# Patient Record
Sex: FEMALE | Race: WHITE | Employment: OTHER | URBAN - METROPOLITAN AREA
[De-identification: names, ages, dates, MRNs, and addresses within clinical notes are randomized per-mention and may not be internally consistent; named-entity substitution may affect disease eponyms.]

---

## 2017-01-12 ENCOUNTER — OFFICE VISIT (OUTPATIENT)
Dept: PEDIATRICS | Facility: CLINIC | Age: 77
End: 2017-01-12
Payer: MEDICARE

## 2017-01-12 VITALS
WEIGHT: 184.9 LBS | SYSTOLIC BLOOD PRESSURE: 144 MMHG | HEIGHT: 64 IN | OXYGEN SATURATION: 96 % | TEMPERATURE: 98 F | DIASTOLIC BLOOD PRESSURE: 70 MMHG | BODY MASS INDEX: 31.57 KG/M2 | HEART RATE: 93 BPM

## 2017-01-12 DIAGNOSIS — I35.0 NONRHEUMATIC AORTIC VALVE STENOSIS: ICD-10-CM

## 2017-01-12 DIAGNOSIS — K21.9 GASTROESOPHAGEAL REFLUX DISEASE WITHOUT ESOPHAGITIS: ICD-10-CM

## 2017-01-12 DIAGNOSIS — Z23 IMMUNIZATION DUE: ICD-10-CM

## 2017-01-12 DIAGNOSIS — C43.9 METASTATIC MALIGNANT MELANOMA (H): Primary | ICD-10-CM

## 2017-01-12 DIAGNOSIS — K21.9 GASTROESOPHAGEAL REFLUX DISEASE WITHOUT ESOPHAGITIS: Primary | ICD-10-CM

## 2017-01-12 DIAGNOSIS — Z23 NEED FOR PROPHYLACTIC VACCINATION AND INOCULATION AGAINST INFLUENZA: ICD-10-CM

## 2017-01-12 PROCEDURE — 90662 IIV NO PRSV INCREASED AG IM: CPT | Performed by: INTERNAL MEDICINE

## 2017-01-12 PROCEDURE — G0008 ADMIN INFLUENZA VIRUS VAC: HCPCS | Performed by: INTERNAL MEDICINE

## 2017-01-12 PROCEDURE — 99397 PER PM REEVAL EST PAT 65+ YR: CPT | Performed by: INTERNAL MEDICINE

## 2017-01-12 RX ORDER — CHOLECALCIFEROL (VITAMIN D3) 25 MCG
2000 CAPSULE ORAL
COMMUNITY
End: 2017-02-15

## 2017-01-12 RX ORDER — PRAVASTATIN SODIUM 40 MG
TABLET ORAL
COMMUNITY
Start: 2016-11-28 | End: 2017-03-21

## 2017-01-12 RX ORDER — PANTOPRAZOLE SODIUM 40 MG/1
40 TABLET, DELAYED RELEASE ORAL DAILY
Qty: 30 TABLET | Refills: 1 | Status: SHIPPED | OUTPATIENT
Start: 2017-01-12 | End: 2017-01-12

## 2017-01-12 RX ORDER — IBUPROFEN 800 MG/1
TABLET, FILM COATED ORAL
Refills: 1 | Status: ON HOLD | COMMUNITY
Start: 2016-06-23 | End: 2017-02-23

## 2017-01-12 NOTE — TELEPHONE ENCOUNTER
pantoprazole (PROTONIX) 40 MG EC tablet      Last Written Prescription Date: 01/12/17  Last Fill Quantity: 30,  # refills: 1   Last Office Visit with [facility], [facility] or [facility] prescribing provider: 01/12/17 with Dr. Wheeler.    Received faxed notation from pharmacy stating that patient is requesting a 90-day supply.  Nargis Klein, CMA

## 2017-01-12 NOTE — MR AVS SNAPSHOT
After Visit Summary   1/12/2017    Gabino Jones    MRN: [id number]           Patient Information     Date Of Birth          1940        Visit Information        Provider Department      1/12/2017 10:00 AM Jeevan Wheeler MD [facility]        Today's Diagnoses     Gastroesophageal reflux disease without esophagitis    -  1     Immunization due           Care Instructions      Preventive Health Recommendations    Female Ages 65 +    Yearly exam:     See your health care provider every year in order to  o Review health changes.   o Discuss preventive care.    o Review your medicines if your doctor has prescribed any.      You no longer need a yearly Pap test unless you've had an abnormal Pap test in the past 10 years. If you have vaginal symptoms, such as bleeding or discharge, be sure to talk with your provider about a Pap test.      Every 1 to 2 years, have a mammogram.  If you are over 69, talk with your health care provider about whether or not you want to continue having screening mammograms.      Every 10 years, have a colonoscopy. Or, have a yearly FIT test (stool test). These exams will check for colon cancer.       Have a cholesterol test every 5 years, or more often if your doctor advises it.       Have a diabetes test (fasting glucose) every three years. If you are at risk for diabetes, you should have this test more often.       At age 65, have a bone density scan (DEXA) to check for osteoporosis (brittle bone disease).    Shots:    Get a flu shot each year.    Get a tetanus shot every 10 years.    Talk to your doctor about your pneumonia vaccines. There are now two you should receive - Pneumovax (PPSV 23) and Prevnar (PCV 13).    Talk to your doctor about the shingles vaccine.    Talk to your doctor about the hepatitis B vaccine.    Nutrition:     Eat at least 5 servings of fruits and vegetables each day.      Eat whole-grain bread, whole-wheat pasta and brown rice  instead of white grains and rice.      Talk to your provider about Calcium and Vitamin D.     Lifestyle    Exercise at least 150 minutes a week (30 minutes a day, 5 days a week). This will help you control your weight and prevent disease.      Limit alcohol to one drink per day.      No smoking.       Wear sunscreen to prevent skin cancer.       See your dentist twice a year for an exam and cleaning.      See your eye doctor every 1 to 2 years to screen for conditions such as glaucoma, macular degeneration and cataracts.        Follow-ups after your visit        Your next 10 appointments already scheduled     Jan 31, 2017 10:30 AM   (Arrive by 10:15 AM)   New Patient Visit with Lenora Pérez MD   [facility] ([facility])    [address] [place] [id number]   [phone]            Feb 15, 2017  9:30 AM   Lab with  LAB   [facility] Lab ([facility])    [address] [place] [id number] [phone]            Feb 15, 2017 10:00 AM   (Arrive by 9:45 AM)   MR ORBIT FACE NECK W/O & W CONTRAST with [address] MRI ([facility])    [address] [place] [id number] [phone]           Take your medicines as usual, unless your doctor tells you not to. Bring a list of your current medicines to your exam (including vitamins, minerals and over-the-counter drugs).  You will be given intravenous contrast for this exam. To prepare:   The day before your exam, drink extra fluids at least six 8-ounce glasses (unless your doctor tells you to restrict your fluids).   Have a blood test (creatinine test) within 30 days of your exam. Go to your clinic or Diagnostic Imaging Department for this test.  The MRI machine uses a strong magnet. Please wear clothes without metal (snaps, zippers). A sweatsuit works well, or we may give you a  hospital gown.  Please remove any body piercings and hair extensions before you arrive. You will also remove watches, jewelry, hairpins, wallets, dentures, partial dental plates and hearing aids. You may wear contact lenses, and you may be able to wear your rings. We have a safe place to keep your personal items, but it is safer to leave them at home.   **IMPORTANT** THE INSTRUCTIONS BELOW ARE ONLY FOR THOSE PATIENTS WHO HAVE BEEN TOLD THEY WILL RECEIVE SEDATION OR GENERAL ANESTHESIA DURING THEIR MRI PROCEDURE:  IF YOU WILL RECEIVE SEDATION (take medicine to help you relax during your exam):   You must get the medicine from your doctor before you arrive. Bring the medicine to the exam. Do not take it at home.   Arrive one hour early. Bring someone who can take you home after the test. Your medicine will make you sleepy. After the exam, you may not drive, take a bus or take a taxi by yourself.   No eating 8 hours before your exam. You may have clear liquids up until 4 hours before your exam. (Clear liquids include water, clear tea, black coffee and fruit juice without pulp.)  IF YOU WILL RECEIVE ANESTHESIA (be asleep for your exam):   Arrive 1 1/2 hours early. Bring someone who can take you home after the test. You may not drive, take a bus or take a taxi by yourself.   No eating 8 hours before your exam. You may have clear liquids up until 4 hours before your exam. (Clear liquids include water, clear tea, black coffee and fruit juice without pulp.)  Please call the Imaging Department at your exam site with any questions.            Feb 15, 2017 11:00 AM   (Arrive by 10:45 AM)   CT CHEST W CONTRAST with UC[facility]2   [facility] ([facility])    [address]  1st Floor  [facility] [address] [phone]           Please bring any scans or X-rays taken at other hospitals, if similar tests were done. Also bring a list of your medicines, including vitamins, minerals and  over-the-counter drugs. It is safest to leave personal items at home.  Be sure to tell your doctor:   If you have any allergies.   If there s any chance you are pregnant.   If you are breastfeeding.   If you have any special needs.  You will have contrast for this exam. To prepare:   Do not eat or drink for 2 hours before your exam. If you need to take medicine, you may take it with small sips of water. (We may ask you to take liquid medicine as well.)   The day before your exam, drink extra fluids at least six 8-ounce glasses (unless your doctor tells you to restrict your fluids).  Patients over 70 or patients with diabetes or kidney problems:   If you haven t had a blood test (creatinine test) within the last 30 days, go to your clinic or Diagnostic Imaging Department for this test.  If you have diabetes:   If your kidney function is normal, continue taking your metformin (Avandamet, Glucophage, Glucovance, Metaglip) on the day of your exam.   If your kidney function is abnormal, wait 48 hours before restarting this medicine.  Please wear loose clothing, such as a sweat suit or jogging clothes. Avoid snaps, zippers and other metal. We may ask you to undress and put on a hospital gown.  If you have any questions, please call the Imaging Department where you will have your exam.            Feb 15, 2017  2:15 PM   (Arrive by 2:00 PM)   Return Visit with Garett Obregon MD   [facility] ([facility])    [address]  2nd Floor  [facility] [address] [phone]            Feb 16, 2017  2:20 PM   (Arrive by 2:05 PM)   RETURN TUMOR VISIT with Yolanda Whitaker MD   [facility] ([facility])    [address]  4th Floor  [facility] [address] [phone]              Who to contact     If you have questions or need follow up information about today's clinic visit or your schedule please contact  "[facility] directly at [phone].  Normal or non-critical lab and imaging results will be communicated to you by iCrederityhart, letter or phone within 4 business days after the clinic has received the results. If you do not hear from us within 7 days, please contact the clinic through iCrederityhart or phone. If you have a critical or abnormal lab result, we will notify you by phone as soon as possible.  Submit refill requests through Viepage or call your pharmacy and they will forward the refill request to us. Please allow 3 business days for your refill to be completed.          Additional Information About Your Visit        Viepage Information     Viepage gives you secure access to your electronic health record. If you see a primary care provider, you can also send messages to your care team and make appointments. If you have questions, please call your primary care clinic.  If you do not have a primary care provider, please call [phone] and they will assist you.      Viepage is an electronic gateway that provides easy, online access to your medical records. With Viepage, you can request a clinic appointment, read your test results, renew a prescription or communicate with your care team.     To access your existing account, please contact your [facility] or call [phone] for assistance.        Care EveryWhere ID     This is your Care EveryWhere ID. This could be used by other organizations to access your [facility] medical records  [id number]        Your Vitals Were     Pulse Temperature Height BMI (Body Mass Index) Pulse Oximetry       93 98  F (36.7  C) (Tympanic) 5' 4.25\" (1.632 m) 31.49 kg/m2 96%        Blood Pressure from Last 3 Encounters:   01/12/17 144/70   08/10/16 167/73   04/25/16 115/66    Weight from Last 3 Encounters:   01/12/17 184 lb 14.4 oz (83.87 kg)   09/15/16 191 lb (86.637 kg)   08/10/16 189 lb 12.8 oz (86.093 kg)              We Performed the " Following     ADMIN INFLUENZA VIRUS VAC          Today's Medication Changes          These changes are accurate as of: 1/12/17 10:46 AM.  If you have any questions, ask your nurse or doctor.               Start taking these medicines.        Dose/Directions    pantoprazole 40 MG EC tablet   Commonly known as:  PROTONIX   Used for:  Gastroesophageal reflux disease without esophagitis   Started by:  Jeevan Wheeler MD        Dose:  40 mg   Take 1 tablet (40 mg) by mouth daily Take 30-60 minutes before a meal.   Quantity:  30 tablet   Refills:  1         Stop taking these medicines if you haven't already. Please contact your care team if you have questions.     MELOXICAM PO   Stopped by:  Jeevan Wheeler MD           omeprazole 20 MG CR capsule   Commonly known as:  priLOSEC   Stopped by:  Jeevan Wheeler MD           TRAMADOL HCL PO   Stopped by:  Jeevan Wheeler MD                Where to get your medicines      These medications were sent to [facility] [address] [place] [address] [address] [address]     Phone:  [phone]    - pantoprazole 40 MG EC tablet             Primary Care Provider Office Phone # Fax #    Stephani PereiraDESTINEY [phone]       [facility] [address] [address] [facility] [address]        Thank you!     Thank you for choosing [facility]  for your care. Our goal is always to provide you with excellent care. Hearing back from our patients is one way we can continue to improve our services. Please take a few minutes to complete the written survey that you may receive in the mail after your visit with us. Thank you!             Your Updated Medication List - Protect others around you: Learn how to safely use, store and throw away your medicines at www.disposemymeds.org.          This list is accurate as of: 1/12/17 10:46 AM.  Always use your most recent med  list.                   Brand Name Dispense Instructions for use    acetaminophen-codeine 300-30 MG per tablet    TYLENOL #3     Take 2 tablets by mouth every 6 hours as needed for moderate pain       ALPRAZOLAM PO      Take 0.25 mg by mouth daily       BUSPAR PO      Take 5 mg by mouth 3 times daily       DOCQLACE 100 MG capsule   Generic drug:  docusate sodium          EFFEXOR  MG 24 hr capsule   Generic drug:  venlafaxine      Take by mouth daily       FOLIC ACID PO      Take 400 mcg by mouth daily       HYDROCHLOROTHIAZIDE PO      Take 12.5 mg by mouth daily       ibuprofen 800 MG tablet    ADVIL/MOTRIN     TK 1 T PO TID WITH MEALS       LISINOPRIL PO      Take 30 mg by mouth daily       pantoprazole 40 MG EC tablet    PROTONIX    30 tablet    Take 1 tablet (40 mg) by mouth daily Take 30-60 minutes before a meal.       potassium chloride SA 20 MEQ CR tablet    K-DUR/KLOR-CON M         POTASSIUM CITRATE PO      Take 20 mEq by mouth       pravastatin 40 MG tablet    PRAVACHOL         ranitidine 150 MG tablet    ZANTAC     TK 1 T PO BID PRN       Simethicone 180 MG Caps      Take by mouth daily       Vitamin D-3 1000 UNITS Caps      Take 2,000 Units by mouth

## 2017-01-12 NOTE — PROGRESS NOTES
SUBJECTIVE:                                                            Gabino Jones is a 76 year old female who presents for Preventive Visit.  Are you in the first 12 months of your Medicare Part B coverage?  No    Healthy Habits:    Do you get at least three servings of calcium containing foods daily (dairy, green leafy vegetables, etc.)? yes    Amount of exercise or daily activities, outside of work: 7 day(s) per week, 30 minutes a day    Problems taking medications regularly No    Medication side effects: Yes - Tramadol gives pt. A rash and itch as well as Temazepam    Have you had an eye exam in the past two years? yes    Do you see a dentist twice per year? no    Do you have sleep apnea, excessive snoring or daytime drowsiness?no    COGNITIVE SCREEN  1) Repeat 3 items (Banana, Sunrise, Chair)    2) Clock draw: NORMAL  3) 3 item recall: Recalls 1 object   Results: NORMAL clock, 1-2 items recalled: COGNITIVE IMPAIRMENT LESS LIKELY    Mini-CogTM Copyright S aMxi. Licensed by the author for use in [facility]; reprinted with permission ([email or web address]). All rights reserved.          History of melanoma    All Histories reviewed and updated in [facility] as appropriate.  Social History   Substance Use Topics     Smoking status: Former Smoker     Types: Cigarettes     Quit date: 06/10/2004     Smokeless tobacco: Never Used     Alcohol Use: No       The patient does not drink >3 drinks per day nor >7 drinks per week.    Today's PHQ-2 Score:   PHQ-2 ( 1999 Pfizer) 6/25/2015 5/19/2015   Q1: Little interest or pleasure in doing things 0 1   Q2: Feeling down, depressed or hopeless 3 1   PHQ-2 Score 3 2       Do you feel safe in your environment - Yes    Do you have a Health Care Directive?: No    Current providers sharing in care for this patient include:   Patient Care Team:  Stephani Pereira NP as PCP - General  Tatyana Morales RN as Clinic Care Coordinator (Otolaryngology)  Garett Obregon MD as MD  (Oncology)  Layla Campos, RN as Nurse Coordinator (Oncology)      Hearing impairment: No    Ability to successfully perform activities of daily living: Yes, no assistance needed     Fall risk:       Home safety:  none identified  click delete button to remove this line now    The following health maintenance items are reviewed in Epic and correct as of today:  Health Maintenance   Topic Date Due     TETANUS IMMUNIZATION (SYSTEM ASSIGNED)  03/18/1958     ADVANCE DIRECTIVE PLANNING Q5 YRS (NO INBASKET)  03/18/1958     FALL RISK ASSESSMENT  03/18/2005     DEXA SCAN SCREENING (SYSTEM ASSIGNED)  03/18/2005     PNEUMOCOCCAL (1 of 2 - PCV13) 03/18/2005     INFLUENZA VACCINE (SYSTEM ASSIGNED)  09/01/2016     LIPID SCREEN Q5 YR FEMALE (SYSTEM ASSIGNED)  08/10/2021              ROS:  C: NEGATIVE for fever, chills, change in weight  E/M: NEGATIVE for ear, mouth and throat problems  R: NEGATIVE for significant cough or SOB  CV: NEGATIVE for chest pain, palpitations or peripheral edema  GI: POSITIVE for constipation and heartburn or reflux    Current Outpatient Prescriptions   Medication Sig Dispense Refill     ranitidine (ZANTAC) 150 MG tablet TK 1 T PO BID PRN  1     pravastatin (PRAVACHOL) 40 MG tablet        ibuprofen (ADVIL/MOTRIN) 800 MG tablet TK 1 T PO TID WITH MEALS  1     ALPRAZOLAM PO Take 0.25 mg by mouth daily       Cholecalciferol (VITAMIN D-3) 1000 UNITS CAPS Take 2,000 Units by mouth       acetaminophen-codeine (TYLENOL #3) 300-30 MG per tablet Take 2 tablets by mouth every 6 hours as needed for moderate pain       BusPIRone HCl (BUSPAR PO) Take 5 mg by mouth 3 times daily       FOLIC ACID PO Take 400 mcg by mouth daily       MELOXICAM PO Take 7.5 mg by mouth daily       Simethicone 180 MG CAPS Take by mouth daily       DOCQLACE 100 MG capsule   0     potassium chloride SA (K-DUR,KLOR-CON M) 20 MEQ tablet   3     omeprazole (PRILOSEC) 20 MG capsule   3     HYDROCHLOROTHIAZIDE PO Take 12.5 mg by mouth daily    "    LISINOPRIL PO Take 30 mg by mouth daily       venlafaxine (EFFEXOR XR) 150 MG 24 hr capsule Take by mouth daily       POTASSIUM CITRATE PO Take 20 mEq by mouth       Allergies   Allergen Reactions     Novocain [Procaine] Unknown and Rash     Mirtazapine Rash     OBJECTIVE:                                                             Estimated body mass index is 30.84 kg/(m^2) as calculated from the following:    Height as of 8/10/16: 5' 5.98\" (1.676 m).    Weight as of 9/15/16: 191 lb (86.637 kg).  EXAM: /70 mmHg  Pulse 93  Temp(Src) 98  F (36.7  C) (Tympanic)  Ht 5' 4.25\" (1.632 m)  Wt 184 lb 14.4 oz (83.87 kg)  BMI 31.49 kg/m2  SpO2 96%    GENERAL: healthy, alert and no distress  EYES: Eyes grossly normal to inspection  HENT: normal cephalic/atraumatic, nose and mouth without ulcers or lesions, oropharynx clear and oral mucous membranes moist  NECK: no adenopathy, no asymmetry, masses, or scars and thyroid normal to palpation  RESP: lungs clear to auscultation - no rales, rhonchi or wheezes  CV: regular rates and rhythm, normal S1 S2, no S3 or S4, grade 3/6 SUNITHA heard best over the base and no peripheral edema  ABDOMEN: soft, nontender, no hepatosplenomegaly, no masses and bowel sounds normal  MS: no gross musculoskeletal defects noted, no edema  NEURO: Normal strength and tone, mentation intact and speech normal  PSYCH: mentation appears normal, affect normal/bright    ASSESSMENT / PLAN:                                                            1. Metastatic malignant melanoma (H)    The MRI showed questionable frontal bone lesion however I did not see that on the bone scan. Would recommend a PET scan for further evaluation    2. Gastroesophageal reflux disease without esophagitis    Recommend stopping NSAIDs.We'll try Protonix instead of omeprazole.  - pantoprazole (PROTONIX) 40 MG EC tablet; Take 1 tablet (40 mg) by mouth daily Take 30-60 minutes before a meal.  Dispense: 30 tablet; Refill: " "1    3. Immunization due      4. Need for prophylactic vaccination and inoculation against influenza    - FLU VACCINE, INCREASED ANTIGEN, PRESV FREE, AGE 65+ [52752]  - Vaccine Administration, Initial [94834]    5. Nonrheumatic aortic valve stenosis        End of Life Planning:  Patient currently has an advanced directive: no    COUNSELING:  Reviewed preventive health counseling, as reflected in patient instructions       Regular exercise       Healthy diet/nutrition       Vaccinated for: Influenza        Estimated body mass index is 30.84 kg/(m^2) as calculated from the following:    Height as of 8/10/16: 5' 5.98\" (1.676 m).    Weight as of 9/15/16: 191 lb (86.637 kg).     reports that she quit smoking about 12 years ago. Her smoking use included Cigarettes. She has never used smokeless tobacco.      Appropriate preventive services were discussed with this patient, including applicable screening as appropriate for cardiovascular disease, diabetes, osteopenia/osteoporosis, and glaucoma.  As appropriate for age/gender, discussed screening for colorectal cancer, prostate cancer, breast cancer, and cervical cancer. Checklist reviewing preventive services available has been given to the patient.    Reviewed patients plan of care and provided an AVS. The Complex Care Plan (for patients with higher acuity and needing more deliberate coordination of services) for Gabino meets the Care Plan requirement. This Care Plan has been established and reviewed with the Patient.    Counseling Resources:  ATP IV Guidelines  Pooled Cohorts Equation Calculator  Breast Cancer Risk Calculator  FRAX Risk Assessment  ICSI Preventive Guidelines  Dietary Guidelines for Americans, 2010  USDA's MyPlate  ASA Prophylaxis  Lung CA Screening    Jeevan Wheeler MD  [facility]  "

## 2017-01-12 NOTE — PROGRESS NOTES
Injectable Influenza Immunization Documentation    1.  Is the person to be vaccinated sick today?  No    2. Does the person to be vaccinated have an allergy to eggs or to a component of the vaccine?  No    3. Has the person to be vaccinated today ever had a serious reaction to influenza vaccine in the past?  No    4. Has the person to be vaccinated ever had Guillain-Manly syndrome?  No     Form completed by Layla Moon CMA

## 2017-01-18 RX ORDER — PANTOPRAZOLE SODIUM 40 MG/1
40 TABLET, DELAYED RELEASE ORAL DAILY
Qty: 90 TABLET | Refills: 1 | Status: SHIPPED | OUTPATIENT
Start: 2017-01-18 | End: 2017-05-09

## 2017-01-18 NOTE — TELEPHONE ENCOUNTER
Patient is requesting a 90 day supply of this medication. Routing to Dr. Wheeler to review.  Tatyana Gonzalez RN, [facility]

## 2017-01-31 ENCOUNTER — OFFICE VISIT (OUTPATIENT)
Dept: NEUROSURGERY | Facility: CLINIC | Age: 77
End: 2017-01-31

## 2017-01-31 VITALS
DIASTOLIC BLOOD PRESSURE: 51 MMHG | BODY MASS INDEX: 29.89 KG/M2 | RESPIRATION RATE: 20 BRPM | WEIGHT: 186 LBS | OXYGEN SATURATION: 99 % | TEMPERATURE: 97.6 F | HEART RATE: 85 BPM | SYSTOLIC BLOOD PRESSURE: 152 MMHG | HEIGHT: 66 IN

## 2017-01-31 DIAGNOSIS — M89.9 FRONTAL SKULL LESION: Primary | ICD-10-CM

## 2017-01-31 ASSESSMENT — PAIN SCALES - GENERAL: PAINLEVEL: MODERATE PAIN (5)

## 2017-01-31 NOTE — PROGRESS NOTES
Dear Ms. Pereira:    We saw Ms. Gabino Jones in [facility] today for evaluation of a skull lesion at Dr. Barboza's request. She is a 76 year old right-handed woman who developed trouble breathing from a nasal obstruction since 2013. She underwent several courses of antibiotics for a presumed sinus infection and was following with an ENT surgeon. An MRI in 2014 revealed a 2 cm anterior cranial fossa mass and she underwent resection for this on 2014 at [facility]. She continued to bleed and underwent re-operation later the same day to remove more of the lesion. She then underwent another endoscopy at [facility] the following day for persistent bleeding. The histopathology revealed this lesion to be a malignant melanoma. She underwent one more resection surgery on 2014 followed by radiation therapy, completed 2014. Ms. Jones developed vision loss in her left eye that was diagnosed on 2016. A new MRI showed a new bony lesion on the right frontal bone. There were no concerning lesion found in her lumbar spine and she is here to discuss biopsy of her sinus lesion. Today, she comes to clinic with her . She reports her vision was lost due to a stroke. She wears bilateral hearing aids, but her hearing continues to decline. 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 She sees Dr. Archibald at the eye clinic in [place].    MEDICAL HISTORY: She has had cholecystectomy. Hernia repair.    SOCIAL HISTORY: She lives in [place] with her . They have 4 living children and 8 grandchildren. Their eldest son  at 40; he had cerebral palsy. She quit smoking more than 10 years ago; she smoked a pack a day.     PHYSICAL EXAM: On exam, she appears well. She has extensive depressions in the skull and we do not feel an obvious mass. Extraocular movements intact. She moves upper and lower extremities equally and with good coordination. She has baseline dysarthria,  but is coherent. Gross neurological examination is normal.     REVIEW OF STUDIES: We reviewed her MRI from 11/2016 and 08/2016. On both studies there is an enhancing lesion in the lateral convexity of the right frontal bone. The lesion does not appear to extend into the epidural space. The mass enhances homogenously and did not appear to grow between August and Novemebr. Because of her history, metastatic melanoma is certainly a consideration.     IMPRESSION AND PLAN: We agree that obtaining a definitive diagnosis for this skull lesion is important. The best way to achieve that is with an excisional biopsy through a small right frontal craniotomy. She and her  seem to have been prepared for this and agree to proceed.We discussed the most serious risks of the operation including epidural hematoma and infection as well as the risk of indeterminate biopsy. They understand all of this and we will schedule her in the next 2-3 weeks. I appreciate your confidence in involving us in your patient's care. Please do not hesitate to contact us with questions. We will keep you informed of her progress.       SHEA BRITTON MD    I, Aster White, am serving as a scribe to document services personally performed by Shea Britton MD, based upon my observations and the provider's statements to me. All documentation has been reviewed by the aforementioned doctor prior to being entered into the official medical record.    I, Shea Britton, attest that above named individual is acting in scribe capacity, has observed my performance of the services and has documented them in accordance with my direction. The documentation recorded by the scribe accurately reflects the service I personally performed and the decisions made by me.

## 2017-01-31 NOTE — LETTER
2017      RE: Gabino Jones  [address] [facility] [address]       Dear Ms. Pereira:    We saw Ms. Gabino Jones in [facility] today for evaluation of a skull lesion at Dr. Barboza's request. She is a 76 year old right-handed woman who developed trouble breathing from a nasal obstruction since 2013. She underwent several courses of antibiotics for a presumed sinus infection and was following with an ENT surgeon. An MRI in 2014 revealed a 2 cm anterior cranial fossa mass and she underwent resection for this on 2014 at [facility]. She continued to bleed and underwent re-operation later the same day to remove more of the lesion. She then underwent another endoscopy at [facility] the following day for persistent bleeding. The histopathology revealed this lesion to be a malignant melanoma. She underwent one more resection surgery on 2014 followed by radiation therapy, completed 2014. Ms. Jones developed vision loss in her left eye that was diagnosed on 2016. A new MRI showed a new bony lesion on the right frontal bone. There were no concerning lesion found in her lumbar spine and she is here to discuss biopsy of her sinus lesion. Today, she comes to clinic with her . She reports her vision was lost due to a stroke. She wears bilateral hearing aids, but her hearing continues to decline. 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 She sees Dr. Archibald at the eye clinic in [place].    MEDICAL HISTORY: She has had cholecystectomy. Hernia repair.    SOCIAL HISTORY: She lives in [place] with her . They have 4 living children and 8 grandchildren. Their eldest son  at 40; he had cerebral palsy. She quit smoking more than 10 years ago; she smoked a pack a day.     PHYSICAL EXAM: On exam, she appears well. She has extensive depressions in the skull and we do not feel an obvious mass. Extraocular movements intact. She moves upper and  lower extremities equally and with good coordination. She has baseline dysarthria, but is coherent. Gross neurological examination is normal.     REVIEW OF STUDIES: We reviewed her MRI from 11/2016 and 08/2016. On both studies there is an enhancing lesion in the lateral convexity of the right frontal bone. The lesion does not appear to extend into the epidural space. The mass enhances homogenously and did not appear to grow between August and Novemebr. Because of her history, metastatic melanoma is certainly a consideration.     IMPRESSION AND PLAN: We agree that obtaining a definitive diagnosis for this skull lesion is important. The best way to achieve that is with an excisional biopsy through a small right frontal craniotomy. She and her  seem to have been prepared for this and agree to proceed.We discussed the most serious risks of the operation including epidural hematoma and infection as well as the risk of indeterminate biopsy. They understand all of this and we will schedule her in the next 2-3 weeks. I appreciate your confidence in involving us in your patient's care. Please do not hesitate to contact us with questions. We will keep you informed of her progress.       SHEA BRITTON MD    I, Aster White, am serving as a scribe to document services personally performed by Shea Britton MD, based upon my observations and the provider's statements to me. All documentation has been reviewed by the aforementioned doctor prior to being entered into the official medical record.    I, Shea Britton, attest that above named individual is acting in scribe capacity, has observed my performance of the services and has documented them in accordance with my direction. The documentation recorded by the scribe accurately reflects the service I personally performed and the decisions made by me.      Shea Britton MD

## 2017-01-31 NOTE — MR AVS SNAPSHOT
After Visit Summary   1/31/2017    Gabino Jones    MRN: [id number]           Patient Information     Date Of Birth          1940        Visit Information        Provider Department      1/31/2017 10:30 AM Lenora Pérez MD [facility] Neurosurgery        Today's Diagnoses     Frontal skull lesion    -  1       Follow-ups after your visit        Your next 10 appointments already scheduled     Oct 04, 2017   Procedure with Milton Reid MD   [facility], [place], Same Day Surgery (--)    [address]  [place] [id number] [phone]            Dec 13, 2017 10:15 AM CST   LAB with  LAB   [facility] ([facility])    [address] [place] [id number] [phone]           Patient must bring picture ID. Patient should be prepared to give a urine specimen  Please do not eat 10-12 hours before your appointment if you are coming in fasting for labs on lipids, cholesterol, or glucose (sugar). Pregnant women should follow their Care Team instructions. Water with medications is okay. Do not drink coffee or other fluids. If you have concerns about taking  your medications, please ask at office or if scheduling via [employer], send a message by clicking on Secure Messaging, Message Your Care Team.            Dec 13, 2017 10:45 AM CST   (Arrive by 10:30 AM)   MR BRAIN W/O & W CONTRAST with [id number] [facility] ([facility])    [address] [place] [id number] [phone]           Take your medicines as usual, unless your doctor tells you not to. Bring a list of your current medicines to your exam (including vitamins, minerals and over-the-counter drugs).  You will be given intravenous contrast for this exam. To prepare:   The day before your exam, drink extra fluids at least six 8-ounce glasses (unless your doctor tells you to restrict your fluids).   Have a blood test  (creatinine test) within 30 days of your exam. Go to your clinic or Diagnostic Imaging Department for this test.  The MRI machine uses a strong magnet. Please wear clothes without metal (snaps, zippers). A sweatsuit works well, or we may give you a hospital gown.  Please remove any body piercings and hair extensions before you arrive. You will also remove watches, jewelry, hairpins, wallets, dentures, partial dental plates and hearing aids. You may wear contact lenses, and you may be able to wear your rings. We have a safe place to keep your personal items, but it is safer to leave them at home.   **IMPORTANT** THE INSTRUCTIONS BELOW ARE ONLY FOR THOSE PATIENTS WHO HAVE BEEN TOLD THEY WILL RECEIVE SEDATION OR GENERAL ANESTHESIA DURING THEIR MRI PROCEDURE:  IF YOU WILL RECEIVE SEDATION (take medicine to help you relax during your exam):   You must get the medicine from your doctor before you arrive. Bring the medicine to the exam. Do not take it at home.   Arrive one hour early. Bring someone who can take you home after the test. Your medicine will make you sleepy. After the exam, you may not drive, take a bus or take a taxi by yourself.   No eating 8 hours before your exam. You may have clear liquids up until 4 hours before your exam. (Clear liquids include water, clear tea, black coffee and fruit juice without pulp.)  IF YOU WILL RECEIVE ANESTHESIA (be asleep for your exam):   Arrive 1 1/2 hours early. Bring someone who can take you home after the test. You may not drive, take a bus or take a taxi by yourself.   No eating 8 hours before your exam. You may have clear liquids up until 4 hours before your exam. (Clear liquids include water, clear tea, black coffee and fruit juice without pulp.)  Please call the Imaging Department at your exam site with any questions.            Dec 13, 2017 11:40 AM CST   (Arrive by 11:25 AM)   CT CHEST/ABDOMEN/PELVIS W CONTRAST with UCCT1   [facility] [address] [facility] ([facility]  [facility] [place])    [address] [place] [address]  [address] [facility] [address] [phone]           Please bring any scans or X-rays taken at other hospitals, if similar tests were done. Also bring a list of your medicines, including vitamins, minerals and over-the-counter drugs. It is safest to leave personal items at home.  Be sure to tell your doctor:   If you have any allergies.   If there s any chance you are pregnant.   If you are breastfeeding.   If you have any special needs.  You may have contrast for this exam. To prepare:   Do not eat or drink for 2 hours before your exam. If you need to take medicine, you may take it with small sips of water. (We may ask you to take liquid medicine as well.)   The day before your exam, drink extra fluids at least six 8-ounce glasses (unless your doctor tells you to restrict your fluids).  Patients over 70 or patients with diabetes or kidney problems:   If you haven t had a blood test (creatinine test) within the last 30 days, go to your clinic or Diagnostic Imaging Department for this test.  If you have diabetes:   If your kidney function is normal, continue taking your metformin (Avandamet, Glucophage, Glucovance, Metaglip) on the day of your exam.   If your kidney function is abnormal, wait 48 hours before restarting this medicine.  You will have oral contrast for this exam:   You will drink the contrast at home. Get this from your clinic or Diagnostic Imaging Department. Please follow the directions given.  Please wear loose clothing, such as a sweat suit or jogging clothes. Avoid snaps, zippers and other metal. We may ask you to undress and put on a hospital gown.  If you have any questions, please call the Imaging Department where you will have your exam.            Dec 13, 2017  1:45 PM CST   (Arrive by 1:30 PM)   Return Visit with Garett Obregon MD   [facility] ([facility] [place])    [address] [place] [address]  "Se  2nd Floor  [facility] [id number] [phone]            Jan 04, 2018  2:20 PM CST   (Arrive by 2:05 PM)   Return Visit with Yolanda Whitaker MD   [facility] ([facility] [place])    [address] [facility] [address]  [address] [facility] [id number] [phone]              Who to contact     Please call your clinic at [phone] to:    Ask questions about your health    Make or cancel appointments    Discuss your medicines    Learn about your test results    Speak to your doctor   If you have compliments or concerns about an experience at your clinic, or if you wish to file a complaint, please contact [facility] Patient Relations at [phone] or email us at [email or web address]         Additional Information About Your Visit        K[employer] Information     [employer] gives you secure access to your electronic health record. If you see a primary care provider, you can also send messages to your care team and make appointments. If you have questions, please call your primary care clinic.  If you do not have a primary care provider, please call [phone] and they will assist you.      VirtualU is an electronic gateway that provides easy, online access to your medical records. With VirtualU, you can request a clinic appointment, read your test results, renew a prescription or communicate with your care team.     To access your existing account, please contact your [facility] or call [phone] for assistance.        Care EveryWhere ID     This is your Care EveryWhere ID. This could be used by other organizations to access your [facility] medical records  [id number]        Your Vitals Were     Pulse Temperature Respirations Height Pulse Oximetry Breastfeeding?    85 97.6  F (36.4  C) (Oral) 20 1.664 m (5' 5.5\") 99% No    BMI (Body Mass Index)                   30.48 kg/m2            Blood Pressure " from Last 3 Encounters:   09/26/17 120/60   08/30/17 129/64   08/01/17 120/70    Weight from Last 3 Encounters:   09/26/17 83.3 kg (183 lb 11.2 oz)   08/30/17 83.8 kg (184 lb 12.8 oz)   08/01/17 82.1 kg (181 lb)              Today, you had the following     No orders found for display       Primary Care Provider Office Phone # Fax #    Jeevan Law Wheeler [phone] [address] [facility] [address]        Equal Access to Services     [facility]: Hadii aad ku hadasho Soomaali, waaxda luqadaha, qaybta kaalmada adegroveryada, vito wright . So [facility] [phone].    ATENCIÓN: Si habla español, tiene a velazquez disposición servicios gratuitos de asistencia lingüística. Llame al [phone].    We comply with applicable federal civil rights laws and [place] laws. We do not discriminate on the basis of race, color, national origin, age, disability sex, sexual orientation or gender identity.            Thank you!     Thank you for choosing [facility]  for your care. Our goal is always to provide you with excellent care. Hearing back from our patients is one way we can continue to improve our services. Please take a few minutes to complete the written survey that you may receive in the mail after your visit with us. Thank you!             Your Updated Medication List - Protect others around you: Learn how to safely use, store and throw away your medicines at www.disposemymeds.org.          This list is accurate as of: 1/31/17 11:59 PM.  Always use your most recent med list.                   Brand Name Dispense Instructions for use Diagnosis    ALPRAZOLAM PO      Take 0.25 mg by mouth daily        BUSPAR PO      Take 5 mg by mouth 3 times daily        ibuprofen 800 MG tablet    ADVIL/MOTRIN     TK 1 T PO TID WITH MEALS        POTASSIUM CITRATE PO      Take 20 mEq by mouth 2 times daily

## 2017-01-31 NOTE — LETTER
2017       RE: Gabino Jones  [address]  [facility] [address]     Dear Colleague,    Thank you for referring your patient, Gabino Jones, to the [facility] NEUROSURGERY at [facility]. Please see a copy of my visit note below.    Dear Ms. Pereira:    We saw Ms. Gabino Jones in [facility] today for evaluation of a skull lesion at Dr. Barboza's request. She is a 76 year old right-handed woman who developed trouble breathing from a nasal obstruction since 2013. She underwent several courses of antibiotics for a presumed sinus infection and was following with an ENT surgeon. An MRI in 2014 revealed a 2 cm anterior cranial fossa mass and she underwent resection for this on 2014 at [facility]. She continued to bleed and underwent re-operation later the same day to remove more of the lesion. She then underwent another endoscopy at [facility] the following day for persistent bleeding. The histopathology revealed this lesion to be a malignant melanoma. She underwent one more resection surgery on 2014 followed by radiation therapy, completed 2014. Ms. Jones developed vision loss in her left eye that was diagnosed on 2016. A new MRI showed a new bony lesion on the right frontal bone. There were no concerning lesion found in her lumbar spine and she is here to discuss biopsy of her sinus lesion. Today, she comes to clinic with her . She reports her vision was lost due to a stroke. She wears bilateral hearing aids, but her hearing continues to decline. 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 She sees Dr. Archibald at the eye clinic in [place].    MEDICAL HISTORY: She has had cholecystectomy. Hernia repair.    SOCIAL HISTORY: She lives in [place] with her . They have 4 living children and 8 grandchildren. Their eldest son  at 40; he had cerebral palsy. She quit smoking more than 10 years ago; she  smoked a pack a day.     PHYSICAL EXAM: On exam, she appears well. She has extensive depressions in the skull and we do not feel an obvious mass. Extraocular movements intact. She moves upper and lower extremities equally and with good coordination. She has baseline dysarthria, but is coherent. Gross neurological examination is normal.     REVIEW OF STUDIES: We reviewed her MRI from 11/2016 and 08/2016. On both studies there is an enhancing lesion in the lateral convexity of the right frontal bone. The lesion does not appear to extend into the epidural space. The mass enhances homogenously and did not appear to grow between August and Novemebr. Because of her history, metastatic melanoma is certainly a consideration.     IMPRESSION AND PLAN: We agree that obtaining a definitive diagnosis for this skull lesion is important. The best way to achieve that is with an excisional biopsy through a small right frontal craniotomy. She and her  seem to have been prepared for this and agree to proceed.We discussed the most serious risks of the operation including epidural hematoma and infection as well as the risk of indeterminate biopsy. They understand all of this and we will schedule her in the next 2-3 weeks. I appreciate your confidence in involving us in your patient's care. Please do not hesitate to contact us with questions. We will keep you informed of her progress.       MD RADHA JAVIRE Madison Smith, am serving as a scribe to document services personally performed by Lenora Pérez MD, based upon my observations and the provider's statements to me. All documentation has been reviewed by the aforementioned doctor prior to being entered into the official medical record.    I, Lenora Pérez, attest that above named individual is acting in scribe capacity, has observed my performance of the services and has documented them in accordance with my direction. The documentation recorded by the  joseibe accurately reflects the service I personally performed and the decisions made by me.      Again, thank you for allowing me to participate in the care of your patient.      Sincerely,    Lenora Pérez MD

## 2017-02-01 NOTE — NURSING NOTE
Pre-op Teaching                Pre-op folder with specific written instructions was provided to  Gabino.     Discussed pre-op routine and requirements to include:  surgical procedure, post-op recovery and expectations, need for H&P, NPO prior to OR, pre-op antibacterial showers, pain control and importance of follow-up visits.  Surgery scheduling will coordinate OR time/date and update patient as appropriate.  Pre-op will call with more instructions 24-48 hour pre-op.   Ample time was provided for questions and in-depth discussion of topics of heightened interest.  Two four ounce bottle of antibacterial soap solution was given to patient as well as specific instructions for use. Patient and  verbalized understanding of instructions.

## 2017-02-06 DIAGNOSIS — M89.8X8 SKULL MASS: Primary | ICD-10-CM

## 2017-02-10 ENCOUNTER — CARE COORDINATION (OUTPATIENT)
Dept: NEUROSURGERY | Facility: CLINIC | Age: 77
End: 2017-02-10

## 2017-02-10 DIAGNOSIS — M89.9 FRONTAL SKULL LESION: Primary | ICD-10-CM

## 2017-02-13 ENCOUNTER — CARE COORDINATION (OUTPATIENT)
Dept: NEUROSURGERY | Facility: CLINIC | Age: 77
End: 2017-02-13

## 2017-02-13 NOTE — PATIENT INSTRUCTIONS
Dear Gabino,     Enclosed you will find information you will need to prepare for your upcoming surgery at the [facility].  At this time, your surgery is scheduled for:    February 22, 2017 @ 7:45 AM                         You MUST arrive on the Surgery Admission Unit 3C at the [facility] NLT 5:30 AM that same day      You will receive a call from the Pre-op Staff 1-2 days prior to your surgery date to confirm the details of your surgery.       There are several important things you must do before that date.Please call Dr. Pérez's RN Care Coordinator, Annabel Billings @ [phone] within one or two days of receiving this information.  She will go over its contents and assist you in coordinating the necessary tasks prior to your scheduled surgery date.  Please be aware, several of these tasks are REQUIRED before surgery.  If not done in a timely manner, your surgery may be cancelled or delayed.           Feel free to call our clinic at the [facility] ([phone]) if you have questions.                                            Sincerely,        Annabel Billings, RN Care Coordinator  Department of Neurosurgery  [facility]

## 2017-02-15 ENCOUNTER — ONCOLOGY VISIT (OUTPATIENT)
Dept: ONCOLOGY | Facility: CLINIC | Age: 77
End: 2017-02-15
Attending: INTERNAL MEDICINE
Payer: MEDICARE

## 2017-02-15 DIAGNOSIS — R68.0 HYPOTHERMIA NOT ASSOCIATED WITH LOW ENVIRONMENTAL TEMPERATURE: ICD-10-CM

## 2017-02-15 DIAGNOSIS — C30.0 MALIGNANT MELANOMA OF NASAL CAVITY (H): ICD-10-CM

## 2017-02-15 DIAGNOSIS — C30.0 MALIGNANT MELANOMA OF NASAL CAVITY (H): Primary | ICD-10-CM

## 2017-02-15 DIAGNOSIS — C76.0 HEAD AND NECK CANCER (H): ICD-10-CM

## 2017-02-15 LAB
ALBUMIN SERPL-MCNC: 3.1 G/DL (ref 3.4–5)
ALP SERPL-CCNC: 90 U/L (ref 40–150)
ALT SERPL W P-5'-P-CCNC: 15 U/L (ref 0–50)
ANION GAP SERPL CALCULATED.3IONS-SCNC: 10 MMOL/L (ref 3–14)
AST SERPL W P-5'-P-CCNC: 19 U/L (ref 0–45)
BASOPHILS # BLD AUTO: 0.1 10E9/L (ref 0–0.2)
BASOPHILS NFR BLD AUTO: 0.8 %
BILIRUB SERPL-MCNC: 0.4 MG/DL (ref 0.2–1.3)
BUN SERPL-MCNC: 11 MG/DL (ref 7–30)
CALCIUM SERPL-MCNC: 8 MG/DL (ref 8.5–10.1)
CHLORIDE SERPL-SCNC: 98 MMOL/L (ref 94–109)
CO2 SERPL-SCNC: 25 MMOL/L (ref 20–32)
CREAT SERPL-MCNC: 0.86 MG/DL (ref 0.52–1.04)
DIFFERENTIAL METHOD BLD: ABNORMAL
EOSINOPHIL # BLD AUTO: 0.2 10E9/L (ref 0–0.7)
EOSINOPHIL NFR BLD AUTO: 2.1 %
ERYTHROCYTE [DISTWIDTH] IN BLOOD BY AUTOMATED COUNT: 13.2 % (ref 10–15)
GFR SERPL CREATININE-BSD FRML MDRD: 64 ML/MIN/1.7M2
GLUCOSE SERPL-MCNC: 84 MG/DL (ref 70–99)
HCT VFR BLD AUTO: 34.2 % (ref 35–47)
HGB BLD-MCNC: 11.1 G/DL (ref 11.7–15.7)
IMM GRANULOCYTES # BLD: 0 10E9/L (ref 0–0.4)
IMM GRANULOCYTES NFR BLD: 0.5 %
LYMPHOCYTES # BLD AUTO: 1.5 10E9/L (ref 0.8–5.3)
LYMPHOCYTES NFR BLD AUTO: 19.3 %
MCH RBC QN AUTO: 29.1 PG (ref 26.5–33)
MCHC RBC AUTO-ENTMCNC: 32.5 G/DL (ref 31.5–36.5)
MCV RBC AUTO: 90 FL (ref 78–100)
MONOCYTES # BLD AUTO: 0.8 10E9/L (ref 0–1.3)
MONOCYTES NFR BLD AUTO: 10.4 %
NEUTROPHILS # BLD AUTO: 5.1 10E9/L (ref 1.6–8.3)
NEUTROPHILS NFR BLD AUTO: 66.9 %
NRBC # BLD AUTO: 0 10*3/UL
NRBC BLD AUTO-RTO: 0 /100
PLATELET # BLD AUTO: 219 10E9/L (ref 150–450)
POTASSIUM SERPL-SCNC: 3.9 MMOL/L (ref 3.4–5.3)
PROT SERPL-MCNC: 6.6 G/DL (ref 6.8–8.8)
RBC # BLD AUTO: 3.81 10E12/L (ref 3.8–5.2)
SODIUM SERPL-SCNC: 133 MMOL/L (ref 133–144)
TSH SERPL DL<=0.005 MIU/L-ACNC: 2.27 MU/L (ref 0.4–4)
WBC # BLD AUTO: 7.6 10E9/L (ref 4–11)

## 2017-02-15 PROCEDURE — 36415 COLL VENOUS BLD VENIPUNCTURE: CPT | Performed by: INTERNAL MEDICINE

## 2017-02-15 PROCEDURE — 84443 ASSAY THYROID STIM HORMONE: CPT | Performed by: INTERNAL MEDICINE

## 2017-02-15 PROCEDURE — 85025 COMPLETE CBC W/AUTO DIFF WBC: CPT | Performed by: INTERNAL MEDICINE

## 2017-02-15 PROCEDURE — 99212 OFFICE O/P EST SF 10 MIN: CPT

## 2017-02-15 PROCEDURE — 99214 OFFICE O/P EST MOD 30 MIN: CPT | Mod: ZP | Performed by: INTERNAL MEDICINE

## 2017-02-15 PROCEDURE — 80053 COMPREHEN METABOLIC PANEL: CPT | Performed by: INTERNAL MEDICINE

## 2017-02-15 RX ORDER — MELATONIN 10 MG
TABLET, SUBLINGUAL SUBLINGUAL 2 TIMES DAILY
COMMUNITY
End: 2017-04-17

## 2017-02-15 NOTE — LETTER
2/15/2017       RE: Gabino Jones  [address] [place] [id number]     Dear Colleague,    Thank you for referring your patient, Gabino Jones, to the [facility]. Please see a copy of my visit note below.    [facility]  FOLLOW-UP VISIT NOTE    PATIENT NAME: Gabino Jones MRN # [id number]  DATE OF VISIT: Feb 15, 2017 YOB: 1940    REFERRING PROVIDER: Yolanda Whitaker MD   PHYSICIANS  [address]  [place] [address]    CANCER TYPE: Malignant Melanoma  STAGE:   ECOG PS: 1    TREATMENT SUMMARY:  Gabino presented with difficulty to breathe for previous 6-7 months due to partial nasal obstruction starting December 2013. She had been following with a local ENT surgeon and was prescribed nasal drops several courses of antibiotics for presumed ethmoid sinusitis.  Most recently in the last couple of weeks she has passed a few blood clots from her nose. She had an episode of epistaxis in April of 2014 which resolved on its own. On 06/03/14, she underwent right intranasal endoscopic sinus surgery for her ethmoid sinusitis as well as resection of the right nasal mass measuring 2.5 x 2 x 1.2 cm at [facility]. Following the procedure, she continued to have hemorrhage from the right nasal cavity and was taken back to the OR that same day for a repeat nasal endoscopy, where it was determined that it was a posterior bleed and that the mass had not been completely resected. The remainder of the mass was resected and the bleeding stopped. The following morning (6/04/14), she underwent another endoscopy at [facility] due to continued bleeding, and her right nasal cavity was packed. The histopathology from her recent resection revealed malignant melanoma.  The margins were positive.  This prompted a referral to [facility] and she was seen in ENT surgery by Dr. Whitaker and  Dr. Ron Quiroga in Radiation/Oncology in addition to Medical Oncology.  She was worked up with a staging PET/CT scan and a brain MRI.  The PET/CT scan revealed minimal uptake in the local area which could very well be from recent surgery or residual disease.  She had multiple subcentimeter pulmonary nodules with the largest one being 6 mm in the left lower lobe.  She had a history of pulmonary nodules which had been previously followed for 3 years. The PET was also significant for an enlarged portacaval node with mild FDG uptake and FDG uptake in the hepatic flexure of her colon.  Her most recent colonoscopy has been merely 2 months ago and was completely normal.  Her brain MRI was limited because of motion artifact. There were no enlarged lymph nodes in the head and neck region or any other site of increased FDG uptake that would be suspicious for definitive metastatic disease.      Her case was reviewed at the multi-specialty tumor board and she was recommended endoscopic resection of the melanoma followed by post operative radiation therapy. She underwent a rerevision surgery on 06/23/2014 using an endoscopic endonasal approach. Tumor was located at the anterior skull base between the middle turbinate and the septum. Final pathology report showed clear margins. She was started on radiation therapy under care of Dr. Ron Quiroga in July. Unfortunately she fell and fractured her left humerus in End of August. It was decided not to proceed with surgical intervention as this would involve intubation which would be difficult given ongoing radiation therapy. She had a difficult hospital course with UTI, confusion. She was managed conservatively and radiation therapy was resumed - eventually completed on September 11, 2014. She had a 3 month post treatment completion restaging scan which does not show any recurrent disease. She comes in today for routine follow up.       ALAN Lloyd is being followed for her  malignant melanoma status post resection on 6/04/14 and then re-resection on 06/24/14 with negative margins followed by radiation therapy from July through September 11, 2014.     She is accompanied by her  at this clinic visit. She notes that she has nearly lost her eye sight in the left eye. She notes that she has seen experts in opthalmology and describes possible retinal detachment, possible retinal vein thrombosis and even a CVA contributing to this. She is worried about losing vision in her right eye and going completely blind.       PAST MEDICAL HISTORY   1. HTN  2. Dyslipidemia  3. Depression on medications  4. Pulmonary nodules  5. Cholecystectomy  6. OA - Back and Neck surgeries done   7. Fibromyalgia      CURRENT OUTPATIENT MEDICATIONS     Current Outpatient Prescriptions   Medication Sig     Cholecalciferol (VITAMIN D3) 50505 UNITS TABS Take by mouth 2 times daily     pantoprazole (PROTONIX) 40 MG EC tablet Take 1 tablet (40 mg) by mouth daily Take 30-60 minutes before a meal.     ranitidine (ZANTAC) 150 MG tablet TK 1 T PO BID PRN     pravastatin (PRAVACHOL) 40 MG tablet      ibuprofen (ADVIL/MOTRIN) 800 MG tablet TK 1 T PO TID WITH MEALS     ALPRAZOLAM PO Take 0.25 mg by mouth daily     acetaminophen-codeine (TYLENOL #3) 300-30 MG per tablet Take 2 tablets by mouth every 6 hours as needed for moderate pain     BusPIRone HCl (BUSPAR PO) Take 5 mg by mouth 3 times daily     Simethicone 180 MG CAPS Take by mouth daily     DOCQLACE 100 MG capsule      potassium chloride SA (K-DUR,KLOR-CON M) 20 MEQ tablet      HYDROCHLOROTHIAZIDE PO Take 12.5 mg by mouth daily     LISINOPRIL PO Take 30 mg by mouth daily     venlafaxine (EFFEXOR XR) 150 MG 24 hr capsule Take by mouth daily     POTASSIUM CITRATE PO Take 20 mEq by mouth     No current facility-administered medications for this visit.           ALLERGIES     Allergies   Allergen Reactions     Novocain [Procaine] Unknown and Rash     Mirtazapine        PHYSICAL EXAM   There were no vitals taken for this visit.   Lying /66, Sitting 119/74, Standing 102/57   SpO2 Readings from Last 4 Encounters:   01/31/17 99%   01/12/17 96%   08/10/16 98%   04/25/16 98%     Wt Readings from Last 3 Encounters:   01/31/17 84.4 kg (186 lb)   01/12/17 83.9 kg (184 lb 14.4 oz)   09/15/16 86.6 kg (191 lb)     GEN: NAD  HEENT: PERRL, EOMI, no icterus, injection or pallor. Oropharynx is clear.   NECK: no obvious cervical or supraclavicular lymphadenopathy  LUNGS: clear bilaterally  CV: regular rate and rhythm  ABDOMEN: soft, non-tender, non-distended, normal bowel sounds, no hepatosplenomegaly  EXT: warm, well perfused, no edema  NEURO: alert  SKIN: no rashes     LABORATORY AND IMAGING STUDIES     Recent Labs   Lab Test  02/15/17   0937  08/10/16   1050 07/29/16 04/13/16   1211  10/21/15   1237  06/25/15   1431   NA  133  136  138  134  136  131*   POTASSIUM  3.9  4.3  4.2  4.2  4.3  4.4   CHLORIDE  98  102  105.0  100  102  97   CO2  25  26   --   26  27  28   ANIONGAP  10  8   --   8  6  7   BUN  11  11  13.0  11  10  11   CR  0.86  0.73  0.76  0.73  0.71  0.68   GLC  84  86  94.0  84  91  107*   STEFFANIE  8.0*  8.7  8.9  9.1  9.0  8.9     Recent Labs   Lab Test  09/02/14   0550  09/01/14   0532  08/31/14   0525  08/30/14   0909  08/30/14   0616   MAG  1.6  1.9  1.8  2.2  1.5*     Recent Labs   Lab Test  02/15/17   0937  08/10/16   1050 07/29/16 04/13/16   1211  10/21/15   1237   WBC  7.6  5.3  5.8  8.5  9.7   HGB  11.1*  12.0  11.9  12.1  12.8   PLT  219  256  225.0  286  272   MCV  90  94  88.0  92  95   NEUTROPHIL  66.9  62.1  79.1  76.1  75.1     Recent Labs   Lab Test  02/15/17   0937  08/10/16   1050  04/13/16   1211   03/19/15   0759   BILITOTAL  0.4  0.3  0.4   < >  0.5   ALKPHOS  90  88  98   < >  79   ALT  15  18  26   < >  22   AST  19  17  23   < >  24   ALBUMIN  3.1*  3.6  3.6   < >  3.7   LDH   --    --    --    --   157    < > = values in this interval not displayed.      TSH   Date Value Ref Range Status   02/15/2017 2.27 0.40 - 4.00 mU/L Final   08/10/2016 2.32 0.40 - 4.00 mU/L Final   03/19/2015 1.34 0.40 - 4.00 mU/L Final     Comment:     Effective 7/30/2014, the reference range for this assay has changed to reflect   new instrumentation/methodology.         Results for orders placed or performed in visit on 02/15/17   CT Chest w Contrast    Narrative    CT CHEST W CONTRAST 2/15/2017 9:28 AM    History: Malignant neoplasm of nasal cavity    Comparison: 8/10/2016, 4/13/2016, 9/22/2015, 6/24/2015, PET/CT from  6/10/2014    Technique: Helical CT images were obtained from the lung apices to the  lung bases with IV contrast.  Images were reconstructed in multiple  planes using multiple reconstruction algorithms.    Contrast: Isovue-370  91cc    Findings:  Lung parenchyma:  Pulmonary nodules:  5 mm pulmonary nodule in the subpleural region of the anterolateral  right upper lobe (series 4 image 98), stable for at least 2 years.  Calcific granulomas in the central left lung base. Other tiny  pulmonary nodules in both lungs are not significantly changed. The  largest of the remaining nodules is located in the lateral left lower  lobe (series 4 image 212), millimeters 6 mm, stable since at least  6/10/2014.    Mediastinal soft tissues and pleural spaces: Inferior thyroid gland is  unremarkable. Atherosclerotic calcifications of the coronary vessels.  No mediastinal or hilar lymphadenopathy. The heart is not enlarged. No  pericardial or pleural effusion. Calcified paraesophageal lymph node  (series 2 image 45).    Visualized upper abdomen: Postoperative evidence of cholecystectomy.  Limited evaluation is otherwise unremarkable.    Subcutaneous soft tissues: No axillary lymphadenopathy. Simultaneous  fat and musculature is unremarkable.    Bones: No aggressive appearing bony lesion or acute fracture.      Impression    Impression: Pulmonary nodules as described, not significantly  changed  since at least 6/10/2014. No new pulmonary nodule is identified. No  other evidence for metastatic disease.         DAVE KNAPP            ASSESSMENT AND PLAN   76 year old female with right sinonasal mucosal malignant melanoma  status post resection on 6/04/14 and then re-resection on 06/24/14 with negative margins followed by radiation therapy from July through September 11, 2014 (6000 cGy in 30 fractions).   b-aubrie and c-KIT status unknown    I have reviewed her scans from earlier today. Her brain MRI was not read at the time of visit. I reviewed actual images with her. I could not identify the site of recurrent disease. She has a surgical procedure planned by Dr. Pérez in a week. She has a visit with Dr. Barboza tomorrow and he would be able to review the official read from MRI. I will follow on the pathology results from her surgery and would plan a follow up depending on the findings from that surgical resection.     Addendum: Official read from MRI does not show any evidence of definite metastasis.     Sincerely,    Garett Obregon MD

## 2017-02-15 NOTE — NURSING NOTE
"Gabino Jones is a 76 year old female who presents for:  Chief Complaint   Patient presents with     Oncology Clinic Visit     Return for SCC Sinus         Initial Vitals:  There were no vitals taken for this visit. Estimated body mass index is 30.48 kg/(m^2) as calculated from the following:    Height as of 1/31/17: 1.664 m (5' 5.5\").    Weight as of 1/31/17: 84.4 kg (186 lb).. There is no height or weight on file to calculate BSA. BP completed using cuff size: large  Data Unavailable No LMP recorded. Patient is postmenopausal. Allergies and medications reviewed.     Medications: Medication refills not needed today.  Pharmacy name entered into [facility]:    [facility] - [place], [place] - [address] AT [address]  [facility] - [place], [place] - [address]    Comments:     6  minutes for nursing intake (face to face time)   Radha Eddy MA        "

## 2017-02-15 NOTE — PROGRESS NOTES
[facility] CANCER CLINIC  FOLLOW-UP VISIT NOTE    PATIENT NAME: Gabino Jones MRN # [id number]  DATE OF VISIT: Feb 15, 2017 YOB: 1940    REFERRING PROVIDER: Yolanda Whitaker MD   PHYSICIANS  [address]  [place] [address]    CANCER TYPE: Malignant Melanoma  STAGE:   ECOG PS: 1    TREATMENT SUMMARY:  Gabino presented with difficulty to breathe for previous 6-7 months due to partial nasal obstruction starting December 2013. She had been following with a local ENT surgeon and was prescribed nasal drops several courses of antibiotics for presumed ethmoid sinusitis.  Most recently in the last couple of weeks she has passed a few blood clots from her nose. She had an episode of epistaxis in April of 2014 which resolved on its own. On 06/03/14, she underwent right intranasal endoscopic sinus surgery for her ethmoid sinusitis as well as resection of the right nasal mass measuring 2.5 x 2 x 1.2 cm at [facility]. Following the procedure, she continued to have hemorrhage from the right nasal cavity and was taken back to the OR that same day for a repeat nasal endoscopy, where it was determined that it was a posterior bleed and that the mass had not been completely resected. The remainder of the mass was resected and the bleeding stopped. The following morning (6/04/14), she underwent another endoscopy at [facility] due to continued bleeding, and her right nasal cavity was packed. The histopathology from her recent resection revealed malignant melanoma.  The margins were positive.  This prompted a referral to [facility] and she was seen in ENT surgery by Dr. Whitaker and Dr. Ron Quiroga in Radiation/Oncology in addition to Medical Oncology.  She was worked up with a staging PET/CT scan and a brain MRI.  The PET/CT scan revealed minimal uptake in the local area which could very well be from recent surgery or residual  disease.  She had multiple subcentimeter pulmonary nodules with the largest one being 6 mm in the left lower lobe.  She had a history of pulmonary nodules which had been previously followed for 3 years. The PET was also significant for an enlarged portacaval node with mild FDG uptake and FDG uptake in the hepatic flexure of her colon.  Her most recent colonoscopy has been merely 2 months ago and was completely normal.  Her brain MRI was limited because of motion artifact. There were no enlarged lymph nodes in the head and neck region or any other site of increased FDG uptake that would be suspicious for definitive metastatic disease.      Her case was reviewed at the multi-specialty tumor board and she was recommended endoscopic resection of the melanoma followed by post operative radiation therapy. She underwent a rerevision surgery on 06/23/2014 using an endoscopic endonasal approach. Tumor was located at the anterior skull base between the middle turbinate and the septum. Final pathology report showed clear margins. She was started on radiation therapy under care of Dr. Ron Quiroga in July. Unfortunately she fell and fractured her left humerus in End of August. It was decided not to proceed with surgical intervention as this would involve intubation which would be difficult given ongoing radiation therapy. She had a difficult hospital course with UTI, confusion. She was managed conservatively and radiation therapy was resumed - eventually completed on September 11, 2014. She had a 3 month post treatment completion restaging scan which does not show any recurrent disease. She comes in today for routine follow up.       ALAN Lloyd is being followed for her malignant melanoma status post resection on 6/04/14 and then re-resection on 06/24/14 with negative margins followed by radiation therapy from July through September 11, 2014.     She is accompanied by her  at this clinic visit. She notes that she  has nearly lost her eye sight in the left eye. She notes that she has seen experts in opthalmology and describes possible retinal detachment, possible retinal vein thrombosis and even a CVA contributing to this. She is worried about losing vision in her right eye and going completely blind.       PAST MEDICAL HISTORY   1. HTN  2. Dyslipidemia  3. Depression on medications  4. Pulmonary nodules  5. Cholecystectomy  6. OA - Back and Neck surgeries done   7. Fibromyalgia      CURRENT OUTPATIENT MEDICATIONS     Current Outpatient Prescriptions   Medication Sig     Cholecalciferol (VITAMIN D3) 00145 UNITS TABS Take by mouth 2 times daily     pantoprazole (PROTONIX) 40 MG EC tablet Take 1 tablet (40 mg) by mouth daily Take 30-60 minutes before a meal.     ranitidine (ZANTAC) 150 MG tablet TK 1 T PO BID PRN     pravastatin (PRAVACHOL) 40 MG tablet      ibuprofen (ADVIL/MOTRIN) 800 MG tablet TK 1 T PO TID WITH MEALS     ALPRAZOLAM PO Take 0.25 mg by mouth daily     acetaminophen-codeine (TYLENOL #3) 300-30 MG per tablet Take 2 tablets by mouth every 6 hours as needed for moderate pain     BusPIRone HCl (BUSPAR PO) Take 5 mg by mouth 3 times daily     Simethicone 180 MG CAPS Take by mouth daily     DOCQLACE 100 MG capsule      potassium chloride SA (K-DUR,KLOR-CON M) 20 MEQ tablet      HYDROCHLOROTHIAZIDE PO Take 12.5 mg by mouth daily     LISINOPRIL PO Take 30 mg by mouth daily     venlafaxine (EFFEXOR XR) 150 MG 24 hr capsule Take by mouth daily     POTASSIUM CITRATE PO Take 20 mEq by mouth     No current facility-administered medications for this visit.           ALLERGIES     Allergies   Allergen Reactions     Novocain [Procaine] Unknown and Rash     Mirtazapine       PHYSICAL EXAM   There were no vitals taken for this visit.   Lying /66, Sitting 119/74, Standing 102/57   SpO2 Readings from Last 4 Encounters:   01/31/17 99%   01/12/17 96%   08/10/16 98%   04/25/16 98%     Wt Readings from Last 3 Encounters:    01/31/17 84.4 kg (186 lb)   01/12/17 83.9 kg (184 lb 14.4 oz)   09/15/16 86.6 kg (191 lb)     GEN: NAD  HEENT: PERRL, EOMI, no icterus, injection or pallor. Oropharynx is clear.   NECK: no obvious cervical or supraclavicular lymphadenopathy  LUNGS: clear bilaterally  CV: regular rate and rhythm  ABDOMEN: soft, non-tender, non-distended, normal bowel sounds, no hepatosplenomegaly  EXT: warm, well perfused, no edema  NEURO: alert  SKIN: no rashes     LABORATORY AND IMAGING STUDIES     Recent Labs   Lab Test  02/15/17   0937  08/10/16   1050 07/29/16 04/13/16   1211  10/21/15   1237  06/25/15   1431   NA  133  136  138  134  136  131*   POTASSIUM  3.9  4.3  4.2  4.2  4.3  4.4   CHLORIDE  98  102  105.0  100  102  97   CO2  25  26   --   26  27  28   ANIONGAP  10  8   --   8  6  7   BUN  11  11  13.0  11  10  11   CR  0.86  0.73  0.76  0.73  0.71  0.68   GLC  84  86  94.0  84  91  107*   STEFFANIE  8.0*  8.7  8.9  9.1  9.0  8.9     Recent Labs   Lab Test  09/02/14   0550  09/01/14   0532  08/31/14   0525  08/30/14   0909  08/30/14   0616   MAG  1.6  1.9  1.8  2.2  1.5*     Recent Labs   Lab Test  02/15/17   0937  08/10/16   1050 07/29/16 04/13/16   1211  10/21/15   1237   WBC  7.6  5.3  5.8  8.5  9.7   HGB  11.1*  12.0  11.9  12.1  12.8   PLT  219  256  225.0  286  272   MCV  90  94  88.0  92  95   NEUTROPHIL  66.9  62.1  79.1  76.1  75.1     Recent Labs   Lab Test  02/15/17   0937  08/10/16   1050  04/13/16   1211   03/19/15   0759   BILITOTAL  0.4  0.3  0.4   < >  0.5   ALKPHOS  90  88  98   < >  79   ALT  15  18  26   < >  22   AST  19  17  23   < >  24   ALBUMIN  3.1*  3.6  3.6   < >  3.7   LDH   --    --    --    --   157    < > = values in this interval not displayed.     TSH   Date Value Ref Range Status   02/15/2017 2.27 0.40 - 4.00 mU/L Final   08/10/2016 2.32 0.40 - 4.00 mU/L Final   03/19/2015 1.34 0.40 - 4.00 mU/L Final     Comment:     Effective 7/30/2014, the reference range for this assay has changed to  reflect   new instrumentation/methodology.         Results for orders placed or performed in visit on 02/15/17   CT Chest w Contrast    Narrative    CT CHEST W CONTRAST 2/15/2017 9:28 AM    History: Malignant neoplasm of nasal cavity    Comparison: 8/10/2016, 4/13/2016, 9/22/2015, 6/24/2015, PET/CT from  6/10/2014    Technique: Helical CT images were obtained from the lung apices to the  lung bases with IV contrast.  Images were reconstructed in multiple  planes using multiple reconstruction algorithms.    Contrast: Isovue-370  91cc    Findings:  Lung parenchyma:  Pulmonary nodules:  5 mm pulmonary nodule in the subpleural region of the anterolateral  right upper lobe (series 4 image 98), stable for at least 2 years.  Calcific granulomas in the central left lung base. Other tiny  pulmonary nodules in both lungs are not significantly changed. The  largest of the remaining nodules is located in the lateral left lower  lobe (series 4 image 212), millimeters 6 mm, stable since at least  6/10/2014.    Mediastinal soft tissues and pleural spaces: Inferior thyroid gland is  unremarkable. Atherosclerotic calcifications of the coronary vessels.  No mediastinal or hilar lymphadenopathy. The heart is not enlarged. No  pericardial or pleural effusion. Calcified paraesophageal lymph node  (series 2 image 45).    Visualized upper abdomen: Postoperative evidence of cholecystectomy.  Limited evaluation is otherwise unremarkable.    Subcutaneous soft tissues: No axillary lymphadenopathy. Simultaneous  fat and musculature is unremarkable.    Bones: No aggressive appearing bony lesion or acute fracture.      Impression    Impression: Pulmonary nodules as described, not significantly changed  since at least 6/10/2014. No new pulmonary nodule is identified. No  other evidence for metastatic disease.         DAVE KNAPP            ASSESSMENT AND PLAN   76 year old female with right sinonasal mucosal malignant melanoma  status post  resection on 6/04/14 and then re-resection on 06/24/14 with negative margins followed by radiation therapy from July through September 11, 2014 (6000 cGy in 30 fractions).   b-aubrie and c-KIT status unknown    I have reviewed her scans from earlier today. Her brain MRI was not read at the time of visit. I reviewed actual images with her. I could not identify the site of recurrent disease. She has a surgical procedure planned by Dr. Pérez in a week. She has a visit with Dr. Barboza tomorrow and he would be able to review the official read from MRI. I will follow on the pathology results from her surgery and would plan a follow up depending on the findings from that surgical resection.     Addendum: Official read from MRI does not show any evidence of definite metastasis.

## 2017-02-15 NOTE — MR AVS SNAPSHOT
After Visit Summary   2/15/2017    Gabino Jones    MRN: [id number]           Patient Information     Date Of Birth          1940        Visit Information        Provider Department      2/15/2017 2:15 PM Garett Obregon MD [facility]        Today's Diagnoses     Malignant melanoma of nasal cavity (H)    -  1       Follow-ups after your visit        Your next 10 appointments already scheduled     Mar 08, 2017  1:00 PM CST   (Arrive by 12:45 PM)   Return Visit with Tiffanie Soares PA-C   [facility] ([facility])    [address]  3rd Floor  [facility] [address]   [phone]            Mar 16, 2017  1:30 PM CDT   NEW NEURO with Matt Quiroga MD   [facility] ([facility])    [facility]  [address] [place] [address]  [address] [address]   [phone]            Apr 17, 2017  1:10 PM CDT   Return Visit with Blake Hobbs MD   [facility] ([facility])    [address] [place] [address]   [phone]            Jun 22, 2017  2:20 PM CDT   (Arrive by 2:05 PM)   Return Visit with Yolanda Whitaker MD   [facility] Ear Nose and Throat ([facility])    [address]  4th [facility] [address]   [phone]            Aug 30, 2017 10:20 AM CDT   (Arrive by 10:05 AM)   CT CHEST/ABDOMEN/PELVIS W CONTRAST with UCCT1   [facility] CT ([facility])    [address]  1st [facility] [address] [phone]           Please bring any scans or X-rays taken at other hospitals, if similar tests were done. Also bring a list of your medicines, including vitamins, minerals and over-the-counter drugs. It is safest to leave personal items at home.  Be sure to tell your doctor:   If you have any allergies.   If there s any chance you are pregnant.   If  you are breastfeeding.   If you have any special needs.  You may have contrast for this exam. To prepare:   Do not eat or drink for 2 hours before your exam. If you need to take medicine, you may take it with small sips of water. (We may ask you to take liquid medicine as well.)   The day before your exam, drink extra fluids at least six 8-ounce glasses (unless your doctor tells you to restrict your fluids).  Patients over 70 or patients with diabetes or kidney problems:   If you haven t had a blood test (creatinine test) within the last 30 days, go to your clinic or Diagnostic Imaging Department for this test.  If you have diabetes:   If your kidney function is normal, continue taking your metformin (Avandamet, Glucophage, Glucovance, Metaglip) on the day of your exam.   If your kidney function is abnormal, wait 48 hours before restarting this medicine.  You will have oral contrast for this exam:   You will drink the contrast at home. Get this from your clinic or Diagnostic Imaging Department. Please follow the directions given.  Please wear loose clothing, such as a sweat suit or jogging clothes. Avoid snaps, zippers and other metal. We may ask you to undress and put on a hospital gown.  If you have any questions, please call the Imaging Department where you will have your exam.            Aug 30, 2017 10:45 AM CDT   (Arrive by 10:30 AM)   MR ORBIT FACE NECK W/O & W CONTRAST with [id number]   [facility] ([facility])    [address] [address] [phone]           Take your medicines as usual, unless your doctor tells you not to. Bring a list of your current medicines to your exam (including vitamins, minerals and over-the-counter drugs).  You will be given intravenous contrast for this exam. To prepare:   The day before your exam, drink extra fluids at least six 8-ounce glasses (unless your doctor tells you to restrict your fluids).    Have a blood test (creatinine test) within 30 days of your exam. Go to your clinic or Diagnostic Imaging Department for this test.  The MRI machine uses a strong magnet. Please wear clothes without metal (snaps, zippers). A sweatsuit works well, or we may give you a hospital gown.  Please remove any body piercings and hair extensions before you arrive. You will also remove watches, jewelry, hairpins, wallets, dentures, partial dental plates and hearing aids. You may wear contact lenses, and you may be able to wear your rings. We have a safe place to keep your personal items, but it is safer to leave them at home.   **IMPORTANT** THE INSTRUCTIONS BELOW ARE ONLY FOR THOSE PATIENTS WHO HAVE BEEN TOLD THEY WILL RECEIVE SEDATION OR GENERAL ANESTHESIA DURING THEIR MRI PROCEDURE:  IF YOU WILL RECEIVE SEDATION (take medicine to help you relax during your exam):   You must get the medicine from your doctor before you arrive. Bring the medicine to the exam. Do not take it at home.   Arrive one hour early. Bring someone who can take you home after the test. Your medicine will make you sleepy. After the exam, you may not drive, take a bus or take a taxi by yourself.   No eating 8 hours before your exam. You may have clear liquids up until 4 hours before your exam. (Clear liquids include water, clear tea, black coffee and fruit juice without pulp.)  IF YOU WILL RECEIVE ANESTHESIA (be asleep for your exam):   Arrive 1 1/2 hours early. Bring someone who can take you home after the test. You may not drive, take a bus or take a taxi by yourself.   No eating 8 hours before your exam. You may have clear liquids up until 4 hours before your exam. (Clear liquids include water, clear tea, black coffee and fruit juice without pulp.)  Please call the Imaging Department at your exam site with any questions.            Aug 30, 2017  1:15 PM CDT   (Arrive by 1:00 PM)   Return Visit with Gartet Obregon MD   [facility]  ([facility])    [address]  2nd Floor  [facility] [address] [phone]              Who to contact     If you have questions or need follow up information about today's clinic visit or your schedule please contact [facility] directly at [phone].  Normal or non-critical lab and imaging results will be communicated to you by MyChart, letter or phone within 4 business days after the clinic has received the results. If you do not hear from us within 7 days, please contact the clinic through [facility] or phone. If you have a critical or abnormal lab result, we will notify you by phone as soon as possible.  Submit refill requests through [employer] or call your pharmacy and they will forward the refill request to us. Please allow 3 business days for your refill to be completed.          Additional Information About Your Visit        MyChart Information     [employer] gives you secure access to your electronic health record. If you see a primary care provider, you can also send messages to your care team and make appointments. If you have questions, please call your primary care clinic.  If you do not have a primary care provider, please call [phone] and they will assist you.        Care EveryWhere ID     This is your Care EveryWhere ID. This could be used by other organizations to access your [facility] medical records  [id number]         Blood Pressure from Last 3 Encounters:   02/23/17 122/65   02/21/17 148/87   01/31/17 152/51    Weight from Last 3 Encounters:   02/22/17 85 kg (187 lb 6.3 oz)   02/21/17 84.6 kg (186 lb 9.6 oz)   02/16/17 84.8 kg (187 lb)              Today, you had the following     No orders found for display         Today's Medication Changes          These changes are accurate as of: 2/15/17 11:59 PM.  If you have any questions, ask your nurse or doctor.               These medicines have changed or have updated prescriptions.         Dose/Directions    hydrochlorothiazide 12.5 MG capsule   Commonly known as:  MICROZIDE   This may have changed:  medication strength   Used for:  Essential hypertension   Changed by:  Jeevan Wheeler MD        Dose:  12.5 mg   Take 1 capsule (12.5 mg) by mouth daily   Quantity:  30 capsule   Refills:  1       lisinopril 30 MG tablet   Commonly known as:  PRINIVIL,ZESTRIL   This may have changed:  medication strength   Used for:  Essential hypertension   Changed by:  Jeevan Wheeler MD        Dose:  30 mg   Take 1 tablet (30 mg) by mouth daily   Quantity:  30 tablet   Refills:  1            Where to get your medicines      These medications were sent to [facility] [address] [place] [address] [address]  [address] [address]     Phone:  [phone]     hydrochlorothiazide 12.5 MG capsule    lisinopril 30 MG tablet                Primary Care Provider Office Phone # Fax #    Jeevan Wheeler [phone]       [facility] [address] [place] [address]        Thank you!     Thank you for choosing [facility]  for your care. Our goal is always to provide you with excellent care. Hearing back from our patients is one way we can continue to improve our services. Please take a few minutes to complete the written survey that you may receive in the mail after your visit with us. Thank you!             Your Updated Medication List - Protect others around you: Learn how to safely use, store and throw away your medicines at www.disposemymeds.org.          This list is accurate as of: 2/15/17 11:59 PM.  Always use your most recent med list.                   Brand Name Dispense Instructions for use    ALPRAZOLAM PO      Take 0.25 mg by mouth daily       BUSPAR PO      Take 5 mg by mouth 3 times daily       DOCQLACE 100 MG capsule   Generic drug:  docusate sodium          EFFEXOR  MG 24  hr capsule   Generic drug:  venlafaxine      Take by mouth daily       hydrochlorothiazide 12.5 MG capsule    MICROZIDE    30 capsule    Take 1 capsule (12.5 mg) by mouth daily       ibuprofen 800 MG tablet    ADVIL/MOTRIN    90 tablet    Take 1 tablet (800 mg) by mouth every 4 hours as needed for moderate pain       lisinopril 30 MG tablet    PRINIVIL,ZESTRIL    30 tablet    Take 1 tablet (30 mg) by mouth daily       ondansetron 4 MG ODT tab    ZOFRAN-ODT    40 tablet    Take 1-2 tablets (4-8 mg) by mouth every 6 hours as needed for nausea       oxyCODONE 5 MG IR tablet    ROXICODONE    40 tablet    Take 1-2 tablets (5-10 mg) by mouth every 4 hours as needed for moderate to severe pain       pantoprazole 40 MG EC tablet    PROTONIX    90 tablet    Take 1 tablet (40 mg) by mouth daily Take 30-60 minutes before a meal.       potassium chloride SA 20 MEQ CR tablet    K-DUR/KLOR-CON M         POTASSIUM CITRATE PO      Take 20 mEq by mouth       pravastatin 40 MG tablet    PRAVACHOL         ranitidine 150 MG tablet    ZANTAC     TK 1 T PO BID PRN       senna-docusate 8.6-50 MG per tablet    SENOKOT-S;PERICOLACE    40 tablet    Take 1-2 tablets by mouth 2 times daily       Simethicone 180 MG Caps      Take by mouth daily       Vitamin D3 81515 UNITS Tabs      Take by mouth 2 times daily

## 2017-02-16 ENCOUNTER — OFFICE VISIT (OUTPATIENT)
Dept: OTOLARYNGOLOGY | Facility: CLINIC | Age: 77
End: 2017-02-16

## 2017-02-16 ENCOUNTER — TELEPHONE (OUTPATIENT)
Dept: NEUROSURGERY | Facility: CLINIC | Age: 77
End: 2017-02-16

## 2017-02-16 VITALS — WEIGHT: 187 LBS | BODY MASS INDEX: 30.65 KG/M2

## 2017-02-16 DIAGNOSIS — C31.9: ICD-10-CM

## 2017-02-16 DIAGNOSIS — R13.12 OROPHARYNGEAL DYSPHAGIA: Primary | ICD-10-CM

## 2017-02-16 ASSESSMENT — PAIN SCALES - GENERAL: PAINLEVEL: NO PAIN (0)

## 2017-02-16 NOTE — MR AVS SNAPSHOT
After Visit Summary   2/16/2017    Gabino Jones    MRN: [id number]           Patient Information     Date Of Birth          1940        Visit Information        Provider Department      2/16/2017 2:20 PM Yolanda Whitaker MD [facility] Ear Nose and Throat        Care Instructions    Return in 4 months with Dr Barboza.      We will order a consult with Dr Quiroga- our neuro-ophtlamologist.  Also will order an appointment/ consult with Speech Pathology/ therapy  to evaluate swallow with swallow. [facility] will call regarding the consult and study.    Our  or myself will call you with Dr Barboza follow up and Dr Quiroga appointment.         Please call/contact with further questions/concerns.     Sincerely,    WILL Lopes R.N.    My days of work are  Monday 12:30-4:30 PM, Tuesday 8-4:30, Thursday 8-4:30. primarily in ENT Triage.    [facility]  ENT clinic [phone]   [address] [place] [address]   4th Floor  Option #1 for appointments  Option # 3 for nurse triage   Fax: [phone]             Follow-ups after your visit        Your next 10 appointments already scheduled     Feb 21, 2017 12:40 PM CST   LAB with LAB FIRST FLOOR [facility] ([facility])    [address] [place] [address] [phone]           Patient must bring picture ID.  Patient should be prepared to give a urine specimen  Please do not eat 10-12 hours before your appointment if you are coming in fasting for labs on lipids, cholesterol, or glucose (sugar).  Pregnant women should follow their Care Team instructions. Water with medications is okay. Do not drink coffee or other fluids.   If you have concerns about taking  your medications, please ask at office or if scheduling via [employer], send a message by clicking on Secure Messaging, Message Your Care Team.            Feb 21, 2017  1:00 PM CST   Pre-Op physical with Jeevan Wheeler MD   [facility]  [facility] ([facility])    [address] [place] [address]   [phone]            Feb 22, 2017  6:00 AM CST   CT HEAD W/O CONTRAST with UUCT1   [facility] [place] [place] ([facility])    [address] [facility] [address] [phone]           Please bring any scans or X-rays taken at other hospitals, if similar tests were done. Also bring a list of your medicines, including vitamins, minerals and over-the-counter drugs. It is safest to leave personal items at home.  Be sure to tell your doctor:   If you have any allergies.   If there s any chance you are pregnant.   If you are breastfeeding.   If you have any special needs.  You do not need to do anything special to prepare.  Please wear loose clothing, such as a sweat suit or jogging clothes. Avoid snaps, zippers and other metal. We may ask you to undress and put on a hospital gown.            Feb 22, 2017  6:30 AM CST   MR BRAIN W/O & W CONTRAST with UUMR2   [facility] [place] MRI ([facility])    [address] [facility] [address] [phone]           Take your medicines as usual, unless your doctor tells you not to. Bring a list of your current medicines to your exam (including vitamins, minerals and over-the-counter drugs).  You will be given intravenous contrast for this exam. To prepare:   The day before your exam, drink extra fluids at least six 8-ounce glasses (unless your doctor tells you to restrict your fluids).   Have a blood test (creatinine test) within 30 days of your exam. Go to your clinic or Diagnostic Imaging Department for this test.  The MRI machine uses a strong magnet. Please wear clothes without metal (snaps, zippers). A sweatsuit works well, or we may give you a hospital gown.  Please remove any body piercings and hair extensions before you arrive. You will also remove watches,  jewelry, hairpins, wallets, dentures, partial dental plates and hearing aids. You may wear contact lenses, and you may be able to wear your rings. We have a safe place to keep your personal items, but it is safer to leave them at home.   **IMPORTANT** THE INSTRUCTIONS BELOW ARE ONLY FOR THOSE PATIENTS WHO HAVE BEEN TOLD THEY WILL RECEIVE SEDATION OR GENERAL ANESTHESIA DURING THEIR MRI PROCEDURE:  IF YOU WILL RECEIVE SEDATION (take medicine to help you relax during your exam):   You must get the medicine from your doctor before you arrive. Bring the medicine to the exam. Do not take it at home.   Arrive one hour early. Bring someone who can take you home after the test. Your medicine will make you sleepy. After the exam, you may not drive, take a bus or take a taxi by yourself.   No eating 8 hours before your exam. You may have clear liquids up until 4 hours before your exam. (Clear liquids include water, clear tea, black coffee and fruit juice without pulp.)  IF YOU WILL RECEIVE ANESTHESIA (be asleep for your exam):   Arrive 1 1/2 hours early. Bring someone who can take you home after the test. You may not drive, take a bus or take a taxi by yourself.   No eating 8 hours before your exam. You may have clear liquids up until 4 hours before your exam. (Clear liquids include water, clear tea, black coffee and fruit juice without pulp.)  Please call the Imaging Department at your exam site with any questions.            Feb 22, 2017   Procedure with Lenora Pérez MD   [facility] [place], Same Day Surgery (--)    [address] [facility] [address] [phone]              Who to contact     Please call your clinic at [phone] to:    Ask questions about your health    Make or cancel appointments    Discuss your medicines    Learn about your test results    Speak to your doctor   If you have compliments or concerns about an experience at your clinic, or if you wish to file a complaint, please contact  [facility] Patient Relations at [phone] or email us at [email or web address]         Additional Information About Your Visit        MyChar Information     [employer] gives you secure access to your electronic health record. If you see a primary care provider, you can also send messages to your care team and make appointments. If you have questions, please call your primary care clinic.  If you do not have a primary care provider, please call [phone] and they will assist you.      [employer] is an electronic gateway that provides easy, online access to your medical records. With [employer], you can request a clinic appointment, read your test results, renew a prescription or communicate with your care team.     To access your existing account, please contact your [facility] or call [phone] for assistance.        Care EveryWhere ID     This is your Care EveryWhere ID. This could be used by other organizations to access your [facility] medical records  [id number]        Your Vitals Were     BMI (Body Mass Index)                   30.65 kg/m2            Blood Pressure from Last 3 Encounters:   01/31/17 152/51   01/12/17 144/70   08/10/16 167/73    Weight from Last 3 Encounters:   02/16/17 84.8 kg (187 lb)   01/31/17 84.4 kg (186 lb)   01/12/17 83.9 kg (184 lb 14.4 oz)              Today, you had the following     No orders found for display       Primary Care Provider Office Phone # Fax #    Jeevan Wheeler [phone]       [facility] [address] [place] [address]        Thank you!     Thank you for choosing [facility] EAR NOSE AND TH[facility]AT  for your care. Our goal is always to provide you with excellent care. Hearing back from our patients is one way we can continue to improve our services. Please take a few minutes to complete the written survey that you may receive in the mail after your visit with us. Thank  you!             Your Updated Medication List - Protect others around you: Learn how to safely use, store and throw away your medicines at www.disposemymeds.org.          This list is accurate as of: 2/16/17  2:32 PM.  Always use your most recent med list.                   Brand Name Dispense Instructions for use    acetaminophen-codeine 300-30 MG per tablet    TYLENOL #3     Take 2 tablets by mouth every 6 hours as needed for moderate pain       ALPRAZOLAM PO      Take 0.25 mg by mouth daily       BUSPAR PO      Take 5 mg by mouth 3 times daily       DOCQLACE 100 MG capsule   Generic drug:  docusate sodium          EFFEXOR  MG 24 hr capsule   Generic drug:  venlafaxine      Take by mouth daily       HYDROCHLOROTHIAZIDE PO      Take 12.5 mg by mouth daily       ibuprofen 800 MG tablet    ADVIL/MOTRIN     TK 1 T PO TID WITH MEALS       LISINOPRIL PO      Take 30 mg by mouth daily       pantoprazole 40 MG EC tablet    PROTONIX    90 tablet    Take 1 tablet (40 mg) by mouth daily Take 30-60 minutes before a meal.       potassium chloride SA 20 MEQ CR tablet    K-DUR/KLOR-CON M         POTASSIUM CITRATE PO      Take 20 mEq by mouth       pravastatin 40 MG tablet    PRAVACHOL         ranitidine 150 MG tablet    ZANTAC     TK 1 T PO BID PRN       Simethicone 180 MG Caps      Take by mouth daily       Vitamin D3 [address]14 UNITS Tabs      Take by mouth 2 times daily

## 2017-02-16 NOTE — NURSING NOTE
Chief Complaint   Patient presents with     RECHECK     6 month f/u     Uma Munoz Medical Assistant

## 2017-02-16 NOTE — LETTER
2/16/2017       RE: Gabino Jones  [address] [facility] [address]     Dear Colleague,    Thank you for referring your patient, Gabino Jones, to the [facility] EAR [facility]SE AND THROAT at [facility]. Please see a copy of my visit note below.    DIAGNOSIS:  Right-sided sinonasal mucosal malignant melanoma.      TREATMENT:  The patient underwent endoscopic resection on 07/23/2014.  She received postoperative radiation therapy that finished on 09/11/2014.      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  The patient states that about two months ago, she started developing left eye vision disturbance.  She started having what she describes as diplopia.  This progressed to loss of vision of her left eye.  She was seen by a local ophthalmologist.  At this point, there is not a clear diagnosis why she lost vision in the left eye.  The patient is also scheduled for surgery with Dr. Pérez for biopsy of the right temporal lesion.  Other than that, she is doing well from a sinonasal standpoint.  She has been having some recurrent epistaxis but is telling me that the last episode was about a month ago.  She denies nasal obstruction.  She is also complaining of difficulty swallowing.  The patient states that she is choking with a lot of the foods, and this was not happening before.      PHYSICAL EXAMINATION:    Constitutional: The patient was well-groomed and in no acute distress.    Skin: Warm and pink.    Neurologic: Alert and oriented x3. Cranial nerves III-XII within normal limits. Voice quality is normal.    Psychiatric: The patient's affect was calm, cooperative and appropriate.    Respiratory: Breathing comfortably without stridor or exertion of accessory muscles.    Eyes: Pupils were equal and reactive. Extraocular movements are intact.    Head: Normocephalic and  atraumatic. No lesions or scars.    Ears: External auditory canals and tympanic membranes were clear.    Nose: Sinuses were nontender. Anterior rhinoscopy revealed midline septum and absence of purulence or polyps.    Oral cavity/Oropharynx: Normal tongue, floor of mouth, buccal mucosa and palate. No lesions or masses on inspection or palpation. No abnormal lymph tissue in the oropharynx.    Neck: The parotids are soft without masses. Supple with normal laryngeal and tracheal landmarks.    Lymphatic: There is no palpable lymphadenopathy or other masses in the neck or parotids.       PROCEDURE: Nasal endoscopy: The risks and benefits of the procedure were discussed and written consent was obtained. A timeout was performed. The patient's nose was sprayed with lidocaine and Afrin, and a flexible laryngoscope was used to gain access into the nasal cavity on the right.  There is evidence of endonasal surgery.  Anterior posterior ethmoidectomy is wide open.  Frontal sinusotomy is wide open.  Sphenoidotomy is wide open.  Right maxillary antrum is wide open.  No evidence of masses or lesions.  I advanced the scope all the way to the nasopharynx which is normal.  The oropharynx shows normal tongue base.  The vallecula and epiglottis are within normal limits.  True vocal cord motility is within normal limits.  I do not see any evidence of masses or lesions in the flexible laryngoscopy today.      ASSESSMENT AND PLAN:  This is a 76-year-old female with right-sided mucosal malignant melanoma, b-aubrie and c-KIT status unknown.  The patient is doing relatively well.  She has lost vision in the left eye.  I am going to ask her to see Dr. Quiroga from our Neurophthalmology Department to evaluate her.  Also, I am going to obtain an evaluation from our speech pathologist with a swallow study.  I would like to see her back in 4 months.           Again, thank you for allowing me to participate in the care of your patient.       Sincerely,    Yolanda Whitaker MD

## 2017-02-16 NOTE — PROGRESS NOTES
DIAGNOSIS:  Right-sided sinonasal mucosal malignant melanoma.      TREATMENT:  The patient underwent endoscopic resection on 07/23/2014.  She received postoperative radiation therapy that finished on 09/11/2014.      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  The patient states that about two months ago, she started developing left eye vision disturbance.  She started having what she describes as diplopia.  This progressed to loss of vision of her left eye.  She was seen by a local ophthalmologist.  At this point, there is not a clear diagnosis why she lost vision in the left eye.  The patient is also scheduled for surgery with Dr. Pérez for biopsy of the right temporal lesion.  Other than that, she is doing well from a sinonasal standpoint.  She has been having some recurrent epistaxis but is telling me that the last episode was about a month ago.  She denies nasal obstruction.  She is also complaining of difficulty swallowing.  The patient states that she is choking with a lot of the foods, and this was not happening before.      PHYSICAL EXAMINATION:    Constitutional: The patient was well-groomed and in no acute distress.    Skin: Warm and pink.    Neurologic: Alert and oriented x3. Cranial nerves III-XII within normal limits. Voice quality is normal.    Psychiatric: The patient's affect was calm, cooperative and appropriate.    Respiratory: Breathing comfortably without stridor or exertion of accessory muscles.    Eyes: Pupils were equal and reactive. Extraocular movements are intact.    Head: Normocephalic and atraumatic. No lesions or scars.    Ears: External auditory canals and tympanic membranes were clear.    Nose: Sinuses were nontender. Anterior rhinoscopy revealed midline septum and absence of purulence or polyps.    Oral cavity/Oropharynx: Normal tongue, floor of mouth, buccal mucosa and palate. No  lesions or masses on inspection or palpation. No abnormal lymph tissue in the oropharynx.    Neck: The parotids are soft without masses. Supple with normal laryngeal and tracheal landmarks.    Lymphatic: There is no palpable lymphadenopathy or other masses in the neck or parotids.       PROCEDURE: Nasal endoscopy: The risks and benefits of the procedure were discussed and written consent was obtained. A timeout was performed. The patient's nose was sprayed with lidocaine and Afrin, and a flexible laryngoscope was used to gain access into the nasal cavity on the right.  There is evidence of endonasal surgery.  Anterior posterior ethmoidectomy is wide open.  Frontal sinusotomy is wide open.  Sphenoidotomy is wide open.  Right maxillary antrum is wide open.  No evidence of masses or lesions.  I advanced the scope all the way to the nasopharynx which is normal.  The oropharynx shows normal tongue base.  The vallecula and epiglottis are within normal limits.  True vocal cord motility is within normal limits.  I do not see any evidence of masses or lesions in the flexible laryngoscopy today.      ASSESSMENT AND PLAN:  This is a 76-year-old female with right-sided mucosal malignant melanoma, b-aubrie and c-KIT status unknown.  The patient is doing relatively well.  She has lost vision in the left eye.  I am going to ask her to see Dr. Quiroga from our Neurophthalmology Department to evaluate her.  Also, I am going to obtain an evaluation from our speech pathologist with a swallow study.  I would like to see her back in 4 months.

## 2017-02-16 NOTE — PATIENT INSTRUCTIONS
Return in 4 months with Dr Barboza.      We will order a consult with Dr Quiroga- our neuro-ophtlamologist.  Also will order an appointment/ consult with Speech Pathology/ therapy  to evaluate swallow with swallow. The [facility] services will call regarding the consult and study.    Our  or myself will call you with Dr Barboza follow up and Dr Quiroga appointment.         Please call/contact with further questions/concerns.     Sincerely,    WILL Lopes R.N.    My days of work are  Monday 12:30-4:30 PM, Tuesday 8-4:30, Thursday 8-4:30. primarily in ENT Triage.    [facility]  ENT clinic [phone]   [address] [place] [address]   4th Floor  Option #1 for appointments  Option # 3 for nurse triage   Fax: [phone]

## 2017-02-20 ENCOUNTER — DOCUMENTATION ONLY (OUTPATIENT)
Dept: LAB | Facility: CLINIC | Age: 77
End: 2017-02-20

## 2017-02-20 DIAGNOSIS — I10 HYPERTENSION GOAL BP (BLOOD PRESSURE) < 140/90: ICD-10-CM

## 2017-02-20 RX ORDER — HYDROCHLOROTHIAZIDE 12.5 MG/1
12.5 CAPSULE ORAL DAILY
Qty: 90 CAPSULE | Refills: 1 | Status: SHIPPED | OUTPATIENT
Start: 2017-02-20 | End: 2017-05-09

## 2017-02-20 RX ORDER — LISINOPRIL 30 MG/1
30 TABLET ORAL DAILY
Qty: 90 TABLET | Refills: 1 | Status: SHIPPED | OUTPATIENT
Start: 2017-02-20 | End: 2017-05-09

## 2017-02-20 NOTE — TELEPHONE ENCOUNTER
Received faxed request from patient's pharmacy stating that patient is requesting a 90-day supply for lisinopril (PRINIVIL,ZESTRIL) 30 MG tablet and hydrochlorothiazide (MICROZIDE) 12.5 MG capsule. Medications was filled on 02/16/17 for 30 tablets with one refill. Please send new scripts for 90-day supply.  Nargis Klein, CMA

## 2017-02-20 NOTE — PROGRESS NOTES
Patient has pre-op lab appt tomorrow at 12:40pm and a pre-op appt with Dr. Wheeler at 1pm.    Routed to Dr. Wheeler for lab orders.    Fabiola Biswas RN,   [facility]

## 2017-02-21 ENCOUNTER — ANESTHESIA EVENT (OUTPATIENT)
Dept: SURGERY | Facility: CLINIC | Age: 77
DRG: 516 | End: 2017-02-21
Payer: MEDICARE

## 2017-02-21 ENCOUNTER — OFFICE VISIT (OUTPATIENT)
Dept: PEDIATRICS | Facility: CLINIC | Age: 77
End: 2017-02-21
Payer: MEDICARE

## 2017-02-21 VITALS
WEIGHT: 186.6 LBS | TEMPERATURE: 98.4 F | HEART RATE: 81 BPM | HEIGHT: 66 IN | SYSTOLIC BLOOD PRESSURE: 148 MMHG | DIASTOLIC BLOOD PRESSURE: 87 MMHG | BODY MASS INDEX: 29.99 KG/M2 | OXYGEN SATURATION: 97 %

## 2017-02-21 DIAGNOSIS — Z01.818 PREOP GENERAL PHYSICAL EXAM: ICD-10-CM

## 2017-02-21 DIAGNOSIS — M79.7 FIBROMYALGIA: ICD-10-CM

## 2017-02-21 DIAGNOSIS — Z01.818 PRE-OP EXAM: Primary | ICD-10-CM

## 2017-02-21 LAB
HGB BLD-MCNC: 12.2 G/DL (ref 11.7–15.7)
POTASSIUM SERPL-SCNC: 4.1 MMOL/L (ref 3.4–5.3)

## 2017-02-21 PROCEDURE — 85018 HEMOGLOBIN: CPT | Performed by: INTERNAL MEDICINE

## 2017-02-21 PROCEDURE — 99214 OFFICE O/P EST MOD 30 MIN: CPT | Performed by: INTERNAL MEDICINE

## 2017-02-21 PROCEDURE — 84132 ASSAY OF SERUM POTASSIUM: CPT | Performed by: INTERNAL MEDICINE

## 2017-02-21 PROCEDURE — 36415 COLL VENOUS BLD VENIPUNCTURE: CPT | Performed by: INTERNAL MEDICINE

## 2017-02-21 ASSESSMENT — COPD QUESTIONNAIRES: COPD: 1

## 2017-02-21 ASSESSMENT — LIFESTYLE VARIABLES: TOBACCO_USE: 1

## 2017-02-21 NOTE — NURSING NOTE
"Chief Complaint   Patient presents with     Pre-Op Exam       Initial /87 (BP Location: Left arm, Patient Position: Chair, Cuff Size: Adult Regular)  Pulse 81  Temp 98.4  F (36.9  C) (Tympanic)  Ht 5' 5.5\" (1.664 m)  Wt 186 lb 9.6 oz (84.6 kg)  SpO2 97%  BMI 30.58 kg/m2 Estimated body mass index is 30.58 kg/(m^2) as calculated from the following:    Height as of this encounter: 5' 5.5\" (1.664 m).    Weight as of this encounter: 186 lb 9.6 oz (84.6 kg).  Medication Reconciliation: complete       Layla Moon CMA    "

## 2017-02-21 NOTE — PROGRESS NOTES
[address] [address]  [phone]  Dept: [phone]    PRE-OP EVALUATION:  Today's date: 2017    Gabino Jones (: 1940) presents for pre-operative evaluation assessment as requested by Dr. Pérez.  She requires evaluation and anesthesia risk assessment prior to undergoing surgery/procedure for treatment of  cramotomy to remove a skull lesion .  Proposed procedure: cramotomy to remove a skull lesion    Date of Surgery/ Procedure: 2017  Time of Surgery/ Procedure: 745 AM  Hospital/Surgical Facility: [facility]  Fax number for surgical facility: [phone]  Primary Physician: Jeevan Wheeler  Type of Anesthesia Anticipated: to be determined    Patient has a Health Care Directive or Living Will:  NO    1. NO - Do you have a history of heart attack, stroke, stent, bypass or surgery on an artery in the head, neck, heart or legs?  2. NO - Do you ever have any pain or discomfort in your chest?  3. NO - Do you have a history of  Heart Failure?  4. NO - Are you troubled by shortness of breath when: walking on the level, up a slight hill or at night?  5. NO - Do you currently have a cold, bronchitis or other respiratory infection?  6. NO - Do you have a cough, shortness of breath or wheezing?  7. NO - Do you sometimes get pains in the calves of your legs when you walk?  8. NO - Do you or anyone in your family have previous history of blood clots?  9. NO - Do you or does anyone in your family have a serious bleeding problem such as prolonged bleeding following surgeries or cuts?  10. NO - Have you ever had problems with anemia or been told to take iron pills?  11. NO - Have you had any abnormal blood loss such as black, tarry or bloody stools, or abnormal vaginal bleeding?  12. NO - Have you ever had a blood transfusion?  13. NO - Have you or any of your relatives ever had problems with anesthesia?  14. NO - Do you have sleep apnea,  excessive snoring or daytime drowsiness?  15. NO - Do you have any prosthetic heart valves?  16. NO - Do you have prosthetic joints?  17. NO - Is there any chance that you may be pregnant?      HPI:                                                      Brief HPI related to upcoming procedure: cramotomy to remove a skull lesion. History of melanoma.        MEDICAL HISTORY:                                                      Patient Active Problem List    Diagnosis Date Noted     Hypertension goal BP (blood pressure) < 140/90 02/20/2017     Priority: Medium     Aortic stenosis 04/25/2016     Priority: Medium     Abnormal chest CT 04/22/2016     Priority: Medium     Proximal humerus fracture 08/29/2014     Melanoma (H) 06/23/2014     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     Nasal pain 06/05/2014      Past Medical History   Diagnosis Date     Cancer (H)      Chronic back pain      Chronic leg pain      Depression      Hearing loss      Heart murmur      Hyperlipidemia      Hypertension      Ringing in ears      Past Surgical History   Procedure Laterality Date     Tonsillectomy       Neck surgery       Gallbladder surgery       Stomach surgery       C anesth,cerv spine, sitting position       Tubal ligation       1975     Optical tracking system endoscopic endonasal surgery  6/23/2014     Procedure: OPTICAL TRACKING SYSTEM ENDOSCOPIC ENDONASAL SURGERY;  Surgeon: Yolanda Whitaker MD;  Location: [facility] OR     Current Outpatient Prescriptions   Medication Sig Dispense Refill     lisinopril (PRINIVIL,ZESTRIL) 30 MG tablet Take 1 tablet (30 mg) by mouth daily 90 tablet 1     hydrochlorothiazide (MICROZIDE) 12.5 MG capsule Take 1 capsule (12.5 mg) by mouth daily 90 capsule 1     Cholecalciferol (VITAMIN D3) 45279 UNITS TABS Take by mouth 2 times daily       pantoprazole (PROTONIX) 40 MG EC tablet Take 1 tablet (40 mg) by mouth daily Take 30-60 minutes before a meal. 90 tablet 1     ranitidine (ZANTAC)  "150 MG tablet TK 1 T PO BID PRN  1     pravastatin (PRAVACHOL) 40 MG tablet        ibuprofen (ADVIL/MOTRIN) 800 MG tablet TK 1 T PO TID WITH MEALS  1     ALPRAZOLAM PO Take 0.25 mg by mouth daily       acetaminophen-codeine (TYLENOL #3) 300-30 MG per tablet Take 2 tablets by mouth every 6 hours as needed for moderate pain       BusPIRone HCl (BUSPAR PO) Take 5 mg by mouth 3 times daily       Simethicone 180 MG CAPS Take by mouth daily       DOCQLACE 100 MG capsule   0     potassium chloride SA (K-DUR,KLOR-CON M) 20 MEQ tablet   3     venlafaxine (EFFEXOR XR) 150 MG 24 hr capsule Take by mouth daily       POTASSIUM CITRATE PO Take 20 mEq by mouth       OTC products: None, except as noted above    Allergies   Allergen Reactions     Novocain [Procaine] Unknown and Rash     Mirtazapine       Latex Allergy: NO    Social History   Substance Use Topics     Smoking status: Former Smoker     Types: Cigarettes     Quit date: 6/10/2004     Smokeless tobacco: Never Used     Alcohol use No     History   Drug Use No       REVIEW OF SYSTEMS:                                                    ROS:  GI: heartburn improved on protonix  Constitutional, HEENT, cardiovascular, pulmonary, and gu systems are negative, except as otherwise noted.      EXAM:                                                    /87 (BP Location: Left arm, Patient Position: Chair, Cuff Size: Adult Regular)  Pulse 81  Temp 98.4  F (36.9  C) (Tympanic)  Ht 5' 5.5\" (1.664 m)  Wt 186 lb 9.6 oz (84.6 kg)  SpO2 97%  BMI 30.58 kg/m2        GENERAL APPEARANCE: healthy, alert and no distress     EYES: Eyes grossly normal to inspection     HENT: nose and mouth without ulcers or lesions     NECK: no adenopathy, no asymmetry, masses, or scars and thyroid normal to palpation     RESP: lungs clear to auscultation - no rales, rhonchi or wheezes     CV: regular rates and rhythm, normal S1 S2, no S3 or S4 and murmur 2/6 mid systolic medium pitched harsh murmur aortic " area     ABDOMEN:  soft, nontender, no HSM or masses and bowel sounds normal     MS: extremities normal- no gross deformities noted, no evidence of inflammation in joints, FROM in all extremities.     NEURO: Normal strength and tone, sensory exam grossly normal, mentation intact and speech normal     PSYCH: mentation appears normal. and affect normal/bright    DIAGNOSTICS:                                                      Labs Resulted Today:   Results for orders placed or performed in visit on 02/21/17   Hemoglobin   Result Value Ref Range    Hemoglobin 12.2 11.7 - 15.7 g/dL   Potassium   Result Value Ref Range    Potassium 4.1 3.4 - 5.3 mmol/L       Recent Labs   Lab Test  02/15/17   0937  08/10/16   1050   08/29/14   1912   HGB  11.1*  12.0   < >  10.0*   PLT  219  256   < >  212   INR   --    --    --   1.19*   NA  133  136   < >  134   POTASSIUM  3.9  4.3   < >  3.9   CR  0.86  0.73   < >  0.69    < > = values in this interval not displayed.        IMPRESSION:                                                    Reason for surgery/procedure: Skull lesion. History of melanoma.    The proposed surgical procedure is considered LOW risk.    REVISED CARDIAC RISK INDEX  The patient has the following serious cardiovascular risks for perioperative complications such as (MI, PE, VFib and 3  AV Block):  Moderate aortic stenosis  INTERPRETATION: 2 risks: Class III (moderate risk - 6.6% complication rate)    The patient has the following additional risks for perioperative complications:  Moderate aortic stenosis      ICD-10-CM    1. Pre-op exam Z01.818 Hemoglobin     Potassium   2. Preop general physical exam Z01.818        RECOMMENDATIONS:                                                      ACE Inhibitor or Angiotensin Receptor Blocker (ARB) Use  Ace inhibitor or Angiotensin Receptor Blocker (ARB) and will continue this medication due to the higher risk of uncontrolled perioerative hypertension (e.g. neurosurgical  procedure)      APPROVAL GIVEN to proceed with proposed procedure, without further diagnostic evaluation       Signed Electronically by: Jeevan Wheeler MD    Copy of this evaluation report is provided to requesting physician.    [facility] Preop Guidelines

## 2017-02-21 NOTE — ANESTHESIA PREPROCEDURE EVALUATION
Anesthesia Evaluation     . Pt has had prior anesthetic. Type: General    No history of anesthetic complications     ROS/MED HX    ENT/Pulmonary:     (+)tobacco use, Past use COPD, , . .    Neurologic:  - neg neurologic ROS     Cardiovascular:     (+) Dyslipidemia, hypertension----. : . . . :. valvular problems/murmurs type: AS mild to moderate aortic stenosis/insufficiency:. Previous cardiac testing Echodate:5/6/2016results:Global and regional left ventricular function is normal with an EF of 60-65%.  Right ventricular function, chamber size, wall motion, and thickness are  normal.  Mild to moderate aortic insufficiency is present.  Mild aortic stenosis is present.  Pulmonary artery systolic pressure is normal.  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    NPO Status:  > 8 hours    Plan for General, RSI and ETT with Intravenous and Propofol induction. Maintenance will be Inhalation and Balanced.    PONV prophylaxis:  Ondansetron (or other 5HT-3) and Dexamethasone or Solumedrol  Additional equipment: 2nd IV and Arterial Line      Postoperative Care  Postoperative pain management:  IV analgesics and Oral pain medications.      Consents        ANESTHESIA PREOP EVALUATION    Procedure: Procedure(s):  Stealth Assisted Right Frontal Craniotomy And Resection Skull Mass  - Wound Class:     HPI: Gabino Jones is a 76 year old female with HTN, HLD,  mild to moderate aortic stenosis, and history of sinusoidal malignancy s/p radiation & surgery presenting for above procedure.     PMHx/PSHx/ROS:  Past Medical History   Diagnosis Date     Cancer (H)      Chronic back pain      Chronic leg pain      Depression      Hearing loss      Heart murmur      Hyperlipidemia      Hypertension      Ringing in ears        Past Surgical History   Procedure Laterality Date     Tonsillectomy       Neck surgery       Gallbladder surgery       Stomach surgery       C anesth,cerv spine, sitting position       Tubal ligation       1975     Optical tracking system endoscopic endonasal surgery  6/23/2014     Procedure: OPTICAL TRACKING SYSTEM ENDOSCOPIC ENDONASAL SURGERY;  Surgeon: Yolanda Whitaker MD;  Location: [facility] OR         Past Anes Hx: No personal or family h/o anesthesia problems    Soc Hx:   Social History   Substance Use Topics     Smoking status: Former Smoker     Types: Cigarettes     Quit date: 6/10/2004     Smokeless tobacco: Never Used     Alcohol use No       Allergies:   Allergies   Allergen Reactions     Novocain [Procaine] Unknown and Rash     Mirtazapine        Meds:   No prescriptions prior to admission.       Current Outpatient Prescriptions   Medication Sig Dispense Refill     acetaminophen-codeine (TYLENOL #3) 300-30 MG per tablet Take 1 tablet by mouth every 6 hours as needed for moderate pain 28 tablet 1     lisinopril (PRINIVIL,ZESTRIL) 30 MG tablet Take 1 tablet (30 mg) by mouth daily 90 tablet 1     hydrochlorothiazide (MICROZIDE) 12.5 MG capsule Take 1 capsule (12.5 mg) by mouth daily 90 capsule 1     Cholecalciferol (VITAMIN D3) 11302 UNITS TABS Take by mouth 2 times daily       pantoprazole (PROTONIX) 40 MG EC tablet Take 1 tablet (40 mg) by mouth daily Take 30-60 minutes before a meal. 90 tablet 1     ranitidine (ZANTAC) 150 MG tablet TK 1 T PO BID PRN  1     pravastatin (PRAVACHOL) 40 MG tablet        ibuprofen (ADVIL/MOTRIN) 800 MG tablet  TK 1 T PO TID WITH MEALS  1     ALPRAZOLAM PO Take 0.25 mg by mouth daily       BusPIRone HCl (BUSPAR PO) Take 5 mg by mouth 3 times daily       Simethicone 180 MG CAPS Take by mouth daily       DOCQLACE 100 MG capsule   0     potassium chloride SA (K-DUR,KLOR-CON M) 20 MEQ tablet   3     venlafaxine (EFFEXOR XR) 150 MG 24 hr capsule Take by mouth daily       POTASSIUM CITRATE PO Take 20 mEq by mouth         Physical Exam:  Vitals: There were no vitals taken for this visit.  BMI= There is no height or weight on file to calculate BMI.      Labs:  UPT: No results found for: HCGQUANT      BMP:  Recent Labs   Lab Test  02/21/17   1240  02/15/17   0937   NA   --   133   POTASSIUM  4.1  3.9   CHLORIDE   --   98   CO2   --   25   BUN   --   11   CR   --   0.86   GLC   --   84   STEFFANIE   --   8.0*     CBC:   Recent Labs   Lab Test  02/21/17   1240  02/15/17   0937   WBC   --   7.6   RBC   --   3.81   HGB  12.2  11.1*   HCT   --   34.2*   MCV   --   90   MCH   --   29.1   MCHC   --   32.5   RDW   --   13.2   PLT   --   219     Coags:  Recent Labs   Lab Test  08/29/14   1912   INR  1.19*   PTT  33       Assessment/Plan:  - ASA 3  - GETA with standard ASA monitors, IV induction, balanced anesthetic  - Pre-induction:   - Versed 1 mg   - PIVx2   - Arterial line    - No central line   - Antibiotics per surgeon  - Induction:   - Mac 3   - ETT 7.0   - Propofol, Rocuronium, Fentanyl, Lidocaine  - Maintenance:   - 0.5 MAC Sevoflurane, Remifentanil infusion, propofol infusion   - Analgesia: Fentanyl   - Fluids: LR 1 mL/kg/hr maint  - Emergence:   - Reversal: Sugammadex vs. Neostigmine & Glycopyrrolate   - PONV prophylaxis: jessica Cruz Jr., MD  Anesthesia Resident - [facility]    2/21/2017  4:10 PM

## 2017-02-21 NOTE — MR AVS SNAPSHOT
After Visit Summary   2/21/2017    Gabino Jones    MRN: [id number]           Patient Information     Date Of Birth          1940        Visit Information        Provider Department      2/21/2017 1:00 PM Jeevan Wheeler MD [facility]        Today's Diagnoses     Pre-op exam    -  1    Preop general physical exam        Fibromyalgia          Care Instructions      Before Your Surgery      Call your surgeon if there is any change in your health. This includes signs of a cold or flu (such as a sore throat, runny nose, cough, rash or fever).    Do not smoke, drink alcohol or take over the counter medicine (unless your surgeon or primary care doctor tells you to) for the 24 hours before and after surgery.    If you take prescribed drugs: Follow your doctor s orders about which medicines to take and which to stop until after surgery.    Eating and drinking prior to surgery: follow the instructions from your surgeon    Take a shower or bath the night before surgery. Use the soap your surgeon gave you to gently clean your skin. If you do not have soap from your surgeon, use your regular soap. Do not shave or scrub the surgery site.  Wear clean pajamas and have clean sheets on your bed.         Follow-ups after your visit        Your next 10 appointments already scheduled     Feb 22, 2017  6:00 AM CST   CT HEAD W/O CONTRAST with UUCT1   [facility], [place], [place] ([facility], [facility])    [address] [address] [phone]           Please bring any scans or X-rays taken at other hospitals, if similar tests were done. Also bring a list of your medicines, including vitamins, minerals and over-the-counter drugs. It is safest to leave personal items at home.  Be sure to tell your doctor:   If you have any allergies.   If there s any chance you are pregnant.   If you are breastfeeding.   If you have any special needs.  You do not  need to do anything special to prepare.  Please wear loose clothing, such as a sweat suit or jogging clothes. Avoid snaps, zippers and other metal. We may ask you to undress and put on a hospital gown.            Feb 22, 2017  6:30 AM CST   MR BRAIN W/O & W CONTRAST with UUMR2   [facility], [place], MRI ([facility], [facility])    [address] [address] [phone]           Take your medicines as usual, unless your doctor tells you not to. Bring a list of your current medicines to your exam (including vitamins, minerals and over-the-counter drugs).  You will be given intravenous contrast for this exam. To prepare:   The day before your exam, drink extra fluids at least six 8-ounce glasses (unless your doctor tells you to restrict your fluids).   Have a blood test (creatinine test) within 30 days of your exam. Go to your clinic or Diagnostic Imaging Department for this test.  The MRI machine uses a strong magnet. Please wear clothes without metal (snaps, zippers). A sweatsuit works well, or we may give you a hospital gown.  Please remove any body piercings and hair extensions before you arrive. You will also remove watches, jewelry, hairpins, wallets, dentures, partial dental plates and hearing aids. You may wear contact lenses, and you may be able to wear your rings. We have a safe place to keep your personal items, but it is safer to leave them at home.   **IMPORTANT** THE INSTRUCTIONS BELOW ARE ONLY FOR THOSE PATIENTS WHO HAVE BEEN TOLD THEY WILL RECEIVE SEDATION OR GENERAL ANESTHESIA DURING THEIR MRI PROCEDURE:  IF YOU WILL RECEIVE SEDATION (take medicine to help you relax during your exam):   You must get the medicine from your doctor before you arrive. Bring the medicine to the exam. Do not take it at home.   Arrive one hour early. Bring someone who can take you home after the test. Your medicine will make you sleepy. After the exam, you may not  drive, take a bus or take a taxi by yourself.   No eating 8 hours before your exam. You may have clear liquids up until 4 hours before your exam. (Clear liquids include water, clear tea, black coffee and fruit juice without pulp.)  IF YOU WILL RECEIVE ANESTHESIA (be asleep for your exam):   Arrive 1 1/2 hours early. Bring someone who can take you home after the test. You may not drive, take a bus or take a taxi by yourself.   No eating 8 hours before your exam. You may have clear liquids up until 4 hours before your exam. (Clear liquids include water, clear tea, black coffee and fruit juice without pulp.)  Please call the Imaging Department at your exam site with any questions.            Feb 22, 2017   Procedure with Lenora Pérez MD   [facility], [place], Same Day Surgery (--)    [address] [address]   [phone]            Feb 28, 2017 12:30 PM CST   NEW NEURO with Matt Quiroga MD   Eye Clinic ([facility])    [address]  [address]  [facility] [id number] [phone]            Jun 22, 2017  2:20 PM CDT   (Arrive by 2:05 PM)   Return Visit with Yolanda Whitaker MD   [facility] ([facility])    [address]  [address] [facility] [id number] [phone]              Who to contact     If you have questions or need follow up information about today's clinic visit or your schedule please contact [facility] [[email or web address]] directly at [phone].  Normal or non-critical lab and imaging results will be communicated to you by MyChart, letter or phone within 4 business days after the clinic has received the results. If you do not hear from us within 7 days, please contact the clinic through MyChart or phone. If you have a critical or abnormal lab result, we will notify you by phone as soon as possible.  Submit refill requests through [place] or call your pharmacy and  "they will forward the refill request to us. Please allow 3 business days for your refill to be completed.          Additional Information About Your Visit        [employer] Information     [employer] gives you secure access to your electronic health record. If you see a primary care provider, you can also send messages to your care team and make appointments. If you have questions, please call your primary care clinic.  If you do not have a primary care provider, please call [phone] and they will assist you.      [employer] is an electronic gateway that provides easy, online access to your medical records. With [employer], you can request a clinic appointment, read your test results, renew a prescription or communicate with your care team.     To access your existing account, please contact your [facility] or call [phone] for assistance.        Care EveryWhere ID     This is your Care EveryWhere ID. This could be used by other organizations to access your [place] medical records  [id number]        Your Vitals Were     Pulse Temperature Height Pulse Oximetry BMI (Body Mass Index)       81 98.4  F (36.9  C) (Tympanic) 5' 5.5\" (1.664 m) 97% 30.58 kg/m2        Blood Pressure from Last 3 Encounters:   02/21/17 148/87   01/31/17 152/51   01/12/17 144/70    Weight from Last 3 Encounters:   02/21/17 186 lb 9.6 oz (84.6 kg)   02/16/17 187 lb (84.8 kg)   01/31/17 186 lb (84.4 kg)              We Performed the Following     Hemoglobin     Potassium          Today's Medication Changes          These changes are accurate as of: 2/21/17  1:37 PM.  If you have any questions, ask your nurse or doctor.               These medicines have changed or have updated prescriptions.        Dose/Directions    acetaminophen-codeine 300-30 MG per tablet   Commonly known as:  TYLENOL #3   This may have changed:  how much to take   Used for:  Fibromyalgia   Changed by:  Jeevan Wheeler MD        Dose:  1 " tablet   Take 1 tablet by mouth every 6 hours as needed for moderate pain   Quantity:  28 tablet   Refills:  1            Where to get your medicines      Some of these will need a paper prescription and others can be bought over the counter.  Ask your nurse if you have questions.     Bring a paper prescription for each of these medications     acetaminophen-codeine 300-30 MG per tablet                Primary Care Provider Office Phone # Fax #    Jeevan Wheeler [phone]       [facility] [address] [facility] [place] [address]        Thank you!     Thank you for choosing [facility]  for your care. Our goal is always to provide you with excellent care. Hearing back from our patients is one way we can continue to improve our services. Please take a few minutes to complete the written survey that you may receive in the mail after your visit with us. Thank you!             Your Updated Medication List - Protect others around you: Learn how to safely use, store and throw away your medicines at www.disposemymeds.org.          This list is accurate as of: 2/21/17  1:37 PM.  Always use your most recent med list.                   Brand Name Dispense Instructions for use    acetaminophen-codeine 300-30 MG per tablet    TYLENOL #3    28 tablet    Take 1 tablet by mouth every 6 hours as needed for moderate pain       ALPRAZOLAM PO      Take 0.25 mg by mouth daily       BUSPAR PO      Take 5 mg by mouth 3 times daily       DOCQLACE 100 MG capsule   Generic drug:  docusate sodium          EFFEXOR  MG 24 hr capsule   Generic drug:  venlafaxine      Take by mouth daily       hydrochlorothiazide 12.5 MG capsule    MICROZIDE    90 capsule    Take 1 capsule (12.5 mg) by mouth daily       ibuprofen 800 MG tablet    ADVIL/MOTRIN     TK 1 T PO TID WITH MEALS       lisinopril 30 MG tablet    PRINIVIL,ZESTRIL    90 tablet    Take 1 tablet (30 mg) by mouth daily       pantoprazole 40 MG  EC tablet    PROTONIX    90 tablet    Take 1 tablet (40 mg) by mouth daily Take 30-60 minutes before a meal.       potassium chloride SA 20 MEQ CR tablet    K-DUR/KLOR-CON M         POTASSIUM CITRATE PO      Take 20 mEq by mouth       pravastatin 40 MG tablet    PRAVACHOL         ranitidine 150 MG tablet    ZANTAC     TK 1 T PO BID PRN       Simethicone 180 MG Caps      Take by mouth daily       Vitamin D3 81486 UNITS Tabs      Take by mouth 2 times daily

## 2017-02-22 ENCOUNTER — HOSPITAL ENCOUNTER (INPATIENT)
Facility: CLINIC | Age: 77
LOS: 1 days | Discharge: HOME | DRG: 516 | End: 2017-02-23
Attending: NEUROLOGICAL SURGERY | Admitting: NEUROLOGICAL SURGERY
Payer: MEDICARE

## 2017-02-22 ENCOUNTER — HOSPITAL ENCOUNTER (OUTPATIENT)
Dept: CT IMAGING | Facility: CLINIC | Age: 77
DRG: 516 | End: 2017-02-22
Attending: NEUROLOGICAL SURGERY | Admitting: NEUROLOGICAL SURGERY
Payer: MEDICARE

## 2017-02-22 ENCOUNTER — HOSPITAL ENCOUNTER (OUTPATIENT)
Dept: MRI IMAGING | Facility: CLINIC | Age: 77
DRG: 516 | End: 2017-02-22
Attending: NEUROLOGICAL SURGERY | Admitting: NEUROLOGICAL SURGERY
Payer: MEDICARE

## 2017-02-22 ENCOUNTER — ANESTHESIA (OUTPATIENT)
Dept: SURGERY | Facility: CLINIC | Age: 77
DRG: 516 | End: 2017-02-22
Payer: MEDICARE

## 2017-02-22 DIAGNOSIS — M89.8X8 SKULL MASS: ICD-10-CM

## 2017-02-22 DIAGNOSIS — H53.10 SUBJECTIVE VISUAL DISTURBANCE: Primary | ICD-10-CM

## 2017-02-22 DIAGNOSIS — M89.9 SKULL LESION: Primary | ICD-10-CM

## 2017-02-22 PROCEDURE — A9270 NON-COVERED ITEM OR SERVICE: HCPCS | Mod: GY | Performed by: NEUROLOGICAL SURGERY

## 2017-02-22 PROCEDURE — 25000128 H RX IP 250 OP 636: Performed by: NEUROLOGICAL SURGERY

## 2017-02-22 PROCEDURE — 25000132 ZZH RX MED GY IP 250 OP 250 PS 637: Mod: GY | Performed by: NEUROLOGICAL SURGERY

## 2017-02-22 PROCEDURE — 27210794 ZZH OR GENERAL SUPPLY STERILE: Performed by: NEUROLOGICAL SURGERY

## 2017-02-22 PROCEDURE — 25800025 ZZH RX 258: Performed by: NEUROLOGICAL SURGERY

## 2017-02-22 PROCEDURE — 25000125 ZZHC RX 250: Performed by: NEUROLOGICAL SURGERY

## 2017-02-22 PROCEDURE — C9399 UNCLASSIFIED DRUGS OR BIOLOG: HCPCS | Performed by: STUDENT IN AN ORGANIZED HEALTH CARE EDUCATION/TRAINING PROGRAM

## 2017-02-22 PROCEDURE — 8E09XBH COMPUTER ASSISTED PROCEDURE OF HEAD AND NECK REGION, WITH MAGNETIC RESONANCE IMAGING: ICD-10-PCS | Performed by: NEUROLOGICAL SURGERY

## 2017-02-22 PROCEDURE — 36000076 ZZH SURGERY LEVEL 6 EA 15 ADDTL MIN - UMMC: Performed by: NEUROLOGICAL SURGERY

## 2017-02-22 PROCEDURE — A9585 GADOBUTROL INJECTION: HCPCS | Performed by: NEUROLOGICAL SURGERY

## 2017-02-22 PROCEDURE — C1781 MESH (IMPLANTABLE): HCPCS | Performed by: NEUROLOGICAL SURGERY

## 2017-02-22 PROCEDURE — 88307 TISSUE EXAM BY PATHOLOGIST: CPT | Performed by: NEUROLOGICAL SURGERY

## 2017-02-22 PROCEDURE — 88305 TISSUE EXAM BY PATHOLOGIST: CPT | Performed by: NEUROLOGICAL SURGERY

## 2017-02-22 PROCEDURE — 71000017 ZZH RECOVERY PHASE 1 LEVEL 3 EA ADDTL HR: Performed by: NEUROLOGICAL SURGERY

## 2017-02-22 PROCEDURE — 25000128 H RX IP 250 OP 636: Performed by: STUDENT IN AN ORGANIZED HEALTH CARE EDUCATION/TRAINING PROGRAM

## 2017-02-22 PROCEDURE — 25500064 ZZH RX 255 OP 636: Performed by: NEUROLOGICAL SURGERY

## 2017-02-22 PROCEDURE — 70450 CT HEAD/BRAIN W/O DYE: CPT

## 2017-02-22 PROCEDURE — C1713 ANCHOR/SCREW BN/BN,TIS/BN: HCPCS | Performed by: NEUROLOGICAL SURGERY

## 2017-02-22 PROCEDURE — 12000008 ZZH R&B INTERMEDIATE UMMC

## 2017-02-22 PROCEDURE — 25000125 ZZHC RX 250: Performed by: STUDENT IN AN ORGANIZED HEALTH CARE EDUCATION/TRAINING PROGRAM

## 2017-02-22 PROCEDURE — 25800025 ZZH RX 258: Performed by: STUDENT IN AN ORGANIZED HEALTH CARE EDUCATION/TRAINING PROGRAM

## 2017-02-22 PROCEDURE — 37000008 ZZH ANESTHESIA TECHNICAL FEE, 1ST 30 MIN: Performed by: NEUROLOGICAL SURGERY

## 2017-02-22 PROCEDURE — 71000016 ZZH RECOVERY PHASE 1 LEVEL 3 FIRST HR: Performed by: NEUROLOGICAL SURGERY

## 2017-02-22 PROCEDURE — 8E09XBG COMPUTER ASSISTED PROCEDURE OF HEAD AND NECK REGION, WITH COMPUTERIZED TOMOGRAPHY: ICD-10-PCS | Performed by: NEUROLOGICAL SURGERY

## 2017-02-22 PROCEDURE — 37000009 ZZH ANESTHESIA TECHNICAL FEE, EACH ADDTL 15 MIN: Performed by: NEUROLOGICAL SURGERY

## 2017-02-22 PROCEDURE — 0NB00ZZ EXCISION OF SKULL, OPEN APPROACH: ICD-10-PCS | Performed by: NEUROLOGICAL SURGERY

## 2017-02-22 PROCEDURE — 40000275 ZZH STATISTIC RCP TIME EA 10 MIN

## 2017-02-22 PROCEDURE — 25000566 ZZH SEVOFLURANE, EA 15 MIN: Performed by: NEUROLOGICAL SURGERY

## 2017-02-22 PROCEDURE — 70552 MRI BRAIN STEM W/DYE: CPT

## 2017-02-22 PROCEDURE — 40000014 ZZH STATISTIC ARTERIAL MONITORING DAILY

## 2017-02-22 PROCEDURE — 88311 DECALCIFY TISSUE: CPT | Performed by: NEUROLOGICAL SURGERY

## 2017-02-22 PROCEDURE — 36000074 ZZH SURGERY LEVEL 6 1ST 30 MIN - UMMC: Performed by: NEUROLOGICAL SURGERY

## 2017-02-22 PROCEDURE — 40000170 ZZH STATISTIC PRE-PROCEDURE ASSESSMENT II: Performed by: NEUROLOGICAL SURGERY

## 2017-02-22 PROCEDURE — C1763 CONN TISS, NON-HUMAN: HCPCS | Performed by: NEUROLOGICAL SURGERY

## 2017-02-22 DEVICE — IMP SCR SYN MATRIX LOW PRO 1.5X04MM SELF DRILL 04.503.104.01: Type: IMPLANTABLE DEVICE | Site: SKULL | Status: FUNCTIONAL

## 2017-02-22 DEVICE — GRAFT DURAGEN 2X2" ID220: Type: IMPLANTABLE DEVICE | Site: BRAIN | Status: FUNCTIONAL

## 2017-02-22 DEVICE — IMP MESH SYN MALLEABLE 38X45MM LOW PROFILER TI: Type: IMPLANTABLE DEVICE | Site: SKULL | Status: FUNCTIONAL

## 2017-02-22 RX ORDER — POTASSIUM CHLORIDE 7.45 MG/ML
10 INJECTION INTRAVENOUS
Status: DISCONTINUED | OUTPATIENT
Start: 2017-02-22 | End: 2017-02-23 | Stop reason: HOSPADM

## 2017-02-22 RX ORDER — ACETAMINOPHEN 325 MG/1
650 TABLET ORAL EVERY 4 HOURS PRN
Status: DISCONTINUED | OUTPATIENT
Start: 2017-02-25 | End: 2017-02-23 | Stop reason: HOSPADM

## 2017-02-22 RX ORDER — HYDROMORPHONE HYDROCHLORIDE 1 MG/ML
.3-.5 INJECTION, SOLUTION INTRAMUSCULAR; INTRAVENOUS; SUBCUTANEOUS
Status: DISCONTINUED | OUTPATIENT
Start: 2017-02-22 | End: 2017-02-23

## 2017-02-22 RX ORDER — ACETAMINOPHEN 10 MG/ML
1000 INJECTION, SOLUTION INTRAVENOUS ONCE
Status: COMPLETED | OUTPATIENT
Start: 2017-02-22 | End: 2017-02-22

## 2017-02-22 RX ORDER — NALOXONE HYDROCHLORIDE 0.4 MG/ML
.1-.4 INJECTION, SOLUTION INTRAMUSCULAR; INTRAVENOUS; SUBCUTANEOUS
Status: DISCONTINUED | OUTPATIENT
Start: 2017-02-22 | End: 2017-02-23 | Stop reason: HOSPADM

## 2017-02-22 RX ORDER — SODIUM CHLORIDE, SODIUM LACTATE, POTASSIUM CHLORIDE, CALCIUM CHLORIDE 600; 310; 30; 20 MG/100ML; MG/100ML; MG/100ML; MG/100ML
INJECTION, SOLUTION INTRAVENOUS CONTINUOUS
Status: DISCONTINUED | OUTPATIENT
Start: 2017-02-22 | End: 2017-02-22 | Stop reason: HOSPADM

## 2017-02-22 RX ORDER — HYDRALAZINE HYDROCHLORIDE 20 MG/ML
10-20 INJECTION INTRAMUSCULAR; INTRAVENOUS EVERY 30 MIN PRN
Status: DISCONTINUED | OUTPATIENT
Start: 2017-02-22 | End: 2017-02-23 | Stop reason: HOSPADM

## 2017-02-22 RX ORDER — HYDROMORPHONE HYDROCHLORIDE 1 MG/ML
.3-.5 INJECTION, SOLUTION INTRAMUSCULAR; INTRAVENOUS; SUBCUTANEOUS EVERY 5 MIN PRN
Status: DISCONTINUED | OUTPATIENT
Start: 2017-02-22 | End: 2017-02-22 | Stop reason: HOSPADM

## 2017-02-22 RX ORDER — PROPOFOL 10 MG/ML
INJECTION, EMULSION INTRAVENOUS PRN
Status: DISCONTINUED | OUTPATIENT
Start: 2017-02-22 | End: 2017-02-22

## 2017-02-22 RX ORDER — LIDOCAINE HYDROCHLORIDE 20 MG/ML
INJECTION, SOLUTION INFILTRATION; PERINEURAL PRN
Status: DISCONTINUED | OUTPATIENT
Start: 2017-02-22 | End: 2017-02-22

## 2017-02-22 RX ORDER — ALPRAZOLAM 0.25 MG
0.25 TABLET ORAL DAILY
Status: DISCONTINUED | OUTPATIENT
Start: 2017-02-23 | End: 2017-02-23 | Stop reason: HOSPADM

## 2017-02-22 RX ORDER — BUPIVACAINE HYDROCHLORIDE AND EPINEPHRINE 2.5; 5 MG/ML; UG/ML
INJECTION, SOLUTION EPIDURAL; INFILTRATION; INTRACAUDAL; PERINEURAL PRN
Status: DISCONTINUED | OUTPATIENT
Start: 2017-02-22 | End: 2017-02-22 | Stop reason: HOSPADM

## 2017-02-22 RX ORDER — ACETAMINOPHEN 325 MG/1
975 TABLET ORAL EVERY 8 HOURS
Status: DISCONTINUED | OUTPATIENT
Start: 2017-02-22 | End: 2017-02-23 | Stop reason: HOSPADM

## 2017-02-22 RX ORDER — ONDANSETRON 2 MG/ML
4 INJECTION INTRAMUSCULAR; INTRAVENOUS EVERY 6 HOURS PRN
Status: DISCONTINUED | OUTPATIENT
Start: 2017-02-22 | End: 2017-02-23 | Stop reason: HOSPADM

## 2017-02-22 RX ORDER — SODIUM CHLORIDE 9 MG/ML
INJECTION, SOLUTION INTRAVENOUS CONTINUOUS
Status: DISCONTINUED | OUTPATIENT
Start: 2017-02-22 | End: 2017-02-23

## 2017-02-22 RX ORDER — CEFAZOLIN SODIUM 1 G/3ML
1 INJECTION, POWDER, FOR SOLUTION INTRAMUSCULAR; INTRAVENOUS SEE ADMIN INSTRUCTIONS
Status: DISCONTINUED | OUTPATIENT
Start: 2017-02-22 | End: 2017-02-22 | Stop reason: HOSPADM

## 2017-02-22 RX ORDER — PROCHLORPERAZINE MALEATE 5 MG
5 TABLET ORAL EVERY 6 HOURS PRN
Status: DISCONTINUED | OUTPATIENT
Start: 2017-02-22 | End: 2017-02-23 | Stop reason: HOSPADM

## 2017-02-22 RX ORDER — ALBUTEROL SULFATE 0.83 MG/ML
2.5 SOLUTION RESPIRATORY (INHALATION) EVERY 4 HOURS PRN
Status: DISCONTINUED | OUTPATIENT
Start: 2017-02-22 | End: 2017-02-22 | Stop reason: HOSPADM

## 2017-02-22 RX ORDER — FENTANYL CITRATE 50 UG/ML
25-50 INJECTION, SOLUTION INTRAMUSCULAR; INTRAVENOUS
Status: DISCONTINUED | OUTPATIENT
Start: 2017-02-22 | End: 2017-02-22 | Stop reason: HOSPADM

## 2017-02-22 RX ORDER — PRAVASTATIN SODIUM 40 MG
40 TABLET ORAL DAILY
Status: DISCONTINUED | OUTPATIENT
Start: 2017-02-22 | End: 2017-02-23 | Stop reason: HOSPADM

## 2017-02-22 RX ORDER — SIMETHICONE 80 MG
160 TABLET,CHEWABLE ORAL DAILY
Status: DISCONTINUED | OUTPATIENT
Start: 2017-02-23 | End: 2017-02-23 | Stop reason: HOSPADM

## 2017-02-22 RX ORDER — FENTANYL CITRATE 50 UG/ML
INJECTION, SOLUTION INTRAMUSCULAR; INTRAVENOUS PRN
Status: DISCONTINUED | OUTPATIENT
Start: 2017-02-22 | End: 2017-02-22

## 2017-02-22 RX ORDER — ONDANSETRON 4 MG/1
4-8 TABLET, ORALLY DISINTEGRATING ORAL EVERY 6 HOURS PRN
Status: DISCONTINUED | OUTPATIENT
Start: 2017-02-22 | End: 2017-02-23 | Stop reason: HOSPADM

## 2017-02-22 RX ORDER — LABETALOL HYDROCHLORIDE 5 MG/ML
10-40 INJECTION, SOLUTION INTRAVENOUS EVERY 10 MIN PRN
Status: DISCONTINUED | OUTPATIENT
Start: 2017-02-22 | End: 2017-02-23 | Stop reason: HOSPADM

## 2017-02-22 RX ORDER — ONDANSETRON 2 MG/ML
INJECTION INTRAMUSCULAR; INTRAVENOUS PRN
Status: DISCONTINUED | OUTPATIENT
Start: 2017-02-22 | End: 2017-02-22

## 2017-02-22 RX ORDER — CEFAZOLIN SODIUM 2 G/100ML
2 INJECTION, SOLUTION INTRAVENOUS
Status: COMPLETED | OUTPATIENT
Start: 2017-02-22 | End: 2017-02-22

## 2017-02-22 RX ORDER — SODIUM CHLORIDE, SODIUM LACTATE, POTASSIUM CHLORIDE, CALCIUM CHLORIDE 600; 310; 30; 20 MG/100ML; MG/100ML; MG/100ML; MG/100ML
INJECTION, SOLUTION INTRAVENOUS CONTINUOUS PRN
Status: DISCONTINUED | OUTPATIENT
Start: 2017-02-22 | End: 2017-02-22

## 2017-02-22 RX ORDER — GADOBUTROL 604.72 MG/ML
10 INJECTION INTRAVENOUS ONCE
Status: COMPLETED | OUTPATIENT
Start: 2017-02-22 | End: 2017-02-22

## 2017-02-22 RX ORDER — DOCUSATE SODIUM 100 MG/1
100 CAPSULE, LIQUID FILLED ORAL DAILY
Status: DISCONTINUED | OUTPATIENT
Start: 2017-02-22 | End: 2017-02-22

## 2017-02-22 RX ORDER — DEXAMETHASONE SODIUM PHOSPHATE 4 MG/ML
INJECTION, SOLUTION INTRA-ARTICULAR; INTRALESIONAL; INTRAMUSCULAR; INTRAVENOUS; SOFT TISSUE PRN
Status: DISCONTINUED | OUTPATIENT
Start: 2017-02-22 | End: 2017-02-22

## 2017-02-22 RX ORDER — POTASSIUM CHLORIDE 29.8 MG/ML
20 INJECTION INTRAVENOUS
Status: DISCONTINUED | OUTPATIENT
Start: 2017-02-22 | End: 2017-02-23 | Stop reason: HOSPADM

## 2017-02-22 RX ORDER — LIDOCAINE 40 MG/G
CREAM TOPICAL
Status: DISCONTINUED | OUTPATIENT
Start: 2017-02-22 | End: 2017-02-23 | Stop reason: HOSPADM

## 2017-02-22 RX ORDER — PANTOPRAZOLE SODIUM 40 MG/1
40 TABLET, DELAYED RELEASE ORAL DAILY
Status: DISCONTINUED | OUTPATIENT
Start: 2017-02-22 | End: 2017-02-23 | Stop reason: HOSPADM

## 2017-02-22 RX ORDER — ONDANSETRON 4 MG/1
4 TABLET, ORALLY DISINTEGRATING ORAL EVERY 30 MIN PRN
Status: DISCONTINUED | OUTPATIENT
Start: 2017-02-22 | End: 2017-02-22 | Stop reason: HOSPADM

## 2017-02-22 RX ORDER — POTASSIUM CHLORIDE 1.5 G/1.58G
20-40 POWDER, FOR SOLUTION ORAL
Status: DISCONTINUED | OUTPATIENT
Start: 2017-02-22 | End: 2017-02-23 | Stop reason: HOSPADM

## 2017-02-22 RX ORDER — BUSPIRONE HYDROCHLORIDE 5 MG/1
5 TABLET ORAL 3 TIMES DAILY
Status: DISCONTINUED | OUTPATIENT
Start: 2017-02-22 | End: 2017-02-23 | Stop reason: HOSPADM

## 2017-02-22 RX ORDER — LABETALOL HYDROCHLORIDE 5 MG/ML
10 INJECTION, SOLUTION INTRAVENOUS
Status: DISCONTINUED | OUTPATIENT
Start: 2017-02-22 | End: 2017-02-22 | Stop reason: HOSPADM

## 2017-02-22 RX ORDER — AMOXICILLIN 250 MG
1-2 CAPSULE ORAL 2 TIMES DAILY
Status: DISCONTINUED | OUTPATIENT
Start: 2017-02-22 | End: 2017-02-23 | Stop reason: HOSPADM

## 2017-02-22 RX ORDER — HYDROXYZINE HYDROCHLORIDE 10 MG/1
10 TABLET, FILM COATED ORAL EVERY 6 HOURS PRN
Status: DISCONTINUED | OUTPATIENT
Start: 2017-02-22 | End: 2017-02-23 | Stop reason: HOSPADM

## 2017-02-22 RX ORDER — VENLAFAXINE HYDROCHLORIDE 150 MG/1
150 TABLET, EXTENDED RELEASE ORAL DAILY
Status: DISCONTINUED | OUTPATIENT
Start: 2017-02-22 | End: 2017-02-23 | Stop reason: HOSPADM

## 2017-02-22 RX ORDER — VENLAFAXINE HYDROCHLORIDE 150 MG/1
150 CAPSULE, EXTENDED RELEASE ORAL DAILY
Status: DISCONTINUED | OUTPATIENT
Start: 2017-02-22 | End: 2017-02-22

## 2017-02-22 RX ORDER — MAGNESIUM SULFATE HEPTAHYDRATE 40 MG/ML
4 INJECTION, SOLUTION INTRAVENOUS EVERY 4 HOURS PRN
Status: DISCONTINUED | OUTPATIENT
Start: 2017-02-22 | End: 2017-02-23 | Stop reason: HOSPADM

## 2017-02-22 RX ORDER — NALOXONE HYDROCHLORIDE 0.4 MG/ML
.1-.4 INJECTION, SOLUTION INTRAMUSCULAR; INTRAVENOUS; SUBCUTANEOUS
Status: DISCONTINUED | OUTPATIENT
Start: 2017-02-22 | End: 2017-02-22 | Stop reason: HOSPADM

## 2017-02-22 RX ORDER — POTASSIUM CHLORIDE 750 MG/1
20-40 TABLET, EXTENDED RELEASE ORAL
Status: DISCONTINUED | OUTPATIENT
Start: 2017-02-22 | End: 2017-02-23 | Stop reason: HOSPADM

## 2017-02-22 RX ORDER — OXYCODONE HYDROCHLORIDE 5 MG/1
5-10 TABLET ORAL EVERY 4 HOURS PRN
Status: DISCONTINUED | OUTPATIENT
Start: 2017-02-22 | End: 2017-02-23 | Stop reason: HOSPADM

## 2017-02-22 RX ORDER — HYDROCHLOROTHIAZIDE 12.5 MG/1
12.5 CAPSULE ORAL DAILY
Status: DISCONTINUED | OUTPATIENT
Start: 2017-02-22 | End: 2017-02-23 | Stop reason: HOSPADM

## 2017-02-22 RX ORDER — EPHEDRINE SULFATE 50 MG/ML
INJECTION, SOLUTION INTRAMUSCULAR; INTRAVENOUS; SUBCUTANEOUS PRN
Status: DISCONTINUED | OUTPATIENT
Start: 2017-02-22 | End: 2017-02-22

## 2017-02-22 RX ORDER — PROPOFOL 10 MG/ML
INJECTION, EMULSION INTRAVENOUS CONTINUOUS PRN
Status: DISCONTINUED | OUTPATIENT
Start: 2017-02-22 | End: 2017-02-22

## 2017-02-22 RX ORDER — ONDANSETRON 2 MG/ML
4 INJECTION INTRAMUSCULAR; INTRAVENOUS EVERY 30 MIN PRN
Status: DISCONTINUED | OUTPATIENT
Start: 2017-02-22 | End: 2017-02-22 | Stop reason: HOSPADM

## 2017-02-22 RX ADMIN — HYDROMORPHONE HYDROCHLORIDE 0.5 MG: 10 INJECTION, SOLUTION INTRAMUSCULAR; INTRAVENOUS; SUBCUTANEOUS at 13:12

## 2017-02-22 RX ADMIN — ACETAMINOPHEN 1000 MG: 10 INJECTION, SOLUTION INTRAVENOUS at 13:30

## 2017-02-22 RX ADMIN — PRAVASTATIN SODIUM 40 MG: 40 TABLET ORAL at 20:21

## 2017-02-22 RX ADMIN — PROPOFOL 40 MCG/KG/MIN: 10 INJECTION, EMULSION INTRAVENOUS at 08:38

## 2017-02-22 RX ADMIN — Medication 5 MG: at 11:17

## 2017-02-22 RX ADMIN — VITAMIN D, TAB 1000IU (100/BT) 1000 UNITS: 25 TAB at 20:21

## 2017-02-22 RX ADMIN — PHENYLEPHRINE HYDROCHLORIDE 200 MCG: 10 INJECTION, SOLUTION INTRAMUSCULAR; INTRAVENOUS; SUBCUTANEOUS at 08:45

## 2017-02-22 RX ADMIN — GADOBUTROL 10 ML: 604.72 INJECTION INTRAVENOUS at 07:16

## 2017-02-22 RX ADMIN — Medication 5 MG: at 11:12

## 2017-02-22 RX ADMIN — SENNOSIDES AND DOCUSATE SODIUM 2 TABLET: 8.6; 5 TABLET ORAL at 20:27

## 2017-02-22 RX ADMIN — SODIUM CHLORIDE, POTASSIUM CHLORIDE, SODIUM LACTATE AND CALCIUM CHLORIDE: 600; 310; 30; 20 INJECTION, SOLUTION INTRAVENOUS at 08:22

## 2017-02-22 RX ADMIN — LISINOPRIL 30 MG: 20 TABLET ORAL at 20:22

## 2017-02-22 RX ADMIN — BUSPIRONE HYDROCHLORIDE 5 MG: 5 TABLET ORAL at 20:21

## 2017-02-22 RX ADMIN — PHENYLEPHRINE HYDROCHLORIDE 200 MCG: 10 INJECTION, SOLUTION INTRAMUSCULAR; INTRAVENOUS; SUBCUTANEOUS at 09:01

## 2017-02-22 RX ADMIN — HYDROCHLOROTHIAZIDE 12.5 MG: 12.5 CAPSULE ORAL at 20:21

## 2017-02-22 RX ADMIN — PHENYLEPHRINE HYDROCHLORIDE 200 MCG: 10 INJECTION, SOLUTION INTRAMUSCULAR; INTRAVENOUS; SUBCUTANEOUS at 08:42

## 2017-02-22 RX ADMIN — FENTANYL CITRATE 250 MCG: 50 INJECTION, SOLUTION INTRAMUSCULAR; INTRAVENOUS at 08:34

## 2017-02-22 RX ADMIN — DEXAMETHASONE SODIUM PHOSPHATE 10 MG: 4 INJECTION, SOLUTION INTRAMUSCULAR; INTRAVENOUS at 09:46

## 2017-02-22 RX ADMIN — VENLAFAXINE HYDROCHLORIDE 150 MG: 150 TABLET, EXTENDED RELEASE ORAL at 20:20

## 2017-02-22 RX ADMIN — PHENYLEPHRINE HYDROCHLORIDE 100 MCG: 10 INJECTION, SOLUTION INTRAMUSCULAR; INTRAVENOUS; SUBCUTANEOUS at 11:19

## 2017-02-22 RX ADMIN — ROCURONIUM BROMIDE 50 MG: 10 INJECTION INTRAVENOUS at 08:34

## 2017-02-22 RX ADMIN — PHENYLEPHRINE HYDROCHLORIDE 200 MCG: 10 INJECTION, SOLUTION INTRAMUSCULAR; INTRAVENOUS; SUBCUTANEOUS at 08:53

## 2017-02-22 RX ADMIN — CEFAZOLIN SODIUM 1 G: 2 INJECTION, SOLUTION INTRAVENOUS at 10:36

## 2017-02-22 RX ADMIN — HYDROMORPHONE HYDROCHLORIDE 0.3 MG: 10 INJECTION, SOLUTION INTRAMUSCULAR; INTRAVENOUS; SUBCUTANEOUS at 15:48

## 2017-02-22 RX ADMIN — REMIFENTANIL HYDROCHLORIDE 0.1 MCG/KG/MIN: 1 INJECTION, POWDER, LYOPHILIZED, FOR SOLUTION INTRAVENOUS at 08:37

## 2017-02-22 RX ADMIN — ROCURONIUM BROMIDE 20 MG: 10 INJECTION INTRAVENOUS at 10:05

## 2017-02-22 RX ADMIN — ACETAMINOPHEN 975 MG: 325 TABLET, FILM COATED ORAL at 15:51

## 2017-02-22 RX ADMIN — FENTANYL CITRATE 50 MCG: 50 INJECTION, SOLUTION INTRAMUSCULAR; INTRAVENOUS at 11:55

## 2017-02-22 RX ADMIN — PHENYLEPHRINE HYDROCHLORIDE 200 MCG: 10 INJECTION, SOLUTION INTRAMUSCULAR; INTRAVENOUS; SUBCUTANEOUS at 08:58

## 2017-02-22 RX ADMIN — LIDOCAINE HYDROCHLORIDE 100 MG: 20 INJECTION, SOLUTION INFILTRATION; PERINEURAL at 08:34

## 2017-02-22 RX ADMIN — SODIUM CHLORIDE: 9 INJECTION, SOLUTION INTRAVENOUS at 12:06

## 2017-02-22 RX ADMIN — HYDROMORPHONE HYDROCHLORIDE 0.5 MG: 10 INJECTION, SOLUTION INTRAMUSCULAR; INTRAVENOUS; SUBCUTANEOUS at 12:37

## 2017-02-22 RX ADMIN — FENTANYL CITRATE 50 MCG: 50 INJECTION, SOLUTION INTRAMUSCULAR; INTRAVENOUS at 11:59

## 2017-02-22 RX ADMIN — OXYCODONE HYDROCHLORIDE 5 MG: 5 TABLET ORAL at 22:57

## 2017-02-22 RX ADMIN — PROPOFOL 150 MG: 10 INJECTION, EMULSION INTRAVENOUS at 08:34

## 2017-02-22 RX ADMIN — SUCCINYLCHOLINE CHLORIDE 100 MG: 20 INJECTION, SOLUTION INTRAMUSCULAR; INTRAVENOUS at 08:34

## 2017-02-22 RX ADMIN — MIDAZOLAM HYDROCHLORIDE 1 MG: 1 INJECTION, SOLUTION INTRAMUSCULAR; INTRAVENOUS at 08:22

## 2017-02-22 RX ADMIN — SUGAMMADEX 170 MG: 100 INJECTION, SOLUTION INTRAVENOUS at 11:07

## 2017-02-22 RX ADMIN — FENTANYL CITRATE 25 MCG: 50 INJECTION, SOLUTION INTRAMUSCULAR; INTRAVENOUS at 11:43

## 2017-02-22 RX ADMIN — CEFAZOLIN SODIUM 2 G: 2 INJECTION, SOLUTION INTRAVENOUS at 08:48

## 2017-02-22 RX ADMIN — PHENYLEPHRINE HYDROCHLORIDE 0.4 MCG/KG/MIN: 10 INJECTION, SOLUTION INTRAMUSCULAR; INTRAVENOUS; SUBCUTANEOUS at 08:57

## 2017-02-22 RX ADMIN — HYDROMORPHONE HYDROCHLORIDE 0.2 MG: 10 INJECTION, SOLUTION INTRAMUSCULAR; INTRAVENOUS; SUBCUTANEOUS at 12:30

## 2017-02-22 RX ADMIN — HYDROMORPHONE HYDROCHLORIDE 0.5 MG: 10 INJECTION, SOLUTION INTRAMUSCULAR; INTRAVENOUS; SUBCUTANEOUS at 18:07

## 2017-02-22 RX ADMIN — HYDROMORPHONE HYDROCHLORIDE 0.3 MG: 1 INJECTION, SOLUTION INTRAMUSCULAR; INTRAVENOUS; SUBCUTANEOUS at 10:07

## 2017-02-22 RX ADMIN — RANITIDINE HYDROCHLORIDE 150 MG: 150 TABLET, FILM COATED ORAL at 20:21

## 2017-02-22 RX ADMIN — FENTANYL CITRATE 50 MCG: 50 INJECTION, SOLUTION INTRAMUSCULAR; INTRAVENOUS at 11:47

## 2017-02-22 RX ADMIN — HYDROMORPHONE HYDROCHLORIDE 0.3 MG: 10 INJECTION, SOLUTION INTRAMUSCULAR; INTRAVENOUS; SUBCUTANEOUS at 12:25

## 2017-02-22 RX ADMIN — FENTANYL CITRATE 25 MCG: 50 INJECTION, SOLUTION INTRAMUSCULAR; INTRAVENOUS at 11:40

## 2017-02-22 RX ADMIN — OXYCODONE HYDROCHLORIDE 5 MG: 5 TABLET ORAL at 20:22

## 2017-02-22 RX ADMIN — ONDANSETRON 4 MG: 2 INJECTION INTRAMUSCULAR; INTRAVENOUS at 10:41

## 2017-02-22 ASSESSMENT — PAIN DESCRIPTION - DESCRIPTORS
DESCRIPTORS: ACHING

## 2017-02-22 NOTE — OR NURSING
Dr. Donell Clarke at bedside in PACU to assess patient.  Patient continues to c/o severe right side head pain.  Patient too groggy to swallow ice at this time.  When patient able to swallow, administer tylenol.  Continue to administer pain medication until pain tolerable.    Patient to transfer to unit 6A per Dr. Clarke.

## 2017-02-22 NOTE — OR NURSING
Pt has a small raised white-filled blister ?pimple? to her left forearm, noted while starting the IV.

## 2017-02-22 NOTE — IP AVS SNAPSHOT
[address] [address]    Phone:  [phone]                                       After Visit Summary   2/22/2017    Gabino Jones    MRN: [id number]           After Visit Summary Signature Page     I have received my discharge instructions, and my questions have been answered. I have discussed any challenges I see with this plan with the nurse or doctor.    ..........................................................................................................................................  Patient/Patient Representative Signature      ..........................................................................................................................................  Patient Representative Print Name and Relationship to Patient    ..................................................               ................................................  Date                                            Time    ..........................................................................................................................................  Reviewed by Signature/Title    ...................................................              ..............................................  Date                                                            Time

## 2017-02-22 NOTE — PROGRESS NOTES
SPIRITUAL HEALTH SERVICES  [facility]C   PRE-SURGERY VISIT    Patient unavailable when checked several times. She was having procedures off the floor. On visit requested  list.    Judie Mccall  Volunteer   Pager [phone]

## 2017-02-22 NOTE — OR NURSING
Text page sent to Dr. Kristian Lopez notifying patient slow to swallowing.  Unable to administer po Tylenol.  Order request for IV Tylenol.  Return call obtained from Dr. Lopez stating he will enter order for IV tylenol.

## 2017-02-22 NOTE — OR NURSING
Dr. Kristian Lopez notified patient continues to c/o severe right side head pain post administration of Fentanyl 200 mcg and Dilaudid 0.5 mg.  Dr. Lopez arrived to bedside in PACU to assess patient.  Neuro assessment at patients baseline.  Per Dr. Lopez, administer Tylenol as ordered in post op orders.

## 2017-02-22 NOTE — OP NOTE
PREOPERATIVE DIAGNOSIS:  Right frontal skull lesion.      POSTOPERATIVE DIAGNOSIS:  Right frontal skull lesion.        PROCEDURE PERFORMED:     1.  Right frontal craniectomy for resection of skull lesion.   2.  Use of intraoperative frameless stereotactic neuronavigation.      SURGEON:  Lenora Pérez MD      ASSISTANTS:  Kristian Lopez MD      OPERATIVE INDICATIONS:  Ms. Gabino Jones is a 76-year-old lady who was referred to the Neurosurgery Clinic at the [facility] for a right frontal skull lesion that was noted on MRI screening of her brain.  This was enhancing and was present in the right frontal aspect measuring about 1 x 1.3 cm in size.  She has a history of malignant melanoma that was treated by resection and underwent radiation therapy.  For concerns of this skull lesion being a metastatic lesion, a biopsy was recommended.       SUMMARY:  Ms. Jones was brought to the operating room by our anesthesia colleagues.  She underwent general endotracheal anesthesia and a dose of preoperative antibiotics was given.  She was then positioned supine in the operative bed and head was fixed in [place] headholder and turned to the left side so the right frontal aspect of the scalp was visible.  Using Stealth guidance, her preoperative Stealth imaging was merged on Stealth system and head was registered to the Stealth scan.  Then a pterional incision was planned from the  speak to the root of zygoma on the right side behind the hairline.  Sterile prepping and draping was completed, and timeout was performed and 8 mL of local anesthetic was injected along the planned incision site.  A 10 blade was used to open the planned incision after timeout was completed and monopolar cautery used for further dissection above the subperiosteal plane about 2 cm behind the orbit.  Then, the periosteum was reflected forward and incision over the temporalis fascia and muscle was made up to the superior  aspect of the root of zygoma and the temporalis muscle reflected forward.  A small temporalis fascia cuff was left at the superior temporal line.  Then, using Stealth guidance, the site of the craniotomy was planned.  A 5 mm bur was used to make a small rosanna hole, and the dura was exposed.  Then, using a B1 with footplate, a 2 x 2 cm size craniectomy was created with the right frontal lesion at the center of the bone flap. The bone flap was elevated and inspected. While there was some discoloration along the inner table, there was no erosion.    The bone was then on the back table and cut in a split thickness manner using the drill. Within the diploe, there was yellowish soft semi-hard tissue was evident which seemed different from the native surrounding bone.  Both bone fragments were sent for pathology.  A small sample of the underlying dura was taken as a biopsy and sent for pathology as well.  Irrigation was performed.  A 2 x 2 cm size Duragen was placed over the site of the dural defect.  A small piece of Synthes titanium mesh was placed over it and secured with help of titanium mini screws. The temporalis muscle fascia was sutured to the mesh with the help of 2-0 Vicryl stitches.  The subgaleal tissue was closed with 3-0 Vicryl stitches in an inverted interrupted manner and the skin was closed with 4-0 Monocryl in a running continuous manner.  The needle and sponge count was correct x2 at the end of the procedure.  Dr. Lenora Britton was present for the key portions and immediately available for the entire procedure.         LENORA BRITTON MD       As dictated by DALIA MOLINA MD      ATTESTATION: My signature attests that I was present for the key portions and immediately available for the entire procedure described above.    LENORA BRITTON MD     D: 02/22/2017 11:31   T: 02/22/2017 12:18   MT: MICHELA      Name:     ALEX ANAYA   MRN:      [id number]        Account:         [id number]   :      1940           Procedure Date: 2017      Document: [id number]

## 2017-02-22 NOTE — ANESTHESIA POSTPROCEDURE EVALUATION
Patient: Gabino Jones    Procedure(s):  Stealth Assisted Right Frontal Craniotomy And Resection Skull Mass, dural biopsy - Wound Class: I-Clean    Diagnosis:Right Frontal Skull Lesion   Diagnosis Additional Information: No value filed.    Anesthesia Type:  General, RSI, ETT    Note:  Anesthesia Post Evaluation    Patient location during evaluation: PACU  Patient participation: Able to fully participate in evaluation  Level of consciousness: awake and alert  Pain management: adequate  Airway patency: patent  Cardiovascular status: acceptable and hemodynamically stable  Respiratory status: acceptable  Hydration status: acceptable  PONV: none     Anesthetic complications: None          Last vitals:  Vitals:    02/22/17 0611   BP: (!) 146/112   Resp: 16   Temp: 36.8  C (98.2  F)   SpO2: 97%         Electronically Signed By: Edwin De León MD  February 22, 2017  11:38 AM

## 2017-02-22 NOTE — IP AVS SNAPSHOT
MRN:[id number]                      After Visit Summary   2017    Gabino Jones    MRN: [id number]           Thank you!     Thank you for choosing [facility] for your care. Our goal is always to provide you with excellent care. Hearing back from our patients is one way we can continue to improve our services. Please take a few minutes to complete the written survey that you may receive in the mail after you visit with us. Thank you!        Patient Information     Date Of Birth          1940        About your hospital stay     You were admitted on:  2017 You last received care in the:  [address] [facility]    You were discharged on:  2017        Reason for your hospital stay       You underwent a skull biopsy.                  Who to Call     For medical emergencies, please call [phone].  For non-urgent questions about your medical care, please call your primary care provider or clinic, [phone]  For questions related to your surgery, please call your surgery clinic        Attending Provider     Provider Specialty    Lenora Pérez MD Neurosurgery       Primary Care Provider Office Phone # Fax #    Jeevan Law Wheeler [phone]       [facility] [address] [place] [address]         When to contact your care team       Call if you have any of the followin. Temperature greater than 101.5 F.   2. Any redness, swelling or discharge from the wound.   3. Any new weakness, numbness or altered mental status.  4. Worsening pain that is not improving with the pain medications you were prescribed.     Call [phone] or after 5:00 pm or on weekends call [phone] and ask for the neurosurgery resident on call. Thank You.   .                  After Care Instructions     Activity       Your activity upon discharge: activity as tolerated and no driving for today            Diet       Follow this diet upon discharge:  "Regular            Wound care and dressings       Instructions to care for your wound at home:Your sutures are absorbable. You can shower on post-operative day #3. Please keep incision clean and dry.                  Follow-up Appointments     Adult [facility] Follow-up and recommended labs and tests       Please call the neurosurgery clinic at [phone] to schedule a follow-up appointment to go over your pathology as well as do a wound check.    This should be done in 2 weeks time.                  Your next 10 appointments already scheduled     Feb 28, 2017 12:30 PM CST   NEW NEURO with Matt Quiroga MD   Eye Clinic ([facility])    [address]  [address]  9[address]  [facility] [id number] [phone]            Jun 22, 2017  2:20 PM CDT   (Arrive by 2:05 PM)   Return Visit with Yolanda Whitaker MD   [facility] Th[facility] ([facility])    [address]  4th Floor  [facility] [address] [phone]              Pending Results     Date and Time Order Name Status Description    2/22/2017 1027 Surgical pathology exam In process             Statement of Approval     Ordered          02/23/17 0827  I have reviewed and agree with all the recommendations and orders detailed in this document.  EFFECTIVE NOW     Approved and electronically signed by:  Donell Clarke MD             Admission Information     Date & Time Provider Department Dept. Phone    2/22/2017 Lenora Pérez MD [address] [facility] [place] [phone]      Your Vitals Were     Blood Pressure Temperature Respirations Height Weight Pulse Oximetry    132/70 98.5  F (36.9  C) (Oral) 16 1.676 m (5' 6\") 85 kg (187 lb 6.3 oz) 98%    BMI (Body Mass Index)                   30.25 kg/m2           MyChart Information     [email or web address] gives you secure access to your electronic health record. If you see a primary care provider, you can also send messages to " your care team and make appointments. If you have questions, please call your primary care clinic.  If you do not have a primary care provider, please call [phone] and they will assist you.        Care EveryWhere ID     This is your Care EveryWhere ID. This could be used by other organizations to access your [place] medical records  [id number]           Review of your medicines      START taking        Dose / Directions    ondansetron 4 MG ODT tab   Commonly known as:  ZOFRAN-ODT        Dose:  4-8 mg   Take 1-2 tablets (4-8 mg) by mouth every 6 hours as needed for nausea   Quantity:  40 tablet   Refills:  0       oxyCODONE 5 MG IR tablet   Commonly known as:  ROXICODONE        Dose:  5-10 mg   Take 1-2 tablets (5-10 mg) by mouth every 4 hours as needed for moderate to severe pain   Quantity:  40 tablet   Refills:  0       senna-docusate 8.6-50 MG per tablet   Commonly known as:  SENOKOT-S;PERICOLACE        Dose:  1-2 tablet   Take 1-2 tablets by mouth 2 times daily   Quantity:  40 tablet   Refills:  0         CONTINUE these medicines which may have CHANGED, or have new prescriptions. If we are uncertain of the size of tablets/capsules you have at home, strength may be listed as something that might have changed.        Dose / Directions    ibuprofen 800 MG tablet   Commonly known as:  ADVIL/MOTRIN   This may have changed:  See the new instructions.        Dose:  800 mg   Start taking on:  2/28/2017   Take 1 tablet (800 mg) by mouth every 4 hours as needed for moderate pain   Quantity:  90 tablet   Refills:  1         CONTINUE these medicines which have NOT CHANGED        Dose / Directions    acetaminophen-codeine 300-30 MG per tablet   Commonly known as:  TYLENOL #3   Used for:  Fibromyalgia        Dose:  1 tablet   Take 1 tablet by mouth every 6 hours as needed for moderate pain   Quantity:  28 tablet   Refills:  1       ALPRAZOLAM PO        Dose:  0.25 mg   Take 0.25 mg by mouth daily   Refills:  0        BUSPAR PO        Dose:  5 mg   Take 5 mg by mouth 3 times daily   Refills:  0       DOCQLACE 100 MG capsule   Generic drug:  docusate sodium        Refills:  0       EFFEXOR  MG 24 hr capsule   Generic drug:  venlafaxine        Take by mouth daily   Refills:  0       hydrochlorothiazide 12.5 MG capsule   Commonly known as:  MICROZIDE   Used for:  Hypertension goal BP (blood pressure) < 140/90        Dose:  12.5 mg   Take 1 capsule (12.5 mg) by mouth daily   Quantity:  90 capsule   Refills:  1       lisinopril 30 MG tablet   Commonly known as:  PRINIVIL,ZESTRIL   Used for:  Hypertension goal BP (blood pressure) < 140/90        Dose:  30 mg   Take 1 tablet (30 mg) by mouth daily   Quantity:  90 tablet   Refills:  1       pantoprazole 40 MG EC tablet   Commonly known as:  PROTONIX   Used for:  Gastroesophageal reflux disease without esophagitis        Dose:  40 mg   Take 1 tablet (40 mg) by mouth daily Take 30-60 minutes before a meal.   Quantity:  90 tablet   Refills:  1       potassium chloride SA 20 MEQ CR tablet   Commonly known as:  K-DUR/KLOR-CON M        Refills:  3       POTASSIUM CITRATE PO        Dose:  20 mEq   Take 20 mEq by mouth   Refills:  0       pravastatin 40 MG tablet   Commonly known as:  PRAVACHOL        Refills:  0       ranitidine 150 MG tablet   Commonly known as:  ZANTAC        TK 1 T PO BID PRN   Refills:  1       Simethicone 180 MG Caps        Take by mouth daily   Refills:  0       Vitamin D3 09551 UNITS Tabs        Take by mouth 2 times daily   Refills:  0            Where to get your medicines      These medications were sent to [facility] [place], [place] - [address] [address]     Phone:  [phone]     ondansetron 4 MG ODT tab    senna-docusate 8.6-50 MG per tablet         Some of these will need a paper prescription and others can be bought over the counter. Ask your nurse if you have questions.     Bring a paper  prescription for each of these medications     oxyCODONE 5 MG IR tablet                Protect others around you: Learn how to safely use, store and throw away your medicines at www.disposemymeds.org.             Medication List: This is a list of all your medications and when to take them. Check marks below indicate your daily home schedule. Keep this list as a reference.      Medications           Morning Afternoon Evening Bedtime As Needed    acetaminophen-codeine 300-30 MG per tablet   Commonly known as:  TYLENOL #3   Take 1 tablet by mouth every 6 hours as needed for moderate pain                                ALPRAZOLAM PO   Take 0.25 mg by mouth daily   Last time this was given:  0.25 mg on 2/23/2017  7:55 AM                                BUSPAR PO   Take 5 mg by mouth 3 times daily   Last time this was given:  5 mg on 2/23/2017  7:55 AM                                DOCQLACE 100 MG capsule   Generic drug:  docusate sodium                                EFFEXOR  MG 24 hr capsule   Take by mouth daily   Generic drug:  venlafaxine                                hydrochlorothiazide 12.5 MG capsule   Commonly known as:  MICROZIDE   Take 1 capsule (12.5 mg) by mouth daily   Last time this was given:  12.5 mg on 2/23/2017  7:56 AM                                ibuprofen 800 MG tablet   Commonly known as:  ADVIL/MOTRIN   Take 1 tablet (800 mg) by mouth every 4 hours as needed for moderate pain   Start taking on:  2/28/2017                                lisinopril 30 MG tablet   Commonly known as:  PRINIVIL,ZESTRIL   Take 1 tablet (30 mg) by mouth daily   Last time this was given:  30 mg on 2/23/2017  7:56 AM                                ondansetron 4 MG ODT tab   Commonly known as:  ZOFRAN-ODT   Take 1-2 tablets (4-8 mg) by mouth every 6 hours as needed for nausea                                oxyCODONE 5 MG IR tablet   Commonly known as:  ROXICODONE   Take 1-2 tablets (5-10 mg) by mouth every 4 hours  as needed for moderate to severe pain   Last time this was given:  10 mg on 2/23/2017  7:55 AM                                pantoprazole 40 MG EC tablet   Commonly known as:  PROTONIX   Take 1 tablet (40 mg) by mouth daily Take 30-60 minutes before a meal.   Last time this was given:  40 mg on 2/23/2017  7:56 AM                                potassium chloride SA 20 MEQ CR tablet   Commonly known as:  K-DUR/KLOR-CON M                                POTASSIUM CITRATE PO   Take 20 mEq by mouth                                pravastatin 40 MG tablet   Commonly known as:  PRAVACHOL   Last time this was given:  40 mg on 2/23/2017  7:57 AM                                ranitidine 150 MG tablet   Commonly known as:  ZANTAC   TK 1 T PO BID PRN   Last time this was given:  150 mg on 2/23/2017  7:56 AM                                senna-docusate 8.6-50 MG per tablet   Commonly known as:  SENOKOT-S;PERICOLACE   Take 1-2 tablets by mouth 2 times daily   Last time this was given:  2 tablets on 2/23/2017  7:56 AM                                Simethicone 180 MG Caps   Take by mouth daily                                Vitamin D3 88572 UNITS Tabs   Take by mouth 2 times daily   Last time this was given:  1,000 Units on 2/23/2017  7:57 AM

## 2017-02-22 NOTE — BRIEF OP NOTE
[facility], [place]    Brief Operative Note    Pre-operative diagnosis: Right Frontal Skull Lesion   Post-operative diagnosis Same  Procedure: Procedure(s):  Stealth Assisted Right Frontal Craniotomy And Resection Skull Mass, dural biopsy - Wound Class: I-Clean  Surgeon: Surgeon(s) and Role:     * Lenora Pérez MD - Primary     * Kristian Lopez MD - Resident - Assisting  Anesthesia: General   Estimated blood loss: 20 ml  Drains: None  Specimens:   ID Type Source Tests Collected by Time Destination   A : Right frontal skull mass Bone Bone SURGICAL PATHOLOGY EXAM Lenora Pérez MD 2/22/2017 10:20 AM    B : Right frontal dura Tissue Brain SURGICAL PATHOLOGY EXAM Lenora Pérez MD 2/22/2017 10:25 AM      Findings:   Soft yellowish tissue invading bone, dura torn and also sent for pathology.  Complications: None.  Implants: Titanium mesh and screws.  Plan:  PACU --> 6A

## 2017-02-22 NOTE — ANESTHESIA CARE TRANSFER NOTE
Patient: Gabino Jones    Procedure(s):  Stealth Assisted Right Frontal Craniotomy And Resection Skull Mass, dural biopsy - Wound Class: I-Clean    Diagnosis: Right Frontal Skull Lesion   Diagnosis Additional Information: No value filed.    Anesthesia Type:   General, RSI, ETT     Note:  Airway :Face Mask  Patient transferred to:PACU  Comments: Airway :Face Mask  Patient transferred to:PACU  Comments: Prior to extubation, patient was breathing spontaneously with appropriate respiratory rate and tidal volume. The patient was following commands, warm and demonstrated adequate strength. Patient was suctioned and extubated without complication.   Transported to PACU on 6L O2 via face mask.   VSS upon arrival to PACU.  Patient denies nausea or pain at this time.   Care transfer plan communicated and patient care transferred to PACU RN     Alcides Dunlap Jr., MD  Anesthesia Resident - [facility]    2/22/2017  11:26 AM        Vitals: (Last set prior to Anesthesia Care Transfer)    CRNA VITALS  2/22/2017 1050 - 2/22/2017 1126      2/22/2017             Ht Rate: 68    SpO2: 100 %    Resp Rate (observed): 12                Electronically Signed By: Alcides Dunlap MD  February 22, 2017  11:26 AM

## 2017-02-22 NOTE — ANESTHESIA POSTPROCEDURE EVALUATION
Patient: Gabino Jones    Procedure(s):  Stealth Assisted Right Frontal Craniotomy And Resection Skull Mass, dural biopsy - Wound Class: I-Clean    Diagnosis:Right Frontal Skull Lesion   Diagnosis Additional Information: No value filed.    Anesthesia Type:  General, RSI, ETT    Note:  Anesthesia Post Evaluation    Patient location during evaluation: PACU  Patient participation: Able to fully participate in evaluation  Level of consciousness: awake and alert  Pain management: adequate  Airway patency: patent  Cardiovascular status: acceptable and hemodynamically stable  Respiratory status: acceptable  Hydration status: acceptable  PONV: none     Anesthetic complications: None          Last vitals:  Vitals:    02/22/17 1130 02/22/17 1145 02/22/17 1200   BP: 92/80 99/78    Resp: 14 14    Temp:      SpO2: 99% 99% 100%         Electronically Signed By: Edwin De León MD  February 22, 2017  12:18 PM

## 2017-02-23 VITALS
TEMPERATURE: 97.9 F | BODY MASS INDEX: 30.12 KG/M2 | WEIGHT: 187.39 LBS | HEIGHT: 66 IN | DIASTOLIC BLOOD PRESSURE: 65 MMHG | SYSTOLIC BLOOD PRESSURE: 122 MMHG | OXYGEN SATURATION: 97 % | RESPIRATION RATE: 16 BRPM

## 2017-02-23 LAB — GLUCOSE BLDC GLUCOMTR-MCNC: 83 MG/DL (ref 70–99)

## 2017-02-23 PROCEDURE — A9270 NON-COVERED ITEM OR SERVICE: HCPCS | Mod: GY | Performed by: NEUROLOGICAL SURGERY

## 2017-02-23 PROCEDURE — 25000132 ZZH RX MED GY IP 250 OP 250 PS 637: Mod: GY | Performed by: NEUROLOGICAL SURGERY

## 2017-02-23 PROCEDURE — 00000146 ZZHCL STATISTIC GLUCOSE BY METER IP

## 2017-02-23 RX ORDER — OXYCODONE HYDROCHLORIDE 5 MG/1
5-10 TABLET ORAL EVERY 4 HOURS PRN
Qty: 40 TABLET | Refills: 0 | Status: SHIPPED | OUTPATIENT
Start: 2017-02-23 | End: 2017-03-08

## 2017-02-23 RX ORDER — AMOXICILLIN 250 MG
1-2 CAPSULE ORAL 2 TIMES DAILY
Qty: 40 TABLET | Refills: 0 | Status: SHIPPED
Start: 2017-02-23 | End: 2017-05-09

## 2017-02-23 RX ORDER — IBUPROFEN 800 MG/1
800 TABLET, FILM COATED ORAL EVERY 4 HOURS PRN
Qty: 90 TABLET | Refills: 1 | COMMUNITY
Start: 2017-02-28 | End: 2017-05-23

## 2017-02-23 RX ORDER — ONDANSETRON 4 MG/1
4-8 TABLET, ORALLY DISINTEGRATING ORAL EVERY 6 HOURS PRN
Qty: 40 TABLET | Refills: 0 | Status: SHIPPED
Start: 2017-02-23 | End: 2017-04-17

## 2017-02-23 RX ADMIN — BUSPIRONE HYDROCHLORIDE 5 MG: 5 TABLET ORAL at 07:55

## 2017-02-23 RX ADMIN — OXYCODONE HYDROCHLORIDE 5 MG: 5 TABLET ORAL at 00:19

## 2017-02-23 RX ADMIN — ALPRAZOLAM 0.25 MG: 0.25 TABLET ORAL at 07:55

## 2017-02-23 RX ADMIN — ACETAMINOPHEN 975 MG: 325 TABLET, FILM COATED ORAL at 00:19

## 2017-02-23 RX ADMIN — ACETAMINOPHEN 975 MG: 325 TABLET, FILM COATED ORAL at 07:56

## 2017-02-23 RX ADMIN — PANTOPRAZOLE SODIUM 40 MG: 40 TABLET, DELAYED RELEASE ORAL at 07:56

## 2017-02-23 RX ADMIN — SIMETHICONE CHEW TAB 80 MG 160 MG: 80 TABLET ORAL at 07:57

## 2017-02-23 RX ADMIN — OXYCODONE HYDROCHLORIDE 10 MG: 5 TABLET ORAL at 04:13

## 2017-02-23 RX ADMIN — PRAVASTATIN SODIUM 40 MG: 40 TABLET ORAL at 07:57

## 2017-02-23 RX ADMIN — RANITIDINE HYDROCHLORIDE 150 MG: 150 TABLET, FILM COATED ORAL at 07:56

## 2017-02-23 RX ADMIN — OXYCODONE HYDROCHLORIDE 10 MG: 5 TABLET ORAL at 11:51

## 2017-02-23 RX ADMIN — VENLAFAXINE HYDROCHLORIDE 150 MG: 150 TABLET, EXTENDED RELEASE ORAL at 07:55

## 2017-02-23 RX ADMIN — HYDROCHLOROTHIAZIDE 12.5 MG: 12.5 CAPSULE ORAL at 07:56

## 2017-02-23 RX ADMIN — OXYCODONE HYDROCHLORIDE 10 MG: 5 TABLET ORAL at 07:55

## 2017-02-23 RX ADMIN — SENNOSIDES AND DOCUSATE SODIUM 2 TABLET: 8.6; 5 TABLET ORAL at 07:56

## 2017-02-23 RX ADMIN — LISINOPRIL 30 MG: 20 TABLET ORAL at 07:56

## 2017-02-23 RX ADMIN — VITAMIN D, TAB 1000IU (100/BT) 1000 UNITS: 25 TAB at 07:57

## 2017-02-23 NOTE — PLAN OF CARE
Post op  Crani for frontal skull mass. Neuros ORx3, generalized weakness. Pt is [place] bilat hearing aides. 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  Crani dressing with scant drainage. Sutures intact. Tolerating clear liquids. Dilaudid IV for C/O pain. Pt due to void at 2000. Pt wears attents for hx of stress incont. IV at 100cc/hr. HOB at 30. Cont POC.

## 2017-02-23 NOTE — PLAN OF CARE
Problem: Discharge Planning  Goal: Discharge Planning (Adult, OB, Behavioral, Peds)  Outcome: Adequate for Discharge Date Met:  02/23/17  VSS. A&Ox4. Neuros unchanged with baseline bilateral [place]; blind in right eye. C/o head pain, good relief with po oxycodone and ice packs. Voiding spont. Reg. PIV removed. Up with A1/GB. Reviewed D/C paperwork with pt and family. No further questions. Pt left floor via wheelchair with all belongings.

## 2017-02-23 NOTE — DISCHARGE SUMMARY
[facility] Discharge Summary and Instructions    Gabino Jones MRN# [id number]   Age: 76 year old YOB: 1940     Date of Admission:  2/22/2017  Date of Discharge::  2/23/2017  1:54 PM  Admitting Physician:  Lenora Pérez MD  Discharge Physician:  Lenora Pérez MD          Admission Diagnoses:   Right Frontal Skull Lesion   Skull lesion          Discharge Diagnosis:   Right Frontal Skull Lesion   Skull lesion          Procedures:   1. Right frontal craniectomy for resection of skull lesion.   2. Use of intraoperative frameless stereotactic neuronavigation.            Brief History of Illness:   Ms. Gabino Jones is a 76-year-old lady who was referred to the Neurosurgery Clinic at the [facility] for a right frontal skull lesion that was noted on MRI screening of her brain. This was enhancing and was present in the right frontal aspect measuring about 1 x 1.3 cm in size. She has a history of malignant melanoma that was treated by resection and underwent radiation therapy. For concerns of this skull lesion being a metastatic lesion, a biopsy was recommended           Hospital Course:   Patient underwent above-mentioned procedure on 2/22/2017. The operation was uncomplicated and she was admitted to the floor for overnight observation. On post operative day 1 she was doing well: ambulating, voiding without a breen, eating a regular diet, pain was well controlled and therefore she was discharged home.     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          Discharge Medications:     Current Discharge Medication List      START taking these medications    Details   ondansetron (ZOFRAN-ODT) 4 MG ODT tab Take 1-2 tablets (4-8 mg) by mouth every 6 hours as needed for nausea  Qty: 40 tablet, Refills: 0    Associated Diagnoses: Skull lesion      oxyCODONE (ROXICODONE) 5 MG  IR tablet Take 1-2 tablets (5-10 mg) by mouth every 4 hours as needed for moderate to severe pain  Qty: 40 tablet, Refills: 0    Associated Diagnoses: Skull lesion      senna-docusate (SENOKOT-S;PERICOLACE) 8.6-50 MG per tablet Take 1-2 tablets by mouth 2 times daily  Qty: 40 tablet, Refills: 0    Associated Diagnoses: Skull lesion         CONTINUE these medications which have CHANGED    Details   ibuprofen (ADVIL/MOTRIN) 800 MG tablet Take 1 tablet (800 mg) by mouth every 4 hours as needed for moderate pain  Qty: 90 tablet, Refills: 1    Associated Diagnoses: Skull lesion         CONTINUE these medications which have NOT CHANGED    Details   acetaminophen-codeine (TYLENOL #3) 300-30 MG per tablet Take 1 tablet by mouth every 6 hours as needed for moderate pain  Qty: 28 tablet, Refills: 1    Associated Diagnoses: Fibromyalgia      BusPIRone HCl (BUSPAR PO) Take 5 mg by mouth 3 times daily      lisinopril (PRINIVIL,ZESTRIL) 30 MG tablet Take 1 tablet (30 mg) by mouth daily  Qty: 90 tablet, Refills: 1    Associated Diagnoses: Hypertension goal BP (blood pressure) < 140/90      hydrochlorothiazide (MICROZIDE) 12.5 MG capsule Take 1 capsule (12.5 mg) by mouth daily  Qty: 90 capsule, Refills: 1    Associated Diagnoses: Hypertension goal BP (blood pressure) < 140/90      Cholecalciferol (VITAMIN D3) 22315 UNITS TABS Take by mouth 2 times daily      pantoprazole (PROTONIX) 40 MG EC tablet Take 1 tablet (40 mg) by mouth daily Take 30-60 minutes before a meal.  Qty: 90 tablet, Refills: 1    Associated Diagnoses: Gastroesophageal reflux disease without esophagitis      ranitidine (ZANTAC) 150 MG tablet TK 1 T PO BID PRN  Refills: 1      pravastatin (PRAVACHOL) 40 MG tablet       ALPRAZOLAM PO Take 0.25 mg by mouth daily      Simethicone 180 MG CAPS Take by mouth daily      DOCQLACE 100 MG capsule Refills: 0      potassium chloride SA (K-DUR,KLOR-CON M) 20 MEQ tablet Refills: 3      venlafaxine (EFFEXOR XR) 150 MG 24 hr  capsule Take by mouth daily      POTASSIUM CITRATE PO Take 20 mEq by mouth                     Discharge Instructions and Follow-Up:   Discharge diet: Regular   Discharge activity: You may advance activity as tolerated. No strenuous exercise or heay lifting greater than 10 lbs for 4 weeks or until seen and cleared in clinic.   Discharge follow-up: Follow-up with Dr. Lenora Pérez MD in 2 weeks   Wound care: Ok to shower,however no scrubbing of the wound and no soaking of the wound, meaning no bathtubs or swimming pools. Pat dry only. Leave wound open to air.  Sutures are not absorbable and need to be removed in 2 weeks. If patient still at rehab by this time, the sutures may be removed by the rehab physician if he or she considers that the wound has healed completely.     Please call if you have:  1. increased pain, redness, drainage, swelling at your incision  2. fevers > 101.5 F degrees  3. with any questions or concerns.  You may reach the Neurosurgery clinic at [phone] during regular work hours. ER at [phone].    and ask for the Neurosurgery Resident on call at [phone], during off hours or weekends.         Discharge Disposition:   Discharged to home

## 2017-02-23 NOTE — PLAN OF CARE
Problem: Craniotomy/Craniectomy/Cranioplasty (Adult)  Goal: Signs and Symptoms of Listed Potential Problems Will be Absent or Manageable (Craniotomy/Craniectomy/Cranioplasty)  Signs and symptoms of listed potential problems will be absent or manageable by discharge/transition of care (reference Craniotomy/Craniectomy/Cranioplasty (Adult) CPG).   Outcome: No Change  VSS. A&Ox4. Neuros unchanged with baseline bilateral [place]; blind in right eye. C/o head pain, moderate relief with po oxycodone. Voiding spont. Diet advanced to Reg. PIV SL. Up with A1/GB. Cont to monitor and with POC.

## 2017-02-24 ENCOUNTER — TELEPHONE (OUTPATIENT)
Dept: NEUROSURGERY | Facility: CLINIC | Age: 77
End: 2017-02-24

## 2017-02-24 ENCOUNTER — CARE COORDINATION (OUTPATIENT)
Dept: CARDIOLOGY | Facility: CLINIC | Age: 77
End: 2017-02-24

## 2017-02-24 NOTE — PROGRESS NOTES
"[facility]  \"Hello, my name is Karla Rayo , and I am calling from the [facility].  I want to check in and see how you are doing, after leaving the hospital.  You may also receive a call from your Care Coordinator (care team), but I want to make sure you don t have any urgent needs.  I have a couple questions to review with you:     Post-Discharge Outreach                                                    Gabino Jones is a 76 year old female     Follow-up Appointments           Adult [facility] Follow-up and recommended labs and tests       Please call the neurosurgery clinic at [phone] to schedule a follow-up appointment to go over your pathology as well as do a wound check.     This should be done in 2 weeks time.                       Your next 10 appointments already scheduled            Feb 28, 2017 12:30 PM CST   NEW NEURO with Matt Quiroga MD   Eye Clinic ([facility])     [address]  [address]  9[address]  [facility] [address]   [phone]                  Jun 22, 2017 2:20 PM CDT   (Arrive by 2:05 PM)   Return Visit with Yolanda Whitaker MD   [facility] Ear Nose and Throat ([facility] [place])              Care Team:    Patient Care Team       Relationship Specialty Notifications Start End    Jeevan Wheeler MD PCP - General Internal Medicine  1/31/17     Phone: [phone] Fax: [phone]         [address] [place] [address]    Tatyana Morales RN Clinic Care Coordinator Otolaryngology Abnormal results only, Admissions 9/25/14     Comment:  [phone]    Phone: [phone]         Garett Obregon MD MD Oncology Admissions 3/29/16     Phone: [phone] Fax: [phone]          [facility] [address] [facility] [address] [facility] [address]    Layla Campos, RN Nurse Coordinator Oncology Admissions 3/29/16     Phone: [phone] Pager: " [phone]        Laron Archibald MD MD Ophthalmology  1/31/17     Phone: [phone] Fax: [phone]         [facility] OPHTHALMOLOGY [address] [facility] [address] [facility] [address]    Yolanda Whitaker MD MD Otolaryngology  2/17/17     Phone: [phone] Fax: [phone]          [facility]IC[facility] [address] [facility] [address] [facility] [address]    Matt Quiroga MD MD Ophthalmology  2/17/17     Phone: [phone] Fax: [phone]          [facility]IC[facility] [address] [facility] [address] [facility] [address]            Transition of Care Review                                                      Patient was called three times and no answer so post 24 hr DC follow up calls will be closed out             Plan                                                      Thanks for your time.  Your Care Coordinator may follow-up within the next couple days.  In the meantime if you have questions, concerns or problems call your care team.        Karla Rayo

## 2017-03-01 LAB — COPATH REPORT: NORMAL

## 2017-03-02 DIAGNOSIS — C30.0 MALIGNANT MELANOMA OF NASAL CAVITY (H): Primary | ICD-10-CM

## 2017-03-06 ENCOUNTER — TELEPHONE (OUTPATIENT)
Dept: ONCOLOGY | Facility: CLINIC | Age: 77
End: 2017-03-06

## 2017-03-06 NOTE — TELEPHONE ENCOUNTER
I called pt's home # but no answer and no patient identifiers on that home answering machine; I did not leave a msg on home #.     I called pt's cell phone ([phone]) which had a positive identifier and left a detailed voicemail. Per Dr Obregon (see msg below), results of patient's craniotomy done 2/22/17 showed no evidence of disease (hx Right-sided sinonasal mucosal malignant melanoma, with recurrent resections). Dr Obregon recommends that pt return in 6 months with repeat scans (MRI orbit/face and CT CAP) followed by MD visit. I also reminded pt that she has a post-op visit w/ neurosurgery on 3/8/17 at 12:45pm for suture removal & general post-op assessment. Left [facility] # for call back as needed.

## 2017-03-06 NOTE — TELEPHONE ENCOUNTER
----- Message from Garett Obregon MD sent at 3/2/2017  4:04 PM CST -----  This is great news as there is no evidence of cancer that was feared.     Unless the ENT tumor board advices otherwise, I would suggest follow up in 6 months with labs and scans (MRI orbit and face and CT chest) prior to clinic visit.     Garett  ----- Message -----     From: Arnol Diaz RN     Sent: 3/1/2017   3:24 PM       To: Garett Obregon MD, Arnol Diaz RN, #    Hi Dr Obregon, her craniotomy results finalized today (3/1/17), take a look and let me know what her plan is! Right now, she has no return appts with you.    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  This case was reviewed with Dr. Chelsea Dunne in Surgical Pathology and he concurs in this assessment.   I have personally reviewed all specimens and or slides, including the   listed special stains, and used them with my medical judgement to   determine the final diagnosis.   ----- Message -----     From: Arnol Diaz RN     Sent: 2/27/2017   1:20 PM       To: Arnol Diaz RN    Message     Hi Pa,        Please watch for craniotomy results and review results with Dr. Obregon once available.        Layla Granados        ----- Message -----       From: Layla Campos RN       Sent: 2/24/2017         To: Layla Campos RN        Check if path results from craniotomy on 2/22 are back.    Discuss with Sabas f/u plan.

## 2017-03-08 ENCOUNTER — OFFICE VISIT (OUTPATIENT)
Dept: NEUROSURGERY | Facility: CLINIC | Age: 77
End: 2017-03-08

## 2017-03-08 VITALS
WEIGHT: 188.3 LBS | HEIGHT: 66 IN | RESPIRATION RATE: 20 BRPM | TEMPERATURE: 97.5 F | HEART RATE: 74 BPM | DIASTOLIC BLOOD PRESSURE: 54 MMHG | SYSTOLIC BLOOD PRESSURE: 145 MMHG | BODY MASS INDEX: 30.26 KG/M2

## 2017-03-08 DIAGNOSIS — M89.9 SKULL LESION: ICD-10-CM

## 2017-03-08 DIAGNOSIS — Z98.890 POST-OPERATIVE STATE: Primary | ICD-10-CM

## 2017-03-08 RX ORDER — OXYCODONE HYDROCHLORIDE 5 MG/1
5 TABLET ORAL EVERY 8 HOURS PRN
Qty: 30 TABLET | Refills: 0 | Status: SHIPPED | OUTPATIENT
Start: 2017-03-08 | End: 2017-04-17

## 2017-03-08 ASSESSMENT — PAIN SCALES - GENERAL: PAINLEVEL: MODERATE PAIN (5)

## 2017-03-08 NOTE — PROGRESS NOTES
Neurosurgery clinic post op wound check  Date of visit: 3/8/2017      Date of Surgery: 2/22/17 by Dr Pérez @[facility].  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 This was enhancing and was present in the right frontal aspect measuring about 1 x 1.3 cm in size. She has a history of malignant melanoma that was treated by resection and underwent radiation therapy. For concerns of this skull lesion being a metastatic lesion, a biopsy was recommended.   The procedure itself was without incident. SHe recovered well and was discharged home in good condition.  Since then she has headache pain but more complaints about total body aches. She says she has fibromyalgia and has been on narcotic for this for 10 years. Currently is taking 3 oxycodone per 24hours. Her son is with her today and he does not dispute this.  SHe presents for routine wound check and suture removal if needed.    STATUS REPORT  Patient Supplied Answers To the [facility] Pain Questionnaire  [facility] Pain -  Patient Entered Questionnaire/Answers 1/31/2017   What number best describes your pain right now:  0 = No pain  to  10 = Worst pain imaginable 0   Which of the following worsen your pain? standing, walking, exercise   What number best describes your average pain for the past week:  0 = No pain  to  10 = Worst pain imaginable 5   What number best describes your WORST pain in past 24 hours:  0 = No pain  to  10 = Worst pain imaginable 5   When is your pain worst? PM    What non-medicine treatments have you already had for your pain? physical therapy, exercise   Have you tried treating your pain with medication?  Yes   Are you currently taking medications for your pain? Yes       Current Outpatient Prescriptions:      oxyCODONE (ROXICODONE) 5 MG IR tablet, Take 1 tablet (5 mg) by mouth every 8 hours as needed for moderate to severe pain, Disp: 30 tablet, Rfl: 0     ondansetron (ZOFRAN-ODT) 4 MG ODT tab, Take 1-2 tablets (4-8 mg) by mouth every 6 hours as needed for nausea, Disp: 40 tablet, Rfl: 0     senna-docusate (SENOKOT-S;PERICOLACE) 8.6-50 MG per tablet, Take 1-2 tablets by mouth 2 times daily, Disp: 40 tablet, Rfl: 0     ibuprofen (ADVIL/MOTRIN) 800 MG tablet, Take 1 tablet (800 mg) by mouth every 4 hours as needed for moderate pain, Disp: 90 tablet, Rfl: 1     acetaminophen-codeine (TYLENOL #3) 300-30 MG per tablet, Take 1 tablet by mouth every 6 hours as needed for moderate pain, Disp: 28 tablet, Rfl: 1     lisinopril (PRINIVIL,ZESTRIL) 30 MG tablet, Take 1 tablet (30 mg) by mouth daily, Disp: 90 tablet, Rfl: 1     hydrochlorothiazide (MICROZIDE) 12.5 MG capsule, Take 1 capsule (12.5 mg) by mouth daily, Disp: 90 capsule, Rfl: 1     Cholecalciferol (VITAMIN D3) 79661 UNITS TABS, Take by mouth 2 times daily, Disp: , Rfl:      pantoprazole (PROTONIX) 40 MG EC tablet, Take 1 tablet (40 mg) by mouth daily Take 30-60 minutes before a meal., Disp: 90 tablet, Rfl: 1     ranitidine (ZANTAC) 150 MG tablet, TK 1 T PO BID PRN, Disp: , Rfl: 1     pravastatin (PRAVACHOL) 40 MG tablet, , Disp: , Rfl:      ALPRAZOLAM PO, Take 0.25 mg by mouth daily, Disp: , Rfl:      BusPIRone HCl (BUSPAR PO), Take 5 mg by mouth 3 times daily, Disp: , Rfl:      Simethicone 180 MG CAPS, Take by mouth daily, Disp: , Rfl:      DOCQLACE 100 MG capsule, , Disp: , Rfl: 0     potassium chloride SA (K-DUR,KLOR-CON M) 20 MEQ tablet, , Disp: , Rfl: 3     venlafaxine (EFFEXOR XR) 150 MG 24 hr capsule, Take by mouth  "daily, Disp: , Rfl:      POTASSIUM CITRATE PO, Take 20 mEq by mouth, Disp: , Rfl:   Allergies   Allergen Reactions     Novocain [Procaine] Unknown and Rash     Mirtazapine    PMH, SOC HIST, FAM HIST, PROBLEM LIST:  All reviewed in EPIC.    OBJECTIVE:  /54 (BP Location: Right arm, Patient Position: Chair, Cuff Size: Adult Large)  Pulse 74  Temp 97.5  F (36.4  C)  Resp 20  Ht 1.676 m (5' 6\")  Wt 85.4 kg (188 lb 4.8 oz)  BMI 30.39 kg/m2    Imaging:  None new.    EXAM:  Well developed well nourished female found seated comfortably in exam chair.  No apparent distress. SHe is accompanied by her son.  A&O X3.  Mood and affect WNL. Language and fund of knowledge intact. Is a bit hard of hearing.  Is able to sit and rise independently.   SHe has a nicely healed incision.  I prepped the wound with chloroprep and cleanly removed monocryl suture knots without difficulty.    Assessment:  1. Post-operative state    2. Skull lesion    3.      Gabino Jones is doing well. The was no melanoma found in the specimen. She says her doctor called and has discussed this with her already.  The wound is healthy.     PLAN:  We discussed wound care.  *  May shower and shampoo with mild soap/shampoo including the incision. No hair conditioners, hair treatments or skin cremes for two more weeks.  *  May swim or bathe when all scabbing is gone from the incision.  We discussed medication.    *  FYI: In general we prescribe narcotics for pain management in the post operative recovery phase generally 2-6 weeks post op, depending on the surgery performed.  Pre-op our patients are advised that by 6 weeks post op we anticipate they will no longer require narcotic. [Surgeries of this type generally do not exceed more than a few days of pain management.]  For pain outside the scope of these parameters we refer the patient back to his/her other providers for ongoing narcotic needs.   *  Medications prescribed today:  Oxycodone filled  for one time. "  Printed today and handed to the patient.  We discussed activities and return to work.  *   We recommend she return to normal activities as able in two weeks  We discussed follow up  *  We are comfortable releasing her to PRN follow up.  We have not made a specific return appointment but will be happy to see her again should the need arise.  *  All the patient's questions have been answered and they demonstrate good understanding of the above.   *   The patient has our contact information and is aware that she should call if she has questions comments or concerns.     We appreciate the opportunity to be of service in the care of this pleasant patient.  Please do call if there is anything more we can do    Tiffanie Soares PA-C  [facility]  Department of Neurosurgery  Phone: [phone]  Fax: [phone]

## 2017-03-08 NOTE — MR AVS SNAPSHOT
After Visit Summary   3/8/2017    Gabino Jones    MRN: [id number]           Patient Information     Date Of Birth          1940        Visit Information        Provider Department      3/8/2017 1:00 PM Tiffanie Soares PA-C [facility] Neurosurgery        Today's Diagnoses     Post-operative state    -  1    Skull lesion           Follow-ups after your visit        Follow-up notes from your care team     Return if symptoms worsen or fail to improve.      Your next 10 appointments already scheduled     Mar 16, 2017  1:30 PM CDT   NEW NEURO with Matt Quiroga MD   [facility] ([facility])    [address]  [address] [facility]  [address] [address]   [phone]            Apr 17, 2017  1:10 PM CDT   [facility]bs [facility]   [facility]    [address] [place] [address]   [phone]            Jun 22, 2017  2:20 PM CDT   (Arrive by 2:05 PM)   Return Visit with Yolanda Whitaker MD   [facility] Nose [facility]ro[facility] ([facility])    [address]  4th [address]  [facility] [address] [phone]            Aug 30, 2017 10:20 AM CDT   (Arrive by 10:05 AM)   CT CHEST/ABDOMEN/PELVIS W CONTRAST with UCCT2   [facility] CT ([facility] [facility])    [address]  1st [address]  [facility] [address] [phone]           Please bring any scans or X-rays taken at other hospitals, if similar tests were done. Also bring a list of your medicines, including vitamins, minerals and over-the-counter drugs. It is safest to leave personal items at home.  Be sure to tell your doctor:   If you have any allergies.   If there s any chance you are pregnant.   If you are breastfeeding.   If you have any special needs.  You may have contrast for this exam. To prepare:   Do not eat or drink for 2 hours before your exam. If  you need to take medicine, you may take it with small sips of water. (We may ask you to take liquid medicine as well.)   The day before your exam, drink extra fluids at least six 8-ounce glasses (unless your doctor tells you to restrict your fluids).  Patients over 70 or patients with diabetes or kidney problems:   If you haven t had a blood test (creatinine test) within the last 30 days, go to your clinic or Diagnostic Imaging Department for this test.  If you have diabetes:   If your kidney function is normal, continue taking your metformin (Avandamet, Glucophage, Glucovance, Metaglip) on the day of your exam.   If your kidney function is abnormal, wait 48 hours before restarting this medicine.  You will have oral contrast for this exam:   You will drink the contrast at home. Get this from your clinic or Diagnostic Imaging Department. Please follow the directions given.  Please wear loose clothing, such as a sweat suit or jogging clothes. Avoid snaps, zippers and other metal. We may ask you to undress and put on a hospital gown.  If you have any questions, please call the Imaging Department where you will have your exam.            Aug 30, 2017 10:45 AM CDT   (Arrive by 10:30 AM)   MR ORBIT FACE NECK W/O & W CONTRAST with [id number]   [facility] ([facility])    [address] [address] [phone]           Take your medicines as usual, unless your doctor tells you not to. Bring a list of your current medicines to your exam (including vitamins, minerals and over-the-counter drugs).  You will be given intravenous contrast for this exam. To prepare:   The day before your exam, drink extra fluids at least six 8-ounce glasses (unless your doctor tells you to restrict your fluids).   Have a blood test (creatinine test) within 30 days of your exam. Go to your clinic or Diagnostic Imaging Department for this test.  The MRI machine uses a strong  magnet. Please wear clothes without metal (snaps, zippers). A sweatsuit works well, or we may give you a hospital gown.  Please remove any body piercings and hair extensions before you arrive. You will also remove watches, jewelry, hairpins, wallets, dentures, partial dental plates and hearing aids. You may wear contact lenses, and you may be able to wear your rings. We have a safe place to keep your personal items, but it is safer to leave them at home.   **IMPORTANT** THE INSTRUCTIONS BELOW ARE ONLY FOR THOSE PATIENTS WHO HAVE BEEN TOLD THEY WILL RECEIVE SEDATION OR GENERAL ANESTHESIA DURING THEIR MRI PROCEDURE:  IF YOU WILL RECEIVE SEDATION (take medicine to help you relax during your exam):   You must get the medicine from your doctor before you arrive. Bring the medicine to the exam. Do not take it at home.   Arrive one hour early. Bring someone who can take you home after the test. Your medicine will make you sleepy. After the exam, you may not drive, take a bus or take a taxi by yourself.   No eating 8 hours before your exam. You may have clear liquids up until 4 hours before your exam. (Clear liquids include water, clear tea, black coffee and fruit juice without pulp.)  IF YOU WILL RECEIVE ANESTHESIA (be asleep for your exam):   Arrive 1 1/2 hours early. Bring someone who can take you home after the test. You may not drive, take a bus or take a taxi by yourself.   No eating 8 hours before your exam. You may have clear liquids up until 4 hours before your exam. (Clear liquids include water, clear tea, black coffee and fruit juice without pulp.)  Please call the Imaging Department at your exam site with any questions.            Aug 30, 2017  1:15 PM CDT   (Arrive by 1:00 PM)   Return Visit with Garett Obregon MD   [facility] ([facility])    [address] [address] [phone]              Who to contact     Please call  "your clinic at [phone] to:    Ask questions about your health    Make or cancel appointments    Discuss your medicines    Learn about your test results    Speak to your doctor   If you have compliments or concerns about an experience at your clinic, or if you wish to file a complaint, please contact [facility] Patient Relations at [phone] or email us at [email or web address]         Additional Information About Your Visit        [facility] Information     [employer] gives you secure access to your electronic health record. If you see a primary care provider, you can also send messages to your care team and make appointments. If you have questions, please call your primary care clinic.  If you do not have a primary care provider, please call [phone] and they will assist you.      [employer] is an electronic gateway that provides easy, online access to your medical records. With [employer], you can request a clinic appointment, read your test results, renew a prescription or communicate with your care team.     To access your existing account, please contact your [facility] or call [phone] for assistance.        Care EveryWhere ID     This is your Care EveryWhere ID. This could be used by other organizations to access your [facility] medical records  [id number]        Your Vitals Were     Pulse Temperature Respirations Height BMI (Body Mass Index)       74 97.5  F (36.4  C) 20 1.676 m (5' 6\") 30.39 kg/m2        Blood Pressure from Last 3 Encounters:   03/08/17 145/54   02/23/17 122/65   02/21/17 148/87    Weight from Last 3 Encounters:   03/08/17 85.4 kg (188 lb 4.8 oz)   02/22/17 85 kg (187 lb 6.3 oz)   02/21/17 84.6 kg (186 lb 9.6 oz)              Today, you had the following     No orders found for display         Today's Medication Changes          These changes are accurate as of: 3/8/17  3:09 PM.  If you have any questions, ask your " nurse or doctor.               These medicines have changed or have updated prescriptions.        Dose/Directions    oxyCODONE 5 MG IR tablet   Commonly known as:  ROXICODONE   This may have changed:    - how much to take  - when to take this   Used for:  Skull lesion   Changed by:  Tiffanie Soares PA-C        Dose:  5 mg   Take 1 tablet (5 mg) by mouth every 8 hours as needed for moderate to severe pain   Quantity:  30 tablet   Refills:  0            Where to get your medicines      Some of these will need a paper prescription and others can be bought over the counter.  Ask your nurse if you have questions.     Bring a paper prescription for each of these medications     oxyCODONE 5 MG IR tablet                Primary Care Provider Office Phone # Fax #    Jeevan Wheeler [phone]       [facility] [address] [place] [address]        Thank you!     Thank you for choosing [facility] NEUROSURGERY  for your care. Our goal is always to provide you with excellent care. Hearing back from our patients is one way we can continue to improve our services. Please take a few minutes to complete the written survey that you may receive in the mail after your visit with us. Thank you!             Your Updated Medication List - Protect others around you: Learn how to safely use, store and throw away your medicines at www.disposemymeds.org.          This list is accurate as of: 3/8/17  3:09 PM.  Always use your most recent med list.                   Brand Name Dispense Instructions for use    acetaminophen-codeine 300-30 MG per tablet    TYLENOL #3    28 tablet    Take 1 tablet by mouth every 6 hours as needed for moderate pain       ALPRAZOLAM PO      Take 0.25 mg by mouth daily       BUSPAR PO      Take 5 mg by mouth 3 times daily       DOCQLACE 100 MG capsule   Generic drug:  docusate sodium          EFFEXOR  MG 24 hr capsule   Generic drug:  venlafaxine      Take by mouth daily        hydrochlorothiazide 12.5 MG capsule    MICROZIDE    90 capsule    Take 1 capsule (12.5 mg) by mouth daily       ibuprofen 800 MG tablet    ADVIL/MOTRIN    90 tablet    Take 1 tablet (800 mg) by mouth every 4 hours as needed for moderate pain       lisinopril 30 MG tablet    PRINIVIL,ZESTRIL    90 tablet    Take 1 tablet (30 mg) by mouth daily       ondansetron 4 MG ODT tab    ZOFRAN-ODT    40 tablet    Take 1-2 tablets (4-8 mg) by mouth every 6 hours as needed for nausea       oxyCODONE 5 MG IR tablet    ROXICODONE    30 tablet    Take 1 tablet (5 mg) by mouth every 8 hours as needed for moderate to severe pain       pantoprazole 40 MG EC tablet    PROTONIX    90 tablet    Take 1 tablet (40 mg) by mouth daily Take 30-60 minutes before a meal.       potassium chloride SA 20 MEQ CR tablet    K-DUR/KLOR-CON M         POTASSIUM CITRATE PO      Take 20 mEq by mouth       pravastatin 40 MG tablet    PRAVACHOL         ranitidine 150 MG tablet    ZANTAC     TK 1 T PO BID PRN       senna-docusate 8.6-50 MG per tablet    SENOKOT-S;PERICOLACE    40 tablet    Take 1-2 tablets by mouth 2 times daily       Simethicone 180 MG Caps      Take by mouth daily       Vitamin D3 44600 UNITS Tabs      Take by mouth 2 times daily

## 2017-03-08 NOTE — LETTER
3/8/2017       RE: Gabino Jones  [address] [facility] [address]     Dear Colleague,    Thank you for referring your patient, Gabino Jones, to the [facility] NEUROSURGERY at [facility]. Please see a copy of my visit note below.      Neurosurgery clinic post op wound check  Date of visit: 3/8/2017      Date of Surgery: 2/22/17 by Dr Pérez @[facility].  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 This was enhancing and was present in the right frontal aspect measuring about 1 x 1.3 cm in size. She has a history of malignant melanoma that was treated by resection and underwent radiation therapy. For concerns of this skull lesion being a metastatic lesion, a biopsy was recommended.   The procedure itself was without incident. SHe recovered well and was discharged home in good condition.  Since then she has headache pain but more complaints about total body aches. She says she has fibromyalgia and has been on narcotic for this for 10 years. Currently is taking 3 oxycodone per 24hours. Her son is with her today and he does not dispute this.  SHe presents for routine wound check and suture removal if needed.    STATUS REPORT  Patient Supplied Answers To the [facility] Pain Questionnaire  [facility] Pain -  Patient Entered Questionnaire/Answers 1/31/2017   What number best describes your pain right now:  0 = No pain  to  10 = Worst pain imaginable 0   Which of the following worsen your  pain? standing, walking, exercise   What number best describes your average pain for the past week:  0 = No pain  to  10 = Worst pain imaginable 5   What number best describes your WORST pain in past 24 hours:  0 = No pain  to  10 = Worst pain imaginable 5   When is your pain worst? PM   What non-medicine treatments have you already had for your pain? physical therapy, exercise   Have you tried treating your pain with medication?  Yes   Are you currently taking medications for your pain? Yes       Current Outpatient Prescriptions:      oxyCODONE (ROXICODONE) 5 MG IR tablet, Take 1 tablet (5 mg) by mouth every 8 hours as needed for moderate to severe pain, Disp: 30 tablet, Rfl: 0     ondansetron (ZOFRAN-ODT) 4 MG ODT tab, Take 1-2 tablets (4-8 mg) by mouth every 6 hours as needed for nausea, Disp: 40 tablet, Rfl: 0     senna-docusate (SENOKOT-S;PERICOLACE) 8.6-50 MG per tablet, Take 1-2 tablets by mouth 2 times daily, Disp: 40 tablet, Rfl: 0     ibuprofen (ADVIL/MOTRIN) 800 MG tablet, Take 1 tablet (800 mg) by mouth every 4 hours as needed for moderate pain, Disp: 90 tablet, Rfl: 1     acetaminophen-codeine (TYLENOL #3) 300-30 MG per tablet, Take 1 tablet by mouth every 6 hours as needed for moderate pain, Disp: 28 tablet, Rfl: 1     lisinopril (PRINIVIL,ZESTRIL) 30 MG tablet, Take 1 tablet (30 mg) by mouth daily, Disp: 90 tablet, Rfl: 1     hydrochlorothiazide (MICROZIDE) 12.5 MG capsule, Take 1 capsule (12.5 mg) by mouth daily, Disp: 90 capsule, Rfl: 1     Cholecalciferol (VITAMIN D3) 64637 UNITS TABS, Take by mouth 2 times daily, Disp: , Rfl:      pantoprazole (PROTONIX) 40 MG EC tablet, Take 1 tablet (40 mg) by mouth daily Take 30-60 minutes before a meal., Disp: 90 tablet, Rfl: 1     ranitidine (ZANTAC) 150 MG tablet, TK 1 T PO BID PRN, Disp: , Rfl: 1     pravastatin (PRAVACHOL) 40 MG tablet, , Disp: , Rfl:      ALPRAZOLAM PO, Take 0.25 mg by mouth daily, Disp: , Rfl:      BusPIRone HCl (BUSPAR PO), Take 5 mg by  "mouth 3 times daily, Disp: , Rfl:      Simethicone 180 MG CAPS, Take by mouth daily, Disp: , Rfl:      DOCQLACE 100 MG capsule, , Disp: , Rfl: 0     potassium chloride SA (K-DUR,KLOR-CON M) 20 MEQ tablet, , Disp: , Rfl: 3     venlafaxine (EFFEXOR XR) 150 MG 24 hr capsule, Take by mouth daily, Disp: , Rfl:      POTASSIUM CITRATE PO, Take 20 mEq by mouth, Disp: , Rfl:   Allergies   Allergen Reactions     Novocain [Procaine] Unknown and Rash     Mirtazapine    PMH, SOC HIST, FAM HIST, PROBLEM LIST:  All reviewed in EPIC.    OBJECTIVE:  /54 (BP Location: Right arm, Patient Position: Chair, Cuff Size: Adult Large)  Pulse 74  Temp 97.5  F (36.4  C)  Resp 20  Ht 1.676 m (5' 6\")  Wt 85.4 kg (188 lb 4.8 oz)  BMI 30.39 kg/m2    Imaging:  None new.    EXAM:  Well developed well nourished female found seated comfortably in exam chair.  No apparent distress. SHe is accompanied by her son.  A&O X3.  Mood and affect WNL. Language and fund of knowledge intact. Is a bit hard of hearing.  Is able to sit and rise independently.   SHe has a nicely healed incision.  I prepped the wound with chloroprep and cleanly removed monocryl suture knots without difficulty.    Assessment:  1. Post-operative state    2. Skull lesion    3.      Gabino Jones is doing well. The was no melanoma found in the specimen. She says her doctor called and has discussed this with her already.  The wound is healthy.     PLAN:  We discussed wound care.  *  May shower and shampoo with mild soap/shampoo including the incision. No hair conditioners, hair treatments or skin cremes for two more weeks.  *  May swim or bathe when all scabbing is gone from the incision.  We discussed medication.    *  FYI: In general we prescribe narcotics for pain management in the post operative recovery phase generally 2-6 weeks post op, depending on the surgery performed.  Pre-op our patients are advised that by 6 weeks post op we anticipate they will no longer require narcotic. " [Surgeries of this type generally do not exceed more than a few days of pain management.]  For pain outside the scope of these parameters we refer the patient back to his/her other providers for ongoing narcotic needs.   *  Medications prescribed today:  Oxycodone filled  for one time.  Printed today and handed to the patient.  We discussed activities and return to work.  *   We recommend she return to normal activities as able in two weeks  We discussed follow up  *  We are comfortable releasing her to PRN follow up.  We have not made a specific return appointment but will be happy to see her again should the need arise.  *  All the patient's questions have been answered and they demonstrate good understanding of the above.   *   The patient has our contact information and is aware that she should call if she has questions comments or concerns.     We appreciate the opportunity to be of service in the care of this pleasant patient.  Please do call if there is anything more we can do    Tiffanie Soares PA-C  [facility]  Department of Neurosurgery  Phone: [phone]  Fax: [phone]

## 2017-03-08 NOTE — NURSING NOTE
Chief Complaint   Patient presents with     RECHECK     [facility] RETURN - 2 WKS Post Op Suture Removal     Harriett Kimbrough MA

## 2017-03-16 ENCOUNTER — OFFICE VISIT (OUTPATIENT)
Dept: OPHTHALMOLOGY | Facility: CLINIC | Age: 77
End: 2017-03-16
Attending: OPHTHALMOLOGY
Payer: MEDICARE

## 2017-03-16 DIAGNOSIS — H46.9 OPTIC NEUROPATHY: ICD-10-CM

## 2017-03-16 DIAGNOSIS — H47.20 OPTIC ATROPHY: ICD-10-CM

## 2017-03-16 DIAGNOSIS — H53.10 SUBJECTIVE VISUAL DISTURBANCE: ICD-10-CM

## 2017-03-16 DIAGNOSIS — C30.0 MALIGNANT MELANOMA OF NASAL CAVITY (H): Primary | ICD-10-CM

## 2017-03-16 PROCEDURE — 92133 CPTRZD OPH DX IMG PST SGM ON: CPT | Mod: ZF | Performed by: OPHTHALMOLOGY

## 2017-03-16 PROCEDURE — 92083 EXTENDED VISUAL FIELD XM: CPT | Mod: ZF | Performed by: OPHTHALMOLOGY

## 2017-03-16 PROCEDURE — 99214 OFFICE O/P EST MOD 30 MIN: CPT | Mod: 25,ZF

## 2017-03-16 ASSESSMENT — CONF VISUAL FIELD
OS_SUPERIOR_TEMPORAL_RESTRICTION: 1
OS_SUPERIOR_NASAL_RESTRICTION: 1
METHOD: COUNTING FINGERS
OS_INFERIOR_NASAL_RESTRICTION: 1
OS_INFERIOR_TEMPORAL_RESTRICTION: 1

## 2017-03-16 ASSESSMENT — ENCOUNTER SYMPTOMS: JOINT SWELLING: 1

## 2017-03-16 ASSESSMENT — VISUAL ACUITY
CORRECTION_TYPE: GLASSES
OS_CC: HM
METHOD: SNELLEN - LINEAR
OD_CC: 20/40
OD_CC+: -2

## 2017-03-16 ASSESSMENT — REFRACTION_WEARINGRX
OD_AXIS: 025
OD_ADD: +2.75
SPECS_TYPE: BIFOCAL
OD_CYLINDER: +0.50
OS_AXIS: 180
OS_SPHERE: -2.25
OS_CYLINDER: +1.00
OS_ADD: +2.75
OD_SPHERE: -2.50

## 2017-03-16 ASSESSMENT — SLIT LAMP EXAM - LIDS
COMMENTS: NORMAL
COMMENTS: NORMAL

## 2017-03-16 ASSESSMENT — TONOMETRY
IOP_METHOD: TONOPEN
OS_IOP_MMHG: 14
OD_IOP_MMHG: 15

## 2017-03-16 ASSESSMENT — EXTERNAL EXAM - LEFT EYE: OS_EXAM: NORMAL

## 2017-03-16 ASSESSMENT — EXTERNAL EXAM - RIGHT EYE: OD_EXAM: NORMAL

## 2017-03-16 NOTE — NURSING NOTE
Chief Complaints and History of Present Illnesses   Patient presents with     Vision Loss Both Eyes     Vision loss LE>RE 3 months ago.     HPI    Affected eye(s):  Both   Symptoms:     Blurred vision   Difficulty with reading   No floaters   No flashes   No tearing   Dryness   Itching (Comment: sometimes BE.)         Do you have eye pain now?:  No      Comments:  Vision loss LE>RE 3 months ago.    Jacky ADAMS  2:03 PM03/16/2017

## 2017-03-16 NOTE — LETTER
3/16/2017         RE:  :  MRN: Gabino Jones  1940  [id number]     Dear Dr. Whitaker,    Thank you for asking me to see your very pleasant patient, Gabino Jones, in neuro-ophthalmic consultation.  I would like to thank you for sending your records and I have summarized them in the history of present illness. She presented with her spouse who provided additional history.  My assessment and plan are below.  For further details, please see my attached clinic note.      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 Subjective visual disturbance    3. Optic neuropathy    4. Optic atrophy         Again, thank you for allowing me to participate in the care of your patient.      Sincerely,    Matt Quiroga MD  Professor, Neuro-Ophthalmology  Department of Ophthalmology and Visual Neurosciences  [facility]    CC: Yolanda Whitaker MD  VIA In Basket     Jeevan Wheeler MD  VIA In Basket     Tatyana Morales RN  VIA In Basket     Garett Obregon MD  VIA In Basket     Layla Campos RN  VIA In Basket     Laron Archibald MD  [facility] Ophthalmology  [address] [address]  VIA Facsimile:  [phone]

## 2017-03-16 NOTE — MR AVS SNAPSHOT
After Visit Summary   3/16/2017    Gabino Jones    MRN: [id number]           Patient Information     Date Of Birth          1940        Visit Information        Provider Department      3/16/2017 1:30 PM Matt Quiroga MD [facility]        Today's Diagnoses     Malignant melanoma of nasal cavity (H)    -  1    Subjective visual disturbance        Optic neuropathy        Optic atrophy           Follow-ups after your visit        Follow-up notes from your care team     Return in 6 weeks (on 4/27/2017).      Your next 10 appointments already scheduled     Mar 17, 2017  7:00 PM CDT   (Arrive by 6:45 PM)   MR ORBIT FACE NECK W/O & W CONTRAST with [id number]   [facility] [place] [facility] ([facility] [place])    [address] [address] [phone]           Take your medicines as usual, unless your doctor tells you not to. Bring a list of your current medicines to your exam (including vitamins, minerals and over-the-counter drugs).  You will be given intravenous contrast for this exam. To prepare:   The day before your exam, drink extra fluids at least six 8-ounce glasses (unless your doctor tells you to restrict your fluids).   Have a blood test (creatinine test) within 30 days of your exam. Go to your clinic or Diagnostic Imaging Department for this test.  The MRI machine uses a strong magnet. Please wear clothes without metal (snaps, zippers). A sweatsuit works well, or we may give you a hospital gown.  Please remove any body piercings and hair extensions before you arrive. You will also remove watches, jewelry, hairpins, wallets, dentures, partial dental plates and hearing aids. You may wear contact lenses, and you may be able to wear your rings. We have a safe place to keep your personal items, but it is safer to leave them at home.   **IMPORTANT** THE INSTRUCTIONS BELOW ARE ONLY FOR THOSE PATIENTS WHO HAVE BEEN TOLD THEY WILL RECEIVE  SEDATION OR GENERAL ANESTHESIA DURING THEIR MRI PROCEDURE:  IF YOU WILL RECEIVE SEDATION (take medicine to help you relax during your exam):   You must get the medicine from your doctor before you arrive. Bring the medicine to the exam. Do not take it at home.   Arrive one hour early. Bring someone who can take you home after the test. Your medicine will make you sleepy. After the exam, you may not drive, take a bus or take a taxi by yourself.   No eating 8 hours before your exam. You may have clear liquids up until 4 hours before your exam. (Clear liquids include water, clear tea, black coffee and fruit juice without pulp.)  IF YOU WILL RECEIVE ANESTHESIA (be asleep for your exam):   Arrive 1 1/2 hours early. Bring someone who can take you home after the test. You may not drive, take a bus or take a taxi by yourself.   No eating 8 hours before your exam. You may have clear liquids up until 4 hours before your exam. (Clear liquids include water, clear tea, black coffee and fruit juice without pulp.)  Please call the Imaging Department at your exam site with any questions.            Apr 17, 2017  1:10 PM CDT   Return Visit with Blake Hobbs MD   [facility]    [address] [place] [address]   [phone]            Apr 27, 2017  2:00 PM CDT   RETURN NEURO with Matt Quiroga MD   [facility] ([facility])    [facility]  [address]  [address] [address]  [facility] [address] [phone]            Jun 22, 2017  2:20 PM CDT   (Arrive by 2:05 PM)   Return Visit with Yolanda Whitaker MD   [facility] ([facility] [place])    [address] [facility]  [address] [facility] [address] [phone]            Aug 30, 2017 10:20 AM CDT   (Arrive by 10:05 AM)   CT CHEST/ABDOMEN/PELVIS W CONTRAST with [id number]   [facility] [place] [facility] ([facility] [place])     [address] [place] [address] [phone]           Please bring any scans or X-rays taken at other hospitals, if similar tests were done. Also bring a list of your medicines, including vitamins, minerals and over-the-counter drugs. It is safest to leave personal items at home.  Be sure to tell your doctor:   If you have any allergies.   If there s any chance you are pregnant.   If you are breastfeeding.   If you have any special needs.  You may have contrast for this exam. To prepare:   Do not eat or drink for 2 hours before your exam. If you need to take medicine, you may take it with small sips of water. (We may ask you to take liquid medicine as well.)   The day before your exam, drink extra fluids at least six 8-ounce glasses (unless your doctor tells you to restrict your fluids).  Patients over 70 or patients with diabetes or kidney problems:   If you haven t had a blood test (creatinine test) within the last 30 days, go to your clinic or Diagnostic Imaging Department for this test.  If you have diabetes:   If your kidney function is normal, continue taking your metformin (Avandamet, Glucophage, Glucovance, Metaglip) on the day of your exam.   If your kidney function is abnormal, wait 48 hours before restarting this medicine.  You will have oral contrast for this exam:   You will drink the contrast at home. Get this from your clinic or Diagnostic Imaging Department. Please follow the directions given.  Please wear loose clothing, such as a sweat suit or jogging clothes. Avoid snaps, zippers and other metal. We may ask you to undress and put on a hospital gown.  If you have any questions, please call the Imaging Department where you will have your exam.            Aug 30, 2017 10:45 AM CDT   (Arrive by 10:30 AM)   MR ORBIT FACE NECK W/O & W CONTRAST with [id number]   [facility] ([facility])    [address] [place] [address]  [id number] [phone]           Take your medicines as usual, unless your doctor tells you not to. Bring a list of your current medicines to your exam (including vitamins, minerals and over-the-counter drugs).  You will be given intravenous contrast for this exam. To prepare:   The day before your exam, drink extra fluids at least six 8-ounce glasses (unless your doctor tells you to restrict your fluids).   Have a blood test (creatinine test) within 30 days of your exam. Go to your clinic or Diagnostic Imaging Department for this test.  The MRI machine uses a strong magnet. Please wear clothes without metal (snaps, zippers). A sweatsuit works well, or we may give you a hospital gown.  Please remove any body piercings and hair extensions before you arrive. You will also remove watches, jewelry, hairpins, wallets, dentures, partial dental plates and hearing aids. You may wear contact lenses, and you may be able to wear your rings. We have a safe place to keep your personal items, but it is safer to leave them at home.   **IMPORTANT** THE INSTRUCTIONS BELOW ARE ONLY FOR THOSE PATIENTS WHO HAVE BEEN TOLD THEY WILL RECEIVE SEDATION OR GENERAL ANESTHESIA DURING THEIR MRI PROCEDURE:  IF YOU WILL RECEIVE SEDATION (take medicine to help you relax during your exam):   You must get the medicine from your doctor before you arrive. Bring the medicine to the exam. Do not take it at home.   Arrive one hour early. Bring someone who can take you home after the test. Your medicine will make you sleepy. After the exam, you may not drive, take a bus or take a taxi by yourself.   No eating 8 hours before your exam. You may have clear liquids up until 4 hours before your exam. (Clear liquids include water, clear tea, black coffee and fruit juice without pulp.)  IF YOU WILL RECEIVE ANESTHESIA (be asleep for your exam):   Arrive 1 1/2 hours early. Bring someone who can take you home after the test. You may not drive, take a bus or take  a taxi by yourself.   No eating 8 hours before your exam. You may have clear liquids up until 4 hours before your exam. (Clear liquids include water, clear tea, black coffee and fruit juice without pulp.)  Please call the Imaging Department at your exam site with any questions.            Aug 30, 2017  1:15 PM CDT   (Arrive by 1:00 PM)   Return Visit with Garett Obregon MD   [facility] ([facility] [place])    [address] [facility]  [address] [facility] [address] [phone]              Future tests that were ordered for you today     Open Future Orders        Priority Expected Expires Ordered    MR Orbit Face Neck w/o & w Contrast Routine  3/16/2018 3/16/2017    IOP Measurement Routine  9/17/2018 3/16/2017    Color Vision - Screening OU (both eyes) Routine  9/17/2018 3/16/2017    DILATED FUNDUS EXAM Routine  9/17/2018 3/16/2017    Glaucoma Top OU Routine  9/17/2018 3/16/2017            Who to contact     Please call your clinic at [phone] to:    Ask questions about your health    Make or cancel appointments    Discuss your medicines    Learn about your test results    Speak to your doctor   If you have compliments or concerns about an experience at your clinic, or if you wish to file a complaint, please contact [facility] Patient Relations at [phone] or email us at [email or web address]         Additional Information About Your Visit        "[employer]hart Information     [employer] gives you secure access to your electronic health record. If you see a primary care provider, you can also send messages to your care team and make appointments. If you have questions, please call your primary care clinic.  If you do not have a primary care provider, please call [phone] and they will assist you.      [employer] is an electronic gateway that provides easy, online access to your medical records. With [employer], you can request a clinic  appointment, read your test results, renew a prescription or communicate with your care team.     To access your existing account, please contact your [facility] or call [phone] for assistance.        Care EveryWhere ID     This is your Care EveryWhere ID. This could be used by other organizations to access your [facility] medical records  [id number]         Blood Pressure from Last 3 Encounters:   03/08/17 145/54   02/23/17 122/65   02/21/17 148/87    Weight from Last 3 Encounters:   03/08/17 85.4 kg (188 lb 4.8 oz)   02/22/17 85 kg (187 lb 6.3 oz)   02/21/17 84.6 kg (186 lb 9.6 oz)              We Performed the Following     Glaucoma Top OU     IOP Measurement     OCT Optic Nerve RNFL Spectralis OU (both eyes)        Primary Care Provider Office Phone # Fax #    Jeevan Wheeler [phone]       [facility] [address] [facility] [address]        Thank you!     Thank you for choosing EYE CLINIC  for your care. Our goal is always to provide you with excellent care. Hearing back from our patients is one way we can continue to improve our services. Please take a few minutes to complete the written survey that you may receive in the mail after your visit with us. Thank you!             Your Updated Medication List - Protect others around you: Learn how to safely use, store and throw away your medicines at www.disposemymeds.org.          This list is accurate as of: 3/16/17  4:17 PM.  Always use your most recent med list.                   Brand Name Dispense Instructions for use    acetaminophen-codeine 300-30 MG per tablet    TYLENOL #3    28 tablet    Take 1 tablet by mouth every 6 hours as needed for moderate pain       ALPRAZOLAM PO      Take 0.25 mg by mouth daily       BUSPAR PO      Take 5 mg by mouth 3 times daily       DOCQLACE 100 MG capsule   Generic drug:  docusate sodium          EFFEXOR  MG 24 hr capsule   Generic drug:   venlafaxine      Take by mouth daily       hydrochlorothiazide 12.5 MG capsule    MICROZIDE    90 capsule    Take 1 capsule (12.5 mg) by mouth daily       ibuprofen 800 MG tablet    ADVIL/MOTRIN    90 tablet    Take 1 tablet (800 mg) by mouth every 4 hours as needed for moderate pain       lisinopril 30 MG tablet    PRINIVIL,ZESTRIL    90 tablet    Take 1 tablet (30 mg) by mouth daily       ondansetron 4 MG ODT tab    ZOFRAN-ODT    40 tablet    Take 1-2 tablets (4-8 mg) by mouth every 6 hours as needed for nausea       oxyCODONE 5 MG IR tablet    ROXICODONE    30 tablet    Take 1 tablet (5 mg) by mouth every 8 hours as needed for moderate to severe pain       pantoprazole 40 MG EC tablet    PROTONIX    90 tablet    Take 1 tablet (40 mg) by mouth daily Take 30-60 minutes before a meal.       potassium chloride SA 20 MEQ CR tablet    K-DUR/KLOR-CON M         POTASSIUM CITRATE PO      Take 20 mEq by mouth       pravastatin 40 MG tablet    PRAVACHOL         ranitidine 150 MG tablet    ZANTAC     TK 1 T PO BID PRN       senna-docusate 8.6-50 MG per tablet    SENOKOT-S;PERICOLACE    40 tablet    Take 1-2 tablets by mouth 2 times daily       Simethicone 180 MG Caps      Take by mouth daily       Vitamin D3 57757 UNITS Tabs      Take by mouth 2 times daily

## 2017-03-16 NOTE — PROGRESS NOTES
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 Subjective visual disturbance    3. Optic neuropathy    4. Optic atrophy       She is a very poor historian. Her records reflect that she lost vision some time before November 2016 in the LEFT eye.  She has no idea when the vision loss actually occurred.  She thinks that her vision loss in the RIGHT eye is new in the past couple of months and may be progressive.  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      The concern is that she has a recurrence of her melanoma vs. Radiation induced optic neuropathy.  I will obtain an MRI to evaluate for these.  If she has enhancement of the right optic nerve, I will try a course of corticosteroids.   If there is compression of the right optic nerve, then I will consider asking rad onc to consider more radiation therapy.      Overall, prognosis is very guarded.         Attending Physician Attestation:  I have seen and examined this patient.  I have confirmed and edited as necessary the chief complaint(s), history of present illness, review of systems, relevant history, and examination findings as documented by others.  I have personally reviewed the relevant tests, images, and reports as documented above.  I have confirmed and edited as necessary the assessment and plan and agree with this note.  - Matt Quiroga MD 4:10 PM 3/16/2017

## 2017-03-21 DIAGNOSIS — H46.9 OPTIC NEURITIS: Primary | ICD-10-CM

## 2017-03-21 DIAGNOSIS — E78.2 MIXED HYPERLIPIDEMIA: Primary | ICD-10-CM

## 2017-03-21 RX ORDER — PREDNISONE 20 MG/1
80 TABLET ORAL DAILY
Qty: 28 TABLET | Refills: 0 | Status: SHIPPED | OUTPATIENT
Start: 2017-03-21 | End: 2017-04-17

## 2017-03-21 NOTE — TELEPHONE ENCOUNTER
pravastatin (PRAVACHOL) 40 MG tablet   Last Written Prescription Date: 11/28/2016  Last Fill Quantity: 90, # refills: 0  Last Office Visit with [facility], [facility] or [facility] prescribing provider: Saw Dr. Wheeler on 02/21/2017  Next 5 appointments (look out 90 days)     Apr 17, 2017  1:10 PM CDT   [facility]   [facility]    [address] [place] [address]   [phone]                   Lab Results   Component Value Date    CHOL 160 08/10/2016     Lab Results   Component Value Date    HDL 60 08/10/2016     Lab Results   Component Value Date    LDL 80 08/10/2016     Lab Results   Component Value Date    TRIG 100 08/10/2016     Lab Results   Component Value Date    CHOLHDLRATIO 2.3 07/29/2016

## 2017-03-28 ENCOUNTER — OFFICE VISIT (OUTPATIENT)
Dept: OPHTHALMOLOGY | Facility: CLINIC | Age: 77
End: 2017-03-28
Attending: OPHTHALMOLOGY
Payer: MEDICARE

## 2017-03-28 DIAGNOSIS — H46.9 OPTIC NEUROPATHY: Primary | ICD-10-CM

## 2017-03-28 DIAGNOSIS — H46.9 OPTIC NEUROPATHY: ICD-10-CM

## 2017-03-28 DIAGNOSIS — C30.0 MALIGNANT MELANOMA OF NASAL CAVITY (H): ICD-10-CM

## 2017-03-28 DIAGNOSIS — C76.0 HEAD AND NECK CANCER (H): ICD-10-CM

## 2017-03-28 LAB
ALBUMIN SERPL-MCNC: 3.6 G/DL (ref 3.4–5)
ALP SERPL-CCNC: 114 U/L (ref 40–150)
ALT SERPL W P-5'-P-CCNC: 18 U/L (ref 0–50)
ANION GAP SERPL CALCULATED.3IONS-SCNC: 9 MMOL/L (ref 3–14)
AST SERPL W P-5'-P-CCNC: 13 U/L (ref 0–45)
BASOPHILS # BLD AUTO: 0 10E9/L (ref 0–0.2)
BASOPHILS NFR BLD AUTO: 0.4 %
BILIRUB SERPL-MCNC: 0.4 MG/DL (ref 0.2–1.3)
BUN SERPL-MCNC: 12 MG/DL (ref 7–30)
CALCIUM SERPL-MCNC: 9 MG/DL (ref 8.5–10.1)
CHLORIDE SERPL-SCNC: 100 MMOL/L (ref 94–109)
CO2 SERPL-SCNC: 27 MMOL/L (ref 20–32)
CREAT SERPL-MCNC: 0.79 MG/DL (ref 0.52–1.04)
DIFFERENTIAL METHOD BLD: ABNORMAL
EOSINOPHIL # BLD AUTO: 0 10E9/L (ref 0–0.7)
EOSINOPHIL NFR BLD AUTO: 0 %
ERYTHROCYTE [DISTWIDTH] IN BLOOD BY AUTOMATED COUNT: 13.8 % (ref 10–15)
GFR SERPL CREATININE-BSD FRML MDRD: 70 ML/MIN/1.7M2
GLUCOSE SERPL-MCNC: 121 MG/DL (ref 70–99)
HCT VFR BLD AUTO: 39.4 % (ref 35–47)
HGB BLD-MCNC: 12.7 G/DL (ref 11.7–15.7)
IMM GRANULOCYTES # BLD: 0.2 10E9/L (ref 0–0.4)
IMM GRANULOCYTES NFR BLD: 2.2 %
LYMPHOCYTES # BLD AUTO: 0.8 10E9/L (ref 0.8–5.3)
LYMPHOCYTES NFR BLD AUTO: 7.3 %
MCH RBC QN AUTO: 29.4 PG (ref 26.5–33)
MCHC RBC AUTO-ENTMCNC: 32.2 G/DL (ref 31.5–36.5)
MCV RBC AUTO: 91 FL (ref 78–100)
MONOCYTES # BLD AUTO: 0.3 10E9/L (ref 0–1.3)
MONOCYTES NFR BLD AUTO: 2.6 %
NEUTROPHILS # BLD AUTO: 9.5 10E9/L (ref 1.6–8.3)
NEUTROPHILS NFR BLD AUTO: 87.5 %
NRBC # BLD AUTO: 0 10*3/UL
NRBC BLD AUTO-RTO: 0 /100
PLATELET # BLD AUTO: 307 10E9/L (ref 150–450)
POTASSIUM SERPL-SCNC: 4.9 MMOL/L (ref 3.4–5.3)
PROT SERPL-MCNC: 7.9 G/DL (ref 6.8–8.8)
RBC # BLD AUTO: 4.32 10E12/L (ref 3.8–5.2)
SODIUM SERPL-SCNC: 136 MMOL/L (ref 133–144)
WBC # BLD AUTO: 10.8 10E9/L (ref 4–11)

## 2017-03-28 PROCEDURE — 92083 EXTENDED VISUAL FIELD XM: CPT | Mod: ZF | Performed by: OPHTHALMOLOGY

## 2017-03-28 PROCEDURE — 86038 ANTINUCLEAR ANTIBODIES: CPT | Performed by: INTERNAL MEDICINE

## 2017-03-28 PROCEDURE — 86618 LYME DISEASE ANTIBODY: CPT | Performed by: INTERNAL MEDICINE

## 2017-03-28 PROCEDURE — 86780 TREPONEMA PALLIDUM: CPT | Performed by: INTERNAL MEDICINE

## 2017-03-28 PROCEDURE — 99214 OFFICE O/P EST MOD 30 MIN: CPT | Mod: ZF

## 2017-03-28 RX ORDER — ALPRAZOLAM 0.25 MG/1
0.25 TABLET, ORALLY DISINTEGRATING ORAL DAILY PRN
Qty: 30 TABLET | OUTPATIENT
Start: 2017-03-28

## 2017-03-28 RX ORDER — PREDNISONE 10 MG/1
70 TABLET ORAL DAILY
Qty: 100 TABLET | Refills: 0 | Status: SHIPPED | OUTPATIENT
Start: 2017-03-28 | End: 2017-04-12

## 2017-03-28 ASSESSMENT — VISUAL ACUITY
CORRECTION_TYPE: GLASSES
METHOD: SNELLEN - LINEAR
OD_CC+: -3
OD_CC: 20/30
OS_CC: HM

## 2017-03-28 ASSESSMENT — EXTERNAL EXAM - LEFT EYE: OS_EXAM: NORMAL

## 2017-03-28 ASSESSMENT — TONOMETRY
OD_IOP_MMHG: 13
OS_IOP_MMHG: 14
IOP_METHOD: TONOPEN

## 2017-03-28 ASSESSMENT — EXTERNAL EXAM - RIGHT EYE: OD_EXAM: NORMAL

## 2017-03-28 ASSESSMENT — SLIT LAMP EXAM - LIDS
COMMENTS: NORMAL
COMMENTS: NORMAL

## 2017-03-28 NOTE — NURSING NOTE
Pt returns today to review the latest MRI results with MD. Pt feels that vision may be slightly improved, hard to tell. Denies any pain or discomfort. ABBY FUNG, COA 2:04 PM 03/28/2017

## 2017-03-28 NOTE — TELEPHONE ENCOUNTER
Pending Prescriptions:                       Disp   Refills    ALPRAZolam 0.25 MG TBDP                   30 tab*             Sig: Take 0.25 mg by mouth daily as needed    Last Office Visit with LEON, LILI or [facility] prescribing provider: 2/21/17  Future Office visit:    Next 5 appointments (look out 90 days)     Apr 17, 2017  1:10 PM CDT   Return Visit with Blake Hobbs MD   [facility]    [address] [place] [id number] [phone]                   Routing refill request to provider for review/approval because:  Medication is reported/historical

## 2017-03-28 NOTE — MR AVS SNAPSHOT
After Visit Summary   3/28/2017    Gabino Jones    MRN: [id number]           Patient Information     Date Of Birth          1940        Visit Information        Provider Department      3/28/2017 2:00 PM Matt Quiroga MD [facility]        Today's Diagnoses     Optic neuropathy    -  1       Follow-ups after your visit        Follow-up notes from your care team     Return in about 1 month (around 4/28/2017).      Your next 10 appointments already scheduled     Apr 17, 2017  1:10 PM CDT   Return Visit with Blake Hobbs MD   [facility]    [address] [place] [id number]   [phone]            Apr 27, 2017  2:00 PM CDT   RETURN NEURO with Matt Quiroga MD   [facility] ([facility] [address] [facility])    [facility] Aldana [facility]  [address] [facility]  [address] [address]   [phone]            Jun 22, 2017  2:20 PM CDT   (Arrive by 2:05 PM)   Return Visit with Yolanda Whitaker MD   [facility] ([facility] [place])    [address]  4th Floor  [facility] [address]   [phone]            Aug 30, 2017 10:20 AM CDT   (Arrive by 10:05 AM)   CT CHEST/ABDOMEN/PELVIS W CONTRAST with UCCT2   [facility] [place] [facility] ([facility])    [address]  1st Floor  [facility] [address] [phone]           Please bring any scans or X-rays taken at other hospitals, if similar tests were done. Also bring a list of your medicines, including vitamins, minerals and over-the-counter drugs. It is safest to leave personal items at home.  Be sure to tell your doctor:   If you have any allergies.   If there s any chance you are pregnant.   If you are breastfeeding.   If you have any special needs.  You may have contrast for this exam. To prepare:   Do not eat or drink for 2 hours before your exam. If you need to take medicine,  you may take it with small sips of water. (We may ask you to take liquid medicine as well.)   The day before your exam, drink extra fluids at least six 8-ounce glasses (unless your doctor tells you to restrict your fluids).  Patients over 70 or patients with diabetes or kidney problems:   If you haven t had a blood test (creatinine test) within the last 30 days, go to your clinic or Diagnostic Imaging Department for this test.  If you have diabetes:   If your kidney function is normal, continue taking your metformin (Avandamet, Glucophage, Glucovance, Metaglip) on the day of your exam.   If your kidney function is abnormal, wait 48 hours before restarting this medicine.  You will have oral contrast for this exam:   You will drink the contrast at home. Get this from your clinic or Diagnostic Imaging Department. Please follow the directions given.  Please wear loose clothing, such as a sweat suit or jogging clothes. Avoid snaps, zippers and other metal. We may ask you to undress and put on a hospital gown.  If you have any questions, please call the Imaging Department where you will have your exam.            Aug 30, 2017 10:45 AM CDT   (Arrive by 10:30 AM)   MR ORBIT FACE NECK W/O & W CONTRAST with [id number]   [facility] [place] [facility] ([facility])    [address] [address] [phone]           Take your medicines as usual, unless your doctor tells you not to. Bring a list of your current medicines to your exam (including vitamins, minerals and over-the-counter drugs).  You will be given intravenous contrast for this exam. To prepare:   The day before your exam, drink extra fluids at least six 8-ounce glasses (unless your doctor tells you to restrict your fluids).   Have a blood test (creatinine test) within 30 days of your exam. Go to your clinic or Diagnostic Imaging Department for this test.  The MRI machine uses a strong magnet. Please wear clothes  without metal (snaps, zippers). A sweatsuit works well, or we may give you a hospital gown.  Please remove any body piercings and hair extensions before you arrive. You will also remove watches, jewelry, hairpins, wallets, dentures, partial dental plates and hearing aids. You may wear contact lenses, and you may be able to wear your rings. We have a safe place to keep your personal items, but it is safer to leave them at home.   **IMPORTANT** THE INSTRUCTIONS BELOW ARE ONLY FOR THOSE PATIENTS WHO HAVE BEEN TOLD THEY WILL RECEIVE SEDATION OR GENERAL ANESTHESIA DURING THEIR MRI PROCEDURE:  IF YOU WILL RECEIVE SEDATION (take medicine to help you relax during your exam):   You must get the medicine from your doctor before you arrive. Bring the medicine to the exam. Do not take it at home.   Arrive one hour early. Bring someone who can take you home after the test. Your medicine will make you sleepy. After the exam, you may not drive, take a bus or take a taxi by yourself.   No eating 8 hours before your exam. You may have clear liquids up until 4 hours before your exam. (Clear liquids include water, clear tea, black coffee and fruit juice without pulp.)  IF YOU WILL RECEIVE ANESTHESIA (be asleep for your exam):   Arrive 1 1/2 hours early. Bring someone who can take you home after the test. You may not drive, take a bus or take a taxi by yourself.   No eating 8 hours before your exam. You may have clear liquids up until 4 hours before your exam. (Clear liquids include water, clear tea, black coffee and fruit juice without pulp.)  Please call the Imaging Department at your exam site with any questions.            Aug 30, 2017  1:15 PM CDT   (Arrive by 1:00 PM)   Return Visit with Garett Obregon MD   [facility] ([facility])    [address] [address] [phone]              Future tests that were ordered for you today     Open Future  Orders        Priority Expected Expires Ordered    Angiotensin converting enzyme Routine  6/26/2017 3/28/2017    Antinuclear antibody screen by EIA Routine  6/26/2017 3/28/2017    Antineutrophil cytoplasmic Jerri IgG Routine  6/26/2017 3/28/2017    Lyme Disease Jerri with reflex to WB Serum Routine  6/26/2017 3/28/2017    Neuromyelitis Optica AQP4 IgG w Reflex Blood Routine  6/26/2017 3/28/2017    Anti Treponema Routine  6/26/2017 3/28/2017            Who to contact     Please call your clinic at [phone] to:    Ask questions about your health    Make or cancel appointments    Discuss your medicines    Learn about your test results    Speak to your doctor   If you have compliments or concerns about an experience at your clinic, or if you wish to file a complaint, please contact [facility] Patient Relations at [phone] or email us at [email or web address]         Additional Information About Your Visit        [employer] Software Information     [employer] gives you secure access to your electronic health record. If you see a primary care provider, you can also send messages to your care team and make appointments. If you have questions, please call your primary care clinic.  If you do not have a primary care provider, please call [phone] and they will assist you.      [employer] is an electronic gateway that provides easy, online access to your medical records. With [employer], you can request a clinic appointment, read your test results, renew a prescription or communicate with your care team.     To access your existing account, please contact your [facility] or call [phone] for assistance.        Care EveryWhere ID     This is your Care EveryWhere ID. This could be used by other organizations to access your [place] medical records  [id number]         Blood Pressure from Last 3 Encounters:   03/08/17 145/54   02/23/17 122/65   02/21/17 148/87    Weight  from Last 3 Encounters:   03/08/17 85.4 kg (188 lb 4.8 oz)   02/22/17 85 kg (187 lb 6.3 oz)   02/21/17 84.6 kg (186 lb 9.6 oz)              We Performed the Following     Glaucoma Top OU          Today's Medication Changes          These changes are accurate as of: 3/28/17  3:25 PM.  If you have any questions, ask your nurse or doctor.               These medicines have changed or have updated prescriptions.        Dose/Directions    * predniSONE 20 MG tablet   Commonly known as:  DELTASONE   This may have changed:  Another medication with the same name was added. Make sure you understand how and when to take each.   Used for:  Optic neuritis   Changed by:  Matt Quiroga MD        Dose:  80 mg   Take 4 tablets (80 mg) by mouth daily   Quantity:  28 tablet   Refills:  0       * predniSONE 10 MG tablet   Commonly known as:  DELTASONE   This may have changed:  You were already taking a medication with the same name, and this prescription was added. Make sure you understand how and when to take each.   Used for:  Optic neuropathy   Changed by:  Matt Quiroga MD        Dose:  70 mg   Take 7 tablets (70 mg) by mouth daily   Quantity:  100 tablet   Refills:  0       * Notice:  This list has 2 medication(s) that are the same as other medications prescribed for you. Read the directions carefully, and ask your doctor or other care provider to review them with you.         Where to get your medicines      These medications were sent to [email or web address] [address] [place] [address] [address]  [address] [address]     Phone:  [phone]     predniSONE 10 MG tablet                Primary Care Provider Office Phone # Fax #    Jeevan Wheeler [phone]       [place] [facility] [place] [address]        Thank you!     Thank you for choosing [facility]  for your care. Our goal is always to provide you  with excellent care. Hearing back from our patients is one way we can continue to improve our services. Please take a few minutes to complete the written survey that you may receive in the mail after your visit with us. Thank you!             Your Updated Medication List - Protect others around you: Learn how to safely use, store and throw away your medicines at www.disposemymeds.org.          This list is accurate as of: 3/28/17  3:25 PM.  Always use your most recent med list.                   Brand Name Dispense Instructions for use    acetaminophen-codeine 300-30 MG per tablet    TYLENOL #3    28 tablet    Take 1 tablet by mouth every 6 hours as needed for moderate pain       ALPRAZOLAM PO      Take 0.25 mg by mouth daily       BUSPAR PO      Take 5 mg by mouth 3 times daily       DOCQLACE 100 MG capsule   Generic drug:  docusate sodium          EFFEXOR  MG 24 hr capsule   Generic drug:  venlafaxine      Take by mouth daily       hydrochlorothiazide 12.5 MG capsule    MICROZIDE    90 capsule    Take 1 capsule (12.5 mg) by mouth daily       ibuprofen 800 MG tablet    ADVIL/MOTRIN    90 tablet    Take 1 tablet (800 mg) by mouth every 4 hours as needed for moderate pain       lisinopril 30 MG tablet    PRINIVIL,ZESTRIL    90 tablet    Take 1 tablet (30 mg) by mouth daily       ondansetron 4 MG ODT tab    ZOFRAN-ODT    40 tablet    Take 1-2 tablets (4-8 mg) by mouth every 6 hours as needed for nausea       oxyCODONE 5 MG IR tablet    ROXICODONE    30 tablet    Take 1 tablet (5 mg) by mouth every 8 hours as needed for moderate to severe pain       pantoprazole 40 MG EC tablet    PROTONIX    90 tablet    Take 1 tablet (40 mg) by mouth daily Take 30-60 minutes before a meal.       potassium chloride SA 20 MEQ CR tablet    K-DUR/KLOR-CON M         POTASSIUM CITRATE PO      Take 20 mEq by mouth       pravastatin 40 MG tablet    PRAVACHOL         * predniSONE 20 MG tablet    DELTASONE    28 tablet    Take 4 tablets  (80 mg) by mouth daily       * predniSONE 10 MG tablet    DELTASONE    100 tablet    Take 7 tablets (70 mg) by mouth daily       ranitidine 150 MG tablet    ZANTAC     TK 1 T PO BID PRN       senna-docusate 8.6-50 MG per tablet    SENOKOT-S;PERICOLACE    40 tablet    Take 1-2 tablets by mouth 2 times daily       Simethicone 180 MG Caps      Take by mouth daily       Vitamin D3 34092 UNITS Tabs      Take by mouth 2 times daily       * Notice:  This list has 2 medication(s) that are the same as other medications prescribed for you. Read the directions carefully, and ask your doctor or other care provider to review them with you.

## 2017-03-28 NOTE — LETTER
3/28/2017         RE:  :  MRN: Gabino Jones  1940  [id number]     Dear Dr. Whitaker:     Your patient, Gabino Jones, returned for neuro-ophthalmic follow up. My assessment and plan are below.  For further details, please see my attached clinic note.      Assessment & Plan     Gaibno Jones is a 76 year old female with the following diagnoses:   1. Optic neuropathy       She had a brain MRI that showed Enhancement of the prechiasmatic optic nerves bilaterally, left more than right, also with some atrophy of the left prechiasmatic optic nerve. She was started on oral Prednisone 80 mg x 7 days. She reports that the vision overall seems better.      Her visual acuity has slightly improved in the right eye. Automated visual field showed moderate improvement in the right eye. The visual acuity in the LEFT eye is stable.  The visual field has improved LEFT eye as well.      Overall, her examination today is better. That said, it is unlikely that she has radiation induced optic neuropathy since radiation induced optic neuropathy typically does not get better with steroids. 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     Will start tapering the prednisone, 70 mg x 7 days, 60 mg x 7 days, 40 mg x 7 days, 30 mg x 7 days, 20 mg x 7 days, 15 mg x 7 days, 5 mg x 7 days.     Follow up after 1 month sooner as needed for worsening symptoms.          Again, thank you for allowing me to participate in the care of your patient.      Sincerely,    Matt Quiroga MD  Professor, Neuro-Ophthalmology  Department of Ophthalmology and Visual Neurosciences  [facility]      CC: Jeevan Wheeler MD  [facility]  [facility] [place] [address]  VIA In Basket     Tatyana Morales RN  VIA In Basket     Garett Obregon MD   Physicians  [address]  [place] [address]  VIA In Basket     Layla Campos  RN  VIA In Basket     Laron Archibald MD  [facility]  [address]  [facility] [address]  VIA Facsimile:  [phone]     Yolanda Whitaker MD   Physicians  [address]  [facility] [address]  VIA In Basket       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

## 2017-03-28 NOTE — PROGRESS NOTES
Assessment & Plan     Gabino Jones is a 76 year old female with the following diagnoses:   1. Optic neuropathy       She had a brain MRI that showed Enhancement of the prechiasmatic optic nerves bilaterally, left more than right, also with some atrophy of the left prechiasmatic optic nerve. She was started on oral Prednisone 80 mg x 7 days. She reports that the vision overall seems better.      Her visual acuity has slightly improved in the right eye. Automated visual field showed moderate improvement in the right eye. The visual acuity in the LEFT eye is stable.  The visual field has improved LEFT eye as well.      Overall, her examination today is better. That said, it is unlikely that she has radiation induced optic neuropathy since radiation induced optic neuropathy typically does not get better with steroids. 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     Will start tapering the prednisone, 70 mg x 7 days, 60 mg x 7 days, 40 mg x 7 days, 30 mg x 7 days, 20 mg x 7 days, 15 mg x 7 days, 5 mg x 7 days.     Follow up after 1 month sooner as needed for worsening symptoms.             Attending Physician Attestation:  I have seen and examined this patient.  I have confirmed and edited as necessary the chief complaint(s), history of present illness, review of systems, relevant history, and examination findings as documented by others.  I have personally reviewed the relevant tests, images, and reports as documented above.  I have confirmed and edited as necessary the assessment and plan and agree with this note.  - Matt Quiroga MD 4:10 PM 3/16/2017

## 2017-03-29 LAB
ANA SER QL IA: NORMAL
B BURGDOR IGG+IGM SER QL: 0.09 (ref 0–0.89)
T PALLIDUM IGG+IGM SER QL: NEGATIVE

## 2017-03-30 LAB — ACE SERPL-CCNC: 8 U/L

## 2017-03-30 RX ORDER — PRAVASTATIN SODIUM 40 MG
40 TABLET ORAL DAILY
Qty: 30 TABLET | Refills: 0 | Status: SHIPPED | OUTPATIENT
Start: 2017-03-30 | End: 2017-09-26

## 2017-03-30 NOTE — TELEPHONE ENCOUNTER
"Pravastatin 40 mg tablet    Last Written Prescription Date: 11/28/2016  Last Fill Quantity: 90, # refills: 0  Last Office Visit with [facility], [facility] or [facility] prescribing provider:  Dr. Wheeler 2/21/2017  Next 5 appointments (look out 90 days)     Apr 17, 2017  1:10 PM CDT   [facility] Visit with Blake Hobbs MD   [facility]    [address] [place] [address]   [phone]                   Lab Results   Component Value Date    CHOL 160 08/10/2016     Lab Results   Component Value Date    HDL 60 08/10/2016     Lab Results   Component Value Date    LDL 80 08/10/2016     Lab Results   Component Value Date    TRIG 100 08/10/2016     Lab Results   Component Value Date    CHOLHDLRATIO 2.3 07/29/2016     Routing refill to provider to review and approve.  Medication is listed on MAR as \"patient reported\" and has not been reviewed or ordered by  provider.    "

## 2017-03-31 LAB — ANCA IGG TITR SER IF: NORMAL {TITER}

## 2017-04-05 LAB — AQP4 H2O CHANNEL IGG TITR SERPL IF: NORMAL {TITER}

## 2017-04-12 DIAGNOSIS — H46.9 OPTIC NEUROPATHY: ICD-10-CM

## 2017-04-16 RX ORDER — PREDNISONE 10 MG/1
TABLET ORAL
Qty: 36 TABLET | Refills: 0 | Status: SHIPPED | OUTPATIENT
Start: 2017-04-16 | End: 2017-05-03

## 2017-04-17 ENCOUNTER — OFFICE VISIT (OUTPATIENT)
Dept: CARDIOLOGY | Facility: CLINIC | Age: 77
End: 2017-04-17
Payer: MEDICARE

## 2017-04-17 VITALS
OXYGEN SATURATION: 97 % | WEIGHT: 188.1 LBS | HEART RATE: 82 BPM | DIASTOLIC BLOOD PRESSURE: 77 MMHG | SYSTOLIC BLOOD PRESSURE: 148 MMHG | BODY MASS INDEX: 30.36 KG/M2

## 2017-04-17 DIAGNOSIS — I35.0 NONRHEUMATIC AORTIC VALVE STENOSIS: Primary | ICD-10-CM

## 2017-04-17 PROCEDURE — 99214 OFFICE O/P EST MOD 30 MIN: CPT | Performed by: INTERNAL MEDICINE

## 2017-04-17 ASSESSMENT — PAIN SCALES - GENERAL: PAINLEVEL: MODERATE PAIN (4)

## 2017-04-17 NOTE — PROGRESS NOTES
CARDIOLOGY CONSULTATION    Referring Provider:  Yolanda Gil*  Primary Care Provider:   Susie Harrison  Indication for Consultation:  LV apical mass    HPI: Gabino Jones is a 77 year old female  referred by Yolanda Gil, for evaluation of an LV apical mass. The patient's risk factor profile is: (+) HTN [Rx], (-) diabetes, (-) hyperlipidemia [Rx], (+)former  tobacco use, (-) family Hx CAD. The patient has a history of aortic stenosis with mean gradient of 22 mm Hg and PATRICIA 1.2 cm2 in 2014.  She has no other evidence of cardiovascular disease (CAD, CHF, arrhythmia).  She has no Hx of rheumatic fever.     The patient denies a history of chest discomfort.  She notes OSBORN with walking up a single flight of steps.  She also notes OSBORN with walking one block.  This is unchanged from last year. She denies PND, orthopnea, pedal edema, palpitations, lightheadedness, and syncope.  The patient has melanoma of the sinus activities and is currently in remission.  She had a routine follow up appointment and as part of that visit, she was scheduled for a chest CT scan.  That study showed a questionable mass in the LV apex.  A follow up cardiac MRI showed no abnormality in the LV apes.    She lives with her family.  She is independent.  She does not drive.  She leads a relatively sedentary lifestyle.    Home BPs in 120s-130s/70s.    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 36 tablet 0     pravastatin (PRAVACHOL) 40 MG tablet Take 1 tablet  (40 mg) by mouth daily 30 tablet 0     senna-docusate (SENOKOT-S;PERICOLACE) 8.6-50 MG per tablet Take 1-2 tablets by mouth 2 times daily 40 tablet 0     acetaminophen-codeine (TYLENOL #3) 300-30 MG per tablet Take 1 tablet by mouth every 6 hours as needed for moderate pain 28 tablet 1     lisinopril (PRINIVIL,ZESTRIL) 30 MG tablet Take 1 tablet (30 mg) by mouth daily 90 tablet 1     hydrochlorothiazide (MICROZIDE) 12.5 MG capsule Take 1 capsule (12.5 mg) by mouth daily 90 capsule 1     ranitidine (ZANTAC) 150 MG tablet TK 1 T PO BID PRN  1     ALPRAZOLAM PO Take 0.25 mg by mouth daily       BusPIRone HCl (BUSPAR PO) Take 5 mg by mouth 3 times daily       Simethicone 180 MG CAPS Take by mouth daily       venlafaxine (EFFEXOR XR) 150 MG 24 hr capsule Take by mouth daily       POTASSIUM CITRATE PO Take 20 mEq by mouth 2 times daily        ibuprofen (ADVIL/MOTRIN) 800 MG tablet Take 800 mg by mouth every 4 hours as needed for moderate pain Reported on 4/17/2017 90 tablet 1     pantoprazole (PROTONIX) 40 MG EC tablet Take 1 tablet (40 mg) by mouth daily Take 30-60 minutes before a meal. (Patient not taking: Reported on 4/17/2017) 90 tablet 1       PAST SURGICAL HISTORY:  Past Surgical History:   Procedure Laterality Date     C ANESTH,CERV SPINE, SITTING POSITION       GALLBLADDER SURGERY       NECK SURGERY       OPTICAL TRACKING SYSTEM CRANIOTOMY, EXCISE TUMOR, COMBINED Right 2/22/2017    Procedure: COMBINED OPTICAL TRACKING SYSTEM CRANIOTOMY, EXCISE TUMOR;  Surgeon: Lenroa Pérez MD;  Location:  OR     OPTICAL TRACKING SYSTEM ENDOSCOPIC ENDONASAL SURGERY  6/23/2014    Procedure: OPTICAL TRACKING SYSTEM ENDOSCOPIC ENDONASAL SURGERY;  Surgeon: Yolanda Whitaker MD;  Location:  OR     STOMACH SURGERY       TONSILLECTOMY       TUBAL LIGATION      1975       ALLERGIES  Novocain [procaine] and Mirtazapine    FAMILY HX:  Family History   Problem Relation Age of Onset     Prostate Cancer Father       CANCER Sister      SCLC     CANCER Sister      Macular Degeneration Daughter      Glaucoma No family hx of        SOCIAL HX:  History     Social History     Marital Status:      Spouse Name: N/A     Number of Children: N/A     Years of Education: N/A     Social History Main Topics     Smoking status: Former Smoker     Quit date: 06/10/2004     Smokeless tobacco: Never Used     Alcohol Use: No     Drug Use: No     Sexual Activity: Not on file     Other Topics Concern     None     Social History Narrative       ROS:  Constitutional: No fever, chills, or sweats. No weight gain/loss.   HEENT: No visual disturbance, ear ache, epistaxis, sore throat.   Allergies/Immunologic: Negative.   Respiratory: No cough, hemoptysis.   Cardiovascular: As per HPI.   GI: No nausea, vomiting, hematemesis, melena, or hematochezia.   : No urinary frequency, dysuria, or hematuria.   Integument: No rash.   Psychiatric: No anxiety / depression.   Neuro: No speech disturbance, focal sensory or motor deficit.   Endocrinology: No polyuria / polyphagia.   Musculoskeletal: No myalgia.    VITAL SIGNS:  /77 (BP Location: Right arm, Patient Position: Chair, Cuff Size: Adult Regular)  Pulse 82  Wt 85.3 kg (188 lb 1.6 oz)  SpO2 97%  BMI 30.36 kg/m2  Body mass index is 30.36 kg/(m^2).  Wt Readings from Last 2 Encounters:   03/08/17 85.4 kg (188 lb 4.8 oz)   02/22/17 85 kg (187 lb 6.3 oz)       PHYSICAL EXAM  Gabino Jones is a 76 year old female.in no acute distress.  HEENT: Eyes Nonicteric.  Neck: JVP normal.  Carotids +3/3 bilaterally without bruits.  Lungs: CTA.  Cor: RRR. Normal S1 and S2.  There is a SUNITHA Grade II/VI radiating from the RUSB to the carotids, unchanged.  Faint, early diastolic murmur, Grade I/VI RLSB.  No rub, or gallop.  PMI in Lf 5th ICS.  Abd: Soft, nontender, nondistended.  NABS.  No pulsatile mass.  Extremities: No C/C/E.  Pulses +3/3 symmetric in upper and lower extremities.  Neuro: Grossly intact.  Psych:  A&O x 3.  Skin: No rash.    LABS  Recent Labs   Lab Test  16   1211  10/21/15   1237   WBC  8.5  9.7   HGB  12.1  12.8   HCT  37.9  38.7   PLT  286  272     Recent Labs   Lab Test  16   1211  10/21/15   1237   NA  134  136   POTASSIUM  4.2  4.3   CHLORIDE  100  102   CO2  26  27   GLC  84  91   BUN  11  10   CR  0.73  0.71   STEFFANIE  9.1  9.0     Recent Labs   Lab Test  08/10/16   1050 16   CHOL  160  166.0   HDL  60  71.0*   LDL  80  82.4   TRIG  100  63.0   CHOLHDLRATIO   --   2.3       EK16  NSR with normal intervals and axes.  No ST shift, TWI, or Q waves.  No ectopy.  No LVH.    ECHO: 14  Global and regional left ventricular function is hyperkinetic with an EF of 65-70%. Global right ventricular function is normal. Moderate aortic stenosis. The peak aortic velocity is 3.2 m/sec. The mean gradient across the aortic valve is 22 mmHg. The aortic valve area is 1.2 cm2 by the continuity equation (with an LVOT of 23 mm). Moderate aortic insufficiency. Moderate tricuspid insufficiency is present. Moderate pulmonary hypertension. Right ventricular systolic pressure is 40 mmHg above the right atrial pressure. The inferior vena cava wi dilated at 2 cm without respiratory variability, consistent with increased right atrial pressure. No pericardial effusion is present.    ECHO (16):  Interpretation Summary  Global and regional left ventricular function is normal with an EF of 60-65%.  Right ventricular function, chamber size, wall motion, and thickness are normal.  Mild to moderate aortic insufficiency is present.  Mild aortic stenosis is present.  Pulmonary artery systolic pressure is normal.  No pericardial effusion is present.    Cardiac MRI (16):  IMPRESSION:  1.  Normal left ventricular size and systolic function with a calculated ejection fraction of  69 %.  2.  Normal right ventricular size and systolic function with a calculated ejection fraction of 69%.   3.  There is decreased  leaflet excursion with moderate sclerosis of the aortic valve with flow acceleration consistent with moderate aortic stenosis and mild aortic insufficiency.  4.  On delayed enhancement imaging, there is no abnormal hyperenhancement to suggest myocardial scar/inflammation/infiltration.  5.  There is no area of infarction or mass noted in the left ventricular apex.     STRESS TEST:  None    CARDIAC CATH: None    CT CHEST:  4/13/16  Findings: Coronary arterial calcifications. Heart is not enlarged. Focal hypodensity left ventricular apex. In the Main pulmonary and aorta are not enlarged. No mediastinal, hilar or axillary adenopathy. Calcified left paraesophageal node. Calcified granuloma in the in the  left lower lobe medially. Mild interlobular septal thickening.   6 mm nodule left lower lobe image 42. Unchanged  3 mm nodule right upper lobe image 17. Unchanged  4 mm subpleural nodule left lower lobe image 33. Unchanged  Other scattered nodules are also unchanged     Moderate centrilobular emphysematous changes in the lungs.     Evaluation of the upper abdomen is limited. Enlarged ita hepatis  lymph nodes are decreased. Cholecystectomy clips.       Bones: No suspicious bony lesions.     IMPRESSION:  1. Stable pulmonary nodules for nearly 2 years.  2. Focal hypodensity in the left ventricular apex could represent an infarct versus ischemia. Cardiac MRI could be helpful in further evaluation.  3. Subtle groundglass opacity within the lungs could represent infection or inflammatory possibly drug induced or chronic aspiration    CARDIAC MRI:  5/6/16  IMPRESSION:  1. Normal left ventricular size and systolic function with a calculated ejection fraction of 69 %.  2. Normal right ventricular size and systolic function with a calculated ejection fraction of 69%.   3. There is decreased leaflet excursion with moderate sclerosis of the aortic valve with flow acceleration consistent with moderate aortic stenosis and mild  aortic insufficiency.  4. On delayed enhancement imaging, there is no abnormal hyperenhancement to suggest myocardial scar/inflammation/infiltration.  5. There is no area of infarction or mass noted in the left ventricular apex.     ASSESSMENT AND PLAN:   1. Aortic stenosis.  Patient has Class II-III dyspnea of unknown etiology.  This has been stable for the past year.  ECHO (May 2016) showed mild to moderate aortic insufficiency, and mild aortic stenosis.   Repeat ECHO in May 2018.    2. R/O Cardiac Mass.  The CT scan reported out a hypodensity in the LV apex but this could not be further defined.  Cardiac MRI showed no mass.    3. Lipids.  LDL 80 mg/dl.  Patient remains on Pravastatin.    4. HTN.  Well controlled.  Continue on Lisinopril and HCTZ.      FOLLOW UP:  1 year      ECHO (4/23/18):  Mild to moderate aortic insufficiency is present.  Moderate aortic stenosis is present.  The aortic valve area is 1.2 cm^2, by the continuity equation.  Mild progression of aortic stenosis since last study in 5/2016.    Blake Hobbs MD    Divisions of Cardiology  [facility]  Patient Care Team:  Jeevan Wheeler MD as PCP - General (Internal Medicine)  Tatyana Morales, RN as Clinic Care Coordinator (Otolaryngology)  Garett Obregon MD as MD (Oncology)  Layla Campos, RN as Nurse Coordinator (Oncology)  Laron Archibald MD as MD (Ophthalmology)  Yolanda Villarreal MD as MD (Otolaryngology)  Matt Quiroga MD as MD (Ophthalmology)  YOLANDA VILLARREAL

## 2017-04-17 NOTE — PATIENT INSTRUCTIONS
It was a pleasure to see you in the cardiology clinic today.    If you have any questions, you can reach my nurse, Guerita Cerna, at [phone].     Note the new medications: None  Stop the following medications: None    The results from today include: None    Tests ordered today: None    I would like you to follow up with Dr. Hobbs in 1 year. Should also have echocardiogram done before appt.     Sincerely,      Blake Hobbs MD     [facility]

## 2017-04-17 NOTE — MR AVS SNAPSHOT
After Visit Summary   4/17/2017    Gabino Jones    MRN: [id number]           Patient Information     Date Of Birth          1940        Visit Information        Provider Department      4/17/2017 1:10 PM Blake Hobbs MD [facility]        Care Instructions    It was a pleasure to see you in the cardiology clinic today.    If you have any questions, you can reach my nurse, Guerita Ceran, at [phone].     Note the new medications: None  Stop the following medications: None    The results from today include: None    Tests ordered today: None    I would like you to follow up with Dr. Hobbs in 1 year. Should also have echocardiogram done before appt.     Sincerely,      Blake Hobbs MD     [facility]            Follow-ups after your visit        Follow-up notes from your care team     Return in about 1 year (around 4/17/2018).      Your next 10 appointments already scheduled     Apr 27, 2017  2:00 PM CDT   RETURN NEURO with Matt Quiroga MD   [facility] ([facility]    [facility]  [address] [facility] [place]  [address] [address]   [phone]            Jun 22, 2017  2:20 PM CDT   (Arrive by 2:05 PM)   Return Visit with Yolanda Whitaker MD   [facility] ([facility])    [address]  [address]th [facility] [id number] [phone]            Aug 30, 2017 10:20 AM CDT   (Arrive by 10:05 AM)   CT CHEST/ABDOMEN/PELVIS W CONTRAST with [id number]   [facility] CT ([facility])    [address]  1st Floor  [facility] [id number] [phone]           Please bring any scans or X-rays taken at other hospitals, if similar tests were done. Also bring a list of your medicines, including vitamins, minerals and over-the-counter drugs. It is safest to leave personal items at home.  Be sure to tell  your doctor:   If you have any allergies.   If there s any chance you are pregnant.   If you are breastfeeding.   If you have any special needs.  You may have contrast for this exam. To prepare:   Do not eat or drink for 2 hours before your exam. If you need to take medicine, you may take it with small sips of water. (We may ask you to take liquid medicine as well.)   The day before your exam, drink extra fluids at least six 8-ounce glasses (unless your doctor tells you to restrict your fluids).  Patients over 70 or patients with diabetes or kidney problems:   If you haven t had a blood test (creatinine test) within the last 30 days, go to your clinic or Diagnostic Imaging Department for this test.  If you have diabetes:   If your kidney function is normal, continue taking your metformin (Avandamet, Glucophage, Glucovance, Metaglip) on the day of your exam.   If your kidney function is abnormal, wait 48 hours before restarting this medicine.  You will have oral contrast for this exam:   You will drink the contrast at home. Get this from your clinic or Diagnostic Imaging Department. Please follow the directions given.  Please wear loose clothing, such as a sweat suit or jogging clothes. Avoid snaps, zippers and other metal. We may ask you to undress and put on a hospital gown.  If you have any questions, please call the Imaging Department where you will have your exam.            Aug 30, 2017 10:45 AM CDT   (Arrive by 10:30 AM)   MR ORBIT FACE NECK W/O & W CONTRAST with [id number]   [facility] ([facility])    [address] [address] [phone]           Take your medicines as usual, unless your doctor tells you not to. Bring a list of your current medicines to your exam (including vitamins, minerals and over-the-counter drugs).  You will be given intravenous contrast for this exam. To prepare:   The day before your exam, drink extra  fluids at least six 8-ounce glasses (unless your doctor tells you to restrict your fluids).   Have a blood test (creatinine test) within 30 days of your exam. Go to your clinic or Diagnostic Imaging Department for this test.  The MRI machine uses a strong magnet. Please wear clothes without metal (snaps, zippers). A sweatsuit works well, or we may give you a hospital gown.  Please remove any body piercings and hair extensions before you arrive. You will also remove watches, jewelry, hairpins, wallets, dentures, partial dental plates and hearing aids. You may wear contact lenses, and you may be able to wear your rings. We have a safe place to keep your personal items, but it is safer to leave them at home.   **IMPORTANT** THE INSTRUCTIONS BELOW ARE ONLY FOR THOSE PATIENTS WHO HAVE BEEN TOLD THEY WILL RECEIVE SEDATION OR GENERAL ANESTHESIA DURING THEIR MRI PROCEDURE:  IF YOU WILL RECEIVE SEDATION (take medicine to help you relax during your exam):   You must get the medicine from your doctor before you arrive. Bring the medicine to the exam. Do not take it at home.   Arrive one hour early. Bring someone who can take you home after the test. Your medicine will make you sleepy. After the exam, you may not drive, take a bus or take a taxi by yourself.   No eating 8 hours before your exam. You may have clear liquids up until 4 hours before your exam. (Clear liquids include water, clear tea, black coffee and fruit juice without pulp.)  IF YOU WILL RECEIVE ANESTHESIA (be asleep for your exam):   Arrive 1 1/2 hours early. Bring someone who can take you home after the test. You may not drive, take a bus or take a taxi by yourself.   No eating 8 hours before your exam. You may have clear liquids up until 4 hours before your exam. (Clear liquids include water, clear tea, black coffee and fruit juice without pulp.)  Please call the Imaging Department at your exam site with any questions.            Aug 30, 2017  1:15 PM CDT    (Arrive by 1:00 PM)   Return Visit with Garett Obregon MD   [facility] ([facility])    [address] [facility]  2nd Floor  [facility] [address] [phone]              Who to contact     If you have questions or need follow up information about today's clinic visit or your schedule please contact [facility] directly at [phone].  Normal or non-critical lab and imaging results will be communicated to you by T-VIPShart, letter or phone within 4 business days after the clinic has received the results. If you do not hear from us within 7 days, please contact the clinic through T-VIPShart or phone. If you have a critical or abnormal lab result, we will notify you by phone as soon as possible.  Submit refill requests through [employer] Plus or call your pharmacy and they will forward the refill request to us. Please allow 3 business days for your refill to be completed.          Additional Information About Your Visit        [employer] Information     [employer] gives you secure access to your electronic health record. If you see a primary care provider, you can also send messages to your care team and make appointments. If you have questions, please call your primary care clinic.  If you do not have a primary care provider, please call [phone] and they will assist you.      [employer] Plus is an electronic gateway that provides easy, online access to your medical records. With [employer], you can request a clinic appointment, read your test results, renew a prescription or communicate with your care team.     To access your existing account, please contact your [facility] or call [phone] for assistance.        Care EveryWhere ID     This is your Care EveryWhere ID. This could be used by other organizations to access your [facility] medical records  [id number]        Your Vitals Were     Pulse Pulse Oximetry BMI (Body Mass Index)              82 97% 30.36 kg/m2          Blood Pressure from Last 3 Encounters:   04/17/17 148/77   03/08/17 145/54   02/23/17 122/65    Weight from Last 3 Encounters:   04/17/17 85.3 kg (188 lb 1.6 oz)   03/08/17 85.4 kg (188 lb 4.8 oz)   02/22/17 85 kg (187 lb 6.3 oz)              Today, you had the following     No orders found for display       Primary Care Provider Office Phone # Fax #    Jeevan Law Wheeler [phone]       [facility] [address] [place] [address]        Thank you!     Thank you for choosing [facility]  for your care. Our goal is always to provide you with excellent care. Hearing back from our patients is one way we can continue to improve our services. Please take a few minutes to complete the written survey that you may receive in the mail after your visit with us. Thank you!             Your Updated Medication List - Protect others around you: Learn how to safely use, store and throw away your medicines at www.disposemymeds.org.          This list is accurate as of: 4/17/17  1:53 PM.  Always use your most recent med list.                   Brand Name Dispense Instructions for use    acetaminophen-codeine 300-30 MG per tablet    TYLENOL #3    28 tablet    Take 1 tablet by mouth every 6 hours as needed for moderate pain       ALPRAZOLAM PO      Take 0.25 mg by mouth daily       BUSPAR PO      Take 5 mg by mouth 3 times daily       EFFEXOR  MG 24 hr capsule   Generic drug:  venlafaxine      Take by mouth daily       hydrochlorothiazide 12.5 MG capsule    MICROZIDE    90 capsule    Take 1 capsule (12.5 mg) by mouth daily       ibuprofen 800 MG tablet    ADVIL/MOTRIN    90 tablet    Take 800 mg by mouth every 4 hours as needed for moderate pain Reported on 4/17/2017       lisinopril 30 MG tablet    PRINIVIL,ZESTRIL    90 tablet    Take 1 tablet (30 mg) by mouth daily       pantoprazole 40 MG EC tablet    PROTONIX    90 tablet    Take 1 tablet  (40 mg) by mouth daily Take 30-60 minutes before a meal.       POTASSIUM CITRATE PO      Take 20 mEq by mouth 2 times daily       pravastatin 40 MG tablet    PRAVACHOL    30 tablet    Take 1 tablet (40 mg) by mouth daily       predniSONE 10 MG tablet    DELTASONE    36 tablet    70 mg x 7 days, 60 mg x 7 days, 40 mg x 7 days, 30 mg x 7 days, 20 mg x 7 days, 15 mg x 7 days, 5 mg x 7 days.       ranitidine 150 MG tablet    ZANTAC     TK 1 T PO BID PRN       senna-docusate 8.6-50 MG per tablet    SENOKOT-S;PERICOLACE    40 tablet    Take 1-2 tablets by mouth 2 times daily       Simethicone 180 MG Caps      Take by mouth daily

## 2017-04-17 NOTE — NURSING NOTE
"Gabino Jones's goals for this visit include:   Chief Complaint   Patient presents with     RECHECK     aortic stenosis       She requests these members of her care team be copied on today's visit information: PCP    PCP: Jeevan Wheeler    Referring Provider:  No referring provider defined for this encounter.    Chief Complaint   Patient presents with     RECHECK     aortic stenosis       Initial /77 (BP Location: Right arm, Patient Position: Chair, Cuff Size: Adult Regular)  Pulse 82  Wt 85.3 kg (188 lb 1.6 oz)  SpO2 97%  BMI 30.36 kg/m2 Estimated body mass index is 30.36 kg/(m^2) as calculated from the following:    Height as of 3/8/17: 1.676 m (5' 6\").    Weight as of this encounter: 85.3 kg (188 lb 1.6 oz).  Medication Reconciliation: complete       Medication Refills: none        Rody Stanley CMA      "

## 2017-04-27 ENCOUNTER — OFFICE VISIT (OUTPATIENT)
Dept: OPHTHALMOLOGY | Facility: CLINIC | Age: 77
End: 2017-04-27
Attending: OPHTHALMOLOGY
Payer: MEDICARE

## 2017-04-27 DIAGNOSIS — H46.9 OPTIC NEUROPATHY: ICD-10-CM

## 2017-04-27 DIAGNOSIS — H53.10 SUBJECTIVE VISUAL DISTURBANCE: ICD-10-CM

## 2017-04-27 PROCEDURE — 99214 OFFICE O/P EST MOD 30 MIN: CPT | Mod: ZF

## 2017-04-27 PROCEDURE — 92083 EXTENDED VISUAL FIELD XM: CPT | Mod: ZF | Performed by: OPHTHALMOLOGY

## 2017-04-27 ASSESSMENT — REFRACTION_WEARINGRX
OS_CYLINDER: +1.00
OS_ADD: +2.75
OS_AXIS: 180
OD_SPHERE: -2.50
OS_SPHERE: -2.25
OD_CYLINDER: +0.50
SPECS_TYPE: BIFOCAL
OD_ADD: +2.75
OD_AXIS: 025

## 2017-04-27 ASSESSMENT — TONOMETRY
OD_IOP_MMHG: 12
IOP_METHOD: TONOPEN
OS_IOP_MMHG: 13

## 2017-04-27 ASSESSMENT — SLIT LAMP EXAM - LIDS
COMMENTS: NORMAL
COMMENTS: NORMAL

## 2017-04-27 ASSESSMENT — CONF VISUAL FIELD
OD_NORMAL: 1
OS_INFERIOR_TEMPORAL_RESTRICTION: 1
OS_SUPERIOR_TEMPORAL_RESTRICTION: 1
OS_SUPERIOR_NASAL_RESTRICTION: 1
OS_INFERIOR_NASAL_RESTRICTION: 1

## 2017-04-27 ASSESSMENT — VISUAL ACUITY
OD_CC: 20/25
OD_CC+: -2
OS_CC: LP
METHOD: SNELLEN - LINEAR

## 2017-04-27 ASSESSMENT — EXTERNAL EXAM - RIGHT EYE: OD_EXAM: NORMAL

## 2017-04-27 ASSESSMENT — EXTERNAL EXAM - LEFT EYE: OS_EXAM: NORMAL

## 2017-04-27 NOTE — MR AVS SNAPSHOT
After Visit Summary   4/27/2017    Gabino Jones    MRN: [id number]           Patient Information     Date Of Birth          1940        Visit Information        Provider Department      4/27/2017 2:00 PM Matt Quiroga MD [facility]        Today's Diagnoses     Subjective visual disturbance        Optic neuropathy           Follow-ups after your visit        Follow-up notes from your care team     Return in 2 months (on 6/27/2017).      Your next 10 appointments already scheduled     Jun 08, 2017  1:00 PM CDT   RETURN NEURO with Matt Quiroga MD   [facility] ([facility]    [address]  [address]  [address]  [facility] [address]   [phone]            Jun 22, 2017  2:20 PM CDT   (Arrive by 2:05 PM)   Return Visit with Yolanda Whitaker MD   [facility] ([facility] [place])    [address]  4th [address]  [facility] [address] [phone]            Aug 30, 2017 10:20 AM CDT   (Arrive by 10:05 AM)   CT CHEST/ABDOMEN/PELVIS W CONTRAST with [id number]   [facility] [place] [facility] ([facility] [place])    [address]  1st [address]  [facility] [address] [phone]           Please bring any scans or X-rays taken at other hospitals, if similar tests were done. Also bring a list of your medicines, including vitamins, minerals and over-the-counter drugs. It is safest to leave personal items at home.  Be sure to tell your doctor:   If you have any allergies.   If there s any chance you are pregnant.   If you are breastfeeding.   If you have any special needs.  You may have contrast for this exam. To prepare:   Do not eat or drink for 2 hours before your exam. If you need to take medicine, you may take it with small sips of water. (We may ask you to take liquid medicine as well.)   The day before your exam, drink extra fluids at least six 8-ounce glasses (unless your  doctor tells you to restrict your fluids).  Patients over 70 or patients with diabetes or kidney problems:   If you haven t had a blood test (creatinine test) within the last 30 days, go to your clinic or Diagnostic Imaging Department for this test.  If you have diabetes:   If your kidney function is normal, continue taking your metformin (Avandamet, Glucophage, Glucovance, Metaglip) on the day of your exam.   If your kidney function is abnormal, wait 48 hours before restarting this medicine.  You will have oral contrast for this exam:   You will drink the contrast at home. Get this from your clinic or Diagnostic Imaging Department. Please follow the directions given.  Please wear loose clothing, such as a sweat suit or jogging clothes. Avoid snaps, zippers and other metal. We may ask you to undress and put on a hospital gown.  If you have any questions, please call the Imaging Department where you will have your exam.            Aug 30, 2017 10:45 AM CDT   (Arrive by 10:30 AM)   MR ORBIT FACE NECK W/O & W CONTRAST with [id number]   [facility] MRI ([facility] [place])    [address] [place] [address] [phone]           Take your medicines as usual, unless your doctor tells you not to. Bring a list of your current medicines to your exam (including vitamins, minerals and over-the-counter drugs).  You will be given intravenous contrast for this exam. To prepare:   The day before your exam, drink extra fluids at least six 8-ounce glasses (unless your doctor tells you to restrict your fluids).   Have a blood test (creatinine test) within 30 days of your exam. Go to your clinic or Diagnostic Imaging Department for this test.  The MRI machine uses a strong magnet. Please wear clothes without metal (snaps, zippers). A sweatsuit works well, or we may give you a hospital gown.  Please remove any body piercings and hair extensions before you arrive. You will also  remove watches, jewelry, hairpins, wallets, dentures, partial dental plates and hearing aids. You may wear contact lenses, and you may be able to wear your rings. We have a safe place to keep your personal items, but it is safer to leave them at home.   **IMPORTANT** THE INSTRUCTIONS BELOW ARE ONLY FOR THOSE PATIENTS WHO HAVE BEEN TOLD THEY WILL RECEIVE SEDATION OR GENERAL ANESTHESIA DURING THEIR MRI PROCEDURE:  IF YOU WILL RECEIVE SEDATION (take medicine to help you relax during your exam):   You must get the medicine from your doctor before you arrive. Bring the medicine to the exam. Do not take it at home.   Arrive one hour early. Bring someone who can take you home after the test. Your medicine will make you sleepy. After the exam, you may not drive, take a bus or take a taxi by yourself.   No eating 8 hours before your exam. You may have clear liquids up until 4 hours before your exam. (Clear liquids include water, clear tea, black coffee and fruit juice without pulp.)  IF YOU WILL RECEIVE ANESTHESIA (be asleep for your exam):   Arrive 1 1/2 hours early. Bring someone who can take you home after the test. You may not drive, take a bus or take a taxi by yourself.   No eating 8 hours before your exam. You may have clear liquids up until 4 hours before your exam. (Clear liquids include water, clear tea, black coffee and fruit juice without pulp.)  Please call the Imaging Department at your exam site with any questions.            Aug 30, 2017  1:15 PM CDT   (Arrive by 1:00 PM)   Return Visit with Garett Obregon MD   [facility] ([facility])    [address] [place] [address] [phone]              Future tests that were ordered for you today     Open Future Orders        Priority Expected Expires Ordered    Glaucoma Top OU Routine  10/29/2018 4/27/2017    Color Vision - Screening OU (both eyes) Routine  10/29/2018 4/27/2017    DILATED  FUNDUS EXAM Routine  10/29/2018 4/27/2017    IOP Measurement Routine  10/29/2018 4/27/2017            Who to contact     Please call your clinic at [phone] to:    Ask questions about your health    Make or cancel appointments    Discuss your medicines    Learn about your test results    Speak to your doctor   If you have compliments or concerns about an experience at your clinic, or if you wish to file a complaint, please contact [facility] Patient Relations at [phone] or email us at [email or web address]         Additional Information About Your Visit        [employer] Information     [employer] gives you secure access to your electronic health record. If you see a primary care provider, you can also send messages to your care team and make appointments. If you have questions, please call your primary care clinic.  If you do not have a primary care provider, please call [phone] and they will assist you.      GT[employer] is an electronic gateway that provides easy, online access to your medical records. With GTI, you can request a clinic appointment, read your test results, renew a prescription or communicate with your care team.     To access your existing account, please contact your [facility] or call [phone] for assistance.        Care EveryWhere ID     This is your Care EveryWhere ID. This could be used by other organizations to access your [facility] medical records  [id number]         Blood Pressure from Last 3 Encounters:   04/17/17 148/77   03/08/17 145/54   02/23/17 122/65    Weight from Last 3 Encounters:   04/17/17 85.3 kg (188 lb 1.6 oz)   03/08/17 85.4 kg (188 lb 4.8 oz)   02/22/17 85 kg (187 lb 6.3 oz)              We Performed the Following     DILATED FUNDUS EXAM     Glaucoma Top OU     IOP Measurement        Primary Care Provider Office Phone # Fax #    Jeevan Wheeler [phone]       [place]  [facility] [address] [facility] [place] [address]        Thank you!     Thank you for choosing [facility]  for your care. Our goal is always to provide you with excellent care. Hearing back from our patients is one way we can continue to improve our services. Please take a few minutes to complete the written survey that you may receive in the mail after your visit with us. Thank you!             Your Updated Medication List - Protect others around you: Learn how to safely use, store and throw away your medicines at www.disposemymeds.org.          This list is accurate as of: 4/27/17  3:15 PM.  Always use your most recent med list.                   Brand Name Dispense Instructions for use    acetaminophen-codeine 300-30 MG per tablet    TYLENOL #3    28 tablet    Take 1 tablet by mouth every 6 hours as needed for moderate pain       ALPRAZOLAM PO      Take 0.25 mg by mouth daily       BUSPAR PO      Take 5 mg by mouth 3 times daily       EFFEXOR  MG 24 hr capsule   Generic drug:  venlafaxine      Take by mouth daily       hydrochlorothiazide 12.5 MG capsule    MICROZIDE    90 capsule    Take 1 capsule (12.5 mg) by mouth daily       ibuprofen 800 MG tablet    ADVIL/MOTRIN    90 tablet    Take 800 mg by mouth every 4 hours as needed for moderate pain Reported on 4/17/2017       lisinopril 30 MG tablet    PRINIVIL,ZESTRIL    90 tablet    Take 1 tablet (30 mg) by mouth daily       pantoprazole 40 MG EC tablet    PROTONIX    90 tablet    Take 1 tablet (40 mg) by mouth daily Take 30-60 minutes before a meal.       POTASSIUM CITRATE PO      Take 20 mEq by mouth 2 times daily       pravastatin 40 MG tablet    PRAVACHOL    30 tablet    Take 1 tablet (40 mg) by mouth daily       predniSONE 10 MG tablet    DELTASONE    36 tablet    70 mg x 7 days, 60 mg x 7 days, 40 mg x 7 days, 30 mg x 7 days, 20 mg x 7 days, 15 mg x 7 days, 5 mg x 7 days.       ranitidine 150 MG tablet    ZANTAC     TK 1 T PO BID PRN       senna-docusate  8.6-50 MG per tablet    SENOKOT-S;PERICOLACE    40 tablet    Take 1-2 tablets by mouth 2 times daily       Simethicone 180 MG Caps      Take by mouth daily

## 2017-04-27 NOTE — NURSING NOTE
Chief Complaints and History of Present Illnesses   Patient presents with     Neurologic Problem     6 WK FU     HPI    Affected eye(s):  Both   Symptoms:     Blurred vision   Decreased vision            Comments:  Gabino Jones is a 76 year old female with the following diagnoses:   1. Optic neuropathy      Feels right eye has improved  Currently on 10 mg prednisone BID. Next Wednesday will decrease to 15 mg.     Alexander MARTINEZ 1:54 PM April 27, 2017

## 2017-04-27 NOTE — PROGRESS NOTES
Assessment & Plan     Gabino Jones is a 77 year old female with the following diagnoses:   1. Subjective visual disturbance    2. 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  Continue prednisone taper by 5 mg each week, currently on 20 mg/d.  Follow up 6 weeks sooner as needed for worsening symptoms.           Attending Physician Attestation:  I have seen and examined this patient.  I have confirmed and edited as necessary the chief complaint(s), history of present illness, review of systems, relevant history, and examination findings as documented by others.  I have personally reviewed the relevant tests, images, and reports as documented above.  I have confirmed and edited as necessary the assessment and plan and agree with this note.  - Matt Quiroga MD 3:09 PM 4/27/2017

## 2017-05-02 NOTE — PROGRESS NOTES
"  SUBJECTIVE:                                                    Gabino Jones is a 77 year old female who presents to clinic today for the following health issues:      Hypertension Follow-up      Outpatient blood pressures are not being checked.    Low Salt Diet: low salt       Amount of exercise or physical activity: 4-5 days/week for an average of 15-30 minutes    Problems taking medications regularly: No, sometime hard to swallow on medication     Medication side effects: none    Diet: regular (no restrictions) and low salt    ABDOMINAL PAIN     Onset: 1 year or more     Description:   Character: Cramping, pressure, bloating,gas   Location: kimberly-umbilical region  Radiation: None    Intensity: 5/10    Progression of Symptoms:  worsening    Accompanying Signs & Symptoms:  Fever/Chills?: no, \"always cold at night\"   Gas/Bloating: YES  Nausea: no   Vomitting: no   Diarrhea?: no   Constipation:YES  Dysuria or Hematuria: no    History:   Trauma: no   Previous similar pain: YES   Previous tests done: none    Precipitating factors:   Does the pain change with:     Food: YES - \"more pressure when i eat\"     BM: YES    Urination: no     Alleviating factors:  OTC acids reflux     Therapies Tried and outcome: \"i don't know \"      LMP:  not applicable     Son has history of polyps. Has colonoscopy every 6 months. No one else in the family has issues.     GERD/Heartburn     Onset: couple years off and on     Description:     Burning in chest: No- \"throat\"     Intensity: moderate    Progression of Symptoms: worsening    Accompanying Signs & Symptoms:  Does it feel like food gets stuck: no   Nausea: no   Vomiting (bloody?): no   Abdominal Pain: YES  Black-Tarry stools: no :  Bloody stools: no    History:   Previous ulcers: no     Precipitating factors:   Caffeine use: YES-some   Alcohol use: no   NSAID/Aspirin use: YES- Ibuprofen. At most 1-2 per day. But not for weeks. Takes it for fibromyalgia.    Tobacco use: no  Worse with " "\"bread\" .    Alleviating factors:  none          Therapies Tried and outcome:OTC acid reflex     History of endoscopy in the past. Reports that food was in there even though NPO.   Was on protonix. Not sure if it made a difference. Ran out.   Taking ranitidine. Helps some. Tums can help.     MELANOMA - nose and sinus cavity. She has had a skull lesion as well and has had surgery. She is managed by ENT. She has vision issues related to this.     ANXIETY/DEPRESSION- DESTINEY Pereira. Treating with Buspar and Venlafaxine. Mental health clinic. Can't be seen there any longer per patient due to be over 60 years old. She is in need of refills. Denies any concerns.       Problem list and histories reviewed & adjusted, as indicated.  Additional history: as documented    Patient Active Problem List   Diagnosis     Malignant melanoma of nasal cavity (H)     Nasal pain     Melanoma (H)     Proximal humerus fracture     Abnormal chest CT     Aortic stenosis     Hypertension goal BP (blood pressure) < 140/90     Skull lesion     Past Surgical History:   Procedure Laterality Date     C ANESTH,CERV SPINE, SITTING POSITION       GALLBLADDER SURGERY       NECK SURGERY       OPTICAL TRACKING SYSTEM CRANIOTOMY, EXCISE TUMOR, COMBINED Right 2/22/2017    Procedure: COMBINED OPTICAL TRACKING SYSTEM CRANIOTOMY, EXCISE TUMOR;  Surgeon: Lenora Pérez MD;  Location:  OR     OPTICAL TRACKING SYSTEM ENDOSCOPIC ENDONASAL SURGERY  6/23/2014    Procedure: OPTICAL TRACKING SYSTEM ENDOSCOPIC ENDONASAL SURGERY;  Surgeon: Yolanda Whitaker MD;  Location:  OR     STOMACH SURGERY       TONSILLECTOMY       TUBAL LIGATION      1975       Social History   Substance Use Topics     Smoking status: Former Smoker     Types: Cigarettes     Quit date: 6/10/2004     Smokeless tobacco: Never Used     Alcohol use No     Family History   Problem Relation Age of Onset     Prostate Cancer Father      CANCER Sister      SCLC     CANCER " "Sister      Macular Degeneration Daughter      Glaucoma No family hx of            Reviewed and updated as needed this visit by clinical staff       Reviewed and updated as needed this visit by Provider         ROS:  C: NEGATIVE for fever, chills, change in weight  ENT/MOUTH: as above.   R: NEGATIVE for significant cough or SOB  CV: NEGATIVE for chest pain, palpitations or peripheral edema  GI: as above  : NEGATIVE for frequency, dysuria, or hematuria  N: NEGATIVE for weakness, dizziness or paresthesias  PSYCHIATRIC: as above    OBJECTIVE:                                                    /64 (BP Location: Left arm, Cuff Size: Adult Large)  Pulse 88  Temp 99.1  F (37.3  C) (Temporal)  Resp 16  Ht 5' 4\" (1.626 m)  Wt 171 lb (77.6 kg)  BMI 29.35 kg/m2  Body mass index is 29.35 kg/(m^2).  GENERAL APPEARANCE: alert and no distress  HENT: throat is clear, Mucous membranes are moist.   NECK: no adenopathy, no asymmetry, masses, or scars and thyroid normal to palpation  RESP: lungs clear to auscultation - no rales, rhonchi or wheezes  CV: regular rates and rhythm, normal S1 S2, no S3 or S4 and no murmur, click or rub  ABDOMEN: normal bowel sounds soft. No hepatosplenomegaly or masses. Non tender. No rebound or guarding.   MS: extremities normal- no gross deformities noted  MENTAL STATUS EXAM:  Appearance/Behavior: No apparent distress and Casually groomed  Speech: Normal  Mood/Affect: normal affect  Insight: Fair    Diagnostic Test Results:  CBC RESULTS:   Recent Labs   Lab Test  03/28/17   1553   WBC  10.8   RBC  4.32   HGB  12.7   HCT  39.4   MCV  91   MCH  29.4   MCHC  32.2   RDW  13.8   PLT  307      Last Basic Metabolic Panel:  Lab Results   Component Value Date     03/28/2017      Lab Results   Component Value Date    POTASSIUM 4.9 03/28/2017     Lab Results   Component Value Date    CHLORIDE 100 03/28/2017     Lab Results   Component Value Date    STEFFANIE 9.0 03/28/2017     Lab Results   Component " Value Date    CO2 27 03/28/2017     Lab Results   Component Value Date    BUN 12 03/28/2017     Lab Results   Component Value Date    CR 0.79 03/28/2017     Lab Results   Component Value Date     03/28/2017     Lab Results   Component Value Date    AST 13 03/28/2017     Lab Results   Component Value Date    ALT 18 03/28/2017           ASSESSMENT/PLAN:                                                            1. Gastroesophageal reflux disease without esophagitis  Reviewed most recent labs done.   No concerns noted.   Restart protonix.  Continue with ranitidine.   Recommend evaluation by GI for EGD and colonoscopy.   Recheck if worsening in any way.   All questions invited, asked and answered to the patient's apparent satisfaction.  Patient agrees to plan.    - pantoprazole (PROTONIX) 40 MG EC tablet; Take 1 tablet (40 mg) by mouth daily Take 30-60 minutes before a meal.  Dispense: 90 tablet; Refill: 1  - GASTROENTEROLOGY ADULT REF PROCEDURE ONLY    2. Flatulence, eructation, and gas pain  Patient with increased gas.   Most recent labs reviewed as above.   Discussed colonoscopy and she agrees.   Discussed potential CT dependent upon CT results.   - GASTROENTEROLOGY ADULT REF PROCEDURE ONLY  - Simethicone 180 MG CAPS; Take 1 capsule by mouth 2 times daily Reported on 5/9/2017  Dispense: 60 capsule; Refill: 6  - senna-docusate (SENOKOT-S;PERICOLACE) 8.6-50 MG per tablet; Take 1-2 tablets by mouth 2 times daily  Dispense: 120 tablet; Refill: 3    3. Malignant melanoma of nose (H)  4. Skull lesion  Under the care of neurosurgery, ENT and oncology.   Will continue follow up      5. Hypertension goal BP (blood pressure) < 140/90  Doing well. Well controlled. Tolerating medication.  No change in plan.    - lisinopril (PRINIVIL,ZESTRIL) 30 MG tablet; Take 1 tablet (30 mg) by mouth daily  Dispense: 90 tablet; Refill: 1  - hydrochlorothiazide (MICROZIDE) 12.5 MG capsule; Take 1 capsule (12.5 mg) by mouth daily   Dispense: 90 capsule; Refill: 1    6. Mixed hyperlipidemia  Doing well. Tolerating medication.  No change in plan.            MIMI RAMIREZ MD, MD  [facility]

## 2017-05-03 DIAGNOSIS — H46.9 OPTIC NEUROPATHY: ICD-10-CM

## 2017-05-03 RX ORDER — PREDNISONE 10 MG/1
20 TABLET ORAL DAILY
Qty: 28 TABLET | Refills: 0 | Status: SHIPPED | OUTPATIENT
Start: 2017-05-03 | End: 2017-06-22

## 2017-05-09 ENCOUNTER — OFFICE VISIT (OUTPATIENT)
Dept: FAMILY MEDICINE | Facility: CLINIC | Age: 77
End: 2017-05-09
Payer: MEDICARE

## 2017-05-09 VITALS
TEMPERATURE: 99.1 F | DIASTOLIC BLOOD PRESSURE: 64 MMHG | BODY MASS INDEX: 29.19 KG/M2 | WEIGHT: 171 LBS | SYSTOLIC BLOOD PRESSURE: 132 MMHG | HEIGHT: 64 IN | HEART RATE: 88 BPM | RESPIRATION RATE: 16 BRPM

## 2017-05-09 DIAGNOSIS — C43.31 MALIGNANT MELANOMA OF NOSE (H): ICD-10-CM

## 2017-05-09 DIAGNOSIS — R14.2 FLATULENCE, ERUCTATION, AND GAS PAIN: ICD-10-CM

## 2017-05-09 DIAGNOSIS — E78.2 MIXED HYPERLIPIDEMIA: ICD-10-CM

## 2017-05-09 DIAGNOSIS — M89.9 SKULL LESION: ICD-10-CM

## 2017-05-09 DIAGNOSIS — I10 HYPERTENSION GOAL BP (BLOOD PRESSURE) < 140/90: ICD-10-CM

## 2017-05-09 DIAGNOSIS — K21.9 GASTROESOPHAGEAL REFLUX DISEASE WITHOUT ESOPHAGITIS: Primary | ICD-10-CM

## 2017-05-09 DIAGNOSIS — R14.3 FLATULENCE, ERUCTATION, AND GAS PAIN: ICD-10-CM

## 2017-05-09 DIAGNOSIS — R14.1 FLATULENCE, ERUCTATION, AND GAS PAIN: ICD-10-CM

## 2017-05-09 DIAGNOSIS — K21.9 GASTROESOPHAGEAL REFLUX DISEASE WITHOUT ESOPHAGITIS: ICD-10-CM

## 2017-05-09 PROCEDURE — 99214 OFFICE O/P EST MOD 30 MIN: CPT | Performed by: FAMILY MEDICINE

## 2017-05-09 RX ORDER — LISINOPRIL 30 MG/1
30 TABLET ORAL DAILY
Qty: 90 TABLET | Refills: 1 | Status: SHIPPED | OUTPATIENT
Start: 2017-05-09 | End: 2017-12-15

## 2017-05-09 RX ORDER — AMOXICILLIN 250 MG
1-2 CAPSULE ORAL 2 TIMES DAILY
Qty: 120 TABLET | Refills: 3 | Status: SHIPPED | OUTPATIENT
Start: 2017-05-09 | End: 2017-08-01

## 2017-05-09 RX ORDER — HYDROCHLOROTHIAZIDE 12.5 MG/1
12.5 CAPSULE ORAL DAILY
Qty: 90 CAPSULE | Refills: 1 | Status: SHIPPED | OUTPATIENT
Start: 2017-05-09 | End: 2017-12-15

## 2017-05-09 RX ORDER — SIMETHICONE 180 MG
1 CAPSULE ORAL 2 TIMES DAILY
Qty: 60 CAPSULE | Refills: 6 | Status: SHIPPED | OUTPATIENT
Start: 2017-05-09 | End: 2017-08-01

## 2017-05-09 RX ORDER — PANTOPRAZOLE SODIUM 40 MG/1
40 TABLET, DELAYED RELEASE ORAL DAILY
Qty: 90 TABLET | Refills: 1 | Status: SHIPPED | OUTPATIENT
Start: 2017-05-09 | End: 2017-12-15

## 2017-05-09 ASSESSMENT — ANXIETY QUESTIONNAIRES
5. BEING SO RESTLESS THAT IT IS HARD TO SIT STILL: NOT AT ALL
6. BECOMING EASILY ANNOYED OR IRRITABLE: MORE THAN HALF THE DAYS
IF YOU CHECKED OFF ANY PROBLEMS ON THIS QUESTIONNAIRE, HOW DIFFICULT HAVE THESE PROBLEMS MADE IT FOR YOU TO DO YOUR WORK, TAKE CARE OF THINGS AT HOME, OR GET ALONG WITH OTHER PEOPLE: SOMEWHAT DIFFICULT
GAD7 TOTAL SCORE: 8
3. WORRYING TOO MUCH ABOUT DIFFERENT THINGS: MORE THAN HALF THE DAYS
1. FEELING NERVOUS, ANXIOUS, OR ON EDGE: MORE THAN HALF THE DAYS
2. NOT BEING ABLE TO STOP OR CONTROL WORRYING: MORE THAN HALF THE DAYS
7. FEELING AFRAID AS IF SOMETHING AWFUL MIGHT HAPPEN: NOT AT ALL

## 2017-05-09 ASSESSMENT — PATIENT HEALTH QUESTIONNAIRE - PHQ9: 5. POOR APPETITE OR OVEREATING: NOT AT ALL

## 2017-05-09 ASSESSMENT — PAIN SCALES - GENERAL: PAINLEVEL: SEVERE PAIN (6)

## 2017-05-09 NOTE — NURSING NOTE
"Chief Complaint   Patient presents with     Abdominal Pain     Panel Management     KEV, Dexgeovany, Honoring CHoices,        Initial /64 (BP Location: Left arm, Cuff Size: Adult Large)  Pulse 88  Temp 99.1  F (37.3  C) (Temporal)  Resp 16  Ht 5' 4\" (1.626 m)  Wt 171 lb (77.6 kg)  BMI 29.35 kg/m2 Estimated body mass index is 29.35 kg/(m^2) as calculated from the following:    Height as of this encounter: 5' 4\" (1.626 m).    Weight as of this encounter: 171 lb (77.6 kg).  Medication Reconciliation: incomplete       Arnol Restrepo MA    "

## 2017-05-09 NOTE — MR AVS SNAPSHOT
After Visit Summary   5/9/2017    Gabino Jones    MRN: [id number]           Patient Information     Date Of Birth          1940        Visit Information        Provider Department      5/9/2017 11:20 AM Rebekah Boogie MD [facility]        Today's Diagnoses     Screening for osteoporosis    -  1    Gastroesophageal reflux disease without esophagitis        Hypertension goal BP (blood pressure) < 140/90        Mixed hyperlipidemia        Malignant melanoma of nose (H)        Flatulence, eructation, and gas pain        Skull lesion           Follow-ups after your visit        Additional Services     GASTROENTEROLOGY ADULT REF PROCEDURE ONLY       Last Lab Result: Creatinine (mg/dL)       Date                     Value                 03/28/2017               0.79             ----------  Body mass index is 29.35 kg/(m^2).     Needed:  No  Language:  English    Patient will be contacted to schedule procedure.     Please be aware that coverage of these services is subject to the terms and limitations of your health insurance plan.  Call member services at your health plan with any benefit or coverage questions.  Any procedures must be performed at a [place] facility OR coordinated by your clinic's referral office.    Please bring the following with you to your appointment:    (1) Any X-Rays, CTs or MRIs which have been performed.  Contact the facility where they were done to arrange for  prior to your scheduled appointment.    (2) List of current medications   (3) This referral request   (4) Any documents/labs given to you for this referral                  Your next 10 appointments already scheduled     Jun 08, 2017  1:00 PM CDT   RETURN NEURO with Matt Quiroga MD   Eye Clinic ([facility])    [facility]  [address]  [address]  [facility] [facility] [address]   [phone]            Jun 22, 2017  2:20 PM CDT   (Arrive by 2:05 PM)    Return Visit with Yolanda Whitaker MD   [facility] ([facility])    [address]  4th Floor  [place] [address] [phone]            Aug 30, 2017 10:20 AM CDT   (Arrive by 10:05 AM)   CT CHEST/ABDOMEN/PELVIS W CONTRAST with UCCT2   [facility] CT ([facility])    [address]  1st Floor  [place] [address] [phone]           Please bring any scans or X-rays taken at other hospitals, if similar tests were done. Also bring a list of your medicines, including vitamins, minerals and over-the-counter drugs. It is safest to leave personal items at home.  Be sure to tell your doctor:   If you have any allergies.   If there s any chance you are pregnant.   If you are breastfeeding.   If you have any special needs.  You may have contrast for this exam. To prepare:   Do not eat or drink for 2 hours before your exam. If you need to take medicine, you may take it with small sips of water. (We may ask you to take liquid medicine as well.)   The day before your exam, drink extra fluids at least six 8-ounce glasses (unless your doctor tells you to restrict your fluids).  Patients over 70 or patients with diabetes or kidney problems:   If you haven t had a blood test (creatinine test) within the last 30 days, go to your clinic or Diagnostic Imaging Department for this test.  If you have diabetes:   If your kidney function is normal, continue taking your metformin (Avandamet, Glucophage, Glucovance, Metaglip) on the day of your exam.   If your kidney function is abnormal, wait 48 hours before restarting this medicine.  You will have oral contrast for this exam:   You will drink the contrast at home. Get this from your clinic or Diagnostic Imaging Department. Please follow the directions given.  Please wear loose clothing, such as a sweat suit or jogging clothes. Avoid snaps, zippers and other metal.  We may ask you to undress and put on a hospital gown.  If you have any questions, please call the Imaging Department where you will have your exam.            Aug 30, 2017 10:45 AM CDT   (Arrive by 10:30 AM)   MR ORBIT FACE NECK W/O & W CONTRAST with [id number]   [facility] ([facility])    [address] [address] [phone]           Take your medicines as usual, unless your doctor tells you not to. Bring a list of your current medicines to your exam (including vitamins, minerals and over-the-counter drugs).  You will be given intravenous contrast for this exam. To prepare:   The day before your exam, drink extra fluids at least six 8-ounce glasses (unless your doctor tells you to restrict your fluids).   Have a blood test (creatinine test) within 30 days of your exam. Go to your clinic or Diagnostic Imaging Department for this test.  The MRI machine uses a strong magnet. Please wear clothes without metal (snaps, zippers). A sweatsuit works well, or we may give you a hospital gown.  Please remove any body piercings and hair extensions before you arrive. You will also remove watches, jewelry, hairpins, wallets, dentures, partial dental plates and hearing aids. You may wear contact lenses, and you may be able to wear your rings. We have a safe place to keep your personal items, but it is safer to leave them at home.   **IMPORTANT** THE INSTRUCTIONS BELOW ARE ONLY FOR THOSE PATIENTS WHO HAVE BEEN TOLD THEY WILL RECEIVE SEDATION OR GENERAL ANESTHESIA DURING THEIR MRI PROCEDURE:  IF YOU WILL RECEIVE SEDATION (take medicine to help you relax during your exam):   You must get the medicine from your doctor before you arrive. Bring the medicine to the exam. Do not take it at home.   Arrive one hour early. Bring someone who can take you home after the test. Your medicine will make you sleepy. After the exam, you may not drive, take a bus or take a  taxi by yourself.   No eating 8 hours before your exam. You may have clear liquids up until 4 hours before your exam. (Clear liquids include water, clear tea, black coffee and fruit juice without pulp.)  IF YOU WILL RECEIVE ANESTHESIA (be asleep for your exam):   Arrive 1 1/2 hours early. Bring someone who can take you home after the test. You may not drive, take a bus or take a taxi by yourself.   No eating 8 hours before your exam. You may have clear liquids up until 4 hours before your exam. (Clear liquids include water, clear tea, black coffee and fruit juice without pulp.)  Please call the Imaging Department at your exam site with any questions.            Aug 30, 2017  1:15 PM CDT   (Arrive by 1:00 PM)   Return Visit with Garett Obregon MD   [facility] ([facility])    [address]  [address] [facility] [address] [phone]              Who to contact     If you have questions or need follow up information about today's clinic visit or your schedule please contact [facility] directly at [phone].  Normal or non-critical lab and imaging results will be communicated to you by MyChart, letter or phone within 4 business days after the clinic has received the results. If you do not hear from us within 7 days, please contact the clinic through [facility] or phone. If you have a critical or abnormal lab result, we will notify you by phone as soon as possible.  Submit refill requests through [facility] or call your pharmacy and they will forward the refill request to us. Please allow 3 business days for your refill to be completed.          Additional Information About Your Visit        [facility] Information     [facility] gives you secure access to your electronic health record. If you see a primary care provider, you can also send messages to your care team and make appointments. If you have questions, please call your primary care clinic.  If  "you do not have a primary care provider, please call [phone] and they will assist you.        Care EveryWhere ID     This is your Care EveryWhere ID. This could be used by other organizations to access your [facility] medical records  [id number]        Your Vitals Were     Pulse Temperature Respirations Height BMI (Body Mass Index)       88 99.1  F (37.3  C) (Temporal) 16 5' 4\" (1.626 m) 29.35 kg/m2        Blood Pressure from Last 3 Encounters:   05/09/17 132/64   04/17/17 148/77   03/08/17 145/54    Weight from Last 3 Encounters:   05/09/17 171 lb (77.6 kg)   04/17/17 188 lb 1.6 oz (85.3 kg)   03/08/17 188 lb 4.8 oz (85.4 kg)              We Performed the Following     GASTROENTEROLOGY ADULT REF PROCEDURE ONLY          Today's Medication Changes          These changes are accurate as of: 5/9/17 12:55 PM.  If you have any questions, ask your nurse or doctor.               These medicines have changed or have updated prescriptions.        Dose/Directions    ranitidine 150 MG tablet   Commonly known as:  ZANTAC   This may have changed:  See the new instructions.   Used for:  Gastroesophageal reflux disease without esophagitis   Changed by:  Rebekah Boogie MD        TK 1 T PO BID PRN   Quantity:  60 tablet   Refills:  6       Simethicone 180 MG Caps   This may have changed:    - how much to take  - when to take this   Used for:  Flatulence, eructation, and gas pain   Changed by:  Rebekah Boogie MD        Dose:  1 capsule   Take 1 capsule by mouth 2 times daily Reported on 5/9/2017   Quantity:  60 capsule   Refills:  6         Stop taking these medicines if you haven't already. Please contact your care team if you have questions.     POTASSIUM CITRATE PO   Stopped by:  Rebekah Boogie MD                Where to get your medicines      These medications were sent to [facility] [address] [place] [address] [address] [facility] [address]  [address] [facility] [address] [address]     Phone:  [phone]     hydrochlorothiazide 12.5 MG capsule    lisinopril 30 MG tablet    pantoprazole 40 MG EC tablet    ranitidine 150 MG tablet    senna-docusate 8.6-50 MG per tablet    Simethicone 180 MG Caps                Primary Care Provider Office Phone # Fax #    Jeevan Law Wheeler [phone]       [facility] [address] [facility] [place] [address]        Thank you!     Thank you for choosing [facility]  for your care. Our goal is always to provide you with excellent care. Hearing back from our patients is one way we can continue to improve our services. Please take a few minutes to complete the written survey that you may receive in the mail after your visit with us. Thank you!             Your Updated Medication List - Protect others around you: Learn how to safely use, store and throw away your medicines at www.disposemymeds.org.          This list is accurate as of: 5/9/17 12:55 PM.  Always use your most recent med list.                   Brand Name Dispense Instructions for use    acetaminophen-codeine 300-30 MG per tablet    TYLENOL #3    28 tablet    Take 1 tablet by mouth every 6 hours as needed for moderate pain       ALPRAZOLAM PO      Take 0.25 mg by mouth daily       BUSPAR PO      Take 5 mg by mouth 3 times daily       EFFEXOR  MG 24 hr capsule   Generic drug:  venlafaxine      Take by mouth daily       hydrochlorothiazide 12.5 MG capsule    MICROZIDE    90 capsule    Take 1 capsule (12.5 mg) by mouth daily       ibuprofen 800 MG tablet    ADVIL/MOTRIN    90 tablet    Take 800 mg by mouth every 4 hours as needed for moderate pain Reported on 4/17/2017       lisinopril 30 MG tablet    PRINIVIL,ZESTRIL    90 tablet    Take 1 tablet (30 mg) by mouth daily       pantoprazole 40 MG EC tablet    PROTONIX    90 tablet    Take 1 tablet (40 mg) by mouth daily Take 30-60 minutes before a meal.       pravastatin 40 MG tablet    PRAVACHOL    30  tablet    Take 1 tablet (40 mg) by mouth daily       predniSONE 10 MG tablet    DELTASONE    28 tablet    Take 2 tablets (20 mg) by mouth daily       ranitidine 150 MG tablet    ZANTAC    60 tablet    TK 1 T PO BID PRN       senna-docusate 8.6-50 MG per tablet    SENOKOT-S;PERICOLACE    120 tablet    Take 1-2 tablets by mouth 2 times daily       Simethicone 180 MG Caps     60 capsule    Take 1 capsule by mouth 2 times daily Reported on 5/9/2017

## 2017-05-10 ASSESSMENT — ANXIETY QUESTIONNAIRES: GAD7 TOTAL SCORE: 8

## 2017-05-10 ASSESSMENT — PATIENT HEALTH QUESTIONNAIRE - PHQ9: SUM OF ALL RESPONSES TO PHQ QUESTIONS 1-9: 8

## 2017-05-10 NOTE — TELEPHONE ENCOUNTER
Prescription approved per [facility] Refill Protocol. For 90- days      Gianni Bowen, RN, BSN

## 2017-05-22 ENCOUNTER — MYC MEDICAL ADVICE (OUTPATIENT)
Dept: FAMILY MEDICINE | Facility: CLINIC | Age: 77
End: 2017-05-22

## 2017-05-22 DIAGNOSIS — M89.9 SKULL LESION: ICD-10-CM

## 2017-05-22 DIAGNOSIS — M79.10 MYALGIA: ICD-10-CM

## 2017-05-22 DIAGNOSIS — F41.1 GAD (GENERALIZED ANXIETY DISORDER): Primary | ICD-10-CM

## 2017-05-23 DIAGNOSIS — F41.1 GAD (GENERALIZED ANXIETY DISORDER): ICD-10-CM

## 2017-05-23 RX ORDER — IBUPROFEN 800 MG/1
800 TABLET, FILM COATED ORAL EVERY 4 HOURS PRN
Qty: 90 TABLET | Refills: 1 | Status: SHIPPED | OUTPATIENT
Start: 2017-05-23 | End: 2017-08-01

## 2017-05-23 RX ORDER — VENLAFAXINE HYDROCHLORIDE 150 MG/1
150 CAPSULE, EXTENDED RELEASE ORAL DAILY
Qty: 30 CAPSULE | Refills: 3 | Status: SHIPPED | OUTPATIENT
Start: 2017-05-23 | End: 2017-05-23

## 2017-05-23 NOTE — TELEPHONE ENCOUNTER
effexor      Last Written Prescription Date:   Last Fill Quantity: , # refills:   Last Office Visit with [facility] primary care provider:  5/9/17        Last PHQ-9 score on record=   PHQ-9 SCORE 5/9/2017   Total Score 8     Routing refill request to provider for review/approval because:  Drug not active on patient's medication list  Medication is reported/historical        Ibuprofen      Last Written Prescription Date: 2-28-17  Last Quantity: 90, # refills: 1  Last Office Visit with [facility], [facility] or [facility] prescribing provider: 5-9-17       Creatinine   Date Value Ref Range Status   03/28/2017 0.79 0.52 - 1.04 mg/dL Final     Lab Results   Component Value Date    AST 13 03/28/2017     Lab Results   Component Value Date    ALT 18 03/28/2017     BP Readings from Last 3 Encounters:   05/09/17 132/64   04/17/17 148/77   03/08/17 145/54

## 2017-05-24 RX ORDER — VENLAFAXINE HYDROCHLORIDE 150 MG/1
CAPSULE, EXTENDED RELEASE ORAL
Qty: 90 CAPSULE | Refills: 3 | Status: SHIPPED | OUTPATIENT
Start: 2017-05-24 | End: 2017-07-18 | Stop reason: DRUGHIGH

## 2017-05-24 NOTE — TELEPHONE ENCOUNTER
Agreed.  Though patient wants 90 days.  rx sent.    Gianni Bowen, RN, BSN      Pending Prescriptions:                       Disp   Refills    venlafaxine (EFFEXOR-XR) 150 MG 24 hr caps*90 cap*3        Sig: TAKE 1 CAPSULE(150 MG) BY MOUTH DAILY

## 2017-06-22 ENCOUNTER — OFFICE VISIT (OUTPATIENT)
Dept: OTOLARYNGOLOGY | Facility: CLINIC | Age: 77
End: 2017-06-22

## 2017-06-22 ENCOUNTER — OFFICE VISIT (OUTPATIENT)
Dept: OPHTHALMOLOGY | Facility: CLINIC | Age: 77
End: 2017-06-22
Attending: OPHTHALMOLOGY
Payer: MEDICARE

## 2017-06-22 VITALS — BODY MASS INDEX: 32.1 KG/M2 | WEIGHT: 187 LBS

## 2017-06-22 DIAGNOSIS — C43.31 MALIGNANT MELANOMA OF NOSE (H): ICD-10-CM

## 2017-06-22 DIAGNOSIS — H46.9 OPTIC NEUROPATHY: ICD-10-CM

## 2017-06-22 DIAGNOSIS — H53.10 SUBJECTIVE VISUAL DISTURBANCE: ICD-10-CM

## 2017-06-22 DIAGNOSIS — R13.10 DYSPHAGIA, UNSPECIFIED TYPE: Primary | ICD-10-CM

## 2017-06-22 PROCEDURE — 99212 OFFICE O/P EST SF 10 MIN: CPT | Mod: ZF

## 2017-06-22 PROCEDURE — 92083 EXTENDED VISUAL FIELD XM: CPT | Mod: ZF | Performed by: OPHTHALMOLOGY

## 2017-06-22 ASSESSMENT — SLIT LAMP EXAM - LIDS
COMMENTS: NORMAL
COMMENTS: NORMAL

## 2017-06-22 ASSESSMENT — REFRACTION_WEARINGRX
SPECS_TYPE: BIFOCAL
OS_SPHERE: -2.25
OD_AXIS: 025
OD_ADD: +2.75
OD_CYLINDER: +0.50
OS_AXIS: 180
OD_SPHERE: -2.50
OS_CYLINDER: +1.00
OS_ADD: +2.75

## 2017-06-22 ASSESSMENT — EXTERNAL EXAM - LEFT EYE: OS_EXAM: NORMAL

## 2017-06-22 ASSESSMENT — VISUAL ACUITY
OD_CC+: +2
CORRECTION_TYPE: GLASSES
METHOD: SNELLEN - LINEAR
OD_CC: 20/30
OS_CC: LP

## 2017-06-22 ASSESSMENT — PAIN SCALES - GENERAL: PAINLEVEL: NO PAIN (0)

## 2017-06-22 ASSESSMENT — TONOMETRY
OS_IOP_MMHG: 18
IOP_METHOD: TONOPEN
OD_IOP_MMHG: 15

## 2017-06-22 ASSESSMENT — EXTERNAL EXAM - RIGHT EYE: OD_EXAM: NORMAL

## 2017-06-22 ASSESSMENT — CUP TO DISC RATIO
OD_RATIO: 0.4
OS_RATIO: 0.6

## 2017-06-22 ASSESSMENT — CONF VISUAL FIELD
OS_INFERIOR_TEMPORAL_RESTRICTION: 1
OS_SUPERIOR_TEMPORAL_RESTRICTION: 1
OS_INFERIOR_NASAL_RESTRICTION: 1
OS_SUPERIOR_NASAL_RESTRICTION: 1

## 2017-06-22 NOTE — NURSING NOTE
Chief Complaints and History of Present Illnesses   Patient presents with     Follow Up For     8 week follow up optic neuropathy     HPI    Affected eye(s):  Both   Symptoms:     Blurred vision (Comment: RE)   No floaters   No flashes   No photophobia      Duration:  8 weeks      Do you have eye pain now?:  No      Comments:  8 week follow up  RE mattering sticky, watering, feels like have cold in eye  Prednisone last taken end of April  Refresh qd BE  Eliz Neff COA 1:13 PM June 22, 2017

## 2017-06-22 NOTE — PATIENT INSTRUCTIONS
1.  You were seen in the ENT Clinic today by Dr. Whitaker.  If you have questions or concerns after your appointment please call, [phone].  Press option #1 for scheduling related needs.  Press option #3 for Nurse advice.  2.  Plan is to make a referral to speech therapy.  You will be called with this appointment information.  3. RN Care Coordinator is Tatyana, [phone]  4.  Please return to clinic in 6 months for another assessment.  Call sooner with needs or concerns.

## 2017-06-22 NOTE — MR AVS SNAPSHOT
After Visit Summary   6/22/2017    Gabino Jones    MRN: [id number]           Patient Information     Date Of Birth          1940        Visit Information        Provider Department      6/22/2017 2:20 PM Yolanda Whitaker MD [facility] Nose [facility] Th[facility]        Today's Diagnoses     Dysphagia    -  1      Care Instructions    1.  You were seen in the ENT Clinic today by Dr. Whitaker.  If you have questions or concerns after your appointment please call, [phone].  Press option #1 for scheduling related needs.  Press option #3 for Nurse advice.  2.  Plan is to make a referral to speech therapy.  You will be called with this appointment information.  3. RN Care Coordinator is Tatyana, [phone]  4.  Please return to clinic in 6 months for another assessment.  Call sooner with needs or concerns.              Follow-ups after your visit        Additional Services     SPEECH THERAPY REFERRAL       *This therapy referral will be filtered to a centralized scheduling office at [facility] and the patient will receive a call to schedule an appointment at a [facility] location most convenient for them. *     [facility] provides Speech Therapy evaluation and treatment and many specialty services across the [place] system.  If requesting a specialty program, please choose from the list below.  If you have not heard from the scheduling office within 2 business days, please call [phone] for all locations, with the exception of [place], please call [phone].       Treatment: Evaluation & Treatment  Speech Treatment Diagnosis: Dysphagia  Special Instructions: Selena manzanares  Special Programs: Clinical Swallow Study    Please be aware that coverage of these services is subject to the terms and limitations of your health insurance plan.  Call member services at your health plan with any benefit or coverage questions.      **Note  "to Provider:  If you are referring outside of [facility] for the therapy appointment, please list the name of the location in the \"special instructions\" above, print the referral and give to the patient to schedule the appointment.                  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      Your next 10 appointments already scheduled     Jul 07, 2017   Procedure with Sathya Norman MD   [facility] (--)    [address]  [place] [address]   [phone]            Aug 28, 2017  1:00 PM CDT   RETURN NEURO with Matt Quiroga MD   [facility] ([facility])    [address]  [address]  [address] [address]  [facility] [id number] [phone]            Aug 30, 2017 10:20 AM CDT   (Arrive by 10:05 AM)   CT CHEST/ABDOMEN/PELVIS W CONTRAST with [id number]   [facility] CT ([facility])    [address]  1st Floor  [facility] [id number] [phone]           Please bring any scans or X-rays taken at other hospitals, if similar tests were done. Also bring a list of your medicines, including vitamins, minerals and over-the-counter drugs. It is safest to leave personal items at home.  Be sure to tell your doctor:   If you have any allergies.   If there s any chance you are pregnant.   If you are breastfeeding.   If you have any special needs.  You may have contrast for this exam. To prepare:   Do not eat or drink for 2 hours before your exam. If you need to take medicine, you may take it with small sips of water. (We may ask you to take liquid medicine as well.)   The day before your exam, drink extra fluids at least six 8-ounce glasses (unless your doctor tells you to restrict your fluids).  Patients over 70 or patients with diabetes or kidney problems:   If you haven t had a blood test (creatinine test) within the last 30 days, go to your clinic " or Diagnostic Imaging Department for this test.  If you have diabetes:   If your kidney function is normal, continue taking your metformin (Avandamet, Glucophage, Glucovance, Metaglip) on the day of your exam.   If your kidney function is abnormal, wait 48 hours before restarting this medicine.  You will have oral contrast for this exam:   You will drink the contrast at home. Get this from your clinic or Diagnostic Imaging Department. Please follow the directions given.  Please wear loose clothing, such as a sweat suit or jogging clothes. Avoid snaps, zippers and other metal. We may ask you to undress and put on a hospital gown.  If you have any questions, please call the Imaging Department where you will have your exam.            Aug 30, 2017 10:45 AM CDT   (Arrive by 10:30 AM)   MR ORBIT FACE NECK W/O & W CONTRAST with [id number]   [facility] ([facility])    [address] [place] [address] [phone]           Take your medicines as usual, unless your doctor tells you not to. Bring a list of your current medicines to your exam (including vitamins, minerals and over-the-counter drugs).  You will be given intravenous contrast for this exam. To prepare:   The day before your exam, drink extra fluids at least six 8-ounce glasses (unless your doctor tells you to restrict your fluids).   Have a blood test (creatinine test) within 30 days of your exam. Go to your clinic or Diagnostic Imaging Department for this test.  The MRI machine uses a strong magnet. Please wear clothes without metal (snaps, zippers). A sweatsuit works well, or we may give you a hospital gown.  Please remove any body piercings and hair extensions before you arrive. You will also remove watches, jewelry, hairpins, wallets, dentures, partial dental plates and hearing aids. You may wear contact lenses, and you may be able to wear your rings. We have a safe place to keep your  personal items, but it is safer to leave them at home.   **IMPORTANT** THE INSTRUCTIONS BELOW ARE ONLY FOR THOSE PATIENTS WHO HAVE BEEN TOLD THEY WILL RECEIVE SEDATION OR GENERAL ANESTHESIA DURING THEIR MRI PROCEDURE:  IF YOU WILL RECEIVE SEDATION (take medicine to help you relax during your exam):   You must get the medicine from your doctor before you arrive. Bring the medicine to the exam. Do not take it at home.   Arrive one hour early. Bring someone who can take you home after the test. Your medicine will make you sleepy. After the exam, you may not drive, take a bus or take a taxi by yourself.   No eating 8 hours before your exam. You may have clear liquids up until 4 hours before your exam. (Clear liquids include water, clear tea, black coffee and fruit juice without pulp.)  IF YOU WILL RECEIVE ANESTHESIA (be asleep for your exam):   Arrive 1 1/2 hours early. Bring someone who can take you home after the test. You may not drive, take a bus or take a taxi by yourself.   No eating 8 hours before your exam. You may have clear liquids up until 4 hours before your exam. (Clear liquids include water, clear tea, black coffee and fruit juice without pulp.)  Please call the Imaging Department at your exam site with any questions.            Aug 30, 2017  1:15 PM CDT   (Arrive by 1:00 PM)   Return Visit with Garett Obregon MD   [facility] ([facility])    [address] [place] [address]  2nd Floor  [facility] [id number] [phone]            Dec 28, 2017  1:20 PM CST   (Arrive by 1:05 PM)   Return Visit with Yolanda Whitaker MD   [facility] ([facility])    [address]  [address] [facility] [address] [phone]              Who to contact     Please call your clinic at [phone] to:    Ask questions about your health    Make or cancel appointments    Discuss your medicines    Learn about your test  results    Speak to your doctor   If you have compliments or concerns about an experience at your clinic, or if you wish to file a complaint, please contact [facility] Patient Relations at [phone] or email us at [email or web address]         Additional Information About Your Visit        MyChart Information     [employer] gives you secure access to your electronic health record. If you see a primary care provider, you can also send messages to your care team and make appointments. If you have questions, please call your primary care clinic.  If you do not have a primary care provider, please call [phone] and they will assist you.      [employer] is an electronic gateway that provides easy, online access to your medical records. With [employer], you can request a clinic appointment, read your test results, renew a prescription or communicate with your care team.     To access your existing account, please contact your [facility] or call [phone] for assistance.        Care EveryWhere ID     This is your Care EveryWhere ID. This could be used by other organizations to access your [facility] medical records  [id number]        Your Vitals Were     BMI (Body Mass Index)                   32.1 kg/m2            Blood Pressure from Last 3 Encounters:   05/09/17 132/64   04/17/17 148/77   03/08/17 145/54    Weight from Last 3 Encounters:   06/22/17 84.8 kg (187 lb)   05/09/17 77.6 kg (171 lb)   04/17/17 85.3 kg (188 lb 1.6 oz)              We Performed the Following     SPEECH THERAPY REFERRAL        Primary Care Provider Office Phone # Fax #    Jeevan Wheeler [phone]       [facility] [address] [place] [address]        Equal Access to Services     GAEL OCONNELL : uyen Torres qaybta kaalmada adeegyada, waxay idiin hayaan adeeg kharash la'aan ah. So [facility]  [phone].    ATENCIÓN: Si demetri lindsay, tiene a velazquez disposición servicios gratuitos de asistencia lingüística. Taisha donovan [phone].    We comply with applicable federal civil rights laws and [place] laws. We do not discriminate on the basis of race, color, national origin, age, disability sex, sexual orientation or gender identity.            Thank you!     Thank you for choosing [facility] EAR NOSE AND THROAT  for your care. Our goal is always to provide you with excellent care. Hearing back from our patients is one way we can continue to improve our services. Please take a few minutes to complete the written survey that you may receive in the mail after your visit with us. Thank you!             Your Updated Medication List - Protect others around you: Learn how to safely use, store and throw away your medicines at www.disposemymeds.org.          This list is accurate as of: 6/22/17  3:42 PM.  Always use your most recent med list.                   Brand Name Dispense Instructions for use Diagnosis    acetaminophen-codeine 300-30 MG per tablet    TYLENOL #3    28 tablet    Take 1 tablet by mouth every 6 hours as needed for moderate pain    Fibromyalgia       ALPRAZOLAM PO      Take 0.25 mg by mouth daily        BUSPAR PO      Take 5 mg by mouth 3 times daily        hydrochlorothiazide 12.5 MG capsule    MICROZIDE    90 capsule    Take 1 capsule (12.5 mg) by mouth daily    Hypertension goal BP (blood pressure) < 140/90       ibuprofen 800 MG tablet    ADVIL/MOTRIN    90 tablet    Take 1 tablet (800 mg) by mouth every 4 hours as needed for moderate pain Reported on 4/17/2017    Myalgia       lisinopril 30 MG tablet    PRINIVIL,ZESTRIL    90 tablet    Take 1 tablet (30 mg) by mouth daily    Hypertension goal BP (blood pressure) < 140/90       pantoprazole 40 MG EC tablet    PROTONIX    90 tablet    Take 1 tablet (40 mg) by mouth daily Take 30-60 minutes before a meal.    Gastroesophageal reflux disease without  esophagitis       pravastatin 40 MG tablet    PRAVACHOL    30 tablet    Take 1 tablet (40 mg) by mouth daily    Mixed hyperlipidemia       ranitidine 150 MG tablet    ZANTAC    180 tablet    TAKE 1 TABLET BY MOUTH TWICE DAILY AS NEEDED    Gastroesophageal reflux disease without esophagitis       senna-docusate 8.6-50 MG per tablet    SENOKOT-S;PERICOLACE    120 tablet    Take 1-2 tablets by mouth 2 times daily    Skull lesion       Simethicone 180 MG Caps     60 capsule    Take 1 capsule by mouth 2 times daily Reported on 5/9/2017    Flatulence, eructation, and gas pain       venlafaxine 150 MG 24 hr capsule    EFFEXOR-XR    90 capsule    TAKE 1 CAPSULE(150 MG) BY MOUTH DAILY    JITENDRA (generalized anxiety disorder)

## 2017-06-22 NOTE — NURSING NOTE
Chief Complaint   Patient presents with     RECHECK     Return F/U Sinonasal melanoma      Pt states no pain today.    N Italo THORNEN

## 2017-06-22 NOTE — PROGRESS NOTES
Assessment & Plan     Gabino Jones is a 77 year old female with the following diagnoses:   1. Subjective visual disturbance    2. Optic neuropathy       She has a left optic neuropathy, longstanding with very poor visual acuity. This has not changed.  Her RIGHT eye has stable visual acuity and visual field.  Unclear if definitive response to steroids.  Will continue to follow and consider repeat MRI.  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           Attending Physician Attestation:  Complete documentation of historical and exam elements from today's encounter can be found in the full encounter summary report (not reduplicated in this progress note).  I personally obtained the chief complaint(s) and history of present illness.  I confirmed and edited as necessary the review of systems, past medical/surgical history, family history, social history, and examination findings as documented by others; and I examined the patient myself.  I personally reviewed the relevant tests, images, and reports as documented above.  I formulated and edited as necessary the assessment and plan and discussed the findings and management plan with the patient and family. - Matt Quiroga MD

## 2017-06-22 NOTE — MR AVS SNAPSHOT
After Visit Summary   6/22/2017    Gabino Jones    MRN: [id number]           Patient Information     Date Of Birth          1940        Visit Information        Provider Department      6/22/2017 1:00 PM [facility]        Today's Diagnoses     Subjective visual disturbance        Optic neuropathy           Follow-ups after your visit        Additional Services     OCCUPATIONAL THERAPY REFERRAL       Low Vision Referral to Judie Marino                  Follow-up notes from your care team     Return in about 3 months (around 9/22/2017) for Vision, color, tension, dilate, GTOP.      Your next 10 appointments already scheduled     Jul 07, 2017   Procedure with Sathya Norman MD   [facility] [place]    [address]  [facility] [id number] [phone]            Aug 30, 2017 10:20 AM CDT   (Arrive by 10:05 AM)   CT CHEST/ABDOMEN/PELVIS W CONTRAST with [id number]   [facility] ([facility])    [address]  1st Floor  [facility] [address] [phone]           Please bring any scans or X-rays taken at other hospitals, if similar tests were done. Also bring a list of your medicines, including vitamins, minerals and over-the-counter drugs. It is safest to leave personal items at home.  Be sure to tell your doctor:   If you have any allergies.   If there s any chance you are pregnant.   If you are breastfeeding.   If you have any special needs.  You may have contrast for this exam. To prepare:   Do not eat or drink for 2 hours before your exam. If you need to take medicine, you may take it with small sips of water. (We may ask you to take liquid medicine as well.)   The day before your exam, drink extra fluids at least six 8-ounce glasses (unless your doctor tells you to restrict your fluids).  Patients over 70 or patients with diabetes or kidney problems:   If you haven t had a blood test  (creatinine test) within the last 30 days, go to your clinic or Diagnostic Imaging Department for this test.  If you have diabetes:   If your kidney function is normal, continue taking your metformin (Avandamet, Glucophage, Glucovance, Metaglip) on the day of your exam.   If your kidney function is abnormal, wait 48 hours before restarting this medicine.  You will have oral contrast for this exam:   You will drink the contrast at home. Get this from your clinic or Diagnostic Imaging Department. Please follow the directions given.  Please wear loose clothing, such as a sweat suit or jogging clothes. Avoid snaps, zippers and other metal. We may ask you to undress and put on a hospital gown.  If you have any questions, please call the Imaging Department where you will have your exam.            Aug 30, 2017 10:45 AM CDT   (Arrive by 10:30 AM)   MR ORBIT FACE NECK W/O & W CONTRAST with [id number]   [facility] ([facility])    [address] [address] [phone]           Take your medicines as usual, unless your doctor tells you not to. Bring a list of your current medicines to your exam (including vitamins, minerals and over-the-counter drugs).  You will be given intravenous contrast for this exam. To prepare:   The day before your exam, drink extra fluids at least six 8-ounce glasses (unless your doctor tells you to restrict your fluids).   Have a blood test (creatinine test) within 30 days of your exam. Go to your clinic or Diagnostic Imaging Department for this test.  The MRI machine uses a strong magnet. Please wear clothes without metal (snaps, zippers). A sweatsuit works well, or we may give you a hospital gown.  Please remove any body piercings and hair extensions before you arrive. You will also remove watches, jewelry, hairpins, wallets, dentures, partial dental plates and hearing aids. You may wear contact lenses, and you may be able  to wear your rings. We have a safe place to keep your personal items, but it is safer to leave them at home.   **IMPORTANT** THE INSTRUCTIONS BELOW ARE ONLY FOR THOSE PATIENTS WHO HAVE BEEN TOLD THEY WILL RECEIVE SEDATION OR GENERAL ANESTHESIA DURING THEIR MRI PROCEDURE:  IF YOU WILL RECEIVE SEDATION (take medicine to help you relax during your exam):   You must get the medicine from your doctor before you arrive. Bring the medicine to the exam. Do not take it at home.   Arrive one hour early. Bring someone who can take you home after the test. Your medicine will make you sleepy. After the exam, you may not drive, take a bus or take a taxi by yourself.   No eating 8 hours before your exam. You may have clear liquids up until 4 hours before your exam. (Clear liquids include water, clear tea, black coffee and fruit juice without pulp.)  IF YOU WILL RECEIVE ANESTHESIA (be asleep for your exam):   Arrive 1 1/2 hours early. Bring someone who can take you home after the test. You may not drive, take a bus or take a taxi by yourself.   No eating 8 hours before your exam. You may have clear liquids up until 4 hours before your exam. (Clear liquids include water, clear tea, black coffee and fruit juice without pulp.)  Please call the Imaging Department at your exam site with any questions.            Aug 30, 2017  1:15 PM CDT   (Arrive by 1:00 PM)   Return Visit with Garett Obregon MD   [facility] ([facility])    [address] [address] [phone]              Who to contact     Please call your clinic at [phone] to:    Ask questions about your health    Make or cancel appointments    Discuss your medicines    Learn about your test results    Speak to your doctor   If you have compliments or concerns about an experience at your clinic, or if you wish to file a complaint, please contact [facility] Patient  Relations at [phone] or email us at [email or web address]         Additional Information About Your Visit        [id number] Information     [employer]ote gives you secure access to your electronic health record. If you see a primary care provider, you can also send messages to your care team and make appointments. If you have questions, please call your primary care clinic.  If you do not have a primary care provider, please call [phone] and they will assist you.      Pikanote is an electronic gateway that provides easy, online access to your medical records. With [employer], you can request a clinic appointment, read your test results, renew a prescription or communicate with your care team.     To access your existing account, please contact your [facility] or call [phone] for assistance.        Care EveryWhere ID     This is your Care EveryWhere ID. This could be used by other organizations to access your [facility] medical records  [id number]         Blood Pressure from Last 3 Encounters:   05/09/17 132/64   04/17/17 148/77   03/08/17 145/54    Weight from Last 3 Encounters:   05/09/17 77.6 kg (171 lb)   04/17/17 85.3 kg (188 lb 1.6 oz)   03/08/17 85.4 kg (188 lb 4.8 oz)              We Performed the Following     Glaucoma Top OU     OCCUPATIONAL THERAPY REFERRAL        Primary Care Provider Office Phone # Fax #    Jeevan Wheeler [phone]       [facility] [address] [place] [address]        Equal Access to Services     GAEL OCONNELL : Hadii aad ku hadasho Soomaali, waaxda luqadaha, qaybta kaalmada adegroveryada, vito cazares hayclif dubon. So [facility] [phone].    ATENCIÓN: Si habla español, tiene a velazquez disposición servicios gratuitos de asistencia lingüística. Llame al [phone].    We comply with applicable federal civil rights laws and [place] laws. We do not discriminate on the basis of race, color, national  origin, age, disability sex, sexual orientation or gender identity.            Thank you!     Thank you for choosing [facility]  for your care. Our goal is always to provide you with excellent care. Hearing back from our patients is one way we can continue to improve our services. Please take a few minutes to complete the written survey that you may receive in the mail after your visit with us. Thank you!             Your Updated Medication List - Protect others around you: Learn how to safely use, store and throw away your medicines at www.disposemymeds.org.          This list is accurate as of: 6/22/17  2:26 PM.  Always use your most recent med list.                   Brand Name Dispense Instructions for use Diagnosis    acetaminophen-codeine 300-30 MG per tablet    TYLENOL #3    28 tablet    Take 1 tablet by mouth every 6 hours as needed for moderate pain    Fibromyalgia       ALPRAZOLAM PO      Take 0.25 mg by mouth daily        BUSPAR PO      Take 5 mg by mouth 3 times daily        hydrochlorothiazide 12.5 MG capsule    MICROZIDE    90 capsule    Take 1 capsule (12.5 mg) by mouth daily    Hypertension goal BP (blood pressure) < 140/90       ibuprofen 800 MG tablet    ADVIL/MOTRIN    90 tablet    Take 1 tablet (800 mg) by mouth every 4 hours as needed for moderate pain Reported on 4/17/2017    Myalgia       lisinopril 30 MG tablet    PRINIVIL,ZESTRIL    90 tablet    Take 1 tablet (30 mg) by mouth daily    Hypertension goal BP (blood pressure) < 140/90       pantoprazole 40 MG EC tablet    PROTONIX    90 tablet    Take 1 tablet (40 mg) by mouth daily Take 30-60 minutes before a meal.    Gastroesophageal reflux disease without esophagitis       pravastatin 40 MG tablet    PRAVACHOL    30 tablet    Take 1 tablet (40 mg) by mouth daily    Mixed hyperlipidemia       predniSONE 10 MG tablet    DELTASONE    28 tablet    Take 2 tablets (20 mg) by mouth daily    Optic neuropathy       ranitidine 150 MG tablet    ZANTAC     180 tablet    TAKE 1 TABLET BY MOUTH TWICE DAILY AS NEEDED    Gastroesophageal reflux disease without esophagitis       senna-docusate 8.6-50 MG per tablet    SENOKOT-S;PERICOLACE    120 tablet    Take 1-2 tablets by mouth 2 times daily    Skull lesion       Simethicone 180 MG Caps     60 capsule    Take 1 capsule by mouth 2 times daily Reported on 5/9/2017    Flatulence, eructation, and gas pain       venlafaxine 150 MG 24 hr capsule    EFFEXOR-XR    90 capsule    TAKE 1 CAPSULE(150 MG) BY MOUTH DAILY    JITENDRA (generalized anxiety disorder)

## 2017-06-22 NOTE — PROGRESS NOTES
DIAGNOSIS:  Right-sided sinonasal mucosal malignant melanoma.      TREATMENT:  The patient underwent endoscopic resection on 07/23/2014 and then received postoperative radiation therapy that finished on 09/11/2014.      HISTORY OF PRESENT ILLNESS:  Ms. 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  The patient is doing really well.  Today, she reports no new symptoms.  She is pain-free.  No nasal obstruction, no epistaxis.  She says she is using saline solution irrigations on a regular basis.  Her swallowing actually is stable.  She is telling me that she is eating pretty much everything.  She is not losing any weight.  She just saw Dr. Quiroga from Ophthalmology who reports the vision to be stable.      PHYSICAL EXAMINATION:    Constitutional: The patient was well-groomed and in no acute distress.    Skin: Warm and pink.    Neurologic: Alert and oriented x3. Cranial nerves III-XII within normal limits. Voice quality is normal.    Psychiatric: The patient's affect was calm, cooperative and appropriate.    Respiratory: Breathing comfortably without stridor or exertion of accessory muscles.    Eyes: Pupils were equal and reactive. Extraocular movements are intact.    Head: Normocephalic and atraumatic. On examination of the scalp, I spotted a lesion on the right temporoparietal region.  This is an approximately 3-4 mm dark black flat lesion without any concerning findings on examination.  In talking to her , he is telling me that this lesion has been there for many years with no change.   Ears: External auditory canals and tympanic membranes were clear.    Nose: Sinuses were nontender. Anterior rhinoscopy revealed midline septum and absence of purulence or polyps.    Oral cavity/Oropharynx: Normal tongue, floor of mouth, buccal mucosa and palate. No lesions or masses on inspection or palpation. No abnormal lymph tissue in the oropharynx.    Neck: The  parotids are soft without masses. Supple with normal laryngeal and tracheal landmarks.    Lymphatic: There is no palpable lymphadenopathy or other masses in the neck or parotids.       PROCEDURE: Nasal endoscopy: The risks and benefits of the procedure were discussed and written consent was obtained. A timeout was performed. The patient's nose was sprayed with lidocaine and Afrin, and a 0-degree nasal endoscope was used to gain access into the nasal cavity on the left side.  The septum is in the midline.  Inferior and middle turbinate are normal.  The nasopharynx is normal.  On the right side, the septum is in the midline.  There is a large maxillary antrostomy.  There is a large sphenoidotomy and anteroposterior ethmoidectomy.  The middle turbinate is absent.  The sinonasal cavity is totally mucosalized.  No evidence of concerning lesions on today's examination.      ASSESSMENT AND PLAN:  This is a 77-year-old female with right-sided sinonasal malignant melanoma, status post surgery followed by radiation therapy, doing well.  We are going to see her back in six months.

## 2017-06-22 NOTE — LETTER
6/22/2017       RE: Gabino Jones  [address] [facility] [address]     Dear Colleague,    Thank you for referring your patient, Gabino Jones, to the [facility] at [facility]. Please see a copy of my visit note below.    DIAGNOSIS:  Right-sided sinonasal mucosal malignant melanoma.      TREATMENT:  The patient underwent endoscopic resection on 07/23/2014 and then received postoperative radiation therapy that finished on 09/11/2014.      HISTORY OF PRESENT ILLNESS:  Ms. 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  The patient is doing really well.  Today, she reports no new symptoms.  She is pain-free.  No nasal obstruction, no epistaxis.  She says she is using saline solution irrigations on a regular basis.  Her swallowing actually is stable.  She is telling me that she is eating pretty much everything.  She is not losing any weight.  She just saw Dr. Quiroga from Ophthalmology who reports the vision to be stable.      PHYSICAL EXAMINATION:    Constitutional: The patient was well-groomed and in no acute distress.    Skin: Warm and pink.    Neurologic: Alert and oriented x3. Cranial nerves III-XII within normal limits. Voice quality is normal.    Psychiatric: The patient's affect was calm, cooperative and appropriate.    Respiratory: Breathing comfortably without stridor or exertion of accessory muscles.    Eyes: Pupils were equal and reactive. Extraocular movements are intact.    Head: Normocephalic and atraumatic. On examination of the scalp, I spotted a lesion on the right temporoparietal region.  This is an approximately 3-4 mm dark black flat lesion without any concerning findings on examination.  In talking to her , he is telling me that this lesion has been there for many years with no change.   Ears: External auditory canals and tympanic membranes were clear.    Nose:  Sinuses were nontender. Anterior rhinoscopy revealed midline septum and absence of purulence or polyps.    Oral cavity/Oropharynx: Normal tongue, floor of mouth, buccal mucosa and palate. No lesions or masses on inspection or palpation. No abnormal lymph tissue in the oropharynx.    Neck: The parotids are soft without masses. Supple with normal laryngeal and tracheal landmarks.    Lymphatic: There is no palpable lymphadenopathy or other masses in the neck or parotids.       PROCEDURE: Nasal endoscopy: The risks and benefits of the procedure were discussed and written consent was obtained. A timeout was performed. The patient's nose was sprayed with lidocaine and Afrin, and a 0-degree nasal endoscope was used to gain access into the nasal cavity on the left side.  The septum is in the midline.  Inferior and middle turbinate are normal.  The nasopharynx is normal.  On the right side, the septum is in the midline.  There is a large maxillary antrostomy.  There is a large sphenoidotomy and anteroposterior ethmoidectomy.  The middle turbinate is absent.  The sinonasal cavity is totally mucosalized.  No evidence of concerning lesions on today's examination.      ASSESSMENT AND PLAN:  This is a 77-year-old female with right-sided sinonasal malignant melanoma, status post surgery followed by radiation therapy, doing well.  We are going to see her back in six months.         Again, thank you for allowing me to participate in the care of your patient.      Sincerely,    Yolanda Whitaker MD

## 2017-07-03 ENCOUNTER — TELEPHONE (OUTPATIENT)
Dept: SPEECH THERAPY | Facility: CLINIC | Age: 77
End: 2017-07-03

## 2017-07-03 NOTE — TELEPHONE ENCOUNTER
Rehab Services Scheduling Attempt    This patient has been contacted by [facility] Central Scheduling department in attempt to schedule a Speech Therapy evaluation or treatment session.    Either:  1) Two messages were left  2) Letter was sent if patient does not have voicemail box  3) Patient refused/declined services    If medical provider or patient wish to pursue Speech Therapy evaluation/treatment scheduling still, you may contact Rehab Central Scheduling at [phone].

## 2017-07-06 PROBLEM — E78.2 MIXED HYPERLIPIDEMIA: Status: ACTIVE | Noted: 2017-07-06

## 2017-07-07 ENCOUNTER — SURGERY (OUTPATIENT)
Age: 77
End: 2017-07-07
Payer: MEDICARE

## 2017-07-07 ENCOUNTER — HOSPITAL ENCOUNTER (OUTPATIENT)
Facility: AMBULATORY SURGERY CENTER | Age: 77
Discharge: HOME | End: 2017-07-07
Attending: INTERNAL MEDICINE | Admitting: INTERNAL MEDICINE
Payer: MEDICARE

## 2017-07-07 VITALS
HEIGHT: 64 IN | BODY MASS INDEX: 31.92 KG/M2 | SYSTOLIC BLOOD PRESSURE: 143 MMHG | DIASTOLIC BLOOD PRESSURE: 59 MMHG | TEMPERATURE: 97.1 F | WEIGHT: 187 LBS | OXYGEN SATURATION: 100 % | RESPIRATION RATE: 16 BRPM

## 2017-07-07 LAB
COLONOSCOPY: NORMAL
UPPER GI ENDOSCOPY: NORMAL

## 2017-07-07 PROCEDURE — G8907 PT DOC NO EVENTS ON DISCHARG: HCPCS

## 2017-07-07 PROCEDURE — 43239 EGD BIOPSY SINGLE/MULTIPLE: CPT

## 2017-07-07 PROCEDURE — 45380 COLONOSCOPY AND BIOPSY: CPT | Mod: XS

## 2017-07-07 PROCEDURE — 45385 COLONOSCOPY W/LESION REMOVAL: CPT

## 2017-07-07 PROCEDURE — G0500 MOD SEDAT ENDO SERVICE >5YRS: HCPCS | Performed by: INTERNAL MEDICINE

## 2017-07-07 PROCEDURE — 45385 COLONOSCOPY W/LESION REMOVAL: CPT | Performed by: INTERNAL MEDICINE

## 2017-07-07 PROCEDURE — 43239 EGD BIOPSY SINGLE/MULTIPLE: CPT | Mod: 51 | Performed by: INTERNAL MEDICINE

## 2017-07-07 PROCEDURE — 45381 COLONOSCOPY SUBMUCOUS NJX: CPT | Performed by: INTERNAL MEDICINE

## 2017-07-07 PROCEDURE — G8918 PT W/O PREOP ORDER IV AB PRO: HCPCS

## 2017-07-07 PROCEDURE — 45381 COLONOSCOPY SUBMUCOUS NJX: CPT

## 2017-07-07 RX ORDER — LIDOCAINE 40 MG/G
CREAM TOPICAL
Status: DISCONTINUED | OUTPATIENT
Start: 2017-07-07 | End: 2017-07-08 | Stop reason: HOSPADM

## 2017-07-07 RX ORDER — ONDANSETRON 2 MG/ML
4 INJECTION INTRAMUSCULAR; INTRAVENOUS
Status: DISCONTINUED | OUTPATIENT
Start: 2017-07-07 | End: 2017-07-08 | Stop reason: HOSPADM

## 2017-07-07 RX ORDER — FENTANYL CITRATE 50 UG/ML
INJECTION, SOLUTION INTRAMUSCULAR; INTRAVENOUS PRN
Status: DISCONTINUED | OUTPATIENT
Start: 2017-07-07 | End: 2017-07-07 | Stop reason: HOSPADM

## 2017-07-07 RX ADMIN — FENTANYL CITRATE 50 MCG: 50 INJECTION, SOLUTION INTRAMUSCULAR; INTRAVENOUS at 10:43

## 2017-07-07 RX ADMIN — FENTANYL CITRATE 50 MCG: 50 INJECTION, SOLUTION INTRAMUSCULAR; INTRAVENOUS at 10:35

## 2017-07-07 RX ADMIN — FENTANYL CITRATE 50 MCG: 50 INJECTION, SOLUTION INTRAMUSCULAR; INTRAVENOUS at 10:18

## 2017-07-07 RX ADMIN — FENTANYL CITRATE 50 MCG: 50 INJECTION, SOLUTION INTRAMUSCULAR; INTRAVENOUS at 10:21

## 2017-07-11 LAB — COPATH REPORT: NORMAL

## 2017-07-18 ENCOUNTER — TELEPHONE (OUTPATIENT)
Dept: FAMILY MEDICINE | Facility: CLINIC | Age: 77
End: 2017-07-18

## 2017-07-18 DIAGNOSIS — F41.1 GAD (GENERALIZED ANXIETY DISORDER): ICD-10-CM

## 2017-07-18 RX ORDER — VENLAFAXINE HYDROCHLORIDE 150 MG/1
300 CAPSULE, EXTENDED RELEASE ORAL DAILY
Qty: 180 CAPSULE | Status: CANCELLED | OUTPATIENT
Start: 2017-07-18

## 2017-07-18 RX ORDER — VENLAFAXINE HYDROCHLORIDE 75 MG/1
225 CAPSULE, EXTENDED RELEASE ORAL DAILY
Qty: 270 CAPSULE | Refills: 1 | Status: SHIPPED | OUTPATIENT
Start: 2017-07-18 | End: 2018-03-02

## 2017-07-18 NOTE — TELEPHONE ENCOUNTER
Reason for call:  Medication  Reason for Call:  Medication or medication refill:    Do you use a [facility]?  Name of the pharmacy and phone number for the current request:  [facility] - [phone]    Name of the medication requested: anxiety medication.  Pharmacy will not refill it because she is requesting it to be filled to soon they said.  She states that she has been taking more than instructed by Dr. Boogie, but she states she needs to because she is very depressed.      Other request:     Can we leave a detailed message on this number? YES    Phone number patient can be reached at: Home number on file [phone] (home)    Best Time: asap    Call taken on 7/18/2017 at 3:05 PM by Bella Faye

## 2017-07-18 NOTE — TELEPHONE ENCOUNTER
Left message  Spoke with patient.  States that mental health provider : Lita that has been seeing her had told her to go up to 300 mg in the morning.  Has been taking it this way for 6 months.  Has been helping. She has been out for 3 days and she's starting to notices more mood changes.  Would like new script for the 300 mg daily of the Effexor XR.    Advised SF may want to see her or have a phone visit.    rx pending.  Flag for provider to review.      Gianni Bowen, RN, BSN

## 2017-07-18 NOTE — TELEPHONE ENCOUNTER
Maximum dose of venlafaxine XR is 225 mg daily. I have sent a prescription for this.     Please assist in scheduling an office visit for follow up in the next 2-3 weeks.

## 2017-07-20 ENCOUNTER — CARE COORDINATION (OUTPATIENT)
Dept: GASTROENTEROLOGY | Facility: CLINIC | Age: 77
End: 2017-07-20

## 2017-07-20 DIAGNOSIS — D12.6 BENIGN NEOPLASM OF COLON: Primary | ICD-10-CM

## 2017-07-20 NOTE — PROGRESS NOTES
[facility] Gastroenterology Procedure Referral       Referring provider: Dr. Emerson GOMEZ [facility]     Referred to: [facility] - Specific provider - Dr. Juan Reid     Referral Received: 07/20/17    Records received: 07/20/17    Images received: BARBER GANDARA review date: 07/20/17    Requested procedure: Colonoscopy with possible EMR vs ESD.     Evaluation for: Endoscopic resection of colon polyp.       Clinical History (per RN review of records provided): ***      Recommendations/Orders: ***       Referral updates/Patient contacted:

## 2017-07-20 NOTE — LETTER
July 24, 2017       TO: Gabino Jones  [address] [place] [address]         Dear Gabino,    PROCEDURE PACKET            Your Colonoscopy is scheduled:    Date: October 4, 2017  ________________________________    Time: 7:30 a.m.  ________________________________    Please arrive at:  5:30 a.m.  ________________________________    Provider's Name: Dr. Juan Reid   ________________________________        Pre-Op Physical Fax Numbers:             [facility] Pre-Admissions      [address]      Fax: [phone]      Phone: [phone]        Your procedure is located at:      [address] [address]      [phone]      [email or web address]       Before Your Procedure  For Patients and Visitors at [place]    Welcome  As you get ready for procedure, you may have a lot of questions.  This brochure will help you know what to expect before and after your procedure.  You and your family are the most important members of your health care team.  You will need to take an active role in your care.    Be sure to ask questions and learn all that you can about your procedure.  If you have any safety concerns, we urge you to tell a nurse as soon as possible.   This brochure is for information only.  It does not replace the advice of your doctor.  Always follow your doctor's advice.    Please tell us if you need a .    GETTING READY FOR THE PROCEDURE  Always follow your provider's instructions.  If you don't, your procedure could be canceled.  Please use the following checklist.    Within 30 Days of Procedure:    Have a pre-procedure physical exam with your family doctor or partner.    If you use a [facility], all of your information from the pre-op physical will be in the Epic computer system.     Failure to complete the pre-operative history and  physical will results in cancellation and rescheduling the procedure.     Ask the doctor to send all of your results to the hospital before the procedure.  The doctor also may ask you to bring the results with you on the day of procedure or you can fax them to [phone].   PROCEDURE PREP INSTRUCTIONS:  YOU WILL NEED A RIDE HOME FROM THE PROCEDURE.    Please make all transportation arrangements prior to arriving for your procedure.    Public transportation is not allowed unless you have a responsible adult with you at all times.    If you don't have a ride home, the procedure will be canceled.    If you are taking blood-thinning medications: Such as Aspirin, Coumadin (warfarin), Persantine, Plavix, Lovenox or Heparin, talk to your doctor about stopping them. These medications need to be held for 5 days before your procedure.    An absolutely clean colon is necessary for a successful examination. Please follow these instructions carefully.    Avoid fruits and vegetables with peels or leafy greens, including salad, as well as seeds and nuts for 2 - 3 days before the examination.    DAY BEFORE THE EXAMINATION:      NO SOLID FOOD CAN BE EATEN THE DAY BEFORE THE EXAM.    1. Mix preparation drink according to instructions and refrigerate.     2. Clear liquids are permitted: some examples include: clear broth, apple juice, vegetable/beef/chicken broth, ginger ale, or Jello-O. Do not drink any red, purple colored beverages as this can affect your exam. You should drink at least six 8 ounce glasses of clear liquids.         Evening before exam:    1. Begin drinking the preparation drink at 4:00 PM. Drink a large glass (about 8 ounces) every 15 minutes until half the container is empty, (about 8 or 9 glasses). Is is best to drink the whole glass rapidly rather than sipping small amounts continuously.     2. Feelings of bloating and/or nausea and chills are common after the first few glasses of the preparation drink because  of the large volume of fluid ingested. This is temporary, however, and will disappear once bowel movements begin. Bowel movements usually occur within 4 hours after the first glass of the drink. They will continue periodically for approximately 2-4 hours after you finish drinking the last glass. By this time the stool liquid should be clear.       3. Between 8:00 PM - 10:00 PM finish the remaining half of the container. It is best to drink the whole glass rapidly rather than sipping small amounts continuously.     4. You may have clear liquids in the morning, but NOTHING, INCLUDING WATER, CAN BE TAKEN BY MOUTH 8 HOURS PRIOR TO THE EXAMINATION.       Please contact Ellie WINTERS, Care Coordinator, as soon as possible if you are unable to fully complete your bowel preparation or if your stools fail to turn clear; your procedure may need to be delayed and an alternative bowel preparation regimen may need to be utilized in order to provide you the best chance for a successful exam.       Sincerely,    Ellie Case RN

## 2017-07-21 NOTE — PROGRESS NOTES
[facility] Procedure Referral       Referring provider: Dr. Norman [place]     Referred to: [facility] - Specific provider - Dr. Juan Reid     Referral Received: 07/20/17    Records received: 07/20/17    Images received: BARBER GANDARA review date: 07/20/17    Requested procedure: Colonoscopy with possible EMR vs ESD.     Evaluation for: Endoscopic resection of colon polyp.       Clinical History (per RN review of records provided):     - Patient evaluated by [facility] for further evaluation of heart burn and abdominal pain. Recommended to have Colonoscopy and EGD.     - EGD/Colonoscopy completed on 7/7/17 with Dr. Norman. Reports available in EPIC.     Impression from colonoscopy:   - One non-bleeding angioectasia in the ascending colon. No intervention performed.   - One 4 mm polyp in the ascending colon, removed with a cold biopsy forceps. Resected and retrieved.   - One 18 mm polyp in the ascending colon, removed using injection-lift and a hot snare. Resected and retrieved. Clips were placed.   - One 22 mm polyp in the proximal transverse colon, removed using injection-lift and a hot snare.  Resected and retrieved. Clips were placed.   - One 12 mm polyp in the descending colon, removed with a hot snare. Resected and retrieved.   - One 35-40 mm polyp in the distal transverse colon. Resection not attempted. Tattooed.  - Sigmoid diverticula   - The examination was otherwise normal on direct and retroflexion views.     Recommendations/Orders:     - Verbal orders received from Dr. Juan Reid for patient to be scheduled for colonoscopy with possible ESD in the OR. First available appointment ok.        Referral updates/Patient contacted:      to contact pt and confirm pharmacy and inform pt of need for pre op H&P.     07/26/2017 9:14 AM - prep medication sent to patients requested pharmacy.

## 2017-07-24 NOTE — PROGRESS NOTES
"  SUBJECTIVE:                                                    Gabino Jones is a 77 year old female who presents to clinic today for the following health issues:      Hypertension Follow-up      Outpatient blood pressures are not being checked.    Low Salt Diet: low salt    Anxiety Follow-Up    Status since last visit: \"anxiety is good.\"    Other associated symptoms:None    Complicating factors:   Significant life event: No   Current substance abuse: None  Depression symptoms: No    Has not had any xanax for the last month. Would like to have some on hand to help as needed.    JITENDRA-7 SCORE 5/9/2017 8/1/2017   Total Score -  (minimal anxiety)   Total Score 8 -       GAD7      GERD/GASSINESS- a little better with the simethicone. She is wondering what else may help. Had upper endoscopy and colonoscopy. Upper endoscopy did not reveal any issues. Colonoscopy showed multiple large polyps which GI is managing.     FIBROMYALGIA- has been on T#3. She is no longer taking \"it doesn't do anything for me\". Reports she had been on this for many years. This was the only thing she has tried. No amitriptyline or Lyrica. She is wanting something to help with pain.     HYPOKALEMIA- due for recheck of labs.       Amount of exercise or physical activity: 1 day/week for an average of 15-30 minutes    Problems taking medications regularly: No    Medication side effects: none  Diet: regular (no restrictions) and low salt          Problem list and histories reviewed & adjusted, as indicated.  Additional history: as documented    BP Readings from Last 3 Encounters:   08/01/17 120/70   07/07/17 143/59   05/09/17 132/64    Wt Readings from Last 3 Encounters:   08/01/17 181 lb (82.1 kg)   07/06/17 187 lb (84.8 kg)   06/22/17 187 lb (84.8 kg)                      Reviewed and updated as needed this visit by clinical staffTobacco  Allergies  Med Hx  Surg Hx  Fam Hx  Soc Hx      Reviewed and updated as needed this visit by Provider     " "    ROS:  C: NEGATIVE for fever, chills, change in weight  E/M: NEGATIVE for ear, mouth and throat problems  R: NEGATIVE for significant cough or SOB  CV: NEGATIVE for chest pain, palpitations or peripheral edema  GI: as above.   MUSCULOSKELETAL:as above.     OBJECTIVE:     /70  Pulse 88  Temp 98.4  F (36.9  C) (Temporal)  Ht 5' 4\" (1.626 m)  Wt 181 lb (82.1 kg)  BMI 31.07 kg/m2  Body mass index is 31.07 kg/(m^2).  GENERAL: alert and no distress  NECK: no adenopathy, no asymmetry, masses, or scars and thyroid normal to palpation  RESP: lungs clear to auscultation - no rales, rhonchi or wheezes  CV: regular rate and rhythm, normal S1 S2, no S3 or S4, systolic murmur heard.   ABDOMEN: soft, nontender, without hepatosplenomegaly or masses and bowel sounds normal  MS: no gross musculoskeletal defects noted, no edema    Diagnostic Test Results:  pending    ASSESSMENT/PLAN:       1. Mixed hyperlipidemia  Awaiting results. Dose adjustment as needed.    - Lipid panel reflex to direct LDL    2. Myalgia  Patient with myalgias.   We discussed that narcotics are not appropriate for the treatment/control of fibromyalgia.   Recommend a trial of alternative. Consider Lyrica. She was concerned about price.   Recommend amitriptyline at bedtime. Discussed side effects.   Recheck in one month. Sooner if needed.   - ibuprofen (ADVIL/MOTRIN) 800 MG tablet; Take 1 tablet (800 mg) by mouth every 4 hours as needed for moderate pain Reported on 4/17/2017  Dispense: 90 tablet; Refill: 1  - amitriptyline (ELAVIL) 10 MG tablet; Take 1 tablet (10 mg) by mouth At Bedtime  Dispense: 30 tablet; Refill: 3    3. Flatulence, eructation, and gas pain  Discussed. Has had some improvement with simethicone.   Discussed trial of probiotics. She will try that.   Recheck in one month.   - Simethicone 180 MG CAPS; Take 1 capsule by mouth 2 times daily Reported on 5/9/2017  Dispense: 60 capsule; Refill: 6    4. Gastroesophageal reflux disease " without esophagitis  Continue present care.   - ranitidine (ZANTAC) 150 MG tablet; TAKE 1 TABLET BY MOUTH TWICE DAILY AS NEEDED  Dispense: 180 tablet; Refill: 1    5. Hypokalemia  Refills given.   Will notify of results.   - potassium chloride (MICRO-K) 10 MEQ CR capsule; TK 2 CS PO D  Dispense: 90 capsule; Refill: 1    6. JITENDRA (generalized anxiety disorder)  Small supply of xanax given. Discussed use.   - ALPRAZolam (XANAX) 0.25 MG tablet; Take 1 tablet (0.25 mg) by mouth 3 times daily as needed for anxiety  Dispense: 20 tablet; Refill: 1    7. Encounter for long-term (current) use of medications  Will notify of results.   - Comprehensive metabolic panel    8. Aortic valve stenosis, unspecified etiology  Continue follow up with cardiology          MIMI RAMIREZ MD, MD  [facility]

## 2017-08-01 ENCOUNTER — OFFICE VISIT (OUTPATIENT)
Dept: FAMILY MEDICINE | Facility: CLINIC | Age: 77
End: 2017-08-01
Payer: MEDICARE

## 2017-08-01 VITALS
WEIGHT: 181 LBS | HEART RATE: 88 BPM | SYSTOLIC BLOOD PRESSURE: 120 MMHG | DIASTOLIC BLOOD PRESSURE: 70 MMHG | HEIGHT: 64 IN | BODY MASS INDEX: 30.9 KG/M2 | TEMPERATURE: 98.4 F

## 2017-08-01 DIAGNOSIS — Z79.899 ENCOUNTER FOR LONG-TERM (CURRENT) USE OF MEDICATIONS: ICD-10-CM

## 2017-08-01 DIAGNOSIS — E87.6 HYPOKALEMIA: ICD-10-CM

## 2017-08-01 DIAGNOSIS — K21.9 GASTROESOPHAGEAL REFLUX DISEASE WITHOUT ESOPHAGITIS: ICD-10-CM

## 2017-08-01 DIAGNOSIS — E78.2 MIXED HYPERLIPIDEMIA: Primary | ICD-10-CM

## 2017-08-01 DIAGNOSIS — R14.1 FLATULENCE, ERUCTATION, AND GAS PAIN: ICD-10-CM

## 2017-08-01 DIAGNOSIS — M79.10 MYALGIA: ICD-10-CM

## 2017-08-01 DIAGNOSIS — R14.2 FLATULENCE, ERUCTATION, AND GAS PAIN: ICD-10-CM

## 2017-08-01 DIAGNOSIS — I35.0 AORTIC VALVE STENOSIS, UNSPECIFIED ETIOLOGY: ICD-10-CM

## 2017-08-01 DIAGNOSIS — R14.3 FLATULENCE, ERUCTATION, AND GAS PAIN: ICD-10-CM

## 2017-08-01 DIAGNOSIS — F41.1 GAD (GENERALIZED ANXIETY DISORDER): ICD-10-CM

## 2017-08-01 LAB
ALBUMIN SERPL-MCNC: 3.7 G/DL (ref 3.4–5)
ALP SERPL-CCNC: 94 U/L (ref 40–150)
ALT SERPL W P-5'-P-CCNC: 20 U/L (ref 0–50)
ANION GAP SERPL CALCULATED.3IONS-SCNC: 8 MMOL/L (ref 3–14)
AST SERPL W P-5'-P-CCNC: 18 U/L (ref 0–45)
BILIRUB SERPL-MCNC: 0.3 MG/DL (ref 0.2–1.3)
BUN SERPL-MCNC: 12 MG/DL (ref 7–30)
CALCIUM SERPL-MCNC: 9.3 MG/DL (ref 8.5–10.1)
CHLORIDE SERPL-SCNC: 99 MMOL/L (ref 94–109)
CHOLEST SERPL-MCNC: 228 MG/DL
CO2 SERPL-SCNC: 28 MMOL/L (ref 20–32)
CREAT SERPL-MCNC: 0.78 MG/DL (ref 0.52–1.04)
GFR SERPL CREATININE-BSD FRML MDRD: 72 ML/MIN/1.7M2
GLUCOSE SERPL-MCNC: 99 MG/DL (ref 70–99)
HDLC SERPL-MCNC: 55 MG/DL
LDLC SERPL CALC-MCNC: 145 MG/DL
NONHDLC SERPL-MCNC: 173 MG/DL
POTASSIUM SERPL-SCNC: 3.8 MMOL/L (ref 3.4–5.3)
PROT SERPL-MCNC: 7.7 G/DL (ref 6.8–8.8)
SODIUM SERPL-SCNC: 135 MMOL/L (ref 133–144)
TRIGL SERPL-MCNC: 139 MG/DL

## 2017-08-01 PROCEDURE — 99214 OFFICE O/P EST MOD 30 MIN: CPT | Performed by: FAMILY MEDICINE

## 2017-08-01 PROCEDURE — 80053 COMPREHEN METABOLIC PANEL: CPT | Performed by: FAMILY MEDICINE

## 2017-08-01 PROCEDURE — 80061 LIPID PANEL: CPT | Performed by: FAMILY MEDICINE

## 2017-08-01 PROCEDURE — 36415 COLL VENOUS BLD VENIPUNCTURE: CPT | Performed by: FAMILY MEDICINE

## 2017-08-01 RX ORDER — POTASSIUM CHLORIDE 750 MG/1
CAPSULE, EXTENDED RELEASE ORAL
Refills: 1 | COMMUNITY
Start: 2017-02-13 | End: 2017-08-01

## 2017-08-01 RX ORDER — AMOXICILLIN 250 MG
1-2 CAPSULE ORAL 2 TIMES DAILY
Qty: 120 TABLET | Refills: 3 | Status: SHIPPED | OUTPATIENT
Start: 2017-08-01 | End: 2018-05-31

## 2017-08-01 RX ORDER — PRAVASTATIN SODIUM 40 MG
40 TABLET ORAL DAILY
Qty: 30 TABLET | Refills: 0 | Status: CANCELLED | OUTPATIENT
Start: 2017-08-01

## 2017-08-01 RX ORDER — IBUPROFEN 800 MG/1
800 TABLET, FILM COATED ORAL EVERY 4 HOURS PRN
Qty: 90 TABLET | Refills: 1 | Status: SHIPPED | OUTPATIENT
Start: 2017-08-01 | End: 2017-09-29

## 2017-08-01 RX ORDER — AMITRIPTYLINE HYDROCHLORIDE 10 MG/1
10 TABLET ORAL AT BEDTIME
Qty: 30 TABLET | Refills: 3 | Status: SHIPPED | OUTPATIENT
Start: 2017-08-01 | End: 2017-09-26

## 2017-08-01 RX ORDER — POTASSIUM CHLORIDE 750 MG/1
CAPSULE, EXTENDED RELEASE ORAL
Qty: 90 CAPSULE | Refills: 1 | Status: SHIPPED | OUTPATIENT
Start: 2017-08-01 | End: 2017-12-15

## 2017-08-01 RX ORDER — ALPRAZOLAM 0.25 MG
0.25 TABLET ORAL 3 TIMES DAILY PRN
Qty: 20 TABLET | Refills: 1 | Status: SHIPPED | OUTPATIENT
Start: 2017-08-01 | End: 2017-10-31

## 2017-08-01 RX ORDER — SIMETHICONE 180 MG
1 CAPSULE ORAL 2 TIMES DAILY
Qty: 60 CAPSULE | Refills: 6 | Status: SHIPPED | OUTPATIENT
Start: 2017-08-01 | End: 2018-05-31

## 2017-08-01 ASSESSMENT — PATIENT HEALTH QUESTIONNAIRE - PHQ9
SUM OF ALL RESPONSES TO PHQ QUESTIONS 1-9: 0
SUM OF ALL RESPONSES TO PHQ QUESTIONS 1-9: 0
10. IF YOU CHECKED OFF ANY PROBLEMS, HOW DIFFICULT HAVE THESE PROBLEMS MADE IT FOR YOU TO DO YOUR WORK, TAKE CARE OF THINGS AT HOME, OR GET ALONG WITH OTHER PEOPLE: NOT DIFFICULT AT ALL

## 2017-08-01 ASSESSMENT — PAIN SCALES - GENERAL: PAINLEVEL: MODERATE PAIN (5)

## 2017-08-01 NOTE — NURSING NOTE
"Chief Complaint   Patient presents with     Recheck Medication     Panel Management     Dexa, Honoring Choices. PHQ9, JITENDRA       Initial /70  Pulse 88  Temp 98.4  F (36.9  C) (Temporal)  Ht 5' 4\" (1.626 m)  Wt 181 lb (82.1 kg)  BMI 31.07 kg/m2 Estimated body mass index is 31.07 kg/(m^2) as calculated from the following:    Height as of this encounter: 5' 4\" (1.626 m).    Weight as of this encounter: 181 lb (82.1 kg).  Medication Reconciliation: complete       Arnol Restrepo MA    "

## 2017-08-01 NOTE — PATIENT INSTRUCTIONS
Recommend starting Probiotics - Nature Made is good. Take daily to help with gas and bloating. Will take a few weeks to know if helping.

## 2017-08-01 NOTE — MR AVS SNAPSHOT
After Visit Summary   8/1/2017    Gabino Jones    MRN: [id number]           Patient Information     Date Of Birth          1940        Visit Information        Provider Department      8/1/2017 8:20 AM Rebekah Boogie MD [facility]        Today's Diagnoses     Mixed hyperlipidemia    -  1    Myalgia        Flatulence, eructation, and gas pain        Gastroesophageal reflux disease without esophagitis        Skull lesion        Encounter for long-term (current) use of medications        Hypokalemia        JITENDRA (generalized anxiety disorder)          Care Instructions      Recommend starting Probiotics - Nature Made is good. Take daily to help with gas and bloating. Will take a few weeks to know if helping.               Follow-ups after your visit        Your next 10 appointments already scheduled     Aug 28, 2017  1:00 PM CDT   RETURN NEURO with Matt Quiroga MD   Eye Clinic ([facility])    [address]  [address]  [address] [address]  [facility] [id number] [phone]            Aug 30, 2017 10:20 AM CDT   (Arrive by 10:05 AM)   CT CHEST/ABDOMEN/PELVIS W CONTRAST with [id number]   [facility] CT ([facility])    [address]  [address] [facility] [id number] [phone]           Please bring any scans or X-rays taken at other hospitals, if similar tests were done. Also bring a list of your medicines, including vitamins, minerals and over-the-counter drugs. It is safest to leave personal items at home.  Be sure to tell your doctor:   If you have any allergies.   If there s any chance you are pregnant.   If you are breastfeeding.   If you have any special needs.  You may have contrast for this exam. To prepare:   Do not eat or drink for 2 hours before your exam. If you need to take medicine, you may take it with small sips of water. (We may ask you to take liquid medicine as well.)   The day before  your exam, drink extra fluids at least six 8-ounce glasses (unless your doctor tells you to restrict your fluids).  Patients over 70 or patients with diabetes or kidney problems:   If you haven t had a blood test (creatinine test) within the last 30 days, go to your clinic or Diagnostic Imaging Department for this test.  If you have diabetes:   If your kidney function is normal, continue taking your metformin (Avandamet, Glucophage, Glucovance, Metaglip) on the day of your exam.   If your kidney function is abnormal, wait 48 hours before restarting this medicine.  You will have oral contrast for this exam:   You will drink the contrast at home. Get this from your clinic or Diagnostic Imaging Department. Please follow the directions given.  Please wear loose clothing, such as a sweat suit or jogging clothes. Avoid snaps, zippers and other metal. We may ask you to undress and put on a hospital gown.  If you have any questions, please call the Imaging Department where you will have your exam.            Aug 30, 2017 10:45 AM CDT   (Arrive by 10:30 AM)   MR ORBIT W/O & W CONTRAST with [id number]   [facility] MRI ([facility] [place])    [address] [address] [phone]           Take your medicines as usual, unless your doctor tells you not to. Bring a list of your current medicines to your exam (including vitamins, minerals and over-the-counter drugs).  You will be given intravenous contrast for this exam. To prepare:   The day before your exam, drink extra fluids at least six 8-ounce glasses (unless your doctor tells you to restrict your fluids).   Have a blood test (creatinine test) within 30 days of your exam. Go to your clinic or Diagnostic Imaging Department for this test.  The MRI machine uses a strong magnet. Please wear clothes without metal (snaps, zippers). A sweatsuit works well, or we may give you a hospital gown.  Please remove any body  piercings and hair extensions before you arrive. You will also remove watches, jewelry, hairpins, wallets, dentures, partial dental plates and hearing aids. You may wear contact lenses, and you may be able to wear your rings. We have a safe place to keep your personal items, but it is safer to leave them at home.   **IMPORTANT** THE INSTRUCTIONS BELOW ARE ONLY FOR THOSE PATIENTS WHO HAVE BEEN TOLD THEY WILL RECEIVE SEDATION OR GENERAL ANESTHESIA DURING THEIR MRI PROCEDURE:  IF YOU WILL RECEIVE SEDATION (take medicine to help you relax during your exam):   You must get the medicine from your doctor before you arrive. Bring the medicine to the exam. Do not take it at home.   Arrive one hour early. Bring someone who can take you home after the test. Your medicine will make you sleepy. After the exam, you may not drive, take a bus or take a taxi by yourself.   No eating 8 hours before your exam. You may have clear liquids up until 4 hours before your exam. (Clear liquids include water, clear tea, black coffee and fruit juice without pulp.)  IF YOU WILL RECEIVE ANESTHESIA (be asleep for your exam):   Arrive 1 1/2 hours early. Bring someone who can take you home after the test. You may not drive, take a bus or take a taxi by yourself.   No eating 8 hours before your exam. You may have clear liquids up until 4 hours before your exam. (Clear liquids include water, clear tea, black coffee and fruit juice without pulp.)  Please call the Imaging Department at your exam site with any questions.            Aug 30, 2017  1:15 PM CDT   (Arrive by 1:00 PM)   Return Visit with Garett Obregon MD   [facility] ([facility])    [address]  2nd Floor  [facility] [address]   [phone]            Oct 04, 2017   Procedure with [place] Reid MD   [facility] [place] Same Day Surgery (--)    [address] [facility] [address]   [phone]            Dec 28, 2017  1:20 PM  "CST   (Arrive by 1:05 PM)   Return Visit with Yolanda Whitaker MD   [facility] ([facility])    [address]  [address] [facility] [address] [phone]              Who to contact     If you have questions or need follow up information about today's clinic visit or your schedule please contact [facility] directly at [phone].  Normal or non-critical lab and imaging results will be communicated to you by CURRENThart, letter or phone within 4 business days after the clinic has received the results. If you do not hear from us within 7 days, please contact the clinic through CURRENThart or phone. If you have a critical or abnormal lab result, we will notify you by phone as soon as possible.  Submit refill requests through Brightblue or call your pharmacy and they will forward the refill request to us. Please allow 3 business days for your refill to be completed.          Additional Information About Your Visit        Brightblue Information     Brightblue gives you secure access to your electronic health record. If you see a primary care provider, you can also send messages to your care team and make appointments. If you have questions, please call your primary care clinic.  If you do not have a primary care provider, please call [phone] and they will assist you.        Care EveryWhere ID     This is your Care EveryWhere ID. This could be used by other organizations to access your [place] medical records  [id number]        Your Vitals Were     Pulse Temperature Height BMI (Body Mass Index)          88 98.4  F (36.9  C) (Temporal) 5' 4\" (1.626 m) 31.07 kg/m2         Blood Pressure from Last 3 Encounters:   08/01/17 120/70   07/07/17 143/59   05/09/17 132/64    Weight from Last 3 Encounters:   08/01/17 181 lb (82.1 kg)   07/06/17 187 lb (84.8 kg)   06/22/17 187 lb (84.8 kg)              We Performed the Following     Comprehensive metabolic " panel     Lipid panel reflex to direct LDL          Today's Medication Changes          These changes are accurate as of: 8/1/17  9:13 AM.  If you have any questions, ask your nurse or doctor.               Start taking these medicines.        Dose/Directions    amitriptyline 10 MG tablet   Commonly known as:  ELAVIL   Used for:  Myalgia   Started by:  Rebekah Boogie MD        Dose:  10 mg   Take 1 tablet (10 mg) by mouth At Bedtime   Quantity:  30 tablet   Refills:  3         These medicines have changed or have updated prescriptions.        Dose/Directions    ALPRAZolam 0.25 MG tablet   Commonly known as:  XANAX   This may have changed:    - medication strength  - when to take this  - reasons to take this   Used for:  JITENDRA (generalized anxiety disorder)   Changed by:  Rebekah Boogie MD        Dose:  0.25 mg   Take 1 tablet (0.25 mg) by mouth 3 times daily as needed for anxiety   Quantity:  20 tablet   Refills:  1       potassium chloride 10 MEQ CR capsule   Commonly known as:  MICRO-K   This may have changed:  See the new instructions.   Used for:  Hypokalemia   Changed by:  Rebekah Boogie MD        TK 2 CS PO D   Quantity:  90 capsule   Refills:  1       ranitidine 150 MG tablet   Commonly known as:  ZANTAC   This may have changed:  See the new instructions.   Used for:  Gastroesophageal reflux disease without esophagitis   Changed by:  Rebekah Boogie MD        TAKE 1 TABLET BY MOUTH TWICE DAILY AS NEEDED   Quantity:  180 tablet   Refills:  1         Stop taking these medicines if you haven't already. Please contact your care team if you have questions.     acetaminophen-codeine 300-30 MG per tablet   Commonly known as:  TYLENOL #3   Stopped by:  Rebekah Boogie MD                Where to get your medicines      [facility] [address] [address] [facility] [address]      Phone:  [phone]     amitriptyline 10 MG tablet    ibuprofen 800 MG tablet    potassium chloride 10 MEQ CR capsule    ranitidine 150 MG tablet    senna-docusate 8.6-50 MG per tablet    Simethicone 180 MG Caps         Some of these will need a paper prescription and others can be bought over the counter.  Ask your nurse if you have questions.     Bring a paper prescription for each of these medications     ALPRAZolam 0.25 MG tablet                Primary Care Provider Office Phone # Fax #    Rebekahgeovany Boogie [phone]       [facility] [address] [facility] [address]        Equal Access to Services     [facility]: Hadii aad ku hadasho Soomaali, waaxda luqadaha, qaybta kaalmada adeegyada, vito cazares hayclif wright . So [facility] [phone].    ATENCIÓN: Si habla español, tiene a velazquez disposición servicios gratuitos de asistencia lingüística. [facility] [phone].    We comply with applicable federal civil rights laws and [place] laws. We do not discriminate on the basis of race, color, national origin, age, disability sex, sexual orientation or gender identity.            Thank you!     Thank you for choosing [facility]  for your care. Our goal is always to provide you with excellent care. Hearing back from our patients is one way we can continue to improve our services. Please take a few minutes to complete the written survey that you may receive in the mail after your visit with us. Thank you!             Your Updated Medication List - Protect others around you: Learn how to safely use, store and throw away your medicines at www.disposemymeds.org.          This list is accurate as of: 8/1/17  9:13 AM.  Always use your most recent med list.                   Brand Name Dispense Instructions for use Diagnosis    ALPRAZolam 0.25 MG tablet    XANAX    20 tablet    Take 1 tablet (0.25 mg) by mouth 3 times daily as needed for anxiety    JITENDRA (generalized  anxiety disorder)       amitriptyline 10 MG tablet    ELAVIL    30 tablet    Take 1 tablet (10 mg) by mouth At Bedtime    Myalgia       BUSPAR PO      Take 5 mg by mouth 3 times daily        hydrochlorothiazide 12.5 MG capsule    MICROZIDE    90 capsule    Take 1 capsule (12.5 mg) by mouth daily    Hypertension goal BP (blood pressure) < 140/90       ibuprofen 800 MG tablet    ADVIL/MOTRIN    90 tablet    Take 1 tablet (800 mg) by mouth every 4 hours as needed for moderate pain Reported on 4/17/2017    Myalgia       lisinopril 30 MG tablet    PRINIVIL,ZESTRIL    90 tablet    Take 1 tablet (30 mg) by mouth daily    Hypertension goal BP (blood pressure) < 140/90       pantoprazole 40 MG EC tablet    PROTONIX    90 tablet    Take 1 tablet (40 mg) by mouth daily Take 30-60 minutes before a meal.    Gastroesophageal reflux disease without esophagitis       potassium chloride 10 MEQ CR capsule    MICRO-K    90 capsule    TK 2 CS PO D    Hypokalemia       pravastatin 40 MG tablet    PRAVACHOL    30 tablet    Take 1 tablet (40 mg) by mouth daily    Mixed hyperlipidemia       ranitidine 150 MG tablet    ZANTAC    180 tablet    TAKE 1 TABLET BY MOUTH TWICE DAILY AS NEEDED    Gastroesophageal reflux disease without esophagitis       senna-docusate 8.6-50 MG per tablet    SENOKOT-S;PERICOLACE    120 tablet    Take 1-2 tablets by mouth 2 times daily    Skull lesion       Simethicone 180 MG Caps     60 capsule    Take 1 capsule by mouth 2 times daily Reported on 5/9/2017    Flatulence, eructation, and gas pain       venlafaxine 75 MG 24 hr capsule    EFFEXOR-XR    270 capsule    Take 3 capsules (225 mg) by mouth daily    JITENDRA (generalized anxiety disorder)

## 2017-08-02 ASSESSMENT — PATIENT HEALTH QUESTIONNAIRE - PHQ9: SUM OF ALL RESPONSES TO PHQ QUESTIONS 1-9: 0

## 2017-08-04 RX ORDER — AMITRIPTYLINE HYDROCHLORIDE 10 MG/1
TABLET ORAL
Qty: 90 TABLET | Refills: 3 | OUTPATIENT
Start: 2017-08-04

## 2017-08-04 RX ORDER — POTASSIUM CHLORIDE 750 MG/1
CAPSULE, EXTENDED RELEASE ORAL
Qty: 180 CAPSULE | Refills: 1 | OUTPATIENT
Start: 2017-08-04

## 2017-08-04 NOTE — TELEPHONE ENCOUNTER
potassium chloride (MICRO-K) 10 MEQ CR capsule  Rx was sent 08/01/2017 for 90 tabs and 1 refills. Patient should have medication through 11/2017.  Pharmacy notified via E-prescribe refusal.  Giulia Whitley RN, BSN     amitriptyline (ELAVIL) 10 MG tablet  Rx was sent 08/01/2017 for 90 tabs and 3 refills. Patient should have medication through 12/2017.  Pharmacy notified via E-prescribe refusal.  Giulia Whitley RN, BSN

## 2017-08-28 ENCOUNTER — OFFICE VISIT (OUTPATIENT)
Dept: OPHTHALMOLOGY | Facility: CLINIC | Age: 77
End: 2017-08-28
Attending: OPHTHALMOLOGY
Payer: MEDICARE

## 2017-08-28 DIAGNOSIS — H46.9 OPTIC NEUROPATHY: ICD-10-CM

## 2017-08-28 DIAGNOSIS — H53.10 SUBJECTIVE VISUAL DISTURBANCE: ICD-10-CM

## 2017-08-28 PROCEDURE — 92083 EXTENDED VISUAL FIELD XM: CPT | Mod: ZF | Performed by: OPHTHALMOLOGY

## 2017-08-28 PROCEDURE — 99213 OFFICE O/P EST LOW 20 MIN: CPT | Mod: ZF

## 2017-08-28 ASSESSMENT — SLIT LAMP EXAM - LIDS
COMMENTS: NORMAL
COMMENTS: NORMAL

## 2017-08-28 ASSESSMENT — REFRACTION_WEARINGRX
OD_CYLINDER: +0.50
OD_ADD: +2.75
OS_CYLINDER: +1.00
SPECS_TYPE: BIFOCAL
OD_AXIS: 025
OS_SPHERE: -2.25
OD_SPHERE: -2.50
OS_ADD: +2.75
OS_AXIS: 180

## 2017-08-28 ASSESSMENT — VISUAL ACUITY
METHOD: SNELLEN - LINEAR
OD_CC+: -2
OD_CC: 20/30
OS_CC: LP
CORRECTION_TYPE: GLASSES

## 2017-08-28 ASSESSMENT — CONF VISUAL FIELD
OS_SUPERIOR_TEMPORAL_RESTRICTION: 1
OS_INFERIOR_TEMPORAL_RESTRICTION: 1
OS_INFERIOR_NASAL_RESTRICTION: 1
OS_SUPERIOR_NASAL_RESTRICTION: 1

## 2017-08-28 ASSESSMENT — EXTERNAL EXAM - LEFT EYE: OS_EXAM: NORMAL

## 2017-08-28 ASSESSMENT — TONOMETRY
OS_IOP_MMHG: 14
IOP_METHOD: TONOPEN
OD_IOP_MMHG: 11

## 2017-08-28 ASSESSMENT — EXTERNAL EXAM - RIGHT EYE: OD_EXAM: NORMAL

## 2017-08-28 ASSESSMENT — CUP TO DISC RATIO
OS_RATIO: 0.6
OD_RATIO: 0.4

## 2017-08-28 NOTE — NURSING NOTE
Chief Complaints and History of Present Illnesses   Patient presents with     Follow Up For     Optic neuropathy      HPI    Affected eye(s):  Both   Symptoms:     Blurred vision   No decreased vision   Itching         Do you have eye pain now?:  No      Comments:  Complains of intermittent fuzzy vision in the RE.  Arabella Lima COA 1:39 PM August 28, 2017

## 2017-08-28 NOTE — MR AVS SNAPSHOT
"              After Visit Summary   8/28/2017    Gabino Jones    MRN: [id number]           Patient Information     Date Of Birth          1940        Visit Information        Provider Department      8/28/2017 1:00 PM Matt Quiroga MD [facility]        Today's Diagnoses     Subjective visual disturbance        Optic neuropathy           Follow-ups after your visit        Additional Services     OCCUPATIONAL THERAPY REFERRAL       *This therapy referral will be filtered to a centralized scheduling office at [facility] and the patient will receive a call to schedule an appointment at a [facility] location most convenient for them. *     [facility] provides Occupational Therapy evaluation and treatment and many specialty services across the [facility] system.  If requesting a specialty program, please choose from the list below.    If you have not heard from the scheduling office within 2 business days, please call [phone] for all locations, with the exception of [place], please call [phone].     Treatment: Evaluation & Treatment  Special Instructions/Modalities: SUSAN Winston  Special Programs: Low Vision     Please be aware that coverage of these services is subject to the terms and limitations of your health insurance plan.  Call member services at your health plan with any benefit or coverage questions.      **Note to Provider:  If you are referring outside of [facility] for the therapy appointment, please list the name of the location in the \"special instructions\" above, print the referral and give to the patient to schedule the appointment.                  Your next 10 appointments already scheduled     Aug 30, 2017 10:20 AM CDT   (Arrive by 10:05 AM)   CT CHEST/ABDOMEN/PELVIS W CONTRAST with [id number]   [facility] ([facility])    [address]  1st Floor  [place] [address] [phone]           " Please bring any scans or X-rays taken at other hospitals, if similar tests were done. Also bring a list of your medicines, including vitamins, minerals and over-the-counter drugs. It is safest to leave personal items at home.  Be sure to tell your doctor:   If you have any allergies.   If there s any chance you are pregnant.   If you are breastfeeding.   If you have any special needs.  You may have contrast for this exam. To prepare:   Do not eat or drink for 2 hours before your exam. If you need to take medicine, you may take it with small sips of water. (We may ask you to take liquid medicine as well.)   The day before your exam, drink extra fluids at least six 8-ounce glasses (unless your doctor tells you to restrict your fluids).  Patients over 70 or patients with diabetes or kidney problems:   If you haven t had a blood test (creatinine test) within the last 30 days, go to your clinic or Diagnostic Imaging Department for this test.  If you have diabetes:   If your kidney function is normal, continue taking your metformin (Avandamet, Glucophage, Glucovance, Metaglip) on the day of your exam.   If your kidney function is abnormal, wait 48 hours before restarting this medicine.  You will have oral contrast for this exam:   You will drink the contrast at home. Get this from your clinic or Diagnostic Imaging Department. Please follow the directions given.  Please wear loose clothing, such as a sweat suit or jogging clothes. Avoid snaps, zippers and other metal. We may ask you to undress and put on a hospital gown.  If you have any questions, please call the Imaging Department where you will have your exam.            Aug 30, 2017 10:45 AM CDT   (Arrive by 10:30 AM)   MR ORBIT W/O & W CONTRAST with [id number]   [facility] ([facility])    [address]  1st [place] [address] [phone]           Take your medicines as usual, unless your doctor tells you  not to. Bring a list of your current medicines to your exam (including vitamins, minerals and over-the-counter drugs).  You will be given intravenous contrast for this exam. To prepare:   The day before your exam, drink extra fluids at least six 8-ounce glasses (unless your doctor tells you to restrict your fluids).   Have a blood test (creatinine test) within 30 days of your exam. Go to your clinic or Diagnostic Imaging Department for this test.  The MRI machine uses a strong magnet. Please wear clothes without metal (snaps, zippers). A sweatsuit works well, or we may give you a hospital gown.  Please remove any body piercings and hair extensions before you arrive. You will also remove watches, jewelry, hairpins, wallets, dentures, partial dental plates and hearing aids. You may wear contact lenses, and you may be able to wear your rings. We have a safe place to keep your personal items, but it is safer to leave them at home.   **IMPORTANT** THE INSTRUCTIONS BELOW ARE ONLY FOR THOSE PATIENTS WHO HAVE BEEN TOLD THEY WILL RECEIVE SEDATION OR GENERAL ANESTHESIA DURING THEIR MRI PROCEDURE:  IF YOU WILL RECEIVE SEDATION (take medicine to help you relax during your exam):   You must get the medicine from your doctor before you arrive. Bring the medicine to the exam. Do not take it at home.   Arrive one hour early. Bring someone who can take you home after the test. Your medicine will make you sleepy. After the exam, you may not drive, take a bus or take a taxi by yourself.   No eating 8 hours before your exam. You may have clear liquids up until 4 hours before your exam. (Clear liquids include water, clear tea, black coffee and fruit juice without pulp.)  IF YOU WILL RECEIVE ANESTHESIA (be asleep for your exam):   Arrive 1 1/2 hours early. Bring someone who can take you home after the test. You may not drive, take a bus or take a taxi by yourself.   No eating 8 hours before your exam. You may have clear liquids up until  4 hours before your exam. (Clear liquids include water, clear tea, black coffee and fruit juice without pulp.)  Please call the Imaging Department at your exam site with any questions.            Aug 30, 2017  1:15 PM CDT   (Arrive by 1:00 PM)   Return Visit with Garett Obregon MD   [facility] ([facility] [place])    [address]  2nd Floor  [place] [id number] [phone]            Sep 14, 2017  2:00 PM CDT   New Patient Visit with Sawyer Michael OD   [facility] ([facility])    [address]  Clinic [address]  [address] [address]   [phone]            Oct 04, 2017   Procedure with Milton Reid MD   [facility] [place], Same Day Surgery (--)    [address] [address] [phone]            Jan 04, 2018  2:20 PM CST   (Arrive by 2:05 PM)   Return Visit with Yolanda Whitaker MD   [facility] ([facility] [place])    [address]  4th [facility] [id number] [phone]              Who to contact     Please call your clinic at [phone] to:    Ask questions about your health    Make or cancel appointments    Discuss your medicines    Learn about your test results    Speak to your doctor   If you have compliments or concerns about an experience at your clinic, or if you wish to file a complaint, please contact [facility] Patient Relations at [phone] or email us at [email or web address]         Additional Information About Your Visit        MyChart Information     [employer] gives you secure access to your electronic health record. If you see a primary care provider, you can also send messages to your care team and make appointments. If you have questions, please call your primary care clinic.  If you do not have a primary care provider, please call [phone] and they will assist  you.      [employer] is an electronic gateway that provides easy, online access to your medical records. With [employer], you can request a clinic appointment, read your test results, renew a prescription or communicate with your care team.     To access your existing account, please contact your [facility] or call [phone] for assistance.        Care EveryWhere ID     This is your Care EveryWhere ID. This could be used by other organizations to access your [place] medical records  [id number]         Blood Pressure from Last 3 Encounters:   08/01/17 120/70   07/07/17 143/59   05/09/17 132/64    Weight from Last 3 Encounters:   08/01/17 82.1 kg (181 lb)   07/06/17 84.8 kg (187 lb)   06/22/17 84.8 kg (187 lb)              We Performed the Following     DILATED FUNDUS EXAM     Glaucoma Top OD     IOP Measurement     OCCUPATIONAL THERAPY REFERRAL        Primary Care Provider Office Phone # Fax #    Reebkahgeovany Boogie [phone] [address] [facility] [address]        Equal Access to Services     [facility]: Hadii aad ku hadasho Soomaali, waaxda luqadaha, qaybta kaalmada adeegyada, waxay sage hayclif wright . So [facility] [phone].    ATENCIÓN: Si habla español, tiene a velazquez disposición servicios gratuitos de asistencia lingüística. Llame al [phone].    We comply with applicable federal civil rights laws and [place] laws. We do not discriminate on the basis of race, color, national origin, age, disability sex, sexual orientation or gender identity.            Thank you!     Thank you for choosing [facility]  for your care. Our goal is always to provide you with excellent care. Hearing back from our patients is one way we can continue to improve our services. Please take a few minutes to complete the written survey that you may receive in the mail after your visit with us. Thank you!             Your Updated Medication List - Protect others  around you: Learn how to safely use, store and throw away your medicines at www.disposemymeds.org.          This list is accurate as of: 8/28/17  5:21 PM.  Always use your most recent med list.                   Brand Name Dispense Instructions for use Diagnosis    ALPRAZolam 0.25 MG tablet    XANAX    20 tablet    Take 1 tablet (0.25 mg) by mouth 3 times daily as needed for anxiety    JITENDRA (generalized anxiety disorder)       amitriptyline 10 MG tablet    ELAVIL    30 tablet    Take 1 tablet (10 mg) by mouth At Bedtime    Myalgia       BUSPAR PO      Take 5 mg by mouth 3 times daily        hydrochlorothiazide 12.5 MG capsule    MICROZIDE    90 capsule    Take 1 capsule (12.5 mg) by mouth daily    Hypertension goal BP (blood pressure) < 140/90       ibuprofen 800 MG tablet    ADVIL/MOTRIN    90 tablet    Take 1 tablet (800 mg) by mouth every 4 hours as needed for moderate pain Reported on 4/17/2017    Myalgia       lisinopril 30 MG tablet    PRINIVIL,ZESTRIL    90 tablet    Take 1 tablet (30 mg) by mouth daily    Hypertension goal BP (blood pressure) < 140/90       pantoprazole 40 MG EC tablet    PROTONIX    90 tablet    Take 1 tablet (40 mg) by mouth daily Take 30-60 minutes before a meal.    Gastroesophageal reflux disease without esophagitis       potassium chloride 10 MEQ CR capsule    MICRO-K    90 capsule    TK 2 CS PO D    Hypokalemia       pravastatin 40 MG tablet    PRAVACHOL    30 tablet    Take 1 tablet (40 mg) by mouth daily    Mixed hyperlipidemia       ranitidine 150 MG tablet    ZANTAC    180 tablet    TAKE 1 TABLET BY MOUTH TWICE DAILY AS NEEDED    Gastroesophageal reflux disease without esophagitis       senna-docusate 8.6-50 MG per tablet    SENOKOT-S;PERICOLACE    120 tablet    Take 1-2 tablets by mouth 2 times daily        Simethicone 180 MG Caps     60 capsule    Take 1 capsule by mouth 2 times daily Reported on 5/9/2017    Flatulence, eructation, and gas pain       venlafaxine 75 MG 24 hr  capsule    EFFEXOR-XR    270 capsule    Take 3 capsules (225 mg) by mouth daily    JITENDRA (generalized anxiety disorder)

## 2017-08-28 NOTE — PROGRESS NOTES
Assessment & Plan     Gabino Jones is a 77 year old female with the following diagnoses:   1. Subjective visual disturbance    2. 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 She was started on oral steroids, but there was not evidence of definitive improvement following steroid treatment. Last MRI on 3/17/17 demonstrated enhancement of both optic nerves left more than right with slight enhancement of the chiasm. Next MRI on 8/30/17. Reports stable vision in both eyes.     Will have patient return to clinic in 3-4 months for repeat evaluation and to review MRI. If there is still enhancement then this is likely radiation induced optic neuropathy. If it is normal, then she may have just an atypical optic neuritis.           Attending Physician Attestation:  Complete documentation of historical and exam elements from today's encounter can be found in the full encounter summary report (not reduplicated in this progress note).  I personally obtained the chief complaint(s) and history of present illness.  I confirmed and edited as necessary the review of systems, past medical/surgical history, family history, social history, and examination findings as documented by others; and I examined the patient myself.  I personally reviewed the relevant tests, images, and reports as documented above.  I formulated and edited as necessary the assessment and plan and discussed the findings and management plan with the patient and family. - Matt Quiroga MD

## 2017-08-30 ENCOUNTER — ONCOLOGY VISIT (OUTPATIENT)
Dept: ONCOLOGY | Facility: CLINIC | Age: 77
End: 2017-08-30
Attending: INTERNAL MEDICINE
Payer: MEDICARE

## 2017-08-30 VITALS
RESPIRATION RATE: 16 BRPM | WEIGHT: 184.8 LBS | DIASTOLIC BLOOD PRESSURE: 64 MMHG | TEMPERATURE: 97.5 F | BODY MASS INDEX: 31.55 KG/M2 | OXYGEN SATURATION: 100 % | HEIGHT: 64 IN | SYSTOLIC BLOOD PRESSURE: 129 MMHG | HEART RATE: 86 BPM

## 2017-08-30 DIAGNOSIS — C30.0 MALIGNANT MELANOMA OF NASAL CAVITY (H): Primary | ICD-10-CM

## 2017-08-30 PROCEDURE — 99214 OFFICE O/P EST MOD 30 MIN: CPT | Mod: ZP | Performed by: INTERNAL MEDICINE

## 2017-08-30 PROCEDURE — 99212 OFFICE O/P EST SF 10 MIN: CPT | Mod: ZF

## 2017-08-30 RX ORDER — ACETAMINOPHEN 500 MG
500 TABLET ORAL EVERY 6 HOURS PRN
COMMUNITY
Start: 2016-09-07 | End: 2018-05-31

## 2017-08-30 ASSESSMENT — PAIN SCALES - GENERAL: PAINLEVEL: MODERATE PAIN (5)

## 2017-08-30 NOTE — NURSING NOTE
"Oncology Rooming Note    August 30, 2017 12:52 PM   Gabino Jones is a 77 year old female who presents for:    Chief Complaint   Patient presents with     Oncology Clinic Visit     SCC Sinus F/U     Initial Vitals: /64  Pulse 86  Temp 97.5  F (36.4  C) (Oral)  Resp 16  Ht 1.626 m (5' 4.02\")  Wt 83.8 kg (184 lb 12.8 oz)  SpO2 100%  BMI 31.7 kg/m2 Estimated body mass index is 31.7 kg/(m^2) as calculated from the following:    Height as of this encounter: 1.626 m (5' 4.02\").    Weight as of this encounter: 83.8 kg (184 lb 12.8 oz). Body surface area is 1.95 meters squared.  Moderate Pain (5) Comment: Muscle Pain   No LMP recorded. Patient is postmenopausal.  Allergies reviewed: Yes  Medications reviewed: Yes    Medications: Medication refills not needed today.  Pharmacy name entered into [facility]:    [facility] - [place], [place] - [address] [facility] [address]  [facility] - [place], [place] - [address]    Clinical concerns: None Dr Obregon was NOT notified.    7 minutes for nursing intake (face to face time)     Nelida Roa LPN              "

## 2017-08-30 NOTE — MR AVS SNAPSHOT
After Visit Summary   8/30/2017    Gabino Jones    MRN: [id number]           Patient Information     Date Of Birth          1940        Visit Information        Provider Department      8/30/2017 1:15 PM Garett Obregon MD [facility]        Today's Diagnoses     Malignant melanoma of nasal cavity (H)    -  1       Follow-ups after your visit        Your next 10 appointments already scheduled     Sep 14, 2017  2:00 PM CDT   New Patient Visit with Sawyer Michael OD   Eye Clinic ([facility])    [address]  Clinic 9a  [address] [facility] [address]   [phone]            Oct 04, 2017   Procedure with Milton Reid MD   [facility] [place], Same Day Surgery (--)    [address] [facility] [id number] [phone]            Dec 13, 2017 10:15 AM CST   LAB with  LAB   [facility] ([facility])    [address] [place] [id number] [phone]           Patient must bring picture ID. Patient should be prepared to give a urine specimen  Please do not eat 10-12 hours before your appointment if you are coming in fasting for labs on lipids, cholesterol, or glucose (sugar). Pregnant women should follow their Care Team instructions. Water with medications is okay. Do not drink coffee or other fluids. If you have concerns about taking  your medications, please ask at office or if scheduling via [employer], send a message by clicking on Secure Messaging, Message Your Care Team.            Dec 13, 2017 10:45 AM CST   (Arrive by 10:30 AM)   MR BRAIN W/O & W CONTRAST with [id number]   [facility] MRI ([facility])    [address] [place] [id number] [phone]           Take your medicines as usual, unless your doctor tells you not to. Bring a list of your current medicines to your exam (including vitamins, minerals and  over-the-counter drugs).  You will be given intravenous contrast for this exam. To prepare:   The day before your exam, drink extra fluids at least six 8-ounce glasses (unless your doctor tells you to restrict your fluids).   Have a blood test (creatinine test) within 30 days of your exam. Go to your clinic or Diagnostic Imaging Department for this test.  The MRI machine uses a strong magnet. Please wear clothes without metal (snaps, zippers). A sweatsuit works well, or we may give you a hospital gown.  Please remove any body piercings and hair extensions before you arrive. You will also remove watches, jewelry, hairpins, wallets, dentures, partial dental plates and hearing aids. You may wear contact lenses, and you may be able to wear your rings. We have a safe place to keep your personal items, but it is safer to leave them at home.   **IMPORTANT** THE INSTRUCTIONS BELOW ARE ONLY FOR THOSE PATIENTS WHO HAVE BEEN TOLD THEY WILL RECEIVE SEDATION OR GENERAL ANESTHESIA DURING THEIR MRI PROCEDURE:  IF YOU WILL RECEIVE SEDATION (take medicine to help you relax during your exam):   You must get the medicine from your doctor before you arrive. Bring the medicine to the exam. Do not take it at home.   Arrive one hour early. Bring someone who can take you home after the test. Your medicine will make you sleepy. After the exam, you may not drive, take a bus or take a taxi by yourself.   No eating 8 hours before your exam. You may have clear liquids up until 4 hours before your exam. (Clear liquids include water, clear tea, black coffee and fruit juice without pulp.)  IF YOU WILL RECEIVE ANESTHESIA (be asleep for your exam):   Arrive 1 1/2 hours early. Bring someone who can take you home after the test. You may not drive, take a bus or take a taxi by yourself.   No eating 8 hours before your exam. You may have clear liquids up until 4 hours before your exam. (Clear liquids include water, clear tea, black coffee and fruit  juice without pulp.)  Please call the Imaging Department at your exam site with any questions.            Dec 13, 2017 11:40 AM CST   (Arrive by 11:25 AM)   CT CHEST/ABDOMEN/PELVIS W CONTRAST with [id number]   [facility] CT ([facility])    [address] [id number] [phone]           Please bring any scans or X-rays taken at other hospitals, if similar tests were done. Also bring a list of your medicines, including vitamins, minerals and over-the-counter drugs. It is safest to leave personal items at home.  Be sure to tell your doctor:   If you have any allergies.   If there s any chance you are pregnant.   If you are breastfeeding.   If you have any special needs.  You may have contrast for this exam. To prepare:   Do not eat or drink for 2 hours before your exam. If you need to take medicine, you may take it with small sips of water. (We may ask you to take liquid medicine as well.)   The day before your exam, drink extra fluids at least six 8-ounce glasses (unless your doctor tells you to restrict your fluids).  Patients over 70 or patients with diabetes or kidney problems:   If you haven t had a blood test (creatinine test) within the last 30 days, go to your clinic or Diagnostic Imaging Department for this test.  If you have diabetes:   If your kidney function is normal, continue taking your metformin (Avandamet, Glucophage, Glucovance, Metaglip) on the day of your exam.   If your kidney function is abnormal, wait 48 hours before restarting this medicine.  You will have oral contrast for this exam:   You will drink the contrast at home. Get this from your clinic or Diagnostic Imaging Department. Please follow the directions given.  Please wear loose clothing, such as a sweat suit or jogging clothes. Avoid snaps, zippers and other metal. We may ask you to undress and put on a hospital gown.  If you have any questions, please call the Imaging  Department where you will have your exam.            Dec 13, 2017  1:45 PM CST   (Arrive by 1:30 PM)   Return Visit with Garett Obregon MD   [facility] ([facility])    [address] [facility] [address] [facility]  2nd Floor  [facility] [id number] [phone]            Jan 04, 2018  2:20 PM CST   (Arrive by 2:05 PM)   Return Visit with Yolanda Whitaker MD   [facility] ([facility])    [address]  4th Floor  [facility] [id number] [phone]              Future tests that were ordered for you today     Open Future Orders        Priority Expected Expires Ordered    MRI Brain w & w/o contrast Routine 11/30/2017 2/28/2018 8/30/2017    CT Chest/Abdomen/Pelvis w Contrast Routine 11/30/2017 2/28/2018 8/30/2017    CBC with platelets differential Routine 11/30/2017 2/28/2018 8/30/2017    Comprehensive metabolic panel Routine 11/30/2017 2/28/2018 8/30/2017    Lactate Dehydrogenase Routine 11/30/2017 2/28/2018 8/30/2017            Who to contact     If you have questions or need follow up information about today's clinic visit or your schedule please contact [facility] directly at [phone].  Normal or non-critical lab and imaging results will be communicated to you by [employer], letter or phone within 4 business days after the clinic has received the results. If you do not hear from us within 7 days, please contact the clinic through [employer] or phone. If you have a critical or abnormal lab result, we will notify you by phone as soon as possible.  Submit refill requests through [employer] or call your pharmacy and they will forward the refill request to us. Please allow 3 business days for your refill to be completed.          Additional Information About Your Visit        [employer] Information     [employer] gives you secure access to your electronic health record. If you see a primary care provider, you can  "also send messages to your care team and make appointments. If you have questions, please call your primary care clinic.  If you do not have a primary care provider, please call [phone] and they will assist you.        Care EveryWhere ID     This is your Care EveryWhere ID. This could be used by other organizations to access your [place] medical records  [id number]        Your Vitals Were     Pulse Temperature Respirations Height Pulse Oximetry BMI (Body Mass Index)    86 97.5  F (36.4  C) (Oral) 16 1.626 m (5' 4.02\") 100% 31.7 kg/m2       Blood Pressure from Last 3 Encounters:   08/30/17 129/64   08/01/17 120/70   07/07/17 143/59    Weight from Last 3 Encounters:   08/30/17 83.8 kg (184 lb 12.8 oz)   08/01/17 82.1 kg (181 lb)   07/06/17 84.8 kg (187 lb)                 Today's Medication Changes          These changes are accurate as of: 8/30/17  1:55 PM.  If you have any questions, ask your nurse or doctor.               These medicines have changed or have updated prescriptions.        Dose/Directions    potassium chloride 10 MEQ CR capsule   Commonly known as:  MICRO-K   This may have changed:    - how much to take  - how to take this  - when to take this  - additional instructions   Used for:  Hypokalemia        TK 2 CS PO D   Quantity:  90 capsule   Refills:  1                Primary Care Provider Office Phone # Fax #    Rebekah Boogie [phone] [address] [facility] [address]        Equal Access to Services     GAEL OCONNELL AH: Hadii rivka benson Somerry, waaxda luqadaha, qaybta kaalvito mabry. So [facility] [phone].    ATENCIÓN: Si habla español, tiene a velazquez disposición servicios gratuitos de asistencia lingüística. Llame al [phone].    We comply with applicable federal civil rights laws and [place] laws. We do not discriminate on the basis of race, color, national origin, age, disability sex, sexual orientation or " gender identity.            Thank you!     Thank you for choosing [facility]  for your care. Our goal is always to provide you with excellent care. Hearing back from our patients is one way we can continue to improve our services. Please take a few minutes to complete the written survey that you may receive in the mail after your visit with us. Thank you!             Your Updated Medication List - Protect others around you: Learn how to safely use, store and throw away your medicines at www.disposemymeds.org.          This list is accurate as of: 8/30/17  1:55 PM.  Always use your most recent med list.                   Brand Name Dispense Instructions for use Diagnosis    acetaminophen 500 MG tablet    TYLENOL     Take 500 mg by mouth as needed    Malignant melanoma of nasal cavity (H)       ALPRAZolam 0.25 MG tablet    XANAX    20 tablet    Take 1 tablet (0.25 mg) by mouth 3 times daily as needed for anxiety    JITENDRA (generalized anxiety disorder)       amitriptyline 10 MG tablet    ELAVIL    30 tablet    Take 1 tablet (10 mg) by mouth At Bedtime    Myalgia       BUSPAR PO      Take 5 mg by mouth 3 times daily        hydrochlorothiazide 12.5 MG capsule    MICROZIDE    90 capsule    Take 1 capsule (12.5 mg) by mouth daily    Hypertension goal BP (blood pressure) < 140/90       ibuprofen 800 MG tablet    ADVIL/MOTRIN    90 tablet    Take 1 tablet (800 mg) by mouth every 4 hours as needed for moderate pain Reported on 4/17/2017    Myalgia       lisinopril 30 MG tablet    PRINIVIL,ZESTRIL    90 tablet    Take 1 tablet (30 mg) by mouth daily    Hypertension goal BP (blood pressure) < 140/90       pantoprazole 40 MG EC tablet    PROTONIX    90 tablet    Take 1 tablet (40 mg) by mouth daily Take 30-60 minutes before a meal.    Gastroesophageal reflux disease without esophagitis       potassium chloride 10 MEQ CR capsule    MICRO-K    90 capsule    TK 2 CS PO D    Hypokalemia       pravastatin 40 MG  tablet    PRAVACHOL    30 tablet    Take 1 tablet (40 mg) by mouth daily    Mixed hyperlipidemia       ranitidine 150 MG tablet    ZANTAC    180 tablet    TAKE 1 TABLET BY MOUTH TWICE DAILY AS NEEDED    Gastroesophageal reflux disease without esophagitis       senna-docusate 8.6-50 MG per tablet    SENOKOT-S;PERICOLACE    120 tablet    Take 1-2 tablets by mouth 2 times daily        Simethicone 180 MG Caps     60 capsule    Take 1 capsule by mouth 2 times daily Reported on 5/9/2017    Flatulence, eructation, and gas pain       venlafaxine 75 MG 24 hr capsule    EFFEXOR-XR    270 capsule    Take 3 capsules (225 mg) by mouth daily    JITENDRA (generalized anxiety disorder)       VITAMIN D (CHOLECALCIFEROL) PO      Take 2,000 Units by mouth daily    Malignant melanoma of nasal cavity (H)

## 2017-08-30 NOTE — PROGRESS NOTES
[facility]  FOLLOW-UP VISIT NOTE    PATIENT NAME: Gabino Jones MRN # [id number]  DATE OF VISIT: Aug 30, 2017 YOB: 1940    REFERRING PROVIDER: Yolanda Whitaker MD   PHYSICIANS  [address]  [place] [address]    CANCER TYPE: Malignant Melanoma  STAGE:   ECOG PS: 1    TREATMENT SUMMARY:  Gabino presented with difficulty to breathe for previous 6-7 months due to partial nasal obstruction starting December 2013. She had been following with a local ENT surgeon and was prescribed nasal drops several courses of antibiotics for presumed ethmoid sinusitis.  Most recently in the last couple of weeks she has passed a few blood clots from her nose. She had an episode of epistaxis in April of 2014 which resolved on its own. On 06/03/14, she underwent right intranasal endoscopic sinus surgery for her ethmoid sinusitis as well as resection of the right nasal mass measuring 2.5 x 2 x 1.2 cm at [facility]. Following the procedure, she continued to have hemorrhage from the right nasal cavity and was taken back to the OR that same day for a repeat nasal endoscopy, where it was determined that it was a posterior bleed and that the mass had not been completely resected. The remainder of the mass was resected and the bleeding stopped. The following morning (6/04/14), she underwent another endoscopy at [facility] due to continued bleeding, and her right nasal cavity was packed. The histopathology from her recent resection revealed malignant melanoma.  The margins were positive.  This prompted a referral to [facility] and she was seen in ENT surgery by Dr. Whitaker and Dr. Ron Quiroga in Radiation/Oncology in addition to Medical Oncology.  She was worked up with a staging PET/CT scan and a brain MRI.  The PET/CT scan revealed minimal uptake in the local area which could very well be from recent surgery or residual  disease.  She had multiple subcentimeter pulmonary nodules with the largest one being 6 mm in the left lower lobe.  She had a history of pulmonary nodules which had been previously followed for 3 years. The PET was also significant for an enlarged portacaval node with mild FDG uptake and FDG uptake in the hepatic flexure of her colon.  Her most recent colonoscopy has been merely 2 months ago and was completely normal.  Her brain MRI was limited because of motion artifact. There were no enlarged lymph nodes in the head and neck region or any other site of increased FDG uptake that would be suspicious for definitive metastatic disease.      Her case was reviewed at the multi-specialty tumor board and she was recommended endoscopic resection of the melanoma followed by post operative radiation therapy. She underwent a rerevision surgery on 06/23/2014 using an endoscopic endonasal approach. Tumor was located at the anterior skull base between the middle turbinate and the septum. Final pathology report showed clear margins. She was started on radiation therapy under care of Dr. Ron Quiroga in July. Unfortunately she fell and fractured her left humerus in End of August. It was decided not to proceed with surgical intervention as this would involve intubation which would be difficult given ongoing radiation therapy. She had a difficult hospital course with UTI, confusion. She was managed conservatively and radiation therapy was resumed - eventually completed on September 11, 2014. She had a 3 month post treatment completion restaging scan which does not show any recurrent disease. She comes in today for routine follow up.       ALAN Lloyd is being followed for her malignant melanoma status post resection on 6/04/14 and then re-resection on 06/24/14 with negative margins followed by radiation therapy from July through September 11, 2014.     She is accompanied by her  at this clinic visit. She has nearly lost  her eye sight in the left eye and can barely appreciate flickering of light in the periphery. She is fearful of losing vision in the right side.     She continues to have pain in her site of excision in skull in February.       PAST MEDICAL HISTORY   1. HTN  2. Dyslipidemia  3. Depression on medications  4. Pulmonary nodules  5. Cholecystectomy  6. OA - Back and Neck surgeries done   7. Fibromyalgia      CURRENT OUTPATIENT MEDICATIONS     Current Outpatient Prescriptions   Medication Sig     acetaminophen (TYLENOL) 500 MG tablet Take 500 mg by mouth as needed     VITAMIN D, CHOLECALCIFEROL, PO Take 2,000 Units by mouth daily     ibuprofen (ADVIL/MOTRIN) 800 MG tablet Take 1 tablet (800 mg) by mouth every 4 hours as needed for moderate pain Reported on 4/17/2017     Simethicone 180 MG CAPS Take 1 capsule by mouth 2 times daily Reported on 5/9/2017     ranitidine (ZANTAC) 150 MG tablet TAKE 1 TABLET BY MOUTH TWICE DAILY AS NEEDED     potassium chloride (MICRO-K) 10 MEQ CR capsule TK 2 CS PO D (Patient taking differently: Take 20 mEq by mouth daily TK 2 CS PO D)     senna-docusate (SENOKOT-S;PERICOLACE) 8.6-50 MG per tablet Take 1-2 tablets by mouth 2 times daily     amitriptyline (ELAVIL) 10 MG tablet Take 1 tablet (10 mg) by mouth At Bedtime     ALPRAZolam (XANAX) 0.25 MG tablet Take 1 tablet (0.25 mg) by mouth 3 times daily as needed for anxiety     venlafaxine (EFFEXOR-XR) 75 MG 24 hr capsule Take 3 capsules (225 mg) by mouth daily     pantoprazole (PROTONIX) 40 MG EC tablet Take 1 tablet (40 mg) by mouth daily Take 30-60 minutes before a meal.     lisinopril (PRINIVIL,ZESTRIL) 30 MG tablet Take 1 tablet (30 mg) by mouth daily     hydrochlorothiazide (MICROZIDE) 12.5 MG capsule Take 1 capsule (12.5 mg) by mouth daily     pravastatin (PRAVACHOL) 40 MG tablet Take 1 tablet (40 mg) by mouth daily     BusPIRone HCl (BUSPAR PO) Take 5 mg by mouth 3 times daily     No current facility-administered medications for this  "visit.           ALLERGIES     Allergies   Allergen Reactions     Novocain [Procaine] Unknown and Rash     Nefazodone Unknown     Mirtazapine       PHYSICAL EXAM   /64  Pulse 86  Temp 97.5  F (36.4  C) (Oral)  Resp 16  Ht 1.626 m (5' 4.02\")  Wt 83.8 kg (184 lb 12.8 oz)  SpO2 100%  BMI 31.7 kg/m2   Lying /66, Sitting 119/74, Standing 102/57   SpO2 Readings from Last 4 Encounters:   08/30/17 100%   07/07/17 100%   04/17/17 97%   02/23/17 97%     Wt Readings from Last 3 Encounters:   08/30/17 83.8 kg (184 lb 12.8 oz)   08/01/17 82.1 kg (181 lb)   07/06/17 84.8 kg (187 lb)     GEN: NAD  HEENT: PERRL, EOMI, no icterus, injection or pallor. Oropharynx is clear.   NECK: no obvious cervical or supraclavicular lymphadenopathy  LUNGS: clear bilaterally  CV: regular rate and rhythm  ABDOMEN: soft, non-tender, non-distended, normal bowel sounds, no hepatosplenomegaly  EXT: warm, well perfused, no edema  NEURO: alert  SKIN: no rashes     LABORATORY AND IMAGING STUDIES     Recent Labs   Lab Test  02/15/17   0937  08/10/16   1050 07/29/16 04/13/16   1211  10/21/15   1237  06/25/15   1431   NA  133  136  138  134  136  131*   POTASSIUM  3.9  4.3  4.2  4.2  4.3  4.4   CHLORIDE  98  102  105.0  100  102  97   CO2  25  26   --   26  27  28   ANIONGAP  10  8   --   8  6  7   BUN  11  11  13.0  11  10  11   CR  0.86  0.73  0.76  0.73  0.71  0.68   GLC  84  86  94.0  84  91  107*   STEFFANIE  8.0*  8.7  8.9  9.1  9.0  8.9     Recent Labs   Lab Test  09/02/14   0550  09/01/14   0532  08/31/14   0525  08/30/14   0909  08/30/14   0616   MAG  1.6  1.9  1.8  2.2  1.5*     Recent Labs   Lab Test  02/15/17   0937  08/10/16   1050 07/29/16 04/13/16   1211  10/21/15   1237   WBC  7.6  5.3  5.8  8.5  9.7   HGB  11.1*  12.0  11.9  12.1  12.8   PLT  219  256  225.0  286  272   MCV  90  94  88.0  92  95   NEUTROPHIL  66.9  62.1  79.1  76.1  75.1     Recent Labs   Lab Test  02/15/17   0937  08/10/16   1050  04/13/16   1211   03/19/15   " 0759   BILITOTAL  0.4  0.3  0.4   < >  0.5   ALKPHOS  90  88  98   < >  79   ALT  15  18  26   < >  22   AST  19  17  23   < >  24   ALBUMIN  3.1*  3.6  3.6   < >  3.7   LDH   --    --    --    --   157    < > = values in this interval not displayed.     TSH   Date Value Ref Range Status   02/15/2017 2.27 0.40 - 4.00 mU/L Final   08/10/2016 2.32 0.40 - 4.00 mU/L Final   03/19/2015 1.34 0.40 - 4.00 mU/L Final     Comment:     Effective 7/30/2014, the reference range for this assay has changed to reflect   new instrumentation/methodology.            ASSESSMENT AND PLAN   77 year old female with right sinonasal mucosal malignant melanoma  status post resection on 6/04/14 and then re-resection on 06/24/14 with negative margins followed by radiation therapy from July through September 11, 2014 (6000 cGy in 30 fractions).   b-aubrie and c-KIT status unknown    I have reviewed her scans from earlier today. There is no evidence of recurrent disease.     I have reviewed the brain MRI. I will wait for the official reports to come by. She has followed with Dr. Quiroga in opthalmology 2 days ago and has a follow up with Dr. Barboza in Jan. I will see her in mid to late December with restaging scans including CT CAP and brain MRI.     She is nearly 3 years out from treatment completion which is very encouraging.     She had EGD and colonoscopy where several polyp's were removed. She notes that she has additional procedure planned for the largest polyp.     Over 25 min of direct face to face time spent with patient with more than 50% time spent in counseling and coordinating care.

## 2017-08-30 NOTE — LETTER
8/30/2017       RE: Gabino Jones  [address] [address]     Dear Colleague,    Thank you for referring your patient, Gabino Jones, to the [facility]. Please see a copy of my visit note below.    [facility]  FOLLOW-UP VISIT NOTE    PATIENT NAME: Gabino Jones MRN # [id number]  DATE OF VISIT: Aug 30, 2017 YOB: 1940    REFERRING PROVIDER: Yolanda Whitaker MD   PHYSICIANS  [address]  [place] [address]    CANCER TYPE: Malignant Melanoma  STAGE:   ECOG PS: 1    TREATMENT SUMMARY:  Gabino presented with difficulty to breathe for previous 6-7 months due to partial nasal obstruction starting December 2013. She had been following with a local ENT surgeon and was prescribed nasal drops several courses of antibiotics for presumed ethmoid sinusitis.  Most recently in the last couple of weeks she has passed a few blood clots from her nose. She had an episode of epistaxis in April of 2014 which resolved on its own. On 06/03/14, she underwent right intranasal endoscopic sinus surgery for her ethmoid sinusitis as well as resection of the right nasal mass measuring 2.5 x 2 x 1.2 cm at [facility]. Following the procedure, she continued to have hemorrhage from the right nasal cavity and was taken back to the OR that same day for a repeat nasal endoscopy, where it was determined that it was a posterior bleed and that the mass had not been completely resected. The remainder of the mass was resected and the bleeding stopped. The following morning (6/04/14), she underwent another endoscopy at [facility] due to continued bleeding, and her right nasal cavity was packed. The histopathology from her recent resection revealed malignant melanoma.  The margins were positive.  This prompted a referral to [facility] and she was seen in ENT surgery by Dr. Whitaker and  Dr. Ron Quiroga in Radiation/Oncology in addition to Medical Oncology.  She was worked up with a staging PET/CT scan and a brain MRI.  The PET/CT scan revealed minimal uptake in the local area which could very well be from recent surgery or residual disease.  She had multiple subcentimeter pulmonary nodules with the largest one being 6 mm in the left lower lobe.  She had a history of pulmonary nodules which had been previously followed for 3 years. The PET was also significant for an enlarged portacaval node with mild FDG uptake and FDG uptake in the hepatic flexure of her colon.  Her most recent colonoscopy has been merely 2 months ago and was completely normal.  Her brain MRI was limited because of motion artifact. There were no enlarged lymph nodes in the head and neck region or any other site of increased FDG uptake that would be suspicious for definitive metastatic disease.      Her case was reviewed at the multi-specialty tumor board and she was recommended endoscopic resection of the melanoma followed by post operative radiation therapy. She underwent a rerevision surgery on 06/23/2014 using an endoscopic endonasal approach. Tumor was located at the anterior skull base between the middle turbinate and the septum. Final pathology report showed clear margins. She was started on radiation therapy under care of Dr. Ron Quiroga in July. Unfortunately she fell and fractured her left humerus in End of August. It was decided not to proceed with surgical intervention as this would involve intubation which would be difficult given ongoing radiation therapy. She had a difficult hospital course with UTI, confusion. She was managed conservatively and radiation therapy was resumed - eventually completed on September 11, 2014. She had a 3 month post treatment completion restaging scan which does not show any recurrent disease. She comes in today for routine follow up.       ALAN Lloyd is being followed for her  malignant melanoma status post resection on 6/04/14 and then re-resection on 06/24/14 with negative margins followed by radiation therapy from July through September 11, 2014.     She is accompanied by her  at this clinic visit. She has nearly lost her eye sight in the left eye and can barely appreciate flickering of light in the periphery. She is fearful of losing vision in the right side.     She continues to have pain in her site of excision in skull in February.       PAST MEDICAL HISTORY   1. HTN  2. Dyslipidemia  3. Depression on medications  4. Pulmonary nodules  5. Cholecystectomy  6. OA - Back and Neck surgeries done   7. Fibromyalgia      CURRENT OUTPATIENT MEDICATIONS     Current Outpatient Prescriptions   Medication Sig     acetaminophen (TYLENOL) 500 MG tablet Take 500 mg by mouth as needed     VITAMIN D, CHOLECALCIFEROL, PO Take 2,000 Units by mouth daily     ibuprofen (ADVIL/MOTRIN) 800 MG tablet Take 1 tablet (800 mg) by mouth every 4 hours as needed for moderate pain Reported on 4/17/2017     Simethicone 180 MG CAPS Take 1 capsule by mouth 2 times daily Reported on 5/9/2017     ranitidine (ZANTAC) 150 MG tablet TAKE 1 TABLET BY MOUTH TWICE DAILY AS NEEDED     potassium chloride (MICRO-K) 10 MEQ CR capsule TK 2 CS PO D (Patient taking differently: Take 20 mEq by mouth daily TK 2 CS PO D)     senna-docusate (SENOKOT-S;PERICOLACE) 8.6-50 MG per tablet Take 1-2 tablets by mouth 2 times daily     amitriptyline (ELAVIL) 10 MG tablet Take 1 tablet (10 mg) by mouth At Bedtime     ALPRAZolam (XANAX) 0.25 MG tablet Take 1 tablet (0.25 mg) by mouth 3 times daily as needed for anxiety     venlafaxine (EFFEXOR-XR) 75 MG 24 hr capsule Take 3 capsules (225 mg) by mouth daily     pantoprazole (PROTONIX) 40 MG EC tablet Take 1 tablet (40 mg) by mouth daily Take 30-60 minutes before a meal.     lisinopril (PRINIVIL,ZESTRIL) 30 MG tablet Take 1 tablet (30 mg) by mouth daily     hydrochlorothiazide (MICROZIDE)  "12.5 MG capsule Take 1 capsule (12.5 mg) by mouth daily     pravastatin (PRAVACHOL) 40 MG tablet Take 1 tablet (40 mg) by mouth daily     BusPIRone HCl (BUSPAR PO) Take 5 mg by mouth 3 times daily     No current facility-administered medications for this visit.           ALLERGIES     Allergies   Allergen Reactions     Novocain [Procaine] Unknown and Rash     Nefazodone Unknown     Mirtazapine       PHYSICAL EXAM   /64  Pulse 86  Temp 97.5  F (36.4  C) (Oral)  Resp 16  Ht 1.626 m (5' 4.02\")  Wt 83.8 kg (184 lb 12.8 oz)  SpO2 100%  BMI 31.7 kg/m2   Lying /66, Sitting 119/74, Standing 102/57   SpO2 Readings from Last 4 Encounters:   08/30/17 100%   07/07/17 100%   04/17/17 97%   02/23/17 97%     Wt Readings from Last 3 Encounters:   08/30/17 83.8 kg (184 lb 12.8 oz)   08/01/17 82.1 kg (181 lb)   07/06/17 84.8 kg (187 lb)     GEN: NAD  HEENT: PERRL, EOMI, no icterus, injection or pallor. Oropharynx is clear.   NECK: no obvious cervical or supraclavicular lymphadenopathy  LUNGS: clear bilaterally  CV: regular rate and rhythm  ABDOMEN: soft, non-tender, non-distended, normal bowel sounds, no hepatosplenomegaly  EXT: warm, well perfused, no edema  NEURO: alert  SKIN: no rashes     LABORATORY AND IMAGING STUDIES     Recent Labs   Lab Test  02/15/17   0937  08/10/16   1050 07/29/16 04/13/16   1211  10/21/15   1237  06/25/15   1431   NA  133  136  138  134  136  131*   POTASSIUM  3.9  4.3  4.2  4.2  4.3  4.4   CHLORIDE  98  102  105.0  100  102  97   CO2  25  26   --   26  27  28   ANIONGAP  10  8   --   8  6  7   BUN  11  11  13.0  11  10  11   CR  0.86  0.73  0.76  0.73  0.71  0.68   GLC  84  86  94.0  84  91  107*   STEFFANIE  8.0*  8.7  8.9  9.1  9.0  8.9     Recent Labs   Lab Test  09/02/14   0550  09/01/14   0532  08/31/14   0525  08/30/14   0909  08/30/14   0616   MAG  1.6  1.9  1.8  2.2  1.5*     Recent Labs   Lab Test  02/15/17   0937  08/10/16   1050 07/29/16 04/13/16   1211  10/21/15   1237   WBC  " 7.6  5.3  5.8  8.5  9.7   HGB  11.1*  12.0  11.9  12.1  12.8   PLT  219  256  225.0  286  272   MCV  90  94  88.0  92  95   NEUTROPHIL  66.9  62.1  79.1  76.1  75.1     Recent Labs   Lab Test  02/15/17   0937  08/10/16   1050  04/13/16   1211   03/19/15   0759   BILITOTAL  0.4  0.3  0.4   < >  0.5   ALKPHOS  90  88  98   < >  79   ALT  15  18  26   < >  22   AST  19  17  23   < >  24   ALBUMIN  3.1*  3.6  3.6   < >  3.7   LDH   --    --    --    --   157    < > = values in this interval not displayed.     TSH   Date Value Ref Range Status   02/15/2017 2.27 0.40 - 4.00 mU/L Final   08/10/2016 2.32 0.40 - 4.00 mU/L Final   03/19/2015 1.34 0.40 - 4.00 mU/L Final     Comment:     Effective 7/30/2014, the reference range for this assay has changed to reflect   new instrumentation/methodology.            ASSESSMENT AND PLAN   77 year old female with right sinonasal mucosal malignant melanoma  status post resection on 6/04/14 and then re-resection on 06/24/14 with negative margins followed by radiation therapy from July through September 11, 2014 (6000 cGy in 30 fractions).   b-aubrie and c-KIT status unknown    I have reviewed her scans from earlier today. There is no evidence of recurrent disease.     I have reviewed the brain MRI. I will wait for the official reports to come by. She has followed with Dr. Quiroga in opthalmology 2 days ago and has a follow up with Dr. Barboza in Jan. I will see her in mid to late December with restaging scans including CT CAP and brain MRI.     She is nearly 3 years out from treatment completion which is very encouraging.     She had EGD and colonoscopy where several polyp's were removed. She notes that she has additional procedure planned for the largest polyp.     Over 25 min of direct face to face time spent with patient with more than 50% time spent in counseling and coordinating care.          Again, thank you for allowing me to participate in the care of your patient.       Sincerely,    Garett Obregon MD

## 2017-09-25 NOTE — PATIENT INSTRUCTIONS
Before Your Surgery      Call your surgeon if there is any change in your health. This includes signs of a cold or flu (such as a sore throat, runny nose, cough, rash or fever).    Do not smoke, drink alcohol or take over the counter medicine (unless your surgeon or primary care doctor tells you to) for the 24 hours before and after surgery.    If you take prescribed drugs: Follow your doctor s orders about which medicines to take and which to stop until after surgery.    Eating and drinking prior to surgery: follow the instructions from your surgeon    Take a shower or bath the night before surgery. Use the soap your surgeon gave you to gently clean your skin. If you do not have soap from your surgeon, use your regular soap. Do not shave or scrub the surgery site.  Wear clean pajamas and have clean sheets on your bed.       Hold Lisinopril on October 3rd and October 4th for your procedure.

## 2017-09-25 NOTE — PROGRESS NOTES
[facility] [place]  [address]  [place] [address]  [phone]  Dept: [phone]    PRE-OP EVALUATION:  Today's date: 2017    Gabino Jonse (: 1940) presents for pre-operative evaluation assessment as requested by Dr. Martinez for Colonic Polyp.  Proposed procedure: colonoscopy with Endoscopic Submucosal Dissection  Fax number - [phone]  Date of Surgery/ Procedure:  , 7:30 am   Time of Surgery/ Procedure: colonoscopy   Hospital/Surgical Facility: [facility]    Primary Physician: Rebekah Boogie  Type of Anesthesia Anticipated: General    Patient has a Health Care Directive or Living Will:  NO    1. NO - Do you have a history of heart attack, stroke, stent, bypass or surgery on an artery in the head, neck, heart or legs?  2. NO - Do you ever have any pain or discomfort in your chest?  3. NO - Do you have a history of  Heart Failure?  4. NO - Are you troubled by shortness of breath when: walking on the level, up a slight hill or at night?  5. NO - Do you currently have a cold, bronchitis or other respiratory infection?  6. NO - Do you have a cough, shortness of breath or wheezing?  7. NO - Do you sometimes get pains in the calves of your legs when you walk?  8. NO - Do you or anyone in your family have previous history of blood clots?  9. NO - Do you or does anyone in your family have a serious bleeding problem such as prolonged bleeding following surgeries or cuts?  10. NO - Have you ever had problems with anemia or been told to take iron pills?  11. NO - Have you had any abnormal blood loss such as black, tarry or bloody stools, or abnormal vaginal bleeding?  12. NO - Have you ever had a blood transfusion?  13. NO - Have you or any of your relatives ever had problems with anesthesia?  14. NO - Do you have sleep apnea, excessive snoring or daytime drowsiness?  15. NO - Do you have any prosthetic heart valves?  16. NO - Do you have prosthetic  joints?  17. NO - Is there any chance that you may be pregnant?        HPI:                                                      Brief HPI related to upcoming procedure: Pt. Having endoscopic Submucosal Dissection of large colon polyp. History of Tubular and Sessile adenomas found on last colonoscopy 7/7/2017.    See problem list for active medical problems.  Problems all longstanding and stable, except as noted/documented.  See ROS for pertinent symptoms related to these conditions.                          Aortic stenosis: from visit 4/2017:    ASSESSMENT AND PLAN:   1. Aortic stenosis.  Patient has Class II-III dyspnea of unknown etiology.  This has been stable for the past year.  ECHO (May 2016) showed mild to moderate aortic insufficiency, and mild aortic stenosis.   Repeat ECHO in May 2018.     2. R/O Cardiac Mass.  The CT scan reported out a hypodensity in the LV apex but this could not be further defined.  Cardiac MRI showed no mass.     Lipids/HTN- plans stable.      FOLLOW UP:  1 year  Has poor vision in left eye from melanoma radiation therapy.                                                                             .    MEDICAL HISTORY:                                                    Patient Active Problem List    Diagnosis Date Noted     Mild recurrent major depression (H) 09/26/2017     Priority: Medium     Tubular adenoma of colon 09/26/2017     Priority: Medium     Mixed hyperlipidemia 07/06/2017     Priority: Medium     JITENDRA (generalized anxiety disorder) 05/23/2017     Priority: Medium     Skull lesion 02/22/2017     Priority: Medium     Hypertension goal BP (blood pressure) < 140/90 02/20/2017     Priority: Medium     Aortic stenosis 04/25/2016     Priority: Medium     Abnormal chest CT 04/22/2016     Priority: Medium     Proximal humerus fracture 08/29/2014     Priority: Medium     Melanoma (H) 06/23/2014     Priority: Medium     Into sinus cavity and 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        Malignant  melanoma of nasal cavity (H) 06/05/2014     Priority: Medium     Nasal pain 06/05/2014     Priority: Medium      Past Medical History:   Diagnosis Date     Cancer (H)      Chronic back pain      Chronic leg pain      Depression      Hearing loss      Heart murmur      Hyperlipidemia      Hypertension      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     Ringing in ears      Past Surgical History:   Procedure Laterality Date     C ANESTH,CERV SPINE, SITTING POSITION       COLONOSCOPY N/A 7/7/2017    Procedure: COMBINED COLONOSCOPY, SINGLE OR MULTIPLE BIOPSY/POLYPECTOMY BY BIOPSY;;  Surgeon: Sathya Norman MD;  Location: [facility] OR     COLONOSCOPY WITH CO2 INSUFFLATION N/A 7/7/2017    Procedure: COLONOSCOPY WITH CO2 INSUFFLATION;  Combined,  [place], Gastroesophageal reflux disease without esophagitis  Flatulence, eructation, and gas pain, BMI 29.35, [facility] [id number];  Surgeon: Sathya Norman MD;  Location: [facility] OR     COMBINED ESOPHAGOSCOPY, GASTROSCOPY, DUODENOSCOPY (EGD) WITH CO2 INSUFFLATION N/A 7/7/2017    Procedure: COMBINED ESOPHAGOSCOPY, GASTROSCOPY, DUODENOSCOPY (EGD) WITH CO2 INSUFFLATION;  Combined,  [place], Gastroesophageal reflux disease without esophagitis  Flatulence, eructation, and gas pain, BMI 29.35, [facility] [id number];  Surgeon: Sathya Norman MD;  Location: [facility] OR     ESOPHAGOSCOPY, GASTROSCOPY, DUODENOSCOPY (EGD), COMBINED N/A 7/7/2017    Procedure: COMBINED ESOPHAGOSCOPY, GASTROSCOPY, DUODENOSCOPY (EGD), BIOPSY SINGLE OR MULTIPLE;;  Surgeon: Sathya Norman MD;  Location: [facility] OR     GALLBLADDER SURGERY       NECK SURGERY       OPTICAL TRACKING SYSTEM CRANIOTOMY, EXCISE TUMOR, COMBINED Right 2/22/2017    Procedure: COMBINED OPTICAL TRACKING SYSTEM CRANIOTOMY, EXCISE TUMOR;  Surgeon: Lenora Pérez MD;  Location: [facility] OR     OPTICAL TRACKING SYSTEM ENDOSCOPIC ENDONASAL SURGERY  6/23/2014    Procedure: OPTICAL TRACKING SYSTEM ENDOSCOPIC ENDONASAL  SURGERY;  Surgeon: Yolanda Whitaker MD;  Location: [facility] OR     STOMACH SURGERY       TONSILLECTOMY       TUBAL LIGATION      1975     Current Outpatient Prescriptions   Medication Sig Dispense Refill     pravastatin (PRAVACHOL) 40 MG tablet Take 1 tablet (40 mg) by mouth daily 30 tablet 0     [DISCONTINUED] FLUoxetine (PROZAC) 20 MG capsule Take 1 capsule (20 mg) by mouth daily 30 capsule 1     VITAMIN D, CHOLECALCIFEROL, PO Take 2,000 Units by mouth daily       ibuprofen (ADVIL/MOTRIN) 800 MG tablet Take 1 tablet (800 mg) by mouth every 4 hours as needed for moderate pain Reported on 4/17/2017 90 tablet 1     Simethicone 180 MG CAPS Take 1 capsule by mouth 2 times daily Reported on 5/9/2017 60 capsule 6     ranitidine (ZANTAC) 150 MG tablet TAKE 1 TABLET BY MOUTH TWICE DAILY AS NEEDED 180 tablet 1     potassium chloride (MICRO-K) 10 MEQ CR capsule TK 2 CS PO D (Patient taking differently: Take 20 mEq by mouth daily TK 2 CS PO D) 90 capsule 1     senna-docusate (SENOKOT-S;PERICOLACE) 8.6-50 MG per tablet Take 1-2 tablets by mouth 2 times daily 120 tablet 3     ALPRAZolam (XANAX) 0.25 MG tablet Take 1 tablet (0.25 mg) by mouth 3 times daily as needed for anxiety 20 tablet 1     venlafaxine (EFFEXOR-XR) 75 MG 24 hr capsule Take 3 capsules (225 mg) by mouth daily 270 capsule 1     pantoprazole (PROTONIX) 40 MG EC tablet Take 1 tablet (40 mg) by mouth daily Take 30-60 minutes before a meal. 90 tablet 1     lisinopril (PRINIVIL,ZESTRIL) 30 MG tablet Take 1 tablet (30 mg) by mouth daily 90 tablet 1     hydrochlorothiazide (MICROZIDE) 12.5 MG capsule Take 1 capsule (12.5 mg) by mouth daily 90 capsule 1     FLUoxetine (PROZAC) 20 MG capsule TAKE 1 CAPSULE(20 MG) BY MOUTH DAILY 90 capsule 1     acetaminophen (TYLENOL) 500 MG tablet Take 500 mg by mouth as needed       [DISCONTINUED] pravastatin (PRAVACHOL) 40 MG tablet Take 1 tablet (40 mg) by mouth daily 30 tablet 0     BusPIRone HCl (BUSPAR PO) Take 5 mg by  "mouth 3 times daily       OTC products: no recent use of OTC ASA, NSAIDS or Steroids    Allergies   Allergen Reactions     Novocain [Procaine] Unknown and Rash     Nefazodone Unknown     Mirtazapine       Latex Allergy: NO    Social History   Substance Use Topics     Smoking status: Former Smoker     Types: Cigarettes     Quit date: 6/10/2004     Smokeless tobacco: Never Used     Alcohol use No     History   Drug Use No       REVIEW OF SYSTEMS:                                                    Constitutional, neuro, ENT, endocrine, pulmonary, cardiac, gastrointestinal, genitourinary, musculoskeletal, integument and psychiatric systems are negative, except as otherwise noted.    No chest pain or SOB. OSBORN after 1 block of ambulation- baseline. No orthopnea      EXAM:                                                    /60  Pulse 80  Temp 97.6  F (36.4  C) (Temporal)  Resp 16  Ht 5' 5.08\" (1.653 m)  Wt 183 lb 11.2 oz (83.3 kg)  SpO2 100%  BMI 30.5 kg/m2    GENERAL APPEARANCE: healthy, alert and no distress     EYES: EOMI, PERRL     HENT: ear canals and TM's normal and nose and mouth without ulcers or lesions     NECK: no adenopathy, no asymmetry, masses, or scars and thyroid normal to palpation     RESP: lungs clear to auscultation - no rales, rhonchi or wheezes     CV: regular rates and rhythm, murmur 3/6 mid systolic medium-low pitch murmur aortic area and No LE edema     ABDOMEN:  soft, nontender, no HSM or masses and bowel sounds normal     MS: extremities normal- no gross deformities noted, no evidence of inflammation in joints, FROM in all extremities.     SKIN: no suspicious lesions or rashes     NEURO: Normal strength and tone, sensory exam grossly normal, mentation intact and speech normal     PSYCH: mentation appears normal. and affect normal/bright    DIAGNOSTICS:                                                    EKG: appears normal, NSR, normal axis, normal intervals, no acute ST/T changes c/w " ischemia, no LVH by voltage criteria, unchanged from previous tracings  Hemoglobin (indicated for history of anemia or procedure with significant blood loss such as tonsillectomy, major intraperitoneal surgery, vascular surgery, major spine surgery, total joint replacement)  Serum Potassium  Serum Creatinine    Recent Labs   Lab Test  08/01/17   0914  03/28/17   1553  02/21/17   1240  02/15/17   0937   08/29/14   1912   HGB   --   12.7  12.2  11.1*   < >  10.0*   PLT   --   307   --   219   < >  212   INR   --    --    --    --    --   1.19*   NA  135  136   --   133   < >  134   POTASSIUM  3.8  4.9  4.1  3.9   < >  3.9   CR  0.78  0.79   --   0.86   < >  0.69    < > = values in this interval not displayed.        IMPRESSION:                                                    Reason for surgery/procedure: colon polyps  Diagnosis/reason for consult: Aortic stenosis, HTN, age, JITENDRA/Depression    The proposed surgical procedure is considered LOW risk.    REVISED CARDIAC RISK INDEX  The patient has the following serious cardiovascular risks for perioperative complications such as (MI, PE, VFib and 3  AV Block):    INTERPRETATION: 1 risks: Class II (low risk - 0.9% complication rate)    The patient has the following additional risks for perioperative complications:  No identified additional risks      ICD-10-CM    1. Preop general physical exam Z01.818    2. Polyp of colon, unspecified part of colon, unspecified type K63.5 EKG 12-lead complete w/read - Clinics     Hemoglobin     Basic metabolic panel   3. Mixed hyperlipidemia E78.2 pravastatin (PRAVACHOL) 40 MG tablet   4. Hypertension goal BP (blood pressure) < 140/90 I10 EKG 12-lead complete w/read - Clinics     Hemoglobin     Basic metabolic panel   5. JITENDRA (generalized anxiety disorder) F41.1 DISCONTINUED: FLUoxetine (PROZAC) 20 MG capsule   6. Nonrheumatic aortic valve stenosis I35.0    7. Mild recurrent major depression (H) F33.0    8. Advanced directives,  counseling/discussion Z71.89    9. History of colonic polyps Z86.010        RECOMMENDATIONS:                                                        Cardiovascular Risk  Pt. Dose routinely under 3.0 METS of activity- baseline.   Low risk procedure, beta blocker considered and not ordered.     Aortic Stenosis- see summary above.         --Patient is to take all scheduled medications on the day of surgery EXCEPT for modifications listed below.    Anticoagulant or Antiplatelet Medication Use  ASPIRIN: on hold  NSAIDS: on hold        ACE Inhibitor or Angiotensin Receptor Blocker (ARB) Use  Ace inhibitor or Angiotensin Receptor Blocker (ARB) and should HOLD this medication for the 24 hours prior to surgery.    JESSICA- possible JESSICA suspected as pt. Reports snoring. Monitor oxygen levels post-op.       Pt. reports worsening depression. Adding Prozac 20 mg PO QD. RE-check with PCP in one month.     Pt. Had not been taking Pravastatin, re-started. Lab re-check with PCP in one month.     Pt. Has been offered information on an Advanced Directive, she declines this.           APPROVAL GIVEN to proceed with proposed procedure, without further diagnostic evaluation       Signed Electronically by: MELANIE Hammond CNP    Copy of this evaluation report is provided to requesting physician.    [facility] Preop Guidelines

## 2017-09-26 ENCOUNTER — TELEPHONE (OUTPATIENT)
Dept: FAMILY MEDICINE | Facility: CLINIC | Age: 77
End: 2017-09-26

## 2017-09-26 ENCOUNTER — OFFICE VISIT (OUTPATIENT)
Dept: FAMILY MEDICINE | Facility: CLINIC | Age: 77
End: 2017-09-26
Payer: MEDICARE

## 2017-09-26 VITALS
RESPIRATION RATE: 16 BRPM | HEIGHT: 65 IN | WEIGHT: 183.7 LBS | BODY MASS INDEX: 30.6 KG/M2 | SYSTOLIC BLOOD PRESSURE: 120 MMHG | HEART RATE: 80 BPM | DIASTOLIC BLOOD PRESSURE: 60 MMHG | OXYGEN SATURATION: 100 % | TEMPERATURE: 97.6 F

## 2017-09-26 DIAGNOSIS — F33.0 MILD RECURRENT MAJOR DEPRESSION (H): ICD-10-CM

## 2017-09-26 DIAGNOSIS — F41.1 GAD (GENERALIZED ANXIETY DISORDER): ICD-10-CM

## 2017-09-26 DIAGNOSIS — E78.2 MIXED HYPERLIPIDEMIA: ICD-10-CM

## 2017-09-26 DIAGNOSIS — Z01.818 PREOP GENERAL PHYSICAL EXAM: Primary | ICD-10-CM

## 2017-09-26 DIAGNOSIS — I35.0 NONRHEUMATIC AORTIC VALVE STENOSIS: ICD-10-CM

## 2017-09-26 DIAGNOSIS — Z86.0100 HISTORY OF COLONIC POLYPS: ICD-10-CM

## 2017-09-26 DIAGNOSIS — I10 HYPERTENSION GOAL BP (BLOOD PRESSURE) < 140/90: ICD-10-CM

## 2017-09-26 DIAGNOSIS — K63.5 POLYP OF COLON, UNSPECIFIED PART OF COLON, UNSPECIFIED TYPE: ICD-10-CM

## 2017-09-26 DIAGNOSIS — Z71.89 ADVANCED DIRECTIVES, COUNSELING/DISCUSSION: ICD-10-CM

## 2017-09-26 PROBLEM — D12.6 TUBULAR ADENOMA OF COLON: Status: ACTIVE | Noted: 2017-09-26

## 2017-09-26 LAB — HGB BLD-MCNC: 12.6 G/DL (ref 11.7–15.7)

## 2017-09-26 PROCEDURE — 80048 BASIC METABOLIC PNL TOTAL CA: CPT | Performed by: NURSE PRACTITIONER

## 2017-09-26 PROCEDURE — 85018 HEMOGLOBIN: CPT | Performed by: NURSE PRACTITIONER

## 2017-09-26 PROCEDURE — 93000 ELECTROCARDIOGRAM COMPLETE: CPT | Performed by: NURSE PRACTITIONER

## 2017-09-26 PROCEDURE — 99215 OFFICE O/P EST HI 40 MIN: CPT | Performed by: NURSE PRACTITIONER

## 2017-09-26 PROCEDURE — 36415 COLL VENOUS BLD VENIPUNCTURE: CPT | Performed by: NURSE PRACTITIONER

## 2017-09-26 RX ORDER — PRAVASTATIN SODIUM 40 MG
40 TABLET ORAL DAILY
Qty: 30 TABLET | Refills: 0 | Status: SHIPPED | OUTPATIENT
Start: 2017-09-26 | End: 2017-09-29

## 2017-09-26 ASSESSMENT — PAIN SCALES - GENERAL: PAINLEVEL: NO PAIN (0)

## 2017-09-26 NOTE — MR AVS SNAPSHOT
After Visit Summary   9/26/2017    Gabino Jones    MRN: [id number]           Patient Information     Date Of Birth          1940        Visit Information        Provider Department      9/26/2017 2:20 PM Sindy Manzo APRN [facility]        Today's Diagnoses     Preop general physical exam    -  1    Polyp of colon, unspecified part of colon, unspecified type        Mixed hyperlipidemia        Hypertension goal BP (blood pressure) < 140/90        JITENDRA (generalized anxiety disorder)        Nonrheumatic aortic valve stenosis        Mild recurrent major depression (H)        Advanced directives, counseling/discussion        History of colonic polyps          Care Instructions      Before Your Surgery      Call your surgeon if there is any change in your health. This includes signs of a cold or flu (such as a sore throat, runny nose, cough, rash or fever).    Do not smoke, drink alcohol or take over the counter medicine (unless your surgeon or primary care doctor tells you to) for the 24 hours before and after surgery.    If you take prescribed drugs: Follow your doctor s orders about which medicines to take and which to stop until after surgery.    Eating and drinking prior to surgery: follow the instructions from your surgeon    Take a shower or bath the night before surgery. Use the soap your surgeon gave you to gently clean your skin. If you do not have soap from your surgeon, use your regular soap. Do not shave or scrub the surgery site.  Wear clean pajamas and have clean sheets on your bed.       Hold Lisinopril on October 3rd and October 4th for your procedure.           Follow-ups after your visit        Your next 10 appointments already scheduled     Oct 04, 2017   Procedure with [place] Reid MD   [facility] [place], Same Day Surgery (--)    [address] [facility] [address]   [phone]            Dec 13, 2017 10:15 AM CST   LAB with [facility] ([facility])    [address] [place] [id number] [phone]           Patient must bring picture ID. Patient should be prepared to give a urine specimen  Please do not eat 10-12 hours before your appointment if you are coming in fasting for labs on lipids, cholesterol, or glucose (sugar). Pregnant women should follow their Care Team instructions. Water with medications is okay. Do not drink coffee or other fluids. If you have concerns about taking  your medications, please ask at office or if scheduling via [employer], send a message by clicking on Secure Messaging, Message Your Care Team.            Dec 13, 2017 10:45 AM CST   (Arrive by 10:30 AM)   MR BRAIN W/O & W CONTRAST with [id number]   [facility] ([facility])    [address] [place] [address] [id number] [phone]           Take your medicines as usual, unless your doctor tells you not to. Bring a list of your current medicines to your exam (including vitamins, minerals and over-the-counter drugs).  You will be given intravenous contrast for this exam. To prepare:   The day before your exam, drink extra fluids at least six 8-ounce glasses (unless your doctor tells you to restrict your fluids).   Have a blood test (creatinine test) within 30 days of your exam. Go to your clinic or Diagnostic Imaging Department for this test.  The MRI machine uses a strong magnet. Please wear clothes without metal (snaps, zippers). A sweatsuit works well, or we may give you a hospital gown.  Please remove any body piercings and hair extensions before you arrive. You will also remove watches, jewelry, hairpins, wallets, dentures, partial dental plates and hearing aids. You may wear contact lenses, and you may be able to wear your rings. We have a safe place to keep your personal items, but it is safer to leave them at home.   **IMPORTANT** THE INSTRUCTIONS BELOW ARE ONLY FOR  THOSE PATIENTS WHO HAVE BEEN TOLD THEY WILL RECEIVE SEDATION OR GENERAL ANESTHESIA DURING THEIR MRI PROCEDURE:  IF YOU WILL RECEIVE SEDATION (take medicine to help you relax during your exam):   You must get the medicine from your doctor before you arrive. Bring the medicine to the exam. Do not take it at home.   Arrive one hour early. Bring someone who can take you home after the test. Your medicine will make you sleepy. After the exam, you may not drive, take a bus or take a taxi by yourself.   No eating 8 hours before your exam. You may have clear liquids up until 4 hours before your exam. (Clear liquids include water, clear tea, black coffee and fruit juice without pulp.)  IF YOU WILL RECEIVE ANESTHESIA (be asleep for your exam):   Arrive 1 1/2 hours early. Bring someone who can take you home after the test. You may not drive, take a bus or take a taxi by yourself.   No eating 8 hours before your exam. You may have clear liquids up until 4 hours before your exam. (Clear liquids include water, clear tea, black coffee and fruit juice without pulp.)  Please call the Imaging Department at your exam site with any questions.            Dec 13, 2017 11:40 AM CST   (Arrive by 11:25 AM)   CT CHEST/ABDOMEN/PELVIS W CONTRAST with [id number]   [facility] ([facility])    [address] [address] [phone]           Please bring any scans or X-rays taken at other hospitals, if similar tests were done. Also bring a list of your medicines, including vitamins, minerals and over-the-counter drugs. It is safest to leave personal items at home.  Be sure to tell your doctor:   If you have any allergies.   If there s any chance you are pregnant.   If you are breastfeeding.   If you have any special needs.  You may have contrast for this exam. To prepare:   Do not eat or drink for 2 hours before your exam. If you need to take medicine, you may take it with  small sips of water. (We may ask you to take liquid medicine as well.)   The day before your exam, drink extra fluids at least six 8-ounce glasses (unless your doctor tells you to restrict your fluids).  Patients over 70 or patients with diabetes or kidney problems:   If you haven t had a blood test (creatinine test) within the last 30 days, go to your clinic or Diagnostic Imaging Department for this test.  If you have diabetes:   If your kidney function is normal, continue taking your metformin (Avandamet, Glucophage, Glucovance, Metaglip) on the day of your exam.   If your kidney function is abnormal, wait 48 hours before restarting this medicine.  You will have oral contrast for this exam:   You will drink the contrast at home. Get this from your clinic or Diagnostic Imaging Department. Please follow the directions given.  Please wear loose clothing, such as a sweat suit or jogging clothes. Avoid snaps, zippers and other metal. We may ask you to undress and put on a hospital gown.  If you have any questions, please call the Imaging Department where you will have your exam.            Dec 13, 2017  1:45 PM CST   (Arrive by 1:30 PM)   Return Visit with Garett Obregon MD   [facility] ([facility])    [address] [place] [id number] [phone]            Jan 04, 2018  2:20 PM CST   (Arrive by 2:05 PM)   Return Visit with Yolanda Whitaker MD   [facility] ([facility])    [address] [place] [address] [phone]              Who to contact     If you have questions or need follow up information about today's clinic visit or your schedule please contact [facility] directly at [phone].  Normal or non-critical lab and imaging results will be communicated to you by MyChart, letter or phone within 4 business days after the clinic has  "received the results. If you do not hear from us within 7 days, please contact the clinic through [employer] or phone. If you have a critical or abnormal lab result, we will notify you by phone as soon as possible.  Submit refill requests through [employer] or call your pharmacy and they will forward the refill request to us. Please allow 3 business days for your refill to be completed.          Additional Information About Your Visit        [employer] Information     [employer] gives you secure access to your electronic health record. If you see a primary care provider, you can also send messages to your care team and make appointments. If you have questions, please call your primary care clinic.  If you do not have a primary care provider, please call [phone] and they will assist you.        Care EveryWhere ID     This is your Care EveryWhere ID. This could be used by other organizations to access your [place] medical records  [id number]        Your Vitals Were     Pulse Temperature Respirations Height Pulse Oximetry BMI (Body Mass Index)    80 97.6  F (36.4  C) (Temporal) 16 5' 5.08\" (1.653 m) 100% 30.5 kg/m2       Blood Pressure from Last 3 Encounters:   09/26/17 120/60   08/30/17 129/64   08/01/17 120/70    Weight from Last 3 Encounters:   09/26/17 183 lb 11.2 oz (83.3 kg)   08/30/17 184 lb 12.8 oz (83.8 kg)   08/01/17 181 lb (82.1 kg)              We Performed the Following     Basic metabolic panel     EKG 12-lead complete w/read - Clinics     Hemoglobin          Today's Medication Changes          These changes are accurate as of: 9/26/17  3:11 PM.  If you have any questions, ask your nurse or doctor.               Start taking these medicines.        Dose/Directions    FLUoxetine 20 MG capsule   Commonly known as:  PROzac   Used for:  JITENDRA (generalized anxiety disorder)   Started by:  Sindy Manzo APRN CNP        Dose:  20 mg   Take 1 capsule (20 mg) by mouth daily   Quantity:  30 capsule   Refills:  1       "   These medicines have changed or have updated prescriptions.        Dose/Directions    potassium chloride 10 MEQ CR capsule   Commonly known as:  MICRO-K   This may have changed:    - how much to take  - how to take this  - when to take this  - additional instructions   Used for:  Hypokalemia        TK 2 CS PO D   Quantity:  90 capsule   Refills:  1         Stop taking these medicines if you haven't already. Please contact your care team if you have questions.     amitriptyline 10 MG tablet   Commonly known as:  ELAVIL   Stopped by:  Sindy Manzo APRN CNP                Where to get your medicines      These medications were sent to [facility] [address] [place] [address] [address] [facility] [address]  [address] [facility] [address]     Phone:  [phone]     FLUoxetine 20 MG capsule    pravastatin 40 MG tablet                Primary Care Provider Office Phone # Fax #    Rebekah Boogie [phone] [address] [facility] [address]        Equal Access to Services     [facility]: Hadii aad ku hadasho Soomaali, waaxda luqadaha, qaybta kaalmada adeegyada, waxay idiin haynelidan antonieta wright . So [facility] [phone].    ATENCIÓN: Si habla español, tiene a velazquez disposición servicios gratuitos de asistencia lingüística. [facility] [phone].    We comply with applicable federal civil rights laws and [place] laws. We do not discriminate on the basis of race, color, national origin, age, disability sex, sexual orientation or gender identity.            Thank you!     Thank you for choosing [facility]  for your care. Our goal is always to provide you with excellent care. Hearing back from our patients is one way we can continue to improve our services. Please take a few minutes to complete the written survey that you may receive in the mail after your visit with us. Thank you!             Your Updated Medication List - Protect  others around you: Learn how to safely use, store and throw away your medicines at www.disposemymeds.org.          This list is accurate as of: 9/26/17  3:11 PM.  Always use your most recent med list.                   Brand Name Dispense Instructions for use Diagnosis    acetaminophen 500 MG tablet    TYLENOL     Take 500 mg by mouth as needed    Malignant melanoma of nasal cavity (H)       ALPRAZolam 0.25 MG tablet    XANAX    20 tablet    Take 1 tablet (0.25 mg) by mouth 3 times daily as needed for anxiety    JITENDRA (generalized anxiety disorder)       BUSPAR PO      Take 5 mg by mouth 3 times daily        FLUoxetine 20 MG capsule    PROzac    30 capsule    Take 1 capsule (20 mg) by mouth daily    JITENDRA (generalized anxiety disorder)       hydrochlorothiazide 12.5 MG capsule    MICROZIDE    90 capsule    Take 1 capsule (12.5 mg) by mouth daily    Hypertension goal BP (blood pressure) < 140/90       ibuprofen 800 MG tablet    ADVIL/MOTRIN    90 tablet    Take 1 tablet (800 mg) by mouth every 4 hours as needed for moderate pain Reported on 4/17/2017    Myalgia       lisinopril 30 MG tablet    PRINIVIL,ZESTRIL    90 tablet    Take 1 tablet (30 mg) by mouth daily    Hypertension goal BP (blood pressure) < 140/90       pantoprazole 40 MG EC tablet    PROTONIX    90 tablet    Take 1 tablet (40 mg) by mouth daily Take 30-60 minutes before a meal.    Gastroesophageal reflux disease without esophagitis       potassium chloride 10 MEQ CR capsule    MICRO-K    90 capsule    TK 2 CS PO D    Hypokalemia       pravastatin 40 MG tablet    PRAVACHOL    30 tablet    Take 1 tablet (40 mg) by mouth daily    Mixed hyperlipidemia       ranitidine 150 MG tablet    ZANTAC    180 tablet    TAKE 1 TABLET BY MOUTH TWICE DAILY AS NEEDED    Gastroesophageal reflux disease without esophagitis       senna-docusate 8.6-50 MG per tablet    SENOKOT-S;PERICOLACE    120 tablet    Take 1-2 tablets by mouth 2 times daily        Simethicone 180 MG Caps      60 capsule    Take 1 capsule by mouth 2 times daily Reported on 5/9/2017    Flatulence, eructation, and gas pain       venlafaxine 75 MG 24 hr capsule    EFFEXOR-XR    270 capsule    Take 3 capsules (225 mg) by mouth daily    JITENDRA (generalized anxiety disorder)       VITAMIN D (CHOLECALCIFEROL) PO      Take 2,000 Units by mouth daily    Malignant melanoma of nasal cavity (H)

## 2017-09-26 NOTE — NURSING NOTE
"Chief Complaint   Patient presents with     Pre-Op Exam     Panel Management     pneumococcal, flu, KEV, Dexa, honring choices       Initial /60  Pulse 80  Temp 97.6  F (36.4  C) (Temporal)  Resp 16  Ht 5' 5.08\" (1.653 m)  Wt 183 lb 11.2 oz (83.3 kg)  SpO2 100%  BMI 30.5 kg/m2 Estimated body mass index is 30.5 kg/(m^2) as calculated from the following:    Height as of this encounter: 5' 5.08\" (1.653 m).    Weight as of this encounter: 183 lb 11.2 oz (83.3 kg).  Medication Reconciliation: complete     Diana Hassan MA       "

## 2017-09-26 NOTE — TELEPHONE ENCOUNTER
FLUoxetine (PROZAC) 20 MG capsule  Rx was sent 09/26/2017 for 30 tabs and 1 refills.    Patient is requesting a 90 day supply. Provider to review and advise if OKAY to send in 90 day supply. Giulia Whitley RN, BSN

## 2017-09-27 LAB
ANION GAP SERPL CALCULATED.3IONS-SCNC: 10 MMOL/L (ref 3–14)
BUN SERPL-MCNC: 8 MG/DL (ref 7–30)
CALCIUM SERPL-MCNC: 9.1 MG/DL (ref 8.5–10.1)
CHLORIDE SERPL-SCNC: 98 MMOL/L (ref 94–109)
CO2 SERPL-SCNC: 26 MMOL/L (ref 20–32)
CREAT SERPL-MCNC: 0.87 MG/DL (ref 0.52–1.04)
GFR SERPL CREATININE-BSD FRML MDRD: 63 ML/MIN/1.7M2
GLUCOSE SERPL-MCNC: 87 MG/DL (ref 70–99)
POTASSIUM SERPL-SCNC: 4.3 MMOL/L (ref 3.4–5.3)
SODIUM SERPL-SCNC: 134 MMOL/L (ref 133–144)

## 2017-09-29 ENCOUNTER — MYC REFILL (OUTPATIENT)
Dept: FAMILY MEDICINE | Facility: CLINIC | Age: 77
End: 2017-09-29

## 2017-09-29 DIAGNOSIS — E78.2 MIXED HYPERLIPIDEMIA: ICD-10-CM

## 2017-09-29 RX ORDER — PRAVASTATIN SODIUM 40 MG
40 TABLET ORAL DAILY
Qty: 30 TABLET | Refills: 0 | Status: CANCELLED | OUTPATIENT
Start: 2017-09-29

## 2017-10-02 DIAGNOSIS — M79.10 MYALGIA: ICD-10-CM

## 2017-10-02 RX ORDER — PRAVASTATIN SODIUM 40 MG
40 TABLET ORAL DAILY
Qty: 90 TABLET | Refills: 3 | Status: SHIPPED | OUTPATIENT
Start: 2017-10-02 | End: 2018-04-30 | Stop reason: ALTCHOICE

## 2017-10-02 NOTE — TELEPHONE ENCOUNTER
Message from [employer]:  Original authorizing provider: MELANIE Hammond CNP    Gabino Robert would like a refill of the following medications:  pravastatin (PRAVACHOL) 40 MG tablet [MELANIE Hammond CNP]    Preferred pharmacy: [facility] [address] [place] [address] [address] [facility] [address]    Comment:

## 2017-10-02 NOTE — TELEPHONE ENCOUNTER
Message from [employer]:  Original authorizing provider: MELANIE Hammond CNP    Gabino Robert would like a refill of the following medications:  pravastatin (PRAVACHOL) 40 MG tablet [MELANIE Hammond CNP]    Preferred pharmacy: [facility] [address] [place] [address] [address] [facility] [address]    Comment:

## 2017-10-03 RX ORDER — IBUPROFEN 800 MG/1
TABLET, FILM COATED ORAL
Qty: 368 TABLET | Refills: 1 | OUTPATIENT
Start: 2017-10-03

## 2017-10-03 NOTE — TELEPHONE ENCOUNTER
90 day rx was sent to [facility] [place] 10/2/17.    Paz Nguyen RN

## 2017-10-04 ENCOUNTER — HOSPITAL ENCOUNTER (OUTPATIENT)
Facility: CLINIC | Age: 77
Discharge: HOME | End: 2017-10-04
Attending: INTERNAL MEDICINE | Admitting: INTERNAL MEDICINE
Payer: MEDICARE

## 2017-10-04 ENCOUNTER — ANESTHESIA EVENT (OUTPATIENT)
Dept: SURGERY | Facility: CLINIC | Age: 77
End: 2017-10-04
Payer: MEDICARE

## 2017-10-04 ENCOUNTER — ANESTHESIA (OUTPATIENT)
Dept: SURGERY | Facility: CLINIC | Age: 77
End: 2017-10-04
Payer: MEDICARE

## 2017-10-04 ENCOUNTER — SURGERY (OUTPATIENT)
Age: 77
End: 2017-10-04

## 2017-10-04 VITALS
RESPIRATION RATE: 18 BRPM | DIASTOLIC BLOOD PRESSURE: 97 MMHG | TEMPERATURE: 98 F | OXYGEN SATURATION: 100 % | HEART RATE: 70 BPM | WEIGHT: 186.07 LBS | BODY MASS INDEX: 31 KG/M2 | SYSTOLIC BLOOD PRESSURE: 144 MMHG | HEIGHT: 65 IN

## 2017-10-04 LAB
COLONOSCOPY: NORMAL
GLUCOSE BLDC GLUCOMTR-MCNC: 97 MG/DL (ref 70–99)
POTASSIUM SERPL-SCNC: 3.6 MMOL/L (ref 3.4–5.3)

## 2017-10-04 PROCEDURE — 71000027 ZZH RECOVERY PHASE 2 EACH 15 MINS: Performed by: INTERNAL MEDICINE

## 2017-10-04 PROCEDURE — 40000170 ZZH STATISTIC PRE-PROCEDURE ASSESSMENT II: Performed by: INTERNAL MEDICINE

## 2017-10-04 PROCEDURE — 25000125 ZZHC RX 250: Performed by: NURSE ANESTHETIST, CERTIFIED REGISTERED

## 2017-10-04 PROCEDURE — 27210995 ZZH RX 272: Performed by: INTERNAL MEDICINE

## 2017-10-04 PROCEDURE — 84132 ASSAY OF SERUM POTASSIUM: CPT | Performed by: ANESTHESIOLOGY

## 2017-10-04 PROCEDURE — 25000128 H RX IP 250 OP 636: Performed by: INTERNAL MEDICINE

## 2017-10-04 PROCEDURE — 36415 COLL VENOUS BLD VENIPUNCTURE: CPT | Performed by: ANESTHESIOLOGY

## 2017-10-04 PROCEDURE — 82962 GLUCOSE BLOOD TEST: CPT

## 2017-10-04 PROCEDURE — 25000125 ZZHC RX 250: Performed by: INTERNAL MEDICINE

## 2017-10-04 PROCEDURE — 36000051 ZZH SURGERY LEVEL 2 1ST 30 MIN - UMMC: Performed by: INTERNAL MEDICINE

## 2017-10-04 PROCEDURE — 37000009 ZZH ANESTHESIA TECHNICAL FEE, EACH ADDTL 15 MIN: Performed by: INTERNAL MEDICINE

## 2017-10-04 PROCEDURE — 27210794 ZZH OR GENERAL SUPPLY STERILE: Performed by: INTERNAL MEDICINE

## 2017-10-04 PROCEDURE — 36000053 ZZH SURGERY LEVEL 2 EA 15 ADDTL MIN - UMMC: Performed by: INTERNAL MEDICINE

## 2017-10-04 PROCEDURE — 88305 TISSUE EXAM BY PATHOLOGIST: CPT | Performed by: INTERNAL MEDICINE

## 2017-10-04 PROCEDURE — 25000128 H RX IP 250 OP 636: Performed by: NURSE ANESTHETIST, CERTIFIED REGISTERED

## 2017-10-04 PROCEDURE — 37000008 ZZH ANESTHESIA TECHNICAL FEE, 1ST 30 MIN: Performed by: INTERNAL MEDICINE

## 2017-10-04 PROCEDURE — A9270 NON-COVERED ITEM OR SERVICE: HCPCS | Performed by: INTERNAL MEDICINE

## 2017-10-04 RX ORDER — MEPERIDINE HYDROCHLORIDE 25 MG/ML
12.5 INJECTION INTRAMUSCULAR; INTRAVENOUS; SUBCUTANEOUS
Status: DISCONTINUED | OUTPATIENT
Start: 2017-10-04 | End: 2017-10-04 | Stop reason: HOSPADM

## 2017-10-04 RX ORDER — FENTANYL CITRATE 50 UG/ML
INJECTION, SOLUTION INTRAMUSCULAR; INTRAVENOUS PRN
Status: DISCONTINUED | OUTPATIENT
Start: 2017-10-04 | End: 2017-10-04

## 2017-10-04 RX ORDER — SODIUM CHLORIDE, SODIUM LACTATE, POTASSIUM CHLORIDE, CALCIUM CHLORIDE 600; 310; 30; 20 MG/100ML; MG/100ML; MG/100ML; MG/100ML
INJECTION, SOLUTION INTRAVENOUS CONTINUOUS
Status: DISCONTINUED | OUTPATIENT
Start: 2017-10-04 | End: 2017-10-04 | Stop reason: HOSPADM

## 2017-10-04 RX ORDER — NALOXONE HYDROCHLORIDE 0.4 MG/ML
.1-.4 INJECTION, SOLUTION INTRAMUSCULAR; INTRAVENOUS; SUBCUTANEOUS
Status: DISCONTINUED | OUTPATIENT
Start: 2017-10-04 | End: 2017-10-04 | Stop reason: HOSPADM

## 2017-10-04 RX ORDER — PROPOFOL 10 MG/ML
INJECTION, EMULSION INTRAVENOUS CONTINUOUS PRN
Status: DISCONTINUED | OUTPATIENT
Start: 2017-10-04 | End: 2017-10-04

## 2017-10-04 RX ORDER — ONDANSETRON 2 MG/ML
INJECTION INTRAMUSCULAR; INTRAVENOUS PRN
Status: DISCONTINUED | OUTPATIENT
Start: 2017-10-04 | End: 2017-10-04

## 2017-10-04 RX ORDER — SODIUM CHLORIDE, SODIUM LACTATE, POTASSIUM CHLORIDE, CALCIUM CHLORIDE 600; 310; 30; 20 MG/100ML; MG/100ML; MG/100ML; MG/100ML
INJECTION, SOLUTION INTRAVENOUS CONTINUOUS PRN
Status: DISCONTINUED | OUTPATIENT
Start: 2017-10-04 | End: 2017-10-04

## 2017-10-04 RX ORDER — LIDOCAINE 40 MG/G
CREAM TOPICAL
Status: DISCONTINUED | OUTPATIENT
Start: 2017-10-04 | End: 2017-10-04 | Stop reason: HOSPADM

## 2017-10-04 RX ORDER — ONDANSETRON 4 MG/1
4 TABLET, ORALLY DISINTEGRATING ORAL EVERY 30 MIN PRN
Status: DISCONTINUED | OUTPATIENT
Start: 2017-10-04 | End: 2017-10-04 | Stop reason: HOSPADM

## 2017-10-04 RX ORDER — FENTANYL CITRATE 50 UG/ML
25-50 INJECTION, SOLUTION INTRAMUSCULAR; INTRAVENOUS EVERY 5 MIN PRN
Status: DISCONTINUED | OUTPATIENT
Start: 2017-10-04 | End: 2017-10-04 | Stop reason: HOSPADM

## 2017-10-04 RX ORDER — ONDANSETRON 2 MG/ML
4 INJECTION INTRAMUSCULAR; INTRAVENOUS EVERY 30 MIN PRN
Status: DISCONTINUED | OUTPATIENT
Start: 2017-10-04 | End: 2017-10-04 | Stop reason: HOSPADM

## 2017-10-04 RX ORDER — LIDOCAINE HYDROCHLORIDE 20 MG/ML
INJECTION, SOLUTION INFILTRATION; PERINEURAL PRN
Status: DISCONTINUED | OUTPATIENT
Start: 2017-10-04 | End: 2017-10-04

## 2017-10-04 RX ORDER — FLUMAZENIL 0.1 MG/ML
0.2 INJECTION, SOLUTION INTRAVENOUS
Status: DISCONTINUED | OUTPATIENT
Start: 2017-10-04 | End: 2017-10-04 | Stop reason: HOSPADM

## 2017-10-04 RX ADMIN — WATER 300 ML: 100 IRRIGANT IRRIGATION at 08:07

## 2017-10-04 RX ADMIN — METHYLENE BLUE 20 ML: 10 INJECTION INTRAVENOUS at 08:52

## 2017-10-04 RX ADMIN — ONDANSETRON 4 MG: 2 INJECTION INTRAMUSCULAR; INTRAVENOUS at 07:50

## 2017-10-04 RX ADMIN — SIMETHICONE 133 MG: 63.3; 3.7 SOLUTION/ DROPS ORAL at 08:06

## 2017-10-04 RX ADMIN — PROPOFOL 75 MCG/KG/MIN: 10 INJECTION, EMULSION INTRAVENOUS at 07:41

## 2017-10-04 RX ADMIN — LIDOCAINE HYDROCHLORIDE 40 MG: 20 INJECTION, SOLUTION INFILTRATION; PERINEURAL at 07:41

## 2017-10-04 RX ADMIN — MIDAZOLAM HYDROCHLORIDE 0.5 MG: 1 INJECTION, SOLUTION INTRAMUSCULAR; INTRAVENOUS at 07:30

## 2017-10-04 RX ADMIN — FENTANYL CITRATE 50 MCG: 50 INJECTION, SOLUTION INTRAMUSCULAR; INTRAVENOUS at 07:44

## 2017-10-04 RX ADMIN — SIMETHICONE 133 MG: 63.3; 3.7 SOLUTION/ DROPS ORAL at 08:11

## 2017-10-04 RX ADMIN — SODIUM CHLORIDE, POTASSIUM CHLORIDE, SODIUM LACTATE AND CALCIUM CHLORIDE: 600; 310; 30; 20 INJECTION, SOLUTION INTRAVENOUS at 07:30

## 2017-10-04 ASSESSMENT — COPD QUESTIONNAIRES: COPD: 1

## 2017-10-04 ASSESSMENT — LIFESTYLE VARIABLES: TOBACCO_USE: 1

## 2017-10-04 NOTE — IP AVS SNAPSHOT
MRN:[id number]                      After Visit Summary   10/4/2017    Gabino Jones    MRN: [id number]           Thank you!     Thank you for choosing [facility] for your care. Our goal is always to provide you with excellent care. Hearing back from our patients is one way we can continue to improve our services. Please take a few minutes to complete the written survey that you may receive in the mail after you visit with us. Thank you!        Patient Information     Date Of Birth          1940        About your hospital stay     You were admitted on:  October 4, 2017 You last received care in the:  Same Day Surgery [facility]    You were discharged on:  October 4, 2017       Who to Call     For medical emergencies, please call 911.  For non-urgent questions about your medical care, please call your primary care provider or clinic, [phone]  For questions related to your surgery, please call your surgery clinic        Attending Provider     Provider Milton Pichardo MD Gastroenterology       Primary Care Provider Office Phone # Fax #    Rebekah Boogie [phone]      After Care Instructions     Discharge Instructions       Resume pre procedure diet            Discharge Instructions       Restart home medications.                  Your next 10 appointments already scheduled     Dec 13, 2017 10:15 AM CST   LAB with  LAB    Health Lab ([facility])    [address] [place] [address] [phone]           Patient must bring picture ID. Patient should be prepared to give a urine specimen  Please do not eat 10-12 hours before your appointment if you are coming in fasting for labs on lipids, cholesterol, or glucose (sugar). Pregnant women should follow their Care Team instructions. Water with medications is okay. Do not drink coffee or other fluids. If you have concerns about taking  your  medications, please ask at office or if scheduling via [employer], send a message by clicking on Secure Messaging, Message Your Care Team.            Dec 13, 2017 10:45 AM CST   (Arrive by 10:30 AM)   MR BRAIN W/O & W CONTRAST with [id number]   [facility] ([facility])    [address] [id number] [phone]           Take your medicines as usual, unless your doctor tells you not to. Bring a list of your current medicines to your exam (including vitamins, minerals and over-the-counter drugs).  You will be given intravenous contrast for this exam. To prepare:   The day before your exam, drink extra fluids at least six 8-ounce glasses (unless your doctor tells you to restrict your fluids).   Have a blood test (creatinine test) within 30 days of your exam. Go to your clinic or Diagnostic Imaging Department for this test.  The MRI machine uses a strong magnet. Please wear clothes without metal (snaps, zippers). A sweatsuit works well, or we may give you a hospital gown.  Please remove any body piercings and hair extensions before you arrive. You will also remove watches, jewelry, hairpins, wallets, dentures, partial dental plates and hearing aids. You may wear contact lenses, and you may be able to wear your rings. We have a safe place to keep your personal items, but it is safer to leave them at home.   **IMPORTANT** THE INSTRUCTIONS BELOW ARE ONLY FOR THOSE PATIENTS WHO HAVE BEEN TOLD THEY WILL RECEIVE SEDATION OR GENERAL ANESTHESIA DURING THEIR MRI PROCEDURE:  IF YOU WILL RECEIVE SEDATION (take medicine to help you relax during your exam):   You must get the medicine from your doctor before you arrive. Bring the medicine to the exam. Do not take it at home.   Arrive one hour early. Bring someone who can take you home after the test. Your medicine will make you sleepy. After the exam, you may not drive, take a bus or take a taxi by yourself.   No  eating 8 hours before your exam. You may have clear liquids up until 4 hours before your exam. (Clear liquids include water, clear tea, black coffee and fruit juice without pulp.)  IF YOU WILL RECEIVE ANESTHESIA (be asleep for your exam):   Arrive 1 1/2 hours early. Bring someone who can take you home after the test. You may not drive, take a bus or take a taxi by yourself.   No eating 8 hours before your exam. You may have clear liquids up until 4 hours before your exam. (Clear liquids include water, clear tea, black coffee and fruit juice without pulp.)  Please call the Imaging Department at your exam site with any questions.            Dec 13, 2017 11:40 AM CST   (Arrive by 11:25 AM)   CT CHEST/ABDOMEN/PELVIS W CONTRAST with [id number]   [facility] [place] [facility] ([facility])    [address] [address] [phone]           Please bring any scans or X-rays taken at other hospitals, if similar tests were done. Also bring a list of your medicines, including vitamins, minerals and over-the-counter drugs. It is safest to leave personal items at home.  Be sure to tell your doctor:   If you have any allergies.   If there s any chance you are pregnant.   If you are breastfeeding.   If you have any special needs.  You may have contrast for this exam. To prepare:   Do not eat or drink for 2 hours before your exam. If you need to take medicine, you may take it with small sips of water. (We may ask you to take liquid medicine as well.)   The day before your exam, drink extra fluids at least six 8-ounce glasses (unless your doctor tells you to restrict your fluids).  Patients over 70 or patients with diabetes or kidney problems:   If you haven t had a blood test (creatinine test) within the last 30 days, go to your clinic or Diagnostic Imaging Department for this test.  If you have diabetes:   If your kidney function is normal, continue taking your metformin  (Avandamet, Glucophage, Glucovance, Metaglip) on the day of your exam.   If your kidney function is abnormal, wait 48 hours before restarting this medicine.  You will have oral contrast for this exam:   You will drink the contrast at home. Get this from your clinic or Diagnostic Imaging Department. Please follow the directions given.  Please wear loose clothing, such as a sweat suit or jogging clothes. Avoid snaps, zippers and other metal. We may ask you to undress and put on a hospital gown.  If you have any questions, please call the Imaging Department where you will have your exam.            Dec 13, 2017  1:45 PM CST   (Arrive by 1:30 PM)   Return Visit with Garett Obregon MD   [facility] ([facility] [place])    [address] [place] [address]  2nd [facility] [address] [phone]            Jan 04, 2018  2:20 PM CST   (Arrive by 2:05 PM)   Return Visit with Yolanda Whitaker MD   [facility] ([facility] [place])    [address] [place] [address]  4th [facility] [id number] [phone]              Further instructions from your care team       [facility] [place]  Same-Day Surgery   Adult Discharge Orders & Instructions     For 24 hours after surgery    1. Get plenty of rest.  A responsible adult must stay with you for at least 24 hours after you leave the hospital.   2. Do not drive or use heavy equipment.  If you have weakness or tingling, don't drive or use heavy equipment until this feeling goes away.  3. Do not drink alcohol.  4. Avoid strenuous or risky activities.  Ask for help when climbing stairs.   5. You may feel lightheaded.  IF so, sit for a few minutes before standing.  Have someone help you get up.   6. If you have nausea (feel sick to your stomach): Drink only clear liquids such as apple juice, ginger ale, broth or 7-Up.  Rest may also help.  Be sure to drink enough fluids.  " Move to a regular diet as you feel able.  7. You may have a slight fever. Call the doctor if your fever is over 100 F (37.7 C) (taken under the tongue) or lasts longer than 24 hours.  8. You may have a dry mouth, a sore throat, muscle aches or trouble sleeping.  These should go away after 24 hours.  9. Do not make important or legal decisions.   Call your doctor for any of the followin.  Signs of infection (fever, growing tenderness at the surgery site, a large amount of drainage or bleeding, severe pain, foul-smelling drainage, redness, swelling).    2. It has been over 8 to 10 hours since surgery and you are still not able to urinate (pass water).    3.  Headache for over 24 hours.    To contact a doctor, call Dr Ventura's office at [phone]  Or:        [phone] and ask for the resident on call for Gastroenterology (answered 24 hours a day)      Emergency Department:  [facility]: [phone]       (TTY for hearing impaired: [phone])    Pending Results     Date and Time Order Name Status Description    10/4/2017 0840 Surgical pathology exam In process             Admission Information     Date & Time Provider Department Dept. Phone    10/4/2017 Milton Javier MD Same Day [facility] [phone]      Your Vitals Were     Blood Pressure Pulse Temperature Respirations Height Weight    154/80 74 97.7  F (36.5  C) (Oral) 16 1.65 m (5' 4.96\") 84.4 kg (186 lb 1.1 oz)    Pulse Oximetry BMI (Body Mass Index)                100% 31 kg/m2          MyChart Information     [facility] gives you secure access to your electronic health record. If you see a primary care provider, you can also send messages to your care team and make appointments. If you have questions, please call your primary care clinic.  If you do not have a primary care provider, please call [phone] and they will assist you.        Care EveryWhere ID     This is your Care EveryWhere ID. This could be used " by other organizations to access your [facility] medical records  [id number]        Equal Access to Services     GAEL OCONNELL : Hadii rivka Daniels, uyen solis, phillip wan, vito dubon. So [facility] [phone].    ATENCIÓN: Si habla español, tiene a velazquez disposición servicios gratuitos de asistencia lingüística. Llame al [phone].    We comply with applicable federal civil rights laws and [place] laws. We do not discriminate on the basis of race, color, national origin, age, disability, sex, sexual orientation, or gender identity.               Review of your medicines      CONTINUE these medicines which may have CHANGED, or have new prescriptions. If we are uncertain of the size of tablets/capsules you have at home, strength may be listed as something that might have changed.        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)        Dose:  500 mg   Take 500 mg by mouth every 6 hours as needed   Refills:  0       ALPRAZolam 0.25 MG tablet   Commonly known as:  XANAX   Used for:  JITENDRA (generalized anxiety disorder)        Dose:  0.25 mg   Take 1 tablet (0.25 mg) by mouth 3 times daily as needed for anxiety   Quantity:  20 tablet   Refills:  1       FLUoxetine 20 MG capsule   Commonly known as:  PROzac   Used for:  JITENDRA (generalized anxiety disorder)        TAKE 1 CAPSULE(20 MG) BY MOUTH DAILY   Quantity:  90 capsule   Refills:  1       hydrochlorothiazide 12.5 MG capsule   Commonly known as:  MICROZIDE   Used for:  Hypertension  goal BP (blood pressure) < 140/90        Dose:  12.5 mg   Take 1 capsule (12.5 mg) by mouth daily   Quantity:  90 capsule   Refills:  1       ibuprofen 800 MG tablet   Commonly known as:  ADVIL/MOTRIN   Used for:  Myalgia        Dose:  800 mg   Take 1 tablet (800 mg) by mouth every 4 hours as needed for moderate pain Reported on 4/17/2017   Quantity:  90 tablet   Refills:  1       lisinopril 30 MG tablet   Commonly known as:  PRINIVIL,ZESTRIL   Used for:  Hypertension goal BP (blood pressure) < 140/90        Dose:  30 mg   Take 1 tablet (30 mg) by mouth daily   Quantity:  90 tablet   Refills:  1       pantoprazole 40 MG EC tablet   Commonly known as:  PROTONIX   Used for:  Gastroesophageal reflux disease without esophagitis        Dose:  40 mg   Take 1 tablet (40 mg) by mouth daily Take 30-60 minutes before a meal.   Quantity:  90 tablet   Refills:  1       pravastatin 40 MG tablet   Commonly known as:  PRAVACHOL   Used for:  Mixed hyperlipidemia        Dose:  40 mg   Take 1 tablet (40 mg) by mouth daily   Quantity:  90 tablet   Refills:  3       ranitidine 150 MG tablet   Commonly known as:  ZANTAC   Used for:  Gastroesophageal reflux disease without esophagitis        TAKE 1 TABLET BY MOUTH TWICE DAILY AS NEEDED   Quantity:  180 tablet   Refills:  1       senna-docusate 8.6-50 MG per tablet   Commonly known as:  SENOKOT-S;PERICOLACE        Dose:  1-2 tablet   Take 1-2 tablets by mouth 2 times daily   Quantity:  120 tablet   Refills:  3       Simethicone 180 MG Caps   Used for:  Flatulence, eructation, and gas pain        Dose:  1 capsule   Take 1 capsule by mouth 2 times daily Reported on 5/9/2017   Quantity:  60 capsule   Refills:  6       venlafaxine 75 MG 24 hr capsule   Commonly known as:  EFFEXOR-XR   Used for:  JITENDRA (generalized anxiety disorder)        Dose:  225 mg   Take 3 capsules (225 mg) by mouth daily   Quantity:  270 capsule   Refills:  1       VITAMIN D (CHOLECALCIFEROL) PO   Used for:  Malignant  melanoma of nasal cavity (H)        Dose:  2000 Units   Take 2,000 Units by mouth daily   Refills:  0                Protect others around you: Learn how to safely use, store and throw away your medicines at www.disposemymeds.org.             Medication List: This is a list of all your medications and when to take them. Check marks below indicate your daily home schedule. Keep this list as a reference.      Medications           Morning Afternoon Evening Bedtime As Needed    acetaminophen 500 MG tablet   Commonly known as:  TYLENOL   Take 500 mg by mouth every 6 hours as needed                                ALPRAZolam 0.25 MG tablet   Commonly known as:  XANAX   Take 1 tablet (0.25 mg) by mouth 3 times daily as needed for anxiety                                FLUoxetine 20 MG capsule   Commonly known as:  PROzac   TAKE 1 CAPSULE(20 MG) BY MOUTH DAILY                                hydrochlorothiazide 12.5 MG capsule   Commonly known as:  MICROZIDE   Take 1 capsule (12.5 mg) by mouth daily                                ibuprofen 800 MG tablet   Commonly known as:  ADVIL/MOTRIN   Take 1 tablet (800 mg) by mouth every 4 hours as needed for moderate pain Reported on 4/17/2017                                lisinopril 30 MG tablet   Commonly known as:  PRINIVIL,ZESTRIL   Take 1 tablet (30 mg) by mouth daily                                pantoprazole 40 MG EC tablet   Commonly known as:  PROTONIX   Take 1 tablet (40 mg) by mouth daily Take 30-60 minutes before a meal.                                potassium chloride 10 MEQ CR capsule   Commonly known as:  MICRO-K   TK 2 CS PO D                                pravastatin 40 MG tablet   Commonly known as:  PRAVACHOL   Take 1 tablet (40 mg) by mouth daily                                ranitidine 150 MG tablet   Commonly known as:  ZANTAC   TAKE 1 TABLET BY MOUTH TWICE DAILY AS NEEDED                                senna-docusate 8.6-50 MG per tablet   Commonly known  as:  SENOKOT-S;PERICOLACE   Take 1-2 tablets by mouth 2 times daily                                Simethicone 180 MG Caps   Take 1 capsule by mouth 2 times daily Reported on 5/9/2017                                venlafaxine 75 MG 24 hr capsule   Commonly known as:  EFFEXOR-XR   Take 3 capsules (225 mg) by mouth daily                                VITAMIN D (CHOLECALCIFEROL) PO   Take 2,000 Units by mouth daily

## 2017-10-04 NOTE — OP NOTE
Procedure:           Colonoscopy   Indications:         Therapeutic procedure for known colon polyp   Providers:           Milton Ventura MD   Referring MD:        Sathya Norman MD, Rebekah Boogie MD   Medicines:           Monitored Anesthesia Care   Complications:       No immediate complications.   _______________________________________________________________________________   Procedure:           Pre-Anesthesia Assessment:                        - Prior to the procedure, a History and Physical was                        performed, and patient medications and allergies were                        reviewed. The patient is competent. The risks and                        benefits of the procedure and the sedation options and                        risks were discussed with the patient. All questions                        were answered and informed consent was obtained. Patient                        identification and proposed procedure were verified by                        the physician in the pre-procedure area. Mental Status                        Examination: alert and oriented. Airway Examination:                        normal oropharyngeal airway and neck mobility and                        Mallampati Class II (the uvula but not tonsillar pillars                        visualized). Respiratory Examination: clear to                        auscultation. CV Examination: normal. Prophylactic                        Antibiotics: The patient does not require prophylactic                        antibiotics. Prior Anticoagulants: The patient has taken                        no previous anticoagulant or antiplatelet agents. ASA                        Grade Assessment: II - A patient with mild systemic                        disease. After reviewing the risks and benefits, the                        patient was deemed in satisfactory condition to undergo                        the procedure. The anesthesia  plan was to use monitored                        anesthesia care (MAC). Immediately prior to                        administration of medications, the patient was                        re-assessed for adequacy to receive sedatives. The heart                        rate, respiratory rate, oxygen saturations, blood                        pressure, adequacy of pulmonary ventilation, and                        response to care were monitored throughout the                        procedure. The physical status of the patient was                        re-assessed after the procedure.                        After obtaining informed consent, the colonoscope was                        passed under direct vision. Throughout the procedure,                        the patient's blood pressure, pulse, and oxygen                        saturations were monitored continuously. The Colonoscope                        was introduced through the anus and advanced to the                        terminal ileum. The colonoscopy was performed without                        difficulty. The patient tolerated the procedure well.                        The quality of the bowel preparation was poor.                                                                                     Findings:        A 12 mm polyp was found in the ascending colon. The polyp was sessile.        The polyp was removed with a hot snare. The polyp was removed with a        saline injection-lift technique using a hot snare. Resection and        retrieval were complete. To prevent bleeding after the polypectomy, one        hemostatic clip was successfully placed. There was no bleeding during,        or at the end, of the procedure.        A 35 mm polyp was found in the transverse colon. The polyp was Precious        classification IIa (superficial, elevated). The polyp was removed with a        hot snare. The polyp was removed with a saline injection-lift technique         using a hot snare. The polyp was removed with a piecemeal technique        using a hot snare. Resection and retrieval were complete. To prevent        bleeding after the polypectomy, five hemostatic clips were successfully        placed. There was no bleeding during, or at the end, of the procedure.        A large amount of semi-liquid stool was found in the entire colon,        interfering with visualization. Lavage of the area was performed using a        large amount of sterile water, resulting in incomplete clearance with        continued poor visualization.        The terminal ileum appeared normal.                                                                                     Impression:          - Preparation of the colon was poor.                        - One 12 mm polyp in the ascending colon, removed with a                        hot snare and removed using injection-lift and a hot                        snare. Resected and retrieved. Clip was placed.                        - One 35 mm polyp in the transverse colon, removed with                        a hot snare, removed using injection-lift and a hot                        snare and removed piecemeal using a hot snare. Resected                        and retrieved. Clips were placed.                        - Stool in the entire examined colon which kept plugging                        the scope while attempting to lavage the colon.                        - The examined portion of the ileum was normal.     Recommendation:      - Await pathology results.                        - Repeat colonoscopy in 6 months for surveillance after                        piecemeal polypectomy.       See full endoscopic/operative note under Chart Review, Procedures tab for details and pictures.    Milton Ventura MD    [facility] - Department of Medicine  Division of Gastroenterology

## 2017-10-04 NOTE — IP AVS SNAPSHOT
Same Day Surgery [address] [address]    Phone:  [phone]                                       After Visit Summary   10/4/2017    Gabino Jones    MRN: [id number]           After Visit Summary Signature Page     I have received my discharge instructions, and my questions have been answered. I have discussed any challenges I see with this plan with the nurse or doctor.    ..........................................................................................................................................  Patient/Patient Representative Signature      ..........................................................................................................................................  Patient Representative Print Name and Relationship to Patient    ..................................................               ................................................  Date                                            Time    ..........................................................................................................................................  Reviewed by Signature/Title    ...................................................              ..............................................  Date                                                            Time

## 2017-10-04 NOTE — OR NURSING
Dr. Juan Reid at bedside on 3C, spoke with pt about procedure and follow up care. Pt verbalized understanding and has no further questions or concerns. Dr. Juan Reid spoke with pt's  in waiting area.

## 2017-10-04 NOTE — DISCHARGE INSTRUCTIONS
[facility]  Same-Day Surgery   Adult Discharge Orders & Instructions     For 24 hours after surgery    1. Get plenty of rest.  A responsible adult must stay with you for at least 24 hours after you leave the hospital.   2. Do not drive or use heavy equipment.  If you have weakness or tingling, don't drive or use heavy equipment until this feeling goes away.  3. Do not drink alcohol.  4. Avoid strenuous or risky activities.  Ask for help when climbing stairs.   5. You may feel lightheaded.  IF so, sit for a few minutes before standing.  Have someone help you get up.   6. If you have nausea (feel sick to your stomach): Drink only clear liquids such as apple juice, ginger ale, broth or 7-Up.  Rest may also help.  Be sure to drink enough fluids.  Move to a regular diet as you feel able.  7. You may have a slight fever. Call the doctor if your fever is over 100 F (37.7 C) (taken under the tongue) or lasts longer than 24 hours.  8. You may have a dry mouth, a sore throat, muscle aches or trouble sleeping.  These should go away after 24 hours.  9. Do not make important or legal decisions.   Call your doctor for any of the followin.  Signs of infection (fever, growing tenderness at the surgery site, a large amount of drainage or bleeding, severe pain, foul-smelling drainage, redness, swelling).    2. It has been over 8 to 10 hours since surgery and you are still not able to urinate (pass water).    3.  Headache for over 24 hours.    To contact a doctor, call Dr Ventura's office at [phone]  Or:        [phone] and ask for the resident on call for Gastroenterology (answered 24 hours a day)      Emergency Department:  [facility]: [phone]       (TTY for hearing impaired: [phone])

## 2017-10-04 NOTE — ANESTHESIA PREPROCEDURE EVALUATION
Anesthesia Evaluation     . Pt has had prior anesthetic. Type: General    No history of anesthetic complications          ROS/MED HX    ENT/Pulmonary:     (+)tobacco use, Past use COPD, , . .    Neurologic:  - neg neurologic ROS     Cardiovascular:     (+) Dyslipidemia, hypertension----. : . . . :. valvular problems/murmurs type: AS mild to moderate aortic stenosis/insufficiency:. Previous cardiac testing Echodate:5/6/2016results:Global and regional left ventricular function is normal with an EF of 60-65%.  Right ventricular function, chamber size, wall motion, and thickness are  normal.  Mild to moderate aortic insufficiency is present.  Mild aortic stenosis is present.  Pulmonary artery systolic pressure is normal.  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    ASA Status:  3 .    NPO Status:  > 8 hours    Plan for MAC with Intravenous induction. Maintenance will be Balanced.  Reason for MAC:  Difficulty with conscious sedation (QS)  PONV prophylaxis:  Ondansetron (or other 5HT-3)       Postoperative Care  Postoperative pain management:  IV analgesics.       Consents                          .

## 2017-10-04 NOTE — ANESTHESIA CARE TRANSFER NOTE
Patient: Gabino Jones    Procedure(s):  Colonoscopy With Endoscopic Polypectomy with clips - Wound Class: II-Clean Contaminated    Diagnosis: Benign Neoplasm Of Colon   Diagnosis Additional Information: No value filed.    Anesthesia Type:   No value filed.     Note:  Airway :Room Air  Patient transferred to:Phase II        Vitals: (Last set prior to Anesthesia Care Transfer)    CRNA VITALS  10/4/2017 0832 - 10/4/2017 0911      10/4/2017             NIBP: 128/90    Ht Rate: 79                Electronically Signed By: MELANIE Castillo CRNA  October 4, 2017  9:11 AM

## 2017-10-04 NOTE — ANESTHESIA POSTPROCEDURE EVALUATION
Patient: Gabino Jones    Procedure(s):  Colonoscopy With Endoscopic Polypectomy with clips - Wound Class: II-Clean Contaminated    Diagnosis:Benign Neoplasm Of Colon   Diagnosis Additional Information: No value filed.    Anesthesia Type:  No value filed.    Note:  Anesthesia Post Evaluation    Patient location during evaluation: Phase 2  Patient participation: Able to fully participate in evaluation  Level of consciousness: awake and alert  Pain management: adequate  Airway patency: patent  Cardiovascular status: acceptable  Respiratory status: acceptable  Hydration status: acceptable  PONV: none     Anesthetic complications: None          Last vitals:  Vitals:    10/04/17 0930 10/04/17 0945 10/04/17 1000   BP: 154/80 (!) 136/101 (!) 144/97   Pulse: 74 74 70   Resp: 16 16 18   Temp:      SpO2: 100% 100% 100%         Electronically Signed By: Ajay Olmos MD  October 4, 2017  10:12 AM

## 2017-10-05 LAB — COPATH REPORT: NORMAL

## 2017-10-10 ENCOUNTER — CARE COORDINATION (OUTPATIENT)
Dept: GASTROENTEROLOGY | Facility: CLINIC | Age: 77
End: 2017-10-10

## 2017-10-10 ENCOUNTER — TELEPHONE (OUTPATIENT)
Dept: GASTROENTEROLOGY | Facility: CLINIC | Age: 77
End: 2017-10-10

## 2017-10-10 DIAGNOSIS — D12.6 TUBULAR ADENOMA OF COLON: Primary | ICD-10-CM

## 2017-10-10 NOTE — PROGRESS NOTES
[facility] RN Care Coordination Note:    Scope follow up discussed with Dr. Martinez. Patient will need repeat surveillance Colonoscopy in 6 months with Dr. Martinez under MAC sedation. Orders placed and routed to scheduling to set up follow up procedure.      Ellie Case RN   Care Coordinator - Dr Watson-Franklin  [phone]

## 2017-10-11 ENCOUNTER — TELEPHONE (OUTPATIENT)
Dept: GASTROENTEROLOGY | Facility: CLINIC | Age: 77
End: 2017-10-11

## 2017-10-13 ENCOUNTER — HOSPITAL ENCOUNTER (OUTPATIENT)
Facility: CLINIC | Age: 77
End: 2017-10-13
Attending: INTERNAL MEDICINE | Admitting: INTERNAL MEDICINE

## 2017-10-13 ENCOUNTER — TELEPHONE (OUTPATIENT)
Dept: GASTROENTEROLOGY | Facility: CLINIC | Age: 77
End: 2017-10-13

## 2017-10-31 DIAGNOSIS — F41.1 GAD (GENERALIZED ANXIETY DISORDER): ICD-10-CM

## 2017-11-03 RX ORDER — ALPRAZOLAM 0.25 MG
TABLET ORAL
Qty: 20 TABLET | Refills: 0 | Status: SHIPPED | OUTPATIENT
Start: 2017-11-03 | End: 2017-12-15

## 2017-11-08 ENCOUNTER — CARE COORDINATION (OUTPATIENT)
Dept: GASTROENTEROLOGY | Facility: CLINIC | Age: 77
End: 2017-11-08

## 2017-12-06 ENCOUNTER — CARE COORDINATION (OUTPATIENT)
Dept: GASTROENTEROLOGY | Facility: CLINIC | Age: 77
End: 2017-12-06

## 2017-12-06 NOTE — PROGRESS NOTES
[facility] RN Care Coordination Note:    Called and left a message for patient to call back and discuss follow up recommendations for a repeat colonoscopy. Left my direct number for patient to call back.      Ellie Case RN   Care Coordinator - Dr Duarte  [phone]

## 2017-12-13 ENCOUNTER — ONCOLOGY VISIT (OUTPATIENT)
Dept: ONCOLOGY | Facility: CLINIC | Age: 77
End: 2017-12-13
Attending: INTERNAL MEDICINE
Payer: MEDICARE

## 2017-12-13 ENCOUNTER — RADIANT APPOINTMENT (OUTPATIENT)
Dept: MRI IMAGING | Facility: CLINIC | Age: 77
End: 2017-12-13
Attending: INTERNAL MEDICINE
Payer: MEDICARE

## 2017-12-13 ENCOUNTER — CARE COORDINATION (OUTPATIENT)
Dept: GASTROENTEROLOGY | Facility: CLINIC | Age: 77
End: 2017-12-13

## 2017-12-13 ENCOUNTER — RADIANT APPOINTMENT (OUTPATIENT)
Dept: CT IMAGING | Facility: CLINIC | Age: 77
End: 2017-12-13
Attending: INTERNAL MEDICINE
Payer: MEDICARE

## 2017-12-13 VITALS
BODY MASS INDEX: 29.32 KG/M2 | DIASTOLIC BLOOD PRESSURE: 67 MMHG | TEMPERATURE: 97.5 F | OXYGEN SATURATION: 96 % | RESPIRATION RATE: 16 BRPM | SYSTOLIC BLOOD PRESSURE: 136 MMHG | HEART RATE: 88 BPM | WEIGHT: 176 LBS

## 2017-12-13 DIAGNOSIS — C30.0 MALIGNANT MELANOMA OF NASAL CAVITY (H): ICD-10-CM

## 2017-12-13 DIAGNOSIS — C30.0 MALIGNANT MELANOMA OF NASAL CAVITY (H): Primary | ICD-10-CM

## 2017-12-13 LAB
ALBUMIN SERPL-MCNC: 2.4 G/DL (ref 3.4–5)
ALP SERPL-CCNC: 83 U/L (ref 40–150)
ALT SERPL W P-5'-P-CCNC: 9 U/L (ref 0–50)
ANION GAP SERPL CALCULATED.3IONS-SCNC: 6 MMOL/L (ref 3–14)
AST SERPL W P-5'-P-CCNC: 14 U/L (ref 0–45)
BASOPHILS # BLD AUTO: 0.1 10E9/L (ref 0–0.2)
BASOPHILS NFR BLD AUTO: 0.6 %
BILIRUB SERPL-MCNC: 0.4 MG/DL (ref 0.2–1.3)
BUN SERPL-MCNC: 13 MG/DL (ref 7–30)
CALCIUM SERPL-MCNC: 7.6 MG/DL (ref 8.5–10.1)
CHLORIDE SERPL-SCNC: 101 MMOL/L (ref 94–109)
CO2 SERPL-SCNC: 29 MMOL/L (ref 20–32)
CREAT BLD-MCNC: 0.8 MG/DL (ref 0.52–1.04)
CREAT SERPL-MCNC: 0.76 MG/DL (ref 0.52–1.04)
DIFFERENTIAL METHOD BLD: ABNORMAL
EOSINOPHIL # BLD AUTO: 0.1 10E9/L (ref 0–0.7)
EOSINOPHIL NFR BLD AUTO: 1.6 %
ERYTHROCYTE [DISTWIDTH] IN BLOOD BY AUTOMATED COUNT: 14.6 % (ref 10–15)
GFR SERPL CREATININE-BSD FRML MDRD: 70 ML/MIN/1.7M2
GFR SERPL CREATININE-BSD FRML MDRD: 74 ML/MIN/1.7M2
GLUCOSE SERPL-MCNC: 91 MG/DL (ref 70–99)
HCT VFR BLD AUTO: 29.8 % (ref 35–47)
HGB BLD-MCNC: 9.3 G/DL (ref 11.7–15.7)
IMM GRANULOCYTES # BLD: 0.1 10E9/L (ref 0–0.4)
IMM GRANULOCYTES NFR BLD: 0.6 %
LDH SERPL L TO P-CCNC: 145 U/L (ref 81–234)
LYMPHOCYTES # BLD AUTO: 1.1 10E9/L (ref 0.8–5.3)
LYMPHOCYTES NFR BLD AUTO: 12.6 %
MCH RBC QN AUTO: 28.4 PG (ref 26.5–33)
MCHC RBC AUTO-ENTMCNC: 31.2 G/DL (ref 31.5–36.5)
MCV RBC AUTO: 91 FL (ref 78–100)
MONOCYTES # BLD AUTO: 0.9 10E9/L (ref 0–1.3)
MONOCYTES NFR BLD AUTO: 10.6 %
NEUTROPHILS # BLD AUTO: 6.4 10E9/L (ref 1.6–8.3)
NEUTROPHILS NFR BLD AUTO: 74 %
NRBC # BLD AUTO: 0 10*3/UL
NRBC BLD AUTO-RTO: 0 /100
PLATELET # BLD AUTO: 275 10E9/L (ref 150–450)
POTASSIUM SERPL-SCNC: 3 MMOL/L (ref 3.4–5.3)
PROT SERPL-MCNC: 5.4 G/DL (ref 6.8–8.8)
RBC # BLD AUTO: 3.27 10E12/L (ref 3.8–5.2)
SODIUM SERPL-SCNC: 136 MMOL/L (ref 133–144)
WBC # BLD AUTO: 8.6 10E9/L (ref 4–11)

## 2017-12-13 PROCEDURE — 80053 COMPREHEN METABOLIC PANEL: CPT | Performed by: INTERNAL MEDICINE

## 2017-12-13 PROCEDURE — 99214 OFFICE O/P EST MOD 30 MIN: CPT | Mod: ZP | Performed by: INTERNAL MEDICINE

## 2017-12-13 PROCEDURE — 85025 COMPLETE CBC W/AUTO DIFF WBC: CPT | Performed by: INTERNAL MEDICINE

## 2017-12-13 PROCEDURE — 83615 LACTATE (LD) (LDH) ENZYME: CPT | Performed by: INTERNAL MEDICINE

## 2017-12-13 PROCEDURE — 36415 COLL VENOUS BLD VENIPUNCTURE: CPT | Performed by: INTERNAL MEDICINE

## 2017-12-13 RX ORDER — GADOBUTROL 604.72 MG/ML
7.5 INJECTION INTRAVENOUS ONCE
Status: COMPLETED | OUTPATIENT
Start: 2017-12-13 | End: 2017-12-13

## 2017-12-13 RX ORDER — IOPAMIDOL 755 MG/ML
114 INJECTION, SOLUTION INTRAVASCULAR ONCE
Status: COMPLETED | OUTPATIENT
Start: 2017-12-13 | End: 2017-12-13

## 2017-12-13 RX ADMIN — IOPAMIDOL 114 ML: 755 INJECTION, SOLUTION INTRAVASCULAR at 10:21

## 2017-12-13 RX ADMIN — GADOBUTROL 7.5 ML: 604.72 INJECTION INTRAVENOUS at 11:37

## 2017-12-13 ASSESSMENT — PAIN SCALES - GENERAL: PAINLEVEL: SEVERE PAIN (6)

## 2017-12-13 NOTE — PATIENT INSTRUCTIONS
Follow up in PCP to follow up on hypoK and hypoalbuminemia    Follow up in 3 months with labs and scans prior to visit

## 2017-12-13 NOTE — MR AVS SNAPSHOT
After Visit Summary   12/13/2017    Gabino Jones    MRN: [id number]           Patient Information     Date Of Birth          1940        Visit Information        Provider Department      12/13/2017 1:45 PM Garett Obregon MD [facility]        Today's Diagnoses     Malignant melanoma of nasal cavity (H)    -  1      Care Instructions    Follow up in PCP to follow up on hypoK and hypoalbuminemia    Follow up in 3 months with labs and scans prior to visit          Follow-ups after your visit        Your next 10 appointments already scheduled     Jan 04, 2018  2:20 PM CST   (Arrive by 2:05 PM)   Return Visit with Yolanda Whitaker MD   [facility] ([facility] [place])    [address] [place] [address]  4th [facility] [address] [phone]              Future tests that were ordered for you today     Open Future Orders        Priority Expected Expires Ordered    CT Chest/Abdomen/Pelvis w Contrast Routine 3/7/2018 12/13/2018 12/13/2017    MRI Brain w & w/o contrast Routine 3/7/2018 12/13/2018 12/13/2017    CBC with platelets differential Routine 3/7/2018 12/13/2018 12/13/2017    Comprehensive metabolic panel Routine 3/7/2018 12/13/2018 12/13/2017    Lactate Dehydrogenase Routine 3/7/2018 12/13/2018 12/13/2017            Who to contact     If you have questions or need follow up information about today's clinic visit or your schedule please contact [facility] directly at [phone].  Normal or non-critical lab and imaging results will be communicated to you by MyChart, letter or phone within 4 business days after the clinic has received the results. If you do not hear from us within 7 days, please contact the clinic through MyChart or phone. If you have a critical or abnormal lab result, we will notify you by phone as soon as possible.  Submit refill requests through [employer] or call your pharmacy and they  will forward the refill request to us. Please allow 3 business days for your refill to be completed.          Additional Information About Your Visit        [employer] Information     [employer] gives you secure access to your electronic health record. If you see a primary care provider, you can also send messages to your care team and make appointments. If you have questions, please call your primary care clinic.  If you do not have a primary care provider, please call [phone] and they will assist you.        Care EveryWhere ID     This is your Care EveryWhere ID. This could be used by other organizations to access your [facility] medical records  [id number]        Your Vitals Were     Pulse Temperature Respirations Pulse Oximetry BMI (Body Mass Index)       88 97.5  F (36.4  C) (Oral) 16 96% 29.32 kg/m2        Blood Pressure from Last 3 Encounters:   12/13/17 136/67   10/04/17 (!) 144/97   09/26/17 120/60    Weight from Last 3 Encounters:   12/13/17 79.8 kg (176 lb)   10/04/17 84.4 kg (186 lb 1.1 oz)   09/26/17 83.3 kg (183 lb 11.2 oz)                 Today's Medication Changes          These changes are accurate as of: 12/13/17  7:27 PM.  If you have any questions, ask your nurse or doctor.               These medicines have changed or have updated prescriptions.        Dose/Directions    potassium chloride 10 MEQ CR capsule   Commonly known as:  MICRO-K   This may have changed:    - how much to take  - how to take this  - when to take this  - additional instructions   Used for:  Hypokalemia        TK 2 CS PO D   Quantity:  90 capsule   Refills:  1                Primary Care Provider Office Phone # Fax #    Rebekah BLAKE MD [place] [phone] [address] [facility] [address]        Equal Access to Services     GAEL OCONNELL : Stefani Daniels, uyen solis, phillip wan, vito dubon. So [facility] [phone].    ATENCIÓN: Si tahirla  español, tiene a velazquez disposición servicios gratuitos de asistencia lingüística. Taisha donovan [phone].    We comply with applicable federal civil rights laws and [place] laws. We do not discriminate on the basis of race, color, national origin, age, disability, sex, sexual orientation, or gender identity.            Thank you!     Thank you for choosing [facility]  for your care. Our goal is always to provide you with excellent care. Hearing back from our patients is one way we can continue to improve our services. Please take a few minutes to complete the written survey that you may receive in the mail after your visit with us. Thank you!             Your Updated Medication List - Protect others around you: Learn how to safely use, store and throw away your medicines at www.disposemymeds.org.          This list is accurate as of: 12/13/17  7:27 PM.  Always use your most recent med list.                   Brand Name Dispense Instructions for use Diagnosis    acetaminophen 500 MG tablet    TYLENOL     Take 500 mg by mouth every 6 hours as needed    Malignant melanoma of nasal cavity (H)       ALPRAZolam 0.25 MG tablet    XANAX    20 tablet    TAKE 1 TABLET BY MOUTH THREE TIMES DAILY AS NEEDED FOR ANXIETY    JITENDRA (generalized anxiety disorder)       FLUoxetine 20 MG capsule    PROzac    90 capsule    TAKE 1 CAPSULE(20 MG) BY MOUTH DAILY    JITENDRA (generalized anxiety disorder)       hydrochlorothiazide 12.5 MG capsule    MICROZIDE    90 capsule    Take 1 capsule (12.5 mg) by mouth daily    Hypertension goal BP (blood pressure) < 140/90       ibuprofen 800 MG tablet    ADVIL/MOTRIN    90 tablet    Take 1 tablet (800 mg) by mouth every 4 hours as needed for moderate pain Reported on 4/17/2017    Myalgia       lisinopril 30 MG tablet    PRINIVIL,ZESTRIL    90 tablet    Take 1 tablet (30 mg) by mouth daily    Hypertension goal BP (blood pressure) < 140/90       pantoprazole 40 MG EC tablet    PROTONIX     90 tablet    Take 1 tablet (40 mg) by mouth daily Take 30-60 minutes before a meal.    Gastroesophageal reflux disease without esophagitis       potassium chloride 10 MEQ CR capsule    MICRO-K    90 capsule    TK 2 CS PO D    Hypokalemia       pravastatin 40 MG tablet    PRAVACHOL    90 tablet    Take 1 tablet (40 mg) by mouth daily    Mixed hyperlipidemia       ranitidine 150 MG tablet    ZANTAC    180 tablet    TAKE 1 TABLET BY MOUTH TWICE DAILY AS NEEDED    Gastroesophageal reflux disease without esophagitis       senna-docusate 8.6-50 MG per tablet    SENOKOT-S;PERICOLACE    120 tablet    Take 1-2 tablets by mouth 2 times daily        Simethicone 180 MG Caps     60 capsule    Take 1 capsule by mouth 2 times daily Reported on 5/9/2017    Flatulence, eructation, and gas pain       venlafaxine 75 MG 24 hr capsule    EFFEXOR-XR    270 capsule    Take 3 capsules (225 mg) by mouth daily    JITENDRA (generalized anxiety disorder)       VITAMIN D (CHOLECALCIFEROL) PO      Take 2,000 Units by mouth daily    Malignant melanoma of nasal cavity (H)

## 2017-12-13 NOTE — LETTER
12/13/2017       RE: Gabino Jones  [address]  [facility] [address]     Dear Colleague,    Thank you for referring your patient, Gabino Jones, to the [facility]. Please see a copy of my visit note below.    [facility]  FOLLOW-UP VISIT NOTE    PATIENT NAME: Gabino Jones MRN # [id number]  DATE OF VISIT: Dec 13, 2017 YOB: 1940    REFERRING PROVIDER: Yolanda Whitaker MD   PHYSICIANS  [address]  [place] [address]    CANCER TYPE: Malignant Melanoma  STAGE:   ECOG PS: 1    TREATMENT SUMMARY:  Gabino presented with difficulty to breathe for previous 6-7 months due to partial nasal obstruction starting December 2013. She had been following with a local ENT surgeon and was prescribed nasal drops several courses of antibiotics for presumed ethmoid sinusitis.  Most recently in the last couple of weeks she has passed a few blood clots from her nose. She had an episode of epistaxis in April of 2014 which resolved on its own. On 06/03/14, she underwent right intranasal endoscopic sinus surgery for her ethmoid sinusitis as well as resection of the right nasal mass measuring 2.5 x 2 x 1.2 cm at [facility]. Following the procedure, she continued to have hemorrhage from the right nasal cavity and was taken back to the OR that same day for a repeat nasal endoscopy, where it was determined that it was a posterior bleed and that the mass had not been completely resected. The remainder of the mass was resected and the bleeding stopped. The following morning (6/04/14), she underwent another endoscopy at [facility] due to continued bleeding, and her right nasal cavity was packed. The histopathology from her recent resection revealed malignant melanoma.  The margins were positive.  This prompted a referral to [facility] and she was seen in ENT surgery by Dr. Whitaker and Dr. Velasquez  Junaid in Radiation/Oncology in addition to Medical Oncology.  She was worked up with a staging PET/CT scan and a brain MRI.  The PET/CT scan revealed minimal uptake in the local area which could very well be from recent surgery or residual disease.  She had multiple subcentimeter pulmonary nodules with the largest one being 6 mm in the left lower lobe.  She had a history of pulmonary nodules which had been previously followed for 3 years. The PET was also significant for an enlarged portacaval node with mild FDG uptake and FDG uptake in the hepatic flexure of her colon.  Her most recent colonoscopy has been merely 2 months ago and was completely normal.  Her brain MRI was limited because of motion artifact. There were no enlarged lymph nodes in the head and neck region or any other site of increased FDG uptake that would be suspicious for definitive metastatic disease.      Her case was reviewed at the multi-specialty tumor board and she was recommended endoscopic resection of the melanoma followed by post operative radiation therapy. She underwent a rerevision surgery on 06/23/2014 using an endoscopic endonasal approach. Tumor was located at the anterior skull base between the middle turbinate and the septum. Final pathology report showed clear margins. She was started on radiation therapy under care of Dr. Ron Quiroga in July. Unfortunately she fell and fractured her left humerus in End of August. It was decided not to proceed with surgical intervention as this would involve intubation which would be difficult given ongoing radiation therapy. She had a difficult hospital course with UTI, confusion. She was managed conservatively and radiation therapy was resumed - eventually completed on September 11, 2014. She had a 3 month post treatment completion restaging scan which does not show any recurrent disease. She comes in today for routine follow up.       ALAN Lloyd is being followed for her malignant  melanoma status post resection on 6/04/14 and then re-resection on 06/24/14 with negative margins followed by radiation therapy from July through September 11, 2014.     She is accompanied by her  at this clinic visit. She continues to have itching and discomfort/pain in her site of excision in skull in February.     Her  point out that she has unstable gait. She did bump in to the wall with the lights off and had a small sub cm scalp hematoma. She often loses chain of thought. She is not confused or disoriented.       PAST MEDICAL HISTORY   1. HTN  2. Dyslipidemia  3. Depression on medications  4. Pulmonary nodules  5. Cholecystectomy  6. OA - Back and Neck surgeries done   7. Fibromyalgia      CURRENT OUTPATIENT MEDICATIONS     Current Outpatient Prescriptions   Medication Sig     ALPRAZolam (XANAX) 0.25 MG tablet TAKE 1 TABLET BY MOUTH THREE TIMES DAILY AS NEEDED FOR ANXIETY     pravastatin (PRAVACHOL) 40 MG tablet Take 1 tablet (40 mg) by mouth daily     FLUoxetine (PROZAC) 20 MG capsule TAKE 1 CAPSULE(20 MG) BY MOUTH DAILY     acetaminophen (TYLENOL) 500 MG tablet Take 500 mg by mouth every 6 hours as needed      VITAMIN D, CHOLECALCIFEROL, PO Take 2,000 Units by mouth daily     Simethicone 180 MG CAPS Take 1 capsule by mouth 2 times daily Reported on 5/9/2017     ranitidine (ZANTAC) 150 MG tablet TAKE 1 TABLET BY MOUTH TWICE DAILY AS NEEDED     potassium chloride (MICRO-K) 10 MEQ CR capsule TK 2 CS PO D (Patient taking differently: Take 20 mEq by mouth daily TK 2 CS PO D)     senna-docusate (SENOKOT-S;PERICOLACE) 8.6-50 MG per tablet Take 1-2 tablets by mouth 2 times daily     venlafaxine (EFFEXOR-XR) 75 MG 24 hr capsule Take 3 capsules (225 mg) by mouth daily     pantoprazole (PROTONIX) 40 MG EC tablet Take 1 tablet (40 mg) by mouth daily Take 30-60 minutes before a meal.     lisinopril (PRINIVIL,ZESTRIL) 30 MG tablet Take 1 tablet (30 mg) by mouth daily     hydrochlorothiazide (MICROZIDE) 12.5  MG capsule Take 1 capsule (12.5 mg) by mouth daily     ibuprofen (ADVIL/MOTRIN) 800 MG tablet Take 1 tablet (800 mg) by mouth every 4 hours as needed for moderate pain Reported on 4/17/2017 (Patient not taking: Reported on 12/13/2017)     No current facility-administered medications for this visit.           ALLERGIES     Allergies   Allergen Reactions     Novocain [Procaine] Unknown and Rash     Nefazodone Unknown     Mirtazapine       PHYSICAL EXAM   /67  Pulse 88  Temp 97.5  F (36.4  C) (Oral)  Resp 16  Wt 79.8 kg (176 lb)  SpO2 96%  BMI 29.32 kg/m2   Lying /66, Sitting 119/74, Standing 102/57   SpO2 Readings from Last 4 Encounters:   12/13/17 96%   10/04/17 100%   09/26/17 100%   08/30/17 100%     Wt Readings from Last 3 Encounters:   12/13/17 79.8 kg (176 lb)   10/04/17 84.4 kg (186 lb 1.1 oz)   09/26/17 83.3 kg (183 lb 11.2 oz)     GEN: NAD  HEENT: PERRL, EOMI, no icterus, injection or pallor. Oropharynx is clear.   NECK: no obvious cervical or supraclavicular lymphadenopathy  LUNGS: clear bilaterally  CV: regular rate and rhythm  ABDOMEN: soft, non-tender, non-distended, normal bowel sounds, no hepatosplenomegaly  EXT: warm, well perfused, no edema  NEURO: alert  SKIN: no rashes     LABORATORY AND IMAGING STUDIES       Recent Labs   Lab Test  12/13/17   1026  10/04/17   0610  09/26/17   1536  08/01/17   0914  03/28/17   1553   02/15/17   0937   NA  136   --   134  135  136   --   133   POTASSIUM  3.0*  3.6  4.3  3.8  4.9   < >  3.9   CHLORIDE  101   --   98  99  100   --   98   CO2  29   --   26  28  27   --   25   ANIONGAP  6   --   10  8  9   --   10   BUN  13   --   8  12  12   --   11   CR  0.76   --   0.87  0.78  0.79   --   0.86   GLC  91   --   87  99  121*   --   84   STEFFANIE  7.6*   --   9.1  9.3  9.0   --   8.0*    < > = values in this interval not displayed.     Recent Labs   Lab Test  09/02/14   0550  09/01/14   0532  08/31/14   0525  08/30/14   0909  08/30/14   0616   MAG  1.6  1.9   1.8  2.2  1.5*     Recent Labs   Lab Test  12/13/17   1026  09/26/17   1536  03/28/17   1553  02/21/17   1240  02/15/17   0937  08/10/16   1050 07/29/16   WBC  8.6   --   10.8   --   7.6  5.3  5.8   HGB  9.3*  12.6  12.7  12.2  11.1*  12.0  11.9   PLT  275   --   307   --   219  256  225.0   MCV  91   --   91   --   90  94  88.0   NEUTROPHIL  74.0   --   87.5   --   66.9  62.1  79.1     Recent Labs   Lab Test  12/13/17   1026  08/01/17   0914  03/28/17   1553   03/19/15   0759   BILITOTAL  0.4  0.3  0.4   < >  0.5   ALKPHOS  83  94  114   < >  79   ALT  9  20  18   < >  22   AST  14  18  13   < >  24   ALBUMIN  2.4*  3.7  3.6   < >  3.7   LDH  145   --    --    --   157    < > = values in this interval not displayed.     TSH   Date Value Ref Range Status   02/15/2017 2.27 0.40 - 4.00 mU/L Final   08/10/2016 2.32 0.40 - 4.00 mU/L Final   03/19/2015 1.34 0.40 - 4.00 mU/L Final     Comment:     Effective 7/30/2014, the reference range for this assay has changed to reflect   new instrumentation/methodology.       No results for input(s): CEA in the last 58576 hours.  Results for orders placed or performed in visit on 12/13/17   CT Chest/Abdomen/Pelvis w Contrast    Narrative    EXAMINATION: CT CHEST/ABDOMEN/PELVIS W CONTRAST, 12/13/2017 10:29 AM    TECHNIQUE:  Helical CT images from the thoracic inlet through the  symphysis pubis were obtained  with contrast. Contrast dose: Isovue  370 114cc    COMPARISON: 8/30/2017.    HISTORY: Restaging; Malignant melanoma of nasal cavity (H)    FINDINGS:    LUNGS: Centrilobular emphysematous changes. Mild peripheral  reticulation consistent with mild fibrotic changes. Bilateral lower  lobes dependent atelectasis. Calcified pulmonary nodules in the left  lower lobe. New patchy groundglass opacities as well as groundglass  nodules throughout both lungs, for example in the right upper lobe,  series 9 image 47, left upper lobe, series 9 image 39. Scattered  bilateral solid pulmonary  nodules, measuring up to 7 mm in the left  lower lobe, series 9 image 108, overall stable from the prior study.    Chest: Partially visualized thyroid gland is unremarkable. Central  tracheobronchial tree is patent. Mild patulous esophagus.  Mild-to-moderate fluid in the epicardial recess. Thoracic aorta and  main pulmonary artery have normal diameter. Atherosclerotic  calcifications of the thoracic aorta, great vessels and coronary  arteries. Aortic root calcifications. Cardiac size within normal  limits. No pericardial effusion. No pleural effusions. No  pneumothorax. No axillary lymphadenopathy. Mediastinal lymph nodes  measuring up to 6 mm short axis, not enlarged by size criteria. 6 mm  short axis right hilar lymph nodes, not enlarged by size criteria.  Calcified foci adjacent to the distal esophagus on the left, stable,  benign-appearing. No central pulmonary embolism.    Abdomen and pelvis: Homogeneous liver parenchyma. Focal fat deposition  versus perfusional changes along the falciform ligament. Prominent  central intrahepatic biliary tree. The common bile duct measures 8 mm,  likely reservoir effect post cholecystectomy. Surgical clips in the  gallbladder fossa.    Partial atrophic pancreatic parenchyma. Stable subcentimeter foci  throughout the pancreatic parenchyma, hypoattenuating, likely  representing interspersed fat. Main pancreatic duct is not dilated.    The spleen is not enlarged. Calcified granulomas in the spleen.    Stable thickening of bilateral adrenal glands with no discrete nodule.  Subcentimeter hypoattenuating foci in the cortex of both kidneys,  stable, too small to characterize, likely representing renal cysts. No  renal stones or hydronephrosis bilaterally. Partially distended  urinary bladder with minimal hyperattenuating material at the base,  series 8 image 529, likely representing trace contrast material.  Uterus is unremarkable. No adnexal masses. Pelvic phleboliths.    No  inguinal lymphadenopathy. No pelvic lymphadenopathy. Subcentimeter  retroperitoneal and mesenteric lymph nodes, not enlarged by size  criteria. No abdominal aortic aneurysm. Atherosclerotic calcifications  of the abdominal aorta and its major branches. Decompressed stomach.  No dilated loops of bowel. No bowel wall thickening. Mild colonic  diverticulosis. No associated CT findings of acute diverticulitis.  There is intramural fat along the cecum and ascending colon wall,  likely from chronic inflammatory changes.    Bones: Enthesopathic changes off the pelvis and hips. Degenerative  changes of the spine, bilateral SI joints and hips. Scattered  Schmorl's nodes. Scattered intradiscal gas. Degenerative changes of  both shoulders. No aggressive sclerotic or lytic lesions in the bones.  Partially visualized postoperative changes of cervical spinal fusion.      Impression    IMPRESSION: In this patient with history of nasal cavity malignant  melanoma:  1. New patchy groundglass opacities as well as groundglass nodules in  both lungs, could be related to infectious/inflammatory changes. Close  attention on follow-up studies is recommended.  2. Otherwise, stable calcified and noncalcified solid pulmonary  nodules. Close attention on follow-up studies is recommended.  3. No evidence for metastatic disease within the abdomen, pelvis and  bones.     Brain MRI without and with contrast     History: ; Malignant melanoma of nasal cavity (H).  ICD-10: Malignant melanoma of nasal cavity (H)  Comparison: [facility] brain MRI 2/22/2017     Technique: Axial T1-weighted, axial FLAIR and susceptibility images  were obtained without intravenous contrast. Following intravenous  gadolinium-based contrast administration, axial T2-weighted,  diffusion, and T1-weighted images (in multiple planes) were obtained.  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  Moderate degree of motion, particularly on postcontrast sequences,  mildly limits evaluation. Rather extensive, severe, T2  hyperintensities noted throughout the periventricular white matter,  particularly the frontal lobes, and in the brainstem, may be related  to advanced chronic small vessel ischemic disease or therapy given the  patient's age, and given that this lesion was larger in 2014 and it  has actually decreased in size over time, would suggest that these  white matter abnormalities are at least in part due to therapy.  Susceptibility images demonstrate no overt hemorrhage within the  brain, and diffusion images are relatively unremarkable.     No definite abnormality of the skull marrow signal is noted. The major  vascular flow-voids appear patent. The orbits, visualized portions of  paranasal sinuses, and mastoid air cells are relatively clear.         Impression:      1. No significant change in size since the last examination of the  cribriform plate meningioma, although it has mildly decreased in size  progressively over multiple examinations since 2014.  2. Extensive white matter abnormalities of advanced chronic small  vessel ischemic disease, particularly in the frontal lobes, may relate  to therapy, and are related to underlying vasculopathy.     JOHNATHON ARIZMENDI MD              ASSESSMENT AND PLAN   77 year old female with right sinonasal mucosal malignant melanoma  status post resection on 6/04/14 and then re-resection on 06/24/14 with negative margins followed by radiation therapy from July through September 11, 2014 (6000 cGy in 30 fractions).   b-aubrie and c-KIT status unknown    I have reviewed her scans from earlier today. There is no evidence of recurrent disease.     I have reviewed the brain MRI.  This remains negative for disease. Her symptoms are concerning - she looses chain of thought and has gait problems. However she has been recovering from a severe flu like infection and had been on antibiotics. She does admit not drinking enough fluids and is possibly dehydrated. She has lost about 10 lbs in weight. Her serum albumin has significantly dropped. I will see her in 3 months with restaging scans including CT CAP and brain MRI.     Her K is low, likely from diarrhea with flu like symptoms. She also has new moderate hypoalbuminemia with albumin of 2.4 which is decreased from 3.7 about 3 months ago.  I have asked her to check with her PCP later this week to see if she needs oral/parenteral replacements. Reviewed diet with increased protein.     She is 3+ years out from treatment completion which is very encouraging.     Over 25 min of direct face to face time spent with patient with more than 50% time spent in counseling and coordinating care.          Again, thank you for allowing me to participate in the care of your patient.      Sincerely,    Garett Obregon MD

## 2017-12-13 NOTE — DISCHARGE INSTRUCTIONS
MRI Contrast Discharge Instructions    The IV contrast you received today will pass out of your body in your  urine. This will happen in the next 24 hours. You will not feel this process.  Your urine will not change color.    Drink at least 4 extra glasses of water or juice today (unless your doctor  has restricted your fluids). This reduces the stress on your kidneys.  You may take your regular medicines.    If you are on dialysis: It is best to have dialysis today.    If you have a reaction: Most reactions happen right away. If you have  any new symptoms after leaving the hospital (such as hives or swelling),  call your hospital at the correct number below. Or call your family doctor.  If you have breathing distress or wheezing, call 911.    Special instructions: ***    I have read and understand the above information.    Signature:______________________________________ Date:___________    Staff:__________________________________________ Date:___________     Time:__________    [facility] Departments:    ___[place]: [phone]  ___[facility]: [phone]  ___[place]: [phone] ___[facility]: [phone]  ___[facility]: [phone]  ___[facility]: [phone]  ___[phone]  ___[facility]: [phone]  ___[phone]

## 2017-12-13 NOTE — PROGRESS NOTES
[facility] RN Care Coordination Note:    Called and left a second message for patient to call back and discuss follow up recommendation for repeat colonoscopy and scheduling options for patient. Left my direct number for patient to call back.     Ellie Case RN   Care Coordinator - Dr Duarte  [phone]

## 2017-12-13 NOTE — NURSING NOTE
"   Oncology Rooming Note    December 13, 2017 12:46 PM   Gabino Jones is a 77 year old female who presents for:    Chief Complaint   Patient presents with     Oncology Clinic Visit     SCC Sinus     Initial Vitals: /67  Pulse 88  Temp 97.5  F (36.4  C) (Oral)  Resp 16  Wt 79.8 kg (176 lb)  SpO2 96%  BMI 29.32 kg/m2 Estimated body mass index is 29.32 kg/(m^2) as calculated from the following:    Height as of 10/4/17: 1.65 m (5' 4.96\").    Weight as of this encounter: 79.8 kg (176 lb). Body surface area is 1.91 meters squared.  Severe Pain (6) Comment: all over   No LMP recorded. Patient is postmenopausal.  Allergies reviewed: Yes  Medications reviewed: Yes    Medications: Medication refills not needed today.  Pharmacy name entered into [employer]:    [id number] - [place], [place] - [address]  [facility] - [place], [place] - [address]    Clinical concerns: Pt  concerned about balance issues that pt has been having Dr Obregon was notified.    6 minutes for nursing intake (face to face time)     Jaimie Mike CMA           cr    "

## 2017-12-13 NOTE — PROGRESS NOTES
[facility]  FOLLOW-UP VISIT NOTE    PATIENT NAME: Gabino Jones MRN # [id number]  DATE OF VISIT: Dec 13, 2017 YOB: 1940    REFERRING PROVIDER: Yolanda Whitaker MD   PHYSICIANS  [address] [facility]  [place] [address]    CANCER TYPE: Malignant Melanoma  STAGE:   ECOG PS: 1    TREATMENT SUMMARY:  Gabino presented with difficulty to breathe for previous 6-7 months due to partial nasal obstruction starting December 2013. She had been following with a local ENT surgeon and was prescribed nasal drops several courses of antibiotics for presumed ethmoid sinusitis.  Most recently in the last couple of weeks she has passed a few blood clots from her nose. She had an episode of epistaxis in April of 2014 which resolved on its own. On 06/03/14, she underwent right intranasal endoscopic sinus surgery for her ethmoid sinusitis as well as resection of the right nasal mass measuring 2.5 x 2 x 1.2 cm at [facility]. Following the procedure, she continued to have hemorrhage from the right nasal cavity and was taken back to the OR that same day for a repeat nasal endoscopy, where it was determined that it was a posterior bleed and that the mass had not been completely resected. The remainder of the mass was resected and the bleeding stopped. The following morning (6/04/14), she underwent another endoscopy at [facility] due to continued bleeding, and her right nasal cavity was packed. The histopathology from her recent resection revealed malignant melanoma.  The margins were positive.  This prompted a referral to [facility] and she was seen in ENT surgery by Dr. Whitaker and Dr. Ron Quiroga in Radiation/Oncology in addition to Medical Oncology.  She was worked up with a staging PET/CT scan and a brain MRI.  The PET/CT scan revealed minimal uptake in the local area which could very well be from recent surgery or residual  disease.  She had multiple subcentimeter pulmonary nodules with the largest one being 6 mm in the left lower lobe.  She had a history of pulmonary nodules which had been previously followed for 3 years. The PET was also significant for an enlarged portacaval node with mild FDG uptake and FDG uptake in the hepatic flexure of her colon.  Her most recent colonoscopy has been merely 2 months ago and was completely normal.  Her brain MRI was limited because of motion artifact. There were no enlarged lymph nodes in the head and neck region or any other site of increased FDG uptake that would be suspicious for definitive metastatic disease.      Her case was reviewed at the multi-specialty tumor board and she was recommended endoscopic resection of the melanoma followed by post operative radiation therapy. She underwent a rerevision surgery on 06/23/2014 using an endoscopic endonasal approach. Tumor was located at the anterior skull base between the middle turbinate and the septum. Final pathology report showed clear margins. She was started on radiation therapy under care of Dr. Ron Quiroga in July. Unfortunately she fell and fractured her left humerus in End of August. It was decided not to proceed with surgical intervention as this would involve intubation which would be difficult given ongoing radiation therapy. She had a difficult hospital course with UTI, confusion. She was managed conservatively and radiation therapy was resumed - eventually completed on September 11, 2014. She had a 3 month post treatment completion restaging scan which does not show any recurrent disease. She comes in today for routine follow up.       ALAN Lloyd is being followed for her malignant melanoma status post resection on 6/04/14 and then re-resection on 06/24/14 with negative margins followed by radiation therapy from July through September 11, 2014.     She is accompanied by her  at this clinic visit. She has nearly lost  her eye sight in the left eye and can barely appreciate flickering of light in the periphery. She is fearful of losing vision in the right side.     She continues to have pain in her site of excision in skull in February.       PAST MEDICAL HISTORY   1. HTN  2. Dyslipidemia  3. Depression on medications  4. Pulmonary nodules  5. Cholecystectomy  6. OA - Back and Neck surgeries done   7. Fibromyalgia      CURRENT OUTPATIENT MEDICATIONS     Current Outpatient Prescriptions   Medication Sig     ALPRAZolam (XANAX) 0.25 MG tablet TAKE 1 TABLET BY MOUTH THREE TIMES DAILY AS NEEDED FOR ANXIETY     pravastatin (PRAVACHOL) 40 MG tablet Take 1 tablet (40 mg) by mouth daily     FLUoxetine (PROZAC) 20 MG capsule TAKE 1 CAPSULE(20 MG) BY MOUTH DAILY     acetaminophen (TYLENOL) 500 MG tablet Take 500 mg by mouth every 6 hours as needed      VITAMIN D, CHOLECALCIFEROL, PO Take 2,000 Units by mouth daily     Simethicone 180 MG CAPS Take 1 capsule by mouth 2 times daily Reported on 5/9/2017     ranitidine (ZANTAC) 150 MG tablet TAKE 1 TABLET BY MOUTH TWICE DAILY AS NEEDED     potassium chloride (MICRO-K) 10 MEQ CR capsule TK 2 CS PO D (Patient taking differently: Take 20 mEq by mouth daily TK 2 CS PO D)     senna-docusate (SENOKOT-S;PERICOLACE) 8.6-50 MG per tablet Take 1-2 tablets by mouth 2 times daily     venlafaxine (EFFEXOR-XR) 75 MG 24 hr capsule Take 3 capsules (225 mg) by mouth daily     pantoprazole (PROTONIX) 40 MG EC tablet Take 1 tablet (40 mg) by mouth daily Take 30-60 minutes before a meal.     lisinopril (PRINIVIL,ZESTRIL) 30 MG tablet Take 1 tablet (30 mg) by mouth daily     hydrochlorothiazide (MICROZIDE) 12.5 MG capsule Take 1 capsule (12.5 mg) by mouth daily     ibuprofen (ADVIL/MOTRIN) 800 MG tablet Take 1 tablet (800 mg) by mouth every 4 hours as needed for moderate pain Reported on 4/17/2017 (Patient not taking: Reported on 12/13/2017)     No current facility-administered medications for this visit.            ALLERGIES     Allergies   Allergen Reactions     Novocain [Procaine] Unknown and Rash     Nefazodone Unknown     Mirtazapine       PHYSICAL EXAM   /67  Pulse 88  Temp 97.5  F (36.4  C) (Oral)  Resp 16  Wt 79.8 kg (176 lb)  SpO2 96%  BMI 29.32 kg/m2   Lying /66, Sitting 119/74, Standing 102/57   SpO2 Readings from Last 4 Encounters:   12/13/17 96%   10/04/17 100%   09/26/17 100%   08/30/17 100%     Wt Readings from Last 3 Encounters:   12/13/17 79.8 kg (176 lb)   10/04/17 84.4 kg (186 lb 1.1 oz)   09/26/17 83.3 kg (183 lb 11.2 oz)     GEN: NAD  HEENT: PERRL, EOMI, no icterus, injection or pallor. Oropharynx is clear.   NECK: no obvious cervical or supraclavicular lymphadenopathy  LUNGS: clear bilaterally  CV: regular rate and rhythm  ABDOMEN: soft, non-tender, non-distended, normal bowel sounds, no hepatosplenomegaly  EXT: warm, well perfused, no edema  NEURO: alert  SKIN: no rashes     LABORATORY AND IMAGING STUDIES     Recent Labs   Lab Test  02/15/17   0937  08/10/16   1050 07/29/16 04/13/16   1211  10/21/15   1237  06/25/15   1431   NA  133  136  138  134  136  131*   POTASSIUM  3.9  4.3  4.2  4.2  4.3  4.4   CHLORIDE  98  102  105.0  100  102  97   CO2  25  26   --   26  27  28   ANIONGAP  10  8   --   8  6  7   BUN  11  11  13.0  11  10  11   CR  0.86  0.73  0.76  0.73  0.71  0.68   GLC  84  86  94.0  84  91  107*   STEFFANIE  8.0*  8.7  8.9  9.1  9.0  8.9     Recent Labs   Lab Test  09/02/14   0550  09/01/14   0532  08/31/14   0525  08/30/14   0909  08/30/14   0616   MAG  1.6  1.9  1.8  2.2  1.5*     Recent Labs   Lab Test  02/15/17   0937  08/10/16   1050 07/29/16 04/13/16   1211  10/21/15   1237   WBC  7.6  5.3  5.8  8.5  9.7   HGB  11.1*  12.0  11.9  12.1  12.8   PLT  219  256  225.0  286  272   MCV  90  94  88.0  92  95   NEUTROPHIL  66.9  62.1  79.1  76.1  75.1     Recent Labs   Lab Test  02/15/17   0937  08/10/16   1050  04/13/16   1211   03/19/15   0759   BILITOTAL  0.4  0.3  0.4   < >   0.5   ALKPHOS  90  88  98   < >  79   ALT  15  18  26   < >  22   AST  19  17  23   < >  24   ALBUMIN  3.1*  3.6  3.6   < >  3.7   LDH   --    --    --    --   157    < > = values in this interval not displayed.     TSH   Date Value Ref Range Status   02/15/2017 2.27 0.40 - 4.00 mU/L Final   08/10/2016 2.32 0.40 - 4.00 mU/L Final   03/19/2015 1.34 0.40 - 4.00 mU/L Final     Comment:     Effective 7/30/2014, the reference range for this assay has changed to reflect   new instrumentation/methodology.            ASSESSMENT AND PLAN   77 year old female with right sinonasal mucosal malignant melanoma  status post resection on 6/04/14 and then re-resection on 06/24/14 with negative margins followed by radiation therapy from July through September 11, 2014 (6000 cGy in 30 fractions).   b-aubrie and c-KIT status unknown    I have reviewed her scans from earlier today. There is no evidence of recurrent disease.     I have reviewed the brain MRI. I will wait for the official reports to come by. She has followed with Dr. Quiroga in opthalmology 2 days ago and has a follow up with Dr. Barboza in Jan. I will see her in mid to late December with restaging scans including CT CAP and brain MRI.     She is nearly 3 years out from treatment completion which is very encouraging.     She had EGD and colonoscopy where several polyp's were removed. She notes that she has additional procedure planned for the largest polyp.     Over 25 min of direct face to face time spent with patient with more than 50% time spent in counseling and coordinating care.

## 2017-12-13 NOTE — DISCHARGE INSTRUCTIONS

## 2017-12-13 NOTE — PROGRESS NOTES
[facility] CANCER CLINIC  FOLLOW-UP VISIT NOTE    PATIENT NAME: Gabino Jones MRN # [id number]  DATE OF VISIT: Dec 13, 2017 YOB: 1940    REFERRING PROVIDER: Yolanda Whitaker MD   PHYSICIANS  [address]  [place] [address]    CANCER TYPE: Malignant Melanoma  STAGE:   ECOG PS: 1    TREATMENT SUMMARY:  Gabino presented with difficulty to breathe for previous 6-7 months due to partial nasal obstruction starting December 2013. She had been following with a local ENT surgeon and was prescribed nasal drops several courses of antibiotics for presumed ethmoid sinusitis.  Most recently in the last couple of weeks she has passed a few blood clots from her nose. She had an episode of epistaxis in April of 2014 which resolved on its own. On 06/03/14, she underwent right intranasal endoscopic sinus surgery for her ethmoid sinusitis as well as resection of the right nasal mass measuring 2.5 x 2 x 1.2 cm at [facility]. Following the procedure, she continued to have hemorrhage from the right nasal cavity and was taken back to the OR that same day for a repeat nasal endoscopy, where it was determined that it was a posterior bleed and that the mass had not been completely resected. The remainder of the mass was resected and the bleeding stopped. The following morning (6/04/14), she underwent another endoscopy at [facility] due to continued bleeding, and her right nasal cavity was packed. The histopathology from her recent resection revealed malignant melanoma.  The margins were positive.  This prompted a referral to [facility] and she was seen in ENT surgery by Dr. Whitaker and Dr. Ron Quiroga in Radiation/Oncology in addition to Medical Oncology.  She was worked up with a staging PET/CT scan and a brain MRI.  The PET/CT scan revealed minimal uptake in the local area which could very well be from recent surgery or residual  disease.  She had multiple subcentimeter pulmonary nodules with the largest one being 6 mm in the left lower lobe.  She had a history of pulmonary nodules which had been previously followed for 3 years. The PET was also significant for an enlarged portacaval node with mild FDG uptake and FDG uptake in the hepatic flexure of her colon.  Her most recent colonoscopy has been merely 2 months ago and was completely normal.  Her brain MRI was limited because of motion artifact. There were no enlarged lymph nodes in the head and neck region or any other site of increased FDG uptake that would be suspicious for definitive metastatic disease.      Her case was reviewed at the multi-specialty tumor board and she was recommended endoscopic resection of the melanoma followed by post operative radiation therapy. She underwent a rerevision surgery on 06/23/2014 using an endoscopic endonasal approach. Tumor was located at the anterior skull base between the middle turbinate and the septum. Final pathology report showed clear margins. She was started on radiation therapy under care of Dr. Ron Quiroga in July. Unfortunately she fell and fractured her left humerus in End of August. It was decided not to proceed with surgical intervention as this would involve intubation which would be difficult given ongoing radiation therapy. She had a difficult hospital course with UTI, confusion. She was managed conservatively and radiation therapy was resumed - eventually completed on September 11, 2014. She had a 3 month post treatment completion restaging scan which does not show any recurrent disease. She comes in today for routine follow up.       ALAN Lloyd is being followed for her malignant melanoma status post resection on 6/04/14 and then re-resection on 06/24/14 with negative margins followed by radiation therapy from July through September 11, 2014.     She is accompanied by her  at this clinic visit. She continues to  have itching and discomfort/pain in her site of excision in skull in February.     Her  point out that she has unstable gait. She did bump in to the wall with the lights off and had a small sub cm scalp hematoma. She often loses chain of thought. She is not confused or disoriented.       PAST MEDICAL HISTORY   1. HTN  2. Dyslipidemia  3. Depression on medications  4. Pulmonary nodules  5. Cholecystectomy  6. OA - Back and Neck surgeries done   7. Fibromyalgia      CURRENT OUTPATIENT MEDICATIONS     Current Outpatient Prescriptions   Medication Sig     ALPRAZolam (XANAX) 0.25 MG tablet TAKE 1 TABLET BY MOUTH THREE TIMES DAILY AS NEEDED FOR ANXIETY     pravastatin (PRAVACHOL) 40 MG tablet Take 1 tablet (40 mg) by mouth daily     FLUoxetine (PROZAC) 20 MG capsule TAKE 1 CAPSULE(20 MG) BY MOUTH DAILY     acetaminophen (TYLENOL) 500 MG tablet Take 500 mg by mouth every 6 hours as needed      VITAMIN D, CHOLECALCIFEROL, PO Take 2,000 Units by mouth daily     Simethicone 180 MG CAPS Take 1 capsule by mouth 2 times daily Reported on 5/9/2017     ranitidine (ZANTAC) 150 MG tablet TAKE 1 TABLET BY MOUTH TWICE DAILY AS NEEDED     potassium chloride (MICRO-K) 10 MEQ CR capsule TK 2 CS PO D (Patient taking differently: Take 20 mEq by mouth daily TK 2 CS PO D)     senna-docusate (SENOKOT-S;PERICOLACE) 8.6-50 MG per tablet Take 1-2 tablets by mouth 2 times daily     venlafaxine (EFFEXOR-XR) 75 MG 24 hr capsule Take 3 capsules (225 mg) by mouth daily     pantoprazole (PROTONIX) 40 MG EC tablet Take 1 tablet (40 mg) by mouth daily Take 30-60 minutes before a meal.     lisinopril (PRINIVIL,ZESTRIL) 30 MG tablet Take 1 tablet (30 mg) by mouth daily     hydrochlorothiazide (MICROZIDE) 12.5 MG capsule Take 1 capsule (12.5 mg) by mouth daily     ibuprofen (ADVIL/MOTRIN) 800 MG tablet Take 1 tablet (800 mg) by mouth every 4 hours as needed for moderate pain Reported on 4/17/2017 (Patient not taking: Reported on 12/13/2017)     No  current facility-administered medications for this visit.           ALLERGIES     Allergies   Allergen Reactions     Novocain [Procaine] Unknown and Rash     Nefazodone Unknown     Mirtazapine       PHYSICAL EXAM   /67  Pulse 88  Temp 97.5  F (36.4  C) (Oral)  Resp 16  Wt 79.8 kg (176 lb)  SpO2 96%  BMI 29.32 kg/m2   Lying /66, Sitting 119/74, Standing 102/57   SpO2 Readings from Last 4 Encounters:   12/13/17 96%   10/04/17 100%   09/26/17 100%   08/30/17 100%     Wt Readings from Last 3 Encounters:   12/13/17 79.8 kg (176 lb)   10/04/17 84.4 kg (186 lb 1.1 oz)   09/26/17 83.3 kg (183 lb 11.2 oz)     GEN: NAD  HEENT: PERRL, EOMI, no icterus, injection or pallor. Oropharynx is clear.   NECK: no obvious cervical or supraclavicular lymphadenopathy  LUNGS: clear bilaterally  CV: regular rate and rhythm  ABDOMEN: soft, non-tender, non-distended, normal bowel sounds, no hepatosplenomegaly  EXT: warm, well perfused, no edema  NEURO: alert  SKIN: no rashes     LABORATORY AND IMAGING STUDIES       Recent Labs   Lab Test  12/13/17   1026  10/04/17   0610  09/26/17   1536  08/01/17   0914  03/28/17   1553   02/15/17   0937   NA  136   --   134  135  136   --   133   POTASSIUM  3.0*  3.6  4.3  3.8  4.9   < >  3.9   CHLORIDE  101   --   98  99  100   --   98   CO2  29   --   26  28  27   --   25   ANIONGAP  6   --   10  8  9   --   10   BUN  13   --   8  12  12   --   11   CR  0.76   --   0.87  0.78  0.79   --   0.86   GLC  91   --   87  99  121*   --   84   STEFFANIE  7.6*   --   9.1  9.3  9.0   --   8.0*    < > = values in this interval not displayed.     Recent Labs   Lab Test  09/02/14   0550  09/01/14   0532  08/31/14   0525  08/30/14   0909  08/30/14   0616   MAG  1.6  1.9  1.8  2.2  1.5*     Recent Labs   Lab Test  12/13/17   1026  09/26/17   1536  03/28/17   1553  02/21/17   1240  02/15/17   0937  08/10/16   1050 07/29/16   WBC  8.6   --   10.8   --   7.6  5.3  5.8   HGB  9.3*  12.6  12.7  12.2  11.1*  12.0   11.9   PLT  275   --   307   --   219  256  225.0   MCV  91   --   91   --   90  94  88.0   NEUTROPHIL  74.0   --   87.5   --   66.9  62.1  79.1     Recent Labs   Lab Test  12/13/17   1026  08/01/17   0914  03/28/17   1553   03/19/15   0759   BILITOTAL  0.4  0.3  0.4   < >  0.5   ALKPHOS  83  94  114   < >  79   ALT  9  20  18   < >  22   AST  14  18  13   < >  24   ALBUMIN  2.4*  3.7  3.6   < >  3.7   LDH  145   --    --    --   157    < > = values in this interval not displayed.     TSH   Date Value Ref Range Status   02/15/2017 2.27 0.40 - 4.00 mU/L Final   08/10/2016 2.32 0.40 - 4.00 mU/L Final   03/19/2015 1.34 0.40 - 4.00 mU/L Final     Comment:     Effective 7/30/2014, the reference range for this assay has changed to reflect   new instrumentation/methodology.       No results for input(s): CEA in the last 22171 hours.  Results for orders placed or performed in visit on 12/13/17   CT Chest/Abdomen/Pelvis w Contrast    Narrative    EXAMINATION: CT CHEST/ABDOMEN/PELVIS W CONTRAST, 12/13/2017 10:29 AM    TECHNIQUE:  Helical CT images from the thoracic inlet through the  symphysis pubis were obtained  with contrast. Contrast dose: Isovue  370 114cc    COMPARISON: 8/30/2017.    HISTORY: Restaging; Malignant melanoma of nasal cavity (H)    FINDINGS:    LUNGS: Centrilobular emphysematous changes. Mild peripheral  reticulation consistent with mild fibrotic changes. Bilateral lower  lobes dependent atelectasis. Calcified pulmonary nodules in the left  lower lobe. New patchy groundglass opacities as well as groundglass  nodules throughout both lungs, for example in the right upper lobe,  series 9 image 47, left upper lobe, series 9 image 39. Scattered  bilateral solid pulmonary nodules, measuring up to 7 mm in the left  lower lobe, series 9 image 108, overall stable from the prior study.    Chest: Partially visualized thyroid gland is unremarkable. Central  tracheobronchial tree is patent. Mild patulous  esophagus.  Mild-to-moderate fluid in the epicardial recess. Thoracic aorta and  main pulmonary artery have normal diameter. Atherosclerotic  calcifications of the thoracic aorta, great vessels and coronary  arteries. Aortic root calcifications. Cardiac size within normal  limits. No pericardial effusion. No pleural effusions. No  pneumothorax. No axillary lymphadenopathy. Mediastinal lymph nodes  measuring up to 6 mm short axis, not enlarged by size criteria. 6 mm  short axis right hilar lymph nodes, not enlarged by size criteria.  Calcified foci adjacent to the distal esophagus on the left, stable,  benign-appearing. No central pulmonary embolism.    Abdomen and pelvis: Homogeneous liver parenchyma. Focal fat deposition  versus perfusional changes along the falciform ligament. Prominent  central intrahepatic biliary tree. The common bile duct measures 8 mm,  likely reservoir effect post cholecystectomy. Surgical clips in the  gallbladder fossa.    Partial atrophic pancreatic parenchyma. Stable subcentimeter foci  throughout the pancreatic parenchyma, hypoattenuating, likely  representing interspersed fat. Main pancreatic duct is not dilated.    The spleen is not enlarged. Calcified granulomas in the spleen.    Stable thickening of bilateral adrenal glands with no discrete nodule.  Subcentimeter hypoattenuating foci in the cortex of both kidneys,  stable, too small to characterize, likely representing renal cysts. No  renal stones or hydronephrosis bilaterally. Partially distended  urinary bladder with minimal hyperattenuating material at the base,  series 8 image 529, likely representing trace contrast material.  Uterus is unremarkable. No adnexal masses. Pelvic phleboliths.    No inguinal lymphadenopathy. No pelvic lymphadenopathy. Subcentimeter  retroperitoneal and mesenteric lymph nodes, not enlarged by size  criteria. No abdominal aortic aneurysm. Atherosclerotic calcifications  of the abdominal aorta and its  major branches. Decompressed stomach.  No dilated loops of bowel. No bowel wall thickening. Mild colonic  diverticulosis. No associated CT findings of acute diverticulitis.  There is intramural fat along the cecum and ascending colon wall,  likely from chronic inflammatory changes.    Bones: Enthesopathic changes off the pelvis and hips. Degenerative  changes of the spine, bilateral SI joints and hips. Scattered  Schmorl's nodes. Scattered intradiscal gas. Degenerative changes of  both shoulders. No aggressive sclerotic or lytic lesions in the bones.  Partially visualized postoperative changes of cervical spinal fusion.      Impression    IMPRESSION: In this patient with history of nasal cavity malignant  melanoma:  1. New patchy groundglass opacities as well as groundglass nodules in  both lungs, could be related to infectious/inflammatory changes. Close  attention on follow-up studies is recommended.  2. Otherwise, stable calcified and noncalcified solid pulmonary  nodules. Close attention on follow-up studies is recommended.  3. No evidence for metastatic disease within the abdomen, pelvis and  bones.     Brain MRI without and with contrast     History: ; Malignant melanoma of nasal cavity (H).  ICD-10: Malignant melanoma of nasal cavity (H)  Comparison: [facility] brain MRI 2/22/2017     Technique: Axial T1-weighted, axial FLAIR and susceptibility images  were obtained without intravenous contrast. Following intravenous  gadolinium-based contrast administration, axial T2-weighted,  diffusion, and T1-weighted images (in multiple planes) were obtained.  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  Moderate degree of motion, particularly on  postcontrast sequences,  mildly limits evaluation. Rather extensive, severe, T2  hyperintensities noted throughout the periventricular white matter,  particularly the frontal lobes, and in the brainstem, may be related  to advanced chronic small vessel ischemic disease or therapy given the  patient's age, and given that this lesion was larger in 2014 and it  has actually decreased in size over time, would suggest that these  white matter abnormalities are at least in part due to therapy.  Susceptibility images demonstrate no overt hemorrhage within the  brain, and diffusion images are relatively unremarkable.     No definite abnormality of the skull marrow signal is noted. The major  vascular flow-voids appear patent. The orbits, visualized portions of  paranasal sinuses, and mastoid air cells are relatively clear.         Impression:      1. No significant change in size since the last examination of the  cribriform plate meningioma, although it has mildly decreased in size  progressively over multiple examinations since 2014.  2. Extensive white matter abnormalities of advanced chronic small  vessel ischemic disease, particularly in the frontal lobes, may relate  to therapy, and are related to underlying vasculopathy.     JOHNATHON ARIZMENDI MD              ASSESSMENT AND PLAN   77 year old female with right sinonasal mucosal malignant melanoma  status post resection on 6/04/14 and then re-resection on 06/24/14 with negative margins followed by radiation therapy from July through September 11, 2014 (6000 cGy in 30 fractions).   b-aubrie and c-KIT status unknown    I have reviewed her scans from earlier today. There is no evidence of recurrent disease.     I have reviewed the brain MRI. This remains negative for disease. Her symptoms are concerning - she looses chain of thought and has gait problems. However she has been recovering from a severe flu like infection and had been on antibiotics. She does admit not  drinking enough fluids and is possibly dehydrated. She has lost about 10 lbs in weight. Her serum albumin has significantly dropped. I will see her in 3 months with restaging scans including CT CAP and brain MRI.     Her K is low, likely from diarrhea with flu like symptoms. She also has new moderate hypoalbuminemia with albumin of 2.4 which is decreased from 3.7 about 3 months ago.  I have asked her to check with her PCP later this week to see if she needs oral/parenteral replacements. Reviewed diet with increased protein.     She is 3+ years out from treatment completion which is very encouraging.     Over 25 min of direct face to face time spent with patient with more than 50% time spent in counseling and coordinating care.

## 2017-12-14 ENCOUNTER — TELEPHONE (OUTPATIENT)
Dept: FAMILY MEDICINE | Facility: CLINIC | Age: 77
End: 2017-12-14

## 2017-12-14 NOTE — TELEPHONE ENCOUNTER
Unable to triage as patient was sleeping.  states patient was seen yesterday for her Oncology visit and was told to follow-up with PCP for possible pneumonia from Chest CT results.  reports on and off cough, no fever and patient loses her voice as the day goes on.         Next 5 appointments (look out 90 days)     Dec 15, 2017  1:40 PM CST   [facility] with [facility]ie [facility]    [address], [address]  [place] [id number] [phone]                Nhi Llanos, RN, BSN

## 2017-12-14 NOTE — TELEPHONE ENCOUNTER
Reason for Call:  Same Day Appointment, Requested Provider:  Rebekah Boogie MD    PCP: Rebekah Boogie    Reason for visit: poss pneumonia, could you see her tomorrow anytime for this,  calling     Duration of symptoms: ongoing    Have you been treated for this in the past? No    Additional comments: none    Can we leave a detailed message on this number? YES    Phone number patient can be reached at: Home number on file [phone] (home)    Best Time: any    Call taken on 12/14/2017 at 2:30 PM by Fabiola Maynard

## 2017-12-15 ENCOUNTER — OFFICE VISIT (OUTPATIENT)
Dept: FAMILY MEDICINE | Facility: CLINIC | Age: 77
End: 2017-12-15
Payer: MEDICARE

## 2017-12-15 VITALS
HEART RATE: 75 BPM | TEMPERATURE: 98 F | HEIGHT: 64 IN | OXYGEN SATURATION: 96 % | BODY MASS INDEX: 28.85 KG/M2 | SYSTOLIC BLOOD PRESSURE: 166 MMHG | WEIGHT: 169 LBS | DIASTOLIC BLOOD PRESSURE: 72 MMHG

## 2017-12-15 DIAGNOSIS — I10 HYPERTENSION GOAL BP (BLOOD PRESSURE) < 140/90: ICD-10-CM

## 2017-12-15 DIAGNOSIS — C30.0 MALIGNANT MELANOMA OF NASAL CAVITY (H): ICD-10-CM

## 2017-12-15 DIAGNOSIS — E87.6 HYPOKALEMIA: ICD-10-CM

## 2017-12-15 DIAGNOSIS — J18.9 PNEUMONIA OF BOTH LUNGS DUE TO INFECTIOUS ORGANISM, UNSPECIFIED PART OF LUNG: Primary | ICD-10-CM

## 2017-12-15 DIAGNOSIS — K21.9 GASTROESOPHAGEAL REFLUX DISEASE WITHOUT ESOPHAGITIS: ICD-10-CM

## 2017-12-15 DIAGNOSIS — F41.1 GAD (GENERALIZED ANXIETY DISORDER): ICD-10-CM

## 2017-12-15 PROCEDURE — 99214 OFFICE O/P EST MOD 30 MIN: CPT | Performed by: FAMILY MEDICINE

## 2017-12-15 RX ORDER — LEVOFLOXACIN 500 MG/1
500 TABLET, FILM COATED ORAL DAILY
Qty: 10 TABLET | Refills: 0 | Status: SHIPPED | OUTPATIENT
Start: 2017-12-15 | End: 2018-03-14

## 2017-12-15 RX ORDER — ALPRAZOLAM 0.25 MG
TABLET ORAL
Qty: 20 TABLET | Refills: 0 | Status: SHIPPED | OUTPATIENT
Start: 2017-12-15 | End: 2017-12-29 | Stop reason: ALTCHOICE

## 2017-12-15 RX ORDER — POTASSIUM CHLORIDE 750 MG/1
20 CAPSULE, EXTENDED RELEASE ORAL DAILY
Qty: 180 CAPSULE | Refills: 1 | Status: SHIPPED | OUTPATIENT
Start: 2017-12-15 | End: 2018-08-06

## 2017-12-15 RX ORDER — HYDROCHLOROTHIAZIDE 12.5 MG/1
12.5 CAPSULE ORAL DAILY
Qty: 90 CAPSULE | Refills: 1 | Status: SHIPPED | OUTPATIENT
Start: 2017-12-15 | End: 2018-05-31

## 2017-12-15 RX ORDER — PANTOPRAZOLE SODIUM 40 MG/1
40 TABLET, DELAYED RELEASE ORAL DAILY
Qty: 90 TABLET | Refills: 1 | Status: SHIPPED | OUTPATIENT
Start: 2017-12-15 | End: 2018-08-06

## 2017-12-15 RX ORDER — LISINOPRIL 30 MG/1
30 TABLET ORAL DAILY
Qty: 90 TABLET | Refills: 1 | Status: SHIPPED | OUTPATIENT
Start: 2017-12-15 | End: 2018-05-31

## 2017-12-15 NOTE — PROGRESS NOTES
"  SUBJECTIVE:                                                    Gabino Jones is a 77 year old female who presents to clinic today for the following health issues:    HPI    Acute Illness   Acute illness concerns: possible pneumonia   Onset: 3 weeks, was see in urgent care, prescribed guaifenesin and completed,also mentioned that she broke out sore around mouth, but now is better      Fever: no    Chills/Sweats: Yes- had it     Headache (location?): YES    Sinus Pressure:YES    Conjunctivitis:  Yes- itching and red around the eye on the right eye     Ear Pain: YES- \"cold in the ears\"     Rhinorrhea: YES- just today     Congestion: YES    Sore Throat: no      Cough: YES-non-productive, it was productive     Wheeze: no     Decreased Appetite: YES    Nausea: YES    Vomiting: no     Diarrhea:  YES    Dysuria/Freq.: no     Fatigue/Achiness: YES    Sick/Strep Exposure: YES-      Therapies Tried and outcome: guaifenesin, ibuprofen     Went to urgent care in November with a cough. Had been to the [facility], had a lot of diffuse spots on her lungs, doctor there thought it looked more like pneumonia than cancer, wanted her to come in to be checked for pneumonia.  states that her cough comes and goes. Previous antibiotic given in UC had some improvement, but has come back. Losing appetite and weight, lost 14 pounds in the last week. Had labs done wednesday, all were normal.  reports that she had a sore throat just a few days ago. Denies any dizziness, but has some weakness in her legs. Denies pain swallowing. She has taken prilosec for her upset stomach, is now taking protonix, thinks this works just as well. She reports that she has been \"sick to her stomach\" and couldn't eat if she wanted to, thus has been losing weight.    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 She has been cancer free for 2.5 years. Had a colonoscopy, found a lot of " "polyps, having surgery to remove in April.    Problem list and histories reviewed & adjusted, as indicated.  Additional history: as documented      BP Readings from Last 3 Encounters:   12/15/17 166/72   12/13/17 136/67   10/04/17 (!) 144/97    Wt Readings from Last 3 Encounters:   12/15/17 76.7 kg (169 lb)   12/13/17 79.8 kg (176 lb)   10/04/17 84.4 kg (186 lb 1.1 oz)          ROS:  CONSTITUTIONAL: POSITIVE  for loss of appetite, fatigue and weight loss  ENT/MOUTH: POSITIVE for sore throat, NEGATIVE for pain while swallowing  RESP: POSITIVE for cough-non productive  GI: POSITIVE for nausea, poor appetite and weight loss  MUSCULOSKELETAL: POSITIVE  for muscle weakness in legs  NEURO: NEGATIVE for dizziness    This document serves as a record of the services and decisions personally performed and made by Rebekah Boogie MD. It was created on her behalf by Charlotte Joe, a trained medical scribe. The creation of this document is based the provider's statements to the medical scribe.  Charlotte Joe, December 15, 2017 2:25 PM       OBJECTIVE:     /72  Pulse 75  Temp 98  F (36.7  C) (Oral)  Ht 1.626 m (5' 4\")  Wt 76.7 kg (169 lb)  SpO2 96%  BMI 29.01 kg/m2  Body mass index is 29.01 kg/(m^2).  GENERAL: healthy, alert and no distress, hard of hearing  EYES: Eyes grossly normal to inspection, PERRL and conjunctivae and sclerae normal  HENT: ear canals and TM's normal, nose and mouth without ulcers or lesions  NECK: no adenopathy, no asymmetry, masses, or scars and thyroid normal to palpation  RESP: lungs clear to auscultation - no rales, rhonchi or wheezes  CV: 3/6 systolic murmur, regular rate and rhythm, normal S1 S2, no S3 or S4, click or rub, no peripheral edema and peripheral pulses strong    Diagnostic Test Results:  None    ASSESSMENT/PLAN:             1. Pneumonia of both lungs due to infectious organism, unspecified part of lung  On auscultation she is moving air well in her lungs, but CT scan results from " 12/13/17 are worrisome, will prescribe antibiotic course. Order placed for Levaquin, 10 day course. Medication direction, dosage, and side effects discussed with patient.  Recheck in 10-14 days.   - levofloxacin (LEVAQUIN) 500 MG tablet; Take 1 tablet (500 mg) by mouth daily  Dispense: 10 tablet; Refill: 0    2. Malignant melanoma of nasal cavity (H)  Patient visited with her oncologist on 12/13/17 and he found no evidence of recurrent disease from CT scan or brain MRI. He had noted some concerning symptoms and requested she follow up in 3 months    3. Hypokalemia  Potassium level was on the low side at last lab. Recommended she keep taking potassium supplements.   Recheck at next office visit in 10-14 days  - potassium chloride (MICRO-K) 10 MEQ CR capsule; Take 2 capsules (20 mEq) by mouth daily  Dispense: 180 capsule; Refill: 1    4. Gastroesophageal reflux disease without esophagitis  Patient has used omeprazole in the past, reports good control of symptoms with Protonix as well.  She has been out of this. Refills given. Continue with zantac as well.   - pantoprazole (PROTONIX) 40 MG EC tablet; Take 1 tablet (40 mg) by mouth daily Take 30-60 minutes before a meal.  Dispense: 90 tablet; Refill: 1    5. Hypertension goal BP (blood pressure) < 140/90  Doing well. Well controlled. Tolerating medication.  No change in plan.   - hydrochlorothiazide (MICROZIDE) 12.5 MG capsule; Take 1 capsule (12.5 mg) by mouth daily  Dispense: 90 capsule; Refill: 1  - lisinopril (PRINIVIL,ZESTRIL) 30 MG tablet; Take 1 tablet (30 mg) by mouth daily  Dispense: 90 tablet; Refill: 1    6. JITENDRA (generalized anxiety disorder)  Doing well. Well controlled. Tolerating medication.  No change in plan.   - ALPRAZolam (XANAX) 0.25 MG tablet; TAKE 1 TABLET BY MOUTH THREE TIMES DAILY AS NEEDED FOR ANXIETY  Dispense: 20 tablet; Refill: 0         - Follow-up visit in 10-14 days if symptoms worsen or fail to improve.     The information in this  document, created by the medical scribe for me, accurately reflects the services I personally performed and the decisions made by me. I have reviewed and approved this document for accuracy.       MIMI RAMIREZ MD, MD  [facility]

## 2017-12-15 NOTE — NURSING NOTE
"Chief Complaint   Patient presents with     Cough       Initial /72  Pulse 75  Temp 98  F (36.7  C) (Oral)  Ht 5' 4\" (1.626 m)  Wt 169 lb (76.7 kg)  SpO2 96%  BMI 29.01 kg/m2 Estimated body mass index is 29.01 kg/(m^2) as calculated from the following:    Height as of this encounter: 5' 4\" (1.626 m).    Weight as of this encounter: 169 lb (76.7 kg).  Medication Reconciliation: complete  "

## 2017-12-15 NOTE — MR AVS SNAPSHOT
After Visit Summary   12/15/2017    Gabino Jones    MRN: [id number]           Patient Information     Date Of Birth          1940        Visit Information        Provider Department      12/15/2017 1:40 PM Rebekah Boogie MD [facility]        Today's Diagnoses     Pneumonia of both lungs due to infectious organism, unspecified part of lung    -  1    Malignant melanoma of nasal cavity (H)        Hypokalemia        Gastroesophageal reflux disease without esophagitis        Hypertension goal BP (blood pressure) < 140/90        JITENDRA (generalized anxiety disorder)           Follow-ups after your visit        Your next 10 appointments already scheduled     Jan 04, 2018  2:20 PM CST   (Arrive by 2:05 PM)   Return Visit with Yolanda Whitaker MD   [facility] ([facility])    [address] [place] [id number] [phone]              Who to contact     If you have questions or need follow up information about today's clinic visit or your schedule please contact [facility] directly at [phone].  Normal or non-critical lab and imaging results will be communicated to you by SuperTruperhart, letter or phone within 4 business days after the clinic has received the results. If you do not hear from us within 7 days, please contact the clinic through SuperTruperhart or phone. If you have a critical or abnormal lab result, we will notify you by phone as soon as possible.  Submit refill requests through needmade or call your pharmacy and they will forward the refill request to us. Please allow 3 business days for your refill to be completed.          Additional Information About Your Visit        MyChart Information     needmade gives you secure access to your electronic health record. If you see a primary care provider, you can also send messages to your care team and make appointments. If you have questions, please  "call your primary care clinic.  If you do not have a primary care provider, please call [phone] and they will assist you.        Care EveryWhere ID     This is your Care EveryWhere ID. This could be used by other organizations to access your [facility] medical records  [id number]        Your Vitals Were     Pulse Temperature Height Pulse Oximetry BMI (Body Mass Index)       75 98  F (36.7  C) (Oral) 5' 4\" (1.626 m) 96% 29.01 kg/m2        Blood Pressure from Last 3 Encounters:   12/15/17 166/72   12/13/17 136/67   10/04/17 (!) 144/97    Weight from Last 3 Encounters:   12/15/17 169 lb (76.7 kg)   12/13/17 176 lb (79.8 kg)   10/04/17 186 lb 1.1 oz (84.4 kg)              Today, you had the following     No orders found for display         Today's Medication Changes          These changes are accurate as of: 12/15/17  2:35 PM.  If you have any questions, ask your nurse or doctor.               Start taking these medicines.        Dose/Directions    levofloxacin 500 MG tablet   Commonly known as:  LEVAQUIN   Used for:  Pneumonia of both lungs due to infectious organism, unspecified part of lung   Started by:  Rebekah Boogie MD        Dose:  500 mg   Take 1 tablet (500 mg) by mouth daily   Quantity:  10 tablet   Refills:  0         These medicines have changed or have updated prescriptions.        Dose/Directions    ALPRAZolam 0.25 MG tablet   Commonly known as:  XANAX   This may have changed:  See the new instructions.   Used for:  JITENDRA (generalized anxiety disorder)   Changed by:  Rebekah Boogie MD        TAKE 1 TABLET BY MOUTH THREE TIMES DAILY AS NEEDED FOR ANXIETY   Quantity:  20 tablet   Refills:  0       potassium chloride 10 MEQ CR capsule   Commonly known as:  MICRO-K   This may have changed:    - how much to take  - how to take this  - when to take this  - additional instructions   Used for:  Hypokalemia   Changed by:  Rebekah Boogie MD        Dose:  20 mEq   Take 2 capsules (20 mEq) by mouth " daily   Quantity:  180 capsule   Refills:  1         Stop taking these medicines if you haven't already. Please contact your care team if you have questions.     FLUoxetine 20 MG capsule   Commonly known as:  PROzac   Stopped by:  Rebekah Boogie MD                Where to get your medicines      These medications were sent to [facility] [address] [address] [address] [facility] [address]  [address] [facility] [address]     Phone:  [phone]     hydrochlorothiazide 12.5 MG capsule    levofloxacin 500 MG tablet    lisinopril 30 MG tablet    pantoprazole 40 MG EC tablet    potassium chloride 10 MEQ CR capsule         Some of these will need a paper prescription and others can be bought over the counter.  Ask your nurse if you have questions.     Bring a paper prescription for each of these medications     ALPRAZolam 0.25 MG tablet                Primary Care Provider Office Phone # Fax #    Rebekah Boogie [phone] [address] [facility] [address]        Equal Access to Services     [facility]: Hadii aad ku hadasho Soomaali, waaxda luqadaha, qaybta kaalmada adeegyada, waxay sage hayclif wright . So [facility] [phone].    ATENCIÓN: Si habla español, tiene a velazquez disposición servicios gratuitos de asistencia lingüística. [facility] [phone].    We comply with applicable federal civil rights laws and [place] laws. We do not discriminate on the basis of race, color, national origin, age, disability, sex, sexual orientation, or gender identity.            Thank you!     Thank you for choosing [facility]  for your care. Our goal is always to provide you with excellent care. Hearing back from our patients is one way we can continue to improve our services. Please take a few minutes to complete the written survey that you may receive in the mail after your visit with us. Thank you!             Your  Updated Medication List - Protect others around you: Learn how to safely use, store and throw away your medicines at www.disposemymeds.org.          This list is accurate as of: 12/15/17  2:35 PM.  Always use your most recent med list.                   Brand Name Dispense Instructions for use Diagnosis    acetaminophen 500 MG tablet    TYLENOL     Take 500 mg by mouth every 6 hours as needed    Malignant melanoma of nasal cavity (H)       ALPRAZolam 0.25 MG tablet    XANAX    20 tablet    TAKE 1 TABLET BY MOUTH THREE TIMES DAILY AS NEEDED FOR ANXIETY    JITENDRA (generalized anxiety disorder)       GuaiFENesin 100 MG Pack           hydrochlorothiazide 12.5 MG capsule    MICROZIDE    90 capsule    Take 1 capsule (12.5 mg) by mouth daily    Hypertension goal BP (blood pressure) < 140/90       ibuprofen 800 MG tablet    ADVIL/MOTRIN    90 tablet    Take 1 tablet (800 mg) by mouth every 4 hours as needed for moderate pain Reported on 4/17/2017    Myalgia       levofloxacin 500 MG tablet    LEVAQUIN    10 tablet    Take 1 tablet (500 mg) by mouth daily    Pneumonia of both lungs due to infectious organism, unspecified part of lung       lisinopril 30 MG tablet    PRINIVIL,ZESTRIL    90 tablet    Take 1 tablet (30 mg) by mouth daily    Hypertension goal BP (blood pressure) < 140/90       pantoprazole 40 MG EC tablet    PROTONIX    90 tablet    Take 1 tablet (40 mg) by mouth daily Take 30-60 minutes before a meal.    Gastroesophageal reflux disease without esophagitis       potassium chloride 10 MEQ CR capsule    MICRO-K    180 capsule    Take 2 capsules (20 mEq) by mouth daily    Hypokalemia       pravastatin 40 MG tablet    PRAVACHOL    90 tablet    Take 1 tablet (40 mg) by mouth daily    Mixed hyperlipidemia       ranitidine 150 MG tablet    ZANTAC    180 tablet    TAKE 1 TABLET BY MOUTH TWICE DAILY AS NEEDED    Gastroesophageal reflux disease without esophagitis       senna-docusate 8.6-50 MG per tablet     SENOKOT-S;PERICOLACE    120 tablet    Take 1-2 tablets by mouth 2 times daily        Simethicone 180 MG Caps     60 capsule    Take 1 capsule by mouth 2 times daily Reported on 5/9/2017    Flatulence, eructation, and gas pain       venlafaxine 75 MG 24 hr capsule    EFFEXOR-XR    270 capsule    Take 3 capsules (225 mg) by mouth daily    JITENDRA (generalized anxiety disorder)       VITAMIN D (CHOLECALCIFEROL) PO      Take 2,000 Units by mouth daily    Malignant melanoma of nasal cavity (H)

## 2017-12-20 ENCOUNTER — CARE COORDINATION (OUTPATIENT)
Dept: GASTROENTEROLOGY | Facility: CLINIC | Age: 77
End: 2017-12-20

## 2017-12-20 NOTE — PROGRESS NOTES
[facility] RN Care Coordination Note:    Called and left additional message for pt to call back and discuss options for follow up colonoscopy recommended to be done in 04/2018 with . Left my direct call back number for pt.      Ellie Case RN   Care Coordinator - Dr Watson-Franklin  [phone]

## 2017-12-21 ENCOUNTER — TELEPHONE (OUTPATIENT)
Dept: FAMILY MEDICINE | Facility: CLINIC | Age: 77
End: 2017-12-21

## 2017-12-21 NOTE — TELEPHONE ENCOUNTER
Gabino Jones is a 77 year old female who calls with sore mouth.    NURSING ASSESSMENT:  Description:  Pt states she has mouth sores.  She is wondering if it is from the antibiotics that she started on 12/15/17 for pneumonia.  Onset/duration:  1 day  Precip. factors:  Unknown if from abx.  Started abx on 12/15, sore mouth started today  Associated symptoms:  Dry tongue, sore mouth (all over).  Denies fever, swollen tongue, white on tongue, open sores, bleeding, difficulty swallowing, difficulty breathing.  Improves/worsens symptoms:  Rinsed mouth with salt water helped a little  Pain scale (0-10)   5/10    Allergies:   Allergies   Allergen Reactions     Novocain [Procaine] Unknown and Rash     Nefazodone Unknown     Mirtazapine        RECOMMENDED DISPOSITION:  Home care advice - drink fluids due to dry tongue, cold fluids to help with mouth pain, rinse with warm water and baking soda 4 times a day.  Will comply with recommendation: Yes  If further questions/concerns or if symptoms do not improve, worsen or new symptoms develop, call your PCP or [facility] as soon as possible.      Guideline used: mouth problems  Telephone Triage Protocols for Nurses, Fifth Edition, Kayce Nguyen RN

## 2017-12-22 NOTE — PROGRESS NOTES
"  SUBJECTIVE:                                                    Gabino Jones is a 77 year old female who presents to clinic today for the following health issues with :      HPI    Acute Illness   Acute illness concerns: Pneumonia  Onset: 12/15    Fever: YES    Chills/Sweats: YES    Headache (location?): YES    Sinus Pressure:YES    Conjunctivitis:  YES: both    Ear Pain: no    Rhinorrhea: YES    Congestion: YES    Sore Throat: YES     Cough: YES-productive of clear sputum    Wheeze: no    Decreased Appetite: YES    Nausea: YES    Vomiting: YES    Diarrhea:  no     Dysuria/Freq.: no    Fatigue/Achiness: YES    Sick/Strep Exposure: no     Therapies Tried and outcome: Robitussin    Patient reports that since her last visit on 12-. She is still coughing. She feels stronger than she was, and her voice is coming back. She is experiencing a fever, chills/sweats, a headache, sinus pressure, conjunctivitis, rhinorrhea, congestion, a sore throat, a productive cough with clear sputum, nausea, vomiting, and fatigue. Patient notes that she is having abdominal pain. She has had a decrease in appetite. Patient has been taking Robitussin. She finished her antibiotics 2 days ago. Patient has an appointment to see her sinus surgeon soon.     Depression/Anxiety  Patient notes that her Prozac only lasts her 28 days. She was prescribed this in [place]. When she eats less she feels better.    She is interested if there is an alternative to Zantac as it makes her \"crabby\" after she takes it. It works well to calm her down. She feels like she needs something.     Problem list and histories reviewed & adjusted, as indicated.  Additional history: as documented    BP Readings from Last 3 Encounters:   12/29/17 136/74   12/15/17 166/72   12/13/17 136/67    Wt Readings from Last 3 Encounters:   12/29/17 80.6 kg (177 lb 12.8 oz)   12/15/17 76.7 kg (169 lb)   12/13/17 79.8 kg (176 lb)           ROS:  C: NEGATIVE for change in " weight. POSITIVE for fever and chills. See HPI above.   E: NEGATIVE for vision changes or irritation. See HPI above.   E/M: NEGATIVE for ear and mouth problems. POSITIVE for throat problems. See HPI above.   R: NEGATIVE for significant SOB. POSITIVE for significant cough. See HPI above.   CV: NEGATIVE for chest pain, palpitations or peripheral edema  GI: NEGATIVE for heartburn or change in bowel habits. POSITIVE for nausea and abdominal pain. See HPI above.   NEURO: NEGATIVE for weakness, dizziness or paresthesias. See HPI above.   PSYCHIATRIC: NEGATIVE for changes in mood or affect. See HPI above.     This document serves as a record of the services and decisions personally performed and made by Rebekah Boogie MD. It was created on her behalf by Clara Malloy, a trained medical scribe. The creation of this document is based on the provider's statements to the medical scribe.  Clara Malloy 2:18 PM December 29, 2017    OBJECTIVE:     /74  Pulse 78  Temp 97.6  F (36.4  C) (Temporal)  Resp 16  Wt 80.6 kg (177 lb 12.8 oz)  BMI 30.52 kg/m2  Body mass index is 30.52 kg/(m^2).  GENERAL APPEARANCE: healthy, alert and no distress  HENT: ear canals and TM's normal and nose and mouth without ulcers or lesions  NECK: no adenopathy, no asymmetry, masses, or scars and thyroid normal to palpation  RESP: lungs clear to auscultation - no rales, rhonchi or wheezes  CV: regular rates and rhythm, normal S1 S2, no S3 or S4 and no murmur, click or rub  MS: extremities normal- no gross deformities noted  Psychiatric: mentation appears normal and affect normal/bright      Diagnostic Test Results:  none     ASSESSMENT/PLAN:         1. Pneumonia due to infectious organism, unspecified laterality, unspecified part of lung  Evaluated Pneumonia symptoms. Seems improved and normal lung exam today.   Will hold off on adding new medication, and will watch symptoms.   Advised to return to the clinic if symptoms worsen.   Will be seeing  back in 3-4 weeks and consider when next CT chest should be to reevaluate for the nodules.       2. JITENDRA (generalized anxiety disorder)  Discussed Anxiety and Depression.   Not controlled at this time with the effexor.   Will start Wellbutrin XL 150mg and will have her use Hydroxyzine 25mg for anxiety attacks.   Medication direction, dosage, and side effects discussed with patient. Directed patient to stop taking Prozac.   - buPROPion (WELLBUTRIN XL) 150 MG 24 hr tablet; Take 1 tablet (150 mg) by mouth every morning  Dispense: 90 tablet; Refill: 0  - hydrOXYzine (ATARAX) 25 MG tablet; Take 1 tablet (25 mg) by mouth every 6 hours as needed for anxiety  Dispense: 30 tablet; Refill: 1    3. Mild recurrent major depression (H)  See above.     Advised patient to stay on Protonix instead of Prilosec.     Follow Up: Return to clinic in 3 weeks for medication check    All questions invited, asked and answered to the patient's apparent satisfaction.  Patient agrees to plan.     The information in this document, created by the medical scribe for me, accurately reflects the services I personally performed and the decisions made by me. I have reviewed and approved this document for accuracy prior to leaving the patient care area.  December 29, 2017 2:34 PM    MIMI RAMIREZ MD, MD  [facility]  Answers for HPI/ROS submitted by the patient on 12/29/2017   PHQ-2 Score: 4  If you checked off any problems, how difficult have these problems made it for you to do your work, take care of things at home, or get along with other people?: Not difficult at all  PHQ9 TOTAL SCORE: 12  JITENDRA 7 TOTAL SCORE: 8

## 2017-12-29 ENCOUNTER — OFFICE VISIT (OUTPATIENT)
Dept: FAMILY MEDICINE | Facility: CLINIC | Age: 77
End: 2017-12-29
Payer: MEDICARE

## 2017-12-29 VITALS
HEART RATE: 78 BPM | RESPIRATION RATE: 16 BRPM | TEMPERATURE: 97.6 F | WEIGHT: 177.8 LBS | DIASTOLIC BLOOD PRESSURE: 74 MMHG | SYSTOLIC BLOOD PRESSURE: 136 MMHG | BODY MASS INDEX: 30.52 KG/M2

## 2017-12-29 DIAGNOSIS — F41.1 GAD (GENERALIZED ANXIETY DISORDER): ICD-10-CM

## 2017-12-29 DIAGNOSIS — J18.9 PNEUMONIA DUE TO INFECTIOUS ORGANISM, UNSPECIFIED LATERALITY, UNSPECIFIED PART OF LUNG: Primary | ICD-10-CM

## 2017-12-29 DIAGNOSIS — F33.0 MILD RECURRENT MAJOR DEPRESSION (H): ICD-10-CM

## 2017-12-29 PROCEDURE — 99214 OFFICE O/P EST MOD 30 MIN: CPT | Performed by: FAMILY MEDICINE

## 2017-12-29 RX ORDER — BUPROPION HYDROCHLORIDE 150 MG/1
150 TABLET ORAL EVERY MORNING
Qty: 90 TABLET | Refills: 0 | Status: SHIPPED | OUTPATIENT
Start: 2017-12-29 | End: 2018-04-13

## 2017-12-29 RX ORDER — HYDROXYZINE HYDROCHLORIDE 25 MG/1
25 TABLET, FILM COATED ORAL EVERY 6 HOURS PRN
Qty: 30 TABLET | Refills: 1 | Status: SHIPPED | OUTPATIENT
Start: 2017-12-29 | End: 2018-03-28

## 2017-12-29 ASSESSMENT — ANXIETY QUESTIONNAIRES
GAD7 TOTAL SCORE: 8
4. TROUBLE RELAXING: NOT AT ALL
GAD7 TOTAL SCORE: 8
1. FEELING NERVOUS, ANXIOUS, OR ON EDGE: MORE THAN HALF THE DAYS
6. BECOMING EASILY ANNOYED OR IRRITABLE: MORE THAN HALF THE DAYS
7. FEELING AFRAID AS IF SOMETHING AWFUL MIGHT HAPPEN: NOT AT ALL
GAD7 TOTAL SCORE: 8
5. BEING SO RESTLESS THAT IT IS HARD TO SIT STILL: NOT AT ALL
2. NOT BEING ABLE TO STOP OR CONTROL WORRYING: MORE THAN HALF THE DAYS
3. WORRYING TOO MUCH ABOUT DIFFERENT THINGS: MORE THAN HALF THE DAYS
7. FEELING AFRAID AS IF SOMETHING AWFUL MIGHT HAPPEN: NOT AT ALL

## 2017-12-29 ASSESSMENT — PATIENT HEALTH QUESTIONNAIRE - PHQ9
SUM OF ALL RESPONSES TO PHQ QUESTIONS 1-9: 12
SUM OF ALL RESPONSES TO PHQ QUESTIONS 1-9: 12
10. IF YOU CHECKED OFF ANY PROBLEMS, HOW DIFFICULT HAVE THESE PROBLEMS MADE IT FOR YOU TO DO YOUR WORK, TAKE CARE OF THINGS AT HOME, OR GET ALONG WITH OTHER PEOPLE: NOT DIFFICULT AT ALL

## 2017-12-29 ASSESSMENT — PAIN SCALES - GENERAL: PAINLEVEL: SEVERE PAIN (6)

## 2017-12-29 NOTE — MR AVS SNAPSHOT
After Visit Summary   12/29/2017    Gabino Jones    MRN: [id number]           Patient Information     Date Of Birth          1940        Visit Information        Provider Department      12/29/2017 1:40 PM Rebekah Boogie MD [facility]        Today's Diagnoses     Pneumonia due to infectious organism, unspecified laterality, unspecified part of lung    -  1    JITENDRA (generalized anxiety disorder)        Mild recurrent major depression (H)          Care Instructions    Don't take Prozac anymore.     Start taking Wellbutrin    Start taking Hydroxyzine.           Follow-ups after your visit        Your next 10 appointments already scheduled     Jan 04, 2018  2:20 PM CST   (Arrive by 2:05 PM)   Return Visit with Yolanda Whitaker MD   [facility] ([facility])    [address] [place] [id number]   [phone]            Jan 04, 2018  2:20 PM CST   (Arrive by 2:05 PM)   Evaluation with NOMI Terrazas   [facility] ([facility])    [address] [place] [id number] [phone]            Mar 14, 2018 12:15 PM CDT   (Arrive by 12:00 PM)   MR BRAIN W/O & W CONTRAST with [address] MRI ([facility])    [address] [place] [id number] [phone]           Take your medicines as usual, unless your doctor tells you not to. Bring a list of your current medicines to your exam (including vitamins, minerals and over-the-counter drugs).  You will be given intravenous contrast for this exam. To prepare:   The day before your exam, drink extra fluids at least six 8-ounce glasses (unless your doctor tells you to restrict your fluids).   Have a blood test (creatinine test) within 30 days of your exam. Go to your clinic or Diagnostic Imaging Department for this test.  The MRI machine uses a  strong magnet. Please wear clothes without metal (snaps, zippers). A sweatsuit works well, or we may give you a hospital gown.  Please remove any body piercings and hair extensions before you arrive. You will also remove watches, jewelry, hairpins, wallets, dentures, partial dental plates and hearing aids. You may wear contact lenses, and you may be able to wear your rings. We have a safe place to keep your personal items, but it is safer to leave them at home.   **IMPORTANT** THE INSTRUCTIONS BELOW ARE ONLY FOR THOSE PATIENTS WHO HAVE BEEN TOLD THEY WILL RECEIVE SEDATION OR GENERAL ANESTHESIA DURING THEIR MRI PROCEDURE:  IF YOU WILL RECEIVE SEDATION (take medicine to help you relax during your exam):   You must get the medicine from your doctor before you arrive. Bring the medicine to the exam. Do not take it at home.   Arrive one hour early. Bring someone who can take you home after the test. Your medicine will make you sleepy. After the exam, you may not drive, take a bus or take a taxi by yourself.   No eating 8 hours before your exam. You may have clear liquids up until 4 hours before your exam. (Clear liquids include water, clear tea, black coffee and fruit juice without pulp.)  IF YOU WILL RECEIVE ANESTHESIA (be asleep for your exam):   Arrive 1 1/2 hours early. Bring someone who can take you home after the test. You may not drive, take a bus or take a taxi by yourself.   No eating 8 hours before your exam. You may have clear liquids up until 4 hours before your exam. (Clear liquids include water, clear tea, black coffee and fruit juice without pulp.)  Please call the Imaging Department at your exam site with any questions.            Mar 14, 2018  1:00 PM CDT   (Arrive by 12:45 PM)   CT CHEST/ABDOMEN/PELVIS W CONTRAST with UCCT1   [facility] ([facility])    [address] [facility]  [address] [facility] [address] [phone]           Please bring any scans or  X-rays taken at other hospitals, if similar tests were done. Also bring a list of your medicines, including vitamins, minerals and over-the-counter drugs. It is safest to leave personal items at home.  Be sure to tell your doctor:   If you have any allergies.   If there s any chance you are pregnant.   If you are breastfeeding.   If you have any special needs.  You may have contrast for this exam. To prepare:   Do not eat or drink for 2 hours before your exam. If you need to take medicine, you may take it with small sips of water. (We may ask you to take liquid medicine as well.)   The day before your exam, drink extra fluids at least six 8-ounce glasses (unless your doctor tells you to restrict your fluids).  Patients over 70 or patients with diabetes or kidney problems:   If you haven t had a blood test (creatinine test) within the last 30 days, go to your clinic or Diagnostic Imaging Department for this test.  If you have diabetes:   If your kidney function is normal, continue taking your metformin (Avandamet, Glucophage, Glucovance, Metaglip) on the day of your exam.   If your kidney function is abnormal, wait 48 hours before restarting this medicine.  You will have oral contrast for this exam:   You will drink the contrast at home. Get this from your clinic or Diagnostic Imaging Department. Please follow the directions given.  Please wear loose clothing, such as a sweat suit or jogging clothes. Avoid snaps, zippers and other metal. We may ask you to undress and put on a hospital gown.  If you have any questions, please call the Imaging Department where you will have your exam.            Mar 14, 2018  3:45 PM CDT   [facility] Lab Draw with  [facility] LAB DRAW   [facility] Draw ([facility])    [address] [address] [phone]            Mar 14, 2018  4:15 PM CDT   (Arrive by 4:00 PM)   Return Visit with Garett Obregon MD   [facility]  [facility] ([facility] [place])    [address] [facility]  2nd Floor  [facility] [address] [phone]              Who to contact     If you have questions or need follow up information about today's clinic visit or your schedule please contact [facility] directly at [phone].  Normal or non-critical lab and imaging results will be communicated to you by MyChart, letter or phone within 4 business days after the clinic has received the results. If you do not hear from us within 7 days, please contact the clinic through [employer] or phone. If you have a critical or abnormal lab result, we will notify you by phone as soon as possible.  Submit refill requests through [employer] or call your pharmacy and they will forward the refill request to us. Please allow 3 business days for your refill to be completed.          Additional Information About Your Visit        [employer] Information     [employer] gives you secure access to your electronic health record. If you see a primary care provider, you can also send messages to your care team and make appointments. If you have questions, please call your primary care clinic.  If you do not have a primary care provider, please call [phone] and they will assist you.        Care EveryWhere ID     This is your Care EveryWhere ID. This could be used by other organizations to access your [facility] medical records  [id number]        Your Vitals Were     Pulse Temperature Respirations BMI (Body Mass Index)          78 97.6  F (36.4  C) (Temporal) 16 30.52 kg/m2         Blood Pressure from Last 3 Encounters:   12/29/17 136/74   12/15/17 166/72   12/13/17 136/67    Weight from Last 3 Encounters:   12/29/17 177 lb 12.8 oz (80.6 kg)   12/15/17 169 lb (76.7 kg)   12/13/17 176 lb (79.8 kg)              Today, you had the following     No orders found for display         Today's Medication Changes          These changes are accurate as of:  12/29/17  2:32 PM.  If you have any questions, ask your nurse or doctor.               Start taking these medicines.        Dose/Directions    buPROPion 150 MG 24 hr tablet   Commonly known as:  WELLBUTRIN XL   Used for:  JITENDRA (generalized anxiety disorder)   Started by:  Rebekah Boogie MD        Dose:  150 mg   Take 1 tablet (150 mg) by mouth every morning   Quantity:  90 tablet   Refills:  0       hydrOXYzine 25 MG tablet   Commonly known as:  ATARAX   Used for:  JITENDRA (generalized anxiety disorder)   Started by:  Rebekah Boogie MD        Dose:  25 mg   Take 1 tablet (25 mg) by mouth every 6 hours as needed for anxiety   Quantity:  30 tablet   Refills:  1         Stop taking these medicines if you haven't already. Please contact your care team if you have questions.     ALPRAZolam 0.25 MG tablet   Commonly known as:  XANAX   Stopped by:  Rebekah Boogie MD           FLUoxetine 20 MG capsule   Commonly known as:  PROzac   Stopped by:  Rebekah Boogie MD                Where to get your medicines      [facility] [address] [place] [address] [address]  [address] [facility] [address]     Phone:  [phone]     buPROPion 150 MG 24 hr tablet    hydrOXYzine 25 MG tablet                Primary Care Provider Office Phone # Fax #    Rebekah Boogie [phone] [address] [facility] [address]        Equal Access to Services     [facility] AH: Hadii aad ku hadasho Soomaali, waaxda luqadaha, qaybta kaalmada adeegyada, waxay yusefin hayclif dubon. So [facility] [phone].    ATENCIÓN: Si tahirla neftali, tiene a velazquez disposición servicios gratuitos de asistencia lingüística. Llame al [phone].    We comply with applicable federal civil rights laws and [place] laws. We do not discriminate on the basis of race, color, national origin, age, disability, sex, sexual orientation, or  gender identity.            Thank you!     Thank you for choosing [facility]  for your care. Our goal is always to provide you with excellent care. Hearing back from our patients is one way we can continue to improve our services. Please take a few minutes to complete the written survey that you may receive in the mail after your visit with us. Thank you!             Your Updated Medication List - Protect others around you: Learn how to safely use, store and throw away your medicines at www.disposemymeds.org.          This list is accurate as of: 12/29/17  2:32 PM.  Always use your most recent med list.                   Brand Name Dispense Instructions for use Diagnosis    acetaminophen 500 MG tablet    TYLENOL     Take 500 mg by mouth every 6 hours as needed    Malignant melanoma of nasal cavity (H)       buPROPion 150 MG 24 hr tablet    WELLBUTRIN XL    90 tablet    Take 1 tablet (150 mg) by mouth every morning    JITENDRA (generalized anxiety disorder)       GuaiFENesin 100 MG Pack           hydrochlorothiazide 12.5 MG capsule    MICROZIDE    90 capsule    Take 1 capsule (12.5 mg) by mouth daily    Hypertension goal BP (blood pressure) < 140/90       hydrOXYzine 25 MG tablet    ATARAX    30 tablet    Take 1 tablet (25 mg) by mouth every 6 hours as needed for anxiety    JITENDRA (generalized anxiety disorder)       ibuprofen 800 MG tablet    ADVIL/MOTRIN    90 tablet    Take 1 tablet (800 mg) by mouth every 4 hours as needed for moderate pain Reported on 4/17/2017    Myalgia       levofloxacin 500 MG tablet    LEVAQUIN    10 tablet    Take 1 tablet (500 mg) by mouth daily    Pneumonia of both lungs due to infectious organism, unspecified part of lung       lisinopril 30 MG tablet    PRINIVIL,ZESTRIL    90 tablet    Take 1 tablet (30 mg) by mouth daily    Hypertension goal BP (blood pressure) < 140/90       pantoprazole 40 MG EC tablet    PROTONIX    90 tablet    Take 1 tablet (40 mg) by mouth daily Take 30-60  minutes before a meal.    Gastroesophageal reflux disease without esophagitis       potassium chloride 10 MEQ CR capsule    MICRO-K    180 capsule    Take 2 capsules (20 mEq) by mouth daily    Hypokalemia       pravastatin 40 MG tablet    PRAVACHOL    90 tablet    Take 1 tablet (40 mg) by mouth daily    Mixed hyperlipidemia       ranitidine 150 MG tablet    ZANTAC    180 tablet    TAKE 1 TABLET BY MOUTH TWICE DAILY AS NEEDED    Gastroesophageal reflux disease without esophagitis       senna-docusate 8.6-50 MG per tablet    SENOKOT-S;PERICOLACE    120 tablet    Take 1-2 tablets by mouth 2 times daily        Simethicone 180 MG Caps     60 capsule    Take 1 capsule by mouth 2 times daily Reported on 5/9/2017    Flatulence, eructation, and gas pain       venlafaxine 75 MG 24 hr capsule    EFFEXOR-XR    270 capsule    Take 3 capsules (225 mg) by mouth daily    JITENDRA (generalized anxiety disorder)       VITAMIN D (CHOLECALCIFEROL) PO      Take 2,000 Units by mouth daily    Malignant melanoma of nasal cavity (H)

## 2017-12-29 NOTE — PATIENT INSTRUCTIONS
Don't take Prozac anymore.     Start taking Wellbutrin XL 150mg.     Start taking Hydroxyzine 25mg.     Return to clinic in 3 weeks.

## 2017-12-30 ASSESSMENT — PATIENT HEALTH QUESTIONNAIRE - PHQ9: SUM OF ALL RESPONSES TO PHQ QUESTIONS 1-9: 12

## 2017-12-30 ASSESSMENT — ANXIETY QUESTIONNAIRES: GAD7 TOTAL SCORE: 8

## 2018-01-04 ENCOUNTER — OFFICE VISIT (OUTPATIENT)
Dept: OTOLARYNGOLOGY | Facility: CLINIC | Age: 78
End: 2018-01-04
Payer: MEDICARE

## 2018-01-04 VITALS — WEIGHT: 177 LBS | BODY MASS INDEX: 30.22 KG/M2 | HEIGHT: 64 IN

## 2018-01-04 DIAGNOSIS — C43.31 MALIGNANT MELANOMA OF NOSE (H): Primary | ICD-10-CM

## 2018-01-04 ASSESSMENT — PAIN SCALES - GENERAL: PAINLEVEL: MODERATE PAIN (5)

## 2018-01-04 NOTE — MR AVS SNAPSHOT
After Visit Summary   1/4/2018    Gabino Jones    MRN: [id number]           Patient Information     Date Of Birth          1940        Visit Information        Provider Department      1/4/2018 2:20 PM Yolanda Whitaker MD [facility] Ear Nose and Throat        Care Instructions    1.  You were seen in the ENT Clinic today by Dr. Whitaker.  If you have questions or concerns after your appointment please call, [phone].  Press option #1 for scheduling related needs.  Press option #3 for Nurse advice.  2.  Plan is to return to clinic in 6 months for another assessment.  3. RN Care Coordinator is Tatyana, [phone]  4.  Please contact your primary care MD if you would like to talk about the memory loss or personality changes further.      [facility] # is [phone]            Follow-ups after your visit        Your next 10 appointments already scheduled     Mar 14, 2018 12:15 PM CDT   (Arrive by 12:00 PM)   MR BRAIN W/O & W CONTRAST with [address] [facility] [place] [facility] ([facility])    [address] [place] [address] [phone]           Take your medicines as usual, unless your doctor tells you not to. Bring a list of your current medicines to your exam (including vitamins, minerals and over-the-counter drugs).  You will be given intravenous contrast for this exam. To prepare:   The day before your exam, drink extra fluids at least six 8-ounce glasses (unless your doctor tells you to restrict your fluids).   Have a blood test (creatinine test) within 30 days of your exam. Go to your clinic or Diagnostic Imaging Department for this test.  The MRI machine uses a strong magnet. Please wear clothes without metal (snaps, zippers). A sweatsuit works well, or we may give you a hospital gown.  Please remove any body piercings and hair extensions before you arrive. You will also remove watches, jewelry,  hairpins, wallets, dentures, partial dental plates and hearing aids. You may wear contact lenses, and you may be able to wear your rings. We have a safe place to keep your personal items, but it is safer to leave them at home.   **IMPORTANT** THE INSTRUCTIONS BELOW ARE ONLY FOR THOSE PATIENTS WHO HAVE BEEN TOLD THEY WILL RECEIVE SEDATION OR GENERAL ANESTHESIA DURING THEIR MRI PROCEDURE:  IF YOU WILL RECEIVE SEDATION (take medicine to help you relax during your exam):   You must get the medicine from your doctor before you arrive. Bring the medicine to the exam. Do not take it at home.   Arrive one hour early. Bring someone who can take you home after the test. Your medicine will make you sleepy. After the exam, you may not drive, take a bus or take a taxi by yourself.   No eating 8 hours before your exam. You may have clear liquids up until 4 hours before your exam. (Clear liquids include water, clear tea, black coffee and fruit juice without pulp.)  IF YOU WILL RECEIVE ANESTHESIA (be asleep for your exam):   Arrive 1 1/2 hours early. Bring someone who can take you home after the test. You may not drive, take a bus or take a taxi by yourself.   No eating 8 hours before your exam. You may have clear liquids up until 4 hours before your exam. (Clear liquids include water, clear tea, black coffee and fruit juice without pulp.)  Please call the Imaging Department at your exam site with any questions.            Mar 14, 2018  1:00 PM CDT   (Arrive by 12:45 PM)   CT CHEST/ABDOMEN/PELVIS W CONTRAST with [facility]   [facility] ([facility])    [address] [address] [phone]           Please bring any scans or X-rays taken at other hospitals, if similar tests were done. Also bring a list of your medicines, including vitamins, minerals and over-the-counter drugs. It is safest to leave personal items at home.  Be sure to tell your doctor:   If you  have any allergies.   If there s any chance you are pregnant.   If you are breastfeeding.   If you have any special needs.  You may have contrast for this exam. To prepare:   Do not eat or drink for 2 hours before your exam. If you need to take medicine, you may take it with small sips of water. (We may ask you to take liquid medicine as well.)   The day before your exam, drink extra fluids at least six 8-ounce glasses (unless your doctor tells you to restrict your fluids).  Patients over 70 or patients with diabetes or kidney problems:   If you haven t had a blood test (creatinine test) within the last 30 days, go to your clinic or Diagnostic Imaging Department for this test.  If you have diabetes:   If your kidney function is normal, continue taking your metformin (Avandamet, Glucophage, Glucovance, Metaglip) on the day of your exam.   If your kidney function is abnormal, wait 48 hours before restarting this medicine.  You will have oral contrast for this exam:   You will drink the contrast at home. Get this from your clinic or Diagnostic Imaging Department. Please follow the directions given.  Please wear loose clothing, such as a sweat suit or jogging clothes. Avoid snaps, zippers and other metal. We may ask you to undress and put on a hospital gown.  If you have any questions, please call the Imaging Department where you will have your exam.            Mar 14, 2018  3:45 PM CDT   [facility] Lab Draw with  [facility] DRAW   [facility] Draw ([facility])    [address]  [address]  [facility] [address]   [phone]            Mar 14, 2018  4:15 PM CDT   (Arrive by 4:00 PM)   Return Visit with Garett Obregon MD   [facility] ([facility])    [address]  [address]  [facility] [address]   [phone]            Jul 05, 2018  1:00 PM CDT   (Arrive by 12:45 PM)   Return Visit with Yolanda Cedeño  "MD Julianne   [facility] Nose and Throat ([facility])    [address] [address] [phone]              Who to contact     Please call your clinic at [phone] to:    Ask questions about your health    Make or cancel appointments    Discuss your medicines    Learn about your test results    Speak to your doctor   If you have compliments or concerns about an experience at your clinic, or if you wish to file a complaint, please contact [facility] Patient Relations at [phone] or email us at [email or web address]         Additional Information About Your Visit        My [employer] Information     [employer] gives you secure access to your electronic health record. If you see a primary care provider, you can also send messages to your care team and make appointments. If you have questions, please call your primary care clinic.  If you do not have a primary care provider, please call [phone] and they will assist you.      Poup is an electronic gateway that provides easy, online access to your medical records. With [employer], you can request a clinic appointment, read your test results, renew a prescription or communicate with your care team.     To access your existing account, please contact your [facility] Clinic or call [phone] for assistance.        Care EveryWhere ID     This is your Care EveryWhere ID. This could be used by other organizations to access your [facility] medical records  [id number]        Your Vitals Were     Height BMI (Body Mass Index)                1.626 m (5' 4.02\") 30.37 kg/m2           Blood Pressure from Last 3 Encounters:   12/29/17 136/74   12/15/17 166/72   12/13/17 136/67    Weight from Last 3 Encounters:   01/04/18 80.3 kg (177 lb)   12/29/17 80.6 kg (177 lb 12.8 oz)   12/15/17 76.7 kg (169 lb)              Today, you had the following     No " orders found for display       Primary Care Provider Office Phone # Fax #    Rebekah Boogie [phone] [address] [facility] [address]        Equal Access to Services     GAEL OCONNELL : Hadgenevieve rivka hargrove montanao Soomaali, waaxda luqadaha, qaybta kaalmada adeegyada, vito guajardo laWeiclif dubon. So [facility] [phone].    ATENCIÓN: Si habla español, tiene a velazquez disposición servicios gratuitos de asistencia lingüística. Llame al [phone].    We comply with applicable federal civil rights laws and [place] laws. We do not discriminate on the basis of race, color, national origin, age, disability, sex, sexual orientation, or gender identity.            Thank you!     Thank you for choosing [facility] EAR NOSE AND THROAT  for your care. Our goal is always to provide you with excellent care. Hearing back from our patients is one way we can continue to improve our services. Please take a few minutes to complete the written survey that you may receive in the mail after your visit with us. Thank you!             Your Updated Medication List - Protect others around you: Learn how to safely use, store and throw away your medicines at www.disposemymeds.org.          This list is accurate as of: 1/4/18  2:37 PM.  Always use your most recent med list.                   Brand Name Dispense Instructions for use Diagnosis    acetaminophen 500 MG tablet    TYLENOL     Take 500 mg by mouth every 6 hours as needed    Malignant melanoma of nasal cavity (H)       buPROPion 150 MG 24 hr tablet    WELLBUTRIN XL    90 tablet    Take 1 tablet (150 mg) by mouth every morning    JITENDRA (generalized anxiety disorder)       GuaiFENesin 100 MG Pack           hydrochlorothiazide 12.5 MG capsule    MICROZIDE    90 capsule    Take 1 capsule (12.5 mg) by mouth daily    Hypertension goal BP (blood pressure) < 140/90       hydrOXYzine 25 MG tablet    ATARAX    30 tablet    Take 1 tablet (25 mg) by mouth every 6  hours as needed for anxiety    JITENDRA (generalized anxiety disorder)       ibuprofen 800 MG tablet    ADVIL/MOTRIN    90 tablet    Take 1 tablet (800 mg) by mouth every 4 hours as needed for moderate pain Reported on 4/17/2017    Myalgia       levofloxacin 500 MG tablet    LEVAQUIN    10 tablet    Take 1 tablet (500 mg) by mouth daily    Pneumonia of both lungs due to infectious organism, unspecified part of lung       lisinopril 30 MG tablet    PRINIVIL,ZESTRIL    90 tablet    Take 1 tablet (30 mg) by mouth daily    Hypertension goal BP (blood pressure) < 140/90       pantoprazole 40 MG EC tablet    PROTONIX    90 tablet    Take 1 tablet (40 mg) by mouth daily Take 30-60 minutes before a meal.    Gastroesophageal reflux disease without esophagitis       potassium chloride 10 MEQ CR capsule    MICRO-K    180 capsule    Take 2 capsules (20 mEq) by mouth daily    Hypokalemia       pravastatin 40 MG tablet    PRAVACHOL    90 tablet    Take 1 tablet (40 mg) by mouth daily    Mixed hyperlipidemia       ranitidine 150 MG tablet    ZANTAC    180 tablet    TAKE 1 TABLET BY MOUTH TWICE DAILY AS NEEDED    Gastroesophageal reflux disease without esophagitis       senna-docusate 8.6-50 MG per tablet    SENOKOT-S;PERICOLACE    120 tablet    Take 1-2 tablets by mouth 2 times daily        Simethicone 180 MG Caps     60 capsule    Take 1 capsule by mouth 2 times daily Reported on 5/9/2017    Flatulence, eructation, and gas pain       venlafaxine 75 MG 24 hr capsule    EFFEXOR-XR    270 capsule    Take 3 capsules (225 mg) by mouth daily    JITENDRA (generalized anxiety disorder)       VITAMIN D (CHOLECALCIFEROL) PO      Take 2,000 Units by mouth daily    Malignant melanoma of nasal cavity (H)

## 2018-01-04 NOTE — PATIENT INSTRUCTIONS
1.  You were seen in the ENT Clinic today by Dr. Whitaker.  If you have questions or concerns after your appointment please call, [phone].  Press option #1 for scheduling related needs.  Press option #3 for Nurse advice.  2.  Plan is to return to clinic in 6 months for another assessment.  3. RN Care Coordinator is Tatyana, [phone]  4.  Please contact your primary care MD if you would like to talk about the memory loss or personality changes further.      [facility] # is [phone]

## 2018-01-04 NOTE — PROGRESS NOTES
DIAGNOSIS:  Right-sided sinonasal mucosal malignant melanoma.      TREATMENT:  The patient underwent endoscopic resection on 07/23/2014.  She received postoperative radiation therapy that finished on 09/11/2014.      HISTORY OF PRESENT ILLNESS:  Ms. Jones is a 77-year-old female.  She is back to the Otolaryngology Clinic for tumor surveillance followup.  The patient has been doing relatively well from an Otolaryngology standpoint.  She denies any sinonasal symptoms.  There has been slight concern  about some memory loss and some intermittent changes of behavior, but this is very unclear, and It was very difficult to elicit a good history on this.        MEDICATIONS:     Current Outpatient Prescriptions   Medication Sig Dispense Refill     buPROPion (WELLBUTRIN XL) 150 MG 24 hr tablet Take 1 tablet (150 mg) by mouth every morning 90 tablet 0     hydrOXYzine (ATARAX) 25 MG tablet Take 1 tablet (25 mg) by mouth every 6 hours as needed for anxiety 30 tablet 1     GuaiFENesin 100 MG PACK        potassium chloride (MICRO-K) 10 MEQ CR capsule Take 2 capsules (20 mEq) by mouth daily 180 capsule 1     pantoprazole (PROTONIX) 40 MG EC tablet Take 1 tablet (40 mg) by mouth daily Take 30-60 minutes before a meal. 90 tablet 1     hydrochlorothiazide (MICROZIDE) 12.5 MG capsule Take 1 capsule (12.5 mg) by mouth daily 90 capsule 1     lisinopril (PRINIVIL,ZESTRIL) 30 MG tablet Take 1 tablet (30 mg) by mouth daily 90 tablet 1     levofloxacin (LEVAQUIN) 500 MG tablet Take 1 tablet (500 mg) by mouth daily 10 tablet 0     ibuprofen (ADVIL/MOTRIN) 800 MG tablet Take 1 tablet (800 mg) by mouth every 4 hours as needed for moderate pain Reported on 4/17/2017 90 tablet 1     pravastatin (PRAVACHOL) 40 MG tablet Take 1 tablet (40 mg) by mouth daily 90 tablet 3     acetaminophen (TYLENOL) 500 MG tablet Take 500 mg by mouth every 6 hours as needed        VITAMIN D, CHOLECALCIFEROL, PO Take 2,000 Units by mouth daily       Simethicone 180 MG  CAPS Take 1 capsule by mouth 2 times daily Reported on 5/9/2017 60 capsule 6     ranitidine (ZANTAC) 150 MG tablet TAKE 1 TABLET BY MOUTH TWICE DAILY AS NEEDED 180 tablet 1     senna-docusate (SENOKOT-S;PERICOLACE) 8.6-50 MG per tablet Take 1-2 tablets by mouth 2 times daily 120 tablet 3     venlafaxine (EFFEXOR-XR) 75 MG 24 hr capsule Take 3 capsules (225 mg) by mouth daily 270 capsule 1       ALLERGIES:    Allergies   Allergen Reactions     Novocain [Procaine] Unknown and Rash     Mirtazapine      Nefazodone Unknown and Rash       HABITS/SOCIAL HISTORY: No smoking, no drinking.    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      PHYSICAL EXAMINATION:    Constitutional: The patient was well-groomed and in no acute distress.    Skin: Warm and pink.    Neurologic: Alert and oriented x3. Cranial nerves III-XII within normal limits. Voice quality is normal.    Psychiatric: The patient's affect was calm, cooperative and appropriate.    Respiratory: Breathing comfortably without stridor or exertion of accessory muscles.    Eyes: Pupils were equal and reactive. Extraocular movements are intact.    Head: Normocephalic and atraumatic. No lesions or scars.    Ears: External auditory canals and tympanic membranes were clear.    Nose: Sinuses were nontender. Anterior rhinoscopy revealed midline septum and absence of purulence or polyps.    Oral cavity/Oropharynx: Normal tongue, floor of  mouth, buccal mucosa and palate. No lesions or masses on inspection or palpation. No abnormal lymph tissue in the oropharynx.    Neck: The parotids are soft without masses. Supple with normal laryngeal and tracheal landmarks.    Lymphatic: There is no palpable lymphadenopathy or other masses in the neck or parotids.       PROCEDURE: Nasal endoscopy: The risks and benefits of the procedure were discussed, and written consent was obtained. A timeout was performed. The patient's nose was sprayed with lidocaine and Afrin, and a 0-degree nasal endoscope was used to gain access into the nasal cavity on the right side.  The septum is in the midline.  There is evidence of total anterior posterior ethmoidectomy with a very large maxillary antrostomy as well as sphenoid sinusotomy.  All the sinonasal cavity is well mucosalized.  No evidence of masses or lesions on today's examination.      IMAGING:  The patient had images in December of 2016 that did not demonstrate any evidence of local, regional or distant disease.      ASSESSMENT AND PLAN:  This is a 77-year-old female with right sinonasal malignant mucosal melanoma, doing well.  No evidence of local or regional disease.  The plan is to follow up in six months.  She is going to follow up also with Dr. Garett Obregon who has ordered followup imaging.     Yolanda Whitaker M.D.  Otolaryngology- Head & Neck Surgery  [phone]

## 2018-01-04 NOTE — NURSING NOTE
Chief Complaint   Patient presents with     RECHECK     f/u sinonasal melanoma     Uma Munoz Medical Assistant

## 2018-01-04 NOTE — LETTER
1/4/2018       RE: Gabino Jones  [address]  [place] [address]     Dear Colleague,    Thank you for referring your patient, Gabino Jones, to the [facility] EAR NOSE AND TH[facility]AT at [facility]. Please see a copy of my visit note below.    DIAGNOSIS:  Right-sided sinonasal mucosal malignant melanoma.      TREATMENT:  The patient underwent endoscopic resection on 07/23/2014.  She received postoperative radiation therapy that finished on 09/11/2014.      HISTORY OF PRESENT ILLNESS:  Ms. Jones is a 77-year-old female.  She is back to the Otolaryngology Clinic for tumor surveillance followup.  The patient has been doing relatively well from an Otolaryngology standpoint.  She denies any sinonasal symptoms.  There has been slight concern  about some memory loss and some intermittent changes of behavior, but this is very unclear, and It was very difficult to elicit a good history on this.        MEDICATIONS:     Current Outpatient Prescriptions   Medication Sig Dispense Refill     buPROPion (WELLBUTRIN XL) 150 MG 24 hr tablet Take 1 tablet (150 mg) by mouth every morning 90 tablet 0     hydrOXYzine (ATARAX) 25 MG tablet Take 1 tablet (25 mg) by mouth every 6 hours as needed for anxiety 30 tablet 1     GuaiFENesin 100 MG PACK        potassium chloride (MICRO-K) 10 MEQ CR capsule Take 2 capsules (20 mEq) by mouth daily 180 capsule 1     pantoprazole (PROTONIX) 40 MG EC tablet Take 1 tablet (40 mg) by mouth daily Take 30-60 minutes before a meal. 90 tablet 1     hydrochlorothiazide (MICROZIDE) 12.5 MG capsule Take 1 capsule (12.5 mg) by mouth daily 90 capsule 1     lisinopril (PRINIVIL,ZESTRIL) 30 MG tablet Take 1 tablet (30 mg) by mouth daily 90 tablet 1     levofloxacin (LEVAQUIN) 500 MG tablet Take 1 tablet (500 mg) by mouth daily 10 tablet 0     ibuprofen (ADVIL/MOTRIN) 800 MG tablet Take 1 tablet (800 mg) by mouth every 4 hours as needed for moderate pain Reported on 4/17/2017  90 tablet 1     pravastatin (PRAVACHOL) 40 MG tablet Take 1 tablet (40 mg) by mouth daily 90 tablet 3     acetaminophen (TYLENOL) 500 MG tablet Take 500 mg by mouth every 6 hours as needed        VITAMIN D, CHOLECALCIFEROL, PO Take 2,000 Units by mouth daily       Simethicone 180 MG CAPS Take 1 capsule by mouth 2 times daily Reported on 5/9/2017 60 capsule 6     ranitidine (ZANTAC) 150 MG tablet TAKE 1 TABLET BY MOUTH TWICE DAILY AS NEEDED 180 tablet 1     senna-docusate (SENOKOT-S;PERICOLACE) 8.6-50 MG per tablet Take 1-2 tablets by mouth 2 times daily 120 tablet 3     venlafaxine (EFFEXOR-XR) 75 MG 24 hr capsule Take 3 capsules (225 mg) by mouth daily 270 capsule 1       ALLERGIES:    Allergies   Allergen Reactions     Novocain [Procaine] Unknown and Rash     Mirtazapine      Nefazodone Unknown and Rash       HABITS/SOCIAL HISTORY: No smoking, no drinking.    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      PHYSICAL EXAMINATION:    Constitutional: The patient was well-groomed and in no acute distress.    Skin: Warm and pink.    Neurologic: Alert and oriented x3. Cranial nerves III-XII within normal limits. Voice quality is normal.    Psychiatric: The patient's affect was calm, cooperative and appropriate.    Respiratory: Breathing comfortably without stridor or exertion of accessory muscles.    Eyes: Pupils were equal and reactive. Extraocular movements are intact.    Head:  Normocephalic and atraumatic. No lesions or scars.    Ears: External auditory canals and tympanic membranes were clear.    Nose: Sinuses were nontender. Anterior rhinoscopy revealed midline septum and absence of purulence or polyps.    Oral cavity/Oropharynx: Normal tongue, floor of mouth, buccal mucosa and palate. No lesions or masses on inspection or palpation. No abnormal lymph tissue in the oropharynx.    Neck: The parotids are soft without masses. Supple with normal laryngeal and tracheal landmarks.    Lymphatic: There is no palpable lymphadenopathy or other masses in the neck or parotids.       PROCEDURE: Nasal endoscopy: The risks and benefits of the procedure were discussed, and written consent was obtained. A timeout was performed. The patient's nose was sprayed with lidocaine and Afrin, and a 0-degree nasal endoscope was used to gain access into the nasal cavity on the right side.  The septum is in the midline.  There is evidence of total anterior posterior ethmoidectomy with a very large maxillary antrostomy as well as sphenoid sinusotomy.  All the sinonasal cavity is well mucosalized.  No evidence of masses or lesions on today's examination.      IMAGING:  The patient had images in December of 2016 that did not demonstrate any evidence of local, regional or distant disease.      ASSESSMENT AND PLAN:  This is a 77-year-old female with right sinonasal malignant mucosal melanoma, doing well.  No evidence of local or regional disease.  The plan is to follow up in six months.  She is going to follow up also with Dr. Garett Obregon who has ordered followup imaging.     Yolanda Whitaker M.D.  Otolaryngology- Head & Neck Surgery  [phone]

## 2018-01-09 ENCOUNTER — CARE COORDINATION (OUTPATIENT)
Dept: GASTROENTEROLOGY | Facility: CLINIC | Age: 78
End: 2018-01-09

## 2018-01-09 NOTE — LETTER
January 9, 2018       TO: Gabino Jones  [address] [place] [address]         Dear Gabino,    Our office has attempted to reach you on multiple occasions to discuss the follow up recommendations for your repeat colonoscopy. Please contact our office at your earliest convenience to discuss options for rescheduling the procedure. We can be reached at [phone].       Sincerely,    Ellie Case RN   Care Coordinator - Dr Duarte  [phone]

## 2018-01-09 NOTE — PROGRESS NOTES
[facility] RN Care Coordination Note:    Called and left message for patient regarding need for repeat procedure. Left my direct number for patient to call back and discuss options for rescheduling procedure. Also mailed a letter to patient with call back number.      Ellie Case RN   Care Coordinator - Dr Duarte  [phone]

## 2018-01-18 ENCOUNTER — CARE COORDINATION (OUTPATIENT)
Dept: GASTROENTEROLOGY | Facility: CLINIC | Age: 78
End: 2018-01-18

## 2018-01-18 NOTE — PROGRESS NOTES
[facility] RN Care Coordination Note:    Called and spoke with patient regarding need for repeat colonoscopy and arranging repeat procedure. Per patient at this time she would like to have Dr. Martinez repeat the procedure at his new location. Informed her that we will plan to follow up by phone closer to the time of her recommended follow up to ensure she was able to get set up with Dr. Martinez and if not will assist with arranging follow up procedure here at the  at that time. She agreed with plan and has no further questions at this time.     Ellie Case RN   Care Coordinator - Dr Watson-Franklin  [phone]

## 2018-02-12 ENCOUNTER — CARE COORDINATION (OUTPATIENT)
Dept: GASTROENTEROLOGY | Facility: CLINIC | Age: 78
End: 2018-02-12

## 2018-02-12 NOTE — PROGRESS NOTES
[facility] RN Care Coordination Note:    Received call from patient regarding follow up recommendations. Left a message for patient that her records have been faxed as requested to Dr. Martinez's new location. Informed her to call our office if she needs anything further or if she changes her mind and would like to follow up with a new provider at the [facility]. Left my direct number for pt.      Ellie Case RN   Care Coordinator - Dr Duarte  [phone]

## 2018-03-02 DIAGNOSIS — K21.9 GASTROESOPHAGEAL REFLUX DISEASE WITHOUT ESOPHAGITIS: ICD-10-CM

## 2018-03-02 DIAGNOSIS — F41.1 GAD (GENERALIZED ANXIETY DISORDER): ICD-10-CM

## 2018-03-05 RX ORDER — VENLAFAXINE HYDROCHLORIDE 75 MG/1
CAPSULE, EXTENDED RELEASE ORAL
Qty: 270 CAPSULE | Refills: 0 | Status: SHIPPED | OUTPATIENT
Start: 2018-03-05 | End: 2018-05-31

## 2018-03-05 NOTE — TELEPHONE ENCOUNTER
Ranitidine    Prescription approved per [facility].      Venlafaxine    Prescription approved per [facility].    Nhi Llanos, RN, BSN

## 2018-03-14 ENCOUNTER — APPOINTMENT (OUTPATIENT)
Dept: LAB | Facility: CLINIC | Age: 78
End: 2018-03-14
Attending: INTERNAL MEDICINE
Payer: MEDICARE

## 2018-03-14 ENCOUNTER — ONCOLOGY VISIT (OUTPATIENT)
Dept: ONCOLOGY | Facility: CLINIC | Age: 78
End: 2018-03-14
Attending: INTERNAL MEDICINE
Payer: MEDICARE

## 2018-03-14 ENCOUNTER — RADIANT APPOINTMENT (OUTPATIENT)
Dept: MRI IMAGING | Facility: CLINIC | Age: 78
End: 2018-03-14
Attending: INTERNAL MEDICINE
Payer: MEDICARE

## 2018-03-14 ENCOUNTER — RADIANT APPOINTMENT (OUTPATIENT)
Dept: CT IMAGING | Facility: CLINIC | Age: 78
End: 2018-03-14
Attending: INTERNAL MEDICINE
Payer: MEDICARE

## 2018-03-14 VITALS
RESPIRATION RATE: 18 BRPM | DIASTOLIC BLOOD PRESSURE: 75 MMHG | HEART RATE: 77 BPM | OXYGEN SATURATION: 99 % | HEIGHT: 64 IN | BODY MASS INDEX: 29.69 KG/M2 | SYSTOLIC BLOOD PRESSURE: 131 MMHG | WEIGHT: 173.9 LBS | TEMPERATURE: 97.6 F

## 2018-03-14 DIAGNOSIS — C30.0 MALIGNANT MELANOMA OF NASAL CAVITY (H): ICD-10-CM

## 2018-03-14 LAB
ALBUMIN SERPL-MCNC: 3.5 G/DL (ref 3.4–5)
ALP SERPL-CCNC: 83 U/L (ref 40–150)
ALT SERPL W P-5'-P-CCNC: 13 U/L (ref 0–50)
ANION GAP SERPL CALCULATED.3IONS-SCNC: 8 MMOL/L (ref 3–14)
AST SERPL W P-5'-P-CCNC: 19 U/L (ref 0–45)
BASOPHILS # BLD AUTO: 0 10E9/L (ref 0–0.2)
BASOPHILS NFR BLD AUTO: 0.6 %
BILIRUB SERPL-MCNC: 0.3 MG/DL (ref 0.2–1.3)
BUN SERPL-MCNC: 17 MG/DL (ref 7–30)
CALCIUM SERPL-MCNC: 8.4 MG/DL (ref 8.5–10.1)
CHLORIDE SERPL-SCNC: 103 MMOL/L (ref 94–109)
CO2 SERPL-SCNC: 24 MMOL/L (ref 20–32)
CREAT BLD-MCNC: 0.9 MG/DL (ref 0.52–1.04)
CREAT SERPL-MCNC: 0.81 MG/DL (ref 0.52–1.04)
DIFFERENTIAL METHOD BLD: ABNORMAL
EOSINOPHIL # BLD AUTO: 0.1 10E9/L (ref 0–0.7)
EOSINOPHIL NFR BLD AUTO: 1.6 %
ERYTHROCYTE [DISTWIDTH] IN BLOOD BY AUTOMATED COUNT: 14.2 % (ref 10–15)
GFR SERPL CREATININE-BSD FRML MDRD: 61 ML/MIN/1.7M2
GFR SERPL CREATININE-BSD FRML MDRD: 69 ML/MIN/1.7M2
GLUCOSE SERPL-MCNC: 85 MG/DL (ref 70–99)
HCT VFR BLD AUTO: 35.5 % (ref 35–47)
HGB BLD-MCNC: 11.4 G/DL (ref 11.7–15.7)
IMM GRANULOCYTES # BLD: 0 10E9/L (ref 0–0.4)
IMM GRANULOCYTES NFR BLD: 0.6 %
LDH SERPL L TO P-CCNC: 168 U/L (ref 81–234)
LYMPHOCYTES # BLD AUTO: 1.1 10E9/L (ref 0.8–5.3)
LYMPHOCYTES NFR BLD AUTO: 16.2 %
MCH RBC QN AUTO: 28.4 PG (ref 26.5–33)
MCHC RBC AUTO-ENTMCNC: 32.1 G/DL (ref 31.5–36.5)
MCV RBC AUTO: 88 FL (ref 78–100)
MONOCYTES # BLD AUTO: 0.5 10E9/L (ref 0–1.3)
MONOCYTES NFR BLD AUTO: 7.2 %
NEUTROPHILS # BLD AUTO: 5.1 10E9/L (ref 1.6–8.3)
NEUTROPHILS NFR BLD AUTO: 73.8 %
NRBC # BLD AUTO: 0 10*3/UL
NRBC BLD AUTO-RTO: 0 /100
PLATELET # BLD AUTO: 225 10E9/L (ref 150–450)
POTASSIUM SERPL-SCNC: 4 MMOL/L (ref 3.4–5.3)
PROT SERPL-MCNC: 6.8 G/DL (ref 6.8–8.8)
RBC # BLD AUTO: 4.02 10E12/L (ref 3.8–5.2)
SODIUM SERPL-SCNC: 135 MMOL/L (ref 133–144)
WBC # BLD AUTO: 6.8 10E9/L (ref 4–11)

## 2018-03-14 PROCEDURE — 80053 COMPREHEN METABOLIC PANEL: CPT | Performed by: INTERNAL MEDICINE

## 2018-03-14 PROCEDURE — 99213 OFFICE O/P EST LOW 20 MIN: CPT | Mod: GC | Performed by: INTERNAL MEDICINE

## 2018-03-14 PROCEDURE — 85025 COMPLETE CBC W/AUTO DIFF WBC: CPT | Performed by: INTERNAL MEDICINE

## 2018-03-14 PROCEDURE — G0463 HOSPITAL OUTPT CLINIC VISIT: HCPCS | Mod: ZF

## 2018-03-14 PROCEDURE — 83615 LACTATE (LD) (LDH) ENZYME: CPT | Performed by: INTERNAL MEDICINE

## 2018-03-14 PROCEDURE — 36592 COLLECT BLOOD FROM PICC: CPT

## 2018-03-14 RX ORDER — GADOBUTROL 604.72 MG/ML
7.5 INJECTION INTRAVENOUS ONCE
Status: COMPLETED | OUTPATIENT
Start: 2018-03-14 | End: 2018-03-14

## 2018-03-14 RX ORDER — IOPAMIDOL 755 MG/ML
108 INJECTION, SOLUTION INTRAVASCULAR ONCE
Status: COMPLETED | OUTPATIENT
Start: 2018-03-14 | End: 2018-03-14

## 2018-03-14 RX ADMIN — GADOBUTROL 7.5 ML: 604.72 INJECTION INTRAVENOUS at 12:46

## 2018-03-14 RX ADMIN — IOPAMIDOL 108 ML: 755 INJECTION, SOLUTION INTRAVASCULAR at 12:03

## 2018-03-14 ASSESSMENT — PAIN SCALES - GENERAL: PAINLEVEL: MODERATE PAIN (5)

## 2018-03-14 NOTE — PROGRESS NOTES
[facility]IN[facility]  FOLLOW-UP VISIT NOTE    PATIENT NAME: Gabino Jones MRN # [id number]  DATE OF VISIT: Mar 14, 2018 YOB: 1940    REFERRING PROVIDER: Yolanda Whitaker MD   PHYSICIANS  [address]  [place] [address]    CANCER TYPE: Malignant Melanoma  STAGE:   ECOG PS: 1    TREATMENT SUMMARY:  Gabino presented with difficulty to breathe for previous 6-7 months due to partial nasal obstruction starting December 2013. She had been following with a local ENT surgeon and was prescribed nasal drops several courses of antibiotics for presumed ethmoid sinusitis.  Most recently in the last couple of weeks she has passed a few blood clots from her nose. She had an episode of epistaxis in April of 2014 which resolved on its own. On 06/03/14, she underwent right intranasal endoscopic sinus surgery for her ethmoid sinusitis as well as resection of the right nasal mass measuring 2.5 x 2 x 1.2 cm at [facility]. Following the procedure, she continued to have hemorrhage from the right nasal cavity and was taken back to the OR that same day for a repeat nasal endoscopy, where it was determined that it was a posterior bleed and that the mass had not been completely resected. The remainder of the mass was resected and the bleeding stopped. The following morning (6/04/14), she underwent another endoscopy at [facility] due to continued bleeding, and her right nasal cavity was packed. The histopathology from her recent resection revealed malignant melanoma.  The margins were positive.  This prompted a referral to [facility] and she was seen in ENT surgery by Dr. Whitaker and Dr. Ron Quiroga in Radiation/Oncology in addition to Medical Oncology.  She was worked up with a staging PET/CT scan and a brain MRI.  The PET/CT scan revealed minimal uptake in the local area which could very well be from recent surgery or residual  disease.  She had multiple subcentimeter pulmonary nodules with the largest one being 6 mm in the left lower lobe.  She had a history of pulmonary nodules which had been previously followed for 3 years. The PET was also significant for an enlarged portacaval node with mild FDG uptake and FDG uptake in the hepatic flexure of her colon.  Her most recent colonoscopy has been merely 2 months ago and was completely normal.  Her brain MRI was limited because of motion artifact. There were no enlarged lymph nodes in the head and neck region or any other site of increased FDG uptake that would be suspicious for definitive metastatic disease.      Her case was reviewed at the multi-specialty tumor board and she was recommended endoscopic resection of the melanoma followed by post operative radiation therapy. She underwent a rerevision surgery on 06/23/2014 using an endoscopic endonasal approach. Tumor was located at the anterior skull base between the middle turbinate and the septum. Final pathology report showed clear margins. She was started on radiation therapy under care of Dr. Ron Quiroga in July. Unfortunately she fell and fractured her left humerus in end of August. It was decided not to proceed with surgical intervention as this would involve intubation which would be difficult given ongoing radiation therapy. She had a difficult hospital course with UTI, confusion. She was managed conservatively and radiation therapy was resumed - eventually completed on September 11, 2014.     She has been followed with surveillance imaging since 2014. MRI in 2016 and again on 2/15/17 showed a 13 mm enhancing T1 hypointense lesion in the right side of the frontal bone. This was biopsied by neurosurgery on 2/22/17 and was benign.       ALAN Lloyd is being followed for her malignant melanoma status post resection on 6/04/14 and then re-resection on 06/24/14 with negative margins followed by radiation therapy from July through  September 11, 2014.     She is accompanied by her  at this clinic visit. Gabino has no new complaints. Reports her legs are getting stronger. No pain complaints. 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         PAST MEDICAL HISTORY   1. HTN  2. Dyslipidemia  3. Depression on medications  4. Pulmonary nodules  5. Cholecystectomy  6. OA - Back and Neck surgeries done   7. Fibromyalgia      CURRENT OUTPATIENT MEDICATIONS     Current Outpatient Prescriptions   Medication Sig     venlafaxine (EFFEXOR-XR) 75 MG 24 hr capsule TAKE 3 CAPSULES BY MOUTH DAILY     ranitidine (ZANTAC) 150 MG tablet TAKE 1 TABLET BY MOUTH TWICE DAILY AS NEEDED (Patient taking differently: TAKE 1 TABLET BY MOUTH TWICE DAILY.)     buPROPion (WELLBUTRIN XL) 150 MG 24 hr tablet Take 1 tablet (150 mg) by mouth every morning     potassium chloride (MICRO-K) 10 MEQ CR capsule Take 2 capsules (20 mEq) by mouth daily     hydrochlorothiazide (MICROZIDE) 12.5 MG capsule Take 1 capsule (12.5 mg) by mouth daily     lisinopril (PRINIVIL,ZESTRIL) 30 MG tablet Take 1 tablet (30 mg) by mouth daily     ibuprofen (ADVIL/MOTRIN) 800 MG tablet Take 1 tablet (800 mg) by mouth every 4 hours as needed for moderate pain Reported on 4/17/2017     pravastatin (PRAVACHOL) 40 MG tablet Take 1 tablet (40 mg) by mouth daily     VITAMIN D, CHOLECALCIFEROL, PO Take 2,000 Units by mouth daily     hydrOXYzine (ATARAX) 25 MG tablet Take 1 tablet (25 mg) by mouth every 6 hours as needed for anxiety     GuaiFENesin 100 MG PACK Take by mouth as needed      pantoprazole (PROTONIX) 40 MG EC tablet Take 1 tablet (40 mg) by mouth daily Take 30-60 minutes before a meal. (Patient not taking: Reported on 3/14/2018)     acetaminophen (TYLENOL) 500 MG tablet Take 500 mg by mouth every 6 hours as needed      Simethicone 180 MG CAPS Take 1 capsule by mouth 2 times daily Reported on 5/9/2017     senna-docusate (SENOKOT-S;PERICOLACE) 8.6-50 MG per tablet  "Take 1-2 tablets by mouth 2 times daily (Patient not taking: Reported on 3/14/2018)     No current facility-administered medications for this visit.           ALLERGIES     Allergies   Allergen Reactions     Novocain [Procaine] Unknown and Rash     Mirtazapine      Nefazodone Unknown and Rash      PHYSICAL EXAM   /75  Pulse 77  Temp 97.6  F (36.4  C) (Oral)  Resp 18  Ht 1.626 m (5' 4.02\")  Wt 78.9 kg (173 lb 14.4 oz)  SpO2 99%  BMI 29.83 kg/m2   SpO2 Readings from Last 4 Encounters:   03/14/18 99%   12/15/17 96%   12/13/17 96%   10/04/17 100%     Wt Readings from Last 3 Encounters:   03/14/18 78.9 kg (173 lb 14.4 oz)   01/04/18 80.3 kg (177 lb)   12/29/17 80.6 kg (177 lb 12.8 oz)     GEN: NAD  HEENT: pupils equal, EOMI, no icterus. Oropharynx is clear.   NECK: no obvious cervical or supraclavicular lymphadenopathy  LUNGS: clear bilaterally  CV: regular rate and rhythm, systolic murmur   ABDOMEN: soft, non-tender, non-distended, normal bowel sounds, no hepatosplenomegaly  EXT: warm, well perfused, no edema  NEURO: alert  SKIN: no rashes     LABORATORY AND IMAGING STUDIES   CBC RESULTS:   Recent Labs   Lab Test  03/14/18   1350   WBC  6.8   RBC  4.02   HGB  11.4*   HCT  35.5   MCV  88   MCH  28.4   MCHC  32.1   RDW  14.2   PLT  225     Recent Labs   Lab Test  03/14/18   1350  12/13/17   1026   NA  135  136   POTASSIUM  4.0  3.0*   CHLORIDE  103  101   CO2  24  29   ANIONGAP  8  6   GLC  85  91   BUN  17  13   CR  0.81  0.76   STEFFANIE  8.4*  7.6*     Liver Function Studies -   Recent Labs   Lab Test  03/14/18   1350   PROTTOTAL  6.8   ALBUMIN  3.5   BILITOTAL  0.3   ALKPHOS  83   AST  19   ALT  13       CT cap 3/14/18  IMPRESSION: In this patient with a history of nasal melanoma:  1. Resolved upper lobe groundglass opacities compared to 12/13/2017.  No new pulmonary findings.  2. Unchanged sub-6 mm pulmonary nodules.  3. No evidence of definite metastatic disease in the chest, abdomen,  or pelvis.     MRI " brain 3/14/18  Impression:  1. Unchanged presumed meningioma along the cribriform plate.  2. Extensive leukoaraiosis, unchanged since 12/13/2017, likely a  combination of chronic small vessel ischemic disease and treatment  changes.     ASSESSMENT AND PLAN   77 year old female with right sinonasal mucosal malignant melanoma status post resection on 6/04/14 and then re-resection on 06/24/14 with negative margins followed by radiation therapy from July through September 11, 2014 (6000 cGy in 30 fractions).   b-aubrie and c-KIT status unknown    We reviewed her scans from earlier today. There is no evidence of recurrent disease. She is now 3.5 years out from surgical resection and radiation, with no recurrence of disease. She and her  have no new concerns. We will have her follow-up in 6 months with surveillance CT cap and MRI brain.   She has ENT f/u in July.       Patient seen and discussed with staff Dr. Sabas Belle MD  Heme/Onc Fellow  Pager: [phone]       This patient has been seen and evaluated by me, Garett Obregon MD along with Dr. Silverio Belle.  I have discussed with Dr. Silverio Belle  and agree with the findings and plan in this note which I have edited.

## 2018-03-14 NOTE — NURSING NOTE
"Oncology Rooming Note    March 14, 2018 3:49 PM   Gabino Jones is a 77 year old female who presents for:    Chief Complaint   Patient presents with     Blood Draw     Blood draw from IV and vitals     Oncology Clinic Visit     SCC Sinus F/U.     Initial Vitals: /75  Pulse 77  Temp 97.6  F (36.4  C) (Oral)  Resp 18  Ht 1.626 m (5' 4.02\")  Wt 78.9 kg (173 lb 14.4 oz)  SpO2 99%  BMI 29.83 kg/m2 Estimated body mass index is 29.83 kg/(m^2) as calculated from the following:    Height as of this encounter: 1.626 m (5' 4.02\").    Weight as of this encounter: 78.9 kg (173 lb 14.4 oz). Body surface area is 1.89 meters squared.  Moderate Pain (5) Comment: Muscles (Pt says Fibromyalgia related)   No LMP recorded. Patient is postmenopausal.  Allergies reviewed: Yes  Medications reviewed: Yes    Medications: Medication refills not needed today.  Pharmacy name entered into [facility]:    [facility] - [place], [place] - [facility] [address] AT [facility] [address]  [facility] - [place], [place] - [address]    Clinical concerns: None. Dr Obregon was NOT notified.    7 minutes for nursing intake (face to face time)     Nelida Roa LPN              "

## 2018-03-14 NOTE — LETTER
3/14/2018       RE: Gabino Jones  [address]  [place] [facility] [address]     Dear Colleague,    Thank you for referring your patient, Gabino Jones, to the [facility]. Please see a copy of my visit note below.    [facility]  FOLLOW-UP VISIT NOTE    PATIENT NAME: Gabino Jones MRN # [id number]  DATE OF VISIT: Mar 14, 2018 YOB: 1940    REFERRING PROVIDER: Yolanda Whitaker MD   PHYSICIANS  [address]  [place] [address]    CANCER TYPE: Malignant Melanoma  STAGE:   ECOG PS: 1    TREATMENT SUMMARY:  Gabino presented with difficulty to breathe for previous 6-7 months due to partial nasal obstruction starting December 2013. She had been following with a local ENT surgeon and was prescribed nasal drops several courses of antibiotics for presumed ethmoid sinusitis.  Most recently in the last couple of weeks she has passed a few blood clots from her nose. She had an episode of epistaxis in April of 2014 which resolved on its own. On 06/03/14, she underwent right intranasal endoscopic sinus surgery for her ethmoid sinusitis as well as resection of the right nasal mass measuring 2.5 x 2 x 1.2 cm at [facility]. Following the procedure, she continued to have hemorrhage from the right nasal cavity and was taken back to the OR that same day for a repeat nasal endoscopy, where it was determined that it was a posterior bleed and that the mass had not been completely resected. The remainder of the mass was resected and the bleeding stopped. The following morning (6/04/14), she underwent another endoscopy at [facility] due to continued bleeding, and her right nasal cavity was packed. The histopathology from her recent resection revealed malignant melanoma.  The margins were positive.  This prompted a referral to [facility] and she was seen in ENT surgery by Dr. Whitaker and Dr. Velasquez  Junaid in Radiation/Oncology in addition to Medical Oncology.  She was worked up with a staging PET/CT scan and a brain MRI.  The PET/CT scan revealed minimal uptake in the local area which could very well be from recent surgery or residual disease.  She had multiple subcentimeter pulmonary nodules with the largest one being 6 mm in the left lower lobe.  She had a history of pulmonary nodules which had been previously followed for 3 years. The PET was also significant for an enlarged portacaval node with mild FDG uptake and FDG uptake in the hepatic flexure of her colon.  Her most recent colonoscopy has been merely 2 months ago and was completely normal.  Her brain MRI was limited because of motion artifact. There were no enlarged lymph nodes in the head and neck region or any other site of increased FDG uptake that would be suspicious for definitive metastatic disease.      Her case was reviewed at the multi-specialty tumor board and she was recommended endoscopic resection of the melanoma followed by post operative radiation therapy. She underwent a rerevision surgery on 06/23/2014 using an endoscopic endonasal approach. Tumor was located at the anterior skull base between the middle turbinate and the septum. Final pathology report showed clear margins. She was started on radiation therapy under care of Dr. Ron Quiroga in July. Unfortunately she fell and fractured her left humerus in end of August. It was decided not to proceed with surgical intervention as this would involve intubation which would be difficult given ongoing radiation therapy. She had a difficult hospital course with UTI, confusion. She was managed conservatively and radiation therapy was resumed - eventually completed on September 11, 2014.     She has been followed with surveillance imaging since 2014. MRI in 2016 and again on 2/15/17 showed a 13 mm enhancing T1 hypointense lesion in the right side of the frontal bone. This was biopsied by  neurosurgery on 2/22/17 and was benign.       SUBJECTIVE   Gabino is being followed for her malignant melanoma status post resection on 6/04/14 and then re-resection on 06/24/14 with negative margins followed by radiation therapy from July through September 11, 2014.     She is accompanied by her  at this clinic visit. Gabino has no new complaints. Reports her legs are getting stronger. No pain complaints. 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         PAST MEDICAL HISTORY   1. HTN  2. Dyslipidemia  3. Depression on medications  4. Pulmonary nodules  5. Cholecystectomy  6. OA - Back and Neck surgeries done   7. Fibromyalgia      CURRENT OUTPATIENT MEDICATIONS     Current Outpatient Prescriptions   Medication Sig     venlafaxine (EFFEXOR-XR) 75 MG 24 hr capsule TAKE 3 CAPSULES BY MOUTH DAILY     ranitidine (ZANTAC) 150 MG tablet TAKE 1 TABLET BY MOUTH TWICE DAILY AS NEEDED (Patient taking differently: TAKE 1 TABLET BY MOUTH TWICE DAILY.)     buPROPion (WELLBUTRIN XL) 150 MG 24 hr tablet Take 1 tablet (150 mg) by mouth every morning     potassium chloride (MICRO-K) 10 MEQ CR capsule Take 2 capsules (20 mEq) by mouth daily     hydrochlorothiazide (MICROZIDE) 12.5 MG capsule Take 1 capsule (12.5 mg) by mouth daily     lisinopril (PRINIVIL,ZESTRIL) 30 MG tablet Take 1 tablet (30 mg) by mouth daily     ibuprofen (ADVIL/MOTRIN) 800 MG tablet Take 1 tablet (800 mg) by mouth every 4 hours as needed for moderate pain Reported on 4/17/2017     pravastatin (PRAVACHOL) 40 MG tablet Take 1 tablet (40 mg) by mouth daily     VITAMIN D, CHOLECALCIFEROL, PO Take 2,000 Units by mouth daily     hydrOXYzine (ATARAX) 25 MG tablet Take 1 tablet (25 mg) by mouth every 6 hours as needed for anxiety     GuaiFENesin 100 MG PACK Take by mouth as needed      pantoprazole (PROTONIX) 40 MG EC tablet Take 1 tablet (40 mg) by mouth daily Take 30-60 minutes before a meal. (Patient not taking: Reported on  "3/14/2018)     acetaminophen (TYLENOL) 500 MG tablet Take 500 mg by mouth every 6 hours as needed      Simethicone 180 MG CAPS Take 1 capsule by mouth 2 times daily Reported on 5/9/2017     senna-docusate (SENOKOT-S;PERICOLACE) 8.6-50 MG per tablet Take 1-2 tablets by mouth 2 times daily (Patient not taking: Reported on 3/14/2018)     No current facility-administered medications for this visit.           ALLERGIES     Allergies   Allergen Reactions     Novocain [Procaine] Unknown and Rash     Mirtazapine      Nefazodone Unknown and Rash      PHYSICAL EXAM   /75  Pulse 77  Temp 97.6  F (36.4  C) (Oral)  Resp 18  Ht 1.626 m (5' 4.02\")  Wt 78.9 kg (173 lb 14.4 oz)  SpO2 99%  BMI 29.83 kg/m2   SpO2 Readings from Last 4 Encounters:   03/14/18 99%   12/15/17 96%   12/13/17 96%   10/04/17 100%     Wt Readings from Last 3 Encounters:   03/14/18 78.9 kg (173 lb 14.4 oz)   01/04/18 80.3 kg (177 lb)   12/29/17 80.6 kg (177 lb 12.8 oz)     GEN: NAD  HEENT: pupils equal, EOMI, no icterus. Oropharynx is clear.   NECK: no obvious cervical or supraclavicular lymphadenopathy  LUNGS: clear bilaterally  CV: regular rate and rhythm, systolic murmur   ABDOMEN: soft, non-tender, non-distended, normal bowel sounds, no hepatosplenomegaly  EXT: warm, well perfused, no edema  NEURO: alert  SKIN: no rashes     LABORATORY AND IMAGING STUDIES   CBC RESULTS:   Recent Labs   Lab Test  03/14/18   1350   WBC  6.8   RBC  4.02   HGB  11.4*   HCT  35.5   MCV  88   MCH  28.4   MCHC  32.1   RDW  14.2   PLT  225     Recent Labs   Lab Test  03/14/18   1350  12/13/17   1026   NA  135  136   POTASSIUM  4.0  3.0*   CHLORIDE  103  101   CO2  24  29   ANIONGAP  8  6   GLC  85  91   BUN  17  13   CR  0.81  0.76   STEFFANIE  8.4*  7.6*     Liver Function Studies -   Recent Labs   Lab Test  03/14/18   1350   PROTTOTAL  6.8   ALBUMIN  3.5   BILITOTAL  0.3   ALKPHOS  83   AST  19   ALT  13       CT cap 3/14/18  IMPRESSION: In this patient with a history of " nasal melanoma:  1. Resolved upper lobe groundglass opacities compared to 12/13/2017.  No new pulmonary findings.  2. Unchanged sub-6 mm pulmonary nodules.  3. No evidence of definite metastatic disease in the chest, abdomen,  or pelvis.     MRI brain 3/14/18  Impression:  1. Unchanged presumed meningioma along the cribriform plate.  2. Extensive leukoaraiosis, unchanged since 12/13/2017, likely a  combination of chronic small vessel ischemic disease and treatment  changes.     ASSESSMENT AND PLAN   77 year old female with right sinonasal mucosal malignant melanoma status post resection on 6/04/14 and then re-resection on 06/24/14 with negative margins followed by radiation therapy from July through September 11, 2014 (6000 cGy in 30 fractions).   b-aubrie and c-KIT status unknown    We reviewed her scans from earlier today. There is no evidence of recurrent disease. She is now 3.5 years out from surgical resection and radiation, with no recurrence of disease. She and her  have no new concerns. We will have her follow-up in 6 months with surveillance CT cap and MRI brain.   She has ENT f/u in July.       Patient seen and discussed with staff Dr. Sabas Belle MD  Heme/Onc Fellow  Pager: [phone]       This patient has been seen and evaluated by me, Garett Obregon MD along with Dr. Silverio Belle.  I have discussed with Dr. Silverio Belle  and agree with the findings and plan in this note which I have edited.      Again, thank you for allowing me to participate in the care of your patient.      Sincerely,    Garett Obregon MD

## 2018-03-14 NOTE — MR AVS SNAPSHOT
After Visit Summary   3/14/2018    Gabino Jones    MRN: [id number]           Patient Information     Date Of Birth          1940        Visit Information        Provider Department      3/14/2018 4:30 PM Garett Obregon MD [facility]        Today's Diagnoses     Malignant melanoma of nasal cavity (H)           Follow-ups after your visit        Your next 10 appointments already scheduled     Apr 23, 2018 11:30 AM CDT   Ech Complete with MGECHR1, MG ECHO TECH   [facility] ([facility])    [address] [place] [id number] [phone]           1. Please bring or wear a comfortable two-piece outfit. 2. You may eat, drink and take your normal medicines. 3. For any questions that cannot be answered, please contact the ordering physician            Apr 30, 2018  2:10 PM CDT   Return Visit with Blake Hobbs MD   [facility] ([facility])    [address] [place] [id number] [phone]            Jul 05, 2018  1:00 PM CDT   (Arrive by 12:45 PM)   Return Visit with Yolanda Whitaker MD   [facility] ([facility])    [address] [place] [id number] [phone]            Sep 12, 2018  9:15 AM CDT   (Arrive by 9:00 AM)   MR BRAIN W/O & W CONTRAST with [id number]   [facility] [place] [facility] ([facility])    [address] [place] [id number] [phone]           Take your medicines as usual, unless your doctor tells you not to. Bring a list of your current medicines to your exam (including vitamins, minerals and over-the-counter drugs).  You may or may not receive intravenous (IV) contrast for this exam pending the discretion of the Radiologist.  You do not need to do anything special to prepare.  The MRI machine uses a strong magnet. Please wear  clothes without metal (snaps, zippers). A sweatsuit works well, or we may give you a hospital gown.  Please remove any body piercings and hair extensions before you arrive. You will also remove watches, jewelry, hairpins, wallets, dentures, partial dental plates and hearing aids. You may wear contact lenses, and you may be able to wear your rings. We have a safe place to keep your personal items, but it is safer to leave them at home.  **IMPORTANT** THE INSTRUCTIONS BELOW ARE ONLY FOR THOSE PATIENTS WHO HAVE BEEN PRESCRIBED SEDATION OR GENERAL ANESTHESIA DURING THEIR MRI PROCEDURE:  IF YOUR DOCTOR PRESCRIBED ORAL SEDATION (take medicine to help you relax during your exam):   You must get the medicine from your doctor (oral medication) before you arrive. Bring the medicine to the exam. Do not take it at home. You ll be told when to take it upon arriving for your exam.   Arrive one hour early. Bring someone who can take you home after the test. Your medicine will make you sleepy. After the exam, you may not drive, take a bus or take a taxi by yourself.  IF YOUR DOCTOR PRESCRIBED IV SEDATION:   Arrive one hour early. Bring someone who can take you home after the test. Your medicine will make you sleepy. After the exam, you may not drive, take a bus or take a taxi by yourself.   No eating 6 hours before your exam. You may have clear liquids up until 4 hours before your exam. (Clear liquids include water, clear tea, black coffee and fruit juice without pulp.)  IF YOUR DOCTOR PRESCRIBED ANESTHESIA (be asleep for your exam):   Arrive 1 1/2 hours early. Bring someone who can take you home after the test. You may not drive, take a bus or take a taxi by yourself.   No eating 8 hours before your exam. You may have clear liquids up until 4 hours before your exam. (Clear liquids include water, clear tea, black coffee and fruit juice without pulp.)   You will spend four to five hours in the recovery room.  Please call the Imaging  Department at your exam site with any questions.            Sep 12, 2018 10:00 AM CDT   (Arrive by 9:45 AM)   CT CHEST/ABDOMEN/PELVIS W CONTRAST with UCCT2   [facility] ([facility])    [address]  1st Floor  [facility] [id number] [phone]           Please bring any scans or X-rays taken at other hospitals, if similar tests were done. Also bring a list of your medicines, including vitamins, minerals and over-the-counter drugs. It is safest to leave personal items at home.  Be sure to tell your doctor:   If you have any allergies.   If there s any chance you are pregnant.   If you are breastfeeding.  You may have contrast for this exam. To prepare:   Do not eat or drink for 2 hours before your exam. If you need to take medicine, you may take it with small sips of water. (We may ask you to take liquid medicine as well.)   The day before your exam, drink extra fluids at least six 8-ounce glasses (unless your doctor tells you to restrict your fluids).   You will be given instructions on how to drink the contrast.  Patients over 70 or patients with diabetes or kidney problems:   If you haven t had a blood test (creatinine test) within the last 30 days, the Cardiologist/Radiologist may require you to get this test prior to your exam.  If you have diabetes:   Continue to take your metformin medication on the day of your exam  Please wear loose clothing, such as a sweat suit or jogging clothes. Avoid snaps, zippers and other metal. We may ask you to undress and put on a hospital gown.  If you have any questions, please call the Imaging Department where you will have your exam.            Sep 12, 2018  1:00 PM CDT   [facility] Lab Draw with  MASONIC LAB DRAW   [facility] Draw ([facility])    [address]  [address]  [facility] [id number] [phone]            Sep 12, 2018  1:30 PM CDT   (Arrive by 1:15 PM)   Return  Visit with Garett Obregon MD   [facility] ([facility])    [address]  [address]  [facility] [address] [phone]              Future tests that were ordered for you today     Open Future Orders        Priority Expected Expires Ordered    CBC with platelets differential Routine  3/14/2019 3/14/2018    Comprehensive metabolic panel Routine  3/14/2019 3/14/2018    Lactate Dehydrogenase Routine  3/14/2019 3/14/2018    CT Chest/Abdomen/Pelvis w Contrast Routine  3/14/2019 3/14/2018    MRI Brain w & w/o contrast Routine  3/14/2019 3/14/2018            Who to contact     If you have questions or need follow up information about today's clinic visit or your schedule please contact [facility] directly at [phone].  Normal or non-critical lab and imaging results will be communicated to you by MyChart, letter or phone within 4 business days after the clinic has received the results. If you do not hear from us within 7 days, please contact the clinic through Wowcr[employer] or phone. If you have a critical or abnormal lab result, we will notify you by phone as soon as possible.  Submit refill requests through [employer] or call your pharmacy and they will forward the refill request to us. Please allow 3 business days for your refill to be completed.          Additional Information About Your Visit        MyChart Information     [employer] gives you secure access to your electronic health record. If you see a primary care provider, you can also send messages to your care team and make appointments. If you have questions, please call your primary care clinic.  If you do not have a primary care provider, please call [phone] and they will assist you.        Care EveryWhere ID     This is your Care EveryWhere ID. This could be used by other organizations to access your [place] medical records  [id number]        Your Vitals Were     Pulse  "Temperature Respirations Height Pulse Oximetry BMI (Body Mass Index)    77 97.6  F (36.4  C) (Oral) 18 1.626 m (5' 4.02\") 99% 29.83 kg/m2       Blood Pressure from Last 3 Encounters:   03/14/18 131/75   12/29/17 136/74   12/15/17 166/72    Weight from Last 3 Encounters:   03/14/18 78.9 kg (173 lb 14.4 oz)   01/04/18 80.3 kg (177 lb)   12/29/17 80.6 kg (177 lb 12.8 oz)              We Performed the Following     CBC with platelets differential     Comprehensive metabolic panel     Lactate Dehydrogenase          Today's Medication Changes          These changes are accurate as of 3/14/18 11:59 PM.  If you have any questions, ask your nurse or doctor.               These medicines have changed or have updated prescriptions.        Dose/Directions    ranitidine 150 MG tablet   Commonly known as:  ZANTAC   This may have changed:  See the new instructions.   Used for:  Gastroesophageal reflux disease without esophagitis        TAKE 1 TABLET BY MOUTH TWICE DAILY AS NEEDED   Quantity:  180 tablet   Refills:  0         Stop taking these medicines if you haven't already. Please contact your care team if you have questions.     levofloxacin 500 MG tablet   Commonly known as:  LEVAQUIN   Stopped by:  Garett Obregon MD                    Primary Care Provider Office Phone # Fax #    Rebekah BLAKE MD [place] [phone] [address] [facility] [address]        Equal Access to Services     [facility] AH: Hadii rivka ku hadasho Sosarahali, waaxda luqadaha, qaybta kaalmada adeegyada, waxay sage dubon. So [facility] [phone].    ATENCIÓN: Si habla español, tiene a velazquez disposición servicios gratuitos de asistencia lingüística. Llame al [phone].    We comply with applicable federal civil rights laws and [place] laws. We do not discriminate on the basis of race, color, national origin, age, disability, sex, sexual orientation, or gender identity.            Thank you!     Thank you for " [facility]  for your care. Our goal is always to provide you with excellent care. Hearing back from our patients is one way we can continue to improve our services. Please take a few minutes to complete the written survey that you may receive in the mail after your visit with us. Thank you!             Your Updated Medication List - Protect others around you: Learn how to safely use, store and throw away your medicines at www.disposemymeds.org.          This list is accurate as of 3/14/18 11:59 PM.  Always use your most recent med list.                   Brand Name Dispense Instructions for use Diagnosis    acetaminophen 500 MG tablet    TYLENOL     Take 500 mg by mouth every 6 hours as needed    Malignant melanoma of nasal cavity (H)       buPROPion 150 MG 24 hr tablet    WELLBUTRIN XL    90 tablet    Take 1 tablet (150 mg) by mouth every morning    JIETNDRA (generalized anxiety disorder)       GuaiFENesin 100 MG Pack      Take by mouth as needed        hydrochlorothiazide 12.5 MG capsule    MICROZIDE    90 capsule    Take 1 capsule (12.5 mg) by mouth daily    Hypertension goal BP (blood pressure) < 140/90       hydrOXYzine 25 MG tablet    ATARAX    30 tablet    Take 1 tablet (25 mg) by mouth every 6 hours as needed for anxiety    JITENDRA (generalized anxiety disorder)       ibuprofen 800 MG tablet    ADVIL/MOTRIN    90 tablet    Take 1 tablet (800 mg) by mouth every 4 hours as needed for moderate pain Reported on 4/17/2017    Myalgia       lisinopril 30 MG tablet    PRINIVIL,ZESTRIL    90 tablet    Take 1 tablet (30 mg) by mouth daily    Hypertension goal BP (blood pressure) < 140/90       pantoprazole 40 MG EC tablet    PROTONIX    90 tablet    Take 1 tablet (40 mg) by mouth daily Take 30-60 minutes before a meal.    Gastroesophageal reflux disease without esophagitis       potassium chloride 10 MEQ CR capsule    MICRO-K    180 capsule    Take 2 capsules (20 mEq) by mouth daily    Hypokalemia        pravastatin 40 MG tablet    PRAVACHOL    90 tablet    Take 1 tablet (40 mg) by mouth daily    Mixed hyperlipidemia       ranitidine 150 MG tablet    ZANTAC    180 tablet    TAKE 1 TABLET BY MOUTH TWICE DAILY AS NEEDED    Gastroesophageal reflux disease without esophagitis       senna-docusate 8.6-50 MG per tablet    SENOKOT-S;PERICOLACE    120 tablet    Take 1-2 tablets by mouth 2 times daily        Simethicone 180 MG Caps     60 capsule    Take 1 capsule by mouth 2 times daily Reported on 5/9/2017    Flatulence, eructation, and gas pain       venlafaxine 75 MG 24 hr capsule    EFFEXOR-XR    270 capsule    TAKE 3 CAPSULES BY MOUTH DAILY    JITENDRA (generalized anxiety disorder)       VITAMIN D (CHOLECALCIFEROL) PO      Take 2,000 Units by mouth daily    Malignant melanoma of nasal cavity (H)

## 2018-03-14 NOTE — NURSING NOTE
Chief Complaint   Patient presents with     Blood Draw     Blood draw from IV and vitals     Labs drawn via IV placed by imaging department in right arm.  Blood drawn, IV flushed with saline and removed from arm

## 2018-03-14 NOTE — DISCHARGE INSTRUCTIONS

## 2018-03-14 NOTE — DISCHARGE INSTRUCTIONS
MRI Contrast Discharge Instructions    The IV contrast you received today will pass out of your body in your  urine. This will happen in the next 24 hours. You will not feel this process.  Your urine will not change color.    Drink at least 4 extra glasses of water or juice today (unless your doctor  has restricted your fluids). This reduces the stress on your kidneys.  You may take your regular medicines.    If you are on dialysis: It is best to have dialysis today.    If you have a reaction: Most reactions happen right away. If you have  any new symptoms after leaving the hospital (such as hives or swelling),  call your hospital at the correct number below. Or call your family doctor.  If you have breathing distress or wheezing, call 911.    Special instructions: ***    I have read and understand the above information.    Signature:______________________________________ Date:___________    Staff:__________________________________________ Date:___________     Time:__________    [place] [facility] Departments:    ___[place]: [phone]  ___[facility]: [phone]  ___[place]: [phone] ___[facility]: [phone]  ___[facility]: [phone]  ___[facility]: [phone]  ___[phone]  ___[facility]: [phone]  ___[phone]

## 2018-03-28 DIAGNOSIS — F41.1 GAD (GENERALIZED ANXIETY DISORDER): ICD-10-CM

## 2018-03-30 RX ORDER — HYDROXYZINE HYDROCHLORIDE 25 MG/1
TABLET, FILM COATED ORAL
Qty: 30 TABLET | Refills: 0 | Status: SHIPPED | OUTPATIENT
Start: 2018-03-30 | End: 2018-05-31

## 2018-03-30 NOTE — TELEPHONE ENCOUNTER
hydrOXYzine (ATARAX) 25 MG tablet  JITENDRA-7 SCORE 5/9/2017 8/1/2017 12/29/2017   Total Score -  (minimal anxiety) 8 (mild anxiety)   Total Score 8 - 8     Medication is being filled for 1 time refill only due to:  Patient needs to be seen because due for medication follow up.   Next 5 appointments (look out 90 days)     Apr 30, 2018  2:10 PM CDT   Return Visit with Blake Hobbs MD   [facility]    [address] [place] [address] [phone]                Please assist with scheduling.    Giulia Whitley RN, BSN

## 2018-04-23 ENCOUNTER — RADIANT APPOINTMENT (OUTPATIENT)
Dept: CARDIOLOGY | Facility: CLINIC | Age: 78
End: 2018-04-23
Attending: INTERNAL MEDICINE
Payer: MEDICARE

## 2018-04-23 DIAGNOSIS — I35.0 AORTIC STENOSIS: ICD-10-CM

## 2018-04-23 PROCEDURE — 93306 TTE W/DOPPLER COMPLETE: CPT

## 2018-04-30 ENCOUNTER — OFFICE VISIT (OUTPATIENT)
Dept: CARDIOLOGY | Facility: CLINIC | Age: 78
End: 2018-04-30
Payer: MEDICARE

## 2018-04-30 VITALS
SYSTOLIC BLOOD PRESSURE: 137 MMHG | WEIGHT: 177.9 LBS | BODY MASS INDEX: 30.52 KG/M2 | OXYGEN SATURATION: 98 % | HEART RATE: 79 BPM | DIASTOLIC BLOOD PRESSURE: 76 MMHG

## 2018-04-30 DIAGNOSIS — I35.0 NONRHEUMATIC AORTIC VALVE STENOSIS: Primary | ICD-10-CM

## 2018-04-30 DIAGNOSIS — E78.00 HYPERCHOLESTEREMIA: ICD-10-CM

## 2018-04-30 PROCEDURE — 99214 OFFICE O/P EST MOD 30 MIN: CPT | Performed by: INTERNAL MEDICINE

## 2018-04-30 RX ORDER — ATORVASTATIN CALCIUM 20 MG/1
20 TABLET, FILM COATED ORAL DAILY
Qty: 90 TABLET | Refills: 3 | Status: SHIPPED | OUTPATIENT
Start: 2018-04-30 | End: 2019-01-01

## 2018-04-30 ASSESSMENT — PAIN SCALES - GENERAL: PAINLEVEL: MODERATE PAIN (5)

## 2018-04-30 NOTE — PROGRESS NOTES
CARDIOLOGY CONSULTATION    Referring Provider:  Yolanda Gil*  Primary Care Provider:   Susie Harrison  Indication for Consultation:  LV apical mass    HPI: Gabino Jones is a 78 year old female  referred by Yolanda Gil, for evaluation of an LV apical mass. The patient's risk factor profile is: (+) HTN [Rx], (-) diabetes, (-) hyperlipidemia [Rx], (+) former  tobacco use, (-) family Hx CAD. The patient has a history of aortic stenosis with mean gradient of 22 mm Hg and PATRICIA 1.2 cm2 in 2014.  Her last ECHO (Apr 2018) showed mild-moderate aortic insufficiency and moderate aortic stenosis (1.2 cm2).  She has no other evidence of cardiovascular disease (CAD, CHF, arrhythmia).  She has no Hx of rheumatic fever.     The patient denies a history of chest discomfort.  She notes OSBORN with walking up a single flight of steps.  She also notes OSBORN with walking one block.  This is unchanged from the past 2 years. She denies PND, orthopnea, pedal edema, palpitations, lightheadedness, and syncope.  The patient has melanoma of the sinus activities and is currently in remission.  She had surgery and XRT. She had a routine follow up appointment and as part of that visit, she was scheduled for a chest CT scan.  That study showed a questionable mass in the LV apex.  A follow up cardiac MRI showed no abnormality in the LV apex.    She lives with her family.  She is independent.  She does not drive.  She leads a relatively sedentary lifestyle.    Home BPs in 120s-130s/70s.                                                                                  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     Ringing in ears        CURRENT MEDICATIONS:  Current Outpatient Prescriptions   Medication Sig Dispense Refill      acetaminophen (TYLENOL) 500 MG tablet Take 500 mg by mouth every 6 hours as needed        buPROPion (WELLBUTRIN XL) 150 MG 24 hr tablet TAKE 1 TABLET(150 MG) BY MOUTH EVERY MORNING 90 tablet 0     GuaiFENesin 100 MG PACK Take by mouth as needed        hydrochlorothiazide (MICROZIDE) 12.5 MG capsule Take 1 capsule (12.5 mg) by mouth daily 90 capsule 1     hydrOXYzine (ATARAX) 25 MG tablet TAKE 1 TABLET(25 MG) BY MOUTH EVERY 6 HOURS AS NEEDED FOR ANXIETY 30 tablet 0     ibuprofen (ADVIL/MOTRIN) 800 MG tablet Take 1 tablet (800 mg) by mouth every 4 hours as needed for moderate pain Reported on 4/17/2017 90 tablet 1     ibuprofen (ADVIL/MOTRIN) 800 MG tablet TAKE 1 TABLET BY MOUTH EVERY 4 HOURS AS NEEDED FOR PAIN 90 tablet 0     lisinopril (PRINIVIL,ZESTRIL) 30 MG tablet Take 1 tablet (30 mg) by mouth daily 90 tablet 1     pantoprazole (PROTONIX) 40 MG EC tablet Take 1 tablet (40 mg) by mouth daily Take 30-60 minutes before a meal. 90 tablet 1     potassium chloride (MICRO-K) 10 MEQ CR capsule Take 2 capsules (20 mEq) by mouth daily 180 capsule 1     pravastatin (PRAVACHOL) 40 MG tablet Take 1 tablet (40 mg) by mouth daily 90 tablet 3     ranitidine (ZANTAC) 150 MG tablet TAKE 1 TABLET BY MOUTH TWICE DAILY AS NEEDED (Patient taking differently: TAKE 1 TABLET BY MOUTH TWICE DAILY.) 180 tablet 0     Simethicone 180 MG CAPS Take 1 capsule by mouth 2 times daily Reported on 5/9/2017 60 capsule 6     venlafaxine (EFFEXOR-XR) 75 MG 24 hr capsule TAKE 3 CAPSULES BY MOUTH DAILY 270 capsule 0     VITAMIN D, CHOLECALCIFEROL, PO Take 2,000 Units by mouth daily       senna-docusate (SENOKOT-S;PERICOLACE) 8.6-50 MG per tablet Take 1-2 tablets by mouth 2 times daily (Patient not taking: Reported on 3/14/2018) 120 tablet 3       PAST SURGICAL HISTORY:  Past Surgical History:   Procedure Laterality Date     C ANESTH,CERV SPINE, SITTING POSITION       COLONOSCOPY N/A 7/7/2017    Procedure: COMBINED COLONOSCOPY, SINGLE OR MULTIPLE  BIOPSY/POLYPECTOMY BY BIOPSY;;  Surgeon: Sathya Norman MD;  Location: [facility] OR     COLONOSCOPY WITH CO2 INSUFFLATION N/A 7/7/2017    Procedure: COLONOSCOPY WITH CO2 INSUFFLATION;  Combined,  [place], Gastroesophageal reflux disease without esophagitis  Flatulence, eructation, and gas pain, BMI 29.35, [facility] [id number];  Surgeon: Sathya Norman MD;  Location: [facility] OR     COLONOSCOPY, SUBMUCOSA RESECTION N/A 10/4/2017    Procedure: COLONOSCOPY, SUBMUCOSA RESECTION;  Colonoscopy With Endoscopic Polypectomy with clips;  Surgeon: Milton Javier MD;  Location: [facility] OR     COMBINED ESOPHAGOSCOPY, GASTROSCOPY, DUODENOSCOPY (EGD) WITH CO2 INSUFFLATION N/A 7/7/2017    Procedure: COMBINED ESOPHAGOSCOPY, GASTROSCOPY, DUODENOSCOPY (EGD) WITH CO2 INSUFFLATION;  Combined,  [place], Gastroesophageal reflux disease without esophagitis  Flatulence, eructation, and gas pain, BMI 29.35, [facility] [id number];  Surgeon: Sathya Norman MD;  Location: [facility] OR     ESOPHAGOSCOPY, GASTROSCOPY, DUODENOSCOPY (EGD), COMBINED N/A 7/7/2017    Procedure: COMBINED ESOPHAGOSCOPY, GASTROSCOPY, DUODENOSCOPY (EGD), BIOPSY SINGLE OR MULTIPLE;;  Surgeon: Sathya Norman MD;  Location:  OR     GALLBLADDER SURGERY       NECK SURGERY       OPTICAL TRACKING SYSTEM CRANIOTOMY, EXCISE TUMOR, COMBINED Right 2/22/2017    Procedure: COMBINED OPTICAL TRACKING SYSTEM CRANIOTOMY, EXCISE TUMOR;  Surgeon: Lenora Pérez MD;  [facility]     OPTICAL TRACKING SYSTEM ENDOSCOPIC ENDONASAL SURGERY  6/23/2014    Procedure: OPTICAL TRACKING SYSTEM ENDOSCOPIC ENDONASAL SURGERY;  Surgeon: Yolanda Whitaker MD;  Location: [facility] OR     STOMACH SURGERY       TONSILLECTOMY       TUBAL LIGATION      1975       ALLERGIES  Novocain [procaine]; Mirtazapine; and Nefazodone    FAMILY HX:  Family History   Problem Relation Age of Onset     Prostate Cancer Father      CANCER Sister      SCLC     CANCER Sister       Macular Degeneration Daughter      Glaucoma No family hx of        SOCIAL HX:  History     Social History     Marital Status:      Spouse Name: N/A     Number of Children: N/A     Years of Education: N/A     Social History Main Topics     Smoking status: Former Smoker     Quit date: 06/10/2004     Smokeless tobacco: Never Used     Alcohol Use: No     Drug Use: No     Sexual Activity: Not on file     Other Topics Concern     None     Social History Narrative       ROS:  Constitutional: No fever, chills, or sweats. No weight gain/loss.   HEENT: No visual disturbance, ear ache, epistaxis, sore throat.   Allergies/Immunologic: Negative.   Respiratory: No cough, hemoptysis.   Cardiovascular: As per HPI.   GI: No nausea, vomiting, hematemesis, melena, or hematochezia.   : No urinary frequency, dysuria, or hematuria.   Integument: No rash.   Psychiatric: No anxiety / depression.   Neuro: No speech disturbance, focal sensory or motor deficit.   Endocrinology: No polyuria / polyphagia.   Musculoskeletal: No myalgia.    VITAL SIGNS:  /76 (BP Location: Left arm, Patient Position: Chair, Cuff Size: Adult Regular)  Pulse 79  Wt 80.7 kg (177 lb 14.4 oz)  SpO2 98%  BMI 30.52 kg/m2  Body mass index is 30.52 kg/(m^2).  Wt Readings from Last 2 Encounters:   03/14/18 78.9 kg (173 lb 14.4 oz)   01/04/18 80.3 kg (177 lb)       PHYSICAL EXAM  Gabino Jones is a 76 year old female.in no acute distress.  HEENT: Eyes Nonicteric.  Neck: JVP normal.  Carotids +3/3 bilaterally without bruits.  Lungs: CTA.  Cor: RRR. Normal S1 and S2.  There is a SUNITHA Grade III/VI radiating from the RUSB to the carotids, slighly more prominent than last year.  Faint, early diastolic murmur, Grade I/VI RLSB.  No rub, or gallop.  PMI in Lf 5th ICS.  Abd: Soft, nontender, nondistended.  NABS.  No pulsatile mass.  Extremities: No C/C/E.  Pulses +3/3 symmetric in upper and lower extremities.  Neuro: Grossly intact.  Psych: A&O x 3.  Skin: No  rash.    LABS  Recent Labs   Lab Test  17   0914  08/10/16   1050 16   CHOL  228*  160  166.0   HDL  55  60  71.0*   LDL  145*  80  82.4   TRIG  139  100  63.0   CHOLHDLRATIO   --    --   2.3         EK16  NSR with normal intervals and axes.  No ST shift, TWI, or Q waves.  No ectopy.  No LVH.    ECHO: 14  Global and regional left ventricular function is hyperkinetic with an EF of 65-70%. Global right ventricular function is normal. Moderate aortic stenosis. The peak aortic velocity is 3.2 m/sec. The mean gradient across the aortic valve is 22 mmHg. The aortic valve area is 1.2 cm2 by the continuity equation (with an LVOT of 23 mm). Moderate aortic insufficiency. Moderate tricuspid insufficiency is present. Moderate pulmonary hypertension. Right ventricular systolic pressure is 40 mmHg above the right atrial pressure. The inferior vena cava wi dilated at 2 cm without respiratory variability, consistent with increased right atrial pressure. No pericardial effusion is present.    ECHO (16):  Interpretation Summary  Global and regional left ventricular function is normal with an EF of 60-65%.  Right ventricular function, chamber size, wall motion, and thickness are normal.  Mild to moderate aortic insufficiency is present.  Mild aortic stenosis is present.  Pulmonary artery systolic pressure is normal.  No pericardial effusion is present.    ECHO (18):  Mild to moderate aortic insufficiency is present.  Moderate aortic stenosis is present.  The aortic valve area is 1.2 cm^2, by the continuity equation.  Mild progression of aortic stenosis since last study in 2016.    Cardiac MRI (16):  IMPRESSION:  1.  Normal left ventricular size and systolic function with a calculated ejection fraction of  69 %.  2.  Normal right ventricular size and systolic function with a calculated ejection fraction of 69%.   3.  There is decreased leaflet excursion with moderate sclerosis of the aortic  valve with flow acceleration consistent with moderate aortic stenosis and mild aortic insufficiency.  4.  On delayed enhancement imaging, there is no abnormal hyperenhancement to suggest myocardial scar/inflammation/infiltration.  5.  There is no area of infarction or mass noted in the left ventricular apex.     STRESS TEST:  None    CARDIAC CATH: None    CT CHEST:  4/13/16  Findings: Coronary arterial calcifications. Heart is not enlarged. Focal hypodensity left ventricular apex. In the Main pulmonary and aorta are not enlarged. No mediastinal, hilar or axillary adenopathy. Calcified left paraesophageal node. Calcified granuloma in the in the  left lower lobe medially. Mild interlobular septal thickening.   6 mm nodule left lower lobe image 42. Unchanged  3 mm nodule right upper lobe image 17. Unchanged  4 mm subpleural nodule left lower lobe image 33. Unchanged  Other scattered nodules are also unchanged     Moderate centrilobular emphysematous changes in the lungs.     Evaluation of the upper abdomen is limited. Enlarged ita hepatis  lymph nodes are decreased. Cholecystectomy clips.       Bones: No suspicious bony lesions.     IMPRESSION:  1. Stable pulmonary nodules for nearly 2 years.  2. Focal hypodensity in the left ventricular apex could represent an infarct versus ischemia. Cardiac MRI could be helpful in further evaluation.  3. Subtle groundglass opacity within the lungs could represent infection or inflammatory possibly drug induced or chronic aspiration    CARDIAC MRI:  5/6/16  IMPRESSION:  1. Normal left ventricular size and systolic function with a calculated ejection fraction of 69 %.  2. Normal right ventricular size and systolic function with a calculated ejection fraction of 69%.   3. There is decreased leaflet excursion with moderate sclerosis of the aortic valve with flow acceleration consistent with moderate aortic stenosis and mild aortic insufficiency.  4. On delayed enhancement imaging,  there is no abnormal hyperenhancement to suggest myocardial scar/inflammation/infiltration.  5. There is no area of infarction or mass noted in the left ventricular apex.       ASSESSMENT AND PLAN:   1. Aortic stenosis.  Patient has Class II-III dyspnea of unknown etiology.  This has been stable for over 2 years.  ECHO (Apr 2018) showed mild to moderate aortic insufficiency, and moderate aortic stenosis.   Repeat ECHO in May 2019.    2. R/O Cardiac Mass.  The CT scan reported out a hypodensity in the LV apex but this could not be further defined.  Cardiac MRI showed no mass.    3. Lipids.  LDL increased from 80 to 145 mg/dl.  Patient on Pravastatin 40 mg qd.  Convert to Lipitor 20 mg qd.    4. HTN.  Well controlled.  Continue on Lisinopril 30 qd and HCTZ 12.5 qd.      FOLLOW UP:  1 year      ECHO:  4/30/19  Interpretation Summary:   Global and regional left ventricular function is hyperkinetic with an EF of 65-70%.  Severe aortic valve calcification is present.  Cannot exclude a bicuspid aortic valve.  Severe aortic stenosis is present.  The peak aortic velocity is 4.6 m/sec.  The mean gradient across the aortic valve is50 mmHg.  The aortic valve area is 0.8 cm^2, by the continuity equation.  Moderate aortic insufficiency is present.  This study was compared with the study from 1/5/19 and aortic stenosis appears worse .    ADDENDUM (4/30/19): The aortic stenosis is now severe.  Recommend coronary angiography and consideration of TAVR vs. SAVR.    Blake Hobbs MD    Divisions of Cardiology  [place], [place]    CC  Patient Care Team:  Rebekah Boogie MD as PCP - General (Family Practice)  Tatyana Morales, RN as Clinic Care Coordinator (Otolaryngology)  Garett Obregon MD as MD (Oncology)  Layla Campos, RN as Nurse Coordinator (Oncology)  Laron Archibald MD as MD (Ophthalmology)  Yolanda Whitaker MD as MD (Otolaryngology)  Matt Quiroga  MD Xu as MD (Ophthalmology)  ERENTR[facility] GALLARDO

## 2018-04-30 NOTE — NURSING NOTE
"Gabino Jones's goals for this visit include:   Chief Complaint   Patient presents with     RECHECK     One year follow up       She requests these members of her care team be copied on today's visit information: PCP    PCP: Rebekah Boogie    Referring Provider:  No referring provider defined for this encounter.    Chief Complaint   Patient presents with     RECHECK     One year follow up       Initial /76 (BP Location: Left arm, Patient Position: Chair, Cuff Size: Adult Regular)  Pulse 79  Wt 80.7 kg (177 lb 14.4 oz)  SpO2 98%  BMI 30.52 kg/m2 Estimated body mass index is 30.52 kg/(m^2) as calculated from the following:    Height as of 3/14/18: 1.626 m (5' 4.02\").    Weight as of this encounter: 80.7 kg (177 lb 14.4 oz).  Medication Reconciliation: complete           Medication Refills: none        Rody Stanley CMA        "

## 2018-04-30 NOTE — PATIENT INSTRUCTIONS
It was a pleasure to see you in the cardiology clinic today.    If you have any questions, you can reach my nurse, Guerita Cerna, at [phone].     Note the new medications: LIPITOR 20 mg, once a day    Stop the following medications: Pravastatin    The results from today include: ECHO REPORT BELOW    Tests ordered today: ECHO (May 2019)    I would like you to follow up with Dr. Hobbs in 1 year.    Sincerely,      Blake Hobbs MD     [facility]      Interpretation Summary     Mild to moderate aortic insufficiency is present.  Moderate aortic stenosis is present.  The aortic valve area is 1.2 cm^2, by the continuity equation.  Mild progression of aortic stenosis since last study in 5/2016.  _____________________________________________________________________________  __        Left Ventricle  Global and regional left ventricular function is normal with an EF of 60-65%.  Left ventricular size is normal. Borderline concentric wall thickening  consistent with left ventricular hypertrophy is present. Left ventricular  diastolic function is indeterminate. Diastolic Doppler findings (E/E' ratio  and/or other parameters) suggest left ventricular filling pressures are  increased. No regional wall motion abnormalities are seen.     Right Ventricle  Right ventricular function, chamber size, wall motion, and thickness are  normal.     Atria  Both atria appear normal. The atrial septum is intact as assessed by color  Doppler .     Mitral Valve  Rheumatic mitral valve disease is present with mild calcification. Mild mitral  insufficiency is present.        Aortic Valve  Mild to moderate aortic valve sclerosis is present. Mild to moderate aortic  insufficiency is present. Moderate aortic stenosis is present. The aortic  valve area is 1.2 cm^2, by the continuity equation. The mean AoV pressure  gradient is 37.4 mmHg.     Tricuspid Valve  The tricuspid valve is normal. Trace to mild  tricuspid insufficiency is  present. The right ventricular systolic pressure is approximated at 28.1 mmHg  plus the right atrial pressure.     Pulmonic Valve  The pulmonic valve is normal.     Vessels  The aorta root is normal. The pulmonary artery is normal. The inferior vena  cava is normal.     Pericardium  No pericardial effusion is present.        Compared to Previous Study  Mild progression of aortic stenosis since last study in 2016.  _____________________________________________________________________________  __  MMode/2D Measurements & Calculations     IVSd: 0.99 cm  LVIDd: 4.8 cm  LVIDs: 1.9 cm  LVPWd: 1.1 cm  FS: 59.4 %  LV mass(C)d: 183.3 grams  LV mass(C)dI: 99.7 grams/m2  Ao root diam: 2.6 cm  LA dimension: 4.3 cm  LA/Ao: 1.6  LVOT diam: 2.3 cm  LVOT area: 4.1 cm2  LA Volume (BP): 46.0 ml  LA Volume Index (BP): 25.0 ml/m2  RWT: 0.47           Doppler Measurements & Calculations  MV E max felix: 107.1 cm/sec  MV A max felix: 109.1 cm/sec  MV E/A: 0.98  MV dec time: 0.25 sec  Ao V2 max: 375.8 cm/sec  Ao max P.0 mmHg  Ao V2 mean: 293.4 cm/sec  Ao mean P.4 mmHg  Ao V2 VTI: 96.2 cm  PATRICIA(I,D): 1.2 cm2  PATRICIA(V,D): 1.2 cm2  AI P1/2t: 426.8 msec  LV V1 max P.1 mmHg  LV V1 max: 112.4 cm/sec  LV V1 VTI: 28.4 cm  SV(LVOT): 116.1 ml  SI(LVOT): 63.1 ml/m2  TR max felix: 265.0 cm/sec  TR max P.1 mmHg  AV Felix Ratio (DI): 0.30  PATRICIA Index (cm2/m2): 0.66  E/E' av.4  Lateral E/e': 20.0  Medial E/e': 22.9

## 2018-04-30 NOTE — MR AVS SNAPSHOT
After Visit Summary   4/30/2018    Gabino Jones    MRN: [id number]           Patient Information     Date Of Birth          1940        Visit Information        Provider Department      4/30/2018 2:10 PM Blake Hobbs MD [facility]        Today's Diagnoses     Nonrheumatic aortic valve stenosis    -  1    Hypercholesteremia          Care Instructions    It was a pleasure to see you in the cardiology clinic today.    If you have any questions, you can reach my nurse, Guerita Cerna, at [phone].     Note the new medications: LIPITOR 20 mg, once a day    Stop the following medications: Pravastatin    The results from today include: ECHO REPORT BELOW    Tests ordered today: ECHO (May 2019)    I would like you to follow up with Dr. Hobbs in 1 year.    Sincerely,      Blake Hobbs MD     [facility]      Interpretation Summary     Mild to moderate aortic insufficiency is present.  Moderate aortic stenosis is present.  The aortic valve area is 1.2 cm^2, by the continuity equation.  Mild progression of aortic stenosis since last study in 5/2016.  _____________________________________________________________________________  __        Left Ventricle  Global and regional left ventricular function is normal with an EF of 60-65%.  Left ventricular size is normal. Borderline concentric wall thickening  consistent with left ventricular hypertrophy is present. Left ventricular  diastolic function is indeterminate. Diastolic Doppler findings (E/E' ratio  and/or other parameters) suggest left ventricular filling pressures are  increased. No regional wall motion abnormalities are seen.     Right Ventricle  Right ventricular function, chamber size, wall motion, and thickness are  normal.     Atria  Both atria appear normal. The atrial septum is intact as assessed by color  Doppler .     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 Mild mitral  insufficiency is present.        Aortic Valve  Mild to moderate aortic valve sclerosis is present. Mild to moderate aortic  insufficiency is present. Moderate aortic stenosis is present. The aortic  valve area is 1.2 cm^2, by the continuity equation. The mean AoV pressure  gradient is 37.4 mmHg.     Tricuspid Valve  The tricuspid valve is normal. Trace to mild tricuspid insufficiency is  present. The right ventricular systolic pressure is approximated at 28.1 mmHg  plus the right atrial pressure.     Pulmonic Valve  The pulmonic valve is normal.     Vessels  The aorta root is normal. The pulmonary artery is normal. The inferior vena  cava is normal.     Pericardium  No pericardial effusion is present.        Compared to Previous Study  Mild progression of aortic stenosis since last study in 2016.  _____________________________________________________________________________  __  MMode/2D Measurements & Calculations     IVSd: 0.99 cm  LVIDd: 4.8 cm  LVIDs: 1.9 cm  LVPWd: 1.1 cm  FS: 59.4 %  LV mass(C)d: 183.3 grams  LV mass(C)dI: 99.7 grams/m2  Ao root diam: 2.6 cm  LA dimension: 4.3 cm  LA/Ao: 1.6  LVOT diam: 2.3 cm  LVOT area: 4.1 cm2  LA Volume (BP): 46.0 ml  LA Volume Index (BP): 25.0 ml/m2  RWT: 0.47           Doppler Measurements & Calculations  MV E max felix: 107.1 cm/sec  MV A max felix: 109.1 cm/sec  MV E/A: 0.98  MV dec time: 0.25 sec  Ao V2 max: 375.8 cm/sec  Ao max P.0 mmHg  Ao V2 mean: 293.4 cm/sec  Ao mean P.4 mmHg  Ao V2 VTI: 96.2 cm  PATRICIA(I,D): 1.2 cm2  PATRICIA(V,D): 1.2 cm2  AI P1/2t: 426.8 msec  LV V1 max P.1 mmHg  LV V1 max: 112.4 cm/sec  LV V1 VTI: 28.4 cm  SV(LVOT): 116.1 ml  SI(LVOT): 63.1 ml/m2  TR max felix: 265.0 cm/sec  TR max P.1 mmHg  AV Felix Ratio (DI): 0.30  PATRICIA Index (cm2/m2): 0.66  E/E' av.4  Lateral E/e': 20.0  Medial E/e': 22.9                  Follow-ups after your visit        Your next 10 appointments already scheduled     ,  2018  1:00 PM CDT   (Arrive by 12:45 PM)   Return Visit with Yolanda Whitaker MD   [facility] ([facility] [place])    [address]  4th Floor  [facility] [address] [phone]            Sep 12, 2018  9:15 AM CDT   (Arrive by 9:00 AM)   MR BRAIN W/O & W CONTRAST with [id number]   [facility] MRI ([facility])    [address]  1st Floor  [facility] [address] [phone]           Take your medicines as usual, unless your doctor tells you not to. Bring a list of your current medicines to your exam (including vitamins, minerals and over-the-counter drugs).  You may or may not receive intravenous (IV) contrast for this exam pending the discretion of the Radiologist.  You do not need to do anything special to prepare.  The MRI machine uses a strong magnet. Please wear clothes without metal (snaps, zippers). A sweatsuit works well, or we may give you a hospital gown.  Please remove any body piercings and hair extensions before you arrive. You will also remove watches, jewelry, hairpins, wallets, dentures, partial dental plates and hearing aids. You may wear contact lenses, and you may be able to wear your rings. We have a safe place to keep your personal items, but it is safer to leave them at home.  **IMPORTANT** THE INSTRUCTIONS BELOW ARE ONLY FOR THOSE PATIENTS WHO HAVE BEEN PRESCRIBED SEDATION OR GENERAL ANESTHESIA DURING THEIR MRI PROCEDURE:  IF YOUR DOCTOR PRESCRIBED ORAL SEDATION (take medicine to help you relax during your exam):   You must get the medicine from your doctor (oral medication) before you arrive. Bring the medicine to the exam. Do not take it at home. You ll be told when to take it upon arriving for your exam.   Arrive one hour early. Bring someone who can take you home after the test. Your medicine will make you sleepy. After the exam, you may not drive, take a bus or take a taxi by  yourself.  IF YOUR DOCTOR PRESCRIBED IV SEDATION:   Arrive one hour early. Bring someone who can take you home after the test. Your medicine will make you sleepy. After the exam, you may not drive, take a bus or take a taxi by yourself.   No eating 6 hours before your exam. You may have clear liquids up until 4 hours before your exam. (Clear liquids include water, clear tea, black coffee and fruit juice without pulp.)  IF YOUR DOCTOR PRESCRIBED ANESTHESIA (be asleep for your exam):   Arrive 1 1/2 hours early. Bring someone who can take you home after the test. You may not drive, take a bus or take a taxi by yourself.   No eating 8 hours before your exam. You may have clear liquids up until 4 hours before your exam. (Clear liquids include water, clear tea, black coffee and fruit juice without pulp.)   You will spend four to five hours in the recovery room.  Please call the Imaging Department at your exam site with any questions.            Sep 12, 2018 10:00 AM CDT   (Arrive by 9:45 AM)   CT CHEST/ABDOMEN/PELVIS W CONTRAST with UCCT2   [facility] [place] [facility] ([facility])    [address]  [facility] [address] [phone]           Please bring any scans or X-rays taken at other hospitals, if similar tests were done. Also bring a list of your medicines, including vitamins, minerals and over-the-counter drugs. It is safest to leave personal items at home.  Be sure to tell your doctor:   If you have any allergies.   If there s any chance you are pregnant.   If you are breastfeeding.  How to prepare:   Do not eat or drink for 2 hours before your exam. If you need to take medicine, you may take it with small sips of water. (We may ask you to take liquid medicine as well.)   Please wear loose clothing, such as a sweat suit or jogging clothes. Avoid snaps, zippers and other metal. We may ask you to undress and put on a hospital gown.  Please arrive 30 minutes early for  your CT. Once in the department you might be asked to drink water 15-20 minutes prior to your exam.  If indicated you may be asked to drink an oral contrast in advance of your CT.  If this is the case, the imaging team will let you know or be in contact with you prior to your appointment  Patients over 70 or patients with diabetes or kidney problems:   If you haven t had a blood test (creatinine test) within the last 30 days, the Cardiologist/Radiologist may require you to get this test prior to your exam.  If you have diabetes:   Continue to take your metformin medication on the day of your exam  If you have any questions, please call the Imaging Department where you will have your exam.            Sep 12, 2018  1:00 PM CDT   [facility] Lab Draw with  [employer] LAB DRAW   [facility] Lab Draw ([facility])    [address] [place] [address]  [address] [place] [address]   [phone]            Sep 12, 2018  1:30 PM CDT   (Arrive by 1:15 PM)   Return Visit with Garett Obregon MD   [facility] ([facility])    [address] [place] [address]  [address] [place] [address]   [phone]            Apr 30, 2019  1:30 PM CDT   Ech Complete with MG TRINIDAD ECHO TECH   [facility] ([facility])    [address] [place] [address] [phone]           1. Please bring or wear a comfortable two-piece outfit. 2. You may eat, drink and take your normal medicines. 3. For any questions that cannot be answered, please contact the ordering physician              Future tests that were ordered for you today     Open Future Orders        Priority Expected Expires Ordered    Echocardiogram Complete Routine 4/23/2019 4/30/2019 4/30/2018            Who to contact     If you have questions or need follow up information about today's clinic visit or your schedule please contact [facility] directly  at [phone].  Normal or non-critical lab and imaging results will be communicated to you by [employer], letter or phone within 4 business days after the clinic has received the results. If you do not hear from us within 7 days, please contact the clinic through [employer] or phone. If you have a critical or abnormal lab result, we will notify you by phone as soon as possible.  Submit refill requests through [employer] or call your pharmacy and they will forward the refill request to us. Please allow 3 business days for your refill to be completed.          Additional Information About Your Visit        [employer] Information     [employer] gives you secure access to your electronic health record. If you see a primary care provider, you can also send messages to your care team and make appointments. If you have questions, please call your primary care clinic.  If you do not have a primary care provider, please call [phone] and they will assist you.      [employer] is an electronic gateway that provides easy, online access to your medical records. With [employer], you can request a clinic appointment, read your test results, renew a prescription or communicate with your care team.     To access your existing account, please contact your [facility] or call [phone] for assistance.        Care EveryWhere ID     This is your Care EveryWhere ID. This could be used by other organizations to access your [place] medical records  [id number]        Your Vitals Were     Pulse Pulse Oximetry BMI (Body Mass Index)             79 98% 30.52 kg/m2          Blood Pressure from Last 3 Encounters:   04/30/18 137/76   03/14/18 131/75   12/29/17 136/74    Weight from Last 3 Encounters:   04/30/18 80.7 kg (177 lb 14.4 oz)   03/14/18 78.9 kg (173 lb 14.4 oz)   01/04/18 80.3 kg (177 lb)                 Today's Medication Changes          These changes are accurate as of 4/30/18  2:52 PM.  If you have any questions,  ask your nurse or doctor.               Start taking these medicines.        Dose/Directions    atorvastatin 20 MG tablet   Commonly known as:  LIPITOR   Used for:  Hypercholesteremia   Started by:  Blake Hobbs MD        Dose:  20 mg   Take 1 tablet (20 mg) by mouth daily   Quantity:  90 tablet   Refills:  3         These medicines have changed or have updated prescriptions.        Dose/Directions    ranitidine 150 MG tablet   Commonly known as:  ZANTAC   This may have changed:  See the new instructions.   Used for:  Gastroesophageal reflux disease without esophagitis        TAKE 1 TABLET BY MOUTH TWICE DAILY AS NEEDED   Quantity:  180 tablet   Refills:  0         Stop taking these medicines if you haven't already. Please contact your care team if you have questions.     pravastatin 40 MG tablet   Commonly known as:  PRAVACHOL   Stopped by:  Blake Hobbs MD                Where to get your medicines      These medications were sent to [facility] [address] [place] [address] [address] [facility] [address]  [address] [facility] [address]     Phone:  [phone]     atorvastatin 20 MG tablet                Primary Care Provider Office Phone # Fax #    Rebekah Boogie [phone] [address] [facility] [address]        Equal Access to Services     [facility] ANISH AH: Hadii aad ku hadasho Soomaali, waaxda luqadaha, qaybta kaalmada adeegyada, waxay idiin haynelidan antonieta dubon. So [facility] [phone].    ATENCIÓN: Si habla español, tiene a velazquez disposición servicios gratuitos de asistencia lingüística. Llame al [phone].    We comply with applicable federal civil rights laws and [place] laws. We do not discriminate on the basis of race, color, national origin, age, disability, sex, sexual orientation, or gender identity.            Thank you!     Thank you for choosing [facility]  for your care. Our goal is  always to provide you with excellent care. Hearing back from our patients is one way we can continue to improve our services. Please take a few minutes to complete the written survey that you may receive in the mail after your visit with us. Thank you!             Your Updated Medication List - Protect others around you: Learn how to safely use, store and throw away your medicines at www.disposemymeds.org.          This list is accurate as of 4/30/18  2:52 PM.  Always use your most recent med list.                   Brand Name Dispense Instructions for use Diagnosis    acetaminophen 500 MG tablet    TYLENOL     Take 500 mg by mouth every 6 hours as needed    Malignant melanoma of nasal cavity (H)       atorvastatin 20 MG tablet    LIPITOR    90 tablet    Take 1 tablet (20 mg) by mouth daily    Hypercholesteremia       buPROPion 150 MG 24 hr tablet    WELLBUTRIN XL    90 tablet    TAKE 1 TABLET(150 MG) BY MOUTH EVERY MORNING    JITENDRA (generalized anxiety disorder)       GuaiFENesin 100 MG Pack      Take by mouth as needed        hydrochlorothiazide 12.5 MG capsule    MICROZIDE    90 capsule    Take 1 capsule (12.5 mg) by mouth daily    Hypertension goal BP (blood pressure) < 140/90       hydrOXYzine 25 MG tablet    ATARAX    30 tablet    TAKE 1 TABLET(25 MG) BY MOUTH EVERY 6 HOURS AS NEEDED FOR ANXIETY    JITENDRA (generalized anxiety disorder)       * ibuprofen 800 MG tablet    ADVIL/MOTRIN    90 tablet    Take 1 tablet (800 mg) by mouth every 4 hours as needed for moderate pain Reported on 4/17/2017    Myalgia       * ibuprofen 800 MG tablet    ADVIL/MOTRIN    90 tablet    TAKE 1 TABLET BY MOUTH EVERY 4 HOURS AS NEEDED FOR PAIN    Myalgia       lisinopril 30 MG tablet    PRINIVIL,ZESTRIL    90 tablet    Take 1 tablet (30 mg) by mouth daily    Hypertension goal BP (blood pressure) < 140/90       pantoprazole 40 MG EC tablet    PROTONIX    90 tablet    Take 1 tablet (40 mg) by mouth daily Take 30-60 minutes before a meal.     Gastroesophageal reflux disease without esophagitis       potassium chloride 10 MEQ CR capsule    MICRO-K    180 capsule    Take 2 capsules (20 mEq) by mouth daily    Hypokalemia       ranitidine 150 MG tablet    ZANTAC    180 tablet    TAKE 1 TABLET BY MOUTH TWICE DAILY AS NEEDED    Gastroesophageal reflux disease without esophagitis       senna-docusate 8.6-50 MG per tablet    SENOKOT-S;PERICOLACE    120 tablet    Take 1-2 tablets by mouth 2 times daily        Simethicone 180 MG Caps     60 capsule    Take 1 capsule by mouth 2 times daily Reported on 5/9/2017    Flatulence, eructation, and gas pain       venlafaxine 75 MG 24 hr capsule    EFFEXOR-XR    270 capsule    TAKE 3 CAPSULES BY MOUTH DAILY    JITENDRA (generalized anxiety disorder)       VITAMIN D (CHOLECALCIFEROL) PO      Take 2,000 Units by mouth daily    Malignant melanoma of nasal cavity (H)       * Notice:  This list has 2 medication(s) that are the same as other medications prescribed for you. Read the directions carefully, and ask your doctor or other care provider to review them with you.

## 2018-05-30 ENCOUNTER — TELEPHONE (OUTPATIENT)
Dept: FAMILY MEDICINE | Facility: CLINIC | Age: 78
End: 2018-05-30

## 2018-05-30 DIAGNOSIS — C30.0 MALIGNANT MELANOMA OF NASAL CAVITY (H): ICD-10-CM

## 2018-05-30 DIAGNOSIS — R14.1 FLATULENCE, ERUCTATION, AND GAS PAIN: ICD-10-CM

## 2018-05-30 DIAGNOSIS — K21.9 GASTROESOPHAGEAL REFLUX DISEASE WITHOUT ESOPHAGITIS: ICD-10-CM

## 2018-05-30 DIAGNOSIS — R14.3 FLATULENCE, ERUCTATION, AND GAS PAIN: ICD-10-CM

## 2018-05-30 DIAGNOSIS — I10 HYPERTENSION GOAL BP (BLOOD PRESSURE) < 140/90: ICD-10-CM

## 2018-05-30 DIAGNOSIS — R05.9 COUGH: Primary | ICD-10-CM

## 2018-05-30 DIAGNOSIS — M79.10 MYALGIA: ICD-10-CM

## 2018-05-30 DIAGNOSIS — F41.1 GAD (GENERALIZED ANXIETY DISORDER): ICD-10-CM

## 2018-05-30 DIAGNOSIS — R14.2 FLATULENCE, ERUCTATION, AND GAS PAIN: ICD-10-CM

## 2018-05-30 NOTE — TELEPHONE ENCOUNTER
Patient  came in for his office visit and bought in patient refills list of medications. Please sent medications to [facility] in [place]. Pt  is aware that patient needs to be seen for an OV.     Please review.     Acetaminophen  Bupropion   Guaifenesin   hydrochlorothiazide   hydroxyzine   Ibuprofen   Lisinopril   Ranitidine  Senna-docusate  Simethicone  Venlafaxine

## 2018-05-30 NOTE — TELEPHONE ENCOUNTER
Summary:    Patient is due/failing the following:   FOLLOW UP and PHQ9    Action needed:   Patient needs office visit for Depression follow up. and Patient needs to do PHQ9.    Type of outreach:    Phone, left message for patient to call back.     Questions for provider review:    None                                                                                                                                    Eunice Triciayaima       Chart routed to Care Team .        Panel Management Review      Patient has the following on her problem list:     Depression / Dysthymia review    Measure:  Needs PHQ-9 score of 4 or less during index window.  Administer PHQ-9 and if score is 5 or more, send encounter to provider for next steps.        PHQ-9 SCORE 5/9/2017 8/1/2017 12/29/2017   Total Score MyChart - 0 12 (Moderate depression)   Total Score 8 0 12       If PHQ-9 recheck is 5 or more, route to provider for next steps.    Patient is due for:  PHQ9    Hypertension   Last three blood pressure readings:  BP Readings from Last 3 Encounters:   04/30/18 137/76   03/14/18 131/75   12/29/17 136/74     Blood pressure: Passed    HTN Guidelines:  Age 18-59 BP range:  Less than 140/90  Age 60-85 with Diabetes:  Less than 140/90  Age 60-85 without Diabetes:  less than 150/90      Composite cancer screening  Chart review shows that this patient is due/due soon for the following None

## 2018-05-30 NOTE — TELEPHONE ENCOUNTER
SF-There are 3 left for you to review. Please route to MAs to schedule OV when you are done.    Left message asking pt to callback. Pt needs an OV    Acetaminophen    Routing refill request to provider for review/approval because:  Medication is reported/historical    Guaifenesin     Routing refill request to provider for review/approval because:  Medication is reported/historical    Simethicone    Routing refill request to provider for review/approval because:  Drug not on the [facility] refill protocol      Bupropion    PHQ-9 SCORE 5/9/2017 8/1/2017 12/29/2017   Total Score MyChart - 0 12 (Moderate depression)   Total Score 8 0 12     Medication is being filled for 1 time refill only due to:  Patient needs to be seen because Mood F/U.        hydrochlorothiazide     BP Readings from Last 3 Encounters:   04/30/18 137/76   03/14/18 131/75   12/29/17 136/74     Prescription approved per [facility] Refill Protocol.    hydroxyzine     Prescription approved per [facility] Refill Protocol.    Lisinopril     BP Readings from Last 3 Encounters:   04/30/18 137/76   03/14/18 131/75   12/29/17 136/74     Prescription approved per [facility] Refill Protocol.      Ranitidine    Prescription approved per [facility] Refill Protocol.      Senna-docusate    Prescription approved per [facility] Refill Protocol.       Venlafaxine    Prescription approved per [facility] Refill Protocol.      Ibuprofen     Sent 4/16/2018 with 90 tablet. Refill not appropriate at this time.       Nhi Llanos, RN, BSN

## 2018-05-31 RX ORDER — HYDROCHLOROTHIAZIDE 12.5 MG/1
12.5 CAPSULE ORAL DAILY
Qty: 90 CAPSULE | Refills: 0 | Status: SHIPPED | OUTPATIENT
Start: 2018-05-31 | End: 2018-11-08

## 2018-05-31 RX ORDER — LISINOPRIL 30 MG/1
30 TABLET ORAL DAILY
Qty: 90 TABLET | Refills: 0 | Status: SHIPPED | OUTPATIENT
Start: 2018-05-31 | End: 2018-11-08

## 2018-05-31 RX ORDER — VENLAFAXINE HYDROCHLORIDE 75 MG/1
CAPSULE, EXTENDED RELEASE ORAL
Qty: 270 CAPSULE | Refills: 0 | Status: SHIPPED | OUTPATIENT
Start: 2018-05-31 | End: 2018-08-27

## 2018-05-31 RX ORDER — BUPROPION HYDROCHLORIDE 150 MG/1
TABLET ORAL
Qty: 30 TABLET | Refills: 0 | Status: SHIPPED | OUTPATIENT
Start: 2018-05-31 | End: 2018-08-06

## 2018-05-31 RX ORDER — IBUPROFEN 800 MG/1
TABLET, FILM COATED ORAL
Qty: 90 TABLET | Refills: 0 | OUTPATIENT
Start: 2018-05-31

## 2018-05-31 RX ORDER — AMOXICILLIN 250 MG
1-2 CAPSULE ORAL 2 TIMES DAILY
Qty: 120 TABLET | Refills: 3 | Status: ON HOLD | OUTPATIENT
Start: 2018-05-31 | End: 2019-01-21

## 2018-05-31 RX ORDER — HYDROXYZINE HYDROCHLORIDE 25 MG/1
TABLET, FILM COATED ORAL
Qty: 30 TABLET | Refills: 0 | Status: SHIPPED | OUTPATIENT
Start: 2018-05-31 | End: 2018-08-22

## 2018-06-01 RX ORDER — SIMETHICONE 180 MG
1 CAPSULE ORAL 2 TIMES DAILY
Qty: 60 CAPSULE | Refills: 6 | Status: SHIPPED | OUTPATIENT
Start: 2018-06-01 | End: 2018-07-05

## 2018-06-01 RX ORDER — ACETAMINOPHEN 500 MG
500 TABLET ORAL EVERY 6 HOURS PRN
Qty: 100 TABLET | Refills: 3 | Status: SHIPPED | OUTPATIENT
Start: 2018-06-01 | End: 2019-01-01

## 2018-07-05 ENCOUNTER — OFFICE VISIT (OUTPATIENT)
Dept: OTOLARYNGOLOGY | Facility: CLINIC | Age: 78
End: 2018-07-05
Payer: MEDICARE

## 2018-07-05 ENCOUNTER — MYC REFILL (OUTPATIENT)
Dept: FAMILY MEDICINE | Facility: CLINIC | Age: 78
End: 2018-07-05

## 2018-07-05 VITALS — HEIGHT: 65 IN | BODY MASS INDEX: 29.16 KG/M2 | WEIGHT: 175 LBS

## 2018-07-05 DIAGNOSIS — C30.0 MALIGNANT MELANOMA OF NASAL CAVITY (H): ICD-10-CM

## 2018-07-05 DIAGNOSIS — C43.31 MALIGNANT MELANOMA OF NOSE (H): Primary | ICD-10-CM

## 2018-07-05 DIAGNOSIS — R14.1 FLATULENCE, ERUCTATION, AND GAS PAIN: ICD-10-CM

## 2018-07-05 DIAGNOSIS — R14.3 FLATULENCE, ERUCTATION, AND GAS PAIN: ICD-10-CM

## 2018-07-05 DIAGNOSIS — R14.2 FLATULENCE, ERUCTATION, AND GAS PAIN: ICD-10-CM

## 2018-07-05 RX ORDER — ACETAMINOPHEN 500 MG
500 TABLET ORAL EVERY 6 HOURS PRN
Qty: 100 TABLET | Refills: 3 | Status: CANCELLED | OUTPATIENT
Start: 2018-07-05

## 2018-07-05 RX ORDER — PRAVASTATIN SODIUM 40 MG
TABLET ORAL
Refills: 3 | COMMUNITY
Start: 2018-03-02 | End: 2018-12-12

## 2018-07-05 ASSESSMENT — PAIN SCALES - GENERAL: PAINLEVEL: MODERATE PAIN (5)

## 2018-07-05 NOTE — LETTER
7/5/2018       RE: Gabino Jones  [address]  [facility] [address]     Dear Colleague,    Thank you for referring your patient, Gabino Jones, to the [facility] EAR NOSE AND THROAT at [facility]. Please see a copy of my visit note below.    July 6, 2018    Dear Dr. Andrade:    I had the pleasure of meeting Gabino Jones in follow-up today at the [facility] Otolaryngology [facility] at your request.     DIAGNOSIS:  Right-sided sinonasal mucosal malignant melanoma.      TREATMENT:  The patient underwent endoscopic resection on 07/23/2014.  She completed postoperative radiation therapy on 9/11/2014.      HISTORY OF PRESENT ILLNESS:  The patient is a 78-year-old female.  She is back to the Otolaryngology Clinic for surveillance followup.  She is doing well.  She really does not have any new complaints.  She has had a few episodes of epistaxis, most likely coming from the right side.  Last episode was last Tuesday, so a few days ago.  Other than that, the patient is eating well.  She is maintaining her weight.  She is pain-free.        MEDICATIONS:     Current Outpatient Prescriptions   Medication Sig Dispense Refill     acetaminophen (TYLENOL) 500 MG tablet Take 1 tablet (500 mg) by mouth every 6 hours as needed 100 tablet 3     atorvastatin (LIPITOR) 20 MG tablet Take 1 tablet (20 mg) by mouth daily 90 tablet 3     buPROPion (WELLBUTRIN XL) 150 MG 24 hr tablet TAKE 1 TABLET(150 MG) BY MOUTH EVERY MORNING 30 tablet 0     GuaiFENesin 100 MG PACK Take 1 tablet by mouth 2 times daily as needed 168 each 3     hydrochlorothiazide (MICROZIDE) 12.5 MG capsule Take 1 capsule (12.5 mg) by mouth daily 90 capsule 0     hydrOXYzine (ATARAX) 25 MG tablet TAKE 1 TABLET(25 MG) BY MOUTH EVERY 6 HOURS AS NEEDED FOR ANXIETY 30 tablet 0     ibuprofen (ADVIL/MOTRIN) 800 MG tablet TAKE 1 TABLET BY MOUTH EVERY 4 HOURS AS NEEDED FOR PAIN 90 tablet 0     ibuprofen (ADVIL/MOTRIN) 800 MG tablet  Take 1 tablet (800 mg) by mouth every 4 hours as needed for moderate pain Reported on 4/17/2017 90 tablet 1     lisinopril (PRINIVIL,ZESTRIL) 30 MG tablet Take 1 tablet (30 mg) by mouth daily 90 tablet 0     pantoprazole (PROTONIX) 40 MG EC tablet Take 1 tablet (40 mg) by mouth daily Take 30-60 minutes before a meal. 90 tablet 1     potassium chloride (MICRO-K) 10 MEQ CR capsule Take 2 capsules (20 mEq) by mouth daily 180 capsule 1     pravastatin (PRAVACHOL) 40 MG tablet   3     ranitidine (ZANTAC) 150 MG tablet TAKE 1 TABLET BY MOUTH TWICE DAILY AS NEEDED 180 tablet 0     senna-docusate (SENOKOT-S;PERICOLACE) 8.6-50 MG per tablet Take 1-2 tablets by mouth 2 times daily 120 tablet 3     Simethicone 180 MG CAPS Take 1 capsule by mouth 2 times daily Reported on 5/9/2017 60 capsule 6     venlafaxine (EFFEXOR-XR) 75 MG 24 hr capsule TAKE 3 CAPSULES BY MOUTH DAILY 270 capsule 0     VITAMIN D, CHOLECALCIFEROL, PO Take 2,000 Units by mouth daily         ALLERGIES:    Allergies   Allergen Reactions     Novocain [Procaine] Unknown and Rash     Mirtazapine      Nefazodone Unknown and Rash       HABITS/SOCIAL HISTORY:  No smoking, no drinking    PAST MEDICAL HISTORY:   Past Medical History:   Diagnosis Date     Cancer (H)      Chronic back pain      Chronic leg pain      Depression      Hearing loss      Heart murmur      Hyperlipidemia      Hypertension      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     Ringing in ears         FAMILY HISTORY:    Family History   Problem Relation Age of Onset     Prostate Cancer Father      Cancer Sister      SCLC     Cancer Sister      Macular Degeneration Daughter      Glaucoma No family hx of        REVIEW OF SYSTEMS:    A 10 point Review of Systems was performed and pertinent positives are noted in the HPI; remaining positives are:  Patient Supplied Answers to Review of Systems  [facility] ENT ROS 7/5/2018   Eyes Visual loss   Ears, Nose, Throat Hearing loss, Ringing/noise in ears   Cardiopulmonary  Cough   Gastrointestinal/Genitourinary Heartburn/indigestion   Musculoskeletal Sore or stiff joints, Swollen legs/feet   Allergy/Immunology Allergies or hay fever   Hematologic Easy bruising   Endocrine Heat or cold intolerance       PHYSICAL EXAMINATION:   Constitutional: The patient was well-groomed and in no acute distress.    Skin: Warm and pink.    Neurologic: Alert and oriented x3. Cranial nerves III-XII within normal limits. Voice quality is normal.    Psychiatric: The patient's affect was calm, cooperative and appropriate.    Respiratory: Breathing comfortably without stridor or exertion of accessory muscles.    Eyes: Pupils were equal and reactive. Extraocular movements are intact.    Head: Normocephalic and atraumatic. No lesions or scars.    Ears: External auditory canals and tympanic membranes were clear.    Nose: Sinuses were nontender. Anterior rhinoscopy revealed midline septum and absence of purulence or polyps.    Oral cavity/Oropharynx: Normal tongue, floor of mouth, buccal mucosa and palate. No lesions or masses on inspection or palpation. No abnormal lymph tissue in the oropharynx.    Neck: The parotids are soft without masses. Supple with normal laryngeal and tracheal landmarks.    Lymphatic: There is no palpable lymphadenopathy or other masses in the neck or parotids.       PROCEDURE:  Nasal endoscopy, I used a 0-degree nasal endoscope.  Septum is in the midline.  On the left side, the inferior middle turbinates are present and normal.  I do not see any evidence of masses or lesions.  The nasopharynx is normal.  On the right, I do see some crusting on the inferior turbinate.  This was removed with suction.  I do see prominent varices on the surface of the inferior turbinate.  These were cauterized using silver nitrate.  She tolerated this very well.  The rest of the right sided nasal endoscopy shows anterior posterior ethmoidectomy, sphenoidotomy and a very wide maxillary antrostomy.   Everything is mucosalized.  I do not see any lesions that are of concern today.      ASSESSMENT AND PLAN:  This is a 78-year-old female with a right-sided sinonasal mucosal malignant melanoma who finished treatment on 9/11/2014.  Today's examination did not reveal any evidence of local or regional disease.  She is going to follow up with Dr. Obregon in September and I would like to see her back six months after that.      ECG/ms     Thank you very much for the opportunity to participate in the care of your patient.        Yolanda Whitaker M.D.  Otolaryngology/Head & Neck Surgery  [phone]        Again, thank you for allowing me to participate in the care of your patient.      Sincerely,    Yolanda Whitaker MD

## 2018-07-05 NOTE — MR AVS SNAPSHOT
After Visit Summary   7/5/2018    Gabino Jones    MRN: [id number]           Patient Information     Date Of Birth          1940        Visit Information        Provider Department      7/5/2018 1:00 PM Yolanda Whitaker MD [facility] Ear Nose and Throat        Care Instructions    Ponaris Nasal Emollient   This cold season reach for all-natural Ponaris to relieve your stuffy, congested nose, post-nasal drip, and nasal dryness.  Once standard in [employer]'s medical space kit, this effective formula contains oils of pine, eucalyptus, peppermint, cajeput, and cottonseed.   Ponaris is a compound of carefully selected mucosal lubricating and moisturizing botanical oils, specially treated through the exclusive J-R iodization process since 1931.   Exclusive Prescribable Therapy Colds (Sinusitis) Atrophic Rhinitis (acute, chronic, allergic) Post-nasal drip Epistaxis (due to irritations of dried mucous membranes) Rebound sinus reaction from drugs and smog   Active Ingredients   Ponaris is a specially prepared iodized botanical oil blend that contains oils of pine, eucalyptus, peppermint, cajeput in a cottonseed oil base. Total Iodine 0.4% - 0.6% by weight. Assimilable hence non-lipoid potential.   Directions  With head tilted back, place 1 or 2 drops in each nostril once or twice a day, or as directed by a physician. After using, immediately screw cap or dropper tightly back onto bottle. Exposure to air and light may cause product to darken, but this will not compromise it's effectiveness. Bottle must be stored in an upright position with dropper bulb drained of product.     1.  You were seen in the ENT Clinic today by Dr. Barboza.  If you have any questions or concerns after your appointment, please call [phone].  Press option #1 for scheduling related needs.  Press option #3 for Nurse advice.  2.  Plan is to return to clinic in March for follow up with Dr. Barboza.                  Follow-ups  after your visit        Your next 10 appointments already scheduled     Jul 10, 2018  5:00 PM CDT   MyChart Long with Rebekah Boogie MD   [facility] ([facility])    [address], [address]  [place] [address]   [phone]            Jul 20, 2018  2:40 PM CDT   Pre-Op physical with Rebekah Boogie MD   [facility] [place] ([facility])    [address]  [place] [address]   [phone]            Sep 12, 2018  9:15 AM CDT   MR BRAIN W/O & W CONTRAST with [id number] MRI ([facility])    [address] [address] [phone]           Take your medicines as usual, unless your doctor tells you not to. Bring a list of your current medicines to your exam (including vitamins, minerals and over-the-counter drugs).  You may or may not receive intravenous (IV) contrast for this exam pending the discretion of the Radiologist.  You do not need to do anything special to prepare.  The MRI machine uses a strong magnet. Please wear clothes without metal (snaps, zippers). A sweatsuit works well, or we may give you a hospital gown.  Please remove any body piercings and hair extensions before you arrive. You will also remove watches, jewelry, hairpins, wallets, dentures, partial dental plates and hearing aids. You may wear contact lenses, and you may be able to wear your rings. We have a safe place to keep your personal items, but it is safer to leave them at home.  **IMPORTANT** THE INSTRUCTIONS BELOW ARE ONLY FOR THOSE PATIENTS WHO HAVE BEEN PRESCRIBED SEDATION OR GENERAL ANESTHESIA DURING THEIR MRI PROCEDURE:  IF YOUR DOCTOR PRESCRIBED ORAL SEDATION (take medicine to help you relax during your exam):   You must get the medicine from your doctor (oral medication) before you arrive. Bring the medicine to the exam. Do not take it at home. You ll be told when to take it upon  arriving for your exam.   Arrive one hour early. Bring someone who can take you home after the test. Your medicine will make you sleepy. After the exam, you may not drive, take a bus or take a taxi by yourself.  IF YOUR DOCTOR PRESCRIBED IV SEDATION:   Arrive one hour early. Bring someone who can take you home after the test. Your medicine will make you sleepy. After the exam, you may not drive, take a bus or take a taxi by yourself.   No eating 6 hours before your exam. You may have clear liquids up until 4 hours before your exam. (Clear liquids include water, clear tea, black coffee and fruit juice without pulp.)  IF YOUR DOCTOR PRESCRIBED ANESTHESIA (be asleep for your exam):   Arrive 1 1/2 hours early. Bring someone who can take you home after the test. You may not drive, take a bus or take a taxi by yourself.   No eating 8 hours before your exam. You may have clear liquids up until 4 hours before your exam. (Clear liquids include water, clear tea, black coffee and fruit juice without pulp.)   You will spend four to five hours in the recovery room.  Please call the Imaging Department at your exam site with any questions.            Sep 12, 2018 10:00 AM CDT   CT CHEST/ABDOMEN/PELVIS W CONTRAST with [id number]   [facility] CT ([facility])    [address] [address] [phone]           Please bring any scans or X-rays taken at other hospitals, if similar tests were done. Also bring a list of your medicines, including vitamins, minerals and over-the-counter drugs. It is safest to leave personal items at home.  Be sure to tell your doctor:   If you have any allergies.   If there s any chance you are pregnant.   If you are breastfeeding.  How to prepare:   Do not eat or drink for 2 hours before your exam. If you need to take medicine, you may take it with small sips of water. (We may ask you to take liquid medicine as well.)   Please wear loose  clothing, such as a sweat suit or jogging clothes. Avoid snaps, zippers and other metal. We may ask you to undress and put on a hospital gown.  Please arrive 30 minutes early for your CT. Once in the department you might be asked to drink water 15-20 minutes prior to your exam.  If indicated you may be asked to drink an oral contrast in advance of your CT.  If this is the case, the imaging team will let you know or be in contact with you prior to your appointment  Patients over 70 or patients with diabetes or kidney problems:   If you haven t had a blood test (creatinine test) within the last 30 days, the Cardiologist/Radiologist may require you to get this test prior to your exam.  If you have diabetes:   Continue to take your metformin medication on the day of your exam  If you have any questions, please call the Imaging Department where you will have your exam.            Sep 12, 2018  1:00 PM CDT   [facility] Lab Draw with  [facility] LAB DRAW   [facility] Lab Draw ([facility])    [address] [place] [address]   [phone]            Sep 12, 2018  1:30 PM CDT   (Arrive by 1:15 PM)   Return Visit with Garett Obregon MD   [facility] ([facility])    [address] [place] [address]   [phone]            Mar 07, 2019  1:00 PM CST   (Arrive by 12:45 PM)   Return Visit with Yolanda Whitaker MD   [facility] Ear Nose and Throat ([facility])    [address] [place] [address]  4th [facility] [address]   [phone]            Apr 30, 2019  1:30 PM CDT   Ech Complete with MGECHAYDEE MG ECHO TECH   [facility] ([facility])    [address] [place] [address] [phone]           1. Please bring or wear a comfortable two-piece outfit. 2. You may eat, drink and take your normal medicines. 3. For any  "questions that cannot be answered, please contact the ordering physician            May 06, 2019  2:10 PM CDT   Return Visit with Blake Hobbs MD   [email or web address] ([facility] [[email or web address]    [address] [place] [address] [phone]              Who to contact     Please call your clinic at [phone] to:    Ask questions about your health    Make or cancel appointments    Discuss your medicines    Learn about your test results    Speak to your doctor            Additional Information About Your Visit        [email or web address] Information     [employer] gives you secure access to your electronic health record. If you see a primary care provider, you can also send messages to your care team and make appointments. If you have questions, please call your primary care clinic.  If you do not have a primary care provider, please call [phone] and they will assist you.      [employer] is an electronic gateway that provides easy, online access to your medical records. With [employer], you can request a clinic appointment, read your test results, renew a prescription or communicate with your care team.     To access your existing account, please contact your [facility] or call [phone] for assistance.        Care EveryWhere ID     This is your Care EveryWhere ID. This could be used by other organizations to access your [facility] medical records  [id number]        Your Vitals Were     Height BMI (Body Mass Index)                1.651 m (5' 5\") 29.12 kg/m2           Blood Pressure from Last 3 Encounters:   04/30/18 137/76   03/14/18 131/75   12/29/17 136/74    Weight from Last 3 Encounters:   07/05/18 79.4 kg (175 lb)   04/30/18 80.7 kg (177 lb 14.4 oz)   03/14/18 78.9 kg (173 lb 14.4 oz)              Today, you had the following     No orders found for display       Primary Care Provider Office Phone # Fax #    Rebekah Boogie [phone] "       [address] [facility] [address]        Equal Access to Services     TANAARMANDO ANISH : Hadii rivka hargrove hadkaterina Sosarahali, waaxda luqadaha, qaybta kaalmada ashleyclaudettevalerie, vito cazares cecescar gibsonsusanwhitley dubon. So [facility] [phone].    ATENCIÓN: Si habla español, tiene a velazquez disposición servicios gratuitos de asistencia lingüística. Llame al [phone].    We comply with applicable federal civil rights laws and [place] laws. We do not discriminate on the basis of race, color, national origin, age, disability, sex, sexual orientation, or gender identity.            Thank you!     Thank you for choosing [facility] EAR NOSE AND THROAT  for your care. Our goal is always to provide you with excellent care. Hearing back from our patients is one way we can continue to improve our services. Please take a few minutes to complete the written survey that you may receive in the mail after your visit with us. Thank you!             Your Updated Medication List - Protect others around you: Learn how to safely use, store and throw away your medicines at www.disposemymeds.org.          This list is accurate as of 7/5/18  1:26 PM.  Always use your most recent med list.                   Brand Name Dispense Instructions for use Diagnosis    acetaminophen 500 MG tablet    TYLENOL    100 tablet    Take 1 tablet (500 mg) by mouth every 6 hours as needed    Malignant melanoma of nasal cavity (H)       atorvastatin 20 MG tablet    LIPITOR    90 tablet    Take 1 tablet (20 mg) by mouth daily    Hypercholesteremia       buPROPion 150 MG 24 hr tablet    WELLBUTRIN XL    30 tablet    TAKE 1 TABLET(150 MG) BY MOUTH EVERY MORNING    JITENDRA (generalized anxiety disorder)       GuaiFENesin 100 MG Pack     168 each    Take 1 tablet by mouth 2 times daily as needed    Cough       hydrochlorothiazide 12.5 MG capsule    MICROZIDE    90 capsule    Take 1 capsule (12.5 mg) by mouth daily    Hypertension goal BP (blood pressure) < 140/90        hydrOXYzine 25 MG tablet    ATARAX    30 tablet    TAKE 1 TABLET(25 MG) BY MOUTH EVERY 6 HOURS AS NEEDED FOR ANXIETY    JITENDRA (generalized anxiety disorder)       * ibuprofen 800 MG tablet    ADVIL/MOTRIN    90 tablet    Take 1 tablet (800 mg) by mouth every 4 hours as needed for moderate pain Reported on 4/17/2017    Myalgia       * ibuprofen 800 MG tablet    ADVIL/MOTRIN    90 tablet    TAKE 1 TABLET BY MOUTH EVERY 4 HOURS AS NEEDED FOR PAIN    Myalgia       lisinopril 30 MG tablet    PRINIVIL,ZESTRIL    90 tablet    Take 1 tablet (30 mg) by mouth daily    Hypertension goal BP (blood pressure) < 140/90       pantoprazole 40 MG EC tablet    PROTONIX    90 tablet    Take 1 tablet (40 mg) by mouth daily Take 30-60 minutes before a meal.    Gastroesophageal reflux disease without esophagitis       potassium chloride 10 MEQ CR capsule    MICRO-K    180 capsule    Take 2 capsules (20 mEq) by mouth daily    Hypokalemia       pravastatin 40 MG tablet    PRAVACHOL          ranitidine 150 MG tablet    ZANTAC    180 tablet    TAKE 1 TABLET BY MOUTH TWICE DAILY AS NEEDED    Gastroesophageal reflux disease without esophagitis       senna-docusate 8.6-50 MG per tablet    SENOKOT-S;PERICOLACE    120 tablet    Take 1-2 tablets by mouth 2 times daily    Flatulence, eructation, and gas pain       Simethicone 180 MG Caps     60 capsule    Take 1 capsule by mouth 2 times daily Reported on 5/9/2017    Flatulence, eructation, and gas pain       venlafaxine 75 MG 24 hr capsule    EFFEXOR-XR    270 capsule    TAKE 3 CAPSULES BY MOUTH DAILY    JITENDRA (generalized anxiety disorder)       VITAMIN D (CHOLECALCIFEROL) PO      Take 2,000 Units by mouth daily    Malignant melanoma of nasal cavity (H)       * Notice:  This list has 2 medication(s) that are the same as other medications prescribed for you. Read the directions carefully, and ask your doctor or other care provider to review them with you.

## 2018-07-05 NOTE — LETTER
7/5/2018      RE: Gabino Jones  [address] [place] [address]     July 6, 2018    Dear  Self:    I had the pleasure of meeting Gabino Jones in follow-up today at the [facility] at your request.     DIAGNOSIS:  Right-sided sinonasal mucosal malignant melanoma.      TREATMENT:  The patient underwent endoscopic resection on 07/23/2014.  She completed postoperative radiation therapy on 9/11/2014.      HISTORY OF PRESENT ILLNESS:  The patient is a 78-year-old female.  She is back to the Otolaryngology Clinic for surveillance followup.  She is doing well.  She really does not have any new complaints.  She has had a few episodes of epistaxis, most likely coming from the right side.  Last episode was last Tuesday, so a few days ago.  Other than that, the patient is eating well.  She is maintaining her weight.  She is pain-free.      MEDICATIONS:     Current Outpatient Prescriptions   Medication Sig Dispense Refill     acetaminophen (TYLENOL) 500 MG tablet Take 1 tablet (500 mg) by mouth every 6 hours as needed 100 tablet 3     atorvastatin (LIPITOR) 20 MG tablet Take 1 tablet (20 mg) by mouth daily 90 tablet 3     buPROPion (WELLBUTRIN XL) 150 MG 24 hr tablet TAKE 1 TABLET(150 MG) BY MOUTH EVERY MORNING 30 tablet 0     GuaiFENesin 100 MG PACK Take 1 tablet by mouth 2 times daily as needed 168 each 3     hydrochlorothiazide (MICROZIDE) 12.5 MG capsule Take 1 capsule (12.5 mg) by mouth daily 90 capsule 0     hydrOXYzine (ATARAX) 25 MG tablet TAKE 1 TABLET(25 MG) BY MOUTH EVERY 6 HOURS AS NEEDED FOR ANXIETY 30 tablet 0     ibuprofen (ADVIL/MOTRIN) 800 MG tablet TAKE 1 TABLET BY MOUTH EVERY 4 HOURS AS NEEDED FOR PAIN 90 tablet 0     ibuprofen (ADVIL/MOTRIN) 800 MG tablet Take 1 tablet (800 mg) by mouth every 4 hours as needed for moderate pain Reported on 4/17/2017 90 tablet 1     lisinopril (PRINIVIL,ZESTRIL) 30 MG tablet Take 1 tablet (30 mg) by mouth daily 90 tablet 0      pantoprazole (PROTONIX) 40 MG EC tablet Take 1 tablet (40 mg) by mouth daily Take 30-60 minutes before a meal. 90 tablet 1     potassium chloride (MICRO-K) 10 MEQ CR capsule Take 2 capsules (20 mEq) by mouth daily 180 capsule 1     pravastatin (PRAVACHOL) 40 MG tablet   3     ranitidine (ZANTAC) 150 MG tablet TAKE 1 TABLET BY MOUTH TWICE DAILY AS NEEDED 180 tablet 0     senna-docusate (SENOKOT-S;PERICOLACE) 8.6-50 MG per tablet Take 1-2 tablets by mouth 2 times daily 120 tablet 3     Simethicone 180 MG CAPS Take 1 capsule by mouth 2 times daily Reported on 5/9/2017 60 capsule 6     venlafaxine (EFFEXOR-XR) 75 MG 24 hr capsule TAKE 3 CAPSULES BY MOUTH DAILY 270 capsule 0     VITAMIN D, CHOLECALCIFEROL, PO Take 2,000 Units by mouth daily         ALLERGIES:    Allergies   Allergen Reactions     Novocain [Procaine] Unknown and Rash     Mirtazapine      Nefazodone Unknown and Rash       HABITS/SOCIAL HISTORY:  No smoking, no drinking    PAST MEDICAL HISTORY:   Past Medical History:   Diagnosis Date     Cancer (H)      Chronic back pain      Chronic leg pain      Depression      Hearing loss      Heart murmur      Hyperlipidemia      Hypertension      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     Ringing in ears         FAMILY HISTORY:    Family History   Problem Relation Age of Onset     Prostate Cancer Father      Cancer Sister      SCLC     Cancer Sister      Macular Degeneration Daughter      Glaucoma No family hx of        REVIEW OF SYSTEMS:    A 10 point Review of Systems was performed and pertinent positives are noted in the HPI; remaining positives are:  Patient Supplied Answers to Review of Systems  [facility] ROS 7/5/2018   Eyes Visual loss   Ears, Nose, Throat Hearing loss, Ringing/noise in ears   Cardiopulmonary Cough   Gastrointestinal/Genitourinary Heartburn/indigestion   Musculoskeletal Sore or stiff joints, Swollen legs/feet   Allergy/Immunology Allergies or hay fever   Hematologic Easy bruising   Endocrine Heat or  cold intolerance       PHYSICAL EXAMINATION:   Constitutional: The patient was well-groomed and in no acute distress.    Skin: Warm and pink.    Neurologic: Alert and oriented x3. Cranial nerves III-XII within normal limits. Voice quality is normal.    Psychiatric: The patient's affect was calm, cooperative and appropriate.    Respiratory: Breathing comfortably without stridor or exertion of accessory muscles.    Eyes: Pupils were equal and reactive. Extraocular movements are intact.    Head: Normocephalic and atraumatic. No lesions or scars.    Ears: External auditory canals and tympanic membranes were clear.    Nose: Sinuses were nontender. Anterior rhinoscopy revealed midline septum and absence of purulence or polyps.    Oral cavity/Oropharynx: Normal tongue, floor of mouth, buccal mucosa and palate. No lesions or masses on inspection or palpation. No abnormal lymph tissue in the oropharynx.    Neck: The parotids are soft without masses. Supple with normal laryngeal and tracheal landmarks.    Lymphatic: There is no palpable lymphadenopathy or other masses in the neck or parotids.       PROCEDURE:  Nasal endoscopy, I used a 0-degree nasal endoscope.  Septum is in the midline.  On the left side, the inferior middle turbinates are present and normal.  I do not see any evidence of masses or lesions.  The nasopharynx is normal.  On the right, I do see some crusting on the inferior turbinate.  This was removed with suction.  I do see prominent varices on the surface of the inferior turbinate.  These were cauterized using silver nitrate.  She tolerated this very well.  The rest of the right sided nasal endoscopy shows anterior posterior ethmoidectomy, sphenoidotomy and a very wide maxillary antrostomy.  Everything is mucosalized.  I do not see any lesions that are of concern today.      ASSESSMENT AND PLAN:  This is a 78-year-old female with a right-sided sinonasal mucosal malignant melanoma who finished treatment on  9/11/2014.  Today's examination did not reveal any evidence of local or regional disease.  She is going to follow up with Dr. Obregon in September and I would like to see her back six months after that.      ECG/ms     Thank you very much for the opportunity to participate in the care of your patient.      Yolanda Whitaker M.D.  Otolaryngology/Head & Neck Surgery  [phone]

## 2018-07-05 NOTE — NURSING NOTE
"Chief Complaint   Patient presents with     RECHECK     follow up sinonasal melanoma     Height 1.651 m (5' 5\"), weight 79.4 kg (175 lb), not currently breastfeeding.    Christopher Parada    "

## 2018-07-05 NOTE — PROGRESS NOTES
July 6, 2018    Dear Dr. Andrade:    I had the pleasure of meeting Gabino Jones in follow-up today at the [facility] at your request.     DIAGNOSIS:  Right-sided sinonasal mucosal malignant melanoma.      TREATMENT:  The patient underwent endoscopic resection on 07/23/2014.  She completed postoperative radiation therapy on 9/11/2014.      HISTORY OF PRESENT ILLNESS:  The patient is a 78-year-old female.  She is back to the Otolaryngology Clinic for surveillance followup.  She is doing well.  She really does not have any new complaints.  She has had a few episodes of epistaxis, most likely coming from the right side.  Last episode was last Tuesday, so a few days ago.  Other than that, the patient is eating well.  She is maintaining her weight.  She is pain-free.        MEDICATIONS:     Current Outpatient Prescriptions   Medication Sig Dispense Refill     acetaminophen (TYLENOL) 500 MG tablet Take 1 tablet (500 mg) by mouth every 6 hours as needed 100 tablet 3     atorvastatin (LIPITOR) 20 MG tablet Take 1 tablet (20 mg) by mouth daily 90 tablet 3     buPROPion (WELLBUTRIN XL) 150 MG 24 hr tablet TAKE 1 TABLET(150 MG) BY MOUTH EVERY MORNING 30 tablet 0     GuaiFENesin 100 MG PACK Take 1 tablet by mouth 2 times daily as needed 168 each 3     hydrochlorothiazide (MICROZIDE) 12.5 MG capsule Take 1 capsule (12.5 mg) by mouth daily 90 capsule 0     hydrOXYzine (ATARAX) 25 MG tablet TAKE 1 TABLET(25 MG) BY MOUTH EVERY 6 HOURS AS NEEDED FOR ANXIETY 30 tablet 0     ibuprofen (ADVIL/MOTRIN) 800 MG tablet TAKE 1 TABLET BY MOUTH EVERY 4 HOURS AS NEEDED FOR PAIN 90 tablet 0     ibuprofen (ADVIL/MOTRIN) 800 MG tablet Take 1 tablet (800 mg) by mouth every 4 hours as needed for moderate pain Reported on 4/17/2017 90 tablet 1     lisinopril (PRINIVIL,ZESTRIL) 30 MG tablet Take 1 tablet (30 mg) by mouth daily 90 tablet 0     pantoprazole (PROTONIX) 40 MG EC tablet Take 1 tablet (40 mg) by mouth daily  Take 30-60 minutes before a meal. 90 tablet 1     potassium chloride (MICRO-K) 10 MEQ CR capsule Take 2 capsules (20 mEq) by mouth daily 180 capsule 1     pravastatin (PRAVACHOL) 40 MG tablet   3     ranitidine (ZANTAC) 150 MG tablet TAKE 1 TABLET BY MOUTH TWICE DAILY AS NEEDED 180 tablet 0     senna-docusate (SENOKOT-S;PERICOLACE) 8.6-50 MG per tablet Take 1-2 tablets by mouth 2 times daily 120 tablet 3     Simethicone 180 MG CAPS Take 1 capsule by mouth 2 times daily Reported on 5/9/2017 60 capsule 6     venlafaxine (EFFEXOR-XR) 75 MG 24 hr capsule TAKE 3 CAPSULES BY MOUTH DAILY 270 capsule 0     VITAMIN D, CHOLECALCIFEROL, PO Take 2,000 Units by mouth daily         ALLERGIES:    Allergies   Allergen Reactions     Novocain [Procaine] Unknown and Rash     Mirtazapine      Nefazodone Unknown and Rash       HABITS/SOCIAL HISTORY:  No smoking, no drinking    PAST MEDICAL HISTORY:   Past Medical History:   Diagnosis Date     Cancer (H)      Chronic back pain      Chronic leg pain      Depression      Hearing loss      Heart murmur      Hyperlipidemia      Hypertension      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     Ringing in ears         FAMILY HISTORY:    Family History   Problem Relation Age of Onset     Prostate Cancer Father      Cancer Sister      SCLC     Cancer Sister      Macular Degeneration Daughter      Glaucoma No family hx of        REVIEW OF SYSTEMS:    A 10 point Review of Systems was performed and pertinent positives are noted in the HPI; remaining positives are:  Patient Supplied Answers to Review of Systems  [facility] ROS 7/5/2018   Eyes Visual loss   Ears, Nose, Throat Hearing loss, Ringing/noise in ears   Cardiopulmonary Cough   Gastrointestinal/Genitourinary Heartburn/indigestion   Musculoskeletal Sore or stiff joints, Swollen legs/feet   Allergy/Immunology Allergies or hay fever   Hematologic Easy bruising   Endocrine Heat or cold intolerance       PHYSICAL EXAMINATION:   Constitutional: The patient  was well-groomed and in no acute distress.    Skin: Warm and pink.    Neurologic: Alert and oriented x3. Cranial nerves III-XII within normal limits. Voice quality is normal.    Psychiatric: The patient's affect was calm, cooperative and appropriate.    Respiratory: Breathing comfortably without stridor or exertion of accessory muscles.    Eyes: Pupils were equal and reactive. Extraocular movements are intact.    Head: Normocephalic and atraumatic. No lesions or scars.    Ears: External auditory canals and tympanic membranes were clear.    Nose: Sinuses were nontender. Anterior rhinoscopy revealed midline septum and absence of purulence or polyps.    Oral cavity/Oropharynx: Normal tongue, floor of mouth, buccal mucosa and palate. No lesions or masses on inspection or palpation. No abnormal lymph tissue in the oropharynx.    Neck: The parotids are soft without masses. Supple with normal laryngeal and tracheal landmarks.    Lymphatic: There is no palpable lymphadenopathy or other masses in the neck or parotids.       PROCEDURE:  Nasal endoscopy, I used a 0-degree nasal endoscope.  Septum is in the midline.  On the left side, the inferior middle turbinates are present and normal.  I do not see any evidence of masses or lesions.  The nasopharynx is normal.  On the right, I do see some crusting on the inferior turbinate.  This was removed with suction.  I do see prominent varices on the surface of the inferior turbinate.  These were cauterized using silver nitrate.  She tolerated this very well.  The rest of the right sided nasal endoscopy shows anterior posterior ethmoidectomy, sphenoidotomy and a very wide maxillary antrostomy.  Everything is mucosalized.  I do not see any lesions that are of concern today.      ASSESSMENT AND PLAN:  This is a 78-year-old female with a right-sided sinonasal mucosal malignant melanoma who finished treatment on 9/11/2014.  Today's examination did not reveal any evidence of local or  regional disease.  She is going to follow up with Dr. Obregon in September and I would like to see her back six months after that.      ECG/ms     Thank you very much for the opportunity to participate in the care of your patient.        Yolanda Whitaker M.D.  Otolaryngology/Head & Neck Surgery  [phone]

## 2018-07-05 NOTE — PATIENT INSTRUCTIONS
Ponaris Nasal Emollient   This cold season reach for all-natural Ponaris to relieve your stuffy, congested nose, post-nasal drip, and nasal dryness.  Once standard in [employer]'s medical space kit, this effective formula contains oils of pine, eucalyptus, peppermint, cajeput, and cottonseed.   Ponaris is a compound of carefully selected mucosal lubricating and moisturizing botanical oils, specially treated through the exclusive J-R iodization process since 1931.   Exclusive Prescribable Therapy Colds (Sinusitis) Atrophic Rhinitis (acute, chronic, allergic) Post-nasal drip Epistaxis (due to irritations of dried mucous membranes) Rebound sinus reaction from drugs and smog   Active Ingredients   Ponaris is a specially prepared iodized botanical oil blend that contains oils of pine, eucalyptus, peppermint, cajeput in a cottonseed oil base. Total Iodine 0.4% - 0.6% by weight. Assimilable hence non-lipoid potential.   Directions  With head tilted back, place 1 or 2 drops in each nostril once or twice a day, or as directed by a physician. After using, immediately screw cap or dropper tightly back onto bottle. Exposure to air and light may cause product to darken, but this will not compromise it's effectiveness. Bottle must be stored in an upright position with dropper bulb drained of product.     1.  You were seen in the ENT Clinic today by Dr. Barboza.  If you have any questions or concerns after your appointment, please call [phone].  Press option #1 for scheduling related needs.  Press option #3 for Nurse advice.  2.  Plan is to return to clinic in March for follow up with Dr. Barboza.

## 2018-07-06 RX ORDER — SIMETHICONE 180 MG
1 CAPSULE ORAL 2 TIMES DAILY
Qty: 60 CAPSULE | Refills: 0 | Status: ON HOLD | OUTPATIENT
Start: 2018-07-06 | End: 2019-01-21

## 2018-07-06 NOTE — TELEPHONE ENCOUNTER
Message from RIVShart:  Original authorizing provider: MIMI RAMIREZ MD, MD Gabino Jones would like a refill of the following medications:  Simethicone 180 MG CAPS [MIMI RAMIREZ MD, MD]    Preferred pharmacy: [facility] [address] [place] [address] - [address] [place] [address] [address]    Comment:

## 2018-07-06 NOTE — TELEPHONE ENCOUNTER
Simethicone 180 MG CAPS     Last Written Prescription Date:  06/01/2018  Last Fill Quantity: 60,   # refills: 6  Last Office Visit: 12/29/2017  Future Office visit:         Next 5 appointments (look out 90 days)      Jul 10, 2018  5:00 PM CDT   MyChart Long with Rebekah Boogie MD   [facility] ([facility])     [address], [address]  [facility] [address]   [phone]                Jul 20, 2018  2:40 PM CDT   Pre-Op physical with Rebekah Boogie MD   [facility] [place] ([facility])     [address], [address]  [facility] [address]   [phone]                  Routing refill request to provider for review/approval because:  Drug not on the [facility], [facility] or [facility] refill protocol or controlled substance  Giulia Whitley RN, BSN

## 2018-07-06 NOTE — TELEPHONE ENCOUNTER
Message from MyChart:  Original authorizing provider: MIMI RAMIREZ MD, MD Gabino Jones would like a refill of the following medications:  acetaminophen (TYLENOL) 500 MG tablet [MIMI RAMIREZ MD, MD]    Preferred pharmacy: [facility] [address] [place] [address] [address]    Comment:

## 2018-07-06 NOTE — TELEPHONE ENCOUNTER
Message from MyChart:  Original authorizing provider: MIMI RAMIREZ MD, MD Gabino Jones would like a refill of the following medications:  acetaminophen (TYLENOL) 500 MG tablet [MIMI RAMIREZ MD, MD]    Preferred pharmacy: [facility] [address] [place] [address] [address]    Comment:

## 2018-07-06 NOTE — TELEPHONE ENCOUNTER
Message from MyChart:  Original authorizing provider: MIMI RAMIREZ MD, MD Gabino Jones would like a refill of the following medications:  acetaminophen (TYLENOL) 500 MG tablet [MIMI RAMIREZ MD, MD]    Preferred pharmacy: [facility] [address] [place] [address] [address]    Comment:

## 2018-07-06 NOTE — TELEPHONE ENCOUNTER
Simethicone 180 MG CAPS     Last Written Prescription Date:  06/01/2018  Last Fill Quantity: 60,   # refills: 6  Last Office Visit: 12/29/2017  Future Office visit:         Next 5 appointments (look out 90 days)      Jul 10, 2018  5:00 PM CDT   MyChart Long with Rebekah Boogie MD   [facility] ([facility])     [address]  [facility] [address]   [phone]                Jul 20, 2018  2:40 PM CDT   Pre-Op physical with Rebekah Boogie MD   [facility] [place] ([facility])     [address]  [facility] [address]   [phone]                  Routing refill request to provider for review/approval because:  Drug not on the [facility], [facility] or [facility] refill protocol or controlled substance  Giulia Whitley RN, BSN

## 2018-07-06 NOTE — TELEPHONE ENCOUNTER
Requested Prescriptions   Pending Prescriptions Disp Refills     Simethicone 180 MG CAPS 60 capsule 6     Sig: Take 1 capsule by mouth 2 times daily Reported on 5/9/2017    There is no refill protocol information for this order         Simethicone 180 MG CAPS     Last Written Prescription Date:  06/01/2018  Last Fill Quantity: 60,   # refills: 6  Last Office Visit: 12/29/2017  Future Office visit:    Next 5 appointments (look out 90 days)     Jul 10, 2018  5:00 PM CDT   MyChart Long with Rebekah Boogie MD   [facility] ([facility])    [address], [address]  [facility] [address]   [phone]            Jul 20, 2018  2:40 PM CDT   Pre-Op physical with Rebekah Boogie MD   [facility] ([facility])    [address]  [facility] [address]   [phone]              Routing refill request to provider for review/approval because:  Drug not on the [facility], [facility] or [facility] refill protocol or controlled substance  Giulia Whitley, RN, BSN

## 2018-07-06 NOTE — TELEPHONE ENCOUNTER
"Requested Prescriptions   Pending Prescriptions Disp Refills     acetaminophen (TYLENOL) 500 MG tablet 100 tablet 3     Sig: Take 1 tablet (500 mg) by mouth every 6 hours as needed    Analgesics (Non-Narcotic Tylenol and ASA Only) Passed    7/6/2018  8:13 AM       Passed - Recent (12 mo) or future (30 days) visit within the authorizing provider's specialty    Patient had office visit in the last 12 months or has a visit in the next 30 days with authorizing provider or within the authorizing provider's specialty.  See \"Patient Info\" tab in inbasket, or \"Choose Columns\" in Meds & Orders section of the refill encounter.           Passed - Patient is 7 months old or older    If patient is a peds patient of the age 7 mos -12 years, ok to refill using weight-based dosing.     If >3g daily and/or sig is not \"prn\", check for liver enzymes. If normal in the last year, ok to refill.  If not, refer to the provider.          acetaminophen (TYLENOL) 500 MG tablet  Rx was sent 06/01/2018 for 100 tabs and 3 refills.   Pharmacy notified via E-prescribe refusal.  Giulia Whitley, RN, BSN       "

## 2018-07-16 NOTE — PATIENT INSTRUCTIONS
Heat or ice to the upper back/shoulders 10-15 minutes at a time, 3 times per day.     Can use tylenol for discomfort.     If not improving in the next 2-3 weeks, I would consider having you see physical therapy.             Before Your Surgery      Call your surgeon if there is any change in your health. This includes signs of a cold or flu (such as a sore throat, runny nose, cough, rash or fever).    Do not smoke, drink alcohol or take over the counter medicine (unless your surgeon or primary care doctor tells you to) for the 24 hours before and after surgery.    If you take prescribed drugs: Follow your doctor s orders about which medicines to take and which to stop until after surgery.    Eating and drinking prior to surgery: follow the instructions from your surgeon    Take a shower or bath the night before surgery. Use the soap your surgeon gave you to gently clean your skin. If you do not have soap from your surgeon, use your regular soap. Do not shave or scrub the surgery site.  Wear clean pajamas and have clean sheets on your bed.

## 2018-07-16 NOTE — PROGRESS NOTES
[facility] [place]  [address]  [place] [address]  [phone]  Dept: [phone]    PRE-OP EVALUATION:  Today's date: 2018    Gabino Jones (: 1940) presents for pre-operative evaluation assessment as requested by Dr. Juan Reid.  She requires evaluation and anesthesia risk assessment prior to undergoing surgery/procedure for treatment of ESD Lower .    Proposed Surgery/ Procedure: ESD Lower  Date of Surgery/ Procedure: 18  Time of Surgery/ Procedure: 09  Hospital/Surgical Facility: [facility]  Fax number for surgical facility: [phone]  Primary Physician: Rebekah Boogie  Type of Anesthesia Anticipated: sedation or MAC    Patient has a Health Care Directive or Living Will:  NO    1. NO - Do you have a history of heart attack, stroke, stent, bypass or surgery on an artery in the head, neck, heart or legs?  2. NO - Do you ever have any pain or discomfort in your chest?  3. NO - Do you have a history of  Heart Failure?  4. YES - ARE YOUR TROUBLED BY SHORTNESS OF BREATH WHEN WALKING ON THE LEVEL, UP A SLIGHT HILL OR AT NIGHT? Known aortic stenosis. She reports that this is stable.   5. NO - Do you currently have a cold, bronchitis or other respiratory infection?  6. NO - DO YOU HAVE A COUGH, SHORTNESS OF BREATH OR WHEEZING? Patient denies any issues.   7. NO - Do you sometimes get pains in the calves of your legs when you walk?  8. NO - Do you or anyone in your family have previous history of blood clots?  9. NO - DO YOU OR DOES ANYONE IN YOUR FAMILY HAVE A SERIOUS BLEEDING PROBLEM SUCH AS PROLONGED BLEEDING FOLLOWING SURGERIES OR CUTS? Patient cannot remember any bleeding issues.   10. NO - Have you ever had problems with anemia or been told to take iron pills?  11. NO - Have you had any abnormal blood loss such as black, tarry or bloody stools, or abnormal vaginal bleeding?  12. NO - Have you ever had a blood transfusion?  13. NO - Have you or  any of your relatives ever had problems with anesthesia?  14. NO - Do you have sleep apnea, excessive snoring or daytime drowsiness?  15. NO - Do you have any prosthetic heart valves?  16. NO - Do you have prosthetic joints?  17. NO - Is there any chance that you may be pregnant?      HPI:     HPI related to upcoming procedure: patient with history of large polyps. She reports having another large polyp that needs to be removed. This will be done at [facility].      Aortic stenosis.  Patient has Class II-III dyspnea of unknown etiology.  This has been stable for the past year.  ECHO (May 2016) showed mild to moderate aortic insufficiency, and mild aortic stenosis.   Repeat ECHO in May 2018.  {Ch. Problems:615658}    MEDICAL HISTORY:     Patient Active Problem List    Diagnosis Date Noted     Mild recurrent major depression (H) 09/26/2017     Priority: Medium     Tubular adenoma of colon 09/26/2017     Priority: Medium     Mixed hyperlipidemia 07/06/2017     Priority: Medium     JITENDRA (generalized anxiety disorder) 05/23/2017     Priority: Medium     Skull lesion 02/22/2017     Priority: Medium     Hypertension goal BP (blood pressure) < 140/90 02/20/2017     Priority: Medium     Aortic stenosis 04/25/2016     Priority: Medium     Abnormal chest CT 04/22/2016     Priority: Medium     Proximal humerus fracture 08/29/2014     Priority: Medium     Melanoma (H) 06/23/2014     Priority: Medium     Into sinus cavity and 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     Priority: Medium     Nasal pain 06/05/2014     Priority: Medium      Past Medical History:   Diagnosis Date     Cancer (H)      Chronic back pain      Chronic leg pain      Depression      Hearing loss      Heart murmur      Hyperlipidemia      Hypertension      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     Ringing in ears      Past Surgical History:   Procedure Laterality Date     C ANESTH,CERV SPINE, SITTING POSITION        COLONOSCOPY N/A 7/7/2017    Procedure: COMBINED COLONOSCOPY, SINGLE OR MULTIPLE BIOPSY/POLYPECTOMY BY BIOPSY;;  Surgeon: Sathya Norman MD;  Location: [facility] OR     COLONOSCOPY WITH CO2 INSUFFLATION N/A 7/7/2017    Procedure: COLONOSCOPY WITH CO2 INSUFFLATION;  Combined,  [place], Gastroesophageal reflux disease without esophagitis  Flatulence, eructation, and gas pain, BMI 29.35, [facility] [id number];  Surgeon: Sathya Norman MD;  Location: [facility] OR     COLONOSCOPY, SUBMUCOSA RESECTION N/A 10/4/2017    Procedure: COLONOSCOPY, SUBMUCOSA RESECTION;  Colonoscopy With Endoscopic Polypectomy with clips;  Surgeon: Milton Javier MD;  Location: [facility] OR     COMBINED ESOPHAGOSCOPY, GASTROSCOPY, DUODENOSCOPY (EGD) WITH CO2 INSUFFLATION N/A 7/7/2017    Procedure: COMBINED ESOPHAGOSCOPY, GASTROSCOPY, DUODENOSCOPY (EGD) WITH CO2 INSUFFLATION;  Combined,  [place], Gastroesophageal reflux disease without esophagitis  Flatulence, eructation, and gas pain, BMI 29.35, [facility] [phone];  Surgeon: Sathya Norman MD;  Location: [facility] OR     ESOPHAGOSCOPY, GASTROSCOPY, DUODENOSCOPY (EGD), COMBINED N/A 7/7/2017    Procedure: COMBINED ESOPHAGOSCOPY, GASTROSCOPY, DUODENOSCOPY (EGD), BIOPSY SINGLE OR MULTIPLE;;  Surgeon: Sathya Norman MD;  Location: [facility] OR     GALLBLADDER SURGERY       NECK SURGERY       OPTICAL TRACKING SYSTEM CRANIOTOMY, EXCISE TUMOR, COMBINED Right 2/22/2017    Procedure: COMBINED OPTICAL TRACKING SYSTEM CRANIOTOMY, EXCISE TUMOR;  Surgeon: Lenora Pérez MD;  Location: [facility] OR     OPTICAL TRACKING SYSTEM ENDOSCOPIC ENDONASAL SURGERY  6/23/2014    Procedure: OPTICAL TRACKING SYSTEM ENDOSCOPIC ENDONASAL SURGERY;  Surgeon: Yolanda Whitaker MD;  Location: [facility] OR     STOMACH SURGERY       TONSILLECTOMY       TUBAL LIGATION      1975     Current Outpatient Prescriptions   Medication Sig Dispense Refill     acetaminophen (TYLENOL) 500 MG tablet Take 1  "tablet (500 mg) by mouth every 6 hours as needed 100 tablet 3     atorvastatin (LIPITOR) 20 MG tablet Take 1 tablet (20 mg) by mouth daily 90 tablet 3     buPROPion (WELLBUTRIN XL) 150 MG 24 hr tablet TAKE 1 TABLET(150 MG) BY MOUTH EVERY MORNING 30 tablet 0     GuaiFENesin 100 MG PACK Take 1 tablet by mouth 2 times daily as needed 168 each 3     hydrochlorothiazide (MICROZIDE) 12.5 MG capsule Take 1 capsule (12.5 mg) by mouth daily 90 capsule 0     hydrOXYzine (ATARAX) 25 MG tablet TAKE 1 TABLET(25 MG) BY MOUTH EVERY 6 HOURS AS NEEDED FOR ANXIETY 30 tablet 0     ibuprofen (ADVIL/MOTRIN) 800 MG tablet Take 1 tablet (800 mg) by mouth every 4 hours as needed for moderate pain Reported on 4/17/2017 90 tablet 1     lisinopril (PRINIVIL,ZESTRIL) 30 MG tablet Take 1 tablet (30 mg) by mouth daily 90 tablet 0     pantoprazole (PROTONIX) 40 MG EC tablet Take 1 tablet (40 mg) by mouth daily Take 30-60 minutes before a meal. 90 tablet 1     potassium chloride (MICRO-K) 10 MEQ CR capsule Take 2 capsules (20 mEq) by mouth daily 180 capsule 1     pravastatin (PRAVACHOL) 40 MG tablet   3     ranitidine (ZANTAC) 150 MG tablet TAKE 1 TABLET BY MOUTH TWICE DAILY AS NEEDED 180 tablet 0     senna-docusate (SENOKOT-S;PERICOLACE) 8.6-50 MG per tablet Take 1-2 tablets by mouth 2 times daily 120 tablet 3     Simethicone 180 MG CAPS Take 1 capsule by mouth 2 times daily Reported on 5/9/2017 60 capsule 0     venlafaxine (EFFEXOR-XR) 75 MG 24 hr capsule TAKE 3 CAPSULES BY MOUTH DAILY 270 capsule 0     VITAMIN D, CHOLECALCIFEROL, PO Take 2,000 Units by mouth daily       ibuprofen (ADVIL/MOTRIN) 800 MG tablet TAKE 1 TABLET BY MOUTH EVERY 4 HOURS AS NEEDED FOR PAIN 90 tablet 0     OTC products: {OTC ANALGESICS:505947}    Allergies   Allergen Reactions     Novocain [Procaine] Unknown and Rash     Mirtazapine      Nefazodone Unknown and Rash      Latex Allergy: {YES/NO WITH DEFAULT:292862::\"NO\"}    Social History   Substance Use Topics     Smoking " "status: Former Smoker     Types: Cigarettes     Quit date: 6/10/2004     Smokeless tobacco: Never Used     Alcohol use No     History   Drug Use No       REVIEW OF SYSTEMS:   {ROS Preop Choices:290930}    EXAM:   /72 (Cuff Size: Adult Regular)  Pulse 80  Temp 97.5  F (36.4  C) (Temporal)  Resp 20  Wt 168 lb 6.4 oz (76.4 kg)  SpO2 98%  BMI 28.02 kg/m2  {EXAM Preop Choices:603016}    DIAGNOSTICS:   {DIAGNOSTIC FOR PREOP:111224}    Recent Labs   Lab Test  03/14/18   1350  12/13/17   1026   08/29/14   1912   HGB  11.4*  9.3*   < >  10.0*   PLT  225  275   < >  212   INR   --    --    --   1.19*   NA  135  136   < >  134   POTASSIUM  4.0  3.0*   < >  3.9   CR  0.81  0.76   < >  0.69    < > = values in this interval not displayed.        IMPRESSION:   {PREOP REASONS:847442::\"Reason for surgery/procedure: ***\",\"Diagnosis/reason for consult: ***\"}    The proposed surgical procedure is considered {HIGH=major cardiovascular or procedures requiring prolonged anesthesia >4 hours or large fluid shifts;    INTERMEDIATE=abdominal, most orthopedic and intrathoracic surgery; LOW= endoscopy, cataract and breast surgery:009432} risk.    REVISED CARDIAC RISK INDEX  The patient has the following serious cardiovascular risks for perioperative complications such as (MI, PE, VFib and 3  AV Block):  {PREOP REVISED CARDIAC INDEX (RCI):457379:p:\"No serious cardiac risks\"}  INTERPRETATION: {REVISED CARDIAC RISK INTERPRETATION:286154}    The patient has the following additional risks for perioperative complications:  {Additional perioperative risks:267695:p:\"No identified additional risks\"}      ICD-10-CM    1. Preop general physical exam Z01.818    2. Asymptomatic postmenopausal status Z78.0 DEXA HIP/PELVIS/SPINE - Future   3. Need for prophylactic vaccination against Streptococcus pneumoniae (pneumococcus) Z23        RECOMMENDATIONS:     {IMPORTANT - Conditions - complete carefully!!:502185}    {IMPORTANT - " "Medications:232766::\"--Patient is to take all scheduled medications on the day of surgery EXCEPT for modifications listed below.\"}    {IMPORTANT - Approval:815914:p:\"APPROVAL GIVEN to proceed with proposed procedure, without further diagnostic evaluation\"}       Signed Electronically by: MIMI RAMIREZ MD, MD    Copy of this evaluation report is provided to requesting physician.    [facility] Preop Guidelines    Revised Cardiac Risk Index  "

## 2018-07-20 ENCOUNTER — OFFICE VISIT (OUTPATIENT)
Dept: FAMILY MEDICINE | Facility: CLINIC | Age: 78
End: 2018-07-20
Payer: MEDICARE

## 2018-07-20 VITALS
SYSTOLIC BLOOD PRESSURE: 134 MMHG | BODY MASS INDEX: 28.02 KG/M2 | DIASTOLIC BLOOD PRESSURE: 72 MMHG | HEART RATE: 80 BPM | RESPIRATION RATE: 20 BRPM | TEMPERATURE: 97.5 F | WEIGHT: 168.4 LBS | OXYGEN SATURATION: 98 %

## 2018-07-20 DIAGNOSIS — I35.0 NONRHEUMATIC AORTIC VALVE STENOSIS: ICD-10-CM

## 2018-07-20 DIAGNOSIS — F41.1 GAD (GENERALIZED ANXIETY DISORDER): ICD-10-CM

## 2018-07-20 DIAGNOSIS — Z01.818 PREOP GENERAL PHYSICAL EXAM: Primary | ICD-10-CM

## 2018-07-20 DIAGNOSIS — I35.0 MODERATE AORTIC STENOSIS: ICD-10-CM

## 2018-07-20 DIAGNOSIS — D12.6 ADENOMATOUS POLYP OF COLON, UNSPECIFIED PART OF COLON: ICD-10-CM

## 2018-07-20 DIAGNOSIS — M54.9 UPPER BACK PAIN: ICD-10-CM

## 2018-07-20 DIAGNOSIS — I10 HYPERTENSION GOAL BP (BLOOD PRESSURE) < 140/90: ICD-10-CM

## 2018-07-20 DIAGNOSIS — C30.0 MALIGNANT MELANOMA OF NASAL CAVITY (H): ICD-10-CM

## 2018-07-20 PROCEDURE — 99214 OFFICE O/P EST MOD 30 MIN: CPT | Performed by: FAMILY MEDICINE

## 2018-07-20 ASSESSMENT — PAIN SCALES - GENERAL: PAINLEVEL: MODERATE PAIN (5)

## 2018-07-20 NOTE — MR AVS SNAPSHOT
After Visit Summary   7/20/2018    Gabino Jones    MRN: [id number]           Patient Information     Date Of Birth          1940        Visit Information        Provider Department      7/20/2018 2:40 PM Rebekah Boogie MD [facility]        Today's Diagnoses     Preop general physical exam    -  1    Asymptomatic postmenopausal status        Need for prophylactic vaccination against Streptococcus pneumoniae (pneumococcus)        Adenomatous polyp of colon, unspecified part of colon          Care Instructions      Heat or ice to the upper back/shoulders 10-15 minutes at a time, 3 times per day.     Can use tylenol for discomfort.     If not improving in the next 2-3 weeks, I would consider having you see physical therapy.             Before Your Surgery      Call your surgeon if there is any change in your health. This includes signs of a cold or flu (such as a sore throat, runny nose, cough, rash or fever).    Do not smoke, drink alcohol or take over the counter medicine (unless your surgeon or primary care doctor tells you to) for the 24 hours before and after surgery.    If you take prescribed drugs: Follow your doctor s orders about which medicines to take and which to stop until after surgery.    Eating and drinking prior to surgery: follow the instructions from your surgeon    Take a shower or bath the night before surgery. Use the soap your surgeon gave you to gently clean your skin. If you do not have soap from your surgeon, use your regular soap. Do not shave or scrub the surgery site.  Wear clean pajamas and have clean sheets on your bed.           Follow-ups after your visit        Your next 10 appointments already scheduled     Sep 12, 2018  9:15 AM CDT   MR BRAIN W/O & W CONTRAST with [id number]   [facility] MRI ([facility])    [address] [address] [phone]           Take your medicines as  usual, unless your doctor tells you not to. Bring a list of your current medicines to your exam (including vitamins, minerals and over-the-counter drugs).  You may or may not receive intravenous (IV) contrast for this exam pending the discretion of the Radiologist.  You do not need to do anything special to prepare.  The MRI machine uses a strong magnet. Please wear clothes without metal (snaps, zippers). A sweatsuit works well, or we may give you a hospital gown.  Please remove any body piercings and hair extensions before you arrive. You will also remove watches, jewelry, hairpins, wallets, dentures, partial dental plates and hearing aids. You may wear contact lenses, and you may be able to wear your rings. We have a safe place to keep your personal items, but it is safer to leave them at home.  **IMPORTANT** THE INSTRUCTIONS BELOW ARE ONLY FOR THOSE PATIENTS WHO HAVE BEEN PRESCRIBED SEDATION OR GENERAL ANESTHESIA DURING THEIR MRI PROCEDURE:  IF YOUR DOCTOR PRESCRIBED ORAL SEDATION (take medicine to help you relax during your exam):   You must get the medicine from your doctor (oral medication) before you arrive. Bring the medicine to the exam. Do not take it at home. You ll be told when to take it upon arriving for your exam.   Arrive one hour early. Bring someone who can take you home after the test. Your medicine will make you sleepy. After the exam, you may not drive, take a bus or take a taxi by yourself.  IF YOUR DOCTOR PRESCRIBED IV SEDATION:   Arrive one hour early. Bring someone who can take you home after the test. Your medicine will make you sleepy. After the exam, you may not drive, take a bus or take a taxi by yourself.   No eating 6 hours before your exam. You may have clear liquids up until 4 hours before your exam. (Clear liquids include water, clear tea, black coffee and fruit juice without pulp.)  IF YOUR DOCTOR PRESCRIBED ANESTHESIA (be asleep for your exam):   Arrive 1 1/2 hours early. Bring  someone who can take you home after the test. You may not drive, take a bus or take a taxi by yourself.   No eating 8 hours before your exam. You may have clear liquids up until 4 hours before your exam. (Clear liquids include water, clear tea, black coffee and fruit juice without pulp.)   You will spend four to five hours in the recovery room.  Please call the Imaging Department at your exam site with any questions.            Sep 12, 2018 10:00 AM CDT   CT CHEST/ABDOMEN/PELVIS W CONTRAST with UCCT2   [facility] CT ([facility])    [address]  1st [address]  [facility] [address] [phone]           Please bring any scans or X-rays taken at other hospitals, if similar tests were done. Also bring a list of your medicines, including vitamins, minerals and over-the-counter drugs. It is safest to leave personal items at home.  Be sure to tell your doctor:   If you have any allergies.   If there s any chance you are pregnant.   If you are breastfeeding.  How to prepare:   Do not eat or drink for 2 hours before your exam. If you need to take medicine, you may take it with small sips of water. (We may ask you to take liquid medicine as well.)   Please wear loose clothing, such as a sweat suit or jogging clothes. Avoid snaps, zippers and other metal. We may ask you to undress and put on a hospital gown.  Please arrive 30 minutes early for your CT. Once in the department you might be asked to drink water 15-20 minutes prior to your exam.  If indicated you may be asked to drink an oral contrast in advance of your CT.  If this is the case, the imaging team will let you know or be in contact with you prior to your appointment  Patients over 70 or patients with diabetes or kidney problems:   If you haven t had a blood test (creatinine test) within the last 30 days, the Cardiologist/Radiologist may require you to get this test prior to your exam.  If you have diabetes:    Continue to take your metformin medication on the day of your exam  If you have any questions, please call the Imaging Department where you will have your exam.            Sep 12, 2018  1:00 PM CDT   [facility] Lab Draw with  [facility] LAB DRAW   [facility] Lab Draw ([facility])    [address]  [address]  [facility] [address]   [phone]            Sep 12, 2018  1:30 PM CDT   (Arrive by 1:15 PM)   Return Visit with Garett Obregon MD   [facility] ([facility])    [address]  [address]  [facility] [address]   [phone]            Mar 07, 2019  1:00 PM CST   (Arrive by 12:45 PM)   Return Visit with Yolanda Whitaker MD   [facility] ([facility])    [address]  4th [address]  [place] [address]   [phone]            Apr 30, 2019  1:30 PM CDT   Ech Complete with [id number], MG ECHO TECH   [email or web address] ([facility] [[email or web address]    [address] [place] [address] [phone]           1.  Please bring or wear a comfortable two-piece outfit. 2.  You may eat, drink and take your normal medicines. 3.  For any questions that cannot be answered, please contact the ordering physician 4.  Please do not wear perfumes or scented lotions on the day of your exam.            May 06, 2019  2:10 PM CDT   Return Visit with Blake Hobbs MD   [email or web address] ([facility] [[email or web address]    [address] [place] [address] [phone]              Who to contact     If you have questions or need follow up information about today's clinic visit or your schedule please contact [facility] [place] directly at [phone].  Normal or non-critical lab and imaging results will be communicated to you by MyChart, letter or phone within 4 business days after the clinic has received the  results. If you do not hear from us within 7 days, please contact the clinic through [employer] or phone. If you have a critical or abnormal lab result, we will notify you by phone as soon as possible.  Submit refill requests through [employer] or call your pharmacy and they will forward the refill request to us. Please allow 3 business days for your refill to be completed.          Additional Information About Your Visit        [employer] Information     [employer] gives you secure access to your electronic health record. If you see a primary care provider, you can also send messages to your care team and make appointments. If you have questions, please call your primary care clinic.  If you do not have a primary care provider, please call [phone] and they will assist you.        Care EveryWhere ID     This is your Care EveryWhere ID. This could be used by other organizations to access your [place] medical records  [id number]        Your Vitals Were     Pulse Temperature Respirations Pulse Oximetry BMI (Body Mass Index)       80 97.5  F (36.4  C) (Temporal) 20 98% 28.02 kg/m2        Blood Pressure from Last 3 Encounters:   07/20/18 134/72   04/30/18 137/76   03/14/18 131/75    Weight from Last 3 Encounters:   07/20/18 168 lb 6.4 oz (76.4 kg)   07/05/18 175 lb (79.4 kg)   04/30/18 177 lb 14.4 oz (80.7 kg)              Today, you had the following     No orders found for display       Primary Care Provider Office Phone # Fax #    Rebekah Boogie [phone] [address] [facility] [address]        Equal Access to Services     [facility]: Hadii aad ku hadasho Soomaali, waaxda luqadaha, qaybta kaalmada adeegyada, vito dubon. So [facility] [phone].    ATENCIÓN: Si habla español, tiene a velazquez disposición servicios gratuitos de asistencia lingüística. Llame al [phone].    We comply with applicable federal civil rights laws and [place] laws. We do not  discriminate on the basis of race, color, national origin, age, disability, sex, sexual orientation, or gender identity.            Thank you!     Thank you for choosing [facility]  for your care. Our goal is always to provide you with excellent care. Hearing back from our patients is one way we can continue to improve our services. Please take a few minutes to complete the written survey that you may receive in the mail after your visit with us. Thank you!             Your Updated Medication List - Protect others around you: Learn how to safely use, store and throw away your medicines at www.disposemymeds.org.          This list is accurate as of 7/20/18  3:33 PM.  Always use your most recent med list.                   Brand Name Dispense Instructions for use Diagnosis    acetaminophen 500 MG tablet    TYLENOL    100 tablet    Take 1 tablet (500 mg) by mouth every 6 hours as needed    Malignant melanoma of nasal cavity (H)       atorvastatin 20 MG tablet    LIPITOR    90 tablet    Take 1 tablet (20 mg) by mouth daily    Hypercholesteremia       buPROPion 150 MG 24 hr tablet    WELLBUTRIN XL    30 tablet    TAKE 1 TABLET(150 MG) BY MOUTH EVERY MORNING    JITENDRA (generalized anxiety disorder)       GuaiFENesin 100 MG Pack     168 each    Take 1 tablet by mouth 2 times daily as needed    Cough       hydrochlorothiazide 12.5 MG capsule    MICROZIDE    90 capsule    Take 1 capsule (12.5 mg) by mouth daily    Hypertension goal BP (blood pressure) < 140/90       hydrOXYzine 25 MG tablet    ATARAX    30 tablet    TAKE 1 TABLET(25 MG) BY MOUTH EVERY 6 HOURS AS NEEDED FOR ANXIETY    JITENDRA (generalized anxiety disorder)       * ibuprofen 800 MG tablet    ADVIL/MOTRIN    90 tablet    Take 1 tablet (800 mg) by mouth every 4 hours as needed for moderate pain Reported on 4/17/2017    Myalgia       * ibuprofen 800 MG tablet    ADVIL/MOTRIN    90 tablet    TAKE 1 TABLET BY MOUTH EVERY 4 HOURS AS NEEDED FOR PAIN    Myalgia        lisinopril 30 MG tablet    PRINIVIL,ZESTRIL    90 tablet    Take 1 tablet (30 mg) by mouth daily    Hypertension goal BP (blood pressure) < 140/90       pantoprazole 40 MG EC tablet    PROTONIX    90 tablet    Take 1 tablet (40 mg) by mouth daily Take 30-60 minutes before a meal.    Gastroesophageal reflux disease without esophagitis       potassium chloride 10 MEQ CR capsule    MICRO-K    180 capsule    Take 2 capsules (20 mEq) by mouth daily    Hypokalemia       pravastatin 40 MG tablet    PRAVACHOL          ranitidine 150 MG tablet    ZANTAC    180 tablet    TAKE 1 TABLET BY MOUTH TWICE DAILY AS NEEDED    Gastroesophageal reflux disease without esophagitis       senna-docusate 8.6-50 MG per tablet    SENOKOT-S;PERICOLACE    120 tablet    Take 1-2 tablets by mouth 2 times daily    Flatulence, eructation, and gas pain       Simethicone 180 MG Caps     60 capsule    Take 1 capsule by mouth 2 times daily Reported on 5/9/2017    Flatulence, eructation, and gas pain       venlafaxine 75 MG 24 hr capsule    EFFEXOR-XR    270 capsule    TAKE 3 CAPSULES BY MOUTH DAILY    JITENDRA (generalized anxiety disorder)       VITAMIN D (CHOLECALCIFEROL) PO      Take 2,000 Units by mouth daily    Malignant melanoma of nasal cavity (H)       * Notice:  This list has 2 medication(s) that are the same as other medications prescribed for you. Read the directions carefully, and ask your doctor or other care provider to review them with you.

## 2018-07-22 NOTE — PROGRESS NOTES
[facility] [place]  [address]  [place] [address]  [phone]  Dept: [phone]    PRE-OP EVALUATION:  Today's date: 2018    Gabino Jones (: 1940) presents for pre-operative evaluation assessment as requested by Dr. Juan Reid.  She requires evaluation and anesthesia risk assessment prior to undergoing surgery/procedure for treatment of ESD Lower .    Proposed Surgery/ Procedure: ESD Lower  Date of Surgery/ Procedure: 18  Time of Surgery/ Procedure: 09  Hospital/Surgical Facility: [facility]  Fax number for surgical facility: [phone]  Primary Physician: Rebekah Boogie  Type of Anesthesia Anticipated: sedation or MAC    Patient has a Health Care Directive or Living Will:  NO    1. NO - Do you have a history of heart attack, stroke, stent, bypass or surgery on an artery in the head, neck, heart or legs?  2. NO - Do you ever have any pain or discomfort in your chest?  3. NO - Do you have a history of  Heart Failure?  4. YES - ARE YOUR TROUBLED BY SHORTNESS OF BREATH WHEN WALKING ON THE LEVEL, UP A SLIGHT HILL OR AT NIGHT? Known aortic stenosis. She reports that this is stable.   5. NO - Do you currently have a cold, bronchitis or other respiratory infection?  6. NO - DO YOU HAVE A COUGH, SHORTNESS OF BREATH OR WHEEZING? Patient denies any issues.   7. NO - Do you sometimes get pains in the calves of your legs when you walk?  8. NO - Do you or anyone in your family have previous history of blood clots?  9. NO - DO YOU OR DOES ANYONE IN YOUR FAMILY HAVE A SERIOUS BLEEDING PROBLEM SUCH AS PROLONGED BLEEDING FOLLOWING SURGERIES OR CUTS? Patient cannot remember any bleeding issues.   10. NO - Have you ever had problems with anemia or been told to take iron pills?  11. NO - Have you had any abnormal blood loss such as black, tarry or bloody stools, or abnormal vaginal bleeding?  12. NO - Have you ever had a blood transfusion?  13. NO - Have you or  any of your relatives ever had problems with anesthesia?  14. NO - Do you have sleep apnea, excessive snoring or daytime drowsiness?  15. NO - Do you have any prosthetic heart valves?  16. NO - Do you have prosthetic joints?  17. NO - Is there any chance that you may be pregnant?      HPI:     HPI related to upcoming procedure: patient with history of large polyps. She reports having another large polyp that needs to be removed. This will be done at [facility].      Aortic stenosis.  Patient has Class II-III dyspnea of unknown etiology.  This has been stable for the past year.  ECHO (May 2016) showed mild to moderate aortic insufficiency, and mild aortic stenosis.   Repeat echo in the next few months.    DEPRESSION - Patient has a long history of Depression of moderate severity requiring medication for control with recent symptoms being stable..Current symptoms of depression include none.                                                                                                                                                                                    .  HYPERTENSION - Patient has longstanding history of HTN , currently denies any symptoms referable to elevated blood pressure. Specifically denies chest pain, palpitations, dyspnea, orthopnea, PND or peripheral edema. Blood pressure readings have been in normal range. Current medication regimen is as listed below. Patient denies any side effects of medication.        UPPER BACK PAIN- patient with upper back pain that comes and goes. No history of injury. No falls. Has tried some Icy Hot and that has not had significant relief.  No numbness or tingling. No neck pain. No weakness.                                                                                                                                                                            .    MEDICAL HISTORY:     Patient Active Problem List    Diagnosis Date Noted     Mild recurrent  major depression (H) 09/26/2017     Priority: Medium     Tubular adenoma of colon 09/26/2017     Priority: Medium     Mixed hyperlipidemia 07/06/2017     Priority: Medium     JITENDRA (generalized anxiety disorder) 05/23/2017     Priority: Medium     Skull lesion 02/22/2017     Priority: Medium     Hypertension goal BP (blood pressure) < 140/90 02/20/2017     Priority: Medium     Aortic stenosis 04/25/2016     Priority: Medium     Abnormal chest CT 04/22/2016     Priority: Medium     Proximal humerus fracture 08/29/2014     Priority: Medium     Melanoma (H) 06/23/2014     Priority: Medium     Into sinus cavity and 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     Priority: Medium     Nasal pain 06/05/2014     Priority: Medium      Past Medical History:   Diagnosis Date     Cancer (H)      Chronic back pain      Chronic leg pain      Depression      Hearing loss      Heart murmur      Hyperlipidemia      Hypertension      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     Ringing in ears      Past Surgical History:   Procedure Laterality Date     C ANESTH,CERV SPINE, SITTING POSITION       COLONOSCOPY N/A 7/7/2017    Procedure: COMBINED COLONOSCOPY, SINGLE OR MULTIPLE BIOPSY/POLYPECTOMY BY BIOPSY;;  Surgeon: Sathya Norman MD;  Location: [facility] OR     COLONOSCOPY WITH CO2 INSUFFLATION N/A 7/7/2017    Procedure: COLONOSCOPY WITH CO2 INSUFFLATION;  Combined,  [place], Gastroesophageal reflux disease without esophagitis  Flatulence, eructation, and gas pain, BMI 29.35, [facility] [phone];  Surgeon: Sathya Norman MD;  Location: [facility] OR     COLONOSCOPY, SUBMUCOSA RESECTION N/A 10/4/2017    Procedure: COLONOSCOPY, SUBMUCOSA RESECTION;  Colonoscopy With Endoscopic Polypectomy with clips;  Surgeon: Milton Javier MD;  Location: [facility] OR     COMBINED ESOPHAGOSCOPY, GASTROSCOPY, DUODENOSCOPY (EGD) WITH CO2 INSUFFLATION N/A 7/7/2017    Procedure: COMBINED ESOPHAGOSCOPY, GASTROSCOPY,  DUODENOSCOPY (EGD) WITH CO2 INSUFFLATION;  Combined,  [place], Gastroesophageal reflux disease without esophagitis  Flatulence, eructation, and gas pain, BMI 29.35, [facility] [phone];  Surgeon: Sathya Norman MD;  Location: [facility] OR     ESOPHAGOSCOPY, GASTROSCOPY, DUODENOSCOPY (EGD), COMBINED N/A 7/7/2017    Procedure: COMBINED ESOPHAGOSCOPY, GASTROSCOPY, DUODENOSCOPY (EGD), BIOPSY SINGLE OR MULTIPLE;;  Surgeon: Sathya Norman MD;  Location: [facility] OR     GALLBLADDER SURGERY       NECK SURGERY       OPTICAL TRACKING SYSTEM CRANIOTOMY, EXCISE TUMOR, COMBINED Right 2/22/2017    Procedure: COMBINED OPTICAL TRACKING SYSTEM CRANIOTOMY, EXCISE TUMOR;  Surgeon: Lenora Pérez MD;  Location: [facility] OR     OPTICAL TRACKING SYSTEM ENDOSCOPIC ENDONASAL SURGERY  6/23/2014    Procedure: OPTICAL TRACKING SYSTEM ENDOSCOPIC ENDONASAL SURGERY;  Surgeon: Yolanda Whitaker MD;  Location: [facility] OR     STOMACH SURGERY       TONSILLECTOMY       TUBAL LIGATION      1975     Current Outpatient Prescriptions   Medication Sig Dispense Refill     acetaminophen (TYLENOL) 500 MG tablet Take 1 tablet (500 mg) by mouth every 6 hours as needed 100 tablet 3     atorvastatin (LIPITOR) 20 MG tablet Take 1 tablet (20 mg) by mouth daily 90 tablet 3     buPROPion (WELLBUTRIN XL) 150 MG 24 hr tablet TAKE 1 TABLET(150 MG) BY MOUTH EVERY MORNING 30 tablet 0     GuaiFENesin 100 MG PACK Take 1 tablet by mouth 2 times daily as needed 168 each 3     hydrochlorothiazide (MICROZIDE) 12.5 MG capsule Take 1 capsule (12.5 mg) by mouth daily 90 capsule 0     hydrOXYzine (ATARAX) 25 MG tablet TAKE 1 TABLET(25 MG) BY MOUTH EVERY 6 HOURS AS NEEDED FOR ANXIETY 30 tablet 0     ibuprofen (ADVIL/MOTRIN) 800 MG tablet Take 1 tablet (800 mg) by mouth every 4 hours as needed for moderate pain Reported on 4/17/2017 90 tablet 1     lisinopril (PRINIVIL,ZESTRIL) 30 MG tablet Take 1 tablet (30 mg) by mouth daily 90 tablet 0     pantoprazole  (PROTONIX) 40 MG EC tablet Take 1 tablet (40 mg) by mouth daily Take 30-60 minutes before a meal. 90 tablet 1     potassium chloride (MICRO-K) 10 MEQ CR capsule Take 2 capsules (20 mEq) by mouth daily 180 capsule 1     pravastatin (PRAVACHOL) 40 MG tablet   3     ranitidine (ZANTAC) 150 MG tablet TAKE 1 TABLET BY MOUTH TWICE DAILY AS NEEDED 180 tablet 0     senna-docusate (SENOKOT-S;PERICOLACE) 8.6-50 MG per tablet Take 1-2 tablets by mouth 2 times daily 120 tablet 3     Simethicone 180 MG CAPS Take 1 capsule by mouth 2 times daily Reported on 5/9/2017 60 capsule 0     venlafaxine (EFFEXOR-XR) 75 MG 24 hr capsule TAKE 3 CAPSULES BY MOUTH DAILY 270 capsule 0     VITAMIN D, CHOLECALCIFEROL, PO Take 2,000 Units by mouth daily       ibuprofen (ADVIL/MOTRIN) 800 MG tablet TAKE 1 TABLET BY MOUTH EVERY 4 HOURS AS NEEDED FOR PAIN 90 tablet 0     OTC products: None, except as noted above    Allergies   Allergen Reactions     Novocain [Procaine] Unknown and Rash     Mirtazapine      Nefazodone Unknown and Rash      Latex Allergy: NO    Social History   Substance Use Topics     Smoking status: Former Smoker     Types: Cigarettes     Quit date: 6/10/2004     Smokeless tobacco: Never Used     Alcohol use No     History   Drug Use No       REVIEW OF SYSTEMS:   CONSTITUTIONAL: NEGATIVE for fever, chills, change in weight  INTEGUMENTARY/SKIN: NEGATIVE for worrisome rashes, moles or lesions  EYES: NEGATIVE for vision changes or irritation  RESP: NEGATIVE for significant cough or SOB  CV: NEGATIVE for chest pain, palpitations or peripheral edema  GI: as above. No changes.   MUSCULOSKELETAL: NEGATIVE for significant arthralgias or myalgia  NEURO: NEGATIVE for weakness, dizziness or paresthesias  ENDOCRINE: NEGATIVE for temperature intolerance, skin/hair changes  HEME: NEGATIVE for bleeding problems  PSYCHIATRIC: NEGATIVE for changes in mood or affect    EXAM:   /72 (Cuff Size: Adult Regular)  Pulse 80  Temp 97.5  F (36.4  C)  (Temporal)  Resp 20  Wt 168 lb 6.4 oz (76.4 kg)  SpO2 98%  BMI 28.02 kg/m2    GENERAL APPEARANCE: alert and no distress     EYES: EOMI, PERRL     HENT: throat is clear, Mucous membranes are moist. TMs clear bilaterally.      NECK: no adenopathy, no asymmetry, masses, or scars and thyroid normal to palpation     RESP: lungs clear to auscultation - no rales, rhonchi or wheezes     CV: regular rates and rhythm and grade 2/6 systolic murmur heard best over the RUSB.      ABDOMEN:  soft, nontender, no HSM or masses and bowel sounds normal     MS: extremities normal- no gross deformities noted, no evidence of inflammation in joints, FROM in all extremities.     SKIN: no suspicious lesions or rashes     NEURO: Normal strength and tone, sensory exam grossly normal, mentation intact and speech normal     PSYCH: mentation appears normal. and affect normal/bright     LYMPHATICS: No cervical adenopathy    DIAGNOSTICS:   No labs or EKG required for low risk surgery (cataract, skin procedure, breast biopsy, etc)    Recent Labs   Lab Test  03/14/18   1350  12/13/17   1026   08/29/14   1912   HGB  11.4*  9.3*   < >  10.0*   PLT  225  275   < >  212   INR   --    --    --   1.19*   NA  135  136   < >  134   POTASSIUM  4.0  3.0*   < >  3.9   CR  0.81  0.76   < >  0.69    < > = values in this interval not displayed.        IMPRESSION:   Reason for surgery/procedure: adenomatous polyps  Diagnosis/reason for consult: moderate aortic stenosis, hypertension, depression/anxiety, malignant melanoma of the nasal cavity.     The proposed surgical procedure is considered LOW risk.    REVISED CARDIAC RISK INDEX  The patient has the following serious cardiovascular risks for perioperative complications such as (MI, PE, VFib and 3  AV Block):  No serious cardiac risks  INTERPRETATION: 0 risks: Class I (very low risk - 0.4% complication rate)    The patient has the following additional risks for perioperative complications:  No identified  additional risks      ICD-10-CM    1. Preop general physical exam Z01.818    2. Adenomatous polyp of colon, unspecified part of colon D12.6    3. 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 Hypertension goal BP (blood pressure) < 140/90 I10    5. JITENDRA (generalized anxiety disorder) F41.1    6. Moderate aortic stenosis I35.0    7. Nonrheumatic aortic valve stenosis I35.0    8. Upper back pain M54.9      Patient Instructions     Heat or ice to the upper back/shoulders 10-15 minutes at a time, 3 times per day.     Can use tylenol for discomfort.     If not improving in the next 2-3 weeks, I would consider having you see physical therapy.          RECOMMENDATIONS:       Cardiovascular Risk  Performs 4 METs exercise without symptoms (Light housework (dusting, washing dishes) and Climb a flight of stairs) .       --Patient is to take all scheduled medications on the day of surgery EXCEPT for modifications listed below.    APPROVAL GIVEN to proceed with proposed procedure, without further diagnostic evaluation       Signed Electronically by: MIMI RAMIREZ MD, MD    Copy of this evaluation report is provided to requesting physician.    [place] Preop Guidelines    Revised Cardiac Risk Index

## 2018-08-06 DIAGNOSIS — F41.1 GAD (GENERALIZED ANXIETY DISORDER): ICD-10-CM

## 2018-08-06 DIAGNOSIS — K21.9 GASTROESOPHAGEAL REFLUX DISEASE WITHOUT ESOPHAGITIS: ICD-10-CM

## 2018-08-06 DIAGNOSIS — E87.6 HYPOKALEMIA: ICD-10-CM

## 2018-08-07 RX ORDER — PANTOPRAZOLE SODIUM 40 MG/1
40 TABLET, DELAYED RELEASE ORAL DAILY
Qty: 90 TABLET | Refills: 1 | Status: SHIPPED | OUTPATIENT
Start: 2018-08-07 | End: 2019-01-01

## 2018-08-07 RX ORDER — BUPROPION HYDROCHLORIDE 150 MG/1
TABLET ORAL
Qty: 90 TABLET | Refills: 1 | Status: SHIPPED | OUTPATIENT
Start: 2018-08-07 | End: 2019-02-25

## 2018-08-07 RX ORDER — POTASSIUM CHLORIDE 750 MG/1
20 CAPSULE, EXTENDED RELEASE ORAL DAILY
Qty: 180 CAPSULE | Refills: 1 | Status: ON HOLD | OUTPATIENT
Start: 2018-08-07 | End: 2019-01-21

## 2018-08-07 NOTE — TELEPHONE ENCOUNTER
Bupropion    Prescription approved per [facility] Refill Protocol.    Pantoprazole    Prescription approved per [facility] Refill Protocol.    Potassium Chloride    Prescription approved per [facility] Refill Protocol.    Nhi Llanos, RN, BSN

## 2018-08-22 DIAGNOSIS — F41.1 GAD (GENERALIZED ANXIETY DISORDER): ICD-10-CM

## 2018-08-23 RX ORDER — HYDROXYZINE HYDROCHLORIDE 25 MG/1
TABLET, FILM COATED ORAL
Qty: 30 TABLET | Refills: 5 | Status: ON HOLD | OUTPATIENT
Start: 2018-08-23 | End: 2019-01-21

## 2018-08-23 NOTE — TELEPHONE ENCOUNTER
Hydroxyzine:  Prescription approved per [facility] Refill Protocol.    Kay Shine, RN, BSN

## 2018-08-24 ENCOUNTER — TELEPHONE (OUTPATIENT)
Dept: FAMILY MEDICINE | Facility: CLINIC | Age: 78
End: 2018-08-24

## 2018-08-24 NOTE — TELEPHONE ENCOUNTER
Summary:    Patient is due/failing the following:   PHQ9    Action needed:   Patient needs to do PHQ9.    Type of outreach:    Sent [place] message.    Questions for provider review:    None                                                                                                                                    Euniceannamaria Miller       Chart routed to Care Team .          Panel Management Review      Patient has the following on her problem list:     Depression / Dysthymia review    Measure:  Needs PHQ-9 score of 4 or less during index window.  Administer PHQ-9 and if score is 5 or more, send encounter to provider for next steps.        PHQ-9 SCORE 5/9/2017 8/1/2017 12/29/2017   Total Score MyChart - 0 12 (Moderate depression)   Total Score 8 0 12       If PHQ-9 recheck is 5 or more, route to provider for next steps.    Patient is due for:  PHQ9    Hypertension   Last three blood pressure readings:  BP Readings from Last 3 Encounters:   07/20/18 134/72   04/30/18 137/76   03/14/18 131/75     Blood pressure: Passed    HTN Guidelines:  Age 18-59 BP range:  Less than 140/90  Age 60-85 with Diabetes:  Less than 140/90  Age 60-85 without Diabetes:  less than 150/90      Composite cancer screening  Chart review shows that this patient is due/due soon for the following None

## 2018-08-27 DIAGNOSIS — F41.1 GAD (GENERALIZED ANXIETY DISORDER): ICD-10-CM

## 2018-08-28 RX ORDER — VENLAFAXINE HYDROCHLORIDE 75 MG/1
CAPSULE, EXTENDED RELEASE ORAL
Qty: 90 CAPSULE | Refills: 0 | Status: SHIPPED | OUTPATIENT
Start: 2018-08-28 | End: 2018-09-28

## 2018-08-28 NOTE — TELEPHONE ENCOUNTER
"Requested Prescriptions   Pending Prescriptions Disp Refills     venlafaxine (EFFEXOR-XR) 75 MG 24 hr capsule 270 capsule 0     Sig: TAKE 3 CAPSULES BY MOUTH DAILY    Serotonin-Norepinephrine Reuptake Inhibitors  Passed    8/27/2018  1:26 PM       Passed - Blood pressure under 140/90 in past 12 months    BP Readings from Last 3 Encounters:   07/20/18 134/72   04/30/18 137/76   03/14/18 131/75          Passed - Recent (12 mo) or future (30 days) visit within the authorizing provider's specialty    Patient had office visit in the last 12 months or has a visit in the next 30 days with authorizing provider or within the authorizing provider's specialty.  See \"Patient Info\" tab in inbasket, or \"Choose Columns\" in Meds & Orders section of the refill encounter.    JITENDRA-7 SCORE 5/9/2017 8/1/2017 12/29/2017   Total Score -  (minimal anxiety) 8 (mild anxiety)   Total Score 8 - 8     PHQ-9 SCORE 5/9/2017 8/1/2017 12/29/2017   Total Score MyChart - 0 12 (Moderate depression)   Total Score 8 0 12          Passed - Patient is age 18 or older       Passed - No active pregnancy on record       Passed - Normal serum creatinine on file in past 12 months    Recent Labs   Lab Test  03/14/18   1350  03/14/18   1155   09/22/15   1341   CR  0.81   --    < >   --    CREAT   --   0.9   < >   --    CRPOC   --    --    --   0.8    < > = values in this interval not displayed.          Passed - No positive pregnancy test in past 12 months        venlafaxine (EFFEXOR-XR) 75 MG 24 hr capsule  Medication is being filled for 1 time refill only due to:  Patient needs to be seen because over due for follow up.   Please assist with scheduling.    Giulia Whitley, RN, BSN           "

## 2018-08-29 DIAGNOSIS — F41.1 GAD (GENERALIZED ANXIETY DISORDER): ICD-10-CM

## 2018-08-29 RX ORDER — VENLAFAXINE HYDROCHLORIDE 75 MG/1
CAPSULE, EXTENDED RELEASE ORAL
Qty: 90 CAPSULE | Refills: 0 | Status: CANCELLED | OUTPATIENT
Start: 2018-08-29

## 2018-08-30 NOTE — TELEPHONE ENCOUNTER
"Requested Prescriptions   Pending Prescriptions Disp Refills     venlafaxine (EFFEXOR-XR) 75 MG 24 hr capsule 90 capsule 0     Sig: TAKE 3 CAPSULES BY MOUTH DAILY    Serotonin-Norepinephrine Reuptake Inhibitors  Passed    8/29/2018  2:47 PM  PHQ-9 SCORE 5/9/2017 8/1/2017 12/29/2017   Total Score MyChart - 0 12 (Moderate depression)   Total Score 8 0 12          Passed - Blood pressure under 140/90 in past 12 months    BP Readings from Last 3 Encounters:   07/20/18 134/72   04/30/18 137/76   03/14/18 131/75          Passed - Recent (12 mo) or future (30 days) visit within the authorizing provider's specialty    Patient had office visit in the last 12 months or has a visit in the next 30 days with authorizing provider or within the authorizing provider's specialty.  See \"Patient Info\" tab in inbasket, or \"Choose Columns\" in Meds & Orders section of the refill encounter.         Passed - Patient is age 18 or older       Passed - No active pregnancy on record       Passed - Normal serum creatinine on file in past 12 months    Recent Labs   Lab Test  03/14/18   1350  03/14/18   1155   09/22/15   1341   CR  0.81   --    < >   --    CREAT   --   0.9   < >   --    CRPOC   --    --    --   0.8    < > = values in this interval not displayed.          Passed - No positive pregnancy test in past 12 months        venlafaxine (EFFEXOR-XR) 75 MG 24 hr capsule  Rx was sent 08/28/2018 for 90 tabs and 0 refills.   Pharmacy notified via E-prescribe refusal.  Giulia Wihtley, RN, BSN       "

## 2018-09-12 ENCOUNTER — RADIANT APPOINTMENT (OUTPATIENT)
Dept: CT IMAGING | Facility: CLINIC | Age: 78
End: 2018-09-12
Attending: INTERNAL MEDICINE
Payer: MEDICARE

## 2018-09-12 ENCOUNTER — APPOINTMENT (OUTPATIENT)
Dept: LAB | Facility: CLINIC | Age: 78
End: 2018-09-12
Attending: INTERNAL MEDICINE
Payer: MEDICARE

## 2018-09-12 ENCOUNTER — ONCOLOGY VISIT (OUTPATIENT)
Dept: ONCOLOGY | Facility: CLINIC | Age: 78
End: 2018-09-12
Attending: INTERNAL MEDICINE
Payer: MEDICARE

## 2018-09-12 ENCOUNTER — RADIANT APPOINTMENT (OUTPATIENT)
Dept: MRI IMAGING | Facility: CLINIC | Age: 78
End: 2018-09-12
Attending: INTERNAL MEDICINE
Payer: MEDICARE

## 2018-09-12 VITALS
DIASTOLIC BLOOD PRESSURE: 70 MMHG | BODY MASS INDEX: 29.61 KG/M2 | OXYGEN SATURATION: 100 % | HEART RATE: 78 BPM | SYSTOLIC BLOOD PRESSURE: 148 MMHG | RESPIRATION RATE: 18 BRPM | WEIGHT: 177.91 LBS | TEMPERATURE: 97.8 F

## 2018-09-12 DIAGNOSIS — C30.0 MALIGNANT MELANOMA OF NASAL CAVITY (H): ICD-10-CM

## 2018-09-12 LAB
ALBUMIN SERPL-MCNC: 3.3 G/DL (ref 3.4–5)
ALP SERPL-CCNC: 107 U/L (ref 40–150)
ALT SERPL W P-5'-P-CCNC: 19 U/L (ref 0–50)
ANION GAP SERPL CALCULATED.3IONS-SCNC: 8 MMOL/L (ref 3–14)
AST SERPL W P-5'-P-CCNC: 20 U/L (ref 0–45)
BASOPHILS # BLD AUTO: 0.1 10E9/L (ref 0–0.2)
BASOPHILS NFR BLD AUTO: 0.6 %
BILIRUB SERPL-MCNC: 0.4 MG/DL (ref 0.2–1.3)
BUN SERPL-MCNC: 12 MG/DL (ref 7–30)
CALCIUM SERPL-MCNC: 8.9 MG/DL (ref 8.5–10.1)
CHLORIDE SERPL-SCNC: 96 MMOL/L (ref 94–109)
CO2 SERPL-SCNC: 25 MMOL/L (ref 20–32)
CREAT BLD-MCNC: 0.7 MG/DL (ref 0.52–1.04)
CREAT SERPL-MCNC: 0.76 MG/DL (ref 0.52–1.04)
DIFFERENTIAL METHOD BLD: ABNORMAL
EOSINOPHIL # BLD AUTO: 0.2 10E9/L (ref 0–0.7)
EOSINOPHIL NFR BLD AUTO: 1.9 %
ERYTHROCYTE [DISTWIDTH] IN BLOOD BY AUTOMATED COUNT: 14.4 % (ref 10–15)
GFR SERPL CREATININE-BSD FRML MDRD: 73 ML/MIN/1.7M2
GFR SERPL CREATININE-BSD FRML MDRD: 81 ML/MIN/1.7M2
GLUCOSE SERPL-MCNC: 93 MG/DL (ref 70–99)
HCT VFR BLD AUTO: 33.5 % (ref 35–47)
HGB BLD-MCNC: 10.5 G/DL (ref 11.7–15.7)
IMM GRANULOCYTES # BLD: 0.1 10E9/L (ref 0–0.4)
IMM GRANULOCYTES NFR BLD: 0.7 %
LDH SERPL L TO P-CCNC: 182 U/L (ref 81–234)
LYMPHOCYTES # BLD AUTO: 1.1 10E9/L (ref 0.8–5.3)
LYMPHOCYTES NFR BLD AUTO: 11.3 %
MCH RBC QN AUTO: 26.3 PG (ref 26.5–33)
MCHC RBC AUTO-ENTMCNC: 31.3 G/DL (ref 31.5–36.5)
MCV RBC AUTO: 84 FL (ref 78–100)
MONOCYTES # BLD AUTO: 0.7 10E9/L (ref 0–1.3)
MONOCYTES NFR BLD AUTO: 7.8 %
NEUTROPHILS # BLD AUTO: 7.4 10E9/L (ref 1.6–8.3)
NEUTROPHILS NFR BLD AUTO: 77.7 %
NRBC # BLD AUTO: 0 10*3/UL
NRBC BLD AUTO-RTO: 0 /100
PLATELET # BLD AUTO: 339 10E9/L (ref 150–450)
POTASSIUM SERPL-SCNC: 3.9 MMOL/L (ref 3.4–5.3)
PROT SERPL-MCNC: 7.4 G/DL (ref 6.8–8.8)
RBC # BLD AUTO: 4 10E12/L (ref 3.8–5.2)
SODIUM SERPL-SCNC: 128 MMOL/L (ref 133–144)
WBC # BLD AUTO: 9.5 10E9/L (ref 4–11)

## 2018-09-12 PROCEDURE — G0463 HOSPITAL OUTPT CLINIC VISIT: HCPCS | Mod: ZF

## 2018-09-12 PROCEDURE — 80053 COMPREHEN METABOLIC PANEL: CPT | Performed by: INTERNAL MEDICINE

## 2018-09-12 PROCEDURE — 85025 COMPLETE CBC W/AUTO DIFF WBC: CPT | Performed by: INTERNAL MEDICINE

## 2018-09-12 PROCEDURE — 99213 OFFICE O/P EST LOW 20 MIN: CPT | Mod: ZP | Performed by: INTERNAL MEDICINE

## 2018-09-12 PROCEDURE — 83615 LACTATE (LD) (LDH) ENZYME: CPT | Performed by: INTERNAL MEDICINE

## 2018-09-12 PROCEDURE — 36592 COLLECT BLOOD FROM PICC: CPT

## 2018-09-12 RX ORDER — GADOBUTROL 604.72 MG/ML
7.5 INJECTION INTRAVENOUS ONCE
Status: COMPLETED | OUTPATIENT
Start: 2018-09-12 | End: 2018-09-12

## 2018-09-12 RX ORDER — IOPAMIDOL 755 MG/ML
103 INJECTION, SOLUTION INTRAVASCULAR ONCE
Status: COMPLETED | OUTPATIENT
Start: 2018-09-12 | End: 2018-09-12

## 2018-09-12 RX ORDER — LEVOFLOXACIN 750 MG/1
750 TABLET, FILM COATED ORAL DAILY
Qty: 10 TABLET | Refills: 0 | Status: SHIPPED | OUTPATIENT
Start: 2018-09-12 | End: 2018-12-12

## 2018-09-12 RX ADMIN — GADOBUTROL 7.5 ML: 604.72 INJECTION INTRAVENOUS at 09:10

## 2018-09-12 RX ADMIN — IOPAMIDOL 103 ML: 755 INJECTION, SOLUTION INTRAVASCULAR at 10:19

## 2018-09-12 ASSESSMENT — PAIN SCALES - GENERAL: PAINLEVEL: NO PAIN (0)

## 2018-09-12 NOTE — DISCHARGE INSTRUCTIONS
MRI Contrast Discharge Instructions    The IV contrast you received today will pass out of your body in your  urine. This will happen in the next 24 hours. You will not feel this process.  Your urine will not change color.    Drink at least 4 extra glasses of water or juice today (unless your doctor  has restricted your fluids). This reduces the stress on your kidneys.  You may take your regular medicines.    If you are on dialysis: It is best to have dialysis today.    If you have a reaction: Most reactions happen right away. If you have  any new symptoms after leaving the hospital (such as hives or swelling),  call your hospital at the correct number below. Or call your family doctor.  If you have breathing distress or wheezing, call 911.    Special instructions: ***    I have read and understand the above information.    Signature:______________________________________ Date:___________    Staff:__________________________________________ Date:___________     Time:__________    [facility] Departments:    ___[place]: [phone]  ___[facility]: [phone]  ___[place]: [phone] ___[facility]: [phone]  ___[facility]: [phone]  ___[facility]: [phone]  ___[phone]  ___[facility]: [phone]  ___[phone]

## 2018-09-12 NOTE — LETTER
9/12/2018       RE: Gabino Jones  [address]  [facility] [address]     Dear Colleague,    Thank you for referring your patient, Gabino Jones, to the [facility]. Please see a copy of my visit note below.    [facility]  FOLLOW-UP VISIT NOTE    PATIENT NAME: Gabino Jones MRN # [id number]  DATE OF VISIT: Sep 12, 2018 YOB: 1940    REFERRING PROVIDER: Yolanda Whitaker MD   PHYSICIANS  [address] [facility]  [place] [address]    CANCER TYPE: Malignant Melanoma  STAGE:   ECOG PS: 1    TREATMENT SUMMARY:  Gabino presented with difficulty to breathe for previous 6-7 months due to partial nasal obstruction starting December 2013. She had been following with a local ENT surgeon and was prescribed nasal drops several courses of antibiotics for presumed ethmoid sinusitis.  Most recently in the last couple of weeks she has passed a few blood clots from her nose. She had an episode of epistaxis in April of 2014 which resolved on its own. On 06/03/14, she underwent right intranasal endoscopic sinus surgery for her ethmoid sinusitis as well as resection of the right nasal mass measuring 2.5 x 2 x 1.2 cm at [facility]. Following the procedure, she continued to have hemorrhage from the right nasal cavity and was taken back to the OR that same day for a repeat nasal endoscopy, where it was determined that it was a posterior bleed and that the mass had not been completely resected. The remainder of the mass was resected and the bleeding stopped. The following morning (6/04/14), she underwent another endoscopy at [facility] due to continued bleeding, and her right nasal cavity was packed. The histopathology from her recent resection revealed malignant melanoma.  The margins were positive.  This prompted a referral to [facility] and she was seen in ENT surgery by Dr. Whitaker and Dr. Velasquez  Junaid in Radiation/Oncology in addition to Medical Oncology.  She was worked up with a staging PET/CT scan and a brain MRI.  The PET/CT scan revealed minimal uptake in the local area which could very well be from recent surgery or residual disease.  She had multiple subcentimeter pulmonary nodules with the largest one being 6 mm in the left lower lobe.  She had a history of pulmonary nodules which had been previously followed for 3 years. The PET was also significant for an enlarged portacaval node with mild FDG uptake and FDG uptake in the hepatic flexure of her colon.  Her most recent colonoscopy has been merely 2 months ago and was completely normal.  Her brain MRI was limited because of motion artifact. There were no enlarged lymph nodes in the head and neck region or any other site of increased FDG uptake that would be suspicious for definitive metastatic disease.      Her case was reviewed at the multi-specialty tumor board and she was recommended endoscopic resection of the melanoma followed by post operative radiation therapy. She underwent a rerevision surgery on 06/23/2014 using an endoscopic endonasal approach. Tumor was located at the anterior skull base between the middle turbinate and the septum. Final pathology report showed clear margins. She was started on radiation therapy under care of Dr. Ron Quiroga in July. Unfortunately she fell and fractured her left humerus in end of August. It was decided not to proceed with surgical intervention as this would involve intubation which would be difficult given ongoing radiation therapy. She had a difficult hospital course with UTI, confusion. She was managed conservatively and radiation therapy was resumed - eventually completed on September 11, 2014.     She has been followed with surveillance imaging since 2014. MRI in 2016 and again on 2/15/17 showed a 13 mm enhancing T1 hypointense lesion in the right side of the frontal bone. This was biopsied by  neurosurgery on 2/22/17 and was benign.       SUBJECTIVE   Gabino is being followed for her malignant melanoma status post resection on 6/04/14 and then re-resection on 06/24/14 with negative margins followed by radiation therapy from July through September 11, 2014.     She is accompanied by her  at this clinic visit. Gabino has no new complaints.  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         PAST MEDICAL HISTORY   1. HTN  2. Dyslipidemia  3. Depression on medications  4. Pulmonary nodules  5. Cholecystectomy  6. OA - Back and Neck surgeries done   7. Fibromyalgia      CURRENT OUTPATIENT MEDICATIONS     Current Outpatient Prescriptions   Medication Sig     acetaminophen (TYLENOL) 500 MG tablet Take 1 tablet (500 mg) by mouth every 6 hours as needed     atorvastatin (LIPITOR) 20 MG tablet Take 1 tablet (20 mg) by mouth daily     buPROPion (WELLBUTRIN XL) 150 MG 24 hr tablet TAKE 1 TABLET(150 MG) BY MOUTH EVERY MORNING     GuaiFENesin 100 MG PACK Take 1 tablet by mouth 2 times daily as needed     hydrochlorothiazide (MICROZIDE) 12.5 MG capsule Take 1 capsule (12.5 mg) by mouth daily     hydrOXYzine (ATARAX) 25 MG tablet TAKE 1 TABLET(25 MG) BY MOUTH EVERY 6 HOURS AS NEEDED FOR ANXIETY     ibuprofen (ADVIL/MOTRIN) 800 MG tablet TAKE 1 TABLET BY MOUTH EVERY 4 HOURS AS NEEDED FOR PAIN     ibuprofen (ADVIL/MOTRIN) 800 MG tablet Take 1 tablet (800 mg) by mouth every 4 hours as needed for moderate pain Reported on 4/17/2017     lisinopril (PRINIVIL,ZESTRIL) 30 MG tablet Take 1 tablet (30 mg) by mouth daily     pantoprazole (PROTONIX) 40 MG EC tablet Take 1 tablet (40 mg) by mouth daily Take 30-60 minutes before a meal.     potassium chloride (MICRO-K) 10 MEQ CR capsule Take 2 capsules (20 mEq) by mouth daily     pravastatin (PRAVACHOL) 40 MG tablet      ranitidine (ZANTAC) 150 MG tablet TAKE 1 TABLET BY MOUTH TWICE DAILY AS NEEDED     senna-docusate (SENOKOT-S;PERICOLACE) 8.6-50  MG per tablet Take 1-2 tablets by mouth 2 times daily     Simethicone 180 MG CAPS Take 1 capsule by mouth 2 times daily Reported on 5/9/2017     venlafaxine (EFFEXOR-XR) 75 MG 24 hr capsule TAKE 3 CAPSULES BY MOUTH DAILY     VITAMIN D, CHOLECALCIFEROL, PO Take 2,000 Units by mouth daily     No current facility-administered medications for this visit.           ALLERGIES     Allergies   Allergen Reactions     Novocain [Procaine] Unknown and Rash     Mirtazapine      Nefazodone Unknown and Rash      PHYSICAL EXAM   /70  Pulse 78  Temp 97.8  F (36.6  C) (Oral)  Resp 18  Wt 80.7 kg (177 lb 14.6 oz)  SpO2 100%  BMI 29.61 kg/m2   SpO2 Readings from Last 4 Encounters:   09/12/18 100%   07/20/18 98%   04/30/18 98%   03/14/18 99%     Wt Readings from Last 3 Encounters:   09/12/18 80.7 kg (177 lb 14.6 oz)   07/20/18 76.4 kg (168 lb 6.4 oz)   07/05/18 79.4 kg (175 lb)     GEN: NAD  HEENT: pupils equal, EOMI, no icterus. Oropharynx is clear.   NECK: no obvious cervical or supraclavicular lymphadenopathy  LUNGS: clear bilaterally  CV: regular rate and rhythm, systolic murmur   ABDOMEN: soft, non-tender, non-distended, normal bowel sounds, no hepatosplenomegaly  EXT: warm, well perfused, no edema  NEURO: alert  SKIN: no rashes     LABORATORY AND IMAGING STUDIES     Recent Labs   Lab Test  09/12/18   1126  03/14/18   1350  12/13/17   1026  10/04/17   0610  09/26/17   1536  08/01/17   0914   NA  128*  135  136   --   134  135   POTASSIUM  3.9  4.0  3.0*  3.6  4.3  3.8   CHLORIDE  96  103  101   --   98  99   CO2  25  24  29   --   26  28   ANIONGAP  8  8  6   --   10  8   BUN  12  17  13   --   8  12   CR  0.76  0.81  0.76   --   0.87  0.78   GLC  93  85  91   --   87  99   STEFFANIE  8.9  8.4*  7.6*   --   9.1  9.3     Recent Labs   Lab Test  09/02/14   0550  09/01/14   0532  08/31/14   0525  08/30/14   0909  08/30/14   0616   MAG  1.6  1.9  1.8  2.2  1.5*     Recent Labs   Lab Test  09/12/18   1126  03/14/18   1351   12/13/17   1026  09/26/17   1536  03/28/17   1553   02/15/17   0937   WBC  9.5  6.8  8.6   --   10.8   --   7.6   HGB  10.5*  11.4*  9.3*  12.6  12.7   < >  11.1*   PLT  339  225  275   --   307   --   219   MCV  84  88  91   --   91   --   90   NEUTROPHIL  77.7  73.8  74.0   --   87.5   --   66.9    < > = values in this interval not displayed.     Recent Labs   Lab Test  09/12/18   1126  03/14/18   1350  12/13/17   1026   BILITOTAL  0.4  0.3  0.4   ALKPHOS  107  83  83   ALT  19  13  9   AST  20  19  14   ALBUMIN  3.3*  3.5  2.4*   LDH  182  168  145     TSH   Date Value Ref Range Status   02/15/2017 2.27 0.40 - 4.00 mU/L Final   08/10/2016 2.32 0.40 - 4.00 mU/L Final   03/19/2015 1.34 0.40 - 4.00 mU/L Final     Comment:     Effective 7/30/2014, the reference range for this assay has changed to reflect   new instrumentation/methodology.         Results for orders placed or performed in visit on 09/12/18   CT Chest/Abdomen/Pelvis w Contrast    Narrative    EXAMINATION: CT CHEST/ABDOMEN/PELVIS W CONTRAST, 9/12/2018 10:30 AM    TECHNIQUE:  Helical CT images from the thoracic inlet through the  symphysis pubis were obtained  with contrast. Contrast dose: Isovue  370 103cc. Additional arterial phase imaging was obtained through the  liver.    COMPARISON: Multiple prior exams, the most recent dated 3/14/2018.    HISTORY: surveillance melanoma - nasal cavity; Malignant melanoma of  nasal cavity (H)    FINDINGS:    Chest: The visualized thyroid gland is unremarkable. The heart is  normal in size without pericardial effusion. Annular and coronary  artery calcification. Scattered calcifications of the great vessels  which are not enlarged. No pathologically enlarged lymph nodes in the  chest. Apical predominant centrilobular and mild paraseptal  emphysematous changes. Mild lower lobe predominant bronchial wall  thickening with new left lower lobe peribronchovascular nodular  parenchymal opacity with mild surrounding subsolid  nodules and  groundglass opacity. No pleural effusion. Redemonstration of multiple  bilateral pulmonary nodules, some of which are calcified. For example,  a 6 mm nodule in the left lower lobe is not significantly changed,  series 11, image 111 and a right apical subpleural nodule measuring 4  mm is also stable, series 11, image 27.    Abdomen and pelvis: No focal liver lesions. Trace focal fatty  infiltration along the falciform ligament. The gallbladder is  surgically absent with slight biliary ductal prominence as before.  Calcified splenic granulomas. Mild left greater than right adrenal  gland thickening, stable. Tiny hypodensities in the pancreas appear  stable. No pancreatic duct dilatation or adjacent inflammatory  stranding/fluid. Bilateral renal parenchymal thinning/cortical  scarring and numerous subcentimeter renal cortical hypodensities, too  small to definitively characterize but likely cysts. No  hydronephrosis. The bowel is without dilatation or adjacent  inflammatory change. Normal caliber appendix. Sigmoid predominant  diverticulosis without evidence of diverticulitis. No free  intraperitoneal air or fluid. No pathologically enlarged lymph nodes  in the abdomen or pelvis. Scattered atherosclerotic calcification  without aneurysmal dilatation of the aorta. Moderate atherosclerotic  disease at the origin of the SMA. There is some excretion of contrast  into the bladder. The uterus and adnexa are within normal limits.    Bones and soft tissues: The bones appear demineralized with  degenerative change. Partial visualization of subacute proximal left  humerus fracture with bridging callus formation. Partially visualized  anterior cervical spine fusion hardware.      Impression    IMPRESSION: In this patient with a history of nasal cavity malignant  melanoma:  1. No convincing evidence of metastatic disease in the abdomen or  pelvis.  2. New left lower lobe peribronchovascular nodular  consolidative  opacity with mild surrounding subsolid and groundglass nodularity,  most compatible with infectious/inflammatory change. Attention on  follow-up imaging is recommended.  3. Otherwise, stable pulmonary nodules upon a background of  emphysematous change.  4. The bones appear demineralized with partial visualization of a  subacute proximal left humerus fracture.    LEXII UMANA MD          ASSESSMENT AND PLAN   78 year old female with right sinonasal mucosal malignant melanoma status post resection on 6/04/14 and then re-resection on 06/24/14 with negative margins followed by radiation therapy from July through September 11, 2014 (6000 cGy in 30 fractions).   b-aubrie and c-KIT status unknown    We reviewed her scans from earlier today. There is no evidence of recurrent disease. She is now 4 years out from surgical resection and radiation, with no recurrence of disease. Her scan though does show an area of consolidation which is likely from infection. I will have her return in 3 months instead of 6 months.    Again, thank you for allowing me to participate in the care of your patient.      Sincerely,    Garett Obregon MD

## 2018-09-12 NOTE — NURSING NOTE
Chief Complaint   Patient presents with     Blood Draw     Blood draw from IV and vitals done     Labs drawn via IV placed by imaging department in left hand. IV was flushed with saline and pulled from hand after labs were drawn.

## 2018-09-12 NOTE — DISCHARGE INSTRUCTIONS

## 2018-09-12 NOTE — PROGRESS NOTES
[facility]  FOLLOW-UP VISIT NOTE    PATIENT NAME: Gabino Jones MRN # [id number]  DATE OF VISIT: Sep 12, 2018 YOB: 1940    REFERRING PROVIDER: Yolanda Whitaker MD   PHYSICIANS  [address]  [place] [address]    CANCER TYPE: Malignant Melanoma  STAGE:   ECOG PS: 1    TREATMENT SUMMARY:  Gabino presented with difficulty to breathe for previous 6-7 months due to partial nasal obstruction starting December 2013. She had been following with a local ENT surgeon and was prescribed nasal drops several courses of antibiotics for presumed ethmoid sinusitis.  Most recently in the last couple of weeks she has passed a few blood clots from her nose. She had an episode of epistaxis in April of 2014 which resolved on its own. On 06/03/14, she underwent right intranasal endoscopic sinus surgery for her ethmoid sinusitis as well as resection of the right nasal mass measuring 2.5 x 2 x 1.2 cm at [facility]. Following the procedure, she continued to have hemorrhage from the right nasal cavity and was taken back to the OR that same day for a repeat nasal endoscopy, where it was determined that it was a posterior bleed and that the mass had not been completely resected. The remainder of the mass was resected and the bleeding stopped. The following morning (6/04/14), she underwent another endoscopy at [facility] due to continued bleeding, and her right nasal cavity was packed. The histopathology from her recent resection revealed malignant melanoma.  The margins were positive.  This prompted a referral to [facility] and she was seen in ENT surgery by Dr. Whitaker and Dr. Ron Quiroga in Radiation/Oncology in addition to Medical Oncology.  She was worked up with a staging PET/CT scan and a brain MRI.  The PET/CT scan revealed minimal uptake in the local area which could very well be from recent surgery or residual  disease.  She had multiple subcentimeter pulmonary nodules with the largest one being 6 mm in the left lower lobe.  She had a history of pulmonary nodules which had been previously followed for 3 years. The PET was also significant for an enlarged portacaval node with mild FDG uptake and FDG uptake in the hepatic flexure of her colon.  Her most recent colonoscopy has been merely 2 months ago and was completely normal.  Her brain MRI was limited because of motion artifact. There were no enlarged lymph nodes in the head and neck region or any other site of increased FDG uptake that would be suspicious for definitive metastatic disease.      Her case was reviewed at the multi-specialty tumor board and she was recommended endoscopic resection of the melanoma followed by post operative radiation therapy. She underwent a rerevision surgery on 06/23/2014 using an endoscopic endonasal approach. Tumor was located at the anterior skull base between the middle turbinate and the septum. Final pathology report showed clear margins. She was started on radiation therapy under care of Dr. Ron Quiroga in July. Unfortunately she fell and fractured her left humerus in end of August. It was decided not to proceed with surgical intervention as this would involve intubation which would be difficult given ongoing radiation therapy. She had a difficult hospital course with UTI, confusion. She was managed conservatively and radiation therapy was resumed - eventually completed on September 11, 2014.     She has been followed with surveillance imaging since 2014. MRI in 2016 and again on 2/15/17 showed a 13 mm enhancing T1 hypointense lesion in the right side of the frontal bone. This was biopsied by neurosurgery on 2/22/17 and was benign.       ALAN Lloyd is being followed for her malignant melanoma status post resection on 6/04/14 and then re-resection on 06/24/14 with negative margins followed by radiation therapy from July through  September 11, 2014.     She is accompanied by her  at this clinic visit. Gabino has no new complaints.  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         PAST MEDICAL HISTORY   1. HTN  2. Dyslipidemia  3. Depression on medications  4. Pulmonary nodules  5. Cholecystectomy  6. OA - Back and Neck surgeries done   7. Fibromyalgia      CURRENT OUTPATIENT MEDICATIONS     Current Outpatient Prescriptions   Medication Sig     acetaminophen (TYLENOL) 500 MG tablet Take 1 tablet (500 mg) by mouth every 6 hours as needed     atorvastatin (LIPITOR) 20 MG tablet Take 1 tablet (20 mg) by mouth daily     buPROPion (WELLBUTRIN XL) 150 MG 24 hr tablet TAKE 1 TABLET(150 MG) BY MOUTH EVERY MORNING     GuaiFENesin 100 MG PACK Take 1 tablet by mouth 2 times daily as needed     hydrochlorothiazide (MICROZIDE) 12.5 MG capsule Take 1 capsule (12.5 mg) by mouth daily     hydrOXYzine (ATARAX) 25 MG tablet TAKE 1 TABLET(25 MG) BY MOUTH EVERY 6 HOURS AS NEEDED FOR ANXIETY     ibuprofen (ADVIL/MOTRIN) 800 MG tablet TAKE 1 TABLET BY MOUTH EVERY 4 HOURS AS NEEDED FOR PAIN     ibuprofen (ADVIL/MOTRIN) 800 MG tablet Take 1 tablet (800 mg) by mouth every 4 hours as needed for moderate pain Reported on 4/17/2017     lisinopril (PRINIVIL,ZESTRIL) 30 MG tablet Take 1 tablet (30 mg) by mouth daily     pantoprazole (PROTONIX) 40 MG EC tablet Take 1 tablet (40 mg) by mouth daily Take 30-60 minutes before a meal.     potassium chloride (MICRO-K) 10 MEQ CR capsule Take 2 capsules (20 mEq) by mouth daily     pravastatin (PRAVACHOL) 40 MG tablet      ranitidine (ZANTAC) 150 MG tablet TAKE 1 TABLET BY MOUTH TWICE DAILY AS NEEDED     senna-docusate (SENOKOT-S;PERICOLACE) 8.6-50 MG per tablet Take 1-2 tablets by mouth 2 times daily     Simethicone 180 MG CAPS Take 1 capsule by mouth 2 times daily Reported on 5/9/2017     venlafaxine (EFFEXOR-XR) 75 MG 24 hr capsule TAKE 3 CAPSULES BY MOUTH DAILY     VITAMIN D,  CHOLECALCIFEROL, PO Take 2,000 Units by mouth daily     No current facility-administered medications for this visit.           ALLERGIES     Allergies   Allergen Reactions     Novocain [Procaine] Unknown and Rash     Mirtazapine      Nefazodone Unknown and Rash      PHYSICAL EXAM   /70  Pulse 78  Temp 97.8  F (36.6  C) (Oral)  Resp 18  Wt 80.7 kg (177 lb 14.6 oz)  SpO2 100%  BMI 29.61 kg/m2   SpO2 Readings from Last 4 Encounters:   09/12/18 100%   07/20/18 98%   04/30/18 98%   03/14/18 99%     Wt Readings from Last 3 Encounters:   09/12/18 80.7 kg (177 lb 14.6 oz)   07/20/18 76.4 kg (168 lb 6.4 oz)   07/05/18 79.4 kg (175 lb)     GEN: NAD  HEENT: pupils equal, EOMI, no icterus. Oropharynx is clear.   NECK: no obvious cervical or supraclavicular lymphadenopathy  LUNGS: clear bilaterally  CV: regular rate and rhythm, systolic murmur   ABDOMEN: soft, non-tender, non-distended, normal bowel sounds, no hepatosplenomegaly  EXT: warm, well perfused, no edema  NEURO: alert  SKIN: no rashes     LABORATORY AND IMAGING STUDIES     Recent Labs   Lab Test  09/12/18   1126  03/14/18   1350  12/13/17   1026  10/04/17   0610  09/26/17   1536  08/01/17   0914   NA  128*  135  136   --   134  135   POTASSIUM  3.9  4.0  3.0*  3.6  4.3  3.8   CHLORIDE  96  103  101   --   98  99   CO2  25  24  29   --   26  28   ANIONGAP  8  8  6   --   10  8   BUN  12  17  13   --   8  12   CR  0.76  0.81  0.76   --   0.87  0.78   GLC  93  85  91   --   87  99   STEFFANIE  8.9  8.4*  7.6*   --   9.1  9.3     Recent Labs   Lab Test  09/02/14   0550  09/01/14   0532  08/31/14   0525  08/30/14   0909  08/30/14   0616   MAG  1.6  1.9  1.8  2.2  1.5*     Recent Labs   Lab Test  09/12/18   1126  03/14/18   1350  12/13/17   1026  09/26/17   1536  03/28/17   1553   02/15/17   0937   WBC  9.5  6.8  8.6   --   10.8   --   7.6   HGB  10.5*  11.4*  9.3*  12.6  12.7   < >  11.1*   PLT  339  225  275   --   307   --   219   MCV  84  88  91   --   91   --   90    NEUTROPHIL  77.7  73.8  74.0   --   87.5   --   66.9    < > = values in this interval not displayed.     Recent Labs   Lab Test  09/12/18   1126  03/14/18   1350  12/13/17   1026   BILITOTAL  0.4  0.3  0.4   ALKPHOS  107  83  83   ALT  19  13  9   AST  20  19  14   ALBUMIN  3.3*  3.5  2.4*   LDH  182  168  145     TSH   Date Value Ref Range Status   02/15/2017 2.27 0.40 - 4.00 mU/L Final   08/10/2016 2.32 0.40 - 4.00 mU/L Final   03/19/2015 1.34 0.40 - 4.00 mU/L Final     Comment:     Effective 7/30/2014, the reference range for this assay has changed to reflect   new instrumentation/methodology.         Results for orders placed or performed in visit on 09/12/18   CT Chest/Abdomen/Pelvis w Contrast    Narrative    EXAMINATION: CT CHEST/ABDOMEN/PELVIS W CONTRAST, 9/12/2018 10:30 AM    TECHNIQUE:  Helical CT images from the thoracic inlet through the  symphysis pubis were obtained  with contrast. Contrast dose: Isovue  370 103cc. Additional arterial phase imaging was obtained through the  liver.    COMPARISON: Multiple prior exams, the most recent dated 3/14/2018.    HISTORY: surveillance melanoma - nasal cavity; Malignant melanoma of  nasal cavity (H)    FINDINGS:    Chest: The visualized thyroid gland is unremarkable. The heart is  normal in size without pericardial effusion. Annular and coronary  artery calcification. Scattered calcifications of the great vessels  which are not enlarged. No pathologically enlarged lymph nodes in the  chest. Apical predominant centrilobular and mild paraseptal  emphysematous changes. Mild lower lobe predominant bronchial wall  thickening with new left lower lobe peribronchovascular nodular  parenchymal opacity with mild surrounding subsolid nodules and  groundglass opacity. No pleural effusion. Redemonstration of multiple  bilateral pulmonary nodules, some of which are calcified. For example,  a 6 mm nodule in the left lower lobe is not significantly changed,  series 11, image  111 and a right apical subpleural nodule measuring 4  mm is also stable, series 11, image 27.    Abdomen and pelvis: No focal liver lesions. Trace focal fatty  infiltration along the falciform ligament. The gallbladder is  surgically absent with slight biliary ductal prominence as before.  Calcified splenic granulomas. Mild left greater than right adrenal  gland thickening, stable. Tiny hypodensities in the pancreas appear  stable. No pancreatic duct dilatation or adjacent inflammatory  stranding/fluid. Bilateral renal parenchymal thinning/cortical  scarring and numerous subcentimeter renal cortical hypodensities, too  small to definitively characterize but likely cysts. No  hydronephrosis. The bowel is without dilatation or adjacent  inflammatory change. Normal caliber appendix. Sigmoid predominant  diverticulosis without evidence of diverticulitis. No free  intraperitoneal air or fluid. No pathologically enlarged lymph nodes  in the abdomen or pelvis. Scattered atherosclerotic calcification  without aneurysmal dilatation of the aorta. Moderate atherosclerotic  disease at the origin of the SMA. There is some excretion of contrast  into the bladder. The uterus and adnexa are within normal limits.    Bones and soft tissues: The bones appear demineralized with  degenerative change. Partial visualization of subacute proximal left  humerus fracture with bridging callus formation. Partially visualized  anterior cervical spine fusion hardware.      Impression    IMPRESSION: In this patient with a history of nasal cavity malignant  melanoma:  1. No convincing evidence of metastatic disease in the abdomen or  pelvis.  2. New left lower lobe peribronchovascular nodular consolidative  opacity with mild surrounding subsolid and groundglass nodularity,  most compatible with infectious/inflammatory change. Attention on  follow-up imaging is recommended.  3. Otherwise, stable pulmonary nodules upon a background of  emphysematous  change.  4. The bones appear demineralized with partial visualization of a  subacute proximal left humerus fracture.    LEXII UMANA MD          ASSESSMENT AND PLAN   78 year old female with right sinonasal mucosal malignant melanoma status post resection on 6/04/14 and then re-resection on 06/24/14 with negative margins followed by radiation therapy from July through September 11, 2014 (6000 cGy in 30 fractions).   b-aubrie and c-KIT status unknown    We reviewed her scans from earlier today. There is no evidence of recurrent disease. She is now 4 years out from surgical resection and radiation, with no recurrence of disease. Her scan though does show an area of consolidation which is likely from infection. I will have her return in 3 months instead of 6 months.

## 2018-09-12 NOTE — MR AVS SNAPSHOT
After Visit Summary   9/12/2018    Gabino Jones    MRN: [id number]           Patient Information     Date Of Birth          1940        Visit Information        Provider Department      9/12/2018 1:30 PM Garett Obregon MD [facility]        Today's Diagnoses     Malignant melanoma of nasal cavity (H)           Follow-ups after your visit        Your next 10 appointments already scheduled     Dec 12, 2018 10:20 AM CST   CT CHEST/ABDOMEN/PELVIS W CONTRAST with UCCT1   [facility] [place] ([facility])    [address]  [facility] [address] [phone]           How do I prepare for my exam? (Food and drink instructions) To prepare: Do not eat or drink for 2 hours before your exam. If you need to take medicine, you may take it with small sips of water. (We may ask you to take liquid medicine as well.)  How do I prepare for my exam? (Other instructions) Please arrive 30 minutes early for your CT.  Once in the department you might be asked to drink water 15-20 minutes prior to your exam.  If indicated you may be asked to drink an oral contrast in advance of your CT.  If this is the case, the imaging team will let you know or be in contact with you prior to your appointment  Patients over 70 or patients with diabetes or kidney problems: If you haven t had a blood test (creatinine test) within the last 30 days, the Cardiologist/Radiologist may require you to get this test prior to your exam.  If you have diabetes:  Continue to take your metformin medication on the day of your exam  What should I wear: Please wear loose clothing, such as a sweat suit or jogging clothes. Avoid snaps, zippers and other metal. We may ask you to undress and put on a hospital gown.  How long does the exam take: Most scans take less than 20 minutes.  What should I bring: Please bring any scans or X-rays taken at other hospitals, if similar tests  were done. Also bring a list of your medicines, including vitamins, minerals and over-the-counter drugs. It is safest to leave personal items at home.  Do I need a : No  is needed.  What do I need to tell my doctor? Be sure to tell your doctor: * If you have any allergies. * If there s any chance you are pregnant. * If you are breastfeeding.  What should I do after the exam: No restrictions, You may resume normal activities.  What is this test: A CT (computed tomography) scan is a series of pictures that allows us to look inside your body. The scanner creates images of the body in cross sections, much like slices of bread. This helps us see any problems more clearly. You may receive contrast (X-ray dye) before or during your scan. You will be asked to drink the contrast.  Who should I call with questions: If you have any questions, please call the Imaging Department where you will have your exam. Directions, parking instructions, and other information is available on our website, [email or web address].            Dec 12, 2018 10:40 AM CST   CT SOFT TISSUE NECK W CONTRAST with UCCT1   [facility] CT ([facility])    [address]  [facility] [address] [phone]           How do I prepare for my exam? (Food and drink instructions) **You will have contrast for this exam.** Do not eat or drink for 2 hours before your exam. If you need to take medicine, you may take it with small sips of water. (We may ask you to take liquid medicine as well.)  The day before your exam, drink extra fluids at least six 8-ounce glasses (unless your doctor tells you to restrict your fluids).  How do I prepare for my exam? (Other instructions) Patients over 70 or patients with diabetes or kidney problems: If you haven t had a blood test (creatinine test) within the last 30 days, the Cardiologist/Radiologist may require you to get this test prior to your exam.  What  should I wear: Please wear loose clothing, such as a sweat suit or jogging clothes.  Avoid snaps, zippers and other metal. We may ask you to undress and put on a hospital gown.  How long does the exam take: Most scans take less than 20 minutes.  What should I bring: Please bring any scans or X-rays taken at other hospitals, if similar tests were done. Also bring a list of your medicines, including vitamins, minerals and over-the-counter drugs. It is safest to leave personal items at home.  Do I need a :  No  is needed.  What do I need to tell my doctor? Be sure to tell your doctor: * If you have any allergies. * If there s any chance you are pregnant. * If you are breastfeeding. * If you have diabetes as your medication may need to be adjusted for this exam.  What should I do after the exam: No restrictions, You may resume normal activities.  What is this test: A CT (computed tomography) scan is a series of pictures that allows us to look inside your body. The scanner creates images of the body in cross sections, much like slices of bread. This helps us see any problems more clearly. You may receive contrast (X-ray dye) before or during your scan. Contrast is given through an IV (small needle in your arm).  Who should I call with questions: If you have any questions, please call the Imaging Department where you will have your exam. Directions, parking instructions, and other information is available on our website, [email or web address].            Dec 12, 2018 12:30 PM CST   [facility] with  MASONIC LAB DRAW   [facility] Lab Draw ([facility])    [address] [place] [address]  [address]  [facility] [id number] [phone]            Dec 12, 2018  1:00 PM CST   (Arrive by 12:45 PM)   Return Visit with Garett Obregon MD   [facility] ([facility])    [address] [place] [address]  [address]  [facility] [id number] [phone]             Mar 07, 2019  1:00 PM CST   (Arrive by 12:45 PM)   Return Visit with Yolanda Whitaker MD   [facility] ([facility])    [address] [facility]  [address] [facility] [id number] [phone]            Apr 30, 2019  1:30 PM CDT   Ech Complete with MGECHR1, MG ECHO TECH   [facility] ([facility])    [address] [place] [id number] [phone]           1.  Please bring or wear a comfortable two-piece outfit. 2.  You may eat, drink and take your normal medicines. 3.  For any questions that cannot be answered, please contact the ordering physician 4.  Please do not wear perfumes or scented lotions on the day of your exam.            May 06, 2019  2:10 PM CDT   Return Visit with Blake Hobbs MD   [facility] ([facility])    [address] [place] [id number] [phone]              Future tests that were ordered for you today     Open Future Orders        Priority Expected Expires Ordered    CT Soft Tissue Neck w Contrast Routine 12/12/2018 9/12/2019 9/12/2018    CT Chest/Abdomen/Pelvis w Contrast Routine 12/12/2018 9/12/2019 9/12/2018    CBC with platelets differential Routine 12/12/2018 9/12/2019 9/12/2018    Comprehensive metabolic panel Routine 12/12/2018 9/12/2019 9/12/2018    Lactate Dehydrogenase Routine 12/12/2018 9/12/2019 9/12/2018            Who to contact     If you have questions or need follow up information about today's clinic visit or your schedule please contact [facility] directly at [phone].  Normal or non-critical lab and imaging results will be communicated to you by MyChart, letter or phone within 4 business days after the clinic has received the results. If you do not hear from us within 7 days, please contact the clinic through MyChart or phone. If you have a critical or abnormal lab result, we  will notify you by phone as soon as possible.  Submit refill requests through [employer] or call your pharmacy and they will forward the refill request to us. Please allow 3 business days for your refill to be completed.          Additional Information About Your Visit        [employer] Information     [employer] gives you secure access to your electronic health record. If you see a primary care provider, you can also send messages to your care team and make appointments. If you have questions, please call your primary care clinic.  If you do not have a primary care provider, please call [phone] and they will assist you.        Care EveryWhere ID     This is your Care EveryWhere ID. This could be used by other organizations to access your [facility] medical records  [id number]        Your Vitals Were     Pulse Temperature Respirations Pulse Oximetry BMI (Body Mass Index)       78 97.8  F (36.6  C) (Oral) 18 100% 29.61 kg/m2        Blood Pressure from Last 3 Encounters:   09/12/18 148/70   07/20/18 134/72   04/30/18 137/76    Weight from Last 3 Encounters:   09/12/18 80.7 kg (177 lb 14.6 oz)   07/20/18 76.4 kg (168 lb 6.4 oz)   07/05/18 79.4 kg (175 lb)              We Performed the Following     CBC with platelets differential     Comprehensive metabolic panel     Lactate Dehydrogenase          Today's Medication Changes          These changes are accurate as of 9/12/18  1:33 PM.  If you have any questions, ask your nurse or doctor.               Start taking these medicines.        Dose/Directions    levofloxacin 750 MG tablet   Commonly known as:  LEVAQUIN   Used for:  Malignant melanoma of nasal cavity (H)   Started by:  Garett Obregon MD        Dose:  750 mg   Take 1 tablet (750 mg) by mouth daily   Quantity:  10 tablet   Refills:  0            Where to get your medicines      [facility] [address] [place] [address] [address] [facility] AT [facility] [address]   [address] [facility] [address]     Phone:  [phone]     levofloxacin 750 MG tablet                Primary Care Provider Office Phone # Fax #    Rebekahgeovany Boogie [phone] [address] [facility] [place] [address]        Equal Access to Services     GAEL OCONNELL : Hadii aad ku hadasho Soomaali, waaxda luqadaha, qaybta kaalmada adeegyada, waxay idiin hayaan adeeg kharash la'aan . So [facility] [phone].    ATENCIÓN: Si habla español, tiene a velazquez disposición servicios gratuitos de asistencia lingüística. Keaganame al [phone].    We comply with applicable federal civil rights laws and [place] laws. We do not discriminate on the basis of race, color, national origin, age, disability, sex, sexual orientation, or gender identity.            Thank you!     Thank you for choosing [facility]  for your care. Our goal is always to provide you with excellent care. Hearing back from our patients is one way we can continue to improve our services. Please take a few minutes to complete the written survey that you may receive in the mail after your visit with us. Thank you!             Your Updated Medication List - Protect others around you: Learn how to safely use, store and throw away your medicines at www.disposemymeds.org.          This list is accurate as of 9/12/18  1:33 PM.  Always use your most recent med list.                   Brand Name Dispense Instructions for use Diagnosis    acetaminophen 500 MG tablet    TYLENOL    100 tablet    Take 1 tablet (500 mg) by mouth every 6 hours as needed    Malignant melanoma of nasal cavity (H)       atorvastatin 20 MG tablet    LIPITOR    90 tablet    Take 1 tablet (20 mg) by mouth daily    Hypercholesteremia       buPROPion 150 MG 24 hr tablet    WELLBUTRIN XL    90 tablet    TAKE 1 TABLET(150 MG) BY MOUTH EVERY MORNING    JITENDRA (generalized anxiety disorder)       GuaiFENesin 100 MG Pack     168 each    Take 1 tablet by mouth  2 times daily as needed    Cough       hydrochlorothiazide 12.5 MG capsule    MICROZIDE    90 capsule    Take 1 capsule (12.5 mg) by mouth daily    Hypertension goal BP (blood pressure) < 140/90       hydrOXYzine 25 MG tablet    ATARAX    30 tablet    TAKE 1 TABLET(25 MG) BY MOUTH EVERY 6 HOURS AS NEEDED FOR ANXIETY    JITENDRA (generalized anxiety disorder)       * ibuprofen 800 MG tablet    ADVIL/MOTRIN    90 tablet    Take 1 tablet (800 mg) by mouth every 4 hours as needed for moderate pain Reported on 4/17/2017    Myalgia       * ibuprofen 800 MG tablet    ADVIL/MOTRIN    90 tablet    TAKE 1 TABLET BY MOUTH EVERY 4 HOURS AS NEEDED FOR PAIN    Myalgia       levofloxacin 750 MG tablet    LEVAQUIN    10 tablet    Take 1 tablet (750 mg) by mouth daily    Malignant melanoma of nasal cavity (H)       lisinopril 30 MG tablet    PRINIVIL,ZESTRIL    90 tablet    Take 1 tablet (30 mg) by mouth daily    Hypertension goal BP (blood pressure) < 140/90       pantoprazole 40 MG EC tablet    PROTONIX    90 tablet    Take 1 tablet (40 mg) by mouth daily Take 30-60 minutes before a meal.    Gastroesophageal reflux disease without esophagitis       potassium chloride 10 MEQ CR capsule    MICRO-K    180 capsule    Take 2 capsules (20 mEq) by mouth daily    Hypokalemia       pravastatin 40 MG tablet    PRAVACHOL          ranitidine 150 MG tablet    ZANTAC    180 tablet    TAKE 1 TABLET BY MOUTH TWICE DAILY AS NEEDED    Gastroesophageal reflux disease without esophagitis       senna-docusate 8.6-50 MG per tablet    SENOKOT-S;PERICOLACE    120 tablet    Take 1-2 tablets by mouth 2 times daily    Flatulence, eructation, and gas pain       Simethicone 180 MG Caps     60 capsule    Take 1 capsule by mouth 2 times daily Reported on 5/9/2017    Flatulence, eructation, and gas pain       venlafaxine 75 MG 24 hr capsule    EFFEXOR-XR    90 capsule    TAKE 3 CAPSULES BY MOUTH DAILY    JITENDRA (generalized anxiety disorder)       VITAMIN D  (CHOLECALCIFEROL) PO      Take 2,000 Units by mouth daily    Malignant melanoma of nasal cavity (H)       * Notice:  This list has 2 medication(s) that are the same as other medications prescribed for you. Read the directions carefully, and ask your doctor or other care provider to review them with you.

## 2018-09-12 NOTE — NURSING NOTE
"Oncology Rooming Note    September 12, 2018 1:08 PM   Gabino Jones is a 78 year old female who presents for:    Chief Complaint   Patient presents with     Blood Draw     Blood draw from IV and vitals done     Oncology Clinic Visit     Return for SCC Sinus      Initial Vitals: /70  Pulse 78  Temp 97.8  F (36.6  C) (Oral)  Resp 18  Wt 80.7 kg (177 lb 14.6 oz)  SpO2 100%  BMI 29.61 kg/m2 Estimated body mass index is 29.61 kg/(m^2) as calculated from the following:    Height as of 7/5/18: 1.651 m (5' 5\").    Weight as of this encounter: 80.7 kg (177 lb 14.6 oz). Body surface area is 1.92 meters squared.  No Pain (0) Comment: Data Unavailable   No LMP recorded. Patient is postmenopausal.  Allergies reviewed: Yes  Medications reviewed: Yes    Medications: Medication refills not needed today.  Pharmacy name entered into [facility]:    [facility] - [place], [place] - [facility] [address] [facility] AT [facility] OF [address]  [facility] - [place], [place] - [address] [facility]    Clinical concerns: CT results  [facility]  was notified.    6 minutes for nursing intake (face to face time)     Radha Eddy MA              "

## 2018-09-17 DIAGNOSIS — M79.10 MYALGIA: ICD-10-CM

## 2018-09-18 NOTE — TELEPHONE ENCOUNTER
Ibuprofen    BP Readings from Last 3 Encounters:   09/12/18 148/70   07/20/18 134/72   04/30/18 137/76     Routing refill request to provider for review/approval because:  Labs out of range:  B/P   Pts age    Nhi Llanos, RN, BSN

## 2018-09-19 RX ORDER — IBUPROFEN 800 MG/1
800 TABLET, FILM COATED ORAL EVERY 4 HOURS PRN
Qty: 90 TABLET | Refills: 1 | Status: ON HOLD | OUTPATIENT
Start: 2018-09-19 | End: 2019-01-21

## 2018-09-24 DIAGNOSIS — F41.1 GAD (GENERALIZED ANXIETY DISORDER): ICD-10-CM

## 2018-09-24 RX ORDER — ALPRAZOLAM 0.25 MG
TABLET ORAL
Qty: 20 TABLET | Refills: 0 | Status: ON HOLD | OUTPATIENT
Start: 2018-09-24 | End: 2019-01-21

## 2018-09-24 NOTE — TELEPHONE ENCOUNTER
ALPRAZolam (XANAX) 0.25 MG tablet (Discontinued)      Last Written Prescription Date:  12/15/2017  Last Fill Quantity: 20,   # refills: 0  Last Office Visit: 7/20/2018  Future Office visit:       Routing refill request to provider for review/approval because:  Drug not on the [facility], [facility] or [facility] refill protocol or controlled substance

## 2018-09-28 DIAGNOSIS — F41.1 GAD (GENERALIZED ANXIETY DISORDER): ICD-10-CM

## 2018-10-01 DIAGNOSIS — F41.1 GAD (GENERALIZED ANXIETY DISORDER): ICD-10-CM

## 2018-10-01 RX ORDER — VENLAFAXINE HYDROCHLORIDE 75 MG/1
CAPSULE, EXTENDED RELEASE ORAL
Qty: 270 CAPSULE | Refills: 2 | Status: SHIPPED | OUTPATIENT
Start: 2018-10-01 | End: 2019-01-01

## 2018-10-01 RX ORDER — VENLAFAXINE HYDROCHLORIDE 75 MG/1
CAPSULE, EXTENDED RELEASE ORAL
Qty: 270 CAPSULE | Refills: 2 | Status: CANCELLED | OUTPATIENT
Start: 2018-10-01

## 2018-10-01 NOTE — TELEPHONE ENCOUNTER
Effexor:  Prescription approved per [facility] Refill Protocol.    Kay Shine, RN, BSN

## 2018-11-08 DIAGNOSIS — I10 HYPERTENSION GOAL BP (BLOOD PRESSURE) < 140/90: ICD-10-CM

## 2018-11-09 RX ORDER — LISINOPRIL 30 MG/1
30 TABLET ORAL DAILY
Qty: 90 TABLET | Refills: 0 | Status: SHIPPED | OUTPATIENT
Start: 2018-11-09 | End: 2019-02-25

## 2018-11-09 RX ORDER — HYDROCHLOROTHIAZIDE 12.5 MG/1
12.5 CAPSULE ORAL DAILY
Qty: 90 CAPSULE | Refills: 0 | Status: ON HOLD | OUTPATIENT
Start: 2018-11-09 | End: 2019-01-21

## 2018-11-09 NOTE — TELEPHONE ENCOUNTER
"Requested Prescriptions   Pending Prescriptions Disp Refills     lisinopril (PRINIVIL,ZESTRIL) 30 MG tablet 90 tablet 0     Sig: Take 1 tablet (30 mg) by mouth daily    ACE Inhibitors (Including Combos) Protocol Failed    11/8/2018 10:18 AM       Failed - Blood pressure under 140/90 in past 12 months    BP Readings from Last 3 Encounters:   09/12/18 148/70   07/20/18 134/72   04/30/18 137/76          Passed - Recent (12 mo) or future (30 days) visit within the authorizing provider's specialty    Patient had office visit in the last 12 months or has a visit in the next 30 days with authorizing provider or within the authorizing provider's specialty.  See \"Patient Info\" tab in inbasket, or \"Choose Columns\" in Meds & Orders section of the refill encounter.           Passed - Patient is age 18 or older       Passed - No active pregnancy on record       Passed - Normal serum creatinine on file in past 12 months    Recent Labs   Lab Test  09/12/18   1126  09/12/18   1014   09/22/15   1341   CR  0.76   --    < >   --    CREAT   --   0.7   < >   --    CRPOC   --    --    --   0.8    < > = values in this interval not displayed.          Passed - Normal serum potassium on file in past 12 months    Recent Labs   Lab Test  09/12/18   1126   POTASSIUM  3.9          Passed - No positive pregnancy test in past 12 months        hydrochlorothiazide (MICROZIDE) 12.5 MG capsule 90 capsule 0     Sig: Take 1 capsule (12.5 mg) by mouth daily    Diuretics (Including Combos) Protocol Failed    11/8/2018 10:18 AM       Failed - Blood pressure under 140/90 in past 12 months    BP Readings from Last 3 Encounters:   09/12/18 148/70   07/20/18 134/72   04/30/18 137/76          Failed - Normal serum sodium on file in past 12 months    Recent Labs   Lab Test  09/12/18   1126   NA  128*           Passed - Recent (12 mo) or future (30 days) visit within the authorizing provider's specialty    Patient had office visit in the last 12 months or has a " "visit in the next 30 days with authorizing provider or within the authorizing provider's specialty.  See \"Patient Info\" tab in inbasket, or \"Choose Columns\" in Meds & Orders section of the refill encounter.             Passed - Patient is age 18 or older       Passed - No active pregancy on record       Passed - Normal serum creatinine on file in past 12 months    Recent Labs   Lab Test  09/12/18   1126   CR  0.76             Passed - Normal serum potassium on file in past 12 months    Recent Labs   Lab Test  09/12/18   1126   POTASSIUM  3.9           Passed - No positive pregnancy test in past 12 months        Last OV: 07/20/2018    Routing refill request to provider for review/approval because:  BP slightly over goal and sodium slightly low.    Austin Arriola, RN, BSN        "

## 2018-12-12 ENCOUNTER — ONCOLOGY VISIT (OUTPATIENT)
Dept: ONCOLOGY | Facility: CLINIC | Age: 78
End: 2018-12-12
Attending: INTERNAL MEDICINE
Payer: MEDICARE

## 2018-12-12 ENCOUNTER — ALLIED HEALTH/NURSE VISIT (OUTPATIENT)
Dept: ONCOLOGY | Facility: CLINIC | Age: 78
End: 2018-12-12

## 2018-12-12 ENCOUNTER — ANCILLARY PROCEDURE (OUTPATIENT)
Dept: CT IMAGING | Facility: CLINIC | Age: 78
End: 2018-12-12
Attending: INTERNAL MEDICINE
Payer: MEDICARE

## 2018-12-12 VITALS
HEIGHT: 65 IN | TEMPERATURE: 97.7 F | WEIGHT: 178 LBS | HEART RATE: 84 BPM | BODY MASS INDEX: 29.66 KG/M2 | RESPIRATION RATE: 18 BRPM | DIASTOLIC BLOOD PRESSURE: 80 MMHG | OXYGEN SATURATION: 99 % | SYSTOLIC BLOOD PRESSURE: 165 MMHG

## 2018-12-12 DIAGNOSIS — R41.0 CONFUSION: Primary | ICD-10-CM

## 2018-12-12 DIAGNOSIS — C30.0 MALIGNANT MELANOMA OF NASAL CAVITY (H): ICD-10-CM

## 2018-12-12 DIAGNOSIS — Z71.9 VISIT FOR COUNSELING: Primary | ICD-10-CM

## 2018-12-12 LAB
ALBUMIN SERPL-MCNC: 3.2 G/DL (ref 3.4–5)
ALP SERPL-CCNC: 96 U/L (ref 40–150)
ALT SERPL W P-5'-P-CCNC: 16 U/L (ref 0–50)
ANION GAP SERPL CALCULATED.3IONS-SCNC: 9 MMOL/L (ref 3–14)
AST SERPL W P-5'-P-CCNC: 19 U/L (ref 0–45)
BASOPHILS # BLD AUTO: 0.1 10E9/L (ref 0–0.2)
BASOPHILS NFR BLD AUTO: 0.5 %
BILIRUB SERPL-MCNC: 0.3 MG/DL (ref 0.2–1.3)
BUN SERPL-MCNC: 18 MG/DL (ref 7–30)
CALCIUM SERPL-MCNC: 8.3 MG/DL (ref 8.5–10.1)
CHLORIDE SERPL-SCNC: 98 MMOL/L (ref 94–109)
CO2 SERPL-SCNC: 24 MMOL/L (ref 20–32)
CREAT SERPL-MCNC: 0.76 MG/DL (ref 0.52–1.04)
DIFFERENTIAL METHOD BLD: ABNORMAL
EOSINOPHIL # BLD AUTO: 0.2 10E9/L (ref 0–0.7)
EOSINOPHIL NFR BLD AUTO: 1.9 %
ERYTHROCYTE [DISTWIDTH] IN BLOOD BY AUTOMATED COUNT: 15.9 % (ref 10–15)
GFR SERPL CREATININE-BSD FRML MDRD: 73 ML/MIN/1.7M2
GLUCOSE SERPL-MCNC: 117 MG/DL (ref 70–99)
HCT VFR BLD AUTO: 33.1 % (ref 35–47)
HGB BLD-MCNC: 10.2 G/DL (ref 11.7–15.7)
IMM GRANULOCYTES # BLD: 0.1 10E9/L (ref 0–0.4)
IMM GRANULOCYTES NFR BLD: 0.7 %
LDH SERPL L TO P-CCNC: 208 U/L (ref 81–234)
LYMPHOCYTES # BLD AUTO: 1.5 10E9/L (ref 0.8–5.3)
LYMPHOCYTES NFR BLD AUTO: 14.3 %
MCH RBC QN AUTO: 25.7 PG (ref 26.5–33)
MCHC RBC AUTO-ENTMCNC: 30.8 G/DL (ref 31.5–36.5)
MCV RBC AUTO: 83 FL (ref 78–100)
MONOCYTES # BLD AUTO: 0.6 10E9/L (ref 0–1.3)
MONOCYTES NFR BLD AUTO: 6.1 %
NEUTROPHILS # BLD AUTO: 7.8 10E9/L (ref 1.6–8.3)
NEUTROPHILS NFR BLD AUTO: 76.5 %
NRBC # BLD AUTO: 0 10*3/UL
NRBC BLD AUTO-RTO: 0 /100
PLATELET # BLD AUTO: 282 10E9/L (ref 150–450)
POTASSIUM SERPL-SCNC: 4.5 MMOL/L (ref 3.4–5.3)
PROT SERPL-MCNC: 7.2 G/DL (ref 6.8–8.8)
RBC # BLD AUTO: 3.97 10E12/L (ref 3.8–5.2)
SODIUM SERPL-SCNC: 131 MMOL/L (ref 133–144)
TSH SERPL DL<=0.005 MIU/L-ACNC: 1.72 MU/L (ref 0.4–4)
VIT B12 SERPL-MCNC: 470 PG/ML (ref 193–986)
WBC # BLD AUTO: 10.3 10E9/L (ref 4–11)

## 2018-12-12 PROCEDURE — 84443 ASSAY THYROID STIM HORMONE: CPT | Performed by: INTERNAL MEDICINE

## 2018-12-12 PROCEDURE — G0463 HOSPITAL OUTPT CLINIC VISIT: HCPCS | Mod: ZF

## 2018-12-12 PROCEDURE — 82607 VITAMIN B-12: CPT | Performed by: INTERNAL MEDICINE

## 2018-12-12 PROCEDURE — 83615 LACTATE (LD) (LDH) ENZYME: CPT | Performed by: INTERNAL MEDICINE

## 2018-12-12 PROCEDURE — 85025 COMPLETE CBC W/AUTO DIFF WBC: CPT | Performed by: INTERNAL MEDICINE

## 2018-12-12 PROCEDURE — 99214 OFFICE O/P EST MOD 30 MIN: CPT | Mod: ZP | Performed by: INTERNAL MEDICINE

## 2018-12-12 PROCEDURE — 80053 COMPREHEN METABOLIC PANEL: CPT | Performed by: INTERNAL MEDICINE

## 2018-12-12 RX ORDER — IOPAMIDOL 755 MG/ML
128 INJECTION, SOLUTION INTRAVASCULAR ONCE
Status: COMPLETED | OUTPATIENT
Start: 2018-12-12 | End: 2018-12-12

## 2018-12-12 RX ADMIN — IOPAMIDOL 128 ML: 755 INJECTION, SOLUTION INTRAVASCULAR at 11:37

## 2018-12-12 ASSESSMENT — PAIN SCALES - GENERAL: PAINLEVEL: EXTREME PAIN (8)

## 2018-12-12 ASSESSMENT — MIFFLIN-ST. JEOR: SCORE: 1288.28

## 2018-12-12 NOTE — NURSING NOTE
"Oncology Rooming Note    December 12, 2018 1:17 PM   Gabino Jones is a 78 year old female who presents for:    Chief Complaint   Patient presents with     Oncology Clinic Visit     Return Malignant Melanoma of Nasal Cavity     Initial Vitals: /80 (BP Location: Right arm, Patient Position: Left side, Cuff Size: Adult Large)   Pulse 84   Temp 97.7  F (36.5  C) (Oral)   Resp 18   Ht 1.651 m (5' 5\")   Wt 80.7 kg (178 lb)   SpO2 99%   BMI 29.62 kg/m   Estimated body mass index is 29.62 kg/m  as calculated from the following:    Height as of this encounter: 1.651 m (5' 5\").    Weight as of this encounter: 80.7 kg (178 lb). Body surface area is 1.92 meters squared.  Extreme Pain (8) Comment: Data Unavailable   No LMP recorded. Patient is postmenopausal.  Allergies reviewed: Yes  Medications reviewed: Yes    Medications: Medication refills not needed today.  Pharmacy name entered into [facility]:    [facility] - [place], [place] - [address] [place] [address] AT [address] OF [address] ([facility] - [place], [place] - [address] [facility]    Clinical concerns: right arm pain; weakness; balance; depression is worsening; dementia is getting really bad;      6 minutes for nursing intake (face to face time)     Giovana [facility]              "

## 2018-12-12 NOTE — PROGRESS NOTES
Social Work Follow-Up Encounter Visit  Oncology Clinic    Data/Intervention:  Patient Name:  Gabino Jones  /Age:  1940 (78 year old)    Reason for Follow-Up:  Social work was asked to meet with patient and family during appointment.    Collaborated With:    -Patient   -  -Daughter    Social work met with patient and family in exam room. Daughter and  explained that patient will need neurology appointment to address her increasing memory loss and dementia.  Daughter explained that she and her father have been the primary caregivers for patient in the home (patient lives with her  in [place], [place]). Daughter and  asked about information for other supports and services in the home.  They stated they struggle when patient becomes agitated, verbally lashing out and becoming belligerent with her family.  SW provided support to family regarding these personality changes and new behaviors.  SW gave education about home health (skilled) vs pca services (unskilled) and explained patient's Medicare coverage of services.  Family understands that PCA needs would be out of pocket without a [place] MA plan.  SW gave resources for setting up services including list of private duty home care agencies as well as [facility] [employer] [facility] information. SW also explained that [facility] [employer] [facility] would be beneficial for finding other supportive services and agencies serving their local area due to [place] distance from the [facility].  Daughter and  were appreciative of education given and indicated intent to follow up with organizations as needed.      Resources Provided:  List of private duty home care agencies  Sen[facility] Linkage Line    Assessment:  Patient did not engage in conversation during meeting.  She demonstrated cognitive deficits, often perseverating on other topics (ie. Nosebleeds) and frequently requiring redirection.  Daughter and  were supportive of patient's needs and  care.     Plan:  SW continues to be available to assist with any other identified needs.  SW contact information provided to family.    Soo Yeon Han, MSW, LICSW  Pager: [phone]  Phone: [phone]

## 2018-12-12 NOTE — DISCHARGE INSTRUCTIONS

## 2018-12-12 NOTE — LETTER
12/12/2018       RE: Gabino Jones  [address]  [facility] [id number]     Dear Colleague,    Thank you for referring your patient, Gabino Jones, to the [facility]. Please see a copy of my visit note below.    [facility]  FOLLOW-UP VISIT NOTE    PATIENT NAME: Gabino Jones MRN # [id number]  DATE OF VISIT: Dec 12, 2018 YOB: 1940    REFERRING PROVIDER: Yolanda Whitaker MD   PHYSICIANS  [address]  [place] [address]    CANCER TYPE: Malignant Melanoma  STAGE:   ECOG PS: 1    TREATMENT SUMMARY:  Gabino presented with difficulty to breathe for previous 6-7 months due to partial nasal obstruction starting December 2013. She had been following with a local ENT surgeon and was prescribed nasal drops several courses of antibiotics for presumed ethmoid sinusitis.  Most recently in the last couple of weeks she has passed a few blood clots from her nose. She had an episode of epistaxis in April of 2014 which resolved on its own. On 06/03/14, she underwent right intranasal endoscopic sinus surgery for her ethmoid sinusitis as well as resection of the right nasal mass measuring 2.5 x 2 x 1.2 cm at [facility]. Following the procedure, she continued to have hemorrhage from the right nasal cavity and was taken back to the OR that same day for a repeat nasal endoscopy, where it was determined that it was a posterior bleed and that the mass had not been completely resected. The remainder of the mass was resected and the bleeding stopped. The following morning (6/04/14), she underwent another endoscopy at [facility] due to continued bleeding, and her right nasal cavity was packed. The histopathology from her recent resection revealed malignant melanoma.  The margins were positive.  This prompted a referral to [facility] and she was seen in ENT surgery by Dr. Whitaker and Dr. Velasquez  Junaid in Radiation/Oncology in addition to Medical Oncology.  She was worked up with a staging PET/CT scan and a brain MRI.  The PET/CT scan revealed minimal uptake in the local area which could very well be from recent surgery or residual disease.  She had multiple subcentimeter pulmonary nodules with the largest one being 6 mm in the left lower lobe.  She had a history of pulmonary nodules which had been previously followed for 3 years. The PET was also significant for an enlarged portacaval node with mild FDG uptake and FDG uptake in the hepatic flexure of her colon.  Her most recent colonoscopy has been merely 2 months ago and was completely normal.  Her brain MRI was limited because of motion artifact. There were no enlarged lymph nodes in the head and neck region or any other site of increased FDG uptake that would be suspicious for definitive metastatic disease.      Her case was reviewed at the multi-specialty tumor board and she was recommended endoscopic resection of the melanoma followed by post operative radiation therapy. She underwent a rerevision surgery on 06/23/2014 using an endoscopic endonasal approach. Tumor was located at the anterior skull base between the middle turbinate and the septum. Final pathology report showed clear margins. She was started on radiation therapy under care of Dr. Ron Quiroga in July. Unfortunately she fell and fractured her left humerus in end of August. It was decided not to proceed with surgical intervention as this would involve intubation which would be difficult given ongoing radiation therapy. She had a difficult hospital course with UTI, confusion. She was managed conservatively and radiation therapy was resumed - eventually completed on September 11, 2014.     She has been followed with surveillance imaging since 2014. MRI in 2016 and again on 2/15/17 showed a 13 mm enhancing T1 hypointense lesion in the right side of the frontal bone. This was biopsied by  neurosurgery on 2/22/17 and was benign.       SUBJECTIVE   Gabino is being followed for her malignant melanoma status post resection on 6/04/14 and then re-resection on 06/24/14 with negative margins followed by radiation therapy from July through September 11, 2014.     She is accompanied by her  and her daughter at this clinic visit. Gabino has no new complaints. Though her family have brought in a long list of concerns. 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         PAST MEDICAL HISTORY   1. HTN  2. Dyslipidemia  3. Depression on medications  4. Pulmonary nodules  5. Cholecystectomy  6. OA - Back and Neck surgeries done   7. Fibromyalgia      CURRENT OUTPATIENT MEDICATIONS     Current Outpatient Medications   Medication Sig     acetaminophen (TYLENOL) 500 MG tablet Take 1 tablet (500 mg) by mouth every 6 hours as needed     atorvastatin (LIPITOR) 20 MG tablet Take 1 tablet (20 mg) by mouth daily     buPROPion (WELLBUTRIN XL) 150 MG 24 hr tablet TAKE 1 TABLET(150 MG) BY MOUTH EVERY MORNING     hydrochlorothiazide (MICROZIDE) 12.5 MG capsule Take 1 capsule (12.5 mg) by mouth daily     hydrOXYzine (ATARAX) 25 MG tablet TAKE 1 TABLET(25 MG) BY MOUTH EVERY 6 HOURS AS NEEDED FOR ANXIETY     ibuprofen (ADVIL/MOTRIN) 800 MG tablet Take 1 tablet (800 mg) by mouth every 4 hours as needed for moderate pain Reported on 4/17/2017     lisinopril (PRINIVIL,ZESTRIL) 30 MG tablet Take 1 tablet (30 mg) by mouth daily     pantoprazole (PROTONIX) 40 MG EC tablet Take 1 tablet (40 mg) by mouth daily Take 30-60 minutes before a meal.     potassium chloride (MICRO-K) 10 MEQ CR capsule Take 2 capsules (20 mEq) by mouth daily     ranitidine (ZANTAC) 150 MG tablet TAKE 1 TABLET BY MOUTH TWICE DAILY AS NEEDED     senna-docusate (SENOKOT-S;PERICOLACE) 8.6-50 MG per tablet Take 1-2 tablets by mouth 2 times daily     Simethicone 180 MG CAPS Take 1 capsule by mouth 2 times daily Reported on 5/9/2017  "    venlafaxine (EFFEXOR-XR) 75 MG 24 hr capsule TAKE 3 CAPSULES BY MOUTH DAILY     VITAMIN D, CHOLECALCIFEROL, PO Take 2,000 Units by mouth daily     ALPRAZolam (XANAX) 0.25 MG tablet TAKE 1 TABLET BY MOUTH THREE TIMES DAILY AS NEEDED FOR ANXIETY (Patient not taking: Reported on 12/12/2018)     GuaiFENesin 100 MG PACK Take 1 tablet by mouth 2 times daily as needed (Patient not taking: Reported on 12/12/2018)     No current facility-administered medications for this visit.           ALLERGIES     Allergies   Allergen Reactions     Novocain [Procaine] Unknown and Rash     Mirtazapine      Nefazodone Unknown and Rash      PHYSICAL EXAM   /80 (BP Location: Right arm, Patient Position: Left side, Cuff Size: Adult Large)   Pulse 84   Temp 97.7  F (36.5  C) (Oral)   Resp 18   Ht 1.651 m (5' 5\")   Wt 80.7 kg (178 lb)   SpO2 99%   BMI 29.62 kg/m      SpO2 Readings from Last 4 Encounters:   09/12/18 100%   07/20/18 98%   04/30/18 98%   03/14/18 99%     Wt Readings from Last 3 Encounters:   12/12/18 80.7 kg (178 lb)   09/12/18 80.7 kg (177 lb 14.6 oz)   07/20/18 76.4 kg (168 lb 6.4 oz)     GEN: NAD  HEENT: pupils equal, EOMI, no icterus. Oropharynx is clear.   NECK: no obvious cervical or supraclavicular lymphadenopathy  LUNGS: clear bilaterally  CV: regular rate and rhythm, systolic murmur   ABDOMEN: soft, non-tender, non-distended, normal bowel sounds, no hepatosplenomegaly  EXT: warm, well perfused, no edema  NEURO: alert  SKIN: no rashes     LABORATORY AND IMAGING STUDIES     Recent Labs   Lab Test 12/12/18  1245 09/12/18  1126 03/14/18  1350 12/13/17  1026 10/04/17  0610 09/26/17  1536   * 128* 135 136  --  134   POTASSIUM 4.5 3.9 4.0 3.0* 3.6 4.3   CHLORIDE 98 96 103 101  --  98   CO2 24 25 24 29  --  26   ANIONGAP 9 8 8 6  --  10   BUN 18 12 17 13  --  8   CR 0.76 0.76 0.81 0.76  --  0.87   * 93 85 91  --  87   STEFFANIE 8.3* 8.9 8.4* 7.6*  --  9.1     Recent Labs   Lab Test 09/02/14  0550 " 09/01/14  0532 08/31/14  0525 08/30/14  0909 08/30/14  0616   MAG 1.6 1.9 1.8 2.2 1.5*     Recent Labs   Lab Test 12/12/18  1245 09/12/18  1126 03/14/18  1350 12/13/17  1026 09/26/17  1536 03/28/17  1553   WBC 10.3 9.5 6.8 8.6  --  10.8   HGB 10.2* 10.5* 11.4* 9.3* 12.6 12.7    339 225 275  --  307   MCV 83 84 88 91  --  91   NEUTROPHIL 76.5 77.7 73.8 74.0  --  87.5     Recent Labs   Lab Test 12/12/18  1245 09/12/18  1126 03/14/18  1350   BILITOTAL 0.3 0.4 0.3   ALKPHOS 96 107 83   ALT 16 19 13   AST 19 20 19   ALBUMIN 3.2* 3.3* 3.5    182 168     TSH   Date Value Ref Range Status   02/15/2017 2.27 0.40 - 4.00 mU/L Final   08/10/2016 2.32 0.40 - 4.00 mU/L Final   03/19/2015 1.34 0.40 - 4.00 mU/L Final     Comment:     Effective 7/30/2014, the reference range for this assay has changed to reflect   new instrumentation/methodology.       No results for input(s): CEA in the last 03723 hours.  Results for orders placed or performed in visit on 12/12/18   CT Chest/Abdomen/Pelvis w Contrast    Narrative    EXAMINATION: CT CHEST/ABDOMEN/PELVIS W CONTRAST, 12/12/2018 11:51 AM    TECHNIQUE:  Helical CT images from the thoracic inlet through the  symphysis pubis were obtained  with contrast. Contrast dose: 128mL  [id number]    COMPARISON: 9/12/2018.    HISTORY: Restaging Melanoma - new infiltrate in LLL; Restaging  Melanoma - new infiltrate in LLL; Malignant melanoma of nasal cavity  (H)    FINDINGS:    LUNGS: Biapical pleural thickening/scarring. Apical predominant  centrilobular emphysematous changes. Peripheral reticulation in both  lungs with scattered fibrotic and atelectatic changes. Mild bronchial  wall thickening with minimal traction bronchiectasis in the lung  bases. Calcified and noncalcified pulmonary nodules measuring up to 6  mm in the left lower lobe, stable. Improved left lower lobe  consolidative changes. No acute airspace opacities.    Chest: Partially visualized thyroid gland is unremarkable.  Central  tracheobronchial tree is patent. Minimal patulous esophagus. Thoracic  aorta and main pulmonary artery have normal diameter. Atherosclerotic  calcifications of the thoracic aorta, great vessels and coronary  arteries. Cardiac size within normal limits. No pericardial effusions.  No pleural effusions. Minimal fluid in the superior aortic recess. No  pneumothorax. Subcentimeter thoracic lymph nodes, not enlarged by size  criteria. Calcified left hilar lymph nodes.    Abdomen and pelvis: No arterially enhancing liver lesions. No focal  liver lesions. Patent hepatic vasculature. Prominent central  intrahepatic biliary tree. Dilated common bile duct measuring 1.2 cm  with smooth tapering up to the level of the duodenal papilla likely  representing reservoir effect post cholecystectomy. Surgical clips in  the gallbladder fossa.    Homogeneous pancreatic parenchyma. Tiny hypodensities at the  pancreatic head likely representing interspersed fat. Main pancreatic  duct is not dilated. The spleen is not enlarged. No focal splenic  lesions. Scattered splenic calcified granulomas.    Thickening of bilateral adrenal glands with no discrete nodule. No  renal stones or hydronephrosis. Subcentimeter cortical hypodensities  in the kidneys, too small to characterize, stable, likely representing  renal cysts. Partially distended urinary bladder. Pelvic phleboliths.  Heterogeneous uterus with suggestion of fibroids. No adnexal masses.  No free fluid. No free air.    No inguinal lymphadenopathy. No suspicious worrisome pelvic lymph  nodes. No abdominal aortic aneurysm. Atherosclerotic calcifications of  the abdominal aorta and its major branches. Subcentimeter  retroperitoneal and mesenteric lymph nodes, not enlarged by size  criteria.    Decompressed stomach. No dilated loops of bowel. No bowel wall  thickening. Hyperattenuating material in the transverse colon likely  representing ingested medication. Colonic diverticulosis.  No  associated CT findings of acute diverticulitis. Moderate colonic stool  burden.    Bones: Enthesopathic changes off the pelvis and hips. Degenerative  changes of the spine, bilateral SI joints, hips and shoulders.  Scattered Schmorl nodes. Scattered intradiscal gas. No aggressive bone  lesions. Previously seen subacute proximal left humerus fracture is  not included in the field-of-view in the present study.      Impression    IMPRESSION: In this patient with history of malignant melanoma:  1. Improved, almost completely resolved left lower lobe consolidative  changes, likely resolving infection/aspiration.  2. Stable calcified and noncalcified pulmonary nodules. No new or  enlarging pulmonary nodules.  3. No convincing evidence of metastatic disease within the abdomen,  pelvis and bones.  4. Colonic diverticulosis. No associated CT findings of acute  diverticulitis.    VELIA YUSUF MD            ASSESSMENT AND PLAN   78 year old female with right sinonasal mucosal malignant melanoma status post resection on 6/04/14 and then re-resection on 06/24/14 with negative margins followed by radiation therapy from July through September 11, 2014 (6000 cGy in 30 fractions).   b-aubrie and c-KIT status unknown  New worsening cognitive status    We reviewed her scans from earlier today. There is no evidence of recurrent disease. She is nearly 5 years out from surgical resection and radiation, with no recurrence of disease. I do not feel that she would merit from any further evaluation with scheduled restaging scans in medical oncology. She does not need to follow in Medical Oncology unless there is a concern for active cancer. She has completed her planned surveillance.     His daughter has pointed out a list of complains. Gabino sleeps most of the day. She appears depressed. She has mood swings. She is forgetful and mixes words. She appears confused about the timing of things. She has complete apathy towards everyone  including herself. Her  points towards poor hygiene, lack of appetite, generalized weak health. She is abusive, uses foul language, combative and paranoid that both her  and daughter gang up on her.     I will get vitamin B12 and TSH added to her labs. I will get a brain MRI done. I will refer her to neurology.     Over 25 min of direct face to face time spent with patient with more than 50% time spent in counseling and coordinating care.        Again, thank you for allowing me to participate in the care of your patient.      Sincerely,    Garett Obregon MD

## 2018-12-12 NOTE — PROGRESS NOTES
[facility] CANCER CLINIC  FOLLOW-UP VISIT NOTE    PATIENT NAME: Gabino Jones MRN # [id number]  DATE OF VISIT: Dec 12, 2018 YOB: 1940    REFERRING PROVIDER: Yolanda Whitaker MD   PHYSICIANS  [address]  [place] [address]    CANCER TYPE: Malignant Melanoma  STAGE:   ECOG PS: 1    TREATMENT SUMMARY:  Gabino presented with difficulty to breathe for previous 6-7 months due to partial nasal obstruction starting December 2013. She had been following with a local ENT surgeon and was prescribed nasal drops several courses of antibiotics for presumed ethmoid sinusitis.  Most recently in the last couple of weeks she has passed a few blood clots from her nose. She had an episode of epistaxis in April of 2014 which resolved on its own. On 06/03/14, she underwent right intranasal endoscopic sinus surgery for her ethmoid sinusitis as well as resection of the right nasal mass measuring 2.5 x 2 x 1.2 cm at [facility]. Following the procedure, she continued to have hemorrhage from the right nasal cavity and was taken back to the OR that same day for a repeat nasal endoscopy, where it was determined that it was a posterior bleed and that the mass had not been completely resected. The remainder of the mass was resected and the bleeding stopped. The following morning (6/04/14), she underwent another endoscopy at [facility] due to continued bleeding, and her right nasal cavity was packed. The histopathology from her recent resection revealed malignant melanoma.  The margins were positive.  This prompted a referral to [facility] and she was seen in ENT surgery by Dr. Whitaker and Dr. Ron Quiroga in Radiation/Oncology in addition to Medical Oncology.  She was worked up with a staging PET/CT scan and a brain MRI.  The PET/CT scan revealed minimal uptake in the local area which could very well be from recent surgery or residual  disease.  She had multiple subcentimeter pulmonary nodules with the largest one being 6 mm in the left lower lobe.  She had a history of pulmonary nodules which had been previously followed for 3 years. The PET was also significant for an enlarged portacaval node with mild FDG uptake and FDG uptake in the hepatic flexure of her colon.  Her most recent colonoscopy has been merely 2 months ago and was completely normal.  Her brain MRI was limited because of motion artifact. There were no enlarged lymph nodes in the head and neck region or any other site of increased FDG uptake that would be suspicious for definitive metastatic disease.      Her case was reviewed at the multi-specialty tumor board and she was recommended endoscopic resection of the melanoma followed by post operative radiation therapy. She underwent a rerevision surgery on 06/23/2014 using an endoscopic endonasal approach. Tumor was located at the anterior skull base between the middle turbinate and the septum. Final pathology report showed clear margins. She was started on radiation therapy under care of Dr. Ron Quiroga in July. Unfortunately she fell and fractured her left humerus in end of August. It was decided not to proceed with surgical intervention as this would involve intubation which would be difficult given ongoing radiation therapy. She had a difficult hospital course with UTI, confusion. She was managed conservatively and radiation therapy was resumed - eventually completed on September 11, 2014.     She has been followed with surveillance imaging since 2014. MRI in 2016 and again on 2/15/17 showed a 13 mm enhancing T1 hypointense lesion in the right side of the frontal bone. This was biopsied by neurosurgery on 2/22/17 and was benign.       ALAN Lloyd is being followed for her malignant melanoma status post resection on 6/04/14 and then re-resection on 06/24/14 with negative margins followed by radiation therapy from July through  September 11, 2014.     She is accompanied by her  and her daughter at this clinic visit. Gabino has no new complaints. Though her family have brought in a long list of concerns. 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         PAST MEDICAL HISTORY   1. HTN  2. Dyslipidemia  3. Depression on medications  4. Pulmonary nodules  5. Cholecystectomy  6. OA - Back and Neck surgeries done   7. Fibromyalgia      CURRENT OUTPATIENT MEDICATIONS     Current Outpatient Medications   Medication Sig     acetaminophen (TYLENOL) 500 MG tablet Take 1 tablet (500 mg) by mouth every 6 hours as needed     atorvastatin (LIPITOR) 20 MG tablet Take 1 tablet (20 mg) by mouth daily     buPROPion (WELLBUTRIN XL) 150 MG 24 hr tablet TAKE 1 TABLET(150 MG) BY MOUTH EVERY MORNING     hydrochlorothiazide (MICROZIDE) 12.5 MG capsule Take 1 capsule (12.5 mg) by mouth daily     hydrOXYzine (ATARAX) 25 MG tablet TAKE 1 TABLET(25 MG) BY MOUTH EVERY 6 HOURS AS NEEDED FOR ANXIETY     ibuprofen (ADVIL/MOTRIN) 800 MG tablet Take 1 tablet (800 mg) by mouth every 4 hours as needed for moderate pain Reported on 4/17/2017     lisinopril (PRINIVIL,ZESTRIL) 30 MG tablet Take 1 tablet (30 mg) by mouth daily     pantoprazole (PROTONIX) 40 MG EC tablet Take 1 tablet (40 mg) by mouth daily Take 30-60 minutes before a meal.     potassium chloride (MICRO-K) 10 MEQ CR capsule Take 2 capsules (20 mEq) by mouth daily     ranitidine (ZANTAC) 150 MG tablet TAKE 1 TABLET BY MOUTH TWICE DAILY AS NEEDED     senna-docusate (SENOKOT-S;PERICOLACE) 8.6-50 MG per tablet Take 1-2 tablets by mouth 2 times daily     Simethicone 180 MG CAPS Take 1 capsule by mouth 2 times daily Reported on 5/9/2017     venlafaxine (EFFEXOR-XR) 75 MG 24 hr capsule TAKE 3 CAPSULES BY MOUTH DAILY     VITAMIN D, CHOLECALCIFEROL, PO Take 2,000 Units by mouth daily     ALPRAZolam (XANAX) 0.25 MG tablet TAKE 1 TABLET BY MOUTH THREE TIMES DAILY AS NEEDED FOR ANXIETY  "(Patient not taking: Reported on 12/12/2018)     GuaiFENesin 100 MG PACK Take 1 tablet by mouth 2 times daily as needed (Patient not taking: Reported on 12/12/2018)     No current facility-administered medications for this visit.           ALLERGIES     Allergies   Allergen Reactions     Novocain [Procaine] Unknown and Rash     Mirtazapine      Nefazodone Unknown and Rash      PHYSICAL EXAM   /80 (BP Location: Right arm, Patient Position: Left side, Cuff Size: Adult Large)   Pulse 84   Temp 97.7  F (36.5  C) (Oral)   Resp 18   Ht 1.651 m (5' 5\")   Wt 80.7 kg (178 lb)   SpO2 99%   BMI 29.62 kg/m     SpO2 Readings from Last 4 Encounters:   09/12/18 100%   07/20/18 98%   04/30/18 98%   03/14/18 99%     Wt Readings from Last 3 Encounters:   12/12/18 80.7 kg (178 lb)   09/12/18 80.7 kg (177 lb 14.6 oz)   07/20/18 76.4 kg (168 lb 6.4 oz)     GEN: NAD  HEENT: pupils equal, EOMI, no icterus. Oropharynx is clear.   NECK: no obvious cervical or supraclavicular lymphadenopathy  LUNGS: clear bilaterally  CV: regular rate and rhythm, systolic murmur   ABDOMEN: soft, non-tender, non-distended, normal bowel sounds, no hepatosplenomegaly  EXT: warm, well perfused, no edema  NEURO: alert  SKIN: no rashes     LABORATORY AND IMAGING STUDIES     Recent Labs   Lab Test 12/12/18  1245 09/12/18  1126 03/14/18  1350 12/13/17  1026 10/04/17  0610 09/26/17  1536   * 128* 135 136  --  134   POTASSIUM 4.5 3.9 4.0 3.0* 3.6 4.3   CHLORIDE 98 96 103 101  --  98   CO2 24 25 24 29  --  26   ANIONGAP 9 8 8 6  --  10   BUN 18 12 17 13  --  8   CR 0.76 0.76 0.81 0.76  --  0.87   * 93 85 91  --  87   STEFFANIE 8.3* 8.9 8.4* 7.6*  --  9.1     Recent Labs   Lab Test 09/02/14  0550 09/01/14  0532 08/31/14  0525 08/30/14  0909 08/30/14  0616   MAG 1.6 1.9 1.8 2.2 1.5*     Recent Labs   Lab Test 12/12/18  1245 09/12/18  1126 03/14/18  1350 12/13/17  1026 09/26/17  1536 03/28/17  1553   WBC 10.3 9.5 6.8 8.6  --  10.8   HGB 10.2* 10.5* " 11.4* 9.3* 12.6 12.7    339 225 275  --  307   MCV 83 84 88 91  --  91   NEUTROPHIL 76.5 77.7 73.8 74.0  --  87.5     Recent Labs   Lab Test 12/12/18  1245 09/12/18  1126 03/14/18  1350   BILITOTAL 0.3 0.4 0.3   ALKPHOS 96 107 83   ALT 16 19 13   AST 19 20 19   ALBUMIN 3.2* 3.3* 3.5    182 168     TSH   Date Value Ref Range Status   02/15/2017 2.27 0.40 - 4.00 mU/L Final   08/10/2016 2.32 0.40 - 4.00 mU/L Final   03/19/2015 1.34 0.40 - 4.00 mU/L Final     Comment:     Effective 7/30/2014, the reference range for this assay has changed to reflect   new instrumentation/methodology.       No results for input(s): CEA in the last [id number] hours.  Results for orders placed or performed in visit on 12/12/18   CT Chest/Abdomen/Pelvis w Contrast    Narrative    EXAMINATION: CT CHEST/ABDOMEN/PELVIS W CONTRAST, 12/12/2018 11:51 AM    TECHNIQUE:  Helical CT images from the thoracic inlet through the  symphysis pubis were obtained  with contrast. Contrast dose: 128mL  Isovue-[id number]    COMPARISON: 9/12/2018.    HISTORY: Restaging Melanoma - new infiltrate in LLL; Restaging  Melanoma - new infiltrate in LLL; Malignant melanoma of nasal cavity  (H)    FINDINGS:    LUNGS: Biapical pleural thickening/scarring. Apical predominant  centrilobular emphysematous changes. Peripheral reticulation in both  lungs with scattered fibrotic and atelectatic changes. Mild bronchial  wall thickening with minimal traction bronchiectasis in the lung  bases. Calcified and noncalcified pulmonary nodules measuring up to 6  mm in the left lower lobe, stable. Improved left lower lobe  consolidative changes. No acute airspace opacities.    Chest: Partially visualized thyroid gland is unremarkable. Central  tracheobronchial tree is patent. Minimal patulous esophagus. Thoracic  aorta and main pulmonary artery have normal diameter. Atherosclerotic  calcifications of the thoracic aorta, great vessels and coronary  arteries. Cardiac size within normal  limits. No pericardial effusions.  No pleural effusions. Minimal fluid in the superior aortic recess. No  pneumothorax. Subcentimeter thoracic lymph nodes, not enlarged by size  criteria. Calcified left hilar lymph nodes.    Abdomen and pelvis: No arterially enhancing liver lesions. No focal  liver lesions. Patent hepatic vasculature. Prominent central  intrahepatic biliary tree. Dilated common bile duct measuring 1.2 cm  with smooth tapering up to the level of the duodenal papilla likely  representing reservoir effect post cholecystectomy. Surgical clips in  the gallbladder fossa.    Homogeneous pancreatic parenchyma. Tiny hypodensities at the  pancreatic head likely representing interspersed fat. Main pancreatic  duct is not dilated. The spleen is not enlarged. No focal splenic  lesions. Scattered splenic calcified granulomas.    Thickening of bilateral adrenal glands with no discrete nodule. No  renal stones or hydronephrosis. Subcentimeter cortical hypodensities  in the kidneys, too small to characterize, stable, likely representing  renal cysts. Partially distended urinary bladder. Pelvic phleboliths.  Heterogeneous uterus with suggestion of fibroids. No adnexal masses.  No free fluid. No free air.    No inguinal lymphadenopathy. No suspicious worrisome pelvic lymph  nodes. No abdominal aortic aneurysm. Atherosclerotic calcifications of  the abdominal aorta and its major branches. Subcentimeter  retroperitoneal and mesenteric lymph nodes, not enlarged by size  criteria.    Decompressed stomach. No dilated loops of bowel. No bowel wall  thickening. Hyperattenuating material in the transverse colon likely  representing ingested medication. Colonic diverticulosis. No  associated CT findings of acute diverticulitis. Moderate colonic stool  burden.    Bones: Enthesopathic changes off the pelvis and hips. Degenerative  changes of the spine, bilateral SI joints, hips and shoulders.  Scattered Schmorl nodes. Scattered  intradiscal gas. No aggressive bone  lesions. Previously seen subacute proximal left humerus fracture is  not included in the field-of-view in the present study.      Impression    IMPRESSION: In this patient with history of malignant melanoma:  1. Improved, almost completely resolved left lower lobe consolidative  changes, likely resolving infection/aspiration.  2. Stable calcified and noncalcified pulmonary nodules. No new or  enlarging pulmonary nodules.  3. No convincing evidence of metastatic disease within the abdomen,  pelvis and bones.  4. Colonic diverticulosis. No associated CT findings of acute  diverticulitis.    VELIA YUSUF MD            ASSESSMENT AND PLAN   78 year old female with right sinonasal mucosal malignant melanoma status post resection on 6/04/14 and then re-resection on 06/24/14 with negative margins followed by radiation therapy from July through September 11, 2014 (6000 cGy in 30 fractions).   b-aubrie and c-KIT status unknown  New worsening cognitive status    We reviewed her scans from earlier today. There is no evidence of recurrent disease. She is nearly 5 years out from surgical resection and radiation, with no recurrence of disease. I do not feel that she would merit from any further evaluation with scheduled restaging scans in medical oncology. She does not need to follow in Medical Oncology unless there is a concern for active cancer. She has completed her planned surveillance.     His daughter has pointed out a list of complains. Gabino sleeps most of the day. She appears depressed. She has mood swings. She is forgetful and mixes words. She appears confused about the timing of things. She has complete apathy towards everyone including herself. Her  points towards poor hygiene, lack of appetite, generalized weak health. She is abusive, uses foul language, combative and paranoid that both her  and daughter gang up on her.     I will get vitamin B12 and TSH added  to her labs. I will get a brain MRI done. I will refer her to neurology.     Over 25 min of direct face to face time spent with patient with more than 50% time spent in counseling and coordinating care.

## 2018-12-13 LAB
CREAT BLD-MCNC: 0.8 MG/DL (ref 0.52–1.04)
GFR SERPL CREATININE-BSD FRML MDRD: 69 ML/MIN/1.7M2

## 2018-12-17 DIAGNOSIS — K21.9 GASTROESOPHAGEAL REFLUX DISEASE WITHOUT ESOPHAGITIS: ICD-10-CM

## 2018-12-18 ENCOUNTER — TELEPHONE (OUTPATIENT)
Dept: SPIRITUAL SERVICES | Facility: CLINIC | Age: 78
End: 2018-12-18

## 2018-12-18 NOTE — TELEPHONE ENCOUNTER
"SPIRITUAL HEALTH SERVICES  Telephone Encounter     Reason: Referred to contact Gabino through her response to the oncology distress screen. \"Do you struggle with the loss of meaning and bg in your life? : QUITE A BIT\"    Intervention: Reviewed documentation. I contacted Gabino and left a voicemail.    Plan: I have no plan for follow-up.    Tab Francis MDiv, [facility]  Staff      "

## 2018-12-19 NOTE — TELEPHONE ENCOUNTER
Ranitidine    Prescription approved per [facility] Refill Protocol.    Nhi Llanos, RN, BSN

## 2019-01-01 ENCOUNTER — TELEPHONE (OUTPATIENT)
Dept: CARDIOLOGY | Facility: CLINIC | Age: 79
End: 2019-01-01

## 2019-01-01 ENCOUNTER — HOSPITAL ENCOUNTER (OUTPATIENT)
Dept: CT IMAGING | Facility: CLINIC | Age: 79
Discharge: HOME | End: 2019-07-10
Attending: INTERNAL MEDICINE | Admitting: INTERNAL MEDICINE
Payer: MEDICARE

## 2019-01-01 ENCOUNTER — TELEPHONE (OUTPATIENT)
Dept: FAMILY MEDICINE | Facility: CLINIC | Age: 79
End: 2019-01-01

## 2019-01-01 ENCOUNTER — ANESTHESIA EVENT (OUTPATIENT)
Dept: SURGERY | Facility: CLINIC | Age: 79
DRG: 267 | End: 2019-01-01
Payer: MEDICARE

## 2019-01-01 ENCOUNTER — ANCILLARY PROCEDURE (OUTPATIENT)
Dept: CARDIOLOGY | Facility: CLINIC | Age: 79
End: 2019-01-01
Attending: INTERNAL MEDICINE
Payer: MEDICARE

## 2019-01-01 ENCOUNTER — HOSPITAL ENCOUNTER (OUTPATIENT)
Dept: CARDIOLOGY | Facility: CLINIC | Age: 79
DRG: 267 | End: 2019-09-18
Attending: INTERNAL MEDICINE | Admitting: INTERNAL MEDICINE
Payer: MEDICARE

## 2019-01-01 ENCOUNTER — TELEPHONE (OUTPATIENT)
Dept: OTOLARYNGOLOGY | Facility: CLINIC | Age: 79
End: 2019-01-01

## 2019-01-01 ENCOUNTER — OFFICE VISIT (OUTPATIENT)
Dept: OTOLARYNGOLOGY | Facility: CLINIC | Age: 79
End: 2019-01-01
Payer: MEDICARE

## 2019-01-01 ENCOUNTER — HOSPITAL ENCOUNTER (OUTPATIENT)
Dept: CT IMAGING | Facility: CLINIC | Age: 79
End: 2019-07-10
Attending: INTERNAL MEDICINE
Payer: MEDICARE

## 2019-01-01 ENCOUNTER — CARE COORDINATION (OUTPATIENT)
Dept: CARDIOLOGY | Facility: CLINIC | Age: 79
End: 2019-01-01

## 2019-01-01 ENCOUNTER — HOSPITAL ENCOUNTER (OUTPATIENT)
Dept: CT IMAGING | Facility: CLINIC | Age: 79
End: 2019-07-10
Attending: OTOLARYNGOLOGY
Payer: MEDICARE

## 2019-01-01 ENCOUNTER — ANCILLARY PROCEDURE (OUTPATIENT)
Dept: MRI IMAGING | Facility: CLINIC | Age: 79
End: 2019-01-01
Attending: OTOLARYNGOLOGY
Payer: MEDICARE

## 2019-01-01 ENCOUNTER — IMMUNIZATION (OUTPATIENT)
Dept: FAMILY MEDICINE | Facility: CLINIC | Age: 79
End: 2019-01-01
Payer: MEDICARE

## 2019-01-01 ENCOUNTER — APPOINTMENT (OUTPATIENT)
Dept: LAB | Facility: CLINIC | Age: 79
End: 2019-01-01
Attending: INTERNAL MEDICINE
Payer: MEDICARE

## 2019-01-01 ENCOUNTER — APPOINTMENT (OUTPATIENT)
Dept: GENERAL RADIOLOGY | Facility: CLINIC | Age: 79
DRG: 267 | End: 2019-01-01
Attending: STUDENT IN AN ORGANIZED HEALTH CARE EDUCATION/TRAINING PROGRAM
Payer: MEDICARE

## 2019-01-01 ENCOUNTER — OFFICE VISIT (OUTPATIENT)
Dept: CARDIOLOGY | Facility: CLINIC | Age: 79
End: 2019-01-01
Attending: THORACIC SURGERY (CARDIOTHORACIC VASCULAR SURGERY)
Payer: MEDICARE

## 2019-01-01 ENCOUNTER — PATIENT OUTREACH (OUTPATIENT)
Dept: CARE COORDINATION | Facility: CLINIC | Age: 79
End: 2019-01-01

## 2019-01-01 ENCOUNTER — APPOINTMENT (OUTPATIENT)
Dept: MEDSURG UNIT | Facility: CLINIC | Age: 79
End: 2019-01-01
Attending: INTERNAL MEDICINE
Payer: MEDICARE

## 2019-01-01 ENCOUNTER — HOSPITAL ENCOUNTER (OUTPATIENT)
Facility: CLINIC | Age: 79
End: 2019-01-01

## 2019-01-01 ENCOUNTER — HOSPITAL ENCOUNTER (OUTPATIENT)
Facility: CLINIC | Age: 79
Discharge: HOME | End: 2019-07-18
Attending: INTERNAL MEDICINE | Admitting: INTERNAL MEDICINE
Payer: MEDICARE

## 2019-01-01 ENCOUNTER — ONCOLOGY VISIT (OUTPATIENT)
Dept: ONCOLOGY | Facility: CLINIC | Age: 79
End: 2019-01-01
Attending: INTERNAL MEDICINE
Payer: MEDICARE

## 2019-01-01 ENCOUNTER — APPOINTMENT (OUTPATIENT)
Dept: CARDIOLOGY | Facility: CLINIC | Age: 79
DRG: 267 | End: 2019-01-01
Attending: STUDENT IN AN ORGANIZED HEALTH CARE EDUCATION/TRAINING PROGRAM
Payer: MEDICARE

## 2019-01-01 ENCOUNTER — OFFICE VISIT (OUTPATIENT)
Dept: CARDIOLOGY | Facility: CLINIC | Age: 79
End: 2019-01-01
Attending: NURSE PRACTITIONER
Payer: MEDICARE

## 2019-01-01 ENCOUNTER — APPOINTMENT (OUTPATIENT)
Dept: OCCUPATIONAL THERAPY | Facility: CLINIC | Age: 79
DRG: 267 | End: 2019-01-01
Attending: STUDENT IN AN ORGANIZED HEALTH CARE EDUCATION/TRAINING PROGRAM
Payer: MEDICARE

## 2019-01-01 ENCOUNTER — HOSPITAL ENCOUNTER (INPATIENT)
Facility: CLINIC | Age: 79
LOS: 2 days | Discharge: HOME | DRG: 267 | End: 2019-09-20
Attending: INTERNAL MEDICINE | Admitting: THORACIC SURGERY (CARDIOTHORACIC VASCULAR SURGERY)
Payer: MEDICARE

## 2019-01-01 ENCOUNTER — ANCILLARY PROCEDURE (OUTPATIENT)
Dept: ULTRASOUND IMAGING | Facility: CLINIC | Age: 79
End: 2019-01-01
Attending: INTERNAL MEDICINE
Payer: MEDICARE

## 2019-01-01 ENCOUNTER — DOCUMENTATION ONLY (OUTPATIENT)
Dept: CARE COORDINATION | Facility: CLINIC | Age: 79
End: 2019-01-01

## 2019-01-01 ENCOUNTER — PRE VISIT (OUTPATIENT)
Dept: SURGERY | Facility: CLINIC | Age: 79
End: 2019-01-01

## 2019-01-01 ENCOUNTER — APPOINTMENT (OUTPATIENT)
Dept: PHYSICAL THERAPY | Facility: CLINIC | Age: 79
DRG: 267 | End: 2019-01-01
Attending: PHYSICIAN ASSISTANT
Payer: MEDICARE

## 2019-01-01 ENCOUNTER — MEDICAL CORRESPONDENCE (OUTPATIENT)
Dept: HEALTH INFORMATION MANAGEMENT | Facility: CLINIC | Age: 79
End: 2019-01-01

## 2019-01-01 ENCOUNTER — OFFICE VISIT (OUTPATIENT)
Dept: FAMILY MEDICINE | Facility: CLINIC | Age: 79
End: 2019-01-01
Payer: MEDICARE

## 2019-01-01 ENCOUNTER — OFFICE VISIT (OUTPATIENT)
Dept: SURGERY | Facility: CLINIC | Age: 79
End: 2019-01-01
Payer: MEDICARE

## 2019-01-01 ENCOUNTER — PATIENT OUTREACH (OUTPATIENT)
Dept: OTOLARYNGOLOGY | Facility: CLINIC | Age: 79
End: 2019-01-01

## 2019-01-01 ENCOUNTER — SURGERY (OUTPATIENT)
Age: 79
End: 2019-01-01
Payer: MEDICARE

## 2019-01-01 ENCOUNTER — PRE VISIT (OUTPATIENT)
Dept: CARDIOLOGY | Facility: CLINIC | Age: 79
End: 2019-01-01

## 2019-01-01 ENCOUNTER — PRE VISIT (OUTPATIENT)
Dept: ONCOLOGY | Facility: CLINIC | Age: 79
End: 2019-01-01

## 2019-01-01 ENCOUNTER — OFFICE VISIT (OUTPATIENT)
Dept: CARDIOLOGY | Facility: CLINIC | Age: 79
End: 2019-01-01
Payer: MEDICARE

## 2019-01-01 ENCOUNTER — ANESTHESIA (OUTPATIENT)
Dept: SURGERY | Facility: CLINIC | Age: 79
DRG: 267 | End: 2019-01-01
Payer: MEDICARE

## 2019-01-01 VITALS
WEIGHT: 184 LBS | HEART RATE: 74 BPM | SYSTOLIC BLOOD PRESSURE: 128 MMHG | HEIGHT: 65 IN | DIASTOLIC BLOOD PRESSURE: 54 MMHG | BODY MASS INDEX: 30.66 KG/M2 | OXYGEN SATURATION: 95 %

## 2019-01-01 VITALS
OXYGEN SATURATION: 100 % | SYSTOLIC BLOOD PRESSURE: 138 MMHG | HEART RATE: 74 BPM | WEIGHT: 177.2 LBS | TEMPERATURE: 97.5 F | DIASTOLIC BLOOD PRESSURE: 75 MMHG | BODY MASS INDEX: 29.49 KG/M2

## 2019-01-01 VITALS
DIASTOLIC BLOOD PRESSURE: 59 MMHG | TEMPERATURE: 97.7 F | RESPIRATION RATE: 16 BRPM | HEART RATE: 77 BPM | SYSTOLIC BLOOD PRESSURE: 117 MMHG | OXYGEN SATURATION: 99 %

## 2019-01-01 VITALS
SYSTOLIC BLOOD PRESSURE: 105 MMHG | HEART RATE: 75 BPM | TEMPERATURE: 97.9 F | OXYGEN SATURATION: 98 % | WEIGHT: 192.4 LBS | BODY MASS INDEX: 32.06 KG/M2 | HEIGHT: 65 IN | DIASTOLIC BLOOD PRESSURE: 65 MMHG | RESPIRATION RATE: 18 BRPM

## 2019-01-01 VITALS
BODY MASS INDEX: 32.49 KG/M2 | OXYGEN SATURATION: 97 % | TEMPERATURE: 98.4 F | HEART RATE: 76 BPM | DIASTOLIC BLOOD PRESSURE: 76 MMHG | HEIGHT: 65 IN | WEIGHT: 195 LBS | SYSTOLIC BLOOD PRESSURE: 120 MMHG

## 2019-01-01 VITALS
HEART RATE: 74 BPM | SYSTOLIC BLOOD PRESSURE: 137 MMHG | WEIGHT: 181 LBS | BODY MASS INDEX: 30.9 KG/M2 | TEMPERATURE: 97.5 F | HEIGHT: 64 IN | DIASTOLIC BLOOD PRESSURE: 51 MMHG

## 2019-01-01 VITALS
OXYGEN SATURATION: 98 % | HEIGHT: 65 IN | BODY MASS INDEX: 32.49 KG/M2 | WEIGHT: 195 LBS | SYSTOLIC BLOOD PRESSURE: 155 MMHG | DIASTOLIC BLOOD PRESSURE: 77 MMHG | HEART RATE: 72 BPM

## 2019-01-01 VITALS — DIASTOLIC BLOOD PRESSURE: 81 MMHG | HEART RATE: 80 BPM | SYSTOLIC BLOOD PRESSURE: 186 MMHG

## 2019-01-01 VITALS
DIASTOLIC BLOOD PRESSURE: 60 MMHG | HEIGHT: 65 IN | OXYGEN SATURATION: 100 % | WEIGHT: 177 LBS | SYSTOLIC BLOOD PRESSURE: 100 MMHG | TEMPERATURE: 98.3 F | BODY MASS INDEX: 29.49 KG/M2 | HEART RATE: 85 BPM

## 2019-01-01 VITALS
DIASTOLIC BLOOD PRESSURE: 96 MMHG | WEIGHT: 183 LBS | BODY MASS INDEX: 31.24 KG/M2 | SYSTOLIC BLOOD PRESSURE: 136 MMHG | TEMPERATURE: 98.4 F | HEART RATE: 79 BPM | OXYGEN SATURATION: 100 % | RESPIRATION RATE: 16 BRPM

## 2019-01-01 VITALS
TEMPERATURE: 97.9 F | RESPIRATION RATE: 16 BRPM | DIASTOLIC BLOOD PRESSURE: 80 MMHG | SYSTOLIC BLOOD PRESSURE: 141 MMHG | WEIGHT: 195 LBS | BODY MASS INDEX: 32.49 KG/M2 | HEIGHT: 65 IN | HEART RATE: 77 BPM | OXYGEN SATURATION: 100 %

## 2019-01-01 VITALS
HEART RATE: 70 BPM | SYSTOLIC BLOOD PRESSURE: 149 MMHG | TEMPERATURE: 97.9 F | HEIGHT: 65 IN | RESPIRATION RATE: 16 BRPM | OXYGEN SATURATION: 98 % | DIASTOLIC BLOOD PRESSURE: 60 MMHG | BODY MASS INDEX: 30.62 KG/M2

## 2019-01-01 VITALS
BODY MASS INDEX: 31.93 KG/M2 | SYSTOLIC BLOOD PRESSURE: 129 MMHG | HEIGHT: 66 IN | HEART RATE: 79 BPM | WEIGHT: 198.7 LBS | OXYGEN SATURATION: 98 % | DIASTOLIC BLOOD PRESSURE: 51 MMHG

## 2019-01-01 VITALS
HEART RATE: 73 BPM | DIASTOLIC BLOOD PRESSURE: 76 MMHG | OXYGEN SATURATION: 100 % | SYSTOLIC BLOOD PRESSURE: 122 MMHG | WEIGHT: 203 LBS | HEIGHT: 65 IN | BODY MASS INDEX: 33.82 KG/M2

## 2019-01-01 DIAGNOSIS — R45.1 AGITATION: ICD-10-CM

## 2019-01-01 DIAGNOSIS — I35.0 SEVERE AORTIC STENOSIS: ICD-10-CM

## 2019-01-01 DIAGNOSIS — I35.0 SEVERE AORTIC STENOSIS: Primary | ICD-10-CM

## 2019-01-01 DIAGNOSIS — I51.89 OTHER ILL-DEFINED HEART DISEASES: ICD-10-CM

## 2019-01-01 DIAGNOSIS — G93.9 BRAIN LESION: ICD-10-CM

## 2019-01-01 DIAGNOSIS — Z01.818 PREOP EXAMINATION: ICD-10-CM

## 2019-01-01 DIAGNOSIS — I35.0 NONRHEUMATIC AORTIC VALVE STENOSIS: Primary | ICD-10-CM

## 2019-01-01 DIAGNOSIS — R10.30 LOWER ABDOMINAL PAIN: ICD-10-CM

## 2019-01-01 DIAGNOSIS — Z98.890 S/P CORONARY ANGIOGRAM: Primary | ICD-10-CM

## 2019-01-01 DIAGNOSIS — Z71.89 OTHER SPECIFIED COUNSELING: ICD-10-CM

## 2019-01-01 DIAGNOSIS — D64.9 ANEMIA, UNSPECIFIED TYPE: Primary | ICD-10-CM

## 2019-01-01 DIAGNOSIS — C30.0 MALIGNANT MELANOMA OF NASAL CAVITY (H): ICD-10-CM

## 2019-01-01 DIAGNOSIS — I10 BENIGN ESSENTIAL HYPERTENSION: ICD-10-CM

## 2019-01-01 DIAGNOSIS — D64.9 ANEMIA, UNSPECIFIED TYPE: ICD-10-CM

## 2019-01-01 DIAGNOSIS — R05.9 COUGH: ICD-10-CM

## 2019-01-01 DIAGNOSIS — F41.1 GAD (GENERALIZED ANXIETY DISORDER): ICD-10-CM

## 2019-01-01 DIAGNOSIS — R06.02 SOB (SHORTNESS OF BREATH): ICD-10-CM

## 2019-01-01 DIAGNOSIS — E78.00 HYPERCHOLESTEREMIA: ICD-10-CM

## 2019-01-01 DIAGNOSIS — C43.31 MALIGNANT MELANOMA OF NOSE (H): Primary | ICD-10-CM

## 2019-01-01 DIAGNOSIS — I35.0 NONRHEUMATIC AORTIC VALVE STENOSIS: ICD-10-CM

## 2019-01-01 DIAGNOSIS — K21.9 GASTROESOPHAGEAL REFLUX DISEASE, ESOPHAGITIS PRESENCE NOT SPECIFIED: ICD-10-CM

## 2019-01-01 DIAGNOSIS — C43.31 MALIGNANT MELANOMA OF NOSE (H): ICD-10-CM

## 2019-01-01 DIAGNOSIS — Z95.2 S/P TAVR (TRANSCATHETER AORTIC VALVE REPLACEMENT): ICD-10-CM

## 2019-01-01 DIAGNOSIS — Z95.2 S/P TAVR (TRANSCATHETER AORTIC VALVE REPLACEMENT): Primary | ICD-10-CM

## 2019-01-01 DIAGNOSIS — I10 HYPERTENSION GOAL BP (BLOOD PRESSURE) < 140/90: ICD-10-CM

## 2019-01-01 DIAGNOSIS — C43.9 METASTATIC MALIGNANT MELANOMA (H): ICD-10-CM

## 2019-01-01 DIAGNOSIS — R45.1 AGITATION: Primary | ICD-10-CM

## 2019-01-01 DIAGNOSIS — K21.9 GASTROESOPHAGEAL REFLUX DISEASE WITHOUT ESOPHAGITIS: ICD-10-CM

## 2019-01-01 DIAGNOSIS — J20.9 ACUTE BRONCHITIS WITH SYMPTOMS > 10 DAYS: Primary | ICD-10-CM

## 2019-01-01 DIAGNOSIS — R09.89 CAROTID BRUIT: ICD-10-CM

## 2019-01-01 DIAGNOSIS — R19.7 DIARRHEA: ICD-10-CM

## 2019-01-01 DIAGNOSIS — E78.2 MIXED HYPERLIPIDEMIA: ICD-10-CM

## 2019-01-01 DIAGNOSIS — E87.6 HYPOKALEMIA: Primary | ICD-10-CM

## 2019-01-01 DIAGNOSIS — E87.1 HYPONATREMIA: ICD-10-CM

## 2019-01-01 DIAGNOSIS — Z23 NEED FOR PROPHYLACTIC VACCINATION AND INOCULATION AGAINST INFLUENZA: Primary | ICD-10-CM

## 2019-01-01 DIAGNOSIS — K21.9 GASTROESOPHAGEAL REFLUX DISEASE, ESOPHAGITIS PRESENCE NOT SPECIFIED: Primary | ICD-10-CM

## 2019-01-01 DIAGNOSIS — R55 SYNCOPE: Primary | ICD-10-CM

## 2019-01-01 DIAGNOSIS — Z79.899 ENCOUNTER FOR LONG-TERM (CURRENT) USE OF MEDICATIONS: ICD-10-CM

## 2019-01-01 DIAGNOSIS — J20.9 ACUTE BRONCHITIS, UNSPECIFIED ORGANISM: ICD-10-CM

## 2019-01-01 DIAGNOSIS — K59.00 CONSTIPATION, UNSPECIFIED CONSTIPATION TYPE: Primary | ICD-10-CM

## 2019-01-01 DIAGNOSIS — E87.1 HYPONATREMIA: Primary | ICD-10-CM

## 2019-01-01 DIAGNOSIS — I25.10 CORONARY ARTERY DISEASE INVOLVING NATIVE CORONARY ARTERY OF NATIVE HEART WITHOUT ANGINA PECTORIS: ICD-10-CM

## 2019-01-01 DIAGNOSIS — C30.0 MALIGNANT MELANOMA OF NASAL CAVITY (H): Primary | ICD-10-CM

## 2019-01-01 LAB
ABO + RH BLD: NORMAL
ABO + RH BLD: NORMAL
ALBUMIN SERPL-MCNC: 3.2 G/DL (ref 3.4–5)
ALBUMIN SERPL-MCNC: 3.3 G/DL (ref 3.4–5)
ALBUMIN SERPL-MCNC: 3.4 G/DL (ref 3.4–5)
ALBUMIN SERPL-MCNC: 3.4 G/DL (ref 3.4–5)
ALBUMIN SERPL-MCNC: 3.5 G/DL (ref 3.4–5)
ALP SERPL-CCNC: 105 U/L (ref 40–150)
ALP SERPL-CCNC: 112 U/L (ref 40–150)
ALP SERPL-CCNC: 78 U/L (ref 40–150)
ALP SERPL-CCNC: 80 U/L (ref 40–150)
ALP SERPL-CCNC: 86 U/L (ref 40–150)
ALT SERPL W P-5'-P-CCNC: 18 U/L (ref 0–50)
ALT SERPL W P-5'-P-CCNC: 19 U/L (ref 0–50)
ALT SERPL W P-5'-P-CCNC: 20 U/L (ref 0–50)
ALT SERPL W P-5'-P-CCNC: 25 U/L (ref 0–50)
ALT SERPL W P-5'-P-CCNC: 49 U/L (ref 0–50)
ANION GAP SERPL CALCULATED.3IONS-SCNC: 5 MMOL/L (ref 3–14)
ANION GAP SERPL CALCULATED.3IONS-SCNC: 6 MMOL/L (ref 3–14)
ANION GAP SERPL CALCULATED.3IONS-SCNC: 6 MMOL/L (ref 3–14)
ANION GAP SERPL CALCULATED.3IONS-SCNC: 7 MMOL/L (ref 3–14)
ANION GAP SERPL CALCULATED.3IONS-SCNC: 7 MMOL/L (ref 3–14)
ANION GAP SERPL CALCULATED.3IONS-SCNC: 8 MMOL/L (ref 3–14)
ANION GAP SERPL CALCULATED.3IONS-SCNC: 9 MMOL/L (ref 3–14)
AST SERPL W P-5'-P-CCNC: 19 U/L (ref 0–45)
AST SERPL W P-5'-P-CCNC: 20 U/L (ref 0–45)
AST SERPL W P-5'-P-CCNC: 66 U/L (ref 0–45)
BILIRUB SERPL-MCNC: 0.3 MG/DL (ref 0.2–1.3)
BILIRUB SERPL-MCNC: 0.4 MG/DL (ref 0.2–1.3)
BLD GP AB SCN SERPL QL: NORMAL
BLD PROD TYP BPU: NORMAL
BLD UNIT ID BPU: 0
BLD UNIT ID BPU: 0
BLOOD BANK CMNT PATIENT-IMP: NORMAL
BLOOD PRODUCT CODE: NORMAL
BLOOD PRODUCT CODE: NORMAL
BPU ID: NORMAL
BPU ID: NORMAL
BUN SERPL-MCNC: 10 MG/DL (ref 7–30)
BUN SERPL-MCNC: 13 MG/DL (ref 7–30)
BUN SERPL-MCNC: 13 MG/DL (ref 7–30)
BUN SERPL-MCNC: 15 MG/DL (ref 7–30)
BUN SERPL-MCNC: 16 MG/DL (ref 7–30)
BUN SERPL-MCNC: 19 MG/DL (ref 7–30)
BUN SERPL-MCNC: 19 MG/DL (ref 7–30)
BUN SERPL-MCNC: 22 MG/DL (ref 7–30)
BUN SERPL-MCNC: 3 MG/DL (ref 7–30)
BUN SERPL-MCNC: 7 MG/DL (ref 7–30)
CALCIUM SERPL-MCNC: 8.2 MG/DL (ref 8.5–10.1)
CALCIUM SERPL-MCNC: 8.3 MG/DL (ref 8.5–10.1)
CALCIUM SERPL-MCNC: 8.5 MG/DL (ref 8.5–10.1)
CALCIUM SERPL-MCNC: 8.6 MG/DL (ref 8.5–10.1)
CALCIUM SERPL-MCNC: 8.6 MG/DL (ref 8.5–10.1)
CALCIUM SERPL-MCNC: 8.7 MG/DL (ref 8.5–10.1)
CALCIUM SERPL-MCNC: 8.7 MG/DL (ref 8.5–10.1)
CALCIUM SERPL-MCNC: 8.8 MG/DL (ref 8.5–10.1)
CHLORIDE SERPL-SCNC: 100 MMOL/L (ref 94–109)
CHLORIDE SERPL-SCNC: 101 MMOL/L (ref 94–109)
CHLORIDE SERPL-SCNC: 102 MMOL/L (ref 94–109)
CHLORIDE SERPL-SCNC: 103 MMOL/L (ref 94–109)
CHLORIDE SERPL-SCNC: 104 MMOL/L (ref 94–109)
CHLORIDE SERPL-SCNC: 96 MMOL/L (ref 94–109)
CHLORIDE SERPL-SCNC: 97 MMOL/L (ref 94–109)
CHLORIDE SERPL-SCNC: 98 MMOL/L (ref 94–109)
CHOLEST SERPL-MCNC: 118 MG/DL
CHOLEST SERPL-MCNC: 134 MG/DL
CO2 SERPL-SCNC: 24 MMOL/L (ref 20–32)
CO2 SERPL-SCNC: 25 MMOL/L (ref 20–32)
CO2 SERPL-SCNC: 26 MMOL/L (ref 20–32)
CO2 SERPL-SCNC: 27 MMOL/L (ref 20–32)
CREAT BLD-MCNC: 0.8 MG/DL (ref 0.52–1.04)
CREAT SERPL-MCNC: 0.65 MG/DL (ref 0.52–1.04)
CREAT SERPL-MCNC: 0.66 MG/DL (ref 0.52–1.04)
CREAT SERPL-MCNC: 0.66 MG/DL (ref 0.52–1.04)
CREAT SERPL-MCNC: 0.75 MG/DL (ref 0.52–1.04)
CREAT SERPL-MCNC: 0.77 MG/DL (ref 0.52–1.04)
CREAT SERPL-MCNC: 0.78 MG/DL (ref 0.52–1.04)
CREAT SERPL-MCNC: 0.78 MG/DL (ref 0.52–1.04)
CREAT SERPL-MCNC: 0.84 MG/DL (ref 0.52–1.04)
CREAT SERPL-MCNC: 0.86 MG/DL (ref 0.52–1.04)
CREAT SERPL-MCNC: 0.86 MG/DL (ref 0.52–1.04)
DLCOCOR-%PRED-PRE: 86 %
DLCOCOR-PRE: 16.91 ML/MIN/MMHG
DLCOUNC-%PRED-PRE: 65 %
DLCOUNC-PRE: 12.86 ML/MIN/MMHG
DLCOUNC-PRED: 19.5 ML/MIN/MMHG
ERV-%PRED-PRE: 78 %
ERV-PRE: 0.32 L
ERV-PRED: 0.41 L
ERYTHROCYTE [DISTWIDTH] IN BLOOD BY AUTOMATED COUNT: 14.9 % (ref 10–15)
ERYTHROCYTE [DISTWIDTH] IN BLOOD BY AUTOMATED COUNT: 16.4 % (ref 10–15)
ERYTHROCYTE [DISTWIDTH] IN BLOOD BY AUTOMATED COUNT: 18 % (ref 10–15)
ERYTHROCYTE [DISTWIDTH] IN BLOOD BY AUTOMATED COUNT: 18.7 % (ref 10–15)
ERYTHROCYTE [DISTWIDTH] IN BLOOD BY AUTOMATED COUNT: ABNORMAL % (ref 10–15)
EXPTIME-PRE: 7.26 SEC
FEF2575-%PRED-PRE: 134 %
FEF2575-PRE: 2.18 L/SEC
FEF2575-PRED: 1.62 L/SEC
FEFMAX-%PRED-PRE: 97 %
FEFMAX-PRE: 4.92 L/SEC
FEFMAX-PRED: 5.04 L/SEC
FEV1-%PRED-PRE: 108 %
FEV1-PRE: 2.19 L
FEV1FEV6-PRE: 80 %
FEV1FEV6-PRED: 78 %
FEV1FVC-PRE: 80 %
FEV1FVC-PRED: 77 %
FEV1SVC-PRE: 80 %
FEV1SVC-PRED: 67 %
FIFMAX-PRE: 3.4 L/SEC
FRCPLETH-%PRED-PRE: 112 %
FRCPLETH-PRE: 3.13 L
FRCPLETH-PRED: 2.78 L
FVC-%PRED-PRE: 102 %
FVC-PRE: 2.72 L
FVC-PRED: 2.66 L
GFR SERPL CREATININE-BSD FRML MDRD: 64 ML/MIN/{1.73_M2}
GFR SERPL CREATININE-BSD FRML MDRD: 65 ML/MIN/{1.73_M2}
GFR SERPL CREATININE-BSD FRML MDRD: 66 ML/MIN/{1.73_M2}
GFR SERPL CREATININE-BSD FRML MDRD: 69 ML/MIN/{1.73_M2}
GFR SERPL CREATININE-BSD FRML MDRD: 72 ML/MIN/{1.73_M2}
GFR SERPL CREATININE-BSD FRML MDRD: 73 ML/MIN/{1.73_M2}
GFR SERPL CREATININE-BSD FRML MDRD: 73 ML/MIN/{1.73_M2}
GFR SERPL CREATININE-BSD FRML MDRD: 75 ML/MIN/{1.73_M2}
GFR SERPL CREATININE-BSD FRML MDRD: 84 ML/MIN/{1.73_M2}
GLUCOSE BLDC GLUCOMTR-MCNC: 84 MG/DL (ref 70–99)
GLUCOSE SERPL-MCNC: 100 MG/DL (ref 70–99)
GLUCOSE SERPL-MCNC: 101 MG/DL (ref 70–99)
GLUCOSE SERPL-MCNC: 101 MG/DL (ref 70–99)
GLUCOSE SERPL-MCNC: 105 MG/DL (ref 70–99)
GLUCOSE SERPL-MCNC: 78 MG/DL (ref 70–99)
GLUCOSE SERPL-MCNC: 86 MG/DL (ref 70–99)
GLUCOSE SERPL-MCNC: 92 MG/DL (ref 70–99)
GLUCOSE SERPL-MCNC: 95 MG/DL (ref 70–99)
GLUCOSE SERPL-MCNC: 97 MG/DL (ref 70–99)
GLUCOSE SERPL-MCNC: 99 MG/DL (ref 70–99)
HCT VFR BLD AUTO: 27.2 % (ref 35–47)
HCT VFR BLD AUTO: 28.6 % (ref 35–47)
HCT VFR BLD AUTO: 28.7 % (ref 35–47)
HCT VFR BLD AUTO: 29 % (ref 35–47)
HCT VFR BLD AUTO: 30.9 % (ref 35–47)
HCT VFR BLD AUTO: 32.8 % (ref 35–47)
HCT VFR BLD AUTO: 35.4 % (ref 35–47)
HCT VFR BLD AUTO: 36.7 % (ref 35–47)
HDLC SERPL-MCNC: 57 MG/DL
HDLC SERPL-MCNC: 58 MG/DL
HGB BLD-MCNC: 10.7 G/DL (ref 11.7–15.7)
HGB BLD-MCNC: 11 G/DL (ref 11.7–15.7)
HGB BLD-MCNC: 7.6 G/DL (ref 11.7–15.7)
HGB BLD-MCNC: 7.9 G/DL (ref 11.7–15.7)
HGB BLD-MCNC: 8.2 G/DL (ref 11.7–15.7)
HGB BLD-MCNC: 8.8 G/DL (ref 11.7–15.7)
HGB BLD-MCNC: 9.2 G/DL (ref 11.7–15.7)
HGB BLD-MCNC: 9.3 G/DL (ref 11.7–15.7)
HGB BLD-MCNC: 9.4 G/DL (ref 11.7–15.7)
IC-%PRED-PRE: 92 %
IC-PRE: 2.41 L
IC-PRED: 2.59 L
INR PPP: 0.98 (ref 0.86–1.14)
INR PPP: 1.01 (ref 0.86–1.14)
INTERPRETATION ECG - MUSE: NORMAL
KCT BLD-ACNC: 110 SEC (ref 75–150)
KCT BLD-ACNC: 255 SEC (ref 75–150)
KCT BLD-ACNC: 271 SEC (ref 75–150)
KCT BLD-ACNC: 328 SEC (ref 75–150)
LDLC SERPL CALC-MCNC: 43 MG/DL
LDLC SERPL CALC-MCNC: 56 MG/DL
MAGNESIUM SERPL-MCNC: 1.8 MG/DL (ref 1.6–2.3)
MAGNESIUM SERPL-MCNC: 1.9 MG/DL (ref 1.6–2.3)
MAGNESIUM SERPL-MCNC: 1.9 MG/DL (ref 1.6–2.3)
MCH RBC QN AUTO: 20.8 PG (ref 26.5–33)
MCH RBC QN AUTO: 21.9 PG (ref 26.5–33)
MCH RBC QN AUTO: 23.2 PG (ref 26.5–33)
MCH RBC QN AUTO: 26 PG (ref 26.5–33)
MCH RBC QN AUTO: 26.2 PG (ref 26.5–33)
MCH RBC QN AUTO: 26.2 PG (ref 26.5–33)
MCH RBC QN AUTO: 26.7 PG (ref 26.5–33)
MCH RBC QN AUTO: 29.2 PG (ref 26.5–33)
MCHC RBC AUTO-ENTMCNC: 27.6 G/DL (ref 31.5–36.5)
MCHC RBC AUTO-ENTMCNC: 27.9 G/DL (ref 31.5–36.5)
MCHC RBC AUTO-ENTMCNC: 28.3 G/DL (ref 31.5–36.5)
MCHC RBC AUTO-ENTMCNC: 28.7 G/DL (ref 31.5–36.5)
MCHC RBC AUTO-ENTMCNC: 29.2 G/DL (ref 31.5–36.5)
MCHC RBC AUTO-ENTMCNC: 29.8 G/DL (ref 31.5–36.5)
MCHC RBC AUTO-ENTMCNC: 30.7 G/DL (ref 31.5–36.5)
MCHC RBC AUTO-ENTMCNC: 31.1 G/DL (ref 31.5–36.5)
MCV RBC AUTO: 76 FL (ref 78–100)
MCV RBC AUTO: 78 FL (ref 78–100)
MCV RBC AUTO: 82 FL (ref 78–100)
MCV RBC AUTO: 87 FL (ref 78–100)
MCV RBC AUTO: 88 FL (ref 78–100)
MCV RBC AUTO: 89 FL (ref 78–100)
MCV RBC AUTO: 91 FL (ref 78–100)
MCV RBC AUTO: 94 FL (ref 78–100)
NONHDLC SERPL-MCNC: 60 MG/DL
NONHDLC SERPL-MCNC: 77 MG/DL
NUM BPU REQUESTED: 2
PHOSPHATE SERPL-MCNC: 3.6 MG/DL (ref 2.5–4.5)
PHOSPHATE SERPL-MCNC: 3.7 MG/DL (ref 2.5–4.5)
PHOSPHATE SERPL-MCNC: 3.8 MG/DL (ref 2.5–4.5)
PLATELET # BLD AUTO: 198 10E9/L (ref 150–450)
PLATELET # BLD AUTO: 218 10E9/L (ref 150–450)
PLATELET # BLD AUTO: 235 10E9/L (ref 150–450)
PLATELET # BLD AUTO: 253 10E9/L (ref 150–450)
PLATELET # BLD AUTO: 281 10E9/L (ref 150–450)
PLATELET # BLD AUTO: 333 10E9/L (ref 150–450)
PLATELET # BLD AUTO: 335 10E9/L (ref 150–450)
PLATELET # BLD AUTO: 413 10E9/L (ref 150–450)
POTASSIUM SERPL-SCNC: 3.6 MMOL/L (ref 3.4–5.3)
POTASSIUM SERPL-SCNC: 3.9 MMOL/L (ref 3.4–5.3)
POTASSIUM SERPL-SCNC: 4 MMOL/L (ref 3.4–5.3)
POTASSIUM SERPL-SCNC: 4.1 MMOL/L (ref 3.4–5.3)
POTASSIUM SERPL-SCNC: 4.1 MMOL/L (ref 3.4–5.3)
POTASSIUM SERPL-SCNC: 4.2 MMOL/L (ref 3.4–5.3)
POTASSIUM SERPL-SCNC: 4.2 MMOL/L (ref 3.4–5.3)
POTASSIUM SERPL-SCNC: 4.9 MMOL/L (ref 3.4–5.3)
PROT SERPL-MCNC: 6.8 G/DL (ref 6.8–8.8)
PROT SERPL-MCNC: 7.3 G/DL (ref 6.8–8.8)
PROT SERPL-MCNC: 7.4 G/DL (ref 6.8–8.8)
PROT SERPL-MCNC: 7.6 G/DL (ref 6.8–8.8)
PROT SERPL-MCNC: 7.8 G/DL (ref 6.8–8.8)
RBC # BLD AUTO: 3.3 10E12/L (ref 3.8–5.2)
RBC # BLD AUTO: 3.47 10E12/L (ref 3.8–5.2)
RBC # BLD AUTO: 3.51 10E12/L (ref 3.8–5.2)
RBC # BLD AUTO: 3.54 10E12/L (ref 3.8–5.2)
RBC # BLD AUTO: 3.59 10E12/L (ref 3.8–5.2)
RBC # BLD AUTO: 3.77 10E12/L (ref 3.8–5.2)
RBC # BLD AUTO: 3.79 10E12/L (ref 3.8–5.2)
RBC # BLD AUTO: 4.12 10E12/L (ref 3.8–5.2)
RVPLETH-%PRED-PRE: 124 %
RVPLETH-PRE: 2.81 L
RVPLETH-PRED: 2.25 L
SODIUM SERPL-SCNC: 127 MMOL/L (ref 133–144)
SODIUM SERPL-SCNC: 130 MMOL/L (ref 133–144)
SODIUM SERPL-SCNC: 130 MMOL/L (ref 133–144)
SODIUM SERPL-SCNC: 132 MMOL/L (ref 133–144)
SODIUM SERPL-SCNC: 132 MMOL/L (ref 133–144)
SODIUM SERPL-SCNC: 133 MMOL/L (ref 133–144)
SODIUM SERPL-SCNC: 133 MMOL/L (ref 133–144)
SODIUM SERPL-SCNC: 134 MMOL/L (ref 133–144)
SODIUM SERPL-SCNC: 134 MMOL/L (ref 133–144)
SODIUM SERPL-SCNC: 139 MMOL/L (ref 133–144)
SPECIMEN EXP DATE BLD: NORMAL
TLCPLETH-%PRED-PRE: 108 %
TLCPLETH-PRE: 5.54 L
TLCPLETH-PRED: 5.11 L
TRANSFUSION STATUS PATIENT QL: NORMAL
TRIGL SERPL-MCNC: 103 MG/DL
TRIGL SERPL-MCNC: 87 MG/DL
VA-%PRED-PRE: 87 %
VA-PRE: 4.2 L
VC-%PRED-PRE: 90 %
VC-PRE: 2.72 L
VC-PRED: 3 L
WBC # BLD AUTO: 10.2 10E9/L (ref 4–11)
WBC # BLD AUTO: 6.8 10E9/L (ref 4–11)
WBC # BLD AUTO: 7.6 10E9/L (ref 4–11)
WBC # BLD AUTO: 8.2 10E9/L (ref 4–11)
WBC # BLD AUTO: 8.2 10E9/L (ref 4–11)
WBC # BLD AUTO: 8.3 10E9/L (ref 4–11)
WBC # BLD AUTO: 8.4 10E9/L (ref 4–11)
WBC # BLD AUTO: 8.6 10E9/L (ref 4–11)

## 2019-01-01 PROCEDURE — 99152 MOD SED SAME PHYS/QHP 5/>YRS: CPT | Performed by: INTERNAL MEDICINE

## 2019-01-01 PROCEDURE — 99214 OFFICE O/P EST MOD 30 MIN: CPT | Mod: ZP | Performed by: INTERNAL MEDICINE

## 2019-01-01 PROCEDURE — 40000065 ZZH STATISTIC EKG NON-CHARGEABLE

## 2019-01-01 PROCEDURE — 93010 ELECTROCARDIOGRAM REPORT: CPT | Mod: ZP | Performed by: INTERNAL MEDICINE

## 2019-01-01 PROCEDURE — 27211024 ZZHC OR SUPPLY OTHER OPNP: Performed by: INTERNAL MEDICINE

## 2019-01-01 PROCEDURE — 80048 BASIC METABOLIC PNL TOTAL CA: CPT | Performed by: STUDENT IN AN ORGANIZED HEALTH CARE EDUCATION/TRAINING PROGRAM

## 2019-01-01 PROCEDURE — 93306 TTE W/DOPPLER COMPLETE: CPT

## 2019-01-01 PROCEDURE — 25000125 ZZHC RX 250: Performed by: NURSE ANESTHETIST, CERTIFIED REGISTERED

## 2019-01-01 PROCEDURE — 99215 OFFICE O/P EST HI 40 MIN: CPT | Mod: ZP | Performed by: INTERNAL MEDICINE

## 2019-01-01 PROCEDURE — 25000128 H RX IP 250 OP 636: Performed by: INTERNAL MEDICINE

## 2019-01-01 PROCEDURE — 85027 COMPLETE CBC AUTOMATED: CPT | Performed by: INTERNAL MEDICINE

## 2019-01-01 PROCEDURE — 40000170 ZZH STATISTIC PRE-PROCEDURE ASSESSMENT II: Performed by: INTERNAL MEDICINE

## 2019-01-01 PROCEDURE — 86850 RBC ANTIBODY SCREEN: CPT | Performed by: INTERNAL MEDICINE

## 2019-01-01 PROCEDURE — C1887 CATHETER, GUIDING: HCPCS | Performed by: INTERNAL MEDICINE

## 2019-01-01 PROCEDURE — 80053 COMPREHEN METABOLIC PANEL: CPT | Performed by: FAMILY MEDICINE

## 2019-01-01 PROCEDURE — C1769 GUIDE WIRE: HCPCS | Performed by: INTERNAL MEDICINE

## 2019-01-01 PROCEDURE — 37000008 ZZH ANESTHESIA TECHNICAL FEE, 1ST 30 MIN: Performed by: INTERNAL MEDICINE

## 2019-01-01 PROCEDURE — G0008 ADMIN INFLUENZA VIRUS VAC: HCPCS

## 2019-01-01 PROCEDURE — 93005 ELECTROCARDIOGRAM TRACING: CPT

## 2019-01-01 PROCEDURE — 93010 ELECTROCARDIOGRAM REPORT: CPT | Mod: 76 | Performed by: INTERNAL MEDICINE

## 2019-01-01 PROCEDURE — 97165 OT EVAL LOW COMPLEX 30 MIN: CPT | Mod: GO

## 2019-01-01 PROCEDURE — 99214 OFFICE O/P EST MOD 30 MIN: CPT | Performed by: FAMILY MEDICINE

## 2019-01-01 PROCEDURE — 93005 ELECTROCARDIOGRAM TRACING: CPT | Mod: ZF

## 2019-01-01 PROCEDURE — 85027 COMPLETE CBC AUTOMATED: CPT | Performed by: STUDENT IN AN ORGANIZED HEALTH CARE EDUCATION/TRAINING PROGRAM

## 2019-01-01 PROCEDURE — 25000132 ZZH RX MED GY IP 250 OP 250 PS 637: Mod: GY | Performed by: INTERNAL MEDICINE

## 2019-01-01 PROCEDURE — C1894 INTRO/SHEATH, NON-LASER: HCPCS | Performed by: INTERNAL MEDICINE

## 2019-01-01 PROCEDURE — 93880 EXTRACRANIAL BILAT STUDY: CPT | Performed by: RADIOLOGY

## 2019-01-01 PROCEDURE — 99207 ZZC NO CHARGE NURSE ONLY: CPT

## 2019-01-01 PROCEDURE — 85347 COAGULATION TIME ACTIVATED: CPT

## 2019-01-01 PROCEDURE — 80061 LIPID PANEL: CPT | Performed by: INTERNAL MEDICINE

## 2019-01-01 PROCEDURE — 27210794 ZZH OR GENERAL SUPPLY STERILE: Performed by: INTERNAL MEDICINE

## 2019-01-01 PROCEDURE — 36415 COLL VENOUS BLD VENIPUNCTURE: CPT | Performed by: INTERNAL MEDICINE

## 2019-01-01 PROCEDURE — 40000275 ZZH STATISTIC RCP TIME EA 10 MIN

## 2019-01-01 PROCEDURE — 93571 IV DOP VEL&/PRESS C FLO 1ST: CPT | Performed by: INTERNAL MEDICINE

## 2019-01-01 PROCEDURE — 25000132 ZZH RX MED GY IP 250 OP 250 PS 637: Mod: GY | Performed by: STUDENT IN AN ORGANIZED HEALTH CARE EDUCATION/TRAINING PROGRAM

## 2019-01-01 PROCEDURE — 93306 TTE W/DOPPLER COMPLETE: CPT | Performed by: INTERNAL MEDICINE

## 2019-01-01 PROCEDURE — G0463 HOSPITAL OUTPT CLINIC VISIT: HCPCS | Mod: 25,ZF

## 2019-01-01 PROCEDURE — 93010 ELECTROCARDIOGRAM REPORT: CPT | Performed by: INTERNAL MEDICINE

## 2019-01-01 PROCEDURE — 25000125 ZZHC RX 250: Performed by: INTERNAL MEDICINE

## 2019-01-01 PROCEDURE — 70553 MRI BRAIN STEM W/O & W/DYE: CPT | Performed by: RADIOLOGY

## 2019-01-01 PROCEDURE — 99215 OFFICE O/P EST HI 40 MIN: CPT | Mod: ZP | Performed by: NURSE PRACTITIONER

## 2019-01-01 PROCEDURE — 40000556 ZZH STATISTIC PERIPHERAL IV START W US GUIDANCE

## 2019-01-01 PROCEDURE — 99153 MOD SED SAME PHYS/QHP EA: CPT | Performed by: INTERNAL MEDICINE

## 2019-01-01 PROCEDURE — 70491 CT SOFT TISSUE NECK W/DYE: CPT

## 2019-01-01 PROCEDURE — 83735 ASSAY OF MAGNESIUM: CPT | Performed by: STUDENT IN AN ORGANIZED HEALTH CARE EDUCATION/TRAINING PROGRAM

## 2019-01-01 PROCEDURE — 80053 COMPREHEN METABOLIC PANEL: CPT | Performed by: STUDENT IN AN ORGANIZED HEALTH CARE EDUCATION/TRAINING PROGRAM

## 2019-01-01 PROCEDURE — 93456 R HRT CORONARY ARTERY ANGIO: CPT | Mod: 26 | Performed by: INTERNAL MEDICINE

## 2019-01-01 PROCEDURE — 36415 COLL VENOUS BLD VENIPUNCTURE: CPT | Performed by: STUDENT IN AN ORGANIZED HEALTH CARE EDUCATION/TRAINING PROGRAM

## 2019-01-01 PROCEDURE — 84100 ASSAY OF PHOSPHORUS: CPT | Performed by: STUDENT IN AN ORGANIZED HEALTH CARE EDUCATION/TRAINING PROGRAM

## 2019-01-01 PROCEDURE — 86901 BLOOD TYPING SEROLOGIC RH(D): CPT | Performed by: INTERNAL MEDICINE

## 2019-01-01 PROCEDURE — G0463 HOSPITAL OUTPT CLINIC VISIT: HCPCS | Mod: ZF

## 2019-01-01 PROCEDURE — 71045 X-RAY EXAM CHEST 1 VIEW: CPT

## 2019-01-01 PROCEDURE — 25000132 ZZH RX MED GY IP 250 OP 250 PS 637: Mod: GY | Performed by: PHYSICIAN ASSISTANT

## 2019-01-01 PROCEDURE — 71260 CT THORAX DX C+: CPT | Mod: XS

## 2019-01-01 PROCEDURE — 99214 OFFICE O/P EST MOD 30 MIN: CPT | Mod: 25 | Performed by: INTERNAL MEDICINE

## 2019-01-01 PROCEDURE — 93572 IV DOP VEL&/PRESS C FLO EA: CPT | Performed by: INTERNAL MEDICINE

## 2019-01-01 PROCEDURE — C1760 CLOSURE DEV, VASC: HCPCS | Performed by: INTERNAL MEDICINE

## 2019-01-01 PROCEDURE — G0463 HOSPITAL OUTPT CLINIC VISIT: HCPCS | Mod: 25

## 2019-01-01 PROCEDURE — A9585 GADOBUTROL INJECTION: HCPCS | Mod: JW | Performed by: OTOLARYNGOLOGY

## 2019-01-01 PROCEDURE — 93456 R HRT CORONARY ARTERY ANGIO: CPT | Performed by: INTERNAL MEDICINE

## 2019-01-01 PROCEDURE — 85027 COMPLETE CBC AUTOMATED: CPT | Performed by: PHYSICIAN ASSISTANT

## 2019-01-01 PROCEDURE — 90662 IIV NO PRSV INCREASED AG IM: CPT

## 2019-01-01 PROCEDURE — 86900 BLOOD TYPING SEROLOGIC ABO: CPT | Performed by: INTERNAL MEDICINE

## 2019-01-01 PROCEDURE — 25800030 ZZH RX IP 258 OP 636: Performed by: STUDENT IN AN ORGANIZED HEALTH CARE EDUCATION/TRAINING PROGRAM

## 2019-01-01 PROCEDURE — 97161 PT EVAL LOW COMPLEX 20 MIN: CPT | Mod: GP | Performed by: PHYSICAL THERAPIST

## 2019-01-01 PROCEDURE — 25000128 H RX IP 250 OP 636: Performed by: NURSE ANESTHETIST, CERTIFIED REGISTERED

## 2019-01-01 PROCEDURE — 71275 CT ANGIOGRAPHY CHEST: CPT | Mod: 26 | Performed by: INTERNAL MEDICINE

## 2019-01-01 PROCEDURE — 99238 HOSP IP/OBS DSCHRG MGMT 30/<: CPT | Mod: 25 | Performed by: INTERNAL MEDICINE

## 2019-01-01 PROCEDURE — 36000076 ZZH SURGERY LEVEL 6 EA 15 ADDTL MIN - UMMC: Performed by: INTERNAL MEDICINE

## 2019-01-01 PROCEDURE — 25000125 ZZHC RX 250: Performed by: PHYSICIAN ASSISTANT

## 2019-01-01 PROCEDURE — 25800030 ZZH RX IP 258 OP 636: Performed by: PHYSICIAN ASSISTANT

## 2019-01-01 PROCEDURE — 36000074 ZZH SURGERY LEVEL 6 1ST 30 MIN - UMMC: Performed by: INTERNAL MEDICINE

## 2019-01-01 PROCEDURE — 37000009 ZZH ANESTHESIA TECHNICAL FEE, EACH ADDTL 15 MIN: Performed by: INTERNAL MEDICINE

## 2019-01-01 PROCEDURE — 97110 THERAPEUTIC EXERCISES: CPT | Mod: GO

## 2019-01-01 PROCEDURE — 93572 IV DOP VEL&/PRESS C FLO EA: CPT | Mod: 26 | Performed by: INTERNAL MEDICINE

## 2019-01-01 PROCEDURE — 80061 LIPID PANEL: CPT | Performed by: FAMILY MEDICINE

## 2019-01-01 PROCEDURE — 97535 SELF CARE MNGMENT TRAINING: CPT | Mod: GO

## 2019-01-01 PROCEDURE — X2A5312 CEREBRAL EMBOLIC FILTRATION, DUAL FILTER IN INNOMINATE ARTERY AND LEFT COMMON CAROTID ARTERY, PERCUTANEOUS APPROACH, NEW TECHNOLOGY GROUP 2: ICD-10-PCS | Performed by: THORACIC SURGERY (CARDIOTHORACIC VASCULAR SURGERY)

## 2019-01-01 PROCEDURE — 40000014 ZZH STATISTIC ARTERIAL MONITORING DAILY

## 2019-01-01 PROCEDURE — 71000014 ZZH RECOVERY PHASE 1 LEVEL 2 FIRST HR: Performed by: INTERNAL MEDICINE

## 2019-01-01 PROCEDURE — 99214 OFFICE O/P EST MOD 30 MIN: CPT | Performed by: NURSE PRACTITIONER

## 2019-01-01 PROCEDURE — 99214 OFFICE O/P EST MOD 30 MIN: CPT | Performed by: INTERNAL MEDICINE

## 2019-01-01 PROCEDURE — 99222 1ST HOSP IP/OBS MODERATE 55: CPT | Mod: 25 | Performed by: INTERNAL MEDICINE

## 2019-01-01 PROCEDURE — 80053 COMPREHEN METABOLIC PANEL: CPT | Performed by: INTERNAL MEDICINE

## 2019-01-01 PROCEDURE — P9016 RBC LEUKOCYTES REDUCED: HCPCS | Performed by: INTERNAL MEDICINE

## 2019-01-01 PROCEDURE — 40000168 ZZH STATISTIC PP CARE STAGE 3

## 2019-01-01 PROCEDURE — 93306 TTE W/DOPPLER COMPLETE: CPT | Mod: 26 | Performed by: INTERNAL MEDICINE

## 2019-01-01 PROCEDURE — 21400000 ZZH R&B CCU UMMC

## 2019-01-01 PROCEDURE — 99214 OFFICE O/P EST MOD 30 MIN: CPT | Mod: ZP | Performed by: NURSE PRACTITIONER

## 2019-01-01 PROCEDURE — C1730 CATH, EP, 19 OR FEW ELECT: HCPCS | Performed by: INTERNAL MEDICINE

## 2019-01-01 PROCEDURE — 74174 CTA ABD&PLVS W/CONTRAST: CPT | Mod: 26 | Performed by: INTERNAL MEDICINE

## 2019-01-01 PROCEDURE — 80048 BASIC METABOLIC PNL TOTAL CA: CPT | Performed by: INTERNAL MEDICINE

## 2019-01-01 PROCEDURE — 85610 PROTHROMBIN TIME: CPT | Performed by: INTERNAL MEDICINE

## 2019-01-01 PROCEDURE — 80048 BASIC METABOLIC PNL TOTAL CA: CPT | Performed by: PHYSICIAN ASSISTANT

## 2019-01-01 PROCEDURE — 74174 CTA ABD&PLVS W/CONTRAST: CPT

## 2019-01-01 PROCEDURE — 00000146 ZZHCL STATISTIC GLUCOSE BY METER IP

## 2019-01-01 PROCEDURE — 97530 THERAPEUTIC ACTIVITIES: CPT | Mod: GP | Performed by: PHYSICAL THERAPIST

## 2019-01-01 PROCEDURE — 33361 REPLACE AORTIC VALVE PERQ: CPT | Mod: 62 | Performed by: INTERNAL MEDICINE

## 2019-01-01 PROCEDURE — 36415 COLL VENOUS BLD VENIPUNCTURE: CPT | Performed by: PHYSICIAN ASSISTANT

## 2019-01-01 PROCEDURE — 41000018 ZZH PER-PERFUSION 1ST 30 MIN: Performed by: INTERNAL MEDICINE

## 2019-01-01 PROCEDURE — 36415 COLL VENOUS BLD VENIPUNCTURE: CPT | Performed by: FAMILY MEDICINE

## 2019-01-01 PROCEDURE — 93571 IV DOP VEL&/PRESS C FLO 1ST: CPT | Mod: 26 | Performed by: INTERNAL MEDICINE

## 2019-01-01 PROCEDURE — 02RF38Z REPLACEMENT OF AORTIC VALVE WITH ZOOPLASTIC TISSUE, PERCUTANEOUS APPROACH: ICD-10-PCS | Performed by: THORACIC SURGERY (CARDIOTHORACIC VASCULAR SURGERY)

## 2019-01-01 PROCEDURE — 25800030 ZZH RX IP 258 OP 636: Performed by: NURSE ANESTHETIST, CERTIFIED REGISTERED

## 2019-01-01 PROCEDURE — 74177 CT ABD & PELVIS W/CONTRAST: CPT

## 2019-01-01 PROCEDURE — 86923 COMPATIBILITY TEST ELECTRIC: CPT | Performed by: INTERNAL MEDICINE

## 2019-01-01 DEVICE — VALVE HEART SAPIEN 3 23MM 9600TFX23A: Type: IMPLANTABLE DEVICE | Site: HEART | Status: FUNCTIONAL

## 2019-01-01 RX ORDER — LISINOPRIL 30 MG/1
TABLET ORAL
Qty: 90 TABLET | Refills: 0 | Status: ON HOLD | OUTPATIENT
Start: 2019-01-01 | End: 2019-01-01

## 2019-01-01 RX ORDER — OLANZAPINE 5 MG/1
5 TABLET ORAL AT BEDTIME
Qty: 30 TABLET | Refills: 3 | Status: SHIPPED | OUTPATIENT
Start: 2019-01-01 | End: 2019-01-01

## 2019-01-01 RX ORDER — LIDOCAINE 40 MG/G
CREAM TOPICAL
Status: DISCONTINUED | OUTPATIENT
Start: 2019-01-01 | End: 2019-01-01 | Stop reason: HOSPADM

## 2019-01-01 RX ORDER — BUPROPION HYDROCHLORIDE 150 MG/1
TABLET ORAL
Qty: 90 TABLET | Refills: 1 | Status: SHIPPED | OUTPATIENT
Start: 2019-01-01 | End: 2019-01-01

## 2019-01-01 RX ORDER — CLOPIDOGREL BISULFATE 75 MG/1
75 TABLET ORAL DAILY
Qty: 30 TABLET | Refills: 3 | Status: SHIPPED | OUTPATIENT
Start: 2019-01-01 | End: 2019-01-01

## 2019-01-01 RX ORDER — DOCUSATE SODIUM 50MG AND SENNOSIDES 8.6MG 8.6; 5 MG/1; MG/1
TABLET, FILM COATED ORAL
Refills: 1 | COMMUNITY
Start: 2019-01-01 | End: 2019-01-01

## 2019-01-01 RX ORDER — CARBOXYMETHYLCELLULOSE SODIUM 5 MG/ML
2 SOLUTION/ DROPS OPHTHALMIC 2 TIMES DAILY
COMMUNITY

## 2019-01-01 RX ORDER — DOXYCYCLINE 100 MG/1
100 CAPSULE ORAL 2 TIMES DAILY
Qty: 10 CAPSULE | Refills: 0 | Status: SHIPPED | OUTPATIENT
Start: 2019-01-01 | End: 2019-01-01

## 2019-01-01 RX ORDER — ACETAMINOPHEN 325 MG/1
325-650 TABLET ORAL EVERY 4 HOURS PRN
Status: DISCONTINUED | OUTPATIENT
Start: 2019-01-01 | End: 2019-01-01 | Stop reason: HOSPADM

## 2019-01-01 RX ORDER — NOREPINEPHRINE BITARTRATE 0.06 MG/ML
.03-.4 INJECTION, SOLUTION INTRAVENOUS CONTINUOUS PRN
Status: DISCONTINUED | OUTPATIENT
Start: 2019-01-01 | End: 2019-01-01 | Stop reason: HOSPADM

## 2019-01-01 RX ORDER — ASPIRIN 81 MG/1
81 TABLET ORAL DAILY
Status: CANCELLED | OUTPATIENT
Start: 2019-01-01

## 2019-01-01 RX ORDER — SODIUM CHLORIDE, SODIUM GLUCONATE, SODIUM ACETATE, POTASSIUM CHLORIDE AND MAGNESIUM CHLORIDE 526; 502; 368; 37; 30 MG/100ML; MG/100ML; MG/100ML; MG/100ML; MG/100ML
INJECTION, SOLUTION INTRAVENOUS CONTINUOUS PRN
Status: DISCONTINUED | OUTPATIENT
Start: 2019-01-01 | End: 2019-01-01

## 2019-01-01 RX ORDER — NALOXONE HYDROCHLORIDE 0.4 MG/ML
.1-.4 INJECTION, SOLUTION INTRAMUSCULAR; INTRAVENOUS; SUBCUTANEOUS
Status: DISCONTINUED | OUTPATIENT
Start: 2019-01-01 | End: 2019-01-01 | Stop reason: HOSPADM

## 2019-01-01 RX ORDER — ACETAMINOPHEN 500 MG
500 TABLET ORAL EVERY 6 HOURS PRN
Qty: 100 TABLET | Refills: 3 | Status: SHIPPED | OUTPATIENT
Start: 2019-01-01

## 2019-01-01 RX ORDER — EPTIFIBATIDE 2 MG/ML
1 INJECTION, SOLUTION INTRAVENOUS CONTINUOUS PRN
Status: DISCONTINUED | OUTPATIENT
Start: 2019-01-01 | End: 2019-01-01 | Stop reason: HOSPADM

## 2019-01-01 RX ORDER — NICARDIPINE HYDROCHLORIDE 2.5 MG/ML
INJECTION INTRAVENOUS
Status: DISCONTINUED | OUTPATIENT
Start: 2019-01-01 | End: 2019-01-01 | Stop reason: HOSPADM

## 2019-01-01 RX ORDER — ONDANSETRON 2 MG/ML
4 INJECTION INTRAMUSCULAR; INTRAVENOUS EVERY 6 HOURS PRN
Status: DISCONTINUED | OUTPATIENT
Start: 2019-01-01 | End: 2019-01-01 | Stop reason: HOSPADM

## 2019-01-01 RX ORDER — SODIUM CHLORIDE, SODIUM LACTATE, POTASSIUM CHLORIDE, CALCIUM CHLORIDE 600; 310; 30; 20 MG/100ML; MG/100ML; MG/100ML; MG/100ML
INJECTION, SOLUTION INTRAVENOUS CONTINUOUS PRN
Status: DISCONTINUED | OUTPATIENT
Start: 2019-01-01 | End: 2019-01-01

## 2019-01-01 RX ORDER — ATORVASTATIN CALCIUM 20 MG/1
20 TABLET, FILM COATED ORAL AT BEDTIME
Status: DISCONTINUED | OUTPATIENT
Start: 2019-01-01 | End: 2019-01-01 | Stop reason: HOSPADM

## 2019-01-01 RX ORDER — OLANZAPINE 5 MG/1
5 TABLET ORAL AT BEDTIME
Qty: 30 TABLET | Refills: 0 | Status: SHIPPED | OUTPATIENT
Start: 2019-01-01 | End: 2019-01-01

## 2019-01-01 RX ORDER — IBUPROFEN 800 MG/1
800 TABLET, FILM COATED ORAL EVERY 4 HOURS PRN
Qty: 90 TABLET | Refills: 0 | OUTPATIENT
Start: 2019-01-01

## 2019-01-01 RX ORDER — CEFAZOLIN SODIUM 2 G/100ML
2 INJECTION, SOLUTION INTRAVENOUS
Status: COMPLETED | OUTPATIENT
Start: 2019-01-01 | End: 2019-01-01

## 2019-01-01 RX ORDER — LISINOPRIL 10 MG/1
10 TABLET ORAL DAILY
Status: DISCONTINUED | OUTPATIENT
Start: 2019-01-01 | End: 2019-01-01 | Stop reason: HOSPADM

## 2019-01-01 RX ORDER — OLANZAPINE 2.5 MG/1
2.5 TABLET, FILM COATED ORAL AT BEDTIME
Qty: 10 TABLET | Refills: 0 | Status: CANCELLED | OUTPATIENT
Start: 2019-01-01

## 2019-01-01 RX ORDER — BENZONATATE 100 MG/1
CAPSULE ORAL
Qty: 60 CAPSULE | Refills: 0 | Status: SHIPPED | OUTPATIENT
Start: 2019-01-01 | End: 2019-01-01

## 2019-01-01 RX ORDER — SODIUM CHLORIDE 9 MG/ML
INJECTION, SOLUTION INTRAVENOUS CONTINUOUS
Status: DISCONTINUED | OUTPATIENT
Start: 2019-01-01 | End: 2019-01-01 | Stop reason: HOSPADM

## 2019-01-01 RX ORDER — NALOXONE HYDROCHLORIDE 0.4 MG/ML
.2-.4 INJECTION, SOLUTION INTRAMUSCULAR; INTRAVENOUS; SUBCUTANEOUS
Status: DISCONTINUED | OUTPATIENT
Start: 2019-01-01 | End: 2019-01-01 | Stop reason: HOSPADM

## 2019-01-01 RX ORDER — CLOPIDOGREL BISULFATE 75 MG/1
300 TABLET ORAL ONCE
Status: COMPLETED | OUTPATIENT
Start: 2019-01-01 | End: 2019-01-01

## 2019-01-01 RX ORDER — IOPAMIDOL 755 MG/ML
INJECTION, SOLUTION INTRAVASCULAR
Status: DISCONTINUED | OUTPATIENT
Start: 2019-01-01 | End: 2019-01-01 | Stop reason: HOSPADM

## 2019-01-01 RX ORDER — BUPROPION HYDROCHLORIDE 150 MG/1
150 TABLET ORAL EVERY MORNING
Status: DISCONTINUED | OUTPATIENT
Start: 2019-01-01 | End: 2019-01-01 | Stop reason: HOSPADM

## 2019-01-01 RX ORDER — LISINOPRIL 10 MG/1
10 TABLET ORAL DAILY
Qty: 30 TABLET | Refills: 1 | Status: SHIPPED | OUTPATIENT
Start: 2019-01-01 | End: 2019-01-01

## 2019-01-01 RX ORDER — ONDANSETRON 2 MG/ML
4 INJECTION INTRAMUSCULAR; INTRAVENOUS EVERY 30 MIN PRN
Status: DISCONTINUED | OUTPATIENT
Start: 2019-01-01 | End: 2019-01-01 | Stop reason: HOSPADM

## 2019-01-01 RX ORDER — LISINOPRIL 10 MG/1
10 TABLET ORAL DAILY
Qty: 30 TABLET | Refills: 1 | Status: SHIPPED | OUTPATIENT
Start: 2019-01-01 | End: 2020-01-01

## 2019-01-01 RX ORDER — ONDANSETRON 4 MG/1
4 TABLET, ORALLY DISINTEGRATING ORAL EVERY 6 HOURS PRN
Status: DISCONTINUED | OUTPATIENT
Start: 2019-01-01 | End: 2019-01-01 | Stop reason: HOSPADM

## 2019-01-01 RX ORDER — OLANZAPINE 5 MG/1
5 TABLET ORAL AT BEDTIME
Qty: 90 TABLET | Refills: 0 | Status: SHIPPED | OUTPATIENT
Start: 2019-01-01 | End: 2019-01-01

## 2019-01-01 RX ORDER — ATROPINE SULFATE 0.1 MG/ML
0.5 INJECTION INTRAVENOUS EVERY 5 MIN PRN
Status: DISCONTINUED | OUTPATIENT
Start: 2019-01-01 | End: 2019-01-01 | Stop reason: HOSPADM

## 2019-01-01 RX ORDER — FENTANYL CITRATE 50 UG/ML
INJECTION, SOLUTION INTRAMUSCULAR; INTRAVENOUS
Status: DISCONTINUED | OUTPATIENT
Start: 2019-01-01 | End: 2019-01-01 | Stop reason: HOSPADM

## 2019-01-01 RX ORDER — DOPAMINE HYDROCHLORIDE 160 MG/100ML
2-20 INJECTION, SOLUTION INTRAVENOUS CONTINUOUS PRN
Status: DISCONTINUED | OUTPATIENT
Start: 2019-01-01 | End: 2019-01-01 | Stop reason: HOSPADM

## 2019-01-01 RX ORDER — BENZONATATE 100 MG/1
100 CAPSULE ORAL 2 TIMES DAILY PRN
Status: DISCONTINUED | OUTPATIENT
Start: 2019-01-01 | End: 2019-01-01 | Stop reason: HOSPADM

## 2019-01-01 RX ORDER — VENLAFAXINE HYDROCHLORIDE 75 MG/1
150 CAPSULE, EXTENDED RELEASE ORAL AT BEDTIME
Status: DISCONTINUED | OUTPATIENT
Start: 2019-01-01 | End: 2019-01-01 | Stop reason: HOSPADM

## 2019-01-01 RX ORDER — BUPROPION HYDROCHLORIDE 150 MG/1
TABLET ORAL
Qty: 90 TABLET | Refills: 0 | Status: CANCELLED | OUTPATIENT
Start: 2019-01-01

## 2019-01-01 RX ORDER — ONDANSETRON 4 MG/1
4 TABLET, ORALLY DISINTEGRATING ORAL EVERY 30 MIN PRN
Status: DISCONTINUED | OUTPATIENT
Start: 2019-01-01 | End: 2019-01-01 | Stop reason: HOSPADM

## 2019-01-01 RX ORDER — NOREPINEPHRINE BITARTRATE 0.06 MG/ML
.03-.4 INJECTION, SOLUTION INTRAVENOUS CONTINUOUS
Status: DISCONTINUED | OUTPATIENT
Start: 2019-01-01 | End: 2019-01-01 | Stop reason: HOSPADM

## 2019-01-01 RX ORDER — SODIUM CHLORIDE 9 MG/ML
INJECTION, SOLUTION INTRAVENOUS CONTINUOUS
Status: ACTIVE | OUTPATIENT
Start: 2019-01-01 | End: 2019-01-01

## 2019-01-01 RX ORDER — OLANZAPINE 5 MG/1
5 TABLET ORAL AT BEDTIME
Qty: 30 TABLET | Refills: 0 | Status: CANCELLED | OUTPATIENT
Start: 2019-01-01

## 2019-01-01 RX ORDER — FENTANYL CITRATE 50 UG/ML
25 INJECTION, SOLUTION INTRAMUSCULAR; INTRAVENOUS EVERY 5 MIN PRN
Status: DISCONTINUED | OUTPATIENT
Start: 2019-01-01 | End: 2019-01-01 | Stop reason: HOSPADM

## 2019-01-01 RX ORDER — FENTANYL CITRATE 50 UG/ML
INJECTION, SOLUTION INTRAMUSCULAR; INTRAVENOUS PRN
Status: DISCONTINUED | OUTPATIENT
Start: 2019-01-01 | End: 2019-01-01

## 2019-01-01 RX ORDER — POTASSIUM CHLORIDE 750 MG/1
20 TABLET, EXTENDED RELEASE ORAL
Status: DISCONTINUED | OUTPATIENT
Start: 2019-01-01 | End: 2019-01-01 | Stop reason: HOSPADM

## 2019-01-01 RX ORDER — ARGATROBAN 1 MG/ML
350 INJECTION, SOLUTION INTRAVENOUS
Status: DISCONTINUED | OUTPATIENT
Start: 2019-01-01 | End: 2019-01-01 | Stop reason: HOSPADM

## 2019-01-01 RX ORDER — METRONIDAZOLE 500 MG/1
500 TABLET ORAL 3 TIMES DAILY
Status: ON HOLD | COMMUNITY
Start: 2019-01-01 | End: 2019-01-01

## 2019-01-01 RX ORDER — PANTOPRAZOLE SODIUM 40 MG/1
40 TABLET, DELAYED RELEASE ORAL DAILY
Qty: 90 TABLET | Refills: 1 | Status: SHIPPED | OUTPATIENT
Start: 2019-01-01 | End: 2019-01-01

## 2019-01-01 RX ORDER — FENTANYL CITRATE 50 UG/ML
50 INJECTION, SOLUTION INTRAMUSCULAR; INTRAVENOUS
Status: DISCONTINUED | OUTPATIENT
Start: 2019-01-01 | End: 2019-01-01 | Stop reason: HOSPADM

## 2019-01-01 RX ORDER — HYDROCHLOROTHIAZIDE 12.5 MG/1
12.5 TABLET ORAL DAILY
Status: DISCONTINUED | OUTPATIENT
Start: 2019-01-01 | End: 2019-01-01

## 2019-01-01 RX ORDER — NITROGLYCERIN 10 MG/100ML
INJECTION INTRAVENOUS PRN
Status: DISCONTINUED | OUTPATIENT
Start: 2019-01-01 | End: 2019-01-01

## 2019-01-01 RX ORDER — ASPIRIN 81 MG/1
81 TABLET ORAL DAILY
Status: DISCONTINUED | OUTPATIENT
Start: 2019-01-01 | End: 2019-01-01 | Stop reason: HOSPADM

## 2019-01-01 RX ORDER — VENLAFAXINE HYDROCHLORIDE 75 MG/1
CAPSULE, EXTENDED RELEASE ORAL
Qty: 270 CAPSULE | Refills: 2 | Status: SHIPPED | OUTPATIENT
Start: 2019-01-01

## 2019-01-01 RX ORDER — SODIUM CHLORIDE 9 MG/ML
INJECTION, SOLUTION INTRAVENOUS CONTINUOUS
Status: CANCELLED | OUTPATIENT
Start: 2019-01-01

## 2019-01-01 RX ORDER — EUCALYPTUS/PEPPERMINT OIL
2 SOLUTION, NON-ORAL NASAL 2 TIMES DAILY
COMMUNITY

## 2019-01-01 RX ORDER — EPTIFIBATIDE 2 MG/ML
180 INJECTION, SOLUTION INTRAVENOUS EVERY 10 MIN PRN
Status: DISCONTINUED | OUTPATIENT
Start: 2019-01-01 | End: 2019-01-01 | Stop reason: HOSPADM

## 2019-01-01 RX ORDER — ATORVASTATIN CALCIUM 20 MG/1
20 TABLET, FILM COATED ORAL DAILY
Qty: 90 TABLET | Refills: 3 | Status: SHIPPED | OUTPATIENT
Start: 2019-01-01

## 2019-01-01 RX ORDER — CEFAZOLIN SODIUM 2 G/50ML
2 SOLUTION INTRAVENOUS
Status: CANCELLED | OUTPATIENT
Start: 2019-01-01

## 2019-01-01 RX ORDER — CLOPIDOGREL BISULFATE 75 MG/1
75 TABLET ORAL DAILY
Status: DISCONTINUED | OUTPATIENT
Start: 2019-01-01 | End: 2019-01-01 | Stop reason: HOSPADM

## 2019-01-01 RX ORDER — HYDROCHLOROTHIAZIDE 12.5 MG/1
12.5 TABLET ORAL EVERY MORNING
Status: DISCONTINUED | OUTPATIENT
Start: 2019-01-01 | End: 2019-01-01

## 2019-01-01 RX ORDER — NITROGLYCERIN 5 MG/ML
VIAL (ML) INTRAVENOUS
Status: DISCONTINUED | OUTPATIENT
Start: 2019-01-01 | End: 2019-01-01 | Stop reason: HOSPADM

## 2019-01-01 RX ORDER — FENTANYL CITRATE 50 UG/ML
25-50 INJECTION, SOLUTION INTRAMUSCULAR; INTRAVENOUS
Status: DISCONTINUED | OUTPATIENT
Start: 2019-01-01 | End: 2019-01-01 | Stop reason: HOSPADM

## 2019-01-01 RX ORDER — DOXYCYCLINE 100 MG/1
100 CAPSULE ORAL 2 TIMES DAILY
Qty: 20 CAPSULE | Refills: 0 | Status: SHIPPED | OUTPATIENT
Start: 2019-01-01 | End: 2019-01-01

## 2019-01-01 RX ORDER — POTASSIUM CHLORIDE 750 MG/1
10 TABLET, EXTENDED RELEASE ORAL 2 TIMES DAILY
Qty: 60 TABLET | Refills: 3 | Status: ON HOLD | OUTPATIENT
Start: 2019-01-01 | End: 2019-01-01

## 2019-01-01 RX ORDER — LISINOPRIL 10 MG/1
10 TABLET ORAL DAILY
Qty: 30 TABLET | Refills: 1 | Status: CANCELLED | OUTPATIENT
Start: 2019-01-01

## 2019-01-01 RX ORDER — HEPARIN SODIUM 1000 [USP'U]/ML
INJECTION, SOLUTION INTRAVENOUS; SUBCUTANEOUS PRN
Status: DISCONTINUED | OUTPATIENT
Start: 2019-01-01 | End: 2019-01-01

## 2019-01-01 RX ORDER — PANTOPRAZOLE SODIUM 40 MG/1
40 TABLET, DELAYED RELEASE ORAL
Status: DISCONTINUED | OUTPATIENT
Start: 2019-01-01 | End: 2019-01-01 | Stop reason: HOSPADM

## 2019-01-01 RX ORDER — ACETAMINOPHEN 325 MG/1
650 TABLET ORAL ONCE
Status: COMPLETED | OUTPATIENT
Start: 2019-01-01 | End: 2019-01-01

## 2019-01-01 RX ORDER — OLANZAPINE 5 MG/1
5 TABLET ORAL AT BEDTIME
Qty: 90 TABLET | Refills: 0 | Status: SHIPPED | OUTPATIENT
Start: 2019-01-01 | End: 2020-01-01

## 2019-01-01 RX ORDER — NITROGLYCERIN 0.4 MG/1
0.4 TABLET SUBLINGUAL EVERY 5 MIN PRN
Status: DISCONTINUED | OUTPATIENT
Start: 2019-01-01 | End: 2019-01-01 | Stop reason: HOSPADM

## 2019-01-01 RX ORDER — ONDANSETRON 2 MG/ML
INJECTION INTRAMUSCULAR; INTRAVENOUS PRN
Status: DISCONTINUED | OUTPATIENT
Start: 2019-01-01 | End: 2019-01-01

## 2019-01-01 RX ORDER — HYDRALAZINE HYDROCHLORIDE 20 MG/ML
10 INJECTION INTRAMUSCULAR; INTRAVENOUS
Status: DISCONTINUED | OUTPATIENT
Start: 2019-01-01 | End: 2019-01-01 | Stop reason: HOSPADM

## 2019-01-01 RX ORDER — DOCUSATE SODIUM 50MG AND SENNOSIDES 8.6MG 8.6; 5 MG/1; MG/1
TABLET, FILM COATED ORAL
Qty: 100 TABLET | Refills: 1 | Status: SHIPPED | OUTPATIENT
Start: 2019-01-01 | End: 2019-01-01

## 2019-01-01 RX ORDER — ARGATROBAN 1 MG/ML
150 INJECTION, SOLUTION INTRAVENOUS
Status: DISCONTINUED | OUTPATIENT
Start: 2019-01-01 | End: 2019-01-01 | Stop reason: HOSPADM

## 2019-01-01 RX ORDER — PROTAMINE SULFATE 10 MG/ML
INJECTION, SOLUTION INTRAVENOUS PRN
Status: DISCONTINUED | OUTPATIENT
Start: 2019-01-01 | End: 2019-01-01

## 2019-01-01 RX ORDER — HYDROMORPHONE HCL/0.9% NACL/PF 0.2MG/0.2
0.2 SYRINGE (ML) INTRAVENOUS
Status: DISCONTINUED | OUTPATIENT
Start: 2019-01-01 | End: 2019-01-01 | Stop reason: HOSPADM

## 2019-01-01 RX ORDER — NITROGLYCERIN 20 MG/100ML
.07-2 INJECTION INTRAVENOUS CONTINUOUS PRN
Status: DISCONTINUED | OUTPATIENT
Start: 2019-01-01 | End: 2019-01-01 | Stop reason: HOSPADM

## 2019-01-01 RX ORDER — CIPROFLOXACIN 500 MG/1
500 TABLET, FILM COATED ORAL 2 TIMES DAILY
Status: ON HOLD | COMMUNITY
Start: 2019-01-01 | End: 2019-01-01

## 2019-01-01 RX ORDER — VENLAFAXINE HYDROCHLORIDE 75 MG/1
CAPSULE, EXTENDED RELEASE ORAL
Qty: 270 CAPSULE | Refills: 2 | Status: CANCELLED | OUTPATIENT
Start: 2019-01-01

## 2019-01-01 RX ORDER — PREDNISONE 20 MG/1
20 TABLET ORAL DAILY
Qty: 5 TABLET | Refills: 0 | Status: SHIPPED | OUTPATIENT
Start: 2019-01-01 | End: 2019-01-01

## 2019-01-01 RX ORDER — PANTOPRAZOLE SODIUM 40 MG/1
40 TABLET, DELAYED RELEASE ORAL AT BEDTIME
Qty: 90 TABLET | Refills: 2 | Status: SHIPPED | OUTPATIENT
Start: 2019-01-01

## 2019-01-01 RX ORDER — HEPARIN SODIUM 1000 [USP'U]/ML
INJECTION, SOLUTION INTRAVENOUS; SUBCUTANEOUS
Status: DISCONTINUED | OUTPATIENT
Start: 2019-01-01 | End: 2019-01-01 | Stop reason: HOSPADM

## 2019-01-01 RX ORDER — CARBOXYMETHYLCELLULOSE SODIUM 5 MG/ML
2 SOLUTION/ DROPS OPHTHALMIC 2 TIMES DAILY
Status: DISCONTINUED | OUTPATIENT
Start: 2019-01-01 | End: 2019-01-01 | Stop reason: HOSPADM

## 2019-01-01 RX ORDER — GADOBUTROL 604.72 MG/ML
7.5 INJECTION INTRAVENOUS ONCE
Status: COMPLETED | OUTPATIENT
Start: 2019-01-01 | End: 2019-01-01

## 2019-01-01 RX ORDER — POTASSIUM CHLORIDE 750 MG/1
40 TABLET, EXTENDED RELEASE ORAL
Status: DISCONTINUED | OUTPATIENT
Start: 2019-01-01 | End: 2019-01-01 | Stop reason: HOSPADM

## 2019-01-01 RX ORDER — BUPROPION HYDROCHLORIDE 150 MG/1
TABLET ORAL
Qty: 90 TABLET | Refills: 1 | Status: SHIPPED | OUTPATIENT
Start: 2019-01-01 | End: 2020-01-01 | Stop reason: DRUGHIGH

## 2019-01-01 RX ORDER — ADENOSINE 3 MG/ML
INJECTION, SOLUTION INTRAVENOUS
Status: DISCONTINUED | OUTPATIENT
Start: 2019-01-01 | End: 2019-01-01 | Stop reason: HOSPADM

## 2019-01-01 RX ORDER — SODIUM CHLORIDE, SODIUM LACTATE, POTASSIUM CHLORIDE, CALCIUM CHLORIDE 600; 310; 30; 20 MG/100ML; MG/100ML; MG/100ML; MG/100ML
INJECTION, SOLUTION INTRAVENOUS CONTINUOUS
Status: DISCONTINUED | OUTPATIENT
Start: 2019-01-01 | End: 2019-01-01 | Stop reason: HOSPADM

## 2019-01-01 RX ORDER — IOPAMIDOL 755 MG/ML
120 INJECTION, SOLUTION INTRAVASCULAR ONCE
Status: COMPLETED | OUTPATIENT
Start: 2019-01-01 | End: 2019-01-01

## 2019-01-01 RX ORDER — HYDROCHLOROTHIAZIDE 12.5 MG/1
TABLET ORAL
Qty: 90 TABLET | Refills: 0 | Status: ON HOLD | OUTPATIENT
Start: 2019-01-01 | End: 2019-01-01

## 2019-01-01 RX ORDER — HYDROCHLOROTHIAZIDE 12.5 MG/1
TABLET ORAL
Qty: 90 TABLET | Refills: 0 | Status: SHIPPED | OUTPATIENT
Start: 2019-01-01 | End: 2019-01-01

## 2019-01-01 RX ORDER — DOBUTAMINE HYDROCHLORIDE 200 MG/100ML
2-20 INJECTION INTRAVENOUS CONTINUOUS PRN
Status: DISCONTINUED | OUTPATIENT
Start: 2019-01-01 | End: 2019-01-01 | Stop reason: HOSPADM

## 2019-01-01 RX ORDER — BUPROPION HYDROCHLORIDE 150 MG/1
TABLET ORAL
Qty: 90 TABLET | Refills: 0 | OUTPATIENT
Start: 2019-01-01

## 2019-01-01 RX ORDER — FLUMAZENIL 0.1 MG/ML
0.2 INJECTION, SOLUTION INTRAVENOUS
Status: DISCONTINUED | OUTPATIENT
Start: 2019-01-01 | End: 2019-01-01 | Stop reason: HOSPADM

## 2019-01-01 RX ORDER — NALOXONE HYDROCHLORIDE 0.4 MG/ML
.1-.4 INJECTION, SOLUTION INTRAMUSCULAR; INTRAVENOUS; SUBCUTANEOUS
Status: ACTIVE | OUTPATIENT
Start: 2019-01-01 | End: 2019-01-01

## 2019-01-01 RX ORDER — LIDOCAINE 40 MG/G
CREAM TOPICAL
Status: CANCELLED | OUTPATIENT
Start: 2019-01-01

## 2019-01-01 RX ADMIN — FENTANYL CITRATE 25 MCG: 50 INJECTION, SOLUTION INTRAMUSCULAR; INTRAVENOUS at 12:49

## 2019-01-01 RX ADMIN — ADENOSINE 80 MCG: 3 INJECTION, SOLUTION INTRAVENOUS at 13:35

## 2019-01-01 RX ADMIN — MIDAZOLAM 0.5 MG: 1 INJECTION INTRAMUSCULAR; INTRAVENOUS at 12:50

## 2019-01-01 RX ADMIN — ATORVASTATIN CALCIUM 20 MG: 20 TABLET, FILM COATED ORAL at 21:41

## 2019-01-01 RX ADMIN — ASPIRIN 325 MG: 325 TABLET, DELAYED RELEASE ORAL at 10:26

## 2019-01-01 RX ADMIN — VENLAFAXINE HYDROCHLORIDE 150 MG: 75 CAPSULE, EXTENDED RELEASE ORAL at 22:42

## 2019-01-01 RX ADMIN — NITROGLYCERIN 50 MCG: 10 INJECTION INTRAVENOUS at 09:02

## 2019-01-01 RX ADMIN — FENTANYL CITRATE 50 MCG: 50 INJECTION, SOLUTION INTRAMUSCULAR; INTRAVENOUS at 09:25

## 2019-01-01 RX ADMIN — NITROGLYCERIN 50 MCG: 10 INJECTION INTRAVENOUS at 09:24

## 2019-01-01 RX ADMIN — VENLAFAXINE HYDROCHLORIDE 150 MG: 75 CAPSULE, EXTENDED RELEASE ORAL at 21:41

## 2019-01-01 RX ADMIN — ACETAMINOPHEN 650 MG: 325 TABLET, FILM COATED ORAL at 09:21

## 2019-01-01 RX ADMIN — MIDAZOLAM 0.5 MG: 1 INJECTION INTRAMUSCULAR; INTRAVENOUS at 13:36

## 2019-01-01 RX ADMIN — CLOPIDOGREL BISULFATE 300 MG: 75 TABLET ORAL at 17:04

## 2019-01-01 RX ADMIN — FENTANYL CITRATE 50 MCG: 50 INJECTION, SOLUTION INTRAMUSCULAR; INTRAVENOUS at 08:50

## 2019-01-01 RX ADMIN — ASPIRIN 81 MG: 81 TABLET, COATED ORAL at 08:16

## 2019-01-01 RX ADMIN — HEPARIN SODIUM 3000 UNITS: 1000 INJECTION, SOLUTION INTRAVENOUS; SUBCUTANEOUS at 09:01

## 2019-01-01 RX ADMIN — GADOBUTROL 8 ML: 604.72 INJECTION INTRAVENOUS at 12:26

## 2019-01-01 RX ADMIN — PROTAMINE SULFATE 100 MG: 10 INJECTION, SOLUTION INTRAVENOUS at 09:28

## 2019-01-01 RX ADMIN — FENTANYL CITRATE 25 MCG: 50 INJECTION, SOLUTION INTRAMUSCULAR; INTRAVENOUS at 13:45

## 2019-01-01 RX ADMIN — SODIUM CHLORIDE, SODIUM GLUCONATE, SODIUM ACETATE, POTASSIUM CHLORIDE AND MAGNESIUM CHLORIDE: 526; 502; 368; 37; 30 INJECTION, SOLUTION INTRAVENOUS at 08:38

## 2019-01-01 RX ADMIN — CARBOXYMETHYLCELLULOSE SODIUM 2 DROP: 5 SOLUTION/ DROPS OPHTHALMIC at 19:14

## 2019-01-01 RX ADMIN — ADENOSINE 100 MCG: 3 INJECTION, SOLUTION INTRAVENOUS at 13:36

## 2019-01-01 RX ADMIN — SODIUM CHLORIDE, POTASSIUM CHLORIDE, SODIUM LACTATE AND CALCIUM CHLORIDE: 600; 310; 30; 20 INJECTION, SOLUTION INTRAVENOUS at 07:42

## 2019-01-01 RX ADMIN — SODIUM CHLORIDE: 9 INJECTION, SOLUTION INTRAVENOUS at 14:04

## 2019-01-01 RX ADMIN — CARBOXYMETHYLCELLULOSE SODIUM 2 DROP: 5 SOLUTION/ DROPS OPHTHALMIC at 09:16

## 2019-01-01 RX ADMIN — NITROGLYCERIN 50 MCG: 10 INJECTION INTRAVENOUS at 09:05

## 2019-01-01 RX ADMIN — HEPARIN SODIUM 13000 UNITS: 1000 INJECTION, SOLUTION INTRAVENOUS; SUBCUTANEOUS at 08:47

## 2019-01-01 RX ADMIN — NITROGLYCERIN 50 MCG: 10 INJECTION INTRAVENOUS at 09:21

## 2019-01-01 RX ADMIN — NOREPINEPHRINE BITARTRATE 3.2 MCG: 1 INJECTION INTRAVENOUS at 09:10

## 2019-01-01 RX ADMIN — NITROGLYCERIN 50 MCG: 10 INJECTION INTRAVENOUS at 09:13

## 2019-01-01 RX ADMIN — ACETAMINOPHEN 650 MG: 325 TABLET, FILM COATED ORAL at 14:22

## 2019-01-01 RX ADMIN — HEPARIN SODIUM 5000 UNITS: 1000 INJECTION, SOLUTION INTRAVENOUS; SUBCUTANEOUS at 13:10

## 2019-01-01 RX ADMIN — RANITIDINE 150 MG: 150 TABLET ORAL at 21:41

## 2019-01-01 RX ADMIN — BUPROPION HYDROCHLORIDE 150 MG: 150 TABLET, FILM COATED, EXTENDED RELEASE ORAL at 08:16

## 2019-01-01 RX ADMIN — ONDANSETRON 4 MG: 2 INJECTION INTRAMUSCULAR; INTRAVENOUS at 09:43

## 2019-01-01 RX ADMIN — Medication 1 MG: at 21:41

## 2019-01-01 RX ADMIN — BUPROPION HYDROCHLORIDE 150 MG: 150 TABLET, FILM COATED, EXTENDED RELEASE ORAL at 09:16

## 2019-01-01 RX ADMIN — MIDAZOLAM 0.5 MG: 1 INJECTION INTRAMUSCULAR; INTRAVENOUS at 09:21

## 2019-01-01 RX ADMIN — PANTOPRAZOLE SODIUM 40 MG: 40 TABLET, DELAYED RELEASE ORAL at 08:16

## 2019-01-01 RX ADMIN — ASPIRIN 81 MG: 81 TABLET, COATED ORAL at 06:55

## 2019-01-01 RX ADMIN — CARBOXYMETHYLCELLULOSE SODIUM 2 DROP: 5 SOLUTION/ DROPS OPHTHALMIC at 19:12

## 2019-01-01 RX ADMIN — CEFAZOLIN SODIUM 2 G: 2 INJECTION, SOLUTION INTRAVENOUS at 08:02

## 2019-01-01 RX ADMIN — CLOPIDOGREL BISULFATE 75 MG: 75 TABLET ORAL at 08:16

## 2019-01-01 RX ADMIN — FENTANYL CITRATE 50 MCG: 50 INJECTION, SOLUTION INTRAMUSCULAR; INTRAVENOUS at 09:30

## 2019-01-01 RX ADMIN — LIDOCAINE: 40 CREAM TOPICAL at 10:27

## 2019-01-01 RX ADMIN — FENTANYL CITRATE 25 MCG: 50 INJECTION, SOLUTION INTRAMUSCULAR; INTRAVENOUS at 08:29

## 2019-01-01 RX ADMIN — FENTANYL CITRATE 25 MCG: 50 INJECTION, SOLUTION INTRAMUSCULAR; INTRAVENOUS at 13:10

## 2019-01-01 RX ADMIN — FENTANYL CITRATE 25 MCG: 50 INJECTION, SOLUTION INTRAMUSCULAR; INTRAVENOUS at 13:09

## 2019-01-01 RX ADMIN — IOPAMIDOL 150 ML: 755 INJECTION, SOLUTION INTRAVENOUS at 13:45

## 2019-01-01 RX ADMIN — PANTOPRAZOLE SODIUM 40 MG: 40 TABLET, DELAYED RELEASE ORAL at 09:16

## 2019-01-01 RX ADMIN — NOREPINEPHRINE BITARTRATE 3.2 MCG: 1 INJECTION INTRAVENOUS at 09:11

## 2019-01-01 RX ADMIN — ACETAMINOPHEN 650 MG: 325 TABLET, FILM COATED ORAL at 17:28

## 2019-01-01 RX ADMIN — HEPARIN SODIUM 4000 UNITS: 1000 INJECTION, SOLUTION INTRAVENOUS; SUBCUTANEOUS at 13:14

## 2019-01-01 RX ADMIN — HYDROCHLOROTHIAZIDE 12.5 MG: 12.5 TABLET ORAL at 09:16

## 2019-01-01 RX ADMIN — IOPAMIDOL 120 ML: 755 INJECTION, SOLUTION INTRAVENOUS at 11:46

## 2019-01-01 RX ADMIN — RANITIDINE 150 MG: 150 TABLET ORAL at 22:42

## 2019-01-01 RX ADMIN — MIDAZOLAM 0.5 MG: 1 INJECTION INTRAMUSCULAR; INTRAVENOUS at 13:45

## 2019-01-01 RX ADMIN — NOREPINEPHRINE BITARTRATE 3.2 MCG: 1 INJECTION INTRAVENOUS at 08:43

## 2019-01-01 RX ADMIN — ACETAMINOPHEN 650 MG: 325 TABLET, FILM COATED ORAL at 08:16

## 2019-01-01 RX ADMIN — ASPIRIN 81 MG: 81 TABLET, COATED ORAL at 09:16

## 2019-01-01 RX ADMIN — NITROGLYCERIN 200 MCG: 5 INJECTION, SOLUTION INTRAVENOUS at 13:08

## 2019-01-01 RX ADMIN — ATORVASTATIN CALCIUM 20 MG: 20 TABLET, FILM COATED ORAL at 22:42

## 2019-01-01 RX ADMIN — NITROGLYCERIN 50 MCG: 10 INJECTION INTRAVENOUS at 09:18

## 2019-01-01 RX ADMIN — LIDOCAINE HYDROCHLORIDE 5 ML: 10 INJECTION, SOLUTION EPIDURAL; INFILTRATION; INTRACAUDAL; PERINEURAL at 13:28

## 2019-01-01 RX ADMIN — Medication 1 MG: at 22:42

## 2019-01-01 RX ADMIN — HYDROCHLOROTHIAZIDE 12.5 MG: 12.5 TABLET ORAL at 17:40

## 2019-01-01 RX ADMIN — NITROGLYCERIN 50 MCG: 10 INJECTION INTRAVENOUS at 09:15

## 2019-01-01 RX ADMIN — LIDOCAINE HYDROCHLORIDE 5 ML: 10 INJECTION, SOLUTION EPIDURAL; INFILTRATION; INTRACAUDAL; PERINEURAL at 12:50

## 2019-01-01 RX ADMIN — FENTANYL CITRATE 50 MCG: 50 INJECTION, SOLUTION INTRAMUSCULAR; INTRAVENOUS at 09:15

## 2019-01-01 RX ADMIN — ADENOSINE 100 MCG: 3 INJECTION, SOLUTION INTRAVENOUS at 13:41

## 2019-01-01 RX ADMIN — NOREPINEPHRINE BITARTRATE 3.2 MCG: 1 INJECTION INTRAVENOUS at 09:06

## 2019-01-01 RX ADMIN — SODIUM CHLORIDE: 9 INJECTION, SOLUTION INTRAVENOUS at 10:54

## 2019-01-01 RX ADMIN — MIDAZOLAM 0.5 MG: 1 INJECTION INTRAMUSCULAR; INTRAVENOUS at 08:25

## 2019-01-01 RX ADMIN — GADOBUTROL 7.5 ML: 604.72 INJECTION INTRAVENOUS at 11:45

## 2019-01-01 RX ADMIN — NITROGLYCERIN 50 MCG: 10 INJECTION INTRAVENOUS at 09:29

## 2019-01-01 RX ADMIN — FENTANYL CITRATE 25 MCG: 50 INJECTION, SOLUTION INTRAMUSCULAR; INTRAVENOUS at 13:36

## 2019-01-01 RX ADMIN — FENTANYL CITRATE 25 MCG: 50 INJECTION, SOLUTION INTRAMUSCULAR; INTRAVENOUS at 08:48

## 2019-01-01 RX ADMIN — HEPARIN SODIUM 2000 UNITS: 1000 INJECTION, SOLUTION INTRAVENOUS; SUBCUTANEOUS at 13:25

## 2019-01-01 RX ADMIN — LIDOCAINE HYDROCHLORIDE 5 ML: 10 INJECTION, SOLUTION EPIDURAL; INFILTRATION; INTRACAUDAL; PERINEURAL at 13:01

## 2019-01-01 RX ADMIN — CLOPIDOGREL BISULFATE 75 MG: 75 TABLET ORAL at 09:16

## 2019-01-01 RX ADMIN — LISINOPRIL 10 MG: 10 TABLET ORAL at 08:16

## 2019-01-01 ASSESSMENT — MIFFLIN-ST. JEOR
SCORE: 1278.75
SCORE: 1360.39
SCORE: 1360.39
SCORE: 1360.38
SCORE: 1283.76
SCORE: 1348.88
SCORE: 1310.5
SCORE: 1393.05
SCORE: 1348.6
SCORE: 1396.68
SCORE: 1351.78

## 2019-01-01 ASSESSMENT — ACTIVITIES OF DAILY LIVING (ADL)
ADLS_ACUITY_SCORE: 28
TOILETING: 2-->ASSISTIVE PERSON
WHICH_OF_THE_ABOVE_FUNCTIONAL_RISKS_HAD_A_RECENT_ONSET_OR_CHANGE?: FALL HISTORY
ADLS_ACUITY_SCORE: 28
RETIRED_COMMUNICATION: 0-->UNDERSTANDS/COMMUNICATES WITHOUT DIFFICULTY
ADLS_ACUITY_SCORE: 31
AMBULATION: 2-->ASSISTIVE PERSON
ADLS_ACUITY_SCORE: 31
FALL_HISTORY_WITHIN_LAST_SIX_MONTHS: YES
TRANSFERRING: 1-->ASSISTIVE EQUIPMENT
ADLS_ACUITY_SCORE: 31
COGNITION: 0 - NO COGNITION ISSUES REPORTED
ADLS_ACUITY_SCORE: 32
PREVIOUS_RESPONSIBILITIES: HOUSEKEEPING
ADLS_ACUITY_SCORE: 32
ADLS_ACUITY_SCORE: 28
ADLS_ACUITY_SCORE: 31
ADLS_ACUITY_SCORE: 28
ADLS_ACUITY_SCORE: 29
NUMBER_OF_TIMES_PATIENT_HAS_FALLEN_WITHIN_LAST_SIX_MONTHS: 1
BATHING: 3-->ASSISTIVE EQUIPMENT AND PERSON
ADLS_ACUITY_SCORE: 30
RETIRED_EATING: 0-->INDEPENDENT
DRESS: 2-->ASSISTIVE PERSON
ADLS_ACUITY_SCORE: 31
SWALLOWING: 0-->SWALLOWS FOODS/LIQUIDS WITHOUT DIFFICULTY

## 2019-01-01 ASSESSMENT — ANXIETY QUESTIONNAIRES
GAD7 TOTAL SCORE: 8
1. FEELING NERVOUS, ANXIOUS, OR ON EDGE: NEARLY EVERY DAY
7. FEELING AFRAID AS IF SOMETHING AWFUL MIGHT HAPPEN: NOT AT ALL
2. NOT BEING ABLE TO STOP OR CONTROL WORRYING: SEVERAL DAYS
5. BEING SO RESTLESS THAT IT IS HARD TO SIT STILL: NOT AT ALL
3. WORRYING TOO MUCH ABOUT DIFFERENT THINGS: NOT AT ALL
6. BECOMING EASILY ANNOYED OR IRRITABLE: NEARLY EVERY DAY
GAD7 TOTAL SCORE: 8

## 2019-01-01 ASSESSMENT — PAIN DESCRIPTION - DESCRIPTORS
DESCRIPTORS: SORE
DESCRIPTORS: ACHING
DESCRIPTORS: SORE
DESCRIPTORS: ACHING;SORE
DESCRIPTORS: SORE
DESCRIPTORS: ACHING

## 2019-01-01 ASSESSMENT — PAIN SCALES - GENERAL
PAINLEVEL: MODERATE PAIN (5)
PAINLEVEL: NO PAIN (0)
PAINLEVEL: NO PAIN (0)
PAINLEVEL: SEVERE PAIN (6)
PAINLEVEL: MODERATE PAIN (4)
PAINLEVEL: SEVERE PAIN (6)
PAINLEVEL: SEVERE PAIN (6)
PAINLEVEL: MODERATE PAIN (5)
PAINLEVEL: NO PAIN (0)
PAINLEVEL: SEVERE PAIN (7)

## 2019-01-01 ASSESSMENT — PATIENT HEALTH QUESTIONNAIRE - PHQ9
5. POOR APPETITE OR OVEREATING: SEVERAL DAYS
SUM OF ALL RESPONSES TO PHQ QUESTIONS 1-9: 14

## 2019-01-01 ASSESSMENT — NEW YORK HEART ASSOCIATION (NYHA) CLASSIFICATION: NYHA FUNCTIONAL CLASS: III

## 2019-01-01 ASSESSMENT — COPD QUESTIONNAIRES: COPD: 1

## 2019-01-01 ASSESSMENT — ENCOUNTER SYMPTOMS: ORTHOPNEA: 1

## 2019-01-01 ASSESSMENT — LIFESTYLE VARIABLES: TOBACCO_USE: 1

## 2019-01-04 ENCOUNTER — TRANSFERRED RECORDS (OUTPATIENT)
Dept: HEALTH INFORMATION MANAGEMENT | Facility: CLINIC | Age: 79
End: 2019-01-04

## 2019-01-04 LAB
ABSTRACT IFOB-NO CHARGE: POSITIVE
ALT SERPL-CCNC: 14 U/L (ref 8–45)
AST SERPL-CCNC: 14 U/L (ref 8–45)
CREAT SERPL-MCNC: 1.14 MG/DL (ref 0.57–1.11)
GFR SERPL CREATININE-BSD FRML MDRD: 46 ML/MIN/1.73M2
GLUCOSE SERPL-MCNC: 135 MG/DL (ref 70–105)
POTASSIUM SERPL-SCNC: 4.2 MMOL/L (ref 3.5–5.1)

## 2019-01-05 ENCOUNTER — HOSPITAL ENCOUNTER (INPATIENT)
Facility: CLINIC | Age: 79
LOS: 20 days | Discharge: NURSING, REHAB OR LONG-TERM CARE | DRG: 871 | End: 2019-01-25
Attending: INTERNAL MEDICINE | Admitting: INTERNAL MEDICINE
Payer: MEDICARE

## 2019-01-05 ENCOUNTER — APPOINTMENT (OUTPATIENT)
Dept: CARDIOLOGY | Facility: CLINIC | Age: 79
DRG: 871 | End: 2019-01-05
Attending: INTERNAL MEDICINE
Payer: MEDICARE

## 2019-01-05 ENCOUNTER — APPOINTMENT (OUTPATIENT)
Dept: GENERAL RADIOLOGY | Facility: CLINIC | Age: 79
DRG: 871 | End: 2019-01-05
Attending: INTERNAL MEDICINE
Payer: MEDICARE

## 2019-01-05 DIAGNOSIS — R14.3 FLATULENCE, ERUCTATION, AND GAS PAIN: ICD-10-CM

## 2019-01-05 DIAGNOSIS — C30.0 MALIGNANT MELANOMA OF NASAL CAVITY (H): ICD-10-CM

## 2019-01-05 DIAGNOSIS — R05.9 COUGH: ICD-10-CM

## 2019-01-05 DIAGNOSIS — K21.9 GASTROESOPHAGEAL REFLUX DISEASE, ESOPHAGITIS PRESENCE NOT SPECIFIED: ICD-10-CM

## 2019-01-05 DIAGNOSIS — R14.1 FLATULENCE, ERUCTATION, AND GAS PAIN: ICD-10-CM

## 2019-01-05 DIAGNOSIS — E46 MALNUTRITION, UNSPECIFIED TYPE (H): ICD-10-CM

## 2019-01-05 DIAGNOSIS — R14.2 FLATULENCE, ERUCTATION, AND GAS PAIN: ICD-10-CM

## 2019-01-05 DIAGNOSIS — B37.2 CANDIDAL SKIN INFECTION: ICD-10-CM

## 2019-01-05 DIAGNOSIS — I10 HYPERTENSION GOAL BP (BLOOD PRESSURE) < 140/90: ICD-10-CM

## 2019-01-05 DIAGNOSIS — H04.123 DRY EYES: ICD-10-CM

## 2019-01-05 DIAGNOSIS — R11.0 NAUSEA: Primary | ICD-10-CM

## 2019-01-05 DIAGNOSIS — R41.82 ALTERED MENTAL STATUS, UNSPECIFIED ALTERED MENTAL STATUS TYPE: ICD-10-CM

## 2019-01-05 DIAGNOSIS — K59.00 CONSTIPATION, UNSPECIFIED CONSTIPATION TYPE: ICD-10-CM

## 2019-01-05 DIAGNOSIS — D50.0 IRON DEFICIENCY ANEMIA DUE TO CHRONIC BLOOD LOSS: ICD-10-CM

## 2019-01-05 PROBLEM — A41.9 SEPSIS (H): Status: ACTIVE | Noted: 2019-01-05

## 2019-01-05 LAB
ABO + RH BLD: NORMAL
ABO + RH BLD: NORMAL
ALBUMIN SERPL-MCNC: 2.8 G/DL (ref 3.4–5)
ALBUMIN UR-MCNC: 10 MG/DL
ALP SERPL-CCNC: 89 U/L (ref 40–150)
ALT SERPL W P-5'-P-CCNC: 23 U/L (ref 0–50)
AMMONIA PLAS-SCNC: 25 UMOL/L (ref 10–50)
ANION GAP SERPL CALCULATED.3IONS-SCNC: 12 MMOL/L (ref 3–14)
APPEARANCE UR: CLEAR
AST SERPL W P-5'-P-CCNC: 38 U/L (ref 0–45)
BASE DEFICIT BLDV-SCNC: 5.7 MMOL/L
BILIRUB SERPL-MCNC: 0.4 MG/DL (ref 0.2–1.3)
BILIRUB UR QL STRIP: NEGATIVE
BLD GP AB SCN SERPL QL: NORMAL
BLD PROD TYP BPU: NORMAL
BLD PROD TYP BPU: NORMAL
BLD UNIT ID BPU: 0
BLOOD BANK CMNT PATIENT-IMP: NORMAL
BLOOD PRODUCT CODE: NORMAL
BPU ID: NORMAL
BUN SERPL-MCNC: 41 MG/DL (ref 7–30)
C COLI+JEJUNI+LARI FUSA STL QL NAA+PROBE: NOT DETECTED
C DIFF TOX B STL QL: NEGATIVE
CA-I BLD-MCNC: 4.9 MG/DL (ref 4.4–5.2)
CALCIUM SERPL-MCNC: 7.7 MG/DL (ref 8.5–10.1)
CHLORIDE SERPL-SCNC: 109 MMOL/L (ref 94–109)
CO2 SERPL-SCNC: 18 MMOL/L (ref 20–32)
COLOR UR AUTO: YELLOW
CREAT SERPL-MCNC: 1 MG/DL (ref 0.52–1.04)
CREAT UR-MCNC: 65 MG/DL
CRP SERPL-MCNC: 9.2 MG/L (ref 0–8)
EC STX1 GENE STL QL NAA+PROBE: NOT DETECTED
EC STX2 GENE STL QL NAA+PROBE: NOT DETECTED
ENTERIC PATHOGEN COMMENT: NORMAL
ERYTHROCYTE [DISTWIDTH] IN BLOOD BY AUTOMATED COUNT: 15.9 % (ref 10–15)
ERYTHROCYTE [DISTWIDTH] IN BLOOD BY AUTOMATED COUNT: 16.3 % (ref 10–15)
FERRITIN SERPL-MCNC: 17 NG/ML (ref 8–252)
FLUAV+FLUBV RNA SPEC QL NAA+PROBE: NEGATIVE
FLUAV+FLUBV RNA SPEC QL NAA+PROBE: NEGATIVE
GFR SERPL CREATININE-BSD FRML MDRD: 54 ML/MIN/{1.73_M2}
GLUCOSE BLDC GLUCOMTR-MCNC: 116 MG/DL (ref 70–99)
GLUCOSE SERPL-MCNC: 131 MG/DL (ref 70–99)
GLUCOSE UR STRIP-MCNC: NEGATIVE MG/DL
HCO3 BLDV-SCNC: 20 MMOL/L (ref 21–28)
HCT VFR BLD AUTO: 23.1 % (ref 35–47)
HCT VFR BLD AUTO: 27.7 % (ref 35–47)
HGB BLD-MCNC: 6.9 G/DL (ref 11.7–15.7)
HGB BLD-MCNC: 8.3 G/DL (ref 11.7–15.7)
HGB UR QL STRIP: ABNORMAL
INR PPP: 1.12 (ref 0.86–1.14)
IRON SATN MFR SERPL: 5 % (ref 15–46)
IRON SERPL-MCNC: 21 UG/DL (ref 35–180)
KETONES UR STRIP-MCNC: NEGATIVE MG/DL
L PNEUMO1 AG UR QL IA: NORMAL
LACTATE BLD-SCNC: 1.6 MMOL/L (ref 0.7–2)
LEUKOCYTE ESTERASE UR QL STRIP: ABNORMAL
MAGNESIUM SERPL-MCNC: 1.9 MG/DL (ref 1.6–2.3)
MCH RBC QN AUTO: 25.2 PG (ref 26.5–33)
MCH RBC QN AUTO: 25.5 PG (ref 26.5–33)
MCHC RBC AUTO-ENTMCNC: 29.9 G/DL (ref 31.5–36.5)
MCHC RBC AUTO-ENTMCNC: 30 G/DL (ref 31.5–36.5)
MCV RBC AUTO: 84 FL (ref 78–100)
MCV RBC AUTO: 85 FL (ref 78–100)
MRSA DNA SPEC QL NAA+PROBE: POSITIVE
MUCOUS THREADS #/AREA URNS LPF: PRESENT /LPF
NITRATE UR QL: NEGATIVE
NOROV GI+II ORF1-ORF2 JNC STL QL NAA+PR: NOT DETECTED
NUM BPU REQUESTED: 1
O2/TOTAL GAS SETTING VFR VENT: ABNORMAL %
OXYHGB MFR BLDV: 41 %
PCO2 BLDV: 39 MM HG (ref 40–50)
PH BLDV: 7.32 PH (ref 7.32–7.43)
PH UR STRIP: 5.5 PH (ref 5–7)
PHOSPHATE SERPL-MCNC: 3.6 MG/DL (ref 2.5–4.5)
PLATELET # BLD AUTO: 259 10E9/L (ref 150–450)
PLATELET # BLD AUTO: 291 10E9/L (ref 150–450)
PO2 BLDV: 27 MM HG (ref 25–47)
POTASSIUM SERPL-SCNC: 3.8 MMOL/L (ref 3.4–5.3)
PROCALCITONIN SERPL-MCNC: 0.31 NG/ML
PROCALCITONIN SERPL-MCNC: 0.36 NG/ML
PROT SERPL-MCNC: 6.2 G/DL (ref 6.8–8.8)
RBC # BLD AUTO: 2.71 10E12/L (ref 3.8–5.2)
RBC # BLD AUTO: 3.29 10E12/L (ref 3.8–5.2)
RBC #/AREA URNS AUTO: 7 /HPF (ref 0–2)
RSV RNA SPEC NAA+PROBE: NEGATIVE
RVA NSP5 STL QL NAA+PROBE: NOT DETECTED
SALMONELLA SP RPOD STL QL NAA+PROBE: NOT DETECTED
SHIGELLA SP+EIEC IPAH STL QL NAA+PROBE: NOT DETECTED
SODIUM SERPL-SCNC: 138 MMOL/L (ref 133–144)
SODIUM UR-SCNC: 32 MMOL/L
SOURCE: ABNORMAL
SP GR UR STRIP: 1.02 (ref 1–1.03)
SPECIMEN EXP DATE BLD: NORMAL
SPECIMEN SOURCE: ABNORMAL
SPECIMEN SOURCE: NORMAL
SQUAMOUS #/AREA URNS AUTO: <1 /HPF (ref 0–1)
TIBC SERPL-MCNC: 413 UG/DL (ref 240–430)
TRANSFUSION STATUS PATIENT QL: NORMAL
TRANSFUSION STATUS PATIENT QL: NORMAL
UROBILINOGEN UR STRIP-MCNC: NORMAL MG/DL (ref 0–2)
UUN UR-MCNC: 683 MG/DL
UUN/CREAT 24H UR: 11 G/G CR
V CHOL+PARA RFBL+TRKH+TNAA STL QL NAA+PR: NOT DETECTED
WBC # BLD AUTO: 17.1 10E9/L (ref 4–11)
WBC # BLD AUTO: 24.3 10E9/L (ref 4–11)
WBC #/AREA URNS AUTO: 8 /HPF (ref 0–5)
Y ENTERO RECN STL QL NAA+PROBE: NOT DETECTED

## 2019-01-05 PROCEDURE — 93010 ELECTROCARDIOGRAM REPORT: CPT | Performed by: INTERNAL MEDICINE

## 2019-01-05 PROCEDURE — 87641 MR-STAPH DNA AMP PROBE: CPT | Performed by: INTERNAL MEDICINE

## 2019-01-05 PROCEDURE — 84300 ASSAY OF URINE SODIUM: CPT | Performed by: STUDENT IN AN ORGANIZED HEALTH CARE EDUCATION/TRAINING PROGRAM

## 2019-01-05 PROCEDURE — 40000264 ECHOCARDIOGRAM COMPLETE

## 2019-01-05 PROCEDURE — 25000128 H RX IP 250 OP 636: Performed by: INTERNAL MEDICINE

## 2019-01-05 PROCEDURE — 36415 COLL VENOUS BLD VENIPUNCTURE: CPT | Performed by: PHYSICIAN ASSISTANT

## 2019-01-05 PROCEDURE — 84540 ASSAY OF URINE/UREA-N: CPT | Performed by: STUDENT IN AN ORGANIZED HEALTH CARE EDUCATION/TRAINING PROGRAM

## 2019-01-05 PROCEDURE — 36415 COLL VENOUS BLD VENIPUNCTURE: CPT | Performed by: STUDENT IN AN ORGANIZED HEALTH CARE EDUCATION/TRAINING PROGRAM

## 2019-01-05 PROCEDURE — 71045 X-RAY EXAM CHEST 1 VIEW: CPT

## 2019-01-05 PROCEDURE — 85027 COMPLETE CBC AUTOMATED: CPT | Performed by: INTERNAL MEDICINE

## 2019-01-05 PROCEDURE — A9270 NON-COVERED ITEM OR SERVICE: HCPCS | Performed by: STUDENT IN AN ORGANIZED HEALTH CARE EDUCATION/TRAINING PROGRAM

## 2019-01-05 PROCEDURE — 93005 ELECTROCARDIOGRAM TRACING: CPT

## 2019-01-05 PROCEDURE — 12000001 ZZH R&B MED SURG/OB UMMC

## 2019-01-05 PROCEDURE — 93306 TTE W/DOPPLER COMPLETE: CPT | Mod: 26 | Performed by: INTERNAL MEDICINE

## 2019-01-05 PROCEDURE — 84100 ASSAY OF PHOSPHORUS: CPT | Performed by: INTERNAL MEDICINE

## 2019-01-05 PROCEDURE — 86901 BLOOD TYPING SEROLOGIC RH(D): CPT | Performed by: STUDENT IN AN ORGANIZED HEALTH CARE EDUCATION/TRAINING PROGRAM

## 2019-01-05 PROCEDURE — 82140 ASSAY OF AMMONIA: CPT | Performed by: INTERNAL MEDICINE

## 2019-01-05 PROCEDURE — 87640 STAPH A DNA AMP PROBE: CPT | Performed by: INTERNAL MEDICINE

## 2019-01-05 PROCEDURE — 25000132 ZZH RX MED GY IP 250 OP 250 PS 637: Mod: GY | Performed by: STUDENT IN AN ORGANIZED HEALTH CARE EDUCATION/TRAINING PROGRAM

## 2019-01-05 PROCEDURE — 83735 ASSAY OF MAGNESIUM: CPT | Performed by: INTERNAL MEDICINE

## 2019-01-05 PROCEDURE — 83550 IRON BINDING TEST: CPT | Performed by: INTERNAL MEDICINE

## 2019-01-05 PROCEDURE — 87040 BLOOD CULTURE FOR BACTERIA: CPT | Performed by: STUDENT IN AN ORGANIZED HEALTH CARE EDUCATION/TRAINING PROGRAM

## 2019-01-05 PROCEDURE — 84145 PROCALCITONIN (PCT): CPT | Performed by: INTERNAL MEDICINE

## 2019-01-05 PROCEDURE — 87086 URINE CULTURE/COLONY COUNT: CPT | Performed by: INTERNAL MEDICINE

## 2019-01-05 PROCEDURE — 85027 COMPLETE CBC AUTOMATED: CPT | Performed by: STUDENT IN AN ORGANIZED HEALTH CARE EDUCATION/TRAINING PROGRAM

## 2019-01-05 PROCEDURE — 86923 COMPATIBILITY TEST ELECTRIC: CPT | Performed by: STUDENT IN AN ORGANIZED HEALTH CARE EDUCATION/TRAINING PROGRAM

## 2019-01-05 PROCEDURE — 85610 PROTHROMBIN TIME: CPT | Performed by: INTERNAL MEDICINE

## 2019-01-05 PROCEDURE — C9113 INJ PANTOPRAZOLE SODIUM, VIA: HCPCS | Performed by: STUDENT IN AN ORGANIZED HEALTH CARE EDUCATION/TRAINING PROGRAM

## 2019-01-05 PROCEDURE — 87631 RESP VIRUS 3-5 TARGETS: CPT | Performed by: NURSE PRACTITIONER

## 2019-01-05 PROCEDURE — 82330 ASSAY OF CALCIUM: CPT | Performed by: INTERNAL MEDICINE

## 2019-01-05 PROCEDURE — A9270 NON-COVERED ITEM OR SERVICE: HCPCS | Mod: GY | Performed by: NURSE PRACTITIONER

## 2019-01-05 PROCEDURE — 83540 ASSAY OF IRON: CPT | Performed by: INTERNAL MEDICINE

## 2019-01-05 PROCEDURE — 25000132 ZZH RX MED GY IP 250 OP 250 PS 637: Mod: GY | Performed by: INTERNAL MEDICINE

## 2019-01-05 PROCEDURE — 0CJY8ZZ INSPECTION OF MOUTH AND THROAT, VIA NATURAL OR ARTIFICIAL OPENING ENDOSCOPIC: ICD-10-PCS | Performed by: OTOLARYNGOLOGY

## 2019-01-05 PROCEDURE — 25000128 H RX IP 250 OP 636: Performed by: STUDENT IN AN ORGANIZED HEALTH CARE EDUCATION/TRAINING PROGRAM

## 2019-01-05 PROCEDURE — 83540 ASSAY OF IRON: CPT | Performed by: STUDENT IN AN ORGANIZED HEALTH CARE EDUCATION/TRAINING PROGRAM

## 2019-01-05 PROCEDURE — 81001 URINALYSIS AUTO W/SCOPE: CPT | Performed by: STUDENT IN AN ORGANIZED HEALTH CARE EDUCATION/TRAINING PROGRAM

## 2019-01-05 PROCEDURE — 86850 RBC ANTIBODY SCREEN: CPT | Performed by: STUDENT IN AN ORGANIZED HEALTH CARE EDUCATION/TRAINING PROGRAM

## 2019-01-05 PROCEDURE — 80053 COMPREHEN METABOLIC PANEL: CPT | Performed by: INTERNAL MEDICINE

## 2019-01-05 PROCEDURE — 87506 IADNA-DNA/RNA PROBE TQ 6-11: CPT | Performed by: STUDENT IN AN ORGANIZED HEALTH CARE EDUCATION/TRAINING PROGRAM

## 2019-01-05 PROCEDURE — A9270 NON-COVERED ITEM OR SERVICE: HCPCS | Mod: GY | Performed by: INTERNAL MEDICINE

## 2019-01-05 PROCEDURE — 25500064 ZZH RX 255 OP 636: Performed by: INTERNAL MEDICINE

## 2019-01-05 PROCEDURE — 83605 ASSAY OF LACTIC ACID: CPT | Performed by: INTERNAL MEDICINE

## 2019-01-05 PROCEDURE — 25000132 ZZH RX MED GY IP 250 OP 250 PS 637: Mod: GY | Performed by: NURSE PRACTITIONER

## 2019-01-05 PROCEDURE — 99207 ZZC APP CREDIT; MD BILLING SHARED VISIT: CPT | Performed by: PHYSICIAN ASSISTANT

## 2019-01-05 PROCEDURE — 25000125 ZZHC RX 250: Performed by: STUDENT IN AN ORGANIZED HEALTH CARE EDUCATION/TRAINING PROGRAM

## 2019-01-05 PROCEDURE — 86140 C-REACTIVE PROTEIN: CPT | Performed by: INTERNAL MEDICINE

## 2019-01-05 PROCEDURE — 87493 C DIFF AMPLIFIED PROBE: CPT | Performed by: NURSE PRACTITIONER

## 2019-01-05 PROCEDURE — 82728 ASSAY OF FERRITIN: CPT | Performed by: INTERNAL MEDICINE

## 2019-01-05 PROCEDURE — P9016 RBC LEUKOCYTES REDUCED: HCPCS | Performed by: STUDENT IN AN ORGANIZED HEALTH CARE EDUCATION/TRAINING PROGRAM

## 2019-01-05 PROCEDURE — A9270 NON-COVERED ITEM OR SERVICE: HCPCS | Mod: GY | Performed by: STUDENT IN AN ORGANIZED HEALTH CARE EDUCATION/TRAINING PROGRAM

## 2019-01-05 PROCEDURE — 87899 AGENT NOS ASSAY W/OPTIC: CPT | Performed by: STUDENT IN AN ORGANIZED HEALTH CARE EDUCATION/TRAINING PROGRAM

## 2019-01-05 PROCEDURE — 25800025 ZZH RX 258: Performed by: NURSE PRACTITIONER

## 2019-01-05 PROCEDURE — 86900 BLOOD TYPING SEROLOGIC ABO: CPT | Performed by: STUDENT IN AN ORGANIZED HEALTH CARE EDUCATION/TRAINING PROGRAM

## 2019-01-05 PROCEDURE — 85027 COMPLETE CBC AUTOMATED: CPT | Performed by: PHYSICIAN ASSISTANT

## 2019-01-05 PROCEDURE — 00000146 ZZHCL STATISTIC GLUCOSE BY METER IP

## 2019-01-05 PROCEDURE — 40000275 ZZH STATISTIC RCP TIME EA 10 MIN

## 2019-01-05 PROCEDURE — 82805 BLOOD GASES W/O2 SATURATION: CPT | Performed by: INTERNAL MEDICINE

## 2019-01-05 RX ORDER — NALOXONE HYDROCHLORIDE 0.4 MG/ML
.1-.4 INJECTION, SOLUTION INTRAMUSCULAR; INTRAVENOUS; SUBCUTANEOUS
Status: DISCONTINUED | OUTPATIENT
Start: 2019-01-05 | End: 2019-01-25 | Stop reason: HOSPADM

## 2019-01-05 RX ORDER — SODIUM CHLORIDE 9 MG/ML
INJECTION, SOLUTION INTRAVENOUS
Status: DISCONTINUED
Start: 2019-01-05 | End: 2019-01-05 | Stop reason: CLARIF

## 2019-01-05 RX ORDER — ATORVASTATIN CALCIUM 20 MG/1
20 TABLET, FILM COATED ORAL DAILY
Status: DISCONTINUED | OUTPATIENT
Start: 2019-01-05 | End: 2019-01-25 | Stop reason: HOSPADM

## 2019-01-05 RX ORDER — DEXTROSE MONOHYDRATE 25 G/50ML
25-50 INJECTION, SOLUTION INTRAVENOUS
Status: DISCONTINUED | OUTPATIENT
Start: 2019-01-05 | End: 2019-01-25 | Stop reason: HOSPADM

## 2019-01-05 RX ORDER — VENLAFAXINE HYDROCHLORIDE 75 MG/1
75 CAPSULE, EXTENDED RELEASE ORAL
Status: DISCONTINUED | OUTPATIENT
Start: 2019-01-05 | End: 2019-01-05

## 2019-01-05 RX ORDER — OXYMETAZOLINE HYDROCHLORIDE 0.05 G/100ML
2 SPRAY NASAL 2 TIMES DAILY
Status: DISCONTINUED | OUTPATIENT
Start: 2019-01-05 | End: 2019-01-05

## 2019-01-05 RX ORDER — SODIUM CHLORIDE, SODIUM LACTATE, POTASSIUM CHLORIDE, CALCIUM CHLORIDE 600; 310; 30; 20 MG/100ML; MG/100ML; MG/100ML; MG/100ML
INJECTION, SOLUTION INTRAVENOUS CONTINUOUS
Status: DISCONTINUED | OUTPATIENT
Start: 2019-01-05 | End: 2019-01-07

## 2019-01-05 RX ORDER — OXYMETAZOLINE HYDROCHLORIDE 0.05 G/100ML
2 SPRAY NASAL ONCE
Status: COMPLETED | OUTPATIENT
Start: 2019-01-05 | End: 2019-01-05

## 2019-01-05 RX ORDER — BUPROPION HYDROCHLORIDE 150 MG/1
150 TABLET ORAL DAILY
Status: DISCONTINUED | OUTPATIENT
Start: 2019-01-05 | End: 2019-01-25 | Stop reason: HOSPADM

## 2019-01-05 RX ORDER — NICOTINE POLACRILEX 4 MG
15-30 LOZENGE BUCCAL
Status: DISCONTINUED | OUTPATIENT
Start: 2019-01-05 | End: 2019-01-25 | Stop reason: HOSPADM

## 2019-01-05 RX ORDER — PIPERACILLIN SODIUM, TAZOBACTAM SODIUM 3; .375 G/15ML; G/15ML
3.38 INJECTION, POWDER, LYOPHILIZED, FOR SOLUTION INTRAVENOUS EVERY 6 HOURS
Status: DISCONTINUED | OUTPATIENT
Start: 2019-01-05 | End: 2019-01-07

## 2019-01-05 RX ORDER — ACETAMINOPHEN 325 MG/1
650 TABLET ORAL EVERY 4 HOURS PRN
Status: DISCONTINUED | OUTPATIENT
Start: 2019-01-05 | End: 2019-01-25 | Stop reason: HOSPADM

## 2019-01-05 RX ORDER — HYDRALAZINE HYDROCHLORIDE 20 MG/ML
10-20 INJECTION INTRAMUSCULAR; INTRAVENOUS EVERY 6 HOURS PRN
Status: DISCONTINUED | OUTPATIENT
Start: 2019-01-05 | End: 2019-01-25 | Stop reason: HOSPADM

## 2019-01-05 RX ADMIN — DEXTROSE AND SODIUM CHLORIDE: 5; 450 INJECTION, SOLUTION INTRAVENOUS at 09:26

## 2019-01-05 RX ADMIN — VENLAFAXINE HYDROCHLORIDE 225 MG: 150 CAPSULE, EXTENDED RELEASE ORAL at 09:28

## 2019-01-05 RX ADMIN — ACETAMINOPHEN 650 MG: 325 TABLET, FILM COATED ORAL at 22:38

## 2019-01-05 RX ADMIN — HUMAN ALBUMIN MICROSPHERES AND PERFLUTREN 5 ML: 10; .22 INJECTION, SOLUTION INTRAVENOUS at 11:15

## 2019-01-05 RX ADMIN — PIPERACILLIN SODIUM AND TAZOBACTAM SODIUM 3.38 G: 3; .375 INJECTION, POWDER, LYOPHILIZED, FOR SOLUTION INTRAVENOUS at 12:08

## 2019-01-05 RX ADMIN — SALINE NASAL SPRAY 1 SPRAY: 1.5 SOLUTION NASAL at 14:01

## 2019-01-05 RX ADMIN — PANTOPRAZOLE SODIUM 40 MG: 40 INJECTION, POWDER, FOR SOLUTION INTRAVENOUS at 17:13

## 2019-01-05 RX ADMIN — BUPROPION HYDROCHLORIDE 150 MG: 150 TABLET, FILM COATED, EXTENDED RELEASE ORAL at 09:27

## 2019-01-05 RX ADMIN — ATORVASTATIN CALCIUM 20 MG: 20 TABLET, FILM COATED ORAL at 09:28

## 2019-01-05 RX ADMIN — PIPERACILLIN SODIUM AND TAZOBACTAM SODIUM 3.38 G: 3; .375 INJECTION, POWDER, LYOPHILIZED, FOR SOLUTION INTRAVENOUS at 18:13

## 2019-01-05 RX ADMIN — PIPERACILLIN SODIUM AND TAZOBACTAM SODIUM 3.38 G: 3; .375 INJECTION, POWDER, LYOPHILIZED, FOR SOLUTION INTRAVENOUS at 05:47

## 2019-01-05 RX ADMIN — SALINE NASAL SPRAY 1 SPRAY: 1.5 SOLUTION NASAL at 11:07

## 2019-01-05 RX ADMIN — SALINE NASAL SPRAY 1 SPRAY: 1.5 SOLUTION NASAL at 22:38

## 2019-01-05 RX ADMIN — OXYMETAZOLINE HYDROCHLORIDE 2 SPRAY: 5 SPRAY NASAL at 09:28

## 2019-01-05 RX ADMIN — SALINE NASAL SPRAY 1 SPRAY: 1.5 SOLUTION NASAL at 15:45

## 2019-01-05 RX ADMIN — SALINE NASAL SPRAY 1 SPRAY: 1.5 SOLUTION NASAL at 18:13

## 2019-01-05 RX ADMIN — SODIUM CHLORIDE, POTASSIUM CHLORIDE, SODIUM LACTATE AND CALCIUM CHLORIDE: 600; 310; 30; 20 INJECTION, SOLUTION INTRAVENOUS at 15:45

## 2019-01-05 RX ADMIN — VANCOMYCIN HYDROCHLORIDE 1750 MG: 10 INJECTION, POWDER, LYOPHILIZED, FOR SOLUTION INTRAVENOUS at 09:53

## 2019-01-05 RX ADMIN — ACETAMINOPHEN 650 MG: 325 TABLET, FILM COATED ORAL at 17:13

## 2019-01-05 RX ADMIN — PANTOPRAZOLE SODIUM 40 MG: 40 INJECTION, POWDER, FOR SOLUTION INTRAVENOUS at 05:47

## 2019-01-05 ASSESSMENT — ACTIVITIES OF DAILY LIVING (ADL)
ADLS_ACUITY_SCORE: 20
ADLS_ACUITY_SCORE: 16
ADLS_ACUITY_SCORE: 15
ADLS_ACUITY_SCORE: 20
ADLS_ACUITY_SCORE: 20

## 2019-01-05 NOTE — PROGRESS NOTES
Admitted/transferred from: [facility]  Reason for admission/transfer: Sepsis  Patient status upon admission/transfer: Transferred for r/o sepsis, upon arrival patient was stable on 2L NC. A&O person/situation. Peter in place for accurate I&O. Rectal pouch placed for continuous loose black stools.   Interventions: VSS assessed, BG taken, labs drawn and multiple cultures sent (blood/stool/urine), MRSA swab positive so placed on contact precautions. Education provided and family updated at bedside.   Plan: Continue to monitor and follow current POC.   2 RN skin assessment: completed by Brissa KHAN   Result of skin assessment and interventions/actions: Some slight bruising of toes.  Height, weight, drug calc weight: done  Patient belongings: Patient wedding ring removed d/t swelling, placed  in patient safe.  MDRO education (if applicable): n/a   Home

## 2019-01-05 NOTE — PLAN OF CARE
D/A/I: Admitted for epistaxis and hypotension. Alert and confused. Very [employer]. BARAJAS. Cardiac WDL. ENT ordered nasal spray. Regular diet ordered. Adequate Uo. C-diff and influenza negative. Continues to have diarrhea. Family at bedside, updated.  P: Transfer orders to , will notify MD with any changes or concerns.

## 2019-01-05 NOTE — LETTER
Transition Communication Hand-off for Care Transitions to Next Level of Care Provider    Name: Gabino Jones  : 1940  MRN #: [id number]  Primary Care Provider: MIMI RAMIREZ MD     Primary Clinic: [id number] [place] [id number]     Reason for Hospitalization:  Hypotension [I95.9]  Admit Date/Time: 2019  1:33 AM  Discharge Date: 19  Payor Source: Payor: MEDICARE / Plan: MEDICARE / Product Type: Medicare /     Readmission Assessment Measure (DIONY) Risk Score/category: elevated    Plan of Care Goals/Milestone Events:   Patient Concerns:   Gabino Jones is a 78 year old year old female admitted on 2019 for syncope, epistaxis, elevated lactate, and hypothermia with concern for sepsis.   Patient Goals:   Short-term rehab   Long-term unclear, placement   Medical Goals   Short-term dementia progression follow up   Long-term undetermined         Reason for Communication Hand-off Referral: Fragility  Difficulty understanding plan of care  Multiple providers/specialties    Discharge Plan:       Concern for non-adherence with plan of care:   Y/N no  Discharge Needs Assessment:  Needs      Most Recent Value   Equipment Currently Used at Home  shower chair          Already enrolled in Tele-monitoring program and name of program:    Follow-up specialty is recommended: Yes    Follow-up plan:    Future Appointments   Date Time Provider Department Center   2019  6:00 AM [facility]   3/7/2019  1:00 PM Yolanda Whitaker MD [facility]   2019  1:30 PM [facility] [place]   2019  2:00 PM Blake Hobbs MD [facility] [place]       Any outstanding tests or procedures:        Referrals     Future Labs/Procedures    Medication Therapy Management Referral     Comments:    MTM referral reason            Patient had a hospital or ED visit in last 6 months and has more than 10   PTA or Discharge medications       This service is designed to help you get  the most from your medications.  A specially trained pharmacist will work closely with you and your doctors  to solve any problems related to your medications and to help you get the   best results from taking them.      The Medication Therapy Management staff will call you to schedule an appointment.    Occupational Therapy Adult Consult     Comments:    Evaluate and treat as clinically indicated.    Reason:  Physical deconditioning, cognitive impairment    Physical Therapy Adult Consult     Comments:    Evaluate and treat as clinically indicated.    Reason:  Deconditioning, difficulty walking            [facility] Recommendations:    With declining status, likely needs LTC following TCU placement    Flores Mata    AVS/Discharge Summary is the source of truth; this is a helpful guide for improved communication of patient story

## 2019-01-05 NOTE — PROGRESS NOTES
SURGICAL ICU PROGRESS NOTE  January 5, 2019        Date of Service (when I saw the patient): 01/05/2019      ASSESSMENT: Ms. 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 Upon arrival, BP normalized and lactic acid normal.        Major things today:  Add vancomycin, plan to discontinue  abx if cultures remain negative in 24-48 hours   Appreciate ENT recommendations and care   Add nasal saline for moisture  GI consult for abd cramping/melena     PLAN:   Neuro/ pain/ sedation:   # dementia   # [place]   -Monitor neurological status. Notify the MD for any acute changes in exam.  - pta wellbutrin, effexor 75 -> 225  - holding pta xanax, ibuprofen, atarax  - continue with supportive cares and measures     HEENT:  -ENT consulted: recs pending  -appreciate cares and recommendations, nasal moistures      Pulmonary care:   -Supplemental oxygen to keep saturation above 92 %.  -Incentive spirometer every 15- 30 minutes when awake.     Cardiovascular:    # Hypertension   # hx of aortic stensosis   - BP normal at time of presentation to [facility]   - Monitor hemodynamic status.   - hold pta hydrochlorothiazide, lisinopril at time of admit, resume lisinopril this afternoon given hypertension, will continue to hold diuretic   - pta atorvastatin resumed.   - Echo done, EF normal, likely not cause of her hypotension.      GI care:   # abdominal cramps   # melena--likely from swallowed blood   # GERD  -NPO except ice chips and medications until GI evaluations.   -pta protonix resumed.   -GI consult for melena, abdominal pain. F/u recs.     Fluids/ Electrolytes/ Nutrition:   - D5 1/2 NS @ 50  -No indication for  parenteral nutrition.     Renal/ Fluid Balance:    -Urine output is adequate so far.  -Will continue to monitor intake and output.     Endocrine:    -No management indication. Start Sliding scale for diabetes management.      ID/ Antibiotics:  # concern for sepsis   # leukocytosis  - no fever but will continue to monitor. UA with mod LE   - add Vancomycin to  zosyn  - C siff negative, flu negative,  sent ANGE panel--pending resultisn  -f/u  Blood cultures cultures, discontinue in 24/48 hours if negative      Heme:     # epistasix    # hx of sinonasal melanoma-will defer additional evaluation needs to ENT   - Monitor for now, no current evidence of bleed now   - iron panel sent, will need to replace/repleter iron     MSK:  # Weakness and deconditioning   - PT/OT.   - OOB     General  Prophylaxis:    -Mechanical prophylaxis for DVT.   -No chemical DVT prophylaxis due to high risk of bleeding.  -protonix     Lines/ tubes/ drains:  -rectal pouch, breen, PIV x2     Disposition:  -Surgical ICU. Consider transfer to medicine.    General Cares/Prophylaxis:    DVT Prophylaxis: mechanical only given epistaxis   GI Prophylaxis: PPI   Restraints: Restraints for medical healing needed: NO    Lines/ tubes/ drains:  - PIV's.     Disposition:  - transfer to floor     Patient seen, findings and plan discussed with surgical ICU staff, Dr. Portillo     Time spent on this Encounter   Billing:  I spent 40minutes bedside and on the inpatient unit today managing the care of Gabino Jones in relation to the issues listed in this note.      Keyon Germain    ====================================  INTERVAL HISTORY:  Course reviewed. No acute events since admission. Pt dementia, repeatedly asking for water. Does not answer ROS questions. Extremely [place] ( doesn't have hearing aids)     OBJECTIVE:   1. VITAL SIGNS:   Temp:  [98.3  F (36.8  C)-98.4  F (36.9  C)] 98.4  F (36.9  C)  Pulse:  [85-94] 89  Heart Rate:  [81-92] 81  Resp:  [16-18] 18  BP:  (137-179)/(56-99) 143/56  SpO2:  [94 %-100 %] 98 %  Resp: 18      2. INTAKE/ OUTPUT:   I/O last 3 completed shifts:  In: -   Out: 375 [Urine:200; Stool:175]    3. PHYSICAL EXAMINATION:  General: sitting up in bed, awake,  Follows simple commands. Extremely [place]   HEENT:OP clear, no evidence of bleeding noted. Tongue midline  Neuro: Awake, interactive, follows commands, not able to answer orientation questions   Pulm/Resp: Clear breath sounds bilaterally without rhonchi, crackles or wheeze,breathing non-labored on 2L NC   CV: S1, s2   Abdomen: Soft, non-distended, non-tender,  no rebound tenderness or guarding, no masses  : (+)  breen catheter in place, urine yellow and clear, rectal pouch present with dark melena   Incisions/Skin: no rashes or sores or lestions   MSK/Extremities: no peripheral edema, moving all extremities, peripheral pulses intact, calves sift and compressible. BARAJAS.  extremities well perfused. CMS intact     4. INVESTIGATIONS:   Arterial Blood Gases   No lab results found in last 7 days.  Complete Blood Count   Recent Labs   Lab 01/05/19  0322   WBC 24.3*   HGB 8.3*        Basic Metabolic Panel  Recent Labs   Lab 01/05/19  0322      POTASSIUM 3.8   CHLORIDE 109   CO2 18*   BUN 41*   CR 1.00   *     Liver Function Tests  Recent Labs   Lab 01/05/19  0322   AST 38   ALT 23   ALKPHOS 89   BILITOTAL 0.4   ALBUMIN 2.8*   INR 1.12     Pancreatic Enzymes  No lab results found in last 7 days.  Coagulation Profile  Recent Labs   Lab 01/05/19  0322   INR 1.12         5. RADIOLOGY:   Recent Results (from the past 24 hour(s))   XR Chest Port 1 View    Narrative    XR CHEST PORT 1 VW 1/5/2019 2:56 AM    History: r/o CAP    Comparison: 12/12/2018    Findings: Single AP view of the chest. The costophrenic angles are  excluded from the field-of-view. Calcifications noted over the left  hemidiaphragm. The heart size is normal. Mild calcifications of the  aortic arch. No focal pulmonary  opacities. No pneumothorax. Anterior  cervical spinal fusion hardware.      Impression    Impression: No focal pulmonary opacities.    I have personally reviewed the examination and initial interpretation  and I agree with the findings.    SAVANAH LABOY MD       =========================================

## 2019-01-05 NOTE — LETTER
Transition Communication Hand-off for Care Transitions to Next Level of Care Provider    Name: Gabino Jones  : 1940  MRN #: [id number]  Primary Care Provider: MIMI RAMIREZ MD     Primary Clinic: [id number] [place] [id number]     Reason for Hospitalization:  Hypotension [I95.9]  Admit Date/Time: 2019  1:33 AM  Discharge Date: 19  Payor Source: Payor: MEDICARE / Plan: MEDICARE / Product Type: Medicare /     Readmission Assessment Measure (DIONY) Risk Score/category: elevated    Plan of Care Goals/Milestone Events:   Patient Concerns: Gabino Jones is a 78 year old year old female admitted on 2019 for syncope, epistaxis, elevated lactate, and hypothermia with concern for sepsis.   Patient Goals:   Short-term rehab   Long-term return to home   Medical Goals   Short-term PCP follow up   Long-term          Reason for Communication Hand-off Referral: Fragility  Multiple providers/specialties    Discharge Plan:       Concern for non-adherence with plan of care:   Y/N no  Discharge Needs Assessment:  Needs      Most Recent Value   Equipment Currently Used at Home  shower chair          Already enrolled in Tele-monitoring program and name of program:    Follow-up specialty is recommended: Yes    Follow-up plan:    Future Appointments   Date Time Provider Department Center   2019  6:00 AM [facility]ub[facility], [facility]   2019  6:00 AM [facility]   3/7/2019  1:00 PM Yolanda Whitaker MD [facility]   2019  1:30 PM [facility]   2019  2:00 PM Blake Hobbs MD [facility]       Any outstanding tests or procedures:        Referrals     Future Labs/Procedures    Medication Therapy Management Referral     Comments:    MTM referral reason            Patient had a hospital or ED visit in last 6 months and has more than 10   PTA or Discharge medications       This service is designed to help you get the most from  your medications.  A specially trained pharmacist will work closely with you and your doctors  to solve any problems related to your medications and to help you get the   best results from taking them.      The Medication Therapy Management staff will call you to schedule an appointment.            Key Recommendations:      Flores Mata    AVS/Discharge Summary is the source of truth; this is a helpful guide for improved communication of patient story

## 2019-01-05 NOTE — H&P
[facility] MICU History and Physical    Gabino Jones MRN# [id number]   Age: 78 year old YOB: 1940     Date of Admission:  1/5/2019    Primary care provider: Rebekah Boogie           Chief Complaint:   Nosebleed, sepsis         History of present illness:   Gabino Jones is a 78 year old female patient with PMH of HTN, HLD, OA, s/p cholecystectomy, fibromyalgia, colonic polyposis noted in colonoscopy from 08/2018, right sinonasal mucosal malignant melanoma s/p resection twice first on 06/04/14 and then on 06/24/14 with negative margins, s/p radiation from July to September 2014, with last evaluation by oncology on 12/12/18 considered to be free of recurrence per previous imaging, being evaluated due to possible dementia, who presented in [facility] due to weakness and nosebleeding. Her  refers that patient has had a progressive decline during the last year but worse during the last few weeks, presenting confusion, some episodes of agitation, not eating and drinking well, and having intermittent episodes of nosebleed +/- 2 times per month. On 1/03, she had nosebleed from 7 - 9 am which is atypical for her and continued to have dribbling during the entire day. Similarly, she had nosebleed on 1/04, starting at 9 am and lasting until 1 pm. She was feeling weaker than usual and around 8 pm, she went to the bathroom and had a large BM, color dark-brown, but called for help since she was not able to stand up, her daughter place her in the floor where she was mildly responsive, so they called EMS that brought her to [facility]. In addition, patient has chronic crampy abdominal pain, and had a recent episode of URI and has had cough during the last week, using intermittently albuterol nebs and cough syrup, but denies fever/chills/headaches/chest pain/SOB.     In the ED, she was noted to be hypotensive (BP 79/41) and hypothermic (94.2oF), without respiratory distress, confused,  partially following commands,  With labs showing leukocytosis (15K), drop in Hgb (from Dec 10.3 to 8.3), BUN 39 Cr 1.14, LA 4.8, VBG 7.24/39. CXR not showing any infiltrate and UA with minimal pyuria, but negative nitrate. In addition, patient had one episode of bloody BM there. Patient received 2 lts of IVF, started zosyn for sepsis and was transferred here for escalation of care.               Past Medical History:     Past Medical History:   Diagnosis Date     Cancer (H)      Chronic back pain      Chronic leg pain      Depression      Hearing loss      Heart murmur      Hyperlipidemia      Hypertension      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     Ringing in ears              Past Surgical History:      Past Surgical History:   Procedure Laterality Date     C ANESTH,CERV SPINE, SITTING POSITION       COLONOSCOPY N/A 7/7/2017    Procedure: COMBINED COLONOSCOPY, SINGLE OR MULTIPLE BIOPSY/POLYPECTOMY BY BIOPSY;;  Surgeon: Sathya Norman MD;  Location: [facility] OR     COLONOSCOPY WITH CO2 INSUFFLATION N/A 7/7/2017    Procedure: COLONOSCOPY WITH CO2 INSUFFLATION;  Combined,  [place], Gastroesophageal reflux disease without esophagitis  Flatulence, eructation, and gas pain, BMI 29.35, [facility] [phone];  Surgeon: Sathya Norman MD;  Location: [facility] OR     COLONOSCOPY, SUBMUCOSA RESECTION N/A 10/4/2017    Procedure: COLONOSCOPY, SUBMUCOSA RESECTION;  Colonoscopy With Endoscopic Polypectomy with clips;  Surgeon: Milton Javier MD;  [facility]: [facility] OR     COMBINED ESOPHAGOSCOPY, GASTROSCOPY, DUODENOSCOPY (EGD) WITH CO2 INSUFFLATION N/A 7/7/2017    Procedure: COMBINED ESOPHAGOSCOPY, GASTROSCOPY, DUODENOSCOPY (EGD) WITH CO2 INSUFFLATION;  Combined,  [place], Gastroesophageal reflux disease without esophagitis  Flatulence, eructation, and gas pain, BMI 29.35, [facility] [id number];  Surgeon: Sathya Norman MD;  Location: [facility] OR     ESOPHAGOSCOPY, GASTROSCOPY, DUODENOSCOPY (EGD), COMBINED N/A  2017    Procedure: COMBINED ESOPHAGOSCOPY, GASTROSCOPY, DUODENOSCOPY (EGD), BIOPSY SINGLE OR MULTIPLE;;  Surgeon: Sathya Norman MD;  Location: [facility] OR     GALLBLADDER SURGERY       NECK SURGERY       OPTICAL TRACKING SYSTEM CRANIOTOMY, EXCISE TUMOR, COMBINED Right 2017    Procedure: COMBINED OPTICAL TRACKING SYSTEM CRANIOTOMY, EXCISE TUMOR;  Surgeon: Lenora Pérez MD;  Location: [facility] OR     OPTICAL TRACKING SYSTEM ENDOSCOPIC ENDONASAL SURGERY  2014    Procedure: OPTICAL TRACKING SYSTEM ENDOSCOPIC ENDONASAL SURGERY;  Surgeon: Yolanda Whitaker MD;  Location: [facility] OR     STOMACH SURGERY       TONSILLECTOMY       TUBAL LIGATION                   Social History:     Social History     Tobacco Use     Smoking status: Former Smoker     Types: Cigarettes     Last attempt to quit: 6/10/2004     Years since quittin.5     Smokeless tobacco: Never Used   Substance Use Topics     Alcohol use: No             Family History:     Family History   Problem Relation Age of Onset     Prostate Cancer Father      Cancer Sister         SCLC     Cancer Sister      Macular Degeneration Daughter      Glaucoma No family hx of              Allergies:     Allergies   Allergen Reactions     Novocain [Procaine] Unknown and Rash     Mirtazapine      Nefazodone Unknown and Rash             Medications:     Current Facility-Administered Medications   Medication     glucose gel 15-30 g    Or     dextrose 50 % injection 25-50 mL    Or     glucagon injection 1 mg     naloxone (NARCAN) injection 0.1-0.4 mg     pantoprazole (PROTONIX) 40 mg IV push injection     piperacillin-tazobactam (ZOSYN) 3.375 g vial to attach to  mL bag             Physical Exam:   /85   Pulse 92   Temp 98.4  F (36.9  C) (Axillary)   Resp 18   Wt 77.9 kg (171 lb 11.8 oz)   SpO2 98%   BMI 28.58 kg/m    General: alert, oriented in person, looks chronically ill  HEENT: Oral mucosa with some clots seen in  pharynx, PERRLA, EOM intact  CV: RRR, normal S1S2, holosystolic murmur IV/VI best heard in the aortic area.  Resp: Clear to auscultation bilaterally, no wheezes, rhonchi  Abd: Soft, non-tender, BS+, no masses appreciated  Extremities: Radial and pedal pulses intact and symmetric, no pedal edema, functional limitation of the right upper arm.  Neuro: Alert, oriented in person, partially following commands, answering most of the questions with yes/no, moving extremities except for right upper arm.         Data:   No intake/output data recorded.    ROUTINE LABS (Last four results)  CMP  Recent Labs   Lab 01/05/19  0322      POTASSIUM 3.8   CHLORIDE 109   CO2 18*   ANIONGAP 12   *   BUN 41*   CR 1.00   GFRESTIMATED 54*   GFRESTBLACK 62   STEFFANIE 7.7*   MAG 1.9   PHOS 3.6   PROTTOTAL 6.2*   ALBUMIN 2.8*   BILITOTAL 0.4   ALKPHOS 89   AST 38   ALT 23     CBC  Recent Labs   Lab 01/05/19  0322   WBC 24.3*   RBC 3.29*   HGB 8.3*   HCT 27.7*   MCV 84   MCH 25.2*   MCHC 30.0*   RDW 15.9*        INR  Recent Labs   Lab 01/05/19  0322   INR 1.12     Arterial Blood GasNo lab results found in last 7 days.         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 Evaluated by hem/onc last month that recommended evaluation by neurology, not performed yet.   - Consider neurology evaluation    # h/o MDD/anxiety  - Possible contribution to dementia, will continue with home meds  - Continue with venlafaxine  (reduced dose to 75 mg every day due to kidney failure) and bupropion.      PULMONARY/ENT:  # Nosebleed  # h/o right inferior turbinate varices  - Patient has chronic history of nosebleed, being evaluated in the past by ENT last time in July, where it was recommended follow up in 6 months. Per  information, bleeding is becoming more frequent during the last weeks and it is noted a drop in Hgb. Per previous oncology note, no concerns of melanoma recurrence, but will need to be seen by ENT. Currently the bleeding has stopped.  - ENT consult   - Monitor CBC       CV:  # Hypotension  # h/o HTN  - Resolved at this moment after having 2 lts of NS, with current MAP>90. Multifactorial, including sepsis/hypovolemia/dehydration/BP meds. At home with lisinopril and hydrochlorothiazide, will consider to add PRN meds for now.  - PRN Hydralazine ordered  - Holding off ACE and diuretic given recent hypotension and kidney failure  - IVF bolus if hypotensive    # h/o Aortic stenosis  - Noted to have moderate stenosis per TTE done in 04/2018. Per  information, patient presents worsening weakness that may be related with worsening of valvulopathy.  - TTE am    RENAL:  # DAVID (GFR 54)  - In Dec 2018, Cr 0.76 and worsened to 1.14. Dehydration/BP meds as possible etiologies.  - FeBUN, Na in urine  - Consider renal US if worsen  - Trend BMP     # NAGMA  # Lactic acidosis  - Related with bleeding, high lactate (4.8 in OSH)  and +/- infection; will keep monitoring.  - Lactic acid check  - IVF as needed    ID:  # Possible sepsis 2/2 CAP vs GI source  - Patient presents with hypotension, hypothermia, respiratory symptoms (cough) and bloody BMs. Patient started IVF and zosyn in OSH. Will add azithromycin to cover for atypical organisms. In addition, patient is presenting bloody stools that can be 2/2 nosebleed, but will rule out dysentery. UA in OSH with minimal pyuria, will repeat it.  - Procalcitonin, sputum and blood culture,  UA, legionella and strept neumo urine Ag ordered  - Enteric panel ordered  - Continue zosyn for now    GI:  # GI vs nasal bleeding  # h/o GERD  - Noted to have dark red BMs possibly 2/2 nasal bleeding. Patient had a recent colonoscopy on 08/2018 where tubular adenomas where resected. She also has h/o of GERD currently on treatment with PPI. Will hold off GI valuation for now and keep her on NPO for ENT evaluation. No new drop in Hgb noted (8.3-->8.3). INR 1.12.  - Continue IV PPI  - NPO for now     ENDO:  No active issues    HEME/ONC:  # Multifactorial anemia  - Noted to have a drop in Hgb from 10.3 to 8.3 during the last few weeks likely related with recurrent nosebleed in addition to malnutrition. No microcytosis noted. Previous B12/TSH WNL.  - Trend CBC  - Iron panel    # H/o sinonasal melanoma  - Patient has been evaluated by oncology on 12/12 where imaging where reviewed and no signs of recurrence of melanoma was noted. However, new ENT evaluation is mandatory given increase in nosebleed, so will wait for their evaluation and then possible hem/onc assessment according to results.  - ENT evaluation        RHEUM/MSK:  # Right arm pain  - Patient presents chronic pain in the right arm, causing some functional limitation. Per daughter information, she has h/o falls but no recent trauma in the RUE.   - Consider right arm/shoulder XRay    FEN: No IVF given hypertension, lytes monitoring, NPO   PPx: PPI BID, SCD  LINES: PIV x 2  CODE STATUS: Full code (discussed with  and daughter)   DISPOSITION: Continue ICU care, possible transfer to medicine according to lactic acid result and Hgb check    Patient seen and discussed with attending physician, Dr. Adrianna Smith MD  Internal Medicine - PGY2  P: [phone]    Physician Attestation   I, Allan  Rodas, saw this patient with  and agree with the findings and plan of care as documented in the note.      I personally reviewed vital signs,  medications, labs and imaging.    Allan Rodas MD  Date of Service (when I saw the patient): 01/05/19

## 2019-01-05 NOTE — CONSULTS
"ENT CONSULT    CC: I was asked by Allan Rodas to see this patient regarding epistaxis.    HPI: Mrs. 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    History is obtained from  over the phone and from chart review. Per , patient has had 1-2 minor episodes of epistaxis since her surgery and radiation in 2014. 2 days ago she had a large bleed from the right side that stopped after 2 hours. Yesterday she had another very large right-sided bleed that lasted 7 hours. It started draining anteriorly and then she started spitting blood out. He estimates that she lost about 2 cups of blood. After the bleeding stopped she went to the bathroom and collapsed. Her daughter noticed black stool and brought her to the ED. The  thinks she may have had black stool the day before.    In the [facility] ED she was hypotensive to the 70s and hypothermic. She had a WBC 15, Hgb 8.3 (down from 10.3 in 12/2018). She was transferred to the [facility] SICU for further care.    Overnight she has been afebrile with normal BP. Lactate was 4.8 on admission but 1.6 this morning. She was started on vanc/zosyn for sepsis of unknown origin. She had no further epistaxis overnight but has had persistent melena.    Of note, the patient has had a slow decline in her mental status. The  reports that she has a \"mild form of dementia.\" Per review of chart, this was noted at her last hematology visit during which an MRI and Neurology referral were ordered. Neither have been completed yet. He reports that she has had some crampy abdominal pain for the past few months.    No headaches, vision changes, eye swelling, change in nasal drainage or eye/nose pain, although her  reports she is difficult to interact with.    ROS: Unable to obtain    PMH: Right sinonasal mucosal melanoma (s/p resection and " "radiation 2014), HTN, depression    PSH: Resection of right mucosal melanoma (2014), tonsillectomy (unknown date), cervical spine surgery (unknown date)    OBJECTIVE:  /60   Pulse 85   Temp 98.3  F (36.8  C) (Axillary)   Resp 18   Wt 77.9 kg (171 lb 11.8 oz)   SpO2 96%   BMI 28.58 kg/m      Gen: Laying in bed, awake, turns to voice but does not make clear sentences  Neuro: awake, will follow simple commands, does not repond to questions appropriately  Face: Facial movements symmetric  Eyes: No spontaneous or gaze-evoked. No proptosis  Nose: No active epistaxis. See scope note  Mouth: Mucous membranes are dry and tacky. Dry clot on posterior pharyngeal wall  Neck: Soft, nontender. No palpable LAD    PROCEDURE: Rigid nasal endoscopy  Rigid endoscope inserted into the left nasal cavity. Anterior septal and inferior turbinate mucosa is dry and macerated. No bleeding or clots. Scope was passed into the nasopharynx. No bleeding, masses, polyps or purulence. The scope was then passed into the right nasal cavity. A large amount of old clot was suctioned from the nasal cavity, maxillary sinus and ethmoid cavity. The anterior septum and inferior turbinate were dry and macerated. The maxillary antrostomy was widely patent. No masses or bleeding within the sinus. The ethmoid cavity was also widely patent with no masses or bleeding. There was a small amount of clot in the furthest posterior ethmoid cell that could not be suctioned due to pain. The scope was then advanced into the nasopharynx. No masses, polyps or purulence. A piece of surgicel was placed along the length of the septum.    LABS:  CBC RESULTS:   Recent Labs   Lab Test 01/05/19  0322   WBC 24.3*   RBC 3.29*   HGB 8.3*   HCT 27.7*   MCV 84   MCH 25.2*   MCHC 30.0*   RDW 15.9*        IMAGING:  CT SOFT TISSUE NECK W CONTRAST 12/12/2018 11:50 AM  \"Impression:  No significant change since 3/19/2015. No evidence of locally recurrent or metastatic " "sinonasal mucosal melanoma in the neck.\"    Brain MRI without and with contrast 9/12/2018  \"Impression:  1. In this patient with a history of nasal cavity melanoma, no evident recurrence of mass in that location.  2. No significant change in frontal cribriform plate meningioma.  3. No evidence of parenchymal metastasis intracranially.  4. 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 There is no active epistaxis. No vessels or masses observed to account for the bleeding. Her nose is very dry and the bleeding likely came from the anterior septum, where the mucosa was significantly macerated. It does sound like her epistaxis was significant yesterday and could cause melena, however, she has had crampy abdominal pains for the past month and yesterday was the first time anybody saw her stool.    - Recommend ruling out other causes of melena  - No sources of sepsis within the nasal cavities  - No signs of recurrent sinonasal melanoma, but will discuss repeat imaging today  - Dissolvable packing in place. This will dissolve on its own. No need for removal.   - No antibiotics for the nasal packing (surgicel)  - There may be some slow dark red oozing after the packing was placed - this is normal and likely from the Afrin and the Surgiflo  - Nasal saline spray to bilateral nasal cavities q2h daily starting in 48 hours (keeps the packing moist and will help with removal)  - If supplemental O2 is required, add humidifier (RT should be contacted to supply this)  - Humidifier to the room to increase the moisture in the air  - If patient experiences brisk bleeding, spray Afrin along the floor of the nose and hold digital pressure on bilateral " nostrils for 20 minutes (holding pressure higher up on the nasal bones does not apply effective pressure to the septum. Instead, the pressure should be applied on the soft part of the nose do that the opening of the nostrils are completely pinched shut).  Nasal clips do not work and should not be used in place of digital pressure. If bleeding continues after 20 minutes of digital pressure, repeat the above steps. If this fails to control the bleeding at that time, call ENT for further recommendations.  - Transfuse as needed per Primary Team  - Remainder of cares per Primary Team  - Feel free to call with any questions or concerns    Patient to be discussed with Dr. Wing Gale MD  Otolaryngology-Head & Neck Surgery PGY2  Please contact ENT with questions by dialing [phone] and entering job [phone] when prompted.

## 2019-01-05 NOTE — PROGRESS NOTES
[facility] - Internal Medicine Transfer Accept Note   Date of Service: 01/05/2019    Patient: Gabino Jones  MRN: [id number]  Admission Date: 1/5/2019  Hospital Day # 0    Assessment & Plan:   Gabino Jones is a 78 year old woman with right sinonasal mucosal malignant melanoma s/p resection and radiation with PMH of HTN, HLD, OA, s/p cholecystectomy, fibromyalgia, colonic polyposis, possible dementia, who presented to  [facility] due to near syncope in the setting of recent brisk nosebleed found to have elevated lactate and hypothermia with concern for sepsis.      Changes:  Changing IVF from D5 1/2 NS to LR  Hold off on GI consult for now. Trend hemoglobin, continue PPI. If hgb continues to drop, will consult for EGD.    # Possible septic angie:   Presented with hypothermia (94.2) , hypotension (BP 79/41), leukocytosis, elevated lactate (4.6 at OSH). Mild pyuria. CXR clear. Blood and urine cx NGTD. C diff negative  - Continue vanc and zosyn for now pending culture results  - Add on procal and CRP, trend these  - Will call for culture result follow-up tomorrow    # Acute blood loss anemia  # Iron deficiency anemia  # Melena  Likely due to epistaxis. Hgb drop from 10.3 to 8.3 after multiple nose bleeds. Iron 21. Ferritin 17. Iron sat 5%.  - SICU consulted GI to evaluate for other causes of melena, however, I think that epistaxis can explain melena and blood loss. Will cancel consult and trend hgb. If continues to drop, will consult GI for EGD.   - trend hgb q8h until stable  - Continue BID IV protonix  - Will start oral iron once possible infection ruled out/identified and treated     # Epistaxis  History of nasosinus melanoma (in remission). Seen by ENT and no evidence of recurrence of malignancy. with the below recs  - Dissolvable packing in place. This will dissolve on its own. No need for removal.   - No antibiotics for the nasal packing (surgicel)  - There may be some slow dark red oozing after  the packing was placed - this is normal and likely from the Afrin and the Surgiflo  - Nasal saline spray to bilateral nasal cavities q2h daily starting in 48 hours (keeps the packing moist and will help with removal)  - If supplemental O2 is required, add humidifier (RT should be contacted to supply this)  - Humidifier to the room to increase the moisture in the air  - If patient experiences brisk bleeding, spray Afrin along the floor of the nose and hold digital pressure on bilateral nostrils for 20 minutes (holding pressure higher up on the nasal bones does not apply effective pressure to the septum. Instead, the pressure should be applied on the soft part of the nose do that the opening of the nostrils are completely pinched shut).  Nasal clips do not work and should not be used in place of digital pressure. If bleeding continues after 20 minutes of digital pressure, repeat the above steps. If this fails to control the bleeding at that time, call ENT for further recommendations    # Acute encephalopathy on chronic confusion--likely dementia   Acute encephalopathy likely due to sepsis. Appears to have some degree of chronic dementia ongoing over months/years. In considering differential for AMS workup undertaken by SICU. No obvious metabolic derangements. TSH normal. Labs/vitals not consistent with adrenal insufficiency, no evidence of seizure activity, no evidence of ingestion/tox.   - was scheduled for outpt neurology eval and MRI but has not yet gotten to this appointment    # DAVID due to sepsis:  Mild. Baseline Cr around 0.7. 1.0 on admission. Likely due to hypotension/sepsis.   - Continue hydration  - Repeat Cr in am    # Hypertension:  - SICU restarted lisinopril   - holding hydrochlorothiazide      # HLD: continue atorvastatin    # Depression:  Continue wellbutrin and effexor    # FEN:   - regular diet  - Continue IVF (change from D5 1/5 to LR)    Consulting Services: ENT, GI    CODE: full  DVT: SCDs,  anticoagulation contraindicated due to bleeding    Disposition Plan   Expected discharge in 2-3 days to prior living arrangement once hemodynamically stable, infection ruled out.     Entered: Dayne Sylvester 01/05/2019, 1:56 PM           Pt's care was discussed with bedside RN, patient, and during Care Team Rounds.    David Sylvester MD   of Medicine  Med-Peds Hospitalist  Pager [phone]    Team: Medicine Gold 9  Page Cross Cover after 5 pm: pager [phone]     ___________________________________________________________________    Subjective & Interval Hx:    Stable since arrival to SICU    Last 24 hr care team notes reviewed.   ROS:  4 point ROS including Respiratory, CV, GI and , other than that noted in the HPI, is negative      Medications: Reviewed in EPIC. List below for reference    Physical Exam:    Blood pressure 143/56, pulse 89, temperature 98.4  F (36.9  C), temperature source Oral, resp. rate 18, weight 77.9 kg (171 lb 11.8 oz), SpO2 98 %, not currently breastfeeding.    General: awake, alert, lying in bed, NAD  HEENT: MMM, no epistaxis, NCAT  Chest/Resp: CTAB  Heart/CV: RRR, no MRG noted, trace LE in left leg  Abdomen/GI: soft,  mildly tender, mildly distended, +BS  Extremities/MSK: no swelling or erythema of joints  Skin: no rash or jaundice noted  Neuro/Psych: awake, alert, slightly confused.    Lines/Tubes:   Peripheral IV 02/22/17 Left Lower forearm (Active)   Number of days: 682       Peripheral IV 02/22/17 Right Hand (Active)   Number of days: 682       Peripheral IV 12/12/18 Right Lower forearm (Active)   Number of days: 24       Peripheral IV 01/05/19 Left (Active)   Site Assessment WDL 1/5/2019  8:00 AM   Line Status Saline locked 1/5/2019  8:00 AM   Phlebitis Scale 0-->no symptoms 1/5/2019  8:00 AM   Infiltration Scale 0 1/5/2019  8:00 AM   Extravasation? No 1/5/2019  5:00 AM   Number of days: 0       Peripheral IV 01/05/19 Right (Active)   Site Assessment WDL 1/5/2019   8:00 AM   Line Status Infusing 1/5/2019  8:00 AM   Phlebitis Scale 0-->no symptoms 1/5/2019  8:00 AM   Infiltration Scale 0 1/5/2019  8:00 AM   Extravasation? No 1/5/2019  5:00 AM   Number of days: 0       Labs & Studies of Note:  I personally reviewed all labs and imaging.     Unresulted Labs Ordered in the Past 30 Days of this Admission     Date and Time Order Name Status Description    1/5/2019 0900 Influenza A and B and RSV PCR In process     1/5/2019 0635 Urine Culture Aerobic Bacterial Preliminary     1/5/2019 0208 Blood culture Preliminary     1/5/2019 0208 Blood culture Preliminary     1/5/2019 0208 Enteric Bacteria and Virus Panel by ANGE Stool In process

## 2019-01-05 NOTE — PHARMACY-VANCOMYCIN DOSING SERVICE
Pharmacy Vancomycin Initial Note  Date of Service 2019  Patient's  1940  78 year old, female    Indication: Sepsis    Current estimated CrCl = Estimated Creatinine Clearance: 47.9 mL/min (based on SCr of 1 mg/dL).    Creatinine for last 3 days  2019:  3:22 AM Creatinine 1.00 mg/dL    Recent Vancomycin Level(s) for last 3 days  No results found for requested labs within last 72 hours.      Vancomycin IV Administrations (past 72 hours)      No vancomycin orders with administrations in past 72 hours.                Nephrotoxins and other renal medications (From now, onward)    Start     Dose/Rate Route Frequency Ordered Stop    19 0915  vancomycin (VANCOCIN) 1,500 mg in sodium chloride 0.9 % 250 mL intermittent infusion      1,500 mg  over 90 Minutes Intravenous EVERY 24 HOURS 19 0901      19 0915  vancomycin (VANCOCIN) 1,750 mg in sodium chloride 0.9 % 500 mL intermittent infusion      1,750 mg  over 2 Hours Intravenous ONCE 19 0901      19 0600  piperacillin-tazobactam (ZOSYN) 3.375 g vial to attach to  mL bag      3.375 g  over 30 Minutes Intravenous EVERY 6 HOURS 19 0357            Contrast Orders - past 72 hours (72h ago, onward)    None                Plan:  1.  Start vancomycin  1750 mg (22.5 mg/kg) IV followed by 1500 mg (19.2 mg/kg) IV q24h.   2.  Goal Trough Level: 15-20 mg/L   3.  Pharmacy will check trough levels as appropriate in 1-3 Days.    4. Serum creatinine levels will be ordered daily for the first week of therapy and at least twice weekly for subsequent weeks.    5. Madeline method utilized to dose vancomycin therapy: Method 2        Arabella Saenz, PharmD  pager [phone]

## 2019-01-06 LAB
ANION GAP SERPL CALCULATED.3IONS-SCNC: 9 MMOL/L (ref 3–14)
BACTERIA SPEC CULT: NO GROWTH
BUN SERPL-MCNC: 23 MG/DL (ref 7–30)
CALCIUM SERPL-MCNC: 7.7 MG/DL (ref 8.5–10.1)
CHLORIDE SERPL-SCNC: 112 MMOL/L (ref 94–109)
CO2 SERPL-SCNC: 19 MMOL/L (ref 20–32)
CREAT SERPL-MCNC: 0.85 MG/DL (ref 0.52–1.04)
ERYTHROCYTE [DISTWIDTH] IN BLOOD BY AUTOMATED COUNT: 15.9 % (ref 10–15)
GFR SERPL CREATININE-BSD FRML MDRD: 65 ML/MIN/{1.73_M2}
GLUCOSE SERPL-MCNC: 94 MG/DL (ref 70–99)
HCT VFR BLD AUTO: 23.3 % (ref 35–47)
HGB BLD-MCNC: 7.2 G/DL (ref 11.7–15.7)
HGB BLD-MCNC: 7.2 G/DL (ref 11.7–15.7)
HGB BLD-MCNC: 7.4 G/DL (ref 11.7–15.7)
HGB BLD-MCNC: 8.3 G/DL (ref 11.7–15.7)
LACTATE BLD-SCNC: 0.7 MMOL/L (ref 0.7–2)
Lab: NORMAL
MCH RBC QN AUTO: 26 PG (ref 26.5–33)
MCHC RBC AUTO-ENTMCNC: 30.9 G/DL (ref 31.5–36.5)
MCV RBC AUTO: 84 FL (ref 78–100)
PLATELET # BLD AUTO: 235 10E9/L (ref 150–450)
POTASSIUM SERPL-SCNC: 3 MMOL/L (ref 3.4–5.3)
POTASSIUM SERPL-SCNC: 3.2 MMOL/L (ref 3.4–5.3)
POTASSIUM SERPL-SCNC: 3.8 MMOL/L (ref 3.4–5.3)
RBC # BLD AUTO: 2.77 10E12/L (ref 3.8–5.2)
S PNEUM AG SPEC QL: NORMAL
SODIUM SERPL-SCNC: 140 MMOL/L (ref 133–144)
SPECIMEN SOURCE: NORMAL
SPECIMEN SOURCE: NORMAL
UPPER GI ENDOSCOPY: NORMAL
WBC # BLD AUTO: 16.2 10E9/L (ref 4–11)

## 2019-01-06 PROCEDURE — A9270 NON-COVERED ITEM OR SERVICE: HCPCS | Mod: GY | Performed by: INTERNAL MEDICINE

## 2019-01-06 PROCEDURE — 40000802 ZZH SITE CHECK

## 2019-01-06 PROCEDURE — 25000128 H RX IP 250 OP 636: Performed by: INTERNAL MEDICINE

## 2019-01-06 PROCEDURE — 25000128 H RX IP 250 OP 636: Performed by: STUDENT IN AN ORGANIZED HEALTH CARE EDUCATION/TRAINING PROGRAM

## 2019-01-06 PROCEDURE — 43235 EGD DIAGNOSTIC BRUSH WASH: CPT | Performed by: INTERNAL MEDICINE

## 2019-01-06 PROCEDURE — 36415 COLL VENOUS BLD VENIPUNCTURE: CPT | Performed by: INTERNAL MEDICINE

## 2019-01-06 PROCEDURE — 84132 ASSAY OF SERUM POTASSIUM: CPT | Performed by: PHYSICIAN ASSISTANT

## 2019-01-06 PROCEDURE — 85027 COMPLETE CBC AUTOMATED: CPT | Performed by: NURSE PRACTITIONER

## 2019-01-06 PROCEDURE — 40000141 ZZH STATISTIC PERIPHERAL IV START W/O US GUIDANCE

## 2019-01-06 PROCEDURE — 25000132 ZZH RX MED GY IP 250 OP 250 PS 637: Mod: GY | Performed by: STUDENT IN AN ORGANIZED HEALTH CARE EDUCATION/TRAINING PROGRAM

## 2019-01-06 PROCEDURE — 25000132 ZZH RX MED GY IP 250 OP 250 PS 637: Mod: GY | Performed by: NURSE PRACTITIONER

## 2019-01-06 PROCEDURE — 99233 SBSQ HOSP IP/OBS HIGH 50: CPT | Performed by: INTERNAL MEDICINE

## 2019-01-06 PROCEDURE — 87899 AGENT NOS ASSAY W/OPTIC: CPT | Performed by: PHYSICIAN ASSISTANT

## 2019-01-06 PROCEDURE — 36415 COLL VENOUS BLD VENIPUNCTURE: CPT | Performed by: PHYSICIAN ASSISTANT

## 2019-01-06 PROCEDURE — G0500 MOD SEDAT ENDO SERVICE >5YRS: HCPCS | Performed by: INTERNAL MEDICINE

## 2019-01-06 PROCEDURE — 40000275 ZZH STATISTIC RCP TIME EA 10 MIN

## 2019-01-06 PROCEDURE — 36415 COLL VENOUS BLD VENIPUNCTURE: CPT | Performed by: NURSE PRACTITIONER

## 2019-01-06 PROCEDURE — 12000001 ZZH R&B MED SURG/OB UMMC

## 2019-01-06 PROCEDURE — A9270 NON-COVERED ITEM OR SERVICE: HCPCS | Mod: GY | Performed by: STUDENT IN AN ORGANIZED HEALTH CARE EDUCATION/TRAINING PROGRAM

## 2019-01-06 PROCEDURE — 83605 ASSAY OF LACTIC ACID: CPT | Performed by: INTERNAL MEDICINE

## 2019-01-06 PROCEDURE — C9113 INJ PANTOPRAZOLE SODIUM, VIA: HCPCS | Performed by: STUDENT IN AN ORGANIZED HEALTH CARE EDUCATION/TRAINING PROGRAM

## 2019-01-06 PROCEDURE — 25000132 ZZH RX MED GY IP 250 OP 250 PS 637: Mod: GY | Performed by: INTERNAL MEDICINE

## 2019-01-06 PROCEDURE — 80048 BASIC METABOLIC PNL TOTAL CA: CPT | Performed by: NURSE PRACTITIONER

## 2019-01-06 PROCEDURE — 0DJ08ZZ INSPECTION OF UPPER INTESTINAL TRACT, VIA NATURAL OR ARTIFICIAL OPENING ENDOSCOPIC: ICD-10-PCS | Performed by: INTERNAL MEDICINE

## 2019-01-06 PROCEDURE — A9270 NON-COVERED ITEM OR SERVICE: HCPCS | Mod: GY | Performed by: NURSE PRACTITIONER

## 2019-01-06 PROCEDURE — 84132 ASSAY OF SERUM POTASSIUM: CPT | Performed by: INTERNAL MEDICINE

## 2019-01-06 PROCEDURE — 85018 HEMOGLOBIN: CPT | Performed by: INTERNAL MEDICINE

## 2019-01-06 RX ORDER — POTASSIUM CHLORIDE 7.45 MG/ML
10 INJECTION INTRAVENOUS
Status: DISCONTINUED | OUTPATIENT
Start: 2019-01-06 | End: 2019-01-18

## 2019-01-06 RX ORDER — POTASSIUM CL/LIDO/0.9 % NACL 10MEQ/0.1L
10 INTRAVENOUS SOLUTION, PIGGYBACK (ML) INTRAVENOUS
Status: DISCONTINUED | OUTPATIENT
Start: 2019-01-06 | End: 2019-01-18

## 2019-01-06 RX ORDER — POTASSIUM CHLORIDE 29.8 MG/ML
20 INJECTION INTRAVENOUS
Status: DISCONTINUED | OUTPATIENT
Start: 2019-01-06 | End: 2019-01-18

## 2019-01-06 RX ORDER — POTASSIUM CHLORIDE 750 MG/1
20-40 TABLET, EXTENDED RELEASE ORAL
Status: DISCONTINUED | OUTPATIENT
Start: 2019-01-06 | End: 2019-01-18

## 2019-01-06 RX ORDER — FENTANYL CITRATE 50 UG/ML
INJECTION, SOLUTION INTRAMUSCULAR; INTRAVENOUS PRN
Status: DISCONTINUED | OUTPATIENT
Start: 2019-01-06 | End: 2019-01-06 | Stop reason: HOSPADM

## 2019-01-06 RX ORDER — POTASSIUM CHLORIDE 1.5 G/1.58G
20-40 POWDER, FOR SOLUTION ORAL
Status: DISCONTINUED | OUTPATIENT
Start: 2019-01-06 | End: 2019-01-18

## 2019-01-06 RX ADMIN — PANTOPRAZOLE SODIUM 40 MG: 40 INJECTION, POWDER, FOR SOLUTION INTRAVENOUS at 17:58

## 2019-01-06 RX ADMIN — Medication 10 MEQ: at 19:55

## 2019-01-06 RX ADMIN — POTASSIUM CHLORIDE 20 MEQ: 750 TABLET, EXTENDED RELEASE ORAL at 10:21

## 2019-01-06 RX ADMIN — SALINE NASAL SPRAY 1 SPRAY: 1.5 SOLUTION NASAL at 21:24

## 2019-01-06 RX ADMIN — VANCOMYCIN HYDROCHLORIDE 1500 MG: 10 INJECTION, POWDER, LYOPHILIZED, FOR SOLUTION INTRAVENOUS at 12:38

## 2019-01-06 RX ADMIN — SALINE NASAL SPRAY 1 SPRAY: 1.5 SOLUTION NASAL at 08:43

## 2019-01-06 RX ADMIN — SALINE NASAL SPRAY 1 SPRAY: 1.5 SOLUTION NASAL at 17:59

## 2019-01-06 RX ADMIN — SALINE NASAL SPRAY 1 SPRAY: 1.5 SOLUTION NASAL at 00:22

## 2019-01-06 RX ADMIN — PIPERACILLIN SODIUM AND TAZOBACTAM SODIUM 3.38 G: 3; .375 INJECTION, POWDER, LYOPHILIZED, FOR SOLUTION INTRAVENOUS at 08:42

## 2019-01-06 RX ADMIN — BUPROPION HYDROCHLORIDE 150 MG: 150 TABLET, FILM COATED, EXTENDED RELEASE ORAL at 08:58

## 2019-01-06 RX ADMIN — SODIUM CHLORIDE, POTASSIUM CHLORIDE, SODIUM LACTATE AND CALCIUM CHLORIDE: 600; 310; 30; 20 INJECTION, SOLUTION INTRAVENOUS at 22:48

## 2019-01-06 RX ADMIN — SODIUM CHLORIDE, POTASSIUM CHLORIDE, SODIUM LACTATE AND CALCIUM CHLORIDE: 600; 310; 30; 20 INJECTION, SOLUTION INTRAVENOUS at 12:37

## 2019-01-06 RX ADMIN — Medication 10 MEQ: at 17:23

## 2019-01-06 RX ADMIN — ATORVASTATIN CALCIUM 20 MG: 20 TABLET, FILM COATED ORAL at 19:55

## 2019-01-06 RX ADMIN — Medication 10 MEQ: at 18:42

## 2019-01-06 RX ADMIN — SALINE NASAL SPRAY 1 SPRAY: 1.5 SOLUTION NASAL at 05:32

## 2019-01-06 RX ADMIN — POTASSIUM CHLORIDE 40 MEQ: 750 TABLET, EXTENDED RELEASE ORAL at 08:46

## 2019-01-06 RX ADMIN — PIPERACILLIN SODIUM AND TAZOBACTAM SODIUM 3.38 G: 3; .375 INJECTION, POWDER, LYOPHILIZED, FOR SOLUTION INTRAVENOUS at 00:22

## 2019-01-06 RX ADMIN — SODIUM CHLORIDE, POTASSIUM CHLORIDE, SODIUM LACTATE AND CALCIUM CHLORIDE: 600; 310; 30; 20 INJECTION, SOLUTION INTRAVENOUS at 02:18

## 2019-01-06 RX ADMIN — ACETAMINOPHEN 650 MG: 325 TABLET, FILM COATED ORAL at 22:47

## 2019-01-06 RX ADMIN — PANTOPRAZOLE SODIUM 40 MG: 40 INJECTION, POWDER, FOR SOLUTION INTRAVENOUS at 05:43

## 2019-01-06 RX ADMIN — ACETAMINOPHEN 650 MG: 325 TABLET, FILM COATED ORAL at 05:43

## 2019-01-06 RX ADMIN — SALINE NASAL SPRAY 1 SPRAY: 1.5 SOLUTION NASAL at 19:58

## 2019-01-06 RX ADMIN — Medication 10 MEQ: at 16:18

## 2019-01-06 RX ADMIN — PIPERACILLIN SODIUM AND TAZOBACTAM SODIUM 3.38 G: 3; .375 INJECTION, POWDER, LYOPHILIZED, FOR SOLUTION INTRAVENOUS at 21:20

## 2019-01-06 RX ADMIN — VENLAFAXINE HYDROCHLORIDE 225 MG: 150 CAPSULE, EXTENDED RELEASE ORAL at 08:43

## 2019-01-06 RX ADMIN — PIPERACILLIN SODIUM AND TAZOBACTAM SODIUM 3.38 G: 3; .375 INJECTION, POWDER, LYOPHILIZED, FOR SOLUTION INTRAVENOUS at 14:35

## 2019-01-06 RX ADMIN — SALINE NASAL SPRAY 1 SPRAY: 1.5 SOLUTION NASAL at 10:21

## 2019-01-06 ASSESSMENT — ACTIVITIES OF DAILY LIVING (ADL)
ADLS_ACUITY_SCORE: 28
ADLS_ACUITY_SCORE: 29
ADLS_ACUITY_SCORE: 31
ADLS_ACUITY_SCORE: 28
ADLS_ACUITY_SCORE: 29

## 2019-01-06 NOTE — PLAN OF CARE
Pt admitted for Sepsis and UTI transferred from . A& oriented to person and place only. Left pupil fixed. Confused at times and forgetful. 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 VSS on RA. Complaints of pain in legs and back, prn tylenol given q 4 hrs. No complaints of nausea, continued NPO except ice chips. Hgb of 6.9 before transfer, gave 1 unit of PRBC, tolerated infusion well. Hgb recheck of 8.3 and 7.2 this am. WBC trending down. Continued IV abx (Zosyn and Vanco) and LR at 100ml/hr. R PIV infiltrated this AM with protonix, warm compress applied. Pt continues to have loose stools, rectal pouch in place and managing fecal containment well, output of 100ml dark brown stool. 425 yellow/pink UO from urethral catheter. Pt complains of sore throat from frequent congested cough. Fragile thin skin, peeling/reddness around buttocks and groin. GI to consult on plan for possible GIB. Will continue with POC.

## 2019-01-06 NOTE — PROGRESS NOTES
[facility] - Internal Medicine Transfer Accept Note   Date of Service: 01/06/2019    Patient: Gabino Jones  MRN: [id number]  Admission Date: 1/5/2019  Hospital Day # 1    Assessment & Plan:   Gabino Jones is a 78 year old woman with right sinonasal mucosal malignant melanoma s/p resection and radiation with PMH of HTN, HLD, OA, s/p cholecystectomy, fibromyalgia, colonic polyposis, possible dementia, who presented to  [facility] due to near syncope in the setting of recent brisk nosebleed found to have elevated lactate and hypothermia with concern for sepsis.      Changes:  - Transfused 1 unit overnight.  - Trend hgb   - GI consult for possible EGD  - Continue vanco and zosyn today.     # Possible septic angie:   Presented with hypothermia (94.2) , hypotension (BP 79/41), leukocytosis, elevated lactate (4.6 at OSH). Mild pyuria. CXR clear. Blood and urine cx NGTD. C diff negative. Called [facility] and blood and urine cx prior to antibiotics are both no growth to date  - Continue vanc and zosyn for now pending culture results  - Trend CRP and procal  - Will call for culture result follow-up tomorrow    # Acute blood loss anemia  # Iron deficiency anemia  # Melena  Likely due to epistaxis. Hgb on admission 8.3 from 10.3 after multiple nose bleeds. Initially thought that epistaxis could explain bleed but then hgb continued to drop to 6.9 despite no further episodes of epistaxis. Though curiously melena has since resolved as well.   Iron 21. Ferritin 17. Iron sat 5%.  - Consulting GI for EGD  - trend hgb q8h until stable  - Continue BID IV protonix  - Will start oral iron once possible infection ruled out/identified and treated     # Epistaxis  History of nasosinus melanoma (in remission). Seen by ENT and no evidence of recurrence of malignancy. with the below recs  - Dissolvable packing in place. This will dissolve on its own. No need for removal.   - No antibiotics for the nasal packing (surgicel)  -  There may be some slow dark red oozing after the packing was placed - this is normal and likely from the Afrin and the Surgiflo  - Nasal saline spray to bilateral nasal cavities q2h daily starting in 48 hours (keeps the packing moist and will help with removal)  - If supplemental O2 is required, add humidifier (RT should be contacted to supply this)  - Humidifier to the room to increase the moisture in the air  - If patient experiences brisk bleeding, spray Afrin along the floor of the nose and hold digital pressure on bilateral nostrils for 20 minutes (holding pressure higher up on the nasal bones does not apply effective pressure to the septum. Instead, the pressure should be applied on the soft part of the nose do that the opening of the nostrils are completely pinched shut).  Nasal clips do not work and should not be used in place of digital pressure. If bleeding continues after 20 minutes of digital pressure, repeat the above steps. If this fails to control the bleeding at that time, call ENT for further recommendations  - Providing humidification via faceshield at ENT request    # Acute encephalopathy on chronic confusion--likely dementia   Acute encephalopathy likely due to sepsis. Appears to have some degree of chronic dementia ongoing over months/years. In considering differential for AMS workup undertaken by SICU. No obvious metabolic derangements. TSH normal. Labs/vitals not consistent with adrenal insufficiency, no evidence of seizure activity, no evidence of ingestion/tox.   - was scheduled for outpt neurology eval and MRI but has not yet gotten to this appointment    # DAVID due to sepsis:  Mild. Baseline Cr around 0.7. 1.0 on admission. Likely due to hypotension/sepsis. Resolving.   - Continue hydration until able to take PO  - Repeat Cr in am    # Hypertension:  - continue lisinopril   - holding hydrochlorothiazide      # HLD: continue atorvastatin    # Depression:  Continue wellbutrin and effexor    #  FEN:   - NPO for EGD  - Continue IVF    Consulting Services: ENT, GI    CODE: full  DVT: SCDs, anticoagulation contraindicated due to bleeding    Disposition Plan   Expected discharge in 2-3 days to rehab once hemodynamically stable, infection ruled out.     Entered: Dayne Sylvester 01/06/2019, 7:57 AM         Pt's care was discussed with bedside RN, patient, and during Care Team Rounds.    David Sylvester MD   of Medicine  Med-Peds Hospitalist  Pager [phone]    Team: Medicine Gold 9  Page Cross Cover after 5 pm: pager [phone]     ___________________________________________________________________    Subjective & Interval Hx:    No acute events. No further epistaxis, stools more brown now. No fevers. BP stable    Last 24 hr care team notes reviewed.   ROS:  4 point ROS including Respiratory, CV, GI and , other than that noted in the HPI, is negative      Medications: Reviewed in EPIC. List below for reference    Physical Exam:    Blood pressure 141/45, pulse 85, temperature 98.8  F (37.1  C), temperature source Oral, resp. rate 18, weight 77.9 kg (171 lb 11.8 oz), SpO2 98 %, not currently breastfeeding.    General: awake, alert, lying in bed, NAD  HEENT: MMM, no epistaxis, NCAT  Chest/Resp: CTAB  Heart/CV: RRR, no MRG noted, trace LE in left leg  Abdomen/GI: soft,  mildly tender, mildly distended, +BS  Extremities/MSK: no swelling or erythema of joints  Skin: no rash or jaundice noted  Neuro/Psych: awake, alert, slightly confused.    Lines/Tubes:   Peripheral IV 02/22/17 Left Lower forearm (Active)   Number of days: 682       Peripheral IV 02/22/17 Right Hand (Active)   Number of days: 682       Peripheral IV 12/12/18 Right Lower forearm (Active)   Number of days: 24       Peripheral IV 01/05/19 Left (Active)   Site Assessment WDL 1/5/2019  8:00 AM   Line Status Saline locked 1/5/2019  8:00 AM   Phlebitis Scale 0-->no symptoms 1/5/2019  8:00 AM   Infiltration Scale 0 1/5/2019  8:00 AM    Extravasation? No 1/5/2019  5:00 AM   Number of days: 0       Peripheral IV 01/05/19 Right (Active)   Site Assessment WDL 1/5/2019  8:00 AM   Line Status Infusing 1/5/2019  8:00 AM   Phlebitis Scale 0-->no symptoms 1/5/2019  8:00 AM   Infiltration Scale 0 1/5/2019  8:00 AM   Extravasation? No 1/5/2019  5:00 AM   Number of days: 0       Labs & Studies of Note:  I personally reviewed all labs and imaging.     Unresulted Labs Ordered in the Past 30 Days of this Admission     Date and Time Order Name Status Description    1/5/2019 0208 Blood culture Preliminary     1/5/2019 0208 Blood culture Preliminary

## 2019-01-06 NOTE — CONSULTS
GASTROENTEROLOGY CONSULTATION      Date of Admission:  1/5/2019           Reason for Consultation:   We were asked by Dr. Sylvester of internal medicine to evaluate this patient with hgb drop and melena.           ASSESSMENT AND RECOMMENDATIONS:   Assessment:  Ms. 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 She was found to have elevated lactate and hypothermia with concern for sepsis   prompting transfer to [facility]. Pt had melena and hgb drop attributed to epistaxis, but with control of nose bleed, hgb continued to drift concerning for bleeding elsewhere. Melena persisted for some time, but has since resolved.      EGD done today was normal with no evidence of bleeding or recent bleeding.  Noted normal (bilious) stools today.    Recommendations  - continue to monitor hgb, VS, and stool output  - ok to return to prior diet  - pt is overdue for surveillance colonoscopy and for follow-up of piecemeal polypectomy of large 35 mm polyp --> if ongoing procedural interventions are in line with family and patient's goals of care, recommend this colonoscopy be done as an outpatient after resolution of acute issues (GI can help arrange at time of discharge if necessary)  - GI service will sign off at this time      Gastroenterology outpatient follow up recommendations: Colonoscopy (as detailed above)    Thank you for involving us in this patient's care. Please do not hesitate to contact the GI service with any questions or concerns.     Cailin Paulino MD  GI Luminal Service    -------------------------------------------------------------------------------------------------------------------           History of Present Illness:   Ms. Jones is a 78 year old woman with right sinonasal  mucosal malignant melanoma s/p resection and radiation who is s/p cholecystectomy with HTN, HLD, OA,fibromyalgia, adenomatous colon polyps, and suspected dementia, who presented to an OSH with pre-syncope in the setting of marked epistaxis. She was found to have elevated lactate and hypothermia with concern for sepsis prompting transfer to [facility].     Pt is unable to provide many historical details and much history is taken from the chart.     Since arrival at [facility], her infectious work-up has been negative thus far and she continues on vanco and zosyn. She's had stabilization of her hemodynamics and temperature. Her epistaxis has been followed closely by ENT and was felt to have bleeding from anterior septum macerated mucosa. The epistaxis has been felt to have stopped (no visible nose bleeds, pt cannot say if any sense of blood running down her throat). Despite management of this her hgb has continued to drift down (8.3 --> 6.9 --> 1 unit(s) PRBCs --> 8.3, 7.2. She had melena on arrival which continued for several days.     Due to ongoing hgb decrease and persistent melena, GI was consulted to evaluate for other source of bleeding. On interview patient denies any abdominal pain, nausea, vomiting, or other discomfort. She states she feels well. Rectal tube at the bedside is putting out bilious, non-bloody stool. Nursing had not noted any melena today.              Past Medical History:   Reviewed and edited as appropriate  Past Medical History:   Diagnosis Date     Cancer (H)      Chronic back pain      Chronic leg pain      Depression      Hearing loss      Heart murmur      Hyperlipidemia      Hypertension      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     Ringing in ears             Past Surgical History:   Reviewed and edited as appropriate   Past Surgical History:   Procedure Laterality Date     C ANESTH,CERV SPINE, SITTING POSITION       COLONOSCOPY N/A 7/7/2017    Procedure: COMBINED COLONOSCOPY, SINGLE OR MULTIPLE  BIOPSY/POLYPECTOMY BY BIOPSY;;  Surgeon: Sathya Norman MD;  Location: [facility] OR     COLONOSCOPY WITH CO2 INSUFFLATION N/A 7/7/2017    Procedure: COLONOSCOPY WITH CO2 INSUFFLATION;  Combined,  [place], Gastroesophageal reflux disease without esophagitis  Flatulence, eructation, and gas pain, BMI 29.35, [facility] [id number];  Surgeon: Sathya Norman MD;  Location: [facility] OR     COLONOSCOPY, SUBMUCOSA RESECTION N/A 10/4/2017    Procedure: COLONOSCOPY, SUBMUCOSA RESECTION;  Colonoscopy With Endoscopic Polypectomy with clips;  Surgeon: Milton Javier MD;  Location: [facility] OR     COMBINED ESOPHAGOSCOPY, GASTROSCOPY, DUODENOSCOPY (EGD) WITH CO2 INSUFFLATION N/A 7/7/2017    Procedure: COMBINED ESOPHAGOSCOPY, GASTROSCOPY, DUODENOSCOPY (EGD) WITH CO2 INSUFFLATION;  Combined,  [place], Gastroesophageal reflux disease without esophagitis  Flatulence, eructation, and gas pain, BMI 29.35, [facility] [id number];  Surgeon: Sathya Norman MD;  Location: [facility] OR     ESOPHAGOSCOPY, GASTROSCOPY, DUODENOSCOPY (EGD), COMBINED N/A 7/7/2017    Procedure: COMBINED ESOPHAGOSCOPY, GASTROSCOPY, DUODENOSCOPY (EGD), BIOPSY SINGLE OR MULTIPLE;;  Surgeon: Sathya Norman MD;  Location: [facility] OR     GALLBLADDER SURGERY       NECK SURGERY       OPTICAL TRACKING SYSTEM CRANIOTOMY, EXCISE TUMOR, COMBINED Right 2/22/2017    Procedure: COMBINED OPTICAL TRACKING SYSTEM CRANIOTOMY, EXCISE TUMOR;  Surgeon: Lenora Pérez MD;  Location: [facility] OR     OPTICAL TRACKING SYSTEM ENDOSCOPIC ENDONASAL SURGERY  6/23/2014    Procedure: OPTICAL TRACKING SYSTEM ENDOSCOPIC ENDONASAL SURGERY;  Surgeon: Yolanda Whitaker MD;  Location: [facility] OR     STOMACH SURGERY       TONSILLECTOMY       TUBAL LIGATION      1975            Previous Endoscopy:     Results for orders placed or performed during the hospital encounter of 10/04/17   COLONOSCOPY   Result Value Ref Range    COLONOSCOPY       [facility],  [address] [place] [address] [phone]     Endoscopy Department  _______________________________________________________________________________  Patient Name: Gabino Jones            Procedure Date: 10/4/2017 7:33 AM  MRN: [id number]                       Account Number: [id number]  YOB: 1940              Admit Type: Outpatient  Age: 77                               Room: OR  Gender: Female                        Note Status: Supervisor Override  Attending MD: Milton Ventura MD   Total Sedation Time:   _______________________________________________________________________________     Procedure:           Colonoscopy  Indications:         Therapeutic procedure for known colon polyp  Providers:           Milton Vnetura MD  Referring MD:        Sathya Norman MD, Rebekah Boogie MD  Medicines:           Monitored Anesthesia Care  Complications:       No immediate complications.  ________________________________________ _______________________________________  Procedure:           Pre-Anesthesia Assessment:                       - Prior to the procedure, a History and Physical was                        performed, and patient medications and allergies were                        reviewed. The patient is competent. The risks and                        benefits of the procedure and the sedation options and                        risks were discussed with the patient. All questions                        were answered and informed consent was obtained. Patient                        identification and proposed procedure were verified by                        the physician in the pre-procedure area. Mental Status                        Examination: alert and oriented. Airway Examination:                        normal oropharyngeal airway and neck mobility and                        Mallampati Class II (the uvula but not tonsillar pillars                        visualized).  Respiratory Examination: clear  to                        auscultation. CV Examination: normal. Prophylactic                        Antibiotics: The patient does not require prophylactic                        antibiotics. Prior Anticoagulants: The patient has taken                        no previous anticoagulant or antiplatelet agents. ASA                        Grade Assessment: II - A patient with mild systemic                        disease. After reviewing the risks and benefits, the                        patient was deemed in satisfactory condition to undergo                        the procedure. The anesthesia plan was to use monitored                        anesthesia care (MAC). Immediately prior to                        administration of medications, the patient was                        re-assessed for adequacy to receive sedatives. The heart                        rate, respiratory rate, oxygen saturations, blood                        pressure, adequacy of pulmonary ventilation, and                         response to care were monitored throughout the                        procedure. The physical status of the patient was                        re-assessed after the procedure.                       After obtaining informed consent, the colonoscope was                        passed under direct vision. Throughout the procedure,                        the patient's blood pressure, pulse, and oxygen                        saturations were monitored continuously. The Colonoscope                        was introduced through the anus and advanced to the                        terminal ileum. The colonoscopy was performed without                        difficulty. The patient tolerated the procedure well.                        The quality of the bowel preparation was poor.                                                                                   Findings:       A 12 mm polyp was found in the  ascending colon. The polyp was sessile.        The polyp was removed with a hot snare.  The polyp was removed with a        saline injection-lift technique using a hot snare. Resection and        retrieval were complete. To prevent bleeding after the polypectomy, one        hemostatic clip was successfully placed. There was no bleeding during,        or at the end, of the procedure.       A 35 mm polyp was found in the transverse colon. The polyp was Precious        classification IIa (superficial, elevated). The polyp was removed with a        hot snare. The polyp was removed with a saline injection-lift technique        using a hot snare. The polyp was removed with a piecemeal technique        using a hot snare. Resection and retrieval were complete. To prevent        bleeding after the polypectomy, five hemostatic clips were successfully        placed. There was no bleeding during, or at the end, of the procedure.       A large amount of semi-liquid stool was found in the entire colon,        interfering with visualization. Lavage of the area was performed using a         large amount of sterile water, resulting in incomplete clearance with        continued poor visualization.       The terminal ileum appeared normal.                                                                                   Impression:          - Preparation of the colon was poor.                       - One 12 mm polyp in the ascending colon, removed with a                        hot snare and removed using injection-lift and a hot                        snare. Resected and retrieved. Clip was placed.                       - One 35 mm polyp in the transverse colon, removed with                        a hot snare, removed using injection-lift and a hot                        snare and removed piecemeal using a hot snare. Resected                        and retrieved. Clips were placed.                       - Stool in the entire examined  colon which kept plugging                        the scope while attempting to lavage the colon.                       - The examined portion  of the ileum was normal.  Recommendation:      - Await pathology results.                       - Repeat colonoscopy in 6 months for surveillance after                        piecemeal polypectomy.                                                                                     _____________________  Milton Ventura MD  10/4/2017 9:28:34 AM  I was physically present for the entire viewing portion of the exam.  __________________________  Signature of teaching physician  [id number] Ventura MD  Number of Addenda: 0    Note Initiated On: 10/4/2017 7:33 AM  Scope In:  Scope Out:              Social History:   Reviewed and edited as appropriate  Social History     Socioeconomic History     Marital status:      Spouse name: Not on file     Number of children: Not on file     Years of education: Not on file     Highest education level: Not on file   Social Needs     Financial resource strain: Not on file     Food insecurity - worry: Not on file     Food insecurity - inability: Not on file     Transportation needs - medical: Not on file     Transportation needs - non-medical: Not on file   Occupational History     Not on file   Tobacco Use     Smoking status: Former Smoker     Types: Cigarettes     Last attempt to quit: 6/10/2004     Years since quittin.5     Smokeless tobacco: Never Used   Substance and Sexual Activity     Alcohol use: No     Drug use: No     Sexual activity: No   Other Topics Concern     Parent/sibling w/ CABG, MI or angioplasty before 65F 55M? Not Asked   Social History Narrative     Not on file            Family History:   Reviewed and edited as appropriate  Family History   Problem Relation Age of Onset     Prostate Cancer Father      Cancer Sister         SCLC     Cancer Sister      Macular Degeneration Daughter      Glaucoma No family  hx of             Allergies:   Reviewed and edited as appropriate     Allergies   Allergen Reactions     Novocain [Procaine] Unknown and Rash     Mirtazapine      Nefazodone Unknown and Rash            Medications:     Current Facility-Administered Medications   Medication     acetaminophen (TYLENOL) tablet 650 mg     atorvastatin (LIPITOR) tablet 20 mg     buPROPion (WELLBUTRIN XL) 24 hr tablet 150 mg     glucose gel 15-30 g    Or     dextrose 50 % injection 25-50 mL    Or     glucagon injection 1 mg     hydrALAZINE (APRESOLINE) injection 10-20 mg     lactated ringers infusion     naloxone (NARCAN) injection 0.1-0.4 mg     pantoprazole (PROTONIX) 40 mg IV push injection     piperacillin-tazobactam (ZOSYN) 3.375 g vial to attach to  mL bag     potassium chloride (KLOR-CON) Packet 20-40 mEq     potassium chloride 10 mEq in 100 mL intermittent infusion with 10 mg lidocaine     potassium chloride 10 mEq in 100 mL sterile water intermittent infusion (premix)     potassium chloride 20 mEq in 50 mL intermittent infusion     potassium chloride ER (K-DUR/KLOR-CON M) CR tablet 20-40 mEq     sodium chloride (OCEAN) 0.65 % nasal spray 1 spray     vancomycin (VANCOCIN) 1,500 mg in sodium chloride 0.9 % 250 mL intermittent infusion     venlafaxine (EFFEXOR-XR) 24 hr capsule 225 mg             Review of Systems:   A complete review of systems was performed and is negative except as noted in the HPI           Physical Exam:   /45 (BP Location: Left arm)   Pulse 85   Temp 98.8  F (37.1  C) (Oral)   Resp 18   Wt 77.9 kg (171 lb 11.8 oz)   SpO2 98%   BMI 28.58 kg/m    Wt:   Wt Readings from Last 2 Encounters:   01/05/19 77.9 kg (171 lb 11.8 oz)   12/12/18 80.7 kg (178 lb)   Constitutional: cooperative, pleasant, in no acute distress  Eyes: Sclera anicteric/injected  Ears/nose/mouth/throat: Normal oropharynx, mucus membranes moist  CV: RRR  Respiratory: Clear on anterior exam  Abd: Nondistended, +bs, nontender, no  peritoneal signs  Skin: warm, perfused, no jaundice  Neuro: alert, answers questions appropriately  Psych: Normal affect         Data:   Labs and imaging below were independently reviewed and interpreted    BMP  Recent Labs   Lab 01/06/19  0708 01/05/19 0322    138   POTASSIUM 3.0* 3.8   CHLORIDE 112* 109   STEFFANIE 7.7* 7.7*   CO2 19* 18*   BUN 23 41*   CR 0.85 1.00   GLC 94 131*     CBC  Recent Labs   Lab 01/06/19  0708 01/05/19 1957 01/05/19 0322   WBC 16.2*  --  17.1* 24.3*   RBC 2.77*  --  2.71* 3.29*   HGB 7.2*   < > 6.9* 8.3*   HCT 23.3*  --  23.1* 27.7*   MCV 84  --  85 84   MCH 26.0*  --  25.5* 25.2*   MCHC 30.9*  --  29.9* 30.0*   RDW 15.9*  --  16.3* 15.9*     --  259 291    < > = values in this interval not displayed.     INR  Recent Labs   Lab 01/05/19 0322   INR 1.12     LFTs  Recent Labs   Lab 01/05/19 0322   ALKPHOS 89   AST 38   ALT 23   BILITOTAL 0.4   PROTTOTAL 6.2*   ALBUMIN 2.8*

## 2019-01-06 NOTE — PROGRESS NOTES
Belongings on transfer to     With patient on transfer: Hearing aides in both ears, dentures (upper/lower in mouth), glasses    Sent home with family: ring

## 2019-01-06 NOTE — OR NURSING
Upper endoscopy tolerated well once intubated with scope.  She fought intubation intitially, once scope was in esophagus she settled down. No bleeding or source seen.  Procedure completed.

## 2019-01-06 NOTE — PLAN OF CARE
OT/5A: OT orders received, per PT patient unable to follow commands in am, significant confusion, attempted later in day and patient off unit at procedure. Will reschedule to tomorrow and initiate as appropriate.

## 2019-01-06 NOTE — PROGRESS NOTES
ENT PROGRESS NOTE    S: No reports of epistaxis overnight. Transfused for Hgb 8.3->6.9. Scheduled to get an EGD today.    O:  /45 (BP Location: Left arm)   Pulse 85   Temp 98.8  F (37.1  C) (Oral)   Resp 18   Wt 77.9 kg (171 lb 11.8 oz)   SpO2 98%   BMI 28.58 kg/m      Gen: Laying in bed, awake  Resp: Nonlabored breathing on room air  HEENT: No signs of active or recent epistaxis. Mucous membranes are very dry and tacky  Neck: Soft, nontender, no palpable LAD    LABS:  CBC RESULTS:   Recent Labs   Lab Test 01/06/19  0708   WBC 16.2*   RBC 2.77*   HGB 7.2*   HCT 23.3*   MCV 84   MCH 26.0*   MCHC 30.9*   RDW 15.9*        A/P:  77yo female with dementia and history of sinonasal melanoma (no evidence of recurrence on last scan, discharged from Heme/Onc clinic) who is admitted for sepsis, anemia, melana and recent epistaxis. There is no active epistaxis. No vessels or masses observed to account for the bleeding. Her nose is very dry and the bleeding likely came from the anterior septum, where the mucosa was significantly macerated. She did have significant epistaxis that could account for her melena, but she did have a hemoglobin drop yesterday without any signs of recurrent epistaxis.    - Agree with EGD with GI to rule out other upper GI bleeding   - No sources of sepsis within the nasal cavities  - No signs of recurrent sinonasal melanoma  - Dissolvable packing in place. This will dissolve on its own. No need for removal.   - No antibiotics for the nasal packing (surgicel)  - There may be some slow dark red oozing after the packing was placed - this is normal and likely from the Afrin and the Surgiflo  - Nasal saline spray to bilateral nasal cavities q2h daily starting in 48 hours (keeps the packing moist and will help with removal)  - If supplemental O2 is required, add humidifier (RT should be contacted to supply this)  - Humidifier to the room to increase the moisture in the air  - If patient  experiences brisk bleeding, spray Afrin along the floor of the nose and hold digital pressure on bilateral nostrils for 20 minutes (holding pressure higher up on the nasal bones does not apply effective pressure to the septum. Instead, the pressure should be applied on the soft part of the nose do that the opening of the nostrils are completely pinched shut).  Nasal clips do not work and should not be used in place of digital pressure. If bleeding continues after 20 minutes of digital pressure, repeat the above steps. If this fails to control the bleeding at that time, call ENT for further recommendations.  - Transfuse as needed per Primary Team  - Remainder of cares per Primary Team  - Feel free to call with any questions or concerns    Patient discussed with Dr. Wing Gale MD  Otolaryngology-Head & Neck Surgery PGY2  Please contact ENT with questions by dialing [phone] and entering job [phone] when prompted.

## 2019-01-06 NOTE — PLAN OF CARE
5A PT - PT evaluation orders acknowledged and appreciated. PT is to HOLD until pt can follow commands and is less confused for safe mobility assessment. PT discussed with OT to initiate first for IP therapy, PT will continue discussion with OT on when it's appropriate for IP PT to initiate.    Pt is hard of hearing L>R. She has B hearing aides that PT/RN helped her put in.

## 2019-01-06 NOTE — PROGRESS NOTES
Pt to be transferred to .  Report given to receiving nurse.  Hgb check  Value 6.9.  Dr. Diego notified of value and confirmed that pt has remained stable.  Dr. Diego confirmed pt still able to transfer to .  Dr. Diego ordered 1 unit of PRBC's to be given after transfer to .  Family in room and updated about lab, blood and transfer. Family accompanied pt to .  Receiving nurse notified that Dr. Diego called and pt OK to transfer to .  Pt transferred to floor bed and taken up to new room.

## 2019-01-06 NOTE — PROCEDURES
Gastroenterology Endoscopy Suite Brief Operative Note    Procedure:  Upper endoscopy   Post-operative diagnosis:  Normal upper GI tract   Staff Physician:  Dr. Cailin Paulino   Fellow/Assistant(s):  N/A    Specimens:  Please see final procedure note for further details.   Findings:  Normal. No blood nor signs of recent bleeding.   Complications:  None.   Condition:  Stable   Recommendations  Diet:  Return to previous diet  PPI:  Stop PPI  Anti-coagulants/platelets:  N/A  Octreotide:  N/A  Discharge Planning:   Return patient to hospital [facility].

## 2019-01-07 ENCOUNTER — APPOINTMENT (OUTPATIENT)
Dept: GENERAL RADIOLOGY | Facility: CLINIC | Age: 79
DRG: 871 | End: 2019-01-07
Attending: INTERNAL MEDICINE
Payer: MEDICARE

## 2019-01-07 ENCOUNTER — APPOINTMENT (OUTPATIENT)
Dept: OCCUPATIONAL THERAPY | Facility: CLINIC | Age: 79
DRG: 871 | End: 2019-01-07
Attending: INTERNAL MEDICINE
Payer: MEDICARE

## 2019-01-07 LAB
ANION GAP SERPL CALCULATED.3IONS-SCNC: 8 MMOL/L (ref 3–14)
ANISOCYTOSIS BLD QL SMEAR: SLIGHT
BASE DEFICIT BLDV-SCNC: 4.1 MMOL/L
BASOPHILS # BLD AUTO: 0.1 10E9/L (ref 0–0.2)
BASOPHILS NFR BLD AUTO: 0.9 %
BUN SERPL-MCNC: 11 MG/DL (ref 7–30)
CALCIUM SERPL-MCNC: 8 MG/DL (ref 8.5–10.1)
CHLORIDE SERPL-SCNC: 112 MMOL/L (ref 94–109)
CO2 SERPL-SCNC: 19 MMOL/L (ref 20–32)
CREAT SERPL-MCNC: 0.68 MG/DL (ref 0.52–1.04)
CRP SERPL-MCNC: 74 MG/L (ref 0–8)
DIFFERENTIAL METHOD BLD: ABNORMAL
EOSINOPHIL # BLD AUTO: 0.1 10E9/L (ref 0–0.7)
EOSINOPHIL NFR BLD AUTO: 0.9 %
ERYTHROCYTE [DISTWIDTH] IN BLOOD BY AUTOMATED COUNT: 16.5 % (ref 10–15)
GFR SERPL CREATININE-BSD FRML MDRD: 83 ML/MIN/{1.73_M2}
GLUCOSE BLDC GLUCOMTR-MCNC: 120 MG/DL (ref 70–99)
GLUCOSE SERPL-MCNC: 84 MG/DL (ref 70–99)
HCO3 BLDV-SCNC: 19 MMOL/L (ref 21–28)
HCT VFR BLD AUTO: 24.5 % (ref 35–47)
HGB BLD-MCNC: 7.4 G/DL (ref 11.7–15.7)
INTERPRETATION ECG - MUSE: NORMAL
LACTATE BLD-SCNC: 1.1 MMOL/L (ref 0.7–2)
LYMPHOCYTES # BLD AUTO: 0.8 10E9/L (ref 0.8–5.3)
LYMPHOCYTES NFR BLD AUTO: 5.2 %
MCH RBC QN AUTO: 26.1 PG (ref 26.5–33)
MCHC RBC AUTO-ENTMCNC: 30.2 G/DL (ref 31.5–36.5)
MCV RBC AUTO: 86 FL (ref 78–100)
MONOCYTES # BLD AUTO: 0 10E9/L (ref 0–1.3)
MONOCYTES NFR BLD AUTO: 0 %
NEUTROPHILS # BLD AUTO: 14.7 10E9/L (ref 1.6–8.3)
NEUTROPHILS NFR BLD AUTO: 93 %
O2/TOTAL GAS SETTING VFR VENT: 21 %
PCO2 BLDV: 27 MM HG (ref 40–50)
PH BLDV: 7.46 PH (ref 7.32–7.43)
PLATELET # BLD AUTO: 233 10E9/L (ref 150–450)
PLATELET # BLD EST: ABNORMAL 10*3/UL
PO2 BLDV: 44 MM HG (ref 25–47)
POTASSIUM SERPL-SCNC: 3.4 MMOL/L (ref 3.4–5.3)
PROCALCITONIN SERPL-MCNC: 0.1 NG/ML
RBC # BLD AUTO: 2.84 10E12/L (ref 3.8–5.2)
SODIUM SERPL-SCNC: 139 MMOL/L (ref 133–144)
WBC # BLD AUTO: 15.8 10E9/L (ref 4–11)

## 2019-01-07 PROCEDURE — 71045 X-RAY EXAM CHEST 1 VIEW: CPT

## 2019-01-07 PROCEDURE — 25000128 H RX IP 250 OP 636: Performed by: STUDENT IN AN ORGANIZED HEALTH CARE EDUCATION/TRAINING PROGRAM

## 2019-01-07 PROCEDURE — 85027 COMPLETE CBC AUTOMATED: CPT | Performed by: INTERNAL MEDICINE

## 2019-01-07 PROCEDURE — 25000132 ZZH RX MED GY IP 250 OP 250 PS 637: Mod: GY | Performed by: STUDENT IN AN ORGANIZED HEALTH CARE EDUCATION/TRAINING PROGRAM

## 2019-01-07 PROCEDURE — 12000001 ZZH R&B MED SURG/OB UMMC

## 2019-01-07 PROCEDURE — 99233 SBSQ HOSP IP/OBS HIGH 50: CPT | Performed by: INTERNAL MEDICINE

## 2019-01-07 PROCEDURE — 25000125 ZZHC RX 250: Performed by: INTERNAL MEDICINE

## 2019-01-07 PROCEDURE — 85004 AUTOMATED DIFF WBC COUNT: CPT | Performed by: INTERNAL MEDICINE

## 2019-01-07 PROCEDURE — A9270 NON-COVERED ITEM OR SERVICE: HCPCS | Mod: GY | Performed by: INTERNAL MEDICINE

## 2019-01-07 PROCEDURE — 94640 AIRWAY INHALATION TREATMENT: CPT

## 2019-01-07 PROCEDURE — 82803 BLOOD GASES ANY COMBINATION: CPT | Performed by: INTERNAL MEDICINE

## 2019-01-07 PROCEDURE — 84145 PROCALCITONIN (PCT): CPT | Performed by: INTERNAL MEDICINE

## 2019-01-07 PROCEDURE — 97165 OT EVAL LOW COMPLEX 30 MIN: CPT | Mod: GO

## 2019-01-07 PROCEDURE — 25000128 H RX IP 250 OP 636: Performed by: INTERNAL MEDICINE

## 2019-01-07 PROCEDURE — 25000132 ZZH RX MED GY IP 250 OP 250 PS 637: Mod: GY | Performed by: NURSE PRACTITIONER

## 2019-01-07 PROCEDURE — 40000133 ZZH STATISTIC OT WARD VISIT

## 2019-01-07 PROCEDURE — 80048 BASIC METABOLIC PNL TOTAL CA: CPT | Performed by: INTERNAL MEDICINE

## 2019-01-07 PROCEDURE — A9270 NON-COVERED ITEM OR SERVICE: HCPCS | Mod: GY | Performed by: NURSE PRACTITIONER

## 2019-01-07 PROCEDURE — 86140 C-REACTIVE PROTEIN: CPT | Performed by: INTERNAL MEDICINE

## 2019-01-07 PROCEDURE — 83605 ASSAY OF LACTIC ACID: CPT | Performed by: INTERNAL MEDICINE

## 2019-01-07 PROCEDURE — 00000146 ZZHCL STATISTIC GLUCOSE BY METER IP

## 2019-01-07 PROCEDURE — 25000132 ZZH RX MED GY IP 250 OP 250 PS 637: Mod: GY | Performed by: INTERNAL MEDICINE

## 2019-01-07 PROCEDURE — A9270 NON-COVERED ITEM OR SERVICE: HCPCS | Mod: GY | Performed by: STUDENT IN AN ORGANIZED HEALTH CARE EDUCATION/TRAINING PROGRAM

## 2019-01-07 PROCEDURE — C9113 INJ PANTOPRAZOLE SODIUM, VIA: HCPCS | Performed by: STUDENT IN AN ORGANIZED HEALTH CARE EDUCATION/TRAINING PROGRAM

## 2019-01-07 PROCEDURE — 97535 SELF CARE MNGMENT TRAINING: CPT | Mod: GO

## 2019-01-07 PROCEDURE — 36415 COLL VENOUS BLD VENIPUNCTURE: CPT | Performed by: INTERNAL MEDICINE

## 2019-01-07 PROCEDURE — 40000275 ZZH STATISTIC RCP TIME EA 10 MIN

## 2019-01-07 RX ORDER — PIPERACILLIN SODIUM, TAZOBACTAM SODIUM 3; .375 G/15ML; G/15ML
3.38 INJECTION, POWDER, LYOPHILIZED, FOR SOLUTION INTRAVENOUS EVERY 6 HOURS
Status: DISCONTINUED | OUTPATIENT
Start: 2019-01-07 | End: 2019-01-07

## 2019-01-07 RX ORDER — AMOXICILLIN 250 MG
1-2 CAPSULE ORAL 2 TIMES DAILY
Status: DISCONTINUED | OUTPATIENT
Start: 2019-01-07 | End: 2019-01-25 | Stop reason: HOSPADM

## 2019-01-07 RX ORDER — SODIUM CHLORIDE, SODIUM LACTATE, POTASSIUM CHLORIDE, CALCIUM CHLORIDE 600; 310; 30; 20 MG/100ML; MG/100ML; MG/100ML; MG/100ML
INJECTION, SOLUTION INTRAVENOUS CONTINUOUS
Status: DISCONTINUED | OUTPATIENT
Start: 2019-01-07 | End: 2019-01-07

## 2019-01-07 RX ORDER — FERROUS SULFATE 325(65) MG
325 TABLET ORAL DAILY
Status: DISCONTINUED | OUTPATIENT
Start: 2019-01-07 | End: 2019-01-08

## 2019-01-07 RX ORDER — HYDROCHLOROTHIAZIDE 12.5 MG/1
12.5 CAPSULE ORAL DAILY
Status: DISCONTINUED | OUTPATIENT
Start: 2019-01-07 | End: 2019-01-07

## 2019-01-07 RX ORDER — IPRATROPIUM BROMIDE AND ALBUTEROL SULFATE 2.5; .5 MG/3ML; MG/3ML
3 SOLUTION RESPIRATORY (INHALATION) ONCE
Status: COMPLETED | OUTPATIENT
Start: 2019-01-07 | End: 2019-01-07

## 2019-01-07 RX ORDER — LISINOPRIL 10 MG/1
30 TABLET ORAL DAILY
Status: DISCONTINUED | OUTPATIENT
Start: 2019-01-07 | End: 2019-01-25 | Stop reason: HOSPADM

## 2019-01-07 RX ORDER — PANTOPRAZOLE SODIUM 40 MG/1
40 TABLET, DELAYED RELEASE ORAL DAILY
Status: DISCONTINUED | OUTPATIENT
Start: 2019-01-08 | End: 2019-01-23

## 2019-01-07 RX ORDER — FUROSEMIDE 10 MG/ML
20 INJECTION INTRAMUSCULAR; INTRAVENOUS ONCE
Status: COMPLETED | OUTPATIENT
Start: 2019-01-07 | End: 2019-01-07

## 2019-01-07 RX ADMIN — HYDRALAZINE HYDROCHLORIDE 10 MG: 20 INJECTION INTRAMUSCULAR; INTRAVENOUS at 10:19

## 2019-01-07 RX ADMIN — FERROUS SULFATE TAB 325 MG (65 MG ELEMENTAL FE) 325 MG: 325 (65 FE) TAB at 16:10

## 2019-01-07 RX ADMIN — PIPERACILLIN SODIUM AND TAZOBACTAM SODIUM 3.38 G: 3; .375 INJECTION, POWDER, LYOPHILIZED, FOR SOLUTION INTRAVENOUS at 07:58

## 2019-01-07 RX ADMIN — SALINE NASAL SPRAY 1 SPRAY: 1.5 SOLUTION NASAL at 07:58

## 2019-01-07 RX ADMIN — SALINE NASAL SPRAY 1 SPRAY: 1.5 SOLUTION NASAL at 03:38

## 2019-01-07 RX ADMIN — SALINE NASAL SPRAY 1 SPRAY: 1.5 SOLUTION NASAL at 16:12

## 2019-01-07 RX ADMIN — PANTOPRAZOLE SODIUM 40 MG: 40 INJECTION, POWDER, FOR SOLUTION INTRAVENOUS at 06:10

## 2019-01-07 RX ADMIN — SALINE NASAL SPRAY 1 SPRAY: 1.5 SOLUTION NASAL at 22:37

## 2019-01-07 RX ADMIN — GUAIFENESIN 10 ML: 100 SOLUTION ORAL at 16:10

## 2019-01-07 RX ADMIN — SODIUM CHLORIDE, POTASSIUM CHLORIDE, SODIUM LACTATE AND CALCIUM CHLORIDE: 600; 310; 30; 20 INJECTION, SOLUTION INTRAVENOUS at 09:01

## 2019-01-07 RX ADMIN — IPRATROPIUM BROMIDE AND ALBUTEROL SULFATE 3 ML: .5; 3 SOLUTION RESPIRATORY (INHALATION) at 13:19

## 2019-01-07 RX ADMIN — PIPERACILLIN SODIUM AND TAZOBACTAM SODIUM 3.38 G: 3; .375 INJECTION, POWDER, LYOPHILIZED, FOR SOLUTION INTRAVENOUS at 03:38

## 2019-01-07 RX ADMIN — FUROSEMIDE 20 MG: 10 INJECTION, SOLUTION INTRAVENOUS at 10:32

## 2019-01-07 RX ADMIN — SALINE NASAL SPRAY 1 SPRAY: 1.5 SOLUTION NASAL at 06:10

## 2019-01-07 RX ADMIN — ACETAMINOPHEN 650 MG: 325 TABLET, FILM COATED ORAL at 22:37

## 2019-01-07 RX ADMIN — VENLAFAXINE HYDROCHLORIDE 225 MG: 150 CAPSULE, EXTENDED RELEASE ORAL at 08:57

## 2019-01-07 RX ADMIN — SALINE NASAL SPRAY 1 SPRAY: 1.5 SOLUTION NASAL at 00:21

## 2019-01-07 RX ADMIN — BUPROPION HYDROCHLORIDE 150 MG: 150 TABLET, FILM COATED, EXTENDED RELEASE ORAL at 08:57

## 2019-01-07 RX ADMIN — RANITIDINE 150 MG: 150 TABLET ORAL at 20:06

## 2019-01-07 RX ADMIN — SALINE NASAL SPRAY 1 SPRAY: 1.5 SOLUTION NASAL at 20:06

## 2019-01-07 RX ADMIN — ATORVASTATIN CALCIUM 20 MG: 20 TABLET, FILM COATED ORAL at 20:06

## 2019-01-07 ASSESSMENT — ACTIVITIES OF DAILY LIVING (ADL)
ADLS_ACUITY_SCORE: 29

## 2019-01-07 NOTE — PLAN OF CARE
Discharge Planner OT   Patient plan for discharge: Unable to report.  Current status: CGA for transfer from bed to chair with use of FWW, SOB and fatigue reported following limited activity (BP: 186/59, HR: 96bpm, O2: 94% on room air). Short Blessed Test completed to assess memory and concentration with patient scoring a 28, indicative of severe memory impairment (0-8 = normal to minimal impairment, 9-19 = moderate impairment, 20-28 = severe impairment). Reported year to be 1971.  Barriers to return to prior living situation: Cognition, strength, endurance, balance.  Recommendations for discharge: TCU.  Rationale for recommendations: To progress functional independence and safety,       Entered by: Karen Butler 01/07/2019 9:59 AM

## 2019-01-07 NOTE — PROGRESS NOTES
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      Changes:  - Lasix x 1  - Discontinue vanco  - Repeat CXR  - ID consult for abx recommendations  - continue zosyn for now  - Restart home lisinopril  - Stop IVF    # Hypervolemia  Secondary to volume resuscitation. Dyspneic and labored when moving back to bed from chair. Not hypoxic. Lung US shows diffuse B lines  - diurese--20 mg IV once. redose as needed    # Possible septic angie:   Presented with hypothermia (94.2) , hypotension (BP 79/41), leukocytosis, elevated lactate (4.6 at OSH). Mild pyuria. CXR clear. Blood and urine cx NGTD. C diff negative. Called [facility] 1/7 and blood and urine cx (prior to start of antibiotics) are both no growth to date. Initial CRP and procal low. However CRP up to 70 on 1/7. Has developed cough over the past 24 hours.   - Discontinue vanco  - Repeat CXR  - ID consult for abx recommendations  - continue zosyn for now  - Trend CRP and procal    # Acute blood loss anemia  # Iron deficiency anemia  # Melena  Likely due to epistaxis. Hgb on admission 8.3 from 10.3 after multiple nose bleeds. Initially thought that epistaxis could explain bleed but then hgb continued to drop to 6.9 despite no further episodes of epistaxis. Though curiously melena resolved as well. Underwent EGD 1/6 which was negative for any cause of bleeding. Hgb has been stable above 7.  Iron 21. Ferritin 17. Iron sat 5%.  - switch back to  home daily protonix  - trend hgb  - Will start oral iron once possible infection ruled out/identified and treated     # Epistaxis  History of nasosinus melanoma (in remission). Seen by ENT and no evidence of recurrence of malignancy. with the below recs  - Dissolvable packing in place. This will dissolve on its own. No need for removal.   - No antibiotics for the nasal packing (surgicel)  - There may be some slow dark red oozing after the packing was placed - this is normal and likely from the Afrin and the Surgiflo  - Nasal saline spray to bilateral nasal cavities q2h daily starting in 48 hours (keeps the packing moist and will help with removal)  - If supplemental O2 is required, add humidifier ([facility] should be contacted to supply this)  - Humidifier to the room to increase the moisture in the air  - If patient experiences brisk bleeding, spray Afrin along the floor of the nose and hold digital pressure on bilateral nostrils for 20 minutes (holding pressure higher up on the nasal bones does not apply effective pressure to the septum. Instead, the pressure should be applied on the soft part of the nose do that the opening of the nostrils are completely pinched shut).  Nasal clips do not work and should not be used in place of digital pressure. If bleeding continues after 20 minutes of digital pressure, repeat the above steps. If this fails to control the bleeding at that time, call ENT for further recommendations  - attempted to provide humidification via faceshield at ENT request but pt kept pulling it off    # Acute encephalopathy on chronic confusion--likely dementia   Acute encephalopathy likely due to sepsis. Appears to have some degree of chronic dementia ongoing over months/years. In considering differential for AMS workup undertaken by SICU. No obvious metabolic derangements. TSH normal. Labs/vitals not consistent with adrenal insufficiency, no evidence of seizure activity, no evidence of ingestion/tox.  Evaluated by OT and did very poorly on cognitive testing  - was scheduled for outpt neurology eval and MRI but has not yet gotten to this appointment. Planning to defer to outpatient when she is back to her baseline    # DAVID due to sepsis:  Mild. Baseline Cr around 0.7. 1.0 on admission. Likely due to hypotension/sepsis. Resolving.   - Continue hydration until able to take PO  - Repeat Cr in am    # Hypertension:  - restart lisinopril today  - continue to hold HCTZ  - PRN hydralazine    # HLD: continue atorvastatin    # Depression:  Continue wellbutrin and effexor    # FEN:   Regular as tolerated  Stop IVF    Consulting Services: ENT, GI    CODE: full  DVT: SCDs, anticoagulation contraindicated due to bleeding    Disposition Plan   Expected discharge in 2-3 days to rehab once hemodynamically stable, infection ruled out.     Entered: Dayne Sylvester 01/07/2019, 10:08 AM         Pt's care was discussed with bedside RN, patient, and during Care Team Rounds.    David Sylvester MD   of Medicine  Med-Peds Hospitalist  Pager [phone]    Team: Medicine Gold 9  Page Cross Cover after 5 pm: pager [phone]     ___________________________________________________________________    Subjective & Interval Hx:     Dyspneic and labored when moving back to bed from chair. Not hypoxic. BP high today. No fevers. Having some cough this am.     Last 24 hr care team notes reviewed.   ROS:  4 point ROS including Respiratory, CV, GI and , other than that noted in the HPI, is negative      Medications: Reviewed in EPIC. List below for reference    Physical Exam:    Blood pressure 171/63, pulse 88, temperature 98  F (36.7  C), temperature source Oral, resp. rate 20, weight 77.9 kg (171 lb 11.8 oz), SpO2 97 %, not currently breastfeeding.    General: awake, alert, lying in bed, slightly labored breathing  HEENT: MMM, no epistaxis, NCAT  Chest/Resp: slight wheeze diffusely  Heart/CV: RRR, no MRG noted, trace LE in LE  bilaterally  Abdomen/GI: soft,  mildly tender, mildly distended, +BS  Extremities/MSK: no swelling or erythema of joints  Skin: no rash or jaundice noted  Neuro/Psych: awake, alert, slightly confused.    Lines/Tubes:   Peripheral IV 02/22/17 Left Lower forearm (Active)   Number of days: 682       Peripheral IV 02/22/17 Right Hand (Active)   Number of days: 682       Peripheral IV 12/12/18 Right Lower forearm (Active)   Number of days: 24       Peripheral IV 01/05/19 Left (Active)   Site Assessment WDL 1/5/2019  8:00 AM   Line Status Saline locked 1/5/2019  8:00 AM   Phlebitis Scale 0-->no symptoms 1/5/2019  8:00 AM   Infiltration Scale 0 1/5/2019  8:00 AM   Extravasation? No 1/5/2019  5:00 AM   Number of days: 0       Peripheral IV 01/05/19 Right (Active)   Site Assessment WDL 1/5/2019  8:00 AM   Line Status Infusing 1/5/2019  8:00 AM   Phlebitis Scale 0-->no symptoms 1/5/2019  8:00 AM   Infiltration Scale 0 1/5/2019  8:00 AM   Extravasation? No 1/5/2019  5:00 AM   Number of days: 0       Labs & Studies of Note:  I personally reviewed all labs and imaging.     Unresulted Labs Ordered in the Past 30 Days of this Admission     Date and Time Order Name Status Description    1/7/2019 0748 Procalcitonin In process     1/5/2019 0208 Blood culture Preliminary     1/5/2019 0208 Blood culture Preliminary

## 2019-01-07 NOTE — PLAN OF CARE
[id number]:     Reason for admission: sepsis, suspect GIB  Vitals: VSS ex HTN on RA  Activity: assist x2, bed alarm on for safety  Pain: c/o R shoulder pain - hot pack applied, PRN tylenol refused  Neuro: disoriented to situation and time, aware of the year and month but unsure of day; L pupil fixed (blindness), R eye partial blindness, R ear deaf, L ear [place] (hearing aids and glasses in place)  Cardiac: WNL  Respiratory: diminished bases, refusing humidified air  GI/: rounded abd, breen in place for I/Os, rectal pouch in place for I/Os and frequent incontinence - having dark brown/black runny stools, last BM today  Diet: Regular diet, not eating at this time  Lines: R PIV, LR @ 100 mL/hr, intermittent zosyn q6h and vanco every day, rectal pouch, breen  Wounds: redness on bottom, bruises, blanchable redness  Labs/imaging: K 3.0 --> 3.2, Hgb 7.2, WBC 16.2      New changes this shift: K replaced orally, recheck back and now pt refusing oral pills - IV K replace started, down for upper scope @ 1130 - once returned, pt very confused and agitated remainder of shift - requiring mitts as pt is trying to get out of bed and pull at lines. At end of shift, pt more directable and less agitated, rectal pouch continuing to have small leak but is still patent    Plan: monitor for S/S of bleeding, work with PT/OT, discharge TCU 2-3 days      Continue to monitor and follow POC

## 2019-01-07 NOTE — PROGRESS NOTES
Social Work: Assessment with Discharge Plan    Patient Name:  Alex Anaya  :  1940  Age:  78 year old  MRN:  [id number]  Completed assessment with:  Pt's  Henok ( [phone])    Presenting Information   Reason for Referral:  Discharge plan  Date of Intake:  2019  Referral Source:  Chart Review  Decision Maker:  Pt if able  Alternate Decision Maker:  Pt's  Henok (NOK)  Health Care Directive:  None on file  Living Situation:  House  Previous Functional Status:  Pt's spouse states prior to hospitalization pt was staying in bed and would not get up.   Patient and family understanding of hospitalization:  Restorative  Cultural/Language/Spiritual Considerations:  None identified  Adjustment to Illness:  Pt confused/disoriented    Physical Health  Reason for Admission:  No diagnosis found.  Services Needed/Recommended:  TCU    Mental Health/Chemical Dependency  Diagnosis:  Depression per H&P  Support/Services in Place:  Medication- wellbutrin and effexor  Services Needed/Recommended:  Continue above    Support System  Significant relationship at present time:  Pt's spouse Henok  Family of origin is available for support:  Yes, pt's daughter lives with her and spouse  Other support available:  None identified, no home care currently  Gaps in support system:  Pt requires further assistance than family can provide at this time  Patient is caregiver to:  None     Provider Information   Primary Care Physician:  Rebekah Boogie   [phone]   Clinic:  [address] [place] [address] / [place] [id number]      :  None    Financial   Income Source:  Pt and spouse are retired  Financial Concerns:  None identified  Insurance:    Payor/Plan Subscriber Name Rel Member # Group #   MEDICARE - MEDICARE ALEX ANAYA  [id number]       ATTN CLAIMS, [address]       Discharge Plan   Patient and family discharge goal:  TCU  Provided education on discharge plan:  YES  Patient agreeable to discharge  plan:  Unknown; spouse agreeable  A list of Medicare Certified Facilities was provided to the patient and/or family to encourage patient choice. Patient's choices for facility are:  Pt's spouse would like a facility close to home. Left list in pt's room for spouse to look over when he visits later today.   Will NH provide Skilled rehabilitation or complex medical:  YES  General information regarding anticipated insurance coverage and possible out of pocket cost was discussed. Patient and patient's family are aware patient may incur the cost of transportation to the facility, pending insurance payment: YES  Barriers to discharge:  Medical stability    Discharge Recommendations   Anticipated Disposition:  Facility:  TCU  Transportation Needs:  Medical:  Wheelchair  Name of Transportation Company and Phone:  [employer] ( [phone])    Additional comments   Pt is a 78-year-old female admitted to [facility] 1/5/19. TCU is recommended at discharge. Pt confused/disoriented. Spoke with pt's spouse and he is in agreement with TCU. Pt's spouse states he believes pt has dementia. Pt has been getting more agitated at home. Pt's spouse states that pt used to ride an exercise bike daily but recently has been refusing to do anything other than lay in bed and watch television. Pt's spouse would prefer a TCU close to home and will look over list of facilities later today. Informed pt's spouse that with straight Medicare pt will have 20 days of TCU coverage at no cost and then daily copays through day 100.    Met with pt's spouse this afternoon and he would prefer placement at [facility]. Next choices are wherever is closest to home.     Faxed referrals to:  1) [facility] (ph [phone])  2) [facility] (ph [phone])  3) [facility] (ph [phone])  4) [facility] (ph [phone])    DALE Mena, [facility]  5A Unit   Pager [phone]  Phone  [phone]

## 2019-01-07 NOTE — PLAN OF CARE
Pt admitted for sepsis and UTI. Alert and disoriented to time only, confusion intermittently. Able to follow commands. VSS on RA. Continued Zosyn q 6hr. LR at 100 ml/hr in L PIV. R PIV TKO. Replaced K, recheck was 3.8 last evening. Removed rectal pouch d/t leaking, frequent brief changes, loose brown/black stools. Buttocks reddened, barrier cream applied. Peter catheter WDL, 650ml sheyla UO. Little appetite. Continued Nasal spray q 2hr. Pt has strong, productive, frequent cough. Complaints of arm pain, tylenol given once. Will continue with POC.

## 2019-01-07 NOTE — CONSULTS
[facility] Service: Initial Consultation     Patient:  Gabino Jones, Date of birth 1940, Medical record number [id number]  Date of Visit:  January 7, 2019  Consult Requested by: Dr. Dayne Sylvester         Assessment and Recommendations:   Recommendations:  - Stop antibiotics  - Continue clinical monitoring    Assessment:  1. Elevated CRP, rising, unclear etiology but nonspecific.  2. Leukocytosis, improving.  3. Mildly elevated procalcitonin in setting of DAVID, improving.  4. Hypotension, resolved with IVF and now hypertensive.  5. Elevated lactate, resolved.    78 year old woman with history of sinonasal mucosal melanoma, s/p resection and radiation in 2014, possible new diagnosis of dementia (undergoing evaluation), and HTN who was admitted to ICU on 1/5/19 following near syncope and nosebleed found to have hypotension (BP 79/41 --> improved with 2L NS), hypothermia (94.2 F), leukocytosis (15k), and elevated lactate (4.8) concerning for sepsis. Was appropriately started on vancomycin and Zosyn empirically. Also found to have 2g hemoglobin drop from baseline and possible melena. Underwent EGD on 1/6, which was unrevealing and etiology of melena suspected to be epistaxis. Hospital course complicated by development of dyspnea on 1/7 AM, currently undergoing diuresis. Antibiotics were discontinued on 1/7 but then Zosyn resumed after CRP increased from 9.2 on 1/5 to 74.0 on 1/7.    From a clinical and laboratory standpoint, she has been afebrile and no further hypothermia, hypotension has resolved, and her leukocytosis and procalcitonin is downtrending. CXR on admit unrevealing (although CXR now with edema) and urine cultures, blood cultures, influenza swab, U strep pneumo ag, enteric panel, and c diff on admit negative/no growth to date. Liver enzymes on admit wnl.    At this time, do not strongly suspect patient has an active infection. Many of her presenting signs/lab abnormalities which have since  resolved could reasonably be explained by blood loss. Etiology for rising CRP unclear but non-specific, possibly could be related to recent EGD although this is conjecture and could be due to a variety of reasons. Today, would recommend to stop antibiotics and continue ongoing clinical monitoring. Do not suspect that further trending of CRP would be beneficial unless additional clinical signs/symptoms develop.    General ID will sign off but please do not hesitate to contact us if additional questions arise.    Patient evaluated and discussed with ID attending, Dr. Morales.    Tereza Chatman MD  Medicine-Dermatology PGY-5  [phone]        History of Present Illness:   Mrs. Gabino Jones is 78 year old woman with history of sinonasal mucosal melanoma, s/p resection and radiation in 2014, possible new diagnosis of dementia (undergoing evaluation), HTN, fibromyalgia, HLD, OA, and s/p cholecystomy who was admitted to ICU on 1/5/19 following near syncope and nosebleed.    Unable to obtain history from patient given dementia/confusion and so majority of history comes from chart review. Per review - one day prior to admission, patient had large nosebleed lasting about 2 hours. On day of admission, she had a nosebleed lasting at least 5 hours. After bleeding stopped, she went to the bathroom and collapsed. Her daughter noted dark stool and so brought her to the ED. Additionally on days leading up to admission, she had crampy abdominal pain and a recent URI/cough that she was treating with albuterol nebs and cough syrup. No fevers or SOB.    Upon arrival to outside ED, she was hypotensive to 79/41 and this improved with 2L NS. She was hypothermic to 94.2 F, had a leukocytosis of 15k, and elevated lactate of 4.8. Was started on Zosyn and transferred to [facility]. Upon arrival here, continued on Zosyn and started on vancomycin. Was also found to have 2g hemoglobin drop from baseline and possible melena. Underwent EGD on  "1/6, which was unrevealing and etiology of melena suspected to be epistaxis. Hospital course complicated by development of dyspnea on 1/7 AM when moving from chair to bed (but no hypoxia). Repeat CXR with pulmonary edema and patient currently undergoing diuresis. Antibiotics were discontinued on 1/7 but then Zosyn resumed after CRP increased from 9.2 on 1/5 to 74.0 on 1/7.    Currently patient says she feels \"not good\"         Review of Systems:   Unable to obtain given patient's confusion/dementia.       Past Medical History:     Past Medical History:   Diagnosis Date     Cancer (H)      Chronic back pain      Chronic leg pain      Depression      Hearing loss      Heart murmur      Hyperlipidemia      Hypertension      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     Ringing in ears      Past Surgical History:   Procedure Laterality Date     C ANESTH,CERV SPINE, SITTING POSITION       COLONOSCOPY N/A 7/7/2017    Procedure: COMBINED COLONOSCOPY, SINGLE OR MULTIPLE BIOPSY/POLYPECTOMY BY BIOPSY;;  Surgeon: Sathya Norman MD;  Location: [facility] OR     COLONOSCOPY WITH CO2 INSUFFLATION N/A 7/7/2017    Procedure: COLONOSCOPY WITH CO2 INSUFFLATION;  Combined,  [place], Gastroesophageal reflux disease without esophagitis  Flatulence, eructation, and gas pain, BMI 29.35, [facility] [id number];  Surgeon: Sathya Norman MD;  Location: [facility] OR     COLONOSCOPY, SUBMUCOSA RESECTION N/A 10/4/2017    Procedure: COLONOSCOPY, SUBMUCOSA RESECTION;  Colonoscopy With Endoscopic Polypectomy with clips;  Surgeon: Milton Javier MD;  [facility]     COMBINED ESOPHAGOSCOPY, GASTROSCOPY, DUODENOSCOPY (EGD) WITH CO2 INSUFFLATION N/A 7/7/2017    Procedure: COMBINED ESOPHAGOSCOPY, GASTROSCOPY, DUODENOSCOPY (EGD) WITH CO2 INSUFFLATION;  Combined,  [place], Gastroesophageal reflux disease without esophagitis  Flatulence, eructation, and gas pain, BMI 29.35, [facility] [id number];  Surgeon: Sathya Norman MD;  " Location: [facility] OR     ESOPHAGOSCOPY, GASTROSCOPY, DUODENOSCOPY (EGD), COMBINED N/A 7/7/2017    Procedure: COMBINED ESOPHAGOSCOPY, GASTROSCOPY, DUODENOSCOPY (EGD), BIOPSY SINGLE OR MULTIPLE;;  Surgeon: Sathya Norman MD;  Location: [facility] OR     ESOPHAGOSCOPY, GASTROSCOPY, DUODENOSCOPY (EGD), COMBINED N/A 1/6/2019    Procedure: COMBINED ESOPHAGOSCOPY, GASTROSCOPY, DUODENOSCOPY (EGD);  Surgeon: Cailin Paulino MD;  [facility]: [facility]     GALLBLADDER SURGERY       NECK SURGERY       OPTICAL TRACKING SYSTEM CRANIOTOMY, EXCISE TUMOR, COMBINED Right 2/22/2017    Procedure: COMBINED OPTICAL TRACKING SYSTEM CRANIOTOMY, EXCISE TUMOR;  Surgeon: Lenora Pérez MD;  Location:  OR     OPTICAL TRACKING SYSTEM ENDOSCOPIC ENDONASAL SURGERY  6/23/2014    Procedure: OPTICAL TRACKING SYSTEM ENDOSCOPIC ENDONASAL SURGERY;  Surgeon: Yolanda Whitaker MD;  Location: [facility] OR     STOMACH SURGERY       TONSILLECTOMY       TUBAL LIGATION      1975         Allergies:      Allergies   Allergen Reactions     Novocain [Procaine] Unknown and Rash     Mirtazapine      Nefazodone Unknown and Rash            Current Antimicrobials:   Zosyn (1/5-1/7)  Vancomycin (1/5-1/6)         Family History:     Family History   Problem Relation Age of Onset     Prostate Cancer Father      Cancer Sister         SCLC     Cancer Sister      Macular Degeneration Daughter      Glaucoma No family hx of             Social History:     Social History     Socioeconomic History     Marital status:      Spouse name: Not on file     Number of children: Not on file     Years of education: Not on file     Highest education level: Not on file   Social Needs     Financial resource strain: Not on file     Food insecurity - worry: Not on file     Food insecurity - inability: Not on file     Transportation needs - medical: Not on file     Transportation needs - non-medical: Not on file   Occupational History     Not on file    Tobacco Use     Smoking status: Former Smoker     Types: Cigarettes     Last attempt to quit: 6/10/2004     Years since quittin.5     Smokeless tobacco: Never Used   Substance and Sexual Activity     Alcohol use: No     Drug use: No     Sexual activity: No   Other Topics Concern     Parent/sibling w/ CABG, MI or angioplasty before 65F 55M? Not Asked   Social History Narrative     Not on file              Physical Exam:   Ranges forvital signs:  Temp:  [96.7  F (35.9  C)-98.4  F (36.9  C)] 98  F (36.7  C)  Pulse:  [] 101  Heart Rate:  [80-92] 92  Resp:  [18-40] 40  BP: (129-186)/(44-94) 129/94  SpO2:  [95 %-99 %] 97 %    Intake/Output Summary (Last 24 hours) at 2019 1221  Last data filed at 2019 1151  Gross per 24 hour   Intake 960 ml   Output 1800 ml   Net -840 ml       Exam:  GENERAL: confused elderly woman sitting upright in bed  ENT:  Head is normocephalic, atraumatic. Oropharynx is moist without exudates or ulcers.  EYES:  Eyes have anicteric sclerae. PERRL.  NECK:  Supple. No LAD.  LUNGS:  Limited by cooperation but clear to auscultation bilaterally.  CARDIOVASCULAR:  Regular rate and rhythm. Harsh systolic murmur throughout precordium.  ABDOMEN:  Normal bowel sounds, soft, nontender.  EXT: Extremities warm and without edema.  SKIN:  No acute rashes.   NEUROLOGIC:  Grossly nonfocal.         Laboratory Data:   Reviewed.  Pertinent for:  WBC 15k (OSH) --> 17.1 --> 16.2 --> 15.8  Cr 0.68 (was 1.0 on admit)    CRP 9.2 () --> 74.0 ()  Procal 0.31-0.36 on  --> 0.10 ()    MRSA nares +  U strep pneumo ag negative  Enteric panel negative  C diff negative  Influenza negative    Culture data:  Urine culture NG  Blood cultures x2 on  NG    Per primary team - outside urine and blood cx also negative         Imaging:   CXR    Impression: No focal pulmonary opacities.    CXR   IMPRESSION: Interstitial opacities are increased likely representing  pulmonary edema.

## 2019-01-07 NOTE — PROGRESS NOTES
CLINICAL NUTRITION SERVICES - ASSESSMENT NOTE     Nutrition Prescription    RECOMMENDATIONS FOR MDs/PROVIDERS TO ORDER:  None at this time    Malnutrition Status:    Unable to determine due to unable to visit with pt at this time    Recommendations already ordered by Registered Dietitian (RD):  1. Boost shakes BID - vary flavors, will trial  2. Calorie counts ordered to better quantify po intakes  3. Room service - standard trays until mentation improves    Future/Additional Recommendations:  1. 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 RN helping to order food but pt not showing interest in any po.  not present in room to obtain further information.    CURRENT NUTRITION ORDERS  Diet: Regular  Intake/Tolerance: 100% x 1 documented on 1/6, RN notes this morning not eating    LABS  Labs reviewed  K 3.0 L on admission - resolved by PM w/ replacement  Cl 112 H, CO2 19 L  BUN/Cr WNL  Euglycemia    MEDICATIONS  Medications reviewed  IVF: LR @ 100 mL/hr    ANTHROPOMETRICS  Height:   Ht Readings from Last 2 Encounters:  "  12/12/18 1.651 m (5' 5\")   07/05/18 1.651 m (5' 5\")     Most Recent Weight: 77.9 kg (171 lb 11.8 oz)    IBW: 56.8 kg  BMI: 28.6 --  Overweight BMI 25-29.9  Weight History: Pt wt appears to fluctuate ~168-177 lbs over the past year. Suspect wt from 12/12 is pt stated which would indicate 3.5% wt loss from 9/12/18-1/5/19 (~3.5 months)   Wt Readings from Last 10 Encounters:   01/05/19 77.9 kg (171 lb 11.8 oz)   12/12/18 80.7 kg (178 lb)   09/12/18 80.7 kg (177 lb 14.6 oz)   07/20/18 76.4 kg (168 lb 6.4 oz)   07/05/18 79.4 kg (175 lb)   04/30/18 80.7 kg (177 lb 14.4 oz)   03/14/18 78.9 kg (173 lb 14.4 oz)   01/04/18 80.3 kg (177 lb)   12/29/17 80.6 kg (177 lb 12.8 oz)   12/15/17 76.7 kg (169 lb)     Dosing Weight: 62 kg (adjusted 77.9 kg on 1/5, IBW 56.8kg)     ASSESSED NUTRITION NEEDS  Estimated Energy Needs: 1489-9854 kcals/day (25 - 30 kcals/kg)  Justification: Maintenance  Estimated Protein Needs: 75-95 grams protein/day (1.2 - 1.5 grams of pro/kg)  Justification: Maintenance  Estimated Fluid Needs: (1 mL/kcal)   Justification: Maintenance and Per provider pending fluid status    PHYSICAL FINDINGS  See malnutrition section below.  1/6 UGI - esophagus, stomach, duodenum WNL    MALNUTRITION  % Intake: Unable to assess - is decreased but unsure of extent  % Weight Loss: Weight loss does not meet criteria  Subcutaneous Fat Loss: Unable to assess  Muscle Loss: Unable to assess  Fluid Accumulation/Edema: None noted  Malnutrition Diagnosis: Unable to determine due to unable to visit with pt at this time    NUTRITION DIAGNOSIS  Inadequate oral intake related to AMS as evidenced by H&P notes.      INTERVENTIONS  Implementation  Nutrition Education: Unable to complete due to pt not appropriate to see per RN   Medical food supplement therapy - see above    Goals  Patient to consume % of nutritionally adequate meal trays TID, or the equivalent with supplements/snacks.     Monitoring/Evaluation  Progress toward goals " will be monitored and evaluated per protocol.    Tatyana Levine RD, , LD.  7B and 5A (Beds 4845-1 to 5079-2) Pager: [phone]

## 2019-01-07 NOTE — PLAN OF CARE
PT-5A: OT recommending that PT initiate services; per RN, pt very confused at this time and is requesting that PT eval be deferred to 1/8/19. Have rescheduled PT eval to 1/8/19

## 2019-01-07 NOTE — PROGRESS NOTES
"   01/07/19 0927   Quick Adds   Type of Visit Initial Occupational Therapy Evaluation   Living Environment   Lives With spouse   Living Environment Comment Due to patient dementia/cognitive deficits very limited historian. Able to obtain that she lives with her spouse, Karnia.   Self-Care   Usual Activity Tolerance fair   Current Activity Tolerance poor   Equipment Currently Used at Home walker, rolling   Functional Level   Ambulation 3-->assistive equipment and person   Transferring 3-->assistive equipment and person   Toileting 2-->assistive person   Bathing 2-->assistive person   Dressing 2-->assistive person   Eating 0-->independent   Communication 2-->difficulty understanding (not related to language barrier)   Swallowing 0-->swallows foods/liquids without difficulty   Cognition 2 - difficulty with organizing thoughts   Fall history within last six months yes   Number of times patient has fallen within last six months 1   Which of the above functional risks had a recent onset or change? ambulation;transferring;toileting;bathing;dressing;fall history;cognition       Present no   General Information   Onset of Illness/Injury or Date of Surgery - Date 01/05/19   Referring Physician Gypsy Moore, APRN CNP   Patient/Family Goals Statement Did not report   Additional Occupational Profile Info/Pertinent History of Current Problem \"Gabino Jones is a 78 year old woman with right sinonasal mucosal malignant melanoma s/p resection and radiation with PMH of HTN, HLD, OA, s/p cholecystectomy, fibromyalgia, colonic polyposis, possible dementia, who presented to  [facility] due to near syncope in the setting of recent brisk nosebleed found to have elevated lactate and hypothermia with concern for sepsis.\"   Precautions/Limitations fall precautions   Cognitive Status Examination   Orientation person   Cognitive Comment Short Blessed Test completed. See daily documentation for further details. 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   Pain Assessment   Patient Currently in Pain No   Range of Motion (ROM)   ROM Quick Adds (Impaired proximally due to deconditioning likely.)   Strength   Manual Muscle Testing Quick Adds (Proximal weakness due to deconditioning likely.)   Hand Strength   Hand Strength Comments WFL   Coordination   Coordination Comments WFL   Mobility   Bed Mobility Bed mobility skill: Supine to sit   Bed Mobility Skill: Supine to Sit   Level of Ray: Supine/Sit moderate assist (50% patients effort)   Physical Assist/Nonphysical Assist: Supine/Sit 1 person assist;verbal cues;nonverbal cues (demo/gestures)   Lower Body Dressing   Level of Ray: Dress Lower Body dependent (less than 25% patients effort)   Physical Assist/Nonphysical Assist: Dress Lower Body 1 person assist   Activities of Daily Living Analysis   Impairments Contributing to Impaired Activities of Daily Living cognition impaired;balance impaired;ROM decreased;strength decreased   General Therapy Interventions   Planned Therapy Interventions ADL retraining;IADL retraining;balance training;cognition;bed mobility training;strengthening;transfer training   Clinical Impression   Criteria for Skilled Therapeutic Interventions Met yes, treatment indicated   OT Diagnosis Patient presented with decreased independence in ADL's.   Influenced by the following impairments Cognition, strength, endurance, balance   Assessment of Occupational Performance 5 or more Performance Deficits   Identified Performance Deficits Dressing, bathing, toileting, ambulation, home management   Clinical Decision Making (Complexity) Low complexity   Therapy Frequency 5 times/wk   Predicted Duration of Therapy Intervention (days/wks) 1 week   Anticipated Discharge Disposition Transitional Care Facility   Risks and Benefits of Treatment have been explained. Yes   Patient, Family & other staff in  agreement with plan of care Yes   Total Evaluation Time   Total Evaluation Time (Minutes) 5

## 2019-01-08 ENCOUNTER — APPOINTMENT (OUTPATIENT)
Dept: PHYSICAL THERAPY | Facility: CLINIC | Age: 79
DRG: 871 | End: 2019-01-08
Attending: INTERNAL MEDICINE
Payer: MEDICARE

## 2019-01-08 LAB
ANION GAP SERPL CALCULATED.3IONS-SCNC: 10 MMOL/L (ref 3–14)
BUN SERPL-MCNC: 7 MG/DL (ref 7–30)
CALCIUM SERPL-MCNC: 8 MG/DL (ref 8.5–10.1)
CHLORIDE SERPL-SCNC: 104 MMOL/L (ref 94–109)
CO2 SERPL-SCNC: 23 MMOL/L (ref 20–32)
CREAT SERPL-MCNC: 0.58 MG/DL (ref 0.52–1.04)
ERYTHROCYTE [DISTWIDTH] IN BLOOD BY AUTOMATED COUNT: 16.6 % (ref 10–15)
GFR SERPL CREATININE-BSD FRML MDRD: 88 ML/MIN/{1.73_M2}
GLUCOSE SERPL-MCNC: 98 MG/DL (ref 70–99)
HCT VFR BLD AUTO: 26.4 % (ref 35–47)
HGB BLD-MCNC: 8 G/DL (ref 11.7–15.7)
LACTATE BLD-SCNC: 1.4 MMOL/L (ref 0.7–2)
MCH RBC QN AUTO: 25.7 PG (ref 26.5–33)
MCHC RBC AUTO-ENTMCNC: 30.3 G/DL (ref 31.5–36.5)
MCV RBC AUTO: 85 FL (ref 78–100)
PLATELET # BLD AUTO: 290 10E9/L (ref 150–450)
POTASSIUM SERPL-SCNC: 2.6 MMOL/L (ref 3.4–5.3)
POTASSIUM SERPL-SCNC: 3 MMOL/L (ref 3.4–5.3)
RBC # BLD AUTO: 3.11 10E12/L (ref 3.8–5.2)
SODIUM SERPL-SCNC: 137 MMOL/L (ref 133–144)
WBC # BLD AUTO: 14.5 10E9/L (ref 4–11)

## 2019-01-08 PROCEDURE — 25000132 ZZH RX MED GY IP 250 OP 250 PS 637: Mod: GY | Performed by: NURSE PRACTITIONER

## 2019-01-08 PROCEDURE — A9270 NON-COVERED ITEM OR SERVICE: HCPCS | Mod: GY | Performed by: STUDENT IN AN ORGANIZED HEALTH CARE EDUCATION/TRAINING PROGRAM

## 2019-01-08 PROCEDURE — 36415 COLL VENOUS BLD VENIPUNCTURE: CPT | Performed by: INTERNAL MEDICINE

## 2019-01-08 PROCEDURE — A9270 NON-COVERED ITEM OR SERVICE: HCPCS | Mod: GY | Performed by: NURSE PRACTITIONER

## 2019-01-08 PROCEDURE — 12000001 ZZH R&B MED SURG/OB UMMC

## 2019-01-08 PROCEDURE — 97530 THERAPEUTIC ACTIVITIES: CPT | Mod: GP

## 2019-01-08 PROCEDURE — 25000132 ZZH RX MED GY IP 250 OP 250 PS 637: Mod: GY | Performed by: STUDENT IN AN ORGANIZED HEALTH CARE EDUCATION/TRAINING PROGRAM

## 2019-01-08 PROCEDURE — 97162 PT EVAL MOD COMPLEX 30 MIN: CPT | Mod: GP

## 2019-01-08 PROCEDURE — 40000193 ZZH STATISTIC PT WARD VISIT

## 2019-01-08 PROCEDURE — 25000132 ZZH RX MED GY IP 250 OP 250 PS 637: Mod: GY | Performed by: INTERNAL MEDICINE

## 2019-01-08 PROCEDURE — 85027 COMPLETE CBC AUTOMATED: CPT | Performed by: INTERNAL MEDICINE

## 2019-01-08 PROCEDURE — 99233 SBSQ HOSP IP/OBS HIGH 50: CPT | Performed by: INTERNAL MEDICINE

## 2019-01-08 PROCEDURE — A9270 NON-COVERED ITEM OR SERVICE: HCPCS | Mod: GY | Performed by: INTERNAL MEDICINE

## 2019-01-08 PROCEDURE — 83605 ASSAY OF LACTIC ACID: CPT | Performed by: INTERNAL MEDICINE

## 2019-01-08 PROCEDURE — 84132 ASSAY OF SERUM POTASSIUM: CPT | Performed by: INTERNAL MEDICINE

## 2019-01-08 PROCEDURE — 80048 BASIC METABOLIC PNL TOTAL CA: CPT | Performed by: INTERNAL MEDICINE

## 2019-01-08 PROCEDURE — 25000128 H RX IP 250 OP 636: Performed by: STUDENT IN AN ORGANIZED HEALTH CARE EDUCATION/TRAINING PROGRAM

## 2019-01-08 RX ORDER — HYDROCHLOROTHIAZIDE 25 MG/1
25 TABLET ORAL DAILY
Status: DISCONTINUED | OUTPATIENT
Start: 2019-01-08 | End: 2019-01-25 | Stop reason: HOSPADM

## 2019-01-08 RX ORDER — ONDANSETRON 4 MG/1
4 TABLET, ORALLY DISINTEGRATING ORAL EVERY 6 HOURS PRN
Status: DISCONTINUED | OUTPATIENT
Start: 2019-01-08 | End: 2019-01-25 | Stop reason: HOSPADM

## 2019-01-08 RX ADMIN — SALINE NASAL SPRAY 1 SPRAY: 1.5 SOLUTION NASAL at 04:04

## 2019-01-08 RX ADMIN — RANITIDINE 150 MG: 150 TABLET ORAL at 20:28

## 2019-01-08 RX ADMIN — VENLAFAXINE HYDROCHLORIDE 225 MG: 150 CAPSULE, EXTENDED RELEASE ORAL at 07:55

## 2019-01-08 RX ADMIN — SALINE NASAL SPRAY 1 SPRAY: 1.5 SOLUTION NASAL at 22:17

## 2019-01-08 RX ADMIN — POTASSIUM CHLORIDE 20 MEQ: 750 TABLET, EXTENDED RELEASE ORAL at 20:28

## 2019-01-08 RX ADMIN — POTASSIUM CHLORIDE 40 MEQ: 750 TABLET, EXTENDED RELEASE ORAL at 11:36

## 2019-01-08 RX ADMIN — GUAIFENESIN 10 ML: 100 SOLUTION ORAL at 04:01

## 2019-01-08 RX ADMIN — SALINE NASAL SPRAY 1 SPRAY: 1.5 SOLUTION NASAL at 20:29

## 2019-01-08 RX ADMIN — HYDROCHLOROTHIAZIDE 25 MG: 25 TABLET ORAL at 16:41

## 2019-01-08 RX ADMIN — SALINE NASAL SPRAY 1 SPRAY: 1.5 SOLUTION NASAL at 08:10

## 2019-01-08 RX ADMIN — LISINOPRIL 30 MG: 20 TABLET ORAL at 07:55

## 2019-01-08 RX ADMIN — FERROUS SULFATE TAB 325 MG (65 MG ELEMENTAL FE) 325 MG: 325 (65 FE) TAB at 07:55

## 2019-01-08 RX ADMIN — SALINE NASAL SPRAY 1 SPRAY: 1.5 SOLUTION NASAL at 18:00

## 2019-01-08 RX ADMIN — ATORVASTATIN CALCIUM 20 MG: 20 TABLET, FILM COATED ORAL at 20:28

## 2019-01-08 RX ADMIN — SALINE NASAL SPRAY 1 SPRAY: 1.5 SOLUTION NASAL at 00:17

## 2019-01-08 RX ADMIN — GUAIFENESIN 10 ML: 100 SOLUTION ORAL at 11:01

## 2019-01-08 RX ADMIN — POTASSIUM CHLORIDE 40 MEQ: 750 TABLET, EXTENDED RELEASE ORAL at 17:59

## 2019-01-08 RX ADMIN — RANITIDINE 150 MG: 150 TABLET ORAL at 07:55

## 2019-01-08 RX ADMIN — HYDRALAZINE HYDROCHLORIDE 10 MG: 20 INJECTION INTRAMUSCULAR; INTRAVENOUS at 11:01

## 2019-01-08 RX ADMIN — SALINE NASAL SPRAY 1 SPRAY: 1.5 SOLUTION NASAL at 11:01

## 2019-01-08 RX ADMIN — BUPROPION HYDROCHLORIDE 150 MG: 150 TABLET, FILM COATED, EXTENDED RELEASE ORAL at 07:55

## 2019-01-08 RX ADMIN — ACETAMINOPHEN 650 MG: 325 TABLET, FILM COATED ORAL at 16:41

## 2019-01-08 RX ADMIN — POTASSIUM CHLORIDE 40 MEQ: 750 TABLET, EXTENDED RELEASE ORAL at 09:29

## 2019-01-08 RX ADMIN — SALINE NASAL SPRAY 1 SPRAY: 1.5 SOLUTION NASAL at 02:10

## 2019-01-08 RX ADMIN — PANTOPRAZOLE SODIUM 40 MG: 40 TABLET, DELAYED RELEASE ORAL at 07:55

## 2019-01-08 ASSESSMENT — ACTIVITIES OF DAILY LIVING (ADL)
ADLS_ACUITY_SCORE: 22
ADLS_ACUITY_SCORE: 22
ADLS_ACUITY_SCORE: 29
ADLS_ACUITY_SCORE: 28
ADLS_ACUITY_SCORE: 29
ADLS_ACUITY_SCORE: 29

## 2019-01-08 NOTE — PROGRESS NOTES
01/08/19 1440   Quick Adds   Type of Visit Initial PT Evaluation      Language English   Living Environment   Lives With child(prudencio), adult;spouse   Living Arrangements house   Home Accessibility stairs to enter home   Living Environment Comment Pt lives in ranch style home with  and adult daughter. Pt has 2 steps to enter home. She has a tub shower as well.  is retired and can assist 24/7, daughter is available to assist if needed. -  providing much of subjective history secondary to pt being a poor historian.    Self-Care   Usual Activity Tolerance moderate   Current Activity Tolerance poor   Equipment Currently Used at Home shower chair   Activity/Exercise/Self-Care Comment Pt requires A for bathing and dressing - using a shower chair. She was previously IND > 1 year ago, but has had an increased decline the past year.    Functional Level Prior   Ambulation 0-->independent   Transferring 0-->independent   Toileting 0-->independent   Bathing 3-->assistive equipment and person   Fall history within last six months yes   Number of times patient has fallen within last six months 5   Which of the above functional risks had a recent onset or change? ambulation;transferring;toileting;bathing;dressing;cognition;fall history   Prior Functional Level Comment Pt has had multiple falls. When asked this question to , he reports no falls, but then describes multiple events where the patient will fall over onto the couch or her knees will buckle. Pt was IND >1 year ago, but recently had required assist for short distance ambulation, ADLs and IADLs.    General Information   Onset of Illness/Injury or Date of Surgery - Date 01/05/18  (date of PT orders)   Referring Physician Gypsy Moore APRN CNP   Patient/Family Goals Statement Did not state a goal today   Pertinent History of Current Problem (include personal factors and/or comorbidities that impact the POC) Pt is a 78 year old woman  with right sinonasal mucosal malignant melanoma s/p resection and radiation with PMH of HTN, HLD, OA, s/p cholecystectomy, fibromyalgia, colonic polyposis, possible dementia, who presented to  [facility] due to near syncope in the setting of recent brisk nosebleed found to have elevated lactate and hypothermia with concern for sepsis. - per MD note   Precautions/Limitations fall precautions   General Observations Pt sitting up in bed, initially asleep but easily aroused.    General Info Comments Activity: ambulate with assist   Cognitive Status Examination   Orientation person   Level of Consciousness alert   Follows Commands and Answers Questions 75% of the time;able to follow single-step instructions   Personal Safety and Judgment impaired   Memory impaired   Cognitive Comment Pt is [place] so difficult to assess true command following (unable to perform multi-step however). Pt  unable to re-orient to place, time, situation.    Pain Assessment   Patient Currently in Pain No   Integumentary/Edema   Integumentary/Edema Comments B LE edema +1, scattered bruising over UEs   Posture    Posture Forward head position;Protracted shoulders   Range of Motion (ROM)   ROM Comment B UE/LE WFL   Strength   Strength Comments B UE 4/5 grossly. B LE 3/5 to 4/5 grossly   Bed Mobility   Bed Mobility Comments Min-mod A with HOB elevated and use of railing.    Transfer Skills   Transfer Comments Sit <> stand at MIN A with FWW   Gait   Gait Comments Pt taking small side steps with FWW at CGA.    Balance   Sitting Balance: Static normal balance   Sitting Balance: Dynamic fair balance   Standing Balance: Static fair balance   Standing Balance: Dynamic poor balance   Balance Quick Add Sitting balance: Static;Sitting balance: dynamic;Standing balance: static;Standing balance: dynamic   Sensory Examination   Sensory Perception Comments Pt reporting tingling in B LEs. Pt with baseline R ear deafness, L ear [place] with hearing aid, L eye  "blind, R eye low vision   General Therapy Interventions   Planned Therapy Interventions balance training;bed mobility training;gait training;neuromuscular re-education;ROM;strengthening;stretching;transfer training;risk factor education;home program guidelines;progressive activity/exercise   Clinical Impression   Criteria for Skilled Therapeutic Intervention yes, treatment indicated   PT Diagnosis Impaired functional mobility   Influenced by the following impairments Impaired strength, balance, posture, activity tolerance, cognition, edema   Functional limitations due to impairments Impaired mobility, limiting return to community at PLOF   Clinical Presentation Evolving/Changing   Clinical Presentation Rationale PMhx, current medical management, PLOF, current mobility, home set up, social support, cognition   Clinical Decision Making (Complexity) Moderate complexity   Therapy Frequency` 5 times/week   Predicted Duration of Therapy Intervention (days/wks) 1.5 weeks   Anticipated Equipment Needs at Discharge (TBD pending progression )   Anticipated Discharge Disposition Transitional Care Facility   Risk & Benefits of therapy have been explained Yes   Patient, Family & other staff in agreement with plan of care Yes   [facility] NEBOTRADE TM \"6 Clicks\"   2016, Trustees of [facility], under license to [employer].  All rights reserved.   6 Clicks Short Forms Basic Mobility Inpatient Short Form   [facility] AM-PAC  \"6 Clicks\" V.2 Basic Mobility Inpatient Short Form   1. Turning from your back to your side while in a flat bed without using bedrails? 4 - None   2. Moving from lying on your back to sitting on the side of a flat bed without using bedrails? 3 - A Little   3. Moving to and from a bed to a chair (including a wheelchair)? 2 - A Lot   4. Standing up from a chair using your arms (e.g., wheelchair, or bedside chair)? 3 - A Little   5. To walk in hospital room? 2 - A Lot   6. Climbing 3-5 steps " with a railing? 2 - A Lot   Basic Mobility Raw Score (Score out of 24.Lower scores equate to lower levels of function) 16   Total Evaluation Time   Total Evaluation Time (Minutes) 7

## 2019-01-08 NOTE — PLAN OF CARE
[id number]:     Reason for admission: sepsis, suspect GIB  Vitals: VSS ex HTN on RA  Activity: assist x1, bed alarm on for safety  Pain: c/o stomach upset - does not want medications  Neuro: disoriented to situation, time and intermittently place; L pupil fixed (blindness), R eye partial blindness, R ear deaf, L ear [place] (hearing aids and glasses in place)  Cardiac: WNL  Respiratory: fine crackles in upper lobes, diminished bases, refusing humidified air  GI/: rounded abd, breen in place for I/Os, dark brown/black runny stools, last BM today  Diet: Regular diet, not eating at this time  Lines: L PIV SL, breen  Wounds: redness on bottom, bruises, blanchable redness  Labs/imaging: K 3.4, Hgb 7.4, WBC 15.8, VBGs = pH 7.46, pCO2 27, Bicarb 19, CRP 74.0, CXR, BC NTD, Lactic 1.1     New changes this shift: pt increasingly SOB and dyspneic after AM meds and working with OT - IVF stopped, CXR showing pulm edema, IV lasix 20 mg given x1 --> UO 1150 ml in 6 hours and while still dyspneic pt appears to have lessened effort; around this time pt also started getting more confused and restless (appears to be having bad nightmares, shorter attention span and more nonsensical answers when awoken), returned back to baseline mentation at 1630.  present at bedside.     Plan: monitor for S/S of bleeding, work with PT/OT, discharge TCU 2-3 days     Continue to monitor and follow POC

## 2019-01-08 NOTE — PLAN OF CARE
Pt alert, disoriented to time and siltation, intermittently confused. VSS on RA except for low grade temps, Tmax 100.7, Tylenol given. Denies pain. Nonproductive cough- PRN robitussin given x1. Peter in place- patent with adequate output. 1 small BM- black and dark brown in color. L PIV-SL. Nasal spray Q 2hr. Will cont to monitor.

## 2019-01-08 NOTE — PLAN OF CARE
PT 5A: Cancel - Pt with critically low potassium and therefore not medically appropriate for PT, however, potassium is being replaced by RN. Will check back this PM after potassium doses are given.

## 2019-01-08 NOTE — PROGRESS NOTES
[facility] - Internal Medicine Transfer Accept Note   Date of Service: 01/08/2019    Patient: Gabino Jones  MRN: [id number]  Admission Date: 1/5/2019  Hospital Day # 3    Assessment & Plan:   Gabino Jones is a 78 year old woman with right sinonasal mucosal malignant melanoma s/p resection and radiation with PMH of HTN, HLD, OA, s/p cholecystectomy, fibromyalgia, colonic polyposis, possible dementia, who presented to  [facility] due to near syncope in the setting of recent brisk nosebleed found to have elevated lactate and hypothermia with concern for sepsis.      Changes:  - continue to hold antibiotics for now and appreciate ID input.  - Restarted home lisinopril  - No IVF    # Hypervolemia  Secondary to volume resuscitation. Dyspneic and labored when moving back to bed from chair. Not hypoxic. Lung US shows diffuse B lines  - diuresed on 1/7. Euvolemic at this time. Can re-dose lasix if dyspneic    # Possible septic angie:   Presented with hypothermia (94.2) , hypotension (BP 79/41), leukocytosis, elevated lactate (4.6 at OSH). Mild pyuria. CXR clear. Blood and urine cx NGTD. C diff negative. Called [facility] 1/7 and blood and urine cx (prior to start of antibiotics) are both no growth to date. Initial CRP and procal low. However CRP up to 70 on 1/7. Has developed cough over the past 24 hours.   - Discontinued vanco  - Repeat CXR  - ID consult for abx recommendations  - stopped antibiotics  - Trend CRP and procal    # Acute blood loss anemia  # Iron deficiency anemia  # Melena  Likely due to epistaxis. Hgb on admission 8.3 from 10.3 after multiple nose bleeds. Initially thought that epistaxis could explain bleed but then hgb continued to drop to 6.9 despite no further episodes of epistaxis. Though curiously melena resolved as well. Underwent EGD 1/6 which was negative for any cause of bleeding. Hgb has been stable above 7.  Iron 21. Ferritin 17. Iron sat 5%.  - switch back to home daily  protonix  - trend hgb  - Will start oral iron every other day.    # Epistaxis  History of nasosinus melanoma (in remission). Seen by ENT and no evidence of recurrence of malignancy. with the below recs  - Dissolvable packing in place. This will dissolve on its own. No need for removal.   - No antibiotics for the nasal packing (surgicel)  - There may be some slow dark red oozing after the packing was placed - this is normal and likely from the Afrin and the Surgiflo  - Nasal saline spray to bilateral nasal cavities q2h daily starting in 48 hours (keeps the packing moist and will help with removal)  - If supplemental O2 is required, add humidifier ([facility] should be contacted to supply this)  - Humidifier to the room to increase the moisture in the air  - If patient experiences brisk bleeding, spray Afrin along the floor of the nose and hold digital pressure on bilateral nostrils for 20 minutes (holding pressure higher up on the nasal bones does not apply effective pressure to the septum. Instead, the pressure should be applied on the soft part of the nose do that the opening of the nostrils are completely pinched shut).  Nasal clips do not work and should not be used in place of digital pressure. If bleeding continues after 20 minutes of digital pressure, repeat the above steps. If this fails to control the bleeding at that time, call ENT for further recommendations  - attempted to provide humidification via faceshield at ENT request but pt kept pulling it off    # Acute encephalopathy on chronic confusion--likely dementia   Acute encephalopathy likely due to sepsis. Appears to have some degree of chronic dementia ongoing over months/years. In considering differential for AMS workup undertaken by SICU. No obvious metabolic derangements. TSH normal. Labs/vitals not consistent with adrenal insufficiency, no evidence of seizure activity, no evidence of ingestion/tox. Evaluated by OT and did very poorly on cognitive  testing  - was scheduled for outpt neurology eval and MRI but has not yet gotten to this appointment. Planning to defer to outpatient when she is back to her baseline    # DAVID due to sepsis:  Mild. Baseline Cr around 0.7. 1.0 on admission. Likely due to hypotension/sepsis. Resolving.   - Continue hydration until able to take PO  - Repeat Cr in am. Improved on 1/8    # Hypertension:  - restarted lisinopril on 1/7  - continue to hold HCTZ  - PRN hydralazine, needed on 1/8, consider increasing lisinopril    # HLD: continue atorvastatin    # Depression:  Continue wellbutrin and effexor    # FEN:   Regular as tolerated  Stop IVF    Consulting Services: ENT, GI    CODE: full  DVT: SCDs, anticoagulation contraindicated due to bleeding    Disposition Plan   Expected discharge in 2-3 days to rehab once hemodynamically stable, infection ruled out.     Entered: Andrew Moreno 01/08/2019, 2:48 PM       Pt's care was discussed with bedside RN, patient, and during Care Team Rounds.    ANDREW MORENO MD, [facility]  Internal Medicine Hospitalist & Staff Physician  [facility]  Pager: [phone]  [email or web address]      Team: Medicine [facility] 9  Page Cross Cover after 5 pm: pager [phone]     ___________________________________________________________________    Subjective & Interval Hx:     Improved dyspnea today. No CP. Working with PT today. Denies fevers. Denies cough. .     Last 24 hr care team notes reviewed.   ROS:  4 point ROS including Respiratory, CV, GI and , other than that noted in the HPI, is negative      Medications: Reviewed in EPIC. List below for reference    Physical Exam:    Blood pressure 164/70, pulse 101, temperature 99.2  F (37.3  C), temperature source Oral, resp. rate 18, weight 77.9 kg (171 lb 11.8 oz), SpO2 95 %, not currently breastfeeding.    General: awake, alert, lying in bed, comfortable  HEENT: MMM, no epistaxis, NCAT  Chest/Resp: clear BTA  Heart/CV: RRR, no MRG noted, trace  LE in LE bilaterally  Abdomen/GI: soft,  mildly tender, mildly distended, +BS  Extremities/MSK: no swelling or erythema of joints  Skin: no rash or jaundice noted  Neuro/Psych: awake, alert, slightly confused.    Lines/Tubes:   Peripheral IV 02/22/17 Left Lower forearm (Active)   Number of days: 682       Peripheral IV 02/22/17 Right Hand (Active)   Number of days: 682       Peripheral IV 12/12/18 Right Lower forearm (Active)   Number of days: 24       Peripheral IV 01/05/19 Left (Active)   Site Assessment WDL 1/5/2019  8:00 AM   Line Status Saline locked 1/5/2019  8:00 AM   Phlebitis Scale 0-->no symptoms 1/5/2019  8:00 AM   Infiltration Scale 0 1/5/2019  8:00 AM   Extravasation? No 1/5/2019  5:00 AM   Number of days: 0       Peripheral IV 01/05/19 Right (Active)   Site Assessment [facility] 1/5/2019  8:00 AM   Line Status Infusing 1/5/2019  8:00 AM   Phlebitis Scale 0-->no symptoms 1/5/2019  8:00 AM   Infiltration Scale 0 1/5/2019  8:00 AM   Extravasation? No 1/5/2019  5:00 AM   Number of days: 0       Labs & Studies of Note:  I personally reviewed all labs and imaging.     Unresulted Labs Ordered in the Past 30 Days of this Admission     Date and Time Order Name Status Description    1/5/2019 0208 Blood culture Preliminary     1/5/2019 0208 Blood culture Preliminary

## 2019-01-08 NOTE — PROGRESS NOTES
"Social Work Services Progress Note    Hospital Day: 4  Date of Initial Social Work Evaluation:  1/7/18  Collaborated with:  Primary team Gold 9, TCU facilities    Data:  Pt is a 78-year-old female admitted with concern for sepsis. TCU is recommended at discharge.     Intervention:  Following-up on TCU referrals. Anticipate discharge in 2-3 days. Pt accepted at [facility] [place] if they have bed availability when pt is stable for d/c.     Faxed referrals to:  1) [facility] (ph [phone])  2) [facility] (ph [phone])- accepted pending bed availability when pt is ready for discharge  3) [facility] (ph [phone])- declined, \"no available or appropriate bed\"  4) [facility] (ph [phone])    Assessment:  Pursuing TCU placement    Plan:    Anticipated Disposition:  Facility:  TCU    Barriers to d/c plan:  Medical stability    Follow Up:  SW to follow for discharge planning     DALE Mena, LGSW  5A Unit   Pager [phone]  Phone [phone]      "

## 2019-01-09 ENCOUNTER — APPOINTMENT (OUTPATIENT)
Dept: OCCUPATIONAL THERAPY | Facility: CLINIC | Age: 79
DRG: 871 | End: 2019-01-09
Attending: INTERNAL MEDICINE
Payer: MEDICARE

## 2019-01-09 ENCOUNTER — APPOINTMENT (OUTPATIENT)
Dept: GENERAL RADIOLOGY | Facility: CLINIC | Age: 79
DRG: 871 | End: 2019-01-09
Attending: INTERNAL MEDICINE
Payer: MEDICARE

## 2019-01-09 ENCOUNTER — APPOINTMENT (OUTPATIENT)
Dept: SPEECH THERAPY | Facility: CLINIC | Age: 79
DRG: 871 | End: 2019-01-09
Attending: INTERNAL MEDICINE
Payer: MEDICARE

## 2019-01-09 LAB
ALBUMIN SERPL-MCNC: 2.4 G/DL (ref 3.4–5)
ALBUMIN UR-MCNC: 10 MG/DL
ALP SERPL-CCNC: 83 U/L (ref 40–150)
ALT SERPL W P-5'-P-CCNC: 25 U/L (ref 0–50)
AMORPH CRY #/AREA URNS HPF: ABNORMAL /HPF
ANION GAP SERPL CALCULATED.3IONS-SCNC: 9 MMOL/L (ref 3–14)
APPEARANCE UR: ABNORMAL
AST SERPL W P-5'-P-CCNC: 33 U/L (ref 0–45)
BASE EXCESS BLDV CALC-SCNC: 2.6 MMOL/L
BILIRUB SERPL-MCNC: 0.4 MG/DL (ref 0.2–1.3)
BILIRUB UR QL STRIP: NEGATIVE
BUN SERPL-MCNC: 8 MG/DL (ref 7–30)
CALCIUM SERPL-MCNC: 8 MG/DL (ref 8.5–10.1)
CHLORIDE SERPL-SCNC: 102 MMOL/L (ref 94–109)
CO2 SERPL-SCNC: 23 MMOL/L (ref 20–32)
COLOR UR AUTO: YELLOW
CREAT SERPL-MCNC: 0.6 MG/DL (ref 0.52–1.04)
CRP SERPL-MCNC: 160 MG/L (ref 0–8)
ERYTHROCYTE [DISTWIDTH] IN BLOOD BY AUTOMATED COUNT: 16.9 % (ref 10–15)
GFR SERPL CREATININE-BSD FRML MDRD: 87 ML/MIN/{1.73_M2}
GLUCOSE SERPL-MCNC: 114 MG/DL (ref 70–99)
GLUCOSE UR STRIP-MCNC: NEGATIVE MG/DL
HCO3 BLDV-SCNC: 25 MMOL/L (ref 21–28)
HCT VFR BLD AUTO: 24.2 % (ref 35–47)
HGB BLD-MCNC: 7.5 G/DL (ref 11.7–15.7)
HGB UR QL STRIP: ABNORMAL
HYALINE CASTS #/AREA URNS LPF: 1 /LPF (ref 0–2)
KETONES UR STRIP-MCNC: NEGATIVE MG/DL
LACTATE BLD-SCNC: 1.7 MMOL/L (ref 0.7–2)
LACTATE BLD-SCNC: 2.3 MMOL/L (ref 0.7–2)
LEUKOCYTE ESTERASE UR QL STRIP: ABNORMAL
MAGNESIUM SERPL-MCNC: 1.5 MG/DL (ref 1.6–2.3)
MCH RBC QN AUTO: 26.1 PG (ref 26.5–33)
MCHC RBC AUTO-ENTMCNC: 31 G/DL (ref 31.5–36.5)
MCV RBC AUTO: 84 FL (ref 78–100)
MUCOUS THREADS #/AREA URNS LPF: PRESENT /LPF
NITRATE UR QL: NEGATIVE
O2/TOTAL GAS SETTING VFR VENT: 21 %
PCO2 BLDV: 31 MM HG (ref 40–50)
PH BLDV: 7.52 PH (ref 7.32–7.43)
PH UR STRIP: 6.5 PH (ref 5–7)
PHOSPHATE SERPL-MCNC: 1.6 MG/DL (ref 2.5–4.5)
PLATELET # BLD AUTO: 270 10E9/L (ref 150–450)
PO2 BLDV: 62 MM HG (ref 25–47)
POTASSIUM SERPL-SCNC: 3.1 MMOL/L (ref 3.4–5.3)
POTASSIUM SERPL-SCNC: 3.7 MMOL/L (ref 3.4–5.3)
PROCALCITONIN SERPL-MCNC: 0.18 NG/ML
PROT SERPL-MCNC: 6.1 G/DL (ref 6.8–8.8)
RBC # BLD AUTO: 2.87 10E12/L (ref 3.8–5.2)
RBC #/AREA URNS AUTO: 12 /HPF (ref 0–2)
SODIUM SERPL-SCNC: 135 MMOL/L (ref 133–144)
SOURCE: ABNORMAL
SP GR UR STRIP: 1.01 (ref 1–1.03)
SQUAMOUS #/AREA URNS AUTO: 1 /HPF (ref 0–1)
UROBILINOGEN UR STRIP-MCNC: NORMAL MG/DL (ref 0–2)
WBC # BLD AUTO: 11.2 10E9/L (ref 4–11)
WBC #/AREA URNS AUTO: 18 /HPF (ref 0–5)

## 2019-01-09 PROCEDURE — 25000132 ZZH RX MED GY IP 250 OP 250 PS 637: Mod: GY | Performed by: INTERNAL MEDICINE

## 2019-01-09 PROCEDURE — 40000225 ZZH STATISTIC SLP WARD VISIT

## 2019-01-09 PROCEDURE — 82803 BLOOD GASES ANY COMBINATION: CPT | Performed by: PHYSICIAN ASSISTANT

## 2019-01-09 PROCEDURE — 71045 X-RAY EXAM CHEST 1 VIEW: CPT

## 2019-01-09 PROCEDURE — 80053 COMPREHEN METABOLIC PANEL: CPT | Performed by: PHYSICIAN ASSISTANT

## 2019-01-09 PROCEDURE — 84132 ASSAY OF SERUM POTASSIUM: CPT | Performed by: INTERNAL MEDICINE

## 2019-01-09 PROCEDURE — 83735 ASSAY OF MAGNESIUM: CPT | Performed by: PHYSICIAN ASSISTANT

## 2019-01-09 PROCEDURE — 87186 SC STD MICRODIL/AGAR DIL: CPT | Performed by: INTERNAL MEDICINE

## 2019-01-09 PROCEDURE — 92610 EVALUATE SWALLOWING FUNCTION: CPT | Mod: GN

## 2019-01-09 PROCEDURE — 87040 BLOOD CULTURE FOR BACTERIA: CPT | Performed by: INTERNAL MEDICINE

## 2019-01-09 PROCEDURE — 36415 COLL VENOUS BLD VENIPUNCTURE: CPT | Performed by: INTERNAL MEDICINE

## 2019-01-09 PROCEDURE — 25000132 ZZH RX MED GY IP 250 OP 250 PS 637: Mod: GY | Performed by: NURSE PRACTITIONER

## 2019-01-09 PROCEDURE — 83605 ASSAY OF LACTIC ACID: CPT | Performed by: PHYSICIAN ASSISTANT

## 2019-01-09 PROCEDURE — 84132 ASSAY OF SERUM POTASSIUM: CPT | Performed by: STUDENT IN AN ORGANIZED HEALTH CARE EDUCATION/TRAINING PROGRAM

## 2019-01-09 PROCEDURE — 84100 ASSAY OF PHOSPHORUS: CPT | Performed by: PHYSICIAN ASSISTANT

## 2019-01-09 PROCEDURE — A9270 NON-COVERED ITEM OR SERVICE: HCPCS | Mod: GY | Performed by: INTERNAL MEDICINE

## 2019-01-09 PROCEDURE — 85027 COMPLETE CBC AUTOMATED: CPT | Performed by: PHYSICIAN ASSISTANT

## 2019-01-09 PROCEDURE — 83605 ASSAY OF LACTIC ACID: CPT | Performed by: INTERNAL MEDICINE

## 2019-01-09 PROCEDURE — 12000001 ZZH R&B MED SURG/OB UMMC

## 2019-01-09 PROCEDURE — 92526 ORAL FUNCTION THERAPY: CPT | Mod: GN

## 2019-01-09 PROCEDURE — 97530 THERAPEUTIC ACTIVITIES: CPT | Mod: GO

## 2019-01-09 PROCEDURE — 36415 COLL VENOUS BLD VENIPUNCTURE: CPT | Performed by: PHYSICIAN ASSISTANT

## 2019-01-09 PROCEDURE — 36415 COLL VENOUS BLD VENIPUNCTURE: CPT | Performed by: STUDENT IN AN ORGANIZED HEALTH CARE EDUCATION/TRAINING PROGRAM

## 2019-01-09 PROCEDURE — 87086 URINE CULTURE/COLONY COUNT: CPT | Performed by: INTERNAL MEDICINE

## 2019-01-09 PROCEDURE — 25000132 ZZH RX MED GY IP 250 OP 250 PS 637: Mod: GY | Performed by: STUDENT IN AN ORGANIZED HEALTH CARE EDUCATION/TRAINING PROGRAM

## 2019-01-09 PROCEDURE — 87088 URINE BACTERIA CULTURE: CPT | Performed by: INTERNAL MEDICINE

## 2019-01-09 PROCEDURE — 40000133 ZZH STATISTIC OT WARD VISIT

## 2019-01-09 PROCEDURE — 25000128 H RX IP 250 OP 636: Performed by: STUDENT IN AN ORGANIZED HEALTH CARE EDUCATION/TRAINING PROGRAM

## 2019-01-09 PROCEDURE — A9270 NON-COVERED ITEM OR SERVICE: HCPCS | Mod: GY | Performed by: NURSE PRACTITIONER

## 2019-01-09 PROCEDURE — 86140 C-REACTIVE PROTEIN: CPT | Performed by: PHYSICIAN ASSISTANT

## 2019-01-09 PROCEDURE — 84145 PROCALCITONIN (PCT): CPT | Performed by: PHYSICIAN ASSISTANT

## 2019-01-09 PROCEDURE — 25000128 H RX IP 250 OP 636: Performed by: INTERNAL MEDICINE

## 2019-01-09 PROCEDURE — 40000556 ZZH STATISTIC PERIPHERAL IV START W US GUIDANCE

## 2019-01-09 PROCEDURE — 97535 SELF CARE MNGMENT TRAINING: CPT | Mod: GO

## 2019-01-09 PROCEDURE — A9270 NON-COVERED ITEM OR SERVICE: HCPCS | Mod: GY | Performed by: STUDENT IN AN ORGANIZED HEALTH CARE EDUCATION/TRAINING PROGRAM

## 2019-01-09 PROCEDURE — 81001 URINALYSIS AUTO W/SCOPE: CPT | Performed by: INTERNAL MEDICINE

## 2019-01-09 PROCEDURE — 99233 SBSQ HOSP IP/OBS HIGH 50: CPT | Performed by: INTERNAL MEDICINE

## 2019-01-09 RX ORDER — FUROSEMIDE 10 MG/ML
20 INJECTION INTRAMUSCULAR; INTRAVENOUS ONCE
Status: COMPLETED | OUTPATIENT
Start: 2019-01-09 | End: 2019-01-09

## 2019-01-09 RX ORDER — MAGNESIUM SULFATE HEPTAHYDRATE 40 MG/ML
4 INJECTION, SOLUTION INTRAVENOUS EVERY 4 HOURS PRN
Status: DISCONTINUED | OUTPATIENT
Start: 2019-01-09 | End: 2019-01-25 | Stop reason: HOSPADM

## 2019-01-09 RX ADMIN — LISINOPRIL 30 MG: 20 TABLET ORAL at 08:22

## 2019-01-09 RX ADMIN — SALINE NASAL SPRAY 1 SPRAY: 1.5 SOLUTION NASAL at 02:20

## 2019-01-09 RX ADMIN — SALINE NASAL SPRAY 1 SPRAY: 1.5 SOLUTION NASAL at 06:16

## 2019-01-09 RX ADMIN — SALINE NASAL SPRAY 1 SPRAY: 1.5 SOLUTION NASAL at 09:42

## 2019-01-09 RX ADMIN — SALINE NASAL SPRAY 1 SPRAY: 1.5 SOLUTION NASAL at 18:08

## 2019-01-09 RX ADMIN — SALINE NASAL SPRAY 1 SPRAY: 1.5 SOLUTION NASAL at 20:11

## 2019-01-09 RX ADMIN — SALINE NASAL SPRAY 1 SPRAY: 1.5 SOLUTION NASAL at 17:00

## 2019-01-09 RX ADMIN — ATORVASTATIN CALCIUM 20 MG: 20 TABLET, FILM COATED ORAL at 20:10

## 2019-01-09 RX ADMIN — RANITIDINE 150 MG: 150 TABLET ORAL at 08:22

## 2019-01-09 RX ADMIN — HYDRALAZINE HYDROCHLORIDE 10 MG: 20 INJECTION INTRAMUSCULAR; INTRAVENOUS at 02:29

## 2019-01-09 RX ADMIN — MICONAZOLE NITRATE: 2 POWDER TOPICAL at 13:05

## 2019-01-09 RX ADMIN — SALINE NASAL SPRAY 1 SPRAY: 1.5 SOLUTION NASAL at 13:54

## 2019-01-09 RX ADMIN — POTASSIUM CHLORIDE 40 MEQ: 750 TABLET, EXTENDED RELEASE ORAL at 19:12

## 2019-01-09 RX ADMIN — POTASSIUM CHLORIDE 20 MEQ: 750 TABLET, EXTENDED RELEASE ORAL at 22:02

## 2019-01-09 RX ADMIN — HYDROCHLOROTHIAZIDE 25 MG: 25 TABLET ORAL at 08:22

## 2019-01-09 RX ADMIN — FUROSEMIDE 20 MG: 10 INJECTION, SOLUTION INTRAVENOUS at 09:37

## 2019-01-09 RX ADMIN — SENNOSIDES AND DOCUSATE SODIUM 2 TABLET: 8.6; 5 TABLET ORAL at 20:10

## 2019-01-09 RX ADMIN — BUPROPION HYDROCHLORIDE 150 MG: 150 TABLET, FILM COATED, EXTENDED RELEASE ORAL at 08:21

## 2019-01-09 RX ADMIN — SALINE NASAL SPRAY 1 SPRAY: 1.5 SOLUTION NASAL at 13:05

## 2019-01-09 RX ADMIN — VENLAFAXINE HYDROCHLORIDE 225 MG: 150 CAPSULE, EXTENDED RELEASE ORAL at 08:21

## 2019-01-09 RX ADMIN — SALINE NASAL SPRAY 1 SPRAY: 1.5 SOLUTION NASAL at 22:00

## 2019-01-09 RX ADMIN — PANTOPRAZOLE SODIUM 40 MG: 40 TABLET, DELAYED RELEASE ORAL at 08:22

## 2019-01-09 RX ADMIN — SALINE NASAL SPRAY 1 SPRAY: 1.5 SOLUTION NASAL at 08:22

## 2019-01-09 RX ADMIN — ACETAMINOPHEN 650 MG: 325 TABLET, FILM COATED ORAL at 06:16

## 2019-01-09 RX ADMIN — GUAIFENESIN 10 ML: 100 SOLUTION ORAL at 20:10

## 2019-01-09 RX ADMIN — RANITIDINE 150 MG: 150 TABLET ORAL at 20:10

## 2019-01-09 RX ADMIN — SODIUM CHLORIDE 250 ML: 9 INJECTION, SOLUTION INTRAVENOUS at 16:46

## 2019-01-09 RX ADMIN — MICONAZOLE NITRATE: 2 POWDER TOPICAL at 20:10

## 2019-01-09 ASSESSMENT — ACTIVITIES OF DAILY LIVING (ADL)
ADLS_ACUITY_SCORE: 23

## 2019-01-09 NOTE — PROGRESS NOTES
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      Changes:  - continue to hold antibiotics for now and appreciate ID input. Will contact if changes.  - Restarted home lisinopril with modest improvement. Added hydrochlorothiazide back.   - Diuresed 20mg IV furosemide for dyspnea today  - swallow eval.  - likely discharge 2-3d    # Hypervolemia  Secondary to volume resuscitation. Dyspneic and labored when moving back to bed from chair. Not hypoxic. Lung US shows diffuse B lines  - diuresed on 1/7. Euvolemic at this time. Can re-dose lasix if dyspneic. Gave x 1 on 11/7, 11/9. Satting 94-96% RA    # Possible septic angie:   Presented with hypothermia (94.2) , hypotension (BP 79/41), leukocytosis, elevated lactate (4.6 at OSH). Mild pyuria. CXR clear. Blood and urine cx NGTD. C diff negative. Called [facility] 1/7 and blood and urine cx (prior to start of antibiotics) are both no growth to date. Initial CRP and procal low. However CRP up to 70 on 1/7. Has developed cough over the past 24 hours.   - Discontinued vanco  - Repeat CXR  - ID consult for abx recommendations  - stopped antibiotics  - Trend CRP and procal in AM    # Acute blood loss anemia  # Iron deficiency anemia  # Melena  Likely due to epistaxis. Hgb on admission 8.3 from 10.3 after multiple nose bleeds. Initially thought that epistaxis could explain bleed but then hgb continued to drop to 6.9 despite no further episodes of  epistaxis. Though curiously melena resolved as well. Underwent EGD 1/6 which was negative for any cause of bleeding. Hgb has been stable above 7.  Iron 21. Ferritin 17. Iron sat 5%.  - switch back to home daily protonix  - trend hgb  - Will start oral iron every other day.    # Epistaxis  History of nasosinus melanoma (in remission). Seen by ENT and no evidence of recurrence of malignancy. with the below recs  - Dissolvable packing in place. This will dissolve on its own. No need for removal.   - No antibiotics for the nasal packing (surgicel)  - There may be some slow dark red oozing after the packing was placed - this is normal and likely from the Afrin and the Surgiflo  - Nasal saline spray to bilateral nasal cavities q2h daily starting in 48 hours (keeps the packing moist and will help with removal)  - If supplemental O2 is required, add humidifier (RT should be contacted to supply this)  - Humidifier to the room to increase the moisture in the air  - If patient experiences brisk bleeding, spray Afrin along the floor of the nose and hold digital pressure on bilateral nostrils for 20 minutes (holding pressure higher up on the nasal bones does not apply effective pressure to the septum. Instead, the pressure should be applied on the soft part of the nose do that the opening of the nostrils are completely pinched shut).  Nasal clips do not work and should not be used in place of digital pressure. If bleeding continues after 20 minutes of digital pressure, repeat the above steps. If this fails to control the bleeding at that time, call ENT for further recommendations  - attempted to provide humidification via faceshield at ENT request but pt kept pulling it off    # Acute encephalopathy on chronic confusion--likely dementia   Acute encephalopathy likely due to sepsis. Appears to have some degree of chronic dementia ongoing over months/years. In considering differential for AMS workup undertaken by SICU. No obvious  metabolic derangements. TSH normal. Labs/vitals not consistent with adrenal insufficiency, no evidence of seizure activity, no evidence of ingestion/tox. Evaluated by OT and did very poorly on cognitive testing  - was scheduled for outpt neurology eval and MRI but has not yet gotten to this appointment. Planning to defer to outpatient when she is back to her baseline    # DAVID due to sepsis:  Mild. Baseline Cr around 0.7. 1.0 on admission. Likely due to hypotension/sepsis. Resolving.   - Continue hydration until able to take PO  - Repeat Cr in am. Improved on 1/8    # Hypertension:  - restarted lisinopril on 1/7  - continue to hold HCTZ  - PRN hydralazine, needed on 1/8, consider increasing lisinopril    # HLD: continue atorvastatin    # Depression:  Continue wellbutrin and effexor    # FEN:   Regular as tolerated  Stop IVF    Consulting Services: ENT, GI    CODE: full  DVT: SCDs, anticoagulation contraindicated due to bleeding    Disposition Plan   Expected discharge in 2-3 days to rehab once hemodynamically stable, infection ruled out.     Entered: Andrew Moreno 01/09/2019, 11:25 AM       Pt's care was discussed with bedside RN, patient, and during Care Team Rounds.    ANDREW MORENO MD, [facility]  Internal Medicine Hospitalist & Staff Physician  [facility]  Pager: [phone]  [email or web address]    Team: Medicine Gold 9  Page Cross Cover after 5 pm: pager [phone]     ___________________________________________________________________    Subjective & Interval Hx:     Worse dyspnea today. Some coughing with PO intake and apprehensive to eat. No CP. Working with PT today. Denies fevers. Denies cough. .     Last 24 hr care team notes reviewed.   ROS:  4 point ROS including Respiratory, CV, GI and , other than that noted in the HPI, is negative      Medications: Reviewed in EPIC. List below for reference    Physical Exam:    Blood pressure 137/56, pulse 94, temperature 99.1  F (37.3  C),  temperature source Oral, resp. rate 26, weight 77.9 kg (171 lb 11.8 oz), SpO2 95 %, not currently breastfeeding.    General: awake, alert, lying in bed, comfortable  HEENT: MMM, no epistaxis, NCAT  Chest/Resp: crackles B bases  Heart/CV: RRR, no MRG noted, trace LE in LE bilaterally  Abdomen/GI: soft,  mildly tender, mildly distended, +BS  Extremities/MSK: no swelling or erythema of joints  Skin: no rash or jaundice noted  Neuro/Psych: awake, alert, slightly confused.    Lines/Tubes:   Peripheral IV 02/22/17 Left Lower forearm (Active)   Number of days: 682       Peripheral IV 02/22/17 Right Hand (Active)   Number of days: 682       Peripheral IV 12/12/18 Right Lower forearm (Active)   Number of days: 24       Peripheral IV 01/05/19 Left (Active)   Site Assessment WDL 1/5/2019  8:00 AM   Line Status Saline locked 1/5/2019  8:00 AM   Phlebitis Scale 0-->no symptoms 1/5/2019  8:00 AM   Infiltration Scale 0 1/5/2019  8:00 AM   Extravasation? No 1/5/2019  5:00 AM   Number of days: 0       Peripheral IV 01/05/19 Right (Active)   Site Assessment WDL 1/5/2019  8:00 AM   Line Status Infusing 1/5/2019  8:00 AM   Phlebitis Scale 0-->no symptoms 1/5/2019  8:00 AM   Infiltration Scale 0 1/5/2019  8:00 AM   Extravasation? No 1/5/2019  5:00 AM   Number of days: 0       Labs & Studies of Note:  I personally reviewed all labs and imaging.     Unresulted Labs Ordered in the Past 30 Days of this Admission     Date and Time Order Name Status Description    1/5/2019 0208 Blood culture Preliminary     1/5/2019 0208 Blood culture Preliminary

## 2019-01-09 NOTE — PROGRESS NOTES
[facility], [place]    Sepsis Evaluation Progress Note    Date of Service: 01/09/2019    I was called to see Gabino Jones due to abnormal vital signs triggering the Sepsis SIRS screening alert. She is not known to have an infection.     Physical Exam    Vital Signs:  Temp: 97.4  F (36.3  C) Temp src: Oral BP: 144/52 Pulse: 94 Heart Rate: 88 Resp: 24 SpO2: 96 % O2 Device: None (Room air)      Lab:  Lactic Acid   Date Value Ref Range Status   01/05/2019 1.6 0.7 - 2.0 mmol/L Final     Lactate for Sepsis Protocol   Date Value Ref Range Status   01/09/2019 2.3 (H) 0.7 - 2.0 mmol/L Final     Comment:     Significant value called to and read back by  SINGH CHOUDHARY @1559 01.09.19 [id number]. BY EK         The patient is at baseline mental status.    The rest of their physical exam is significant for alert, NAD. Coughing. Lungs CTAB.    Assessment and Plan    The SIRS and exam findings are likely due to continued leukocytosis from resolving sepsis and tachycardia from diuresis and movement. Cough is worse and getting CXR, there is no sign of sepsis at this time.    Disposition: The patient will remain on the current unit. We will continue to monitor this patient closely.    Heladio Power MD

## 2019-01-09 NOTE — PLAN OF CARE
Discharge Planner SLP   Patient plan for discharge: Unknown  Current status: Clinical swallow eval completed per MD order. Pt presents with mild oropharyngeal dysphagia in setting of confusion/suspected dementia and subjective SOB. Oral mech exam remarkable for intact upper and lower dentures. Pt very [place], impacting ability to consistently follow commands. Assessed with thin liquids, puree, and higher texture solids. Oral phase prolonged and labored for higher texture solids with mild residue/pocketing observed. Pharyngeal phase characterized by repeated swallows with higher texture solids. No overt s/sx of aspiration. In setting of confusion and SOB, recommend [facility] soft diet/thin liquids with 1:1 assist during meals for feeding and cueing. Ensure pt is fully alert and upright for all PO, taking small bites/sips at slow rate. SLP to follow for education/training of safe-swallowing strategies.  Barriers to return to prior living situation: Confusion, nutrition  Recommendations for discharge: TCU  Rationale for recommendations: Below baseline function; pt will benefit from ST targeting swallowing deficits       Entered by: Bianca Camargo 01/09/2019 12:29 PM

## 2019-01-09 NOTE — PLAN OF CARE
Discharge Planner OT   Patient plan for discharge: not discussed  Current status: Performed bed mobility supine to seated EOB with min A. Sat at EOB ~6 minutes with SBA. Performed stand pivot to BSC with A x 2. Dependent for clothing management and kimberly cares. Pt engaged in self cares for g/h tasks with mod/max A. Max A for doffing/donning slipper socks.  Barriers to return to prior living situation: Cognition, strength, endurance, balance.  Recommendations for discharge: Per plan established by the OT, the recommendation for dc location is TCU.  Rationale for recommendations: To progress functional independence and safety,

## 2019-01-09 NOTE — PROGRESS NOTES
"Social Work Services Progress Note     Hospital Day: 5  Date of Initial Social Work Evaluation:  1/7/18  Collaborated with:  Primary team Gold 9, TCU facilities     Data:  Pt is a 78-year-old female admitted with concern for sepsis. TCU is recommended at discharge.      Intervention:  Following-up on TCU referrals. Anticipate discharge in 2-3 days. Left  for [facility] as this is pt's spouse's first choice for placement.     Faxed referrals to:  1) [facility] (ph [phone])- left  1/9  2) [facility] (ph [phone])- accepted pending bed availability when pt is ready for discharge  3) [facility] (ph [phone])- declined, \"no available or appropriate bed\"  4) [facility] (ph [phone])     Assessment:  Pursuing TCU placement     Plan:    Anticipated Disposition:  Facility:  TCU    Barriers to d/c plan:  Medical stability    Follow Up:  SW to follow for discharge planning     DALE Mena, LGSW  5A Unit   Pager [phone]  Phone [phone]      "

## 2019-01-09 NOTE — PLAN OF CARE
Pt alert, disoriented to time and situation, intermittently confused. HTN- PRN hydralazine given for /55. Low grade temps-Tmax 100.2,Tylenol given. K recheck 3.7.  Nonproductive cough. Peter in place- patent with adequate output. R PIV-SL. Nasal spray Q 2hr. Rash in groin area. C/o tongue soreness. 2 smear BMs-dark brown/black in color. Discharge to TCU when medically stable.

## 2019-01-09 NOTE — PLAN OF CARE
Pt admitted for sepsis. A& disoriented to time and situation. Pt confused intermittently throughout the shift. Afebrile, VSS except for elevated respirations of 22-26. Breathing is labored and pt complains of SOB, MD mullins, 20 mg IV lasix given with little relief. No complaints of pain or nausea. Poor appetite on regular diet, had difficulty with swallowing this AM, speech evaluated, recommended [facility] soft diet w/ thin liquids. Swallows pills w/o difficulty. 1 dark brown BM today, continent. Peter removed. UO of 1350 this shift. Barrier paste applied to buttocks, banchable redness. Miconazole powder applied to groin. Bed alarm on for safety. Worked with PT and OT today, pt up with assist of 2. Plan to start PO lasix tomorrow. Will continue with POC and discharge planning.

## 2019-01-09 NOTE — PLAN OF CARE
[id number]:     Reason for admission: sepsis, suspect GIB  Vitals: VSS ex HTN on RA  Activity: assist x1, bed alarm on for safety  Pain: c/o intermittent stomach upset - doesn't want meds at this time  Neuro: disoriented to situation, time and intermittently place; L pupil fixed (blindness), R eye partial blindness, R ear deaf, L ear [place] (hearing aids and glasses in place)  Cardiac: WNL  Respiratory: diminished lung sounds, refusing humidified air  GI/: rounded abd, breen in place for I/Os, dark brown/black runny stools, last BM today (intermittently incontinent)  Diet: Regular diet, refusing food at this time  Lines: L PIV SL, breen  Wounds: redness on bottom, bruises, blanchable redness  Labs/imaging: K 2.6 --> 3.0, WBC 14.5, Hbg 8.0     New changes this shift: Critical K - replacement protocol 80 mEq given, recheck 3.0 - second replacement started, next dose to be given @ 8 pm. Poor oral intake, jamshid has low urine output as well. PRN Hydralazine 10 mg given x1 for HTN - HTN resolved, but then reoccurred around 3 pm with PT - MD notified, hydrochlorothiazide restarted. No aggressive/combative behavior, pt pleasant all shift.  present at bedside.     Plan: monitor for S/S of bleeding, work with PT/OT, discharge TCU 2-3 days     Continue to monitor and follow POC

## 2019-01-09 NOTE — PROGRESS NOTES
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 Per pt's , pt has been declining since October. Per RN, pt observed coughing/choking with hashbrowns this AM. Clinical swallow eval completed per MD order.   Clinical Swallow Evaluation   Oral Musculature generally intact   Dentition upper and lower dentures   Mucosal Quality good   Mandibular Strength and Mobility intact   Oral Labial Strength and Mobility WFL   Lingual Strength and Mobility WFL   Laryngeal Function Cough;Throat clear;Swallow;Voicing initiated   Oral Musculature Comments Grossly functional; pt demonstrated some difficulty following commands 2/2 hearing loss   Swallow Eval   Feeding Assistance frequent cues/help required   Clinical Swallow Eval: Thin Liquid Texture Trial   Mode of Presentation, Thin Liquids cup;straw;self-fed   Oral Phase of Swallow WFL   Pharyngeal Phase of Swallow intact   Diagnostic Statement WFL   Clinical Swallow Eval: Puree Solid Texture Trial   Mode of Presentation, Puree spoon;self-fed   Oral Phase, Puree WFL   Pharyngeal Phase, Puree  intact   Diagnostic Statement WF   Clinical Swallow Eval: Solid Food Texture Trial   Mode of Presentation, Solid self-fed   Volume of Solid Food Presented 1 cracker   Oral Phase, Solid Residue in oral cavity  (Prolonged and labored oral prep)   Pharyngeal Phase, Solid repeated swallows   Diagnostic Statement Oral phase prolonged and labored, with mild oral residue/pocketing. Pharyngeal phase characterized by repeated swallows.   Esophageal Phase of Swallow   Patient reports or presents with symptoms of esophageal dysphagia No   General Therapy Interventions   Planned Therapy Interventions Dysphagia Treatment   Dysphagia treatment Modified diet education;Instruction of safe swallow strategies;Compensatory strategies for swallowing  (Education for spouse)   Swallow Eval: Clinical Impressions   Skilled Criteria for Therapy Intervention Skilled criteria met.  Treatment indicated.   Functional Assessment Scale (FAS) 5   Treatment Diagnosis Mild oropharyngeal dysphagia   Diet texture recommendations Thin liquids  (Mech soft)   Recommended Feeding/Eating Techniques small sips/bites  (1:1 feeder, slow rate, upright for all PO)   Demonstrates Need for Referral to Another Service dietitian   Therapy Frequency 3 times/wk   Anticipated Discharge Disposition extended care facility   Risks and Benefits of Treatment have been explained. Yes   Patient, family and/or staff in agreement with Plan of Care Yes   Clinical Impression Comments SLP: Clinical swallow eval completed per MD order. Pt presents with mild oropharyngeal dysphagia in setting of confusion/suspected dementia and subjective SOB. Oral mech exam remarkable for intact upper and lower dentures. Pt very [place], impacting ability to consistently follow commands. Assessed with thin liquids, puree, and higher texture solids. Oral phase prolonged and labored for higher texture solids with mild residue/pocketing observed. Pharyngeal phase characterized by repeated swallows with  higher texture solids. No overt s/sx of aspiration. In setting of confusion and SOB, recommend [facility] soft diet/thin liquids with 1:1 assist during meals for feeding and cueing. Ensure pt is fully alert and upright for all PO, taking small bites/sips at slow rate. SLP to follow for education/training of safe-swallowing strategies.   Total Evaluation Time   Total Evaluation Time (Minutes) 15

## 2019-01-09 NOTE — PROGRESS NOTES
Calorie Count    Intake recorded for: 1/8  Total Kcals: 137 Total Protein: 4g    Kcals from Hospital Food: 137  Protein: 4g    Kcals from Outside Food (average):0 Protein: 0g    # Meals Recorded: 100% pudding, less than 25% roast turkey w/ gravy    # Supplements Recorded: 0

## 2019-01-10 ENCOUNTER — APPOINTMENT (OUTPATIENT)
Dept: OCCUPATIONAL THERAPY | Facility: CLINIC | Age: 79
DRG: 871 | End: 2019-01-10
Attending: INTERNAL MEDICINE
Payer: MEDICARE

## 2019-01-10 ENCOUNTER — APPOINTMENT (OUTPATIENT)
Dept: CT IMAGING | Facility: CLINIC | Age: 79
DRG: 871 | End: 2019-01-10
Attending: INTERNAL MEDICINE
Payer: MEDICARE

## 2019-01-10 ENCOUNTER — APPOINTMENT (OUTPATIENT)
Dept: PHYSICAL THERAPY | Facility: CLINIC | Age: 79
DRG: 871 | End: 2019-01-10
Attending: INTERNAL MEDICINE
Payer: MEDICARE

## 2019-01-10 ENCOUNTER — APPOINTMENT (OUTPATIENT)
Dept: SPEECH THERAPY | Facility: CLINIC | Age: 79
DRG: 871 | End: 2019-01-10
Attending: INTERNAL MEDICINE
Payer: MEDICARE

## 2019-01-10 ENCOUNTER — APPOINTMENT (OUTPATIENT)
Dept: GENERAL RADIOLOGY | Facility: CLINIC | Age: 79
DRG: 871 | End: 2019-01-10
Attending: INTERNAL MEDICINE
Payer: MEDICARE

## 2019-01-10 LAB
LACTATE BLD-SCNC: 0.9 MMOL/L (ref 0.7–2)
MAGNESIUM SERPL-MCNC: 2.7 MG/DL (ref 1.6–2.3)
PHOSPHATE SERPL-MCNC: 3.9 MG/DL (ref 2.5–4.5)
POTASSIUM SERPL-SCNC: 3.6 MMOL/L (ref 3.4–5.3)
POTASSIUM SERPL-SCNC: 4.6 MMOL/L (ref 3.4–5.3)

## 2019-01-10 PROCEDURE — 70450 CT HEAD/BRAIN W/O DYE: CPT

## 2019-01-10 PROCEDURE — 25000128 H RX IP 250 OP 636: Performed by: INTERNAL MEDICINE

## 2019-01-10 PROCEDURE — 36416 COLLJ CAPILLARY BLOOD SPEC: CPT | Performed by: INTERNAL MEDICINE

## 2019-01-10 PROCEDURE — 71045 X-RAY EXAM CHEST 1 VIEW: CPT

## 2019-01-10 PROCEDURE — A9270 NON-COVERED ITEM OR SERVICE: HCPCS | Mod: GY | Performed by: NURSE PRACTITIONER

## 2019-01-10 PROCEDURE — 84100 ASSAY OF PHOSPHORUS: CPT | Performed by: INTERNAL MEDICINE

## 2019-01-10 PROCEDURE — 97535 SELF CARE MNGMENT TRAINING: CPT | Mod: GO

## 2019-01-10 PROCEDURE — 25000132 ZZH RX MED GY IP 250 OP 250 PS 637: Mod: GY | Performed by: INTERNAL MEDICINE

## 2019-01-10 PROCEDURE — A9270 NON-COVERED ITEM OR SERVICE: HCPCS | Mod: GY | Performed by: INTERNAL MEDICINE

## 2019-01-10 PROCEDURE — 40000141 ZZH STATISTIC PERIPHERAL IV START W/O US GUIDANCE

## 2019-01-10 PROCEDURE — 99223 1ST HOSP IP/OBS HIGH 75: CPT | Performed by: PSYCHIATRY & NEUROLOGY

## 2019-01-10 PROCEDURE — A9270 NON-COVERED ITEM OR SERVICE: HCPCS | Mod: GY | Performed by: STUDENT IN AN ORGANIZED HEALTH CARE EDUCATION/TRAINING PROGRAM

## 2019-01-10 PROCEDURE — 83735 ASSAY OF MAGNESIUM: CPT | Performed by: INTERNAL MEDICINE

## 2019-01-10 PROCEDURE — 25000125 ZZHC RX 250: Performed by: INTERNAL MEDICINE

## 2019-01-10 PROCEDURE — 92526 ORAL FUNCTION THERAPY: CPT | Mod: GN

## 2019-01-10 PROCEDURE — 97110 THERAPEUTIC EXERCISES: CPT | Mod: GO

## 2019-01-10 PROCEDURE — 40000133 ZZH STATISTIC OT WARD VISIT

## 2019-01-10 PROCEDURE — 84132 ASSAY OF SERUM POTASSIUM: CPT | Performed by: INTERNAL MEDICINE

## 2019-01-10 PROCEDURE — 25000128 H RX IP 250 OP 636: Performed by: PHYSICIAN ASSISTANT

## 2019-01-10 PROCEDURE — 83605 ASSAY OF LACTIC ACID: CPT | Performed by: INTERNAL MEDICINE

## 2019-01-10 PROCEDURE — 25000132 ZZH RX MED GY IP 250 OP 250 PS 637: Mod: GY | Performed by: NURSE PRACTITIONER

## 2019-01-10 PROCEDURE — 99233 SBSQ HOSP IP/OBS HIGH 50: CPT | Performed by: INTERNAL MEDICINE

## 2019-01-10 PROCEDURE — 25000132 ZZH RX MED GY IP 250 OP 250 PS 637: Mod: GY | Performed by: STUDENT IN AN ORGANIZED HEALTH CARE EDUCATION/TRAINING PROGRAM

## 2019-01-10 PROCEDURE — 40000225 ZZH STATISTIC SLP WARD VISIT

## 2019-01-10 PROCEDURE — 97530 THERAPEUTIC ACTIVITIES: CPT | Mod: GO

## 2019-01-10 PROCEDURE — 12000001 ZZH R&B MED SURG/OB UMMC

## 2019-01-10 PROCEDURE — 36415 COLL VENOUS BLD VENIPUNCTURE: CPT | Performed by: INTERNAL MEDICINE

## 2019-01-10 PROCEDURE — 40000193 ZZH STATISTIC PT WARD VISIT

## 2019-01-10 PROCEDURE — 97530 THERAPEUTIC ACTIVITIES: CPT | Mod: GP

## 2019-01-10 RX ORDER — DIPHENHYDRAMINE HYDROCHLORIDE AND LIDOCAINE HYDROCHLORIDE AND ALUMINUM HYDROXIDE AND MAGNESIUM HYDRO
10 KIT EVERY 6 HOURS PRN
Status: DISCONTINUED | OUTPATIENT
Start: 2019-01-10 | End: 2019-01-25 | Stop reason: HOSPADM

## 2019-01-10 RX ORDER — FUROSEMIDE 10 MG/ML
10 INJECTION INTRAMUSCULAR; INTRAVENOUS ONCE
Status: DISCONTINUED | OUTPATIENT
Start: 2019-01-10 | End: 2019-01-17

## 2019-01-10 RX ORDER — FUROSEMIDE 40 MG
40 TABLET ORAL DAILY
Status: DISCONTINUED | OUTPATIENT
Start: 2019-01-10 | End: 2019-01-25 | Stop reason: HOSPADM

## 2019-01-10 RX ORDER — FUROSEMIDE 10 MG/ML
20 INJECTION INTRAMUSCULAR; INTRAVENOUS ONCE
Status: DISCONTINUED | OUTPATIENT
Start: 2019-01-10 | End: 2019-01-10

## 2019-01-10 RX ADMIN — MICONAZOLE NITRATE: 2 POWDER TOPICAL at 09:36

## 2019-01-10 RX ADMIN — DIPHENHYDRAMINE HYDROCHLORIDE AND LIDOCAINE HYDROCHLORIDE AND ALUMINUM HYDROXIDE AND MAGNESIUM HYDRO 10 ML: KIT at 10:37

## 2019-01-10 RX ADMIN — SALINE NASAL SPRAY 1 SPRAY: 1.5 SOLUTION NASAL at 19:52

## 2019-01-10 RX ADMIN — FUROSEMIDE 40 MG: 40 TABLET ORAL at 10:37

## 2019-01-10 RX ADMIN — POTASSIUM PHOSPHATE, MONOBASIC AND POTASSIUM PHOSPHATE, DIBASIC 20 MMOL: 224; 236 INJECTION, SOLUTION INTRAVENOUS at 02:35

## 2019-01-10 RX ADMIN — MAGNESIUM SULFATE HEPTAHYDRATE 4 G: 40 INJECTION, SOLUTION INTRAVENOUS at 00:27

## 2019-01-10 RX ADMIN — PANTOPRAZOLE SODIUM 40 MG: 40 TABLET, DELAYED RELEASE ORAL at 09:35

## 2019-01-10 RX ADMIN — VENLAFAXINE HYDROCHLORIDE 225 MG: 150 CAPSULE, EXTENDED RELEASE ORAL at 09:34

## 2019-01-10 RX ADMIN — DIPHENHYDRAMINE HYDROCHLORIDE AND LIDOCAINE HYDROCHLORIDE AND ALUMINUM HYDROXIDE AND MAGNESIUM HYDRO 10 ML: KIT at 18:23

## 2019-01-10 RX ADMIN — GUAIFENESIN 10 ML: 100 SOLUTION ORAL at 04:36

## 2019-01-10 RX ADMIN — SALINE NASAL SPRAY 1 SPRAY: 1.5 SOLUTION NASAL at 16:26

## 2019-01-10 RX ADMIN — ATORVASTATIN CALCIUM 20 MG: 20 TABLET, FILM COATED ORAL at 19:52

## 2019-01-10 RX ADMIN — SENNOSIDES AND DOCUSATE SODIUM 2 TABLET: 8.6; 5 TABLET ORAL at 19:52

## 2019-01-10 RX ADMIN — SALINE NASAL SPRAY 1 SPRAY: 1.5 SOLUTION NASAL at 00:34

## 2019-01-10 RX ADMIN — LISINOPRIL 30 MG: 20 TABLET ORAL at 09:08

## 2019-01-10 RX ADMIN — SALINE NASAL SPRAY 1 SPRAY: 1.5 SOLUTION NASAL at 10:43

## 2019-01-10 RX ADMIN — SALINE NASAL SPRAY 1 SPRAY: 1.5 SOLUTION NASAL at 06:55

## 2019-01-10 RX ADMIN — BUPROPION HYDROCHLORIDE 150 MG: 150 TABLET, FILM COATED, EXTENDED RELEASE ORAL at 09:35

## 2019-01-10 RX ADMIN — SALINE NASAL SPRAY 1 SPRAY: 1.5 SOLUTION NASAL at 18:06

## 2019-01-10 RX ADMIN — RANITIDINE 150 MG: 150 TABLET ORAL at 19:52

## 2019-01-10 RX ADMIN — MINERAL SUPPLEMENT IRON 300 MG / 5 ML STRENGTH LIQUID 100 PER BOX UNFLAVORED 500 MG: at 09:12

## 2019-01-10 RX ADMIN — SALINE NASAL SPRAY 1 SPRAY: 1.5 SOLUTION NASAL at 12:55

## 2019-01-10 RX ADMIN — SENNOSIDES AND DOCUSATE SODIUM 1 TABLET: 8.6; 5 TABLET ORAL at 09:35

## 2019-01-10 RX ADMIN — RANITIDINE 150 MG: 150 TABLET ORAL at 09:34

## 2019-01-10 RX ADMIN — SALINE NASAL SPRAY 1 SPRAY: 1.5 SOLUTION NASAL at 09:15

## 2019-01-10 RX ADMIN — HYDROCHLOROTHIAZIDE 25 MG: 25 TABLET ORAL at 09:35

## 2019-01-10 RX ADMIN — MICONAZOLE NITRATE: 2 POWDER TOPICAL at 19:52

## 2019-01-10 RX ADMIN — SALINE NASAL SPRAY 1 SPRAY: 1.5 SOLUTION NASAL at 02:52

## 2019-01-10 ASSESSMENT — ACTIVITIES OF DAILY LIVING (ADL)
ADLS_ACUITY_SCORE: 27
ADLS_ACUITY_SCORE: 23
ADLS_ACUITY_SCORE: 27
ADLS_ACUITY_SCORE: 23

## 2019-01-10 NOTE — CONSULTS
[facility]  Neurology Consultation    Patient Name:  Gabino Jones  MRN:  [id number]    :  1940  Date of Service:  January 10, 2019  Primary care provider:  Rebekah Boogie      Neurology consultation service was asked to see Gabino Jones by Dr. Power to evaluate for encephalopathy.    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 On arrival she was hypothermic, hypotensive, with elevated lactate. Further infectious work up has been unremarkable. She also had a nosebleed, for which she was seen by ENT who was not concerned about nasosinus melanoma relapse.     Since being in the hospital she has become progressively more encephalopathic.  Namely, her  finds her more restless and mentioning hallucinations.  He reports that things have acutely worsened while she has been in the hospital last 3 days.  Prior to this she had experienced a subacute decline over the last 8 months.  He describes that 8 months ago she started to have difficulty attending to full conversations.  He would find her staring off, but it would be easy to get her attention.  Over the next several months she would have waxing and waning attention, sometimes started conversation and losing her train of thought and not remembering what she was speaking about.  She then became progressively more somnolent and was taking frequent naps throughout the day and recently has slept for the majority of the day.  Her short-term memory has worsened as well.  Sometimes she focuses on things that are not active issues like bills that have been paid.  She has significant anhedonia no longer reads or plays games which she used to enjoy.  She does not go in public.  She has lost interest in  personal hygiene and is very stubborn, often refusing to shower.  She does not take suggestions well from her family.  And she will sometimes curse that her family members which is unusual for her.  She has significantly decreased oral intake as well.     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  She had normal birth and development.  They have several children who are healthy.  She is a non-smoker.  She has no history of concussions or head trauma.    She was seen in December by her oncology provider who started a workup for dementia including a brain MRI, which is not yet done.  She also had a B12 and TSH that were normal.  She has not had any formal cognitive evaluation but while inpatient she underwent a BLESSED test and scored a 28, which is the lowest one can score, indicating severe dementia.  During past hospitalization she will consistently be confused when admitted.    Her only active medical issues are a mild DAVID due to hypotension.     ROS  A 10-point ROS was performed as per HPI.     PMH  Past Medical History:   Diagnosis Date     Cancer (H)      Chronic back pain      Chronic leg pain      Depression      Hearing loss      Heart murmur      Hyperlipidemia      Hypertension      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     Ringing in ears      Past Surgical History:   Procedure Laterality Date     C ANESTH,CERV SPINE, SITTING POSITION       COLONOSCOPY N/A 7/7/2017    Procedure: COMBINED COLONOSCOPY, SINGLE OR MULTIPLE BIOPSY/POLYPECTOMY BY BIOPSY;;  Surgeon: Sathya Norman MD;  Location:  OR     COLONOSCOPY WITH CO2 INSUFFLATION N/A 7/7/2017    Procedure: COLONOSCOPY WITH CO2 INSUFFLATION;  Combined,  [place], Gastroesophageal reflux disease without esophagitis  Flatulence, eructation, and gas pain, BMI 29.35, [facility] [id number];  Surgeon: Sathya Norman MD;  Location:  OR     COLONOSCOPY, SUBMUCOSA RESECTION N/A 10/4/2017    Procedure: COLONOSCOPY,  SUBMUCOSA RESECTION;  Colonoscopy With Endoscopic Polypectomy with clips;  Surgeon: Milton Javier MD;  Location: [facility] OR     COMBINED ESOPHAGOSCOPY, GASTROSCOPY, DUODENOSCOPY (EGD) WITH CO2 INSUFFLATION N/A 7/7/2017    Procedure: COMBINED ESOPHAGOSCOPY, GASTROSCOPY, DUODENOSCOPY (EGD) WITH CO2 INSUFFLATION;  Combined,  [place], Gastroesophageal reflux disease without esophagitis  Flatulence, eructation, and gas pain, BMI 29.35, [facility] [id number];  Surgeon: Sathya Norman MD;  Location: [facility] OR     ESOPHAGOSCOPY, GASTROSCOPY, DUODENOSCOPY (EGD), COMBINED N/A 7/7/2017    Procedure: COMBINED ESOPHAGOSCOPY, GASTROSCOPY, DUODENOSCOPY (EGD), BIOPSY SINGLE OR MULTIPLE;;  Surgeon: Sathya Norman MD;  Location: [facility] OR     ESOPHAGOSCOPY, GASTROSCOPY, DUODENOSCOPY (EGD), COMBINED N/A 1/6/2019    Procedure: COMBINED ESOPHAGOSCOPY, GASTROSCOPY, DUODENOSCOPY (EGD);  Surgeon: Cailin Paulino MD;  [facility]: [facility]     GALLBLADDER SURGERY       NECK SURGERY       OPTICAL TRACKING SYSTEM CRANIOTOMY, EXCISE TUMOR, COMBINED Right 2/22/2017    Procedure: COMBINED OPTICAL TRACKING SYSTEM CRANIOTOMY, EXCISE TUMOR;  Surgeon: Lenora Pérez MD;  Location:  OR     OPTICAL TRACKING SYSTEM ENDOSCOPIC ENDONASAL SURGERY  6/23/2014    Procedure: OPTICAL TRACKING SYSTEM ENDOSCOPIC ENDONASAL SURGERY;  Surgeon: Yolanda Whitaker MD;  Location:  OR     STOMACH SURGERY       TONSILLECTOMY       TUBAL LIGATION      1975       Medications   Medications Prior to Admission   Medication Sig Dispense Refill Last Dose     acetaminophen (TYLENOL) 500 MG tablet Take 1 tablet (500 mg) by mouth every 6 hours as needed 100 tablet 3 Taking     ALPRAZolam (XANAX) 0.25 MG tablet TAKE 1 TABLET BY MOUTH THREE TIMES DAILY AS NEEDED FOR ANXIETY (Patient not taking: Reported on 12/12/2018) 20 tablet 0 Not Taking     atorvastatin (LIPITOR) 20 MG tablet Take 1 tablet (20 mg) by mouth daily 90 tablet  3 Taking     buPROPion (WELLBUTRIN XL) 150 MG 24 hr tablet TAKE 1 TABLET(150 MG) BY MOUTH EVERY MORNING 90 tablet 1 Taking     GuaiFENesin 100 MG PACK Take 1 tablet by mouth 2 times daily as needed (Patient not taking: Reported on 12/12/2018) 168 each 3 Not Taking     hydrochlorothiazide (MICROZIDE) 12.5 MG capsule Take 1 capsule (12.5 mg) by mouth daily 90 capsule 0 Taking     hydrOXYzine (ATARAX) 25 MG tablet TAKE 1 TABLET(25 MG) BY MOUTH EVERY 6 HOURS AS NEEDED FOR ANXIETY 30 tablet 5 Taking     ibuprofen (ADVIL/MOTRIN) 800 MG tablet Take 1 tablet (800 mg) by mouth every 4 hours as needed for moderate pain Reported on 4/17/2017 90 tablet 1 Taking     lisinopril (PRINIVIL,ZESTRIL) 30 MG tablet Take 1 tablet (30 mg) by mouth daily 90 tablet 0 Taking     pantoprazole (PROTONIX) 40 MG EC tablet Take 1 tablet (40 mg) by mouth daily Take 30-60 minutes before a meal. 90 tablet 1 Taking     potassium chloride (MICRO-K) 10 MEQ CR capsule Take 2 capsules (20 mEq) by mouth daily 180 capsule 1 Taking     ranitidine (ZANTAC) 150 MG tablet TAKE 1 TABLET BY MOUTH TWICE DAILY AS NEEDED 180 tablet 0      senna-docusate (SENOKOT-S;PERICOLACE) 8.6-50 MG per tablet Take 1-2 tablets by mouth 2 times daily 120 tablet 3 Taking     Simethicone 180 MG CAPS Take 1 capsule by mouth 2 times daily Reported on 5/9/2017 60 capsule 0 Taking     venlafaxine (EFFEXOR-XR) 75 MG 24 hr capsule TAKE 3 CAPSULES BY MOUTH DAILY 270 capsule 2 Taking     VITAMIN D, CHOLECALCIFEROL, PO Take 2,000 Units by mouth daily   Taking       Allergies  Allergies   Allergen Reactions     Novocain [Procaine] Unknown and Rash     Mirtazapine      Nefazodone Unknown and Rash     Social History  In the past she worked in a factory, 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  She had normal birth and development.  They have several children who are healthy.  She is a non-smoker.    Family History    No FH seizures.    Physical Examination   Vitals: /40   Pulse 94   Temp 98.9  F  (37.2  C) (Oral)   Resp 30   Wt 82.2 kg (181 lb 3.2 oz)   SpO2 96%   BMI 30.15 kg/m    General: Adult, lying in bed, restless, tremulous, intermittently responding to internal stimuli, hallucinating rats in cars.  HEENT: NC/AT, no icterus, op pink and moist  Cardiac: RRR no M  Chest: CTAB no w/c/r  Abdomen: S/NT/ND  Extremities: No LE swelling.  Skin: Small petechial rashes on her arms.  Psych: Experiencing auditory hallucinations.  Neuro:  Mental status: Awake, alert, intermittently closes her eyes and is unresponsive.  She is able to follow simple commands and name simple objects.  She is oriented to herself and her spouse but not the month or year or location.  She cannot recall how long they have been .  She reports seeing a car on the wall, and rats in the room.  Cranial nerves: Eyes conjugate, PERRLA, EOMI, she blinks to threat, cannot count fingers, face symmetric, tongue/uvula midline.   Motor: Tone is normal but she is tremulous in all 4 extremities.  There is no evidence of resting tremor, despite being tremulous.  5/5 strength in all 4 extremities.  No evidence of asterixis.  Reflexes: Normoreflexic and symmetric biceps, patellar, and achilles.  She has sustained bilateral ankle clonus.   left toe upgoing.  Sensory: Intact to LT  Coordination: No overt ataxia when reaching for objects.  Gait: Patient requires assistance of 2 to pivot and turn to use the commode.  Gait not formally assessed.    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     This patient has a history of encephalopathy when admitted to the hospital, but her course at present is much more chronic, with up to 8 months of cognitive decline as an outpatient.  By history, she has had inattention and distractibility, excessive sleepiness, with short-term memory difficulty, anhedonia and concern for depression.  Since being admitted, she is quite restless and tremulous, with new onset of auditory hallucinations.  There is concern as an outpatient that she may have dementia (Alzheimer's or possibly Lewy Body), which has not been formally evaluated.  Most likely her underlying cognitive impairment is exacerbated by being in the hospital, but we due need to rule out intracranial disease and subclinical seizures. Since her clinical course has fluctuated, even multiple times during the same time, sometimes returning to baseline, which argues against a paraneoplastic or autoimmune etiology.    First, recommend brain MRI with and without contrast. She will require sedation for this, but I think it is worth the effort.  Recommend a routine EEG to rule out status.    Acutely, recommend delirium precautions like lights on during the day, lights/TV off at night, minimize vital checks overnight, etc.     As an outpatient, she will need a cognitive evaluation and further evaluation by neurology.    # Subacute on chronic encephalopathy  -  Brain MRI with and without contrast   -  Routine 1 hour EEG to rule out status    Thank you for involving neurology in the care of Gabino Jones.  Please do not hesitate to call with questions/concerns.    Patient was seen and discussed with Dr. Johnson.    Mable Thomas DO  Neurology PGY3  P: [phone]

## 2019-01-10 NOTE — PLAN OF CARE
Discharge Planner SLP   Patient plan for discharge: Did not discuss  Current status: Pt grossly tolerating current diet - this is similar to pt's baseline diet per 's report. Cough x 1 on solids when pt was talking while eating. No overt s/sx of aspiration with thin liquids.     Continue dysphagia diet 2 and thin liquids. Pt must be seated upright, fully alert, reduce distractions during meals, small bites/sips. Pt requires feeding assistance and supervision with oral intake.    Barriers to return to prior living situation: Cognition, nutrition   Recommendations for discharge: TCU  Rationale for recommendations: SLP at next level of care to ensure diet tolerance, use of strategies and provide education          Entered by: Lisa Hutchinson 01/10/2019 2:08 PM

## 2019-01-10 NOTE — PLAN OF CARE
VSS on RA- ex respirations are 24 and pt is intermittently tachy. Pt disoriented to place, time, and situation. RN reoriented pt frequently on shift. Bed alarm on. Pt coughing frequently- RN gave PRN robitussin once. Pt triggered sepsis around 1500 PM, RN ordered lactic per protocol- lactic was 2.3. MD notified and gave 250 bolus NS per order. Recheck was 1.7. Chest xray ordered for possible fluid overload- see results.  UA sent. Pt is intermittently incontinent of stool and urine. Pt up to commode x2 on shift with assist of 2. Measured urine output was 100 for shift. Blood cultures drawn- preliminary results are negative. K replaced per protocol-recheck ordered for 00:00 AM. Will continue to monitor and follow POC.

## 2019-01-10 NOTE — PROGRESS NOTES
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      Changes:  - continue to hold antibiotics for now and appreciate ID input. Will contact if changes.  - Restarted home lisinopril with modest improvement. Added hydrochlorothiazide back.   - Diurese with oral furosemide for dyspnea today  - swallow eval.  - likely discharge 2-3d  - psych consult  - neuro consult: getting1 hr EEG and MRI w and w/o contrast    # Hypervolemia  Secondary to volume resuscitation. Dyspneic and labored when moving back to bed from chair. Not hypoxic. Lung US shows diffuse B lines  - diuresed on 1/7. Euvolemic at this time. Can re-dose lasix if dyspneic. Gave x 1 on 11/7, 11/9. Satting 94-96% RA    # Possible septic angie:   Presented with hypothermia (94.2) , hypotension (BP 79/41), leukocytosis, elevated lactate (4.6 at OSH). Mild pyuria. CXR clear. Blood and urine cx NGTD. C diff negative. Called [facility] 1/7 and blood and urine cx (prior to start of antibiotics) are both no growth to date. Initial CRP and procal low. However CRP up to 70 on 1/7. Has developed cough over the past 24 hours.   - Discontinued vanco  - Repeat CXR  - ID consult for abx recommendations appreciated.  - stopped antibiotics  - Trend CRP and procal in AM  - UA repeated. Pending blood cultures, urine culture pending. CXR unremarkable other than some pulm edema  - no clear source. Consider LP if worsening mental status persists    # Acute  blood loss anemia  # Iron deficiency anemia  # Melena  Likely due to epistaxis. Hgb on admission 8.3 from 10.3 after multiple nose bleeds. Initially thought that epistaxis could explain bleed but then hgb continued to drop to 6.9 despite no further episodes of epistaxis. Though curiously melena resolved as well. Underwent EGD 1/6 which was negative for any cause of bleeding. Hgb has been stable above 7.  Iron 21. Ferritin 17. Iron sat 5%.  - switch back to home daily protonix  - trend hgb  - Will start oral iron every other day.    # Epistaxis  History of nasosinus melanoma (in remission). Seen by ENT and no evidence of recurrence of malignancy. with the below recs  - Dissolvable packing in place. This will dissolve on its own. No need for removal.   - No antibiotics for the nasal packing (surgicel)  - There may be some slow dark red oozing after the packing was placed - this is normal and likely from the Afrin and the Surgiflo  - Nasal saline spray to bilateral nasal cavities q2h daily starting in 48 hours (keeps the packing moist and will help with removal)  - If supplemental O2 is required, add humidifier ([facility] should be contacted to supply this)  - Humidifier to the room to increase the moisture in the air  - If patient experiences brisk bleeding, spray Afrin along the floor of the nose and hold digital pressure on bilateral nostrils for 20 minutes (holding pressure higher up on the nasal bones does not apply effective pressure to the septum. Instead, the pressure should be applied on the soft part of the nose do that the opening of the nostrils are completely pinched shut).  Nasal clips do not work and should not be used in place of digital pressure. If bleeding continues after 20 minutes of digital pressure, repeat the above steps. If this fails to control the bleeding at that time, call ENT for further recommendations  - attempted to provide humidification via faceshield at ENT request but pt kept pulling it  off    # Acute encephalopathy on chronic confusion--likely dementia   Acute encephalopathy likely due to sepsis. Appears to have some degree of chronic dementia ongoing over months/years. In considering differential for AMS workup undertaken by SICU. No obvious metabolic derangements. TSH normal. Labs/vitals not consistent with adrenal insufficiency, no evidence of seizure activity, no evidence of ingestion/tox. Evaluated by OT and did very poorly on cognitive testing  - was scheduled for outpt neurology eval and MRI but has not yet gotten to this appointment. Planning to defer to outpatient when she is back to her baseline  - consulted neurology. Getting head CT, likely MRI. Neuro reccs pending    # DAVID due to sepsis:  Mild. Baseline Cr around 0.7. 1.0 on admission. Likely due to hypotension/sepsis. Resolving.   - Continue hydration until able to take PO  - Repeat Cr in am. Improved on 1/8    # Hypertension:  - restarted lisinopril on 1/7  - restarted HCTZ    # HLD: continue atorvastatin    # Depression:  Continue wellbutrin and effexor    # FEN:   Regular as tolerated  Stop IVF    Consulting Services: ENT, GI, neurology, psych    CODE: full  DVT: SCDs, anticoagulation contraindicated due to bleeding    Disposition Plan   Expected discharge in 2-3 days to rehab once hemodynamically stable, infection ruled out.     Entered: Andrew Moreno 01/10/2019, 1:30 PM       Pt's care was discussed with bedside RN, patient, and during Care Team Rounds.    ANDREW MORENO MD, [facility]  Internal Medicine Hospitalist & Staff Physician  [facility]  Pager: [phone]  [email or web address]    Team: Medicine [facility] 9  Page Cross Cover after 5 pm: pager [phone]     ___________________________________________________________________    Subjective & Interval Hx:    Worse dyspnea, cough today. CXR unremarkale Some coughing with PO intake and apprehensive to eat. No CP. Working with PT today. Denies fevers.     Last  24 hr care team notes reviewed.   ROS:  4 point ROS including Respiratory, CV, GI and , other than that noted in the HPI, is negative      Medications: Reviewed in EPIC. List below for reference    Physical Exam:    Blood pressure 142/40, pulse 94, temperature 98.9  F (37.2  C), temperature source Oral, resp. rate 30, weight 82.2 kg (181 lb 3.2 oz), SpO2 96 %, not currently breastfeeding.    General: awake, alert, lying in bed, comfortable  HEENT: MMM, no epistaxis, NCAT  Chest/Resp: CTAB  Heart/CV: RRR, no MRG noted, trace LE in LE bilaterally  Abdomen/GI: soft,  mildly tender, mildly distended, +BS  Extremities/MSK: no swelling or erythema of joints  Skin: no rash or jaundice noted  Neuro/Psych: awake, alert, very confused. hallucinating    Lines/Tubes:   Peripheral IV 02/22/17 Left Lower forearm (Active)   Number of days: 682       Peripheral IV 02/22/17 Right Hand (Active)   Number of days: 682       Peripheral IV 12/12/18 Right Lower forearm (Active)   Number of days: 24       Peripheral IV 01/05/19 Left (Active)   Site Assessment [facility] 1/5/2019  8:00 AM   Line Status Saline locked 1/5/2019  8:00 AM   Phlebitis Scale 0-->no symptoms 1/5/2019  8:00 AM   Infiltration Scale 0 1/5/2019  8:00 AM   Extravasation? No 1/5/2019  5:00 AM   Number of days: 0       Peripheral IV 01/05/19 Right (Active)   Site Assessment [facility] 1/5/2019  8:00 AM   Line Status Infusing 1/5/2019  8:00 AM   Phlebitis Scale 0-->no symptoms 1/5/2019  8:00 AM   Infiltration Scale 0 1/5/2019  8:00 AM   Extravasation? No 1/5/2019  5:00 AM   Number of days: 0       Labs & Studies of Note:  I personally reviewed all labs and imaging.     Unresulted Labs Ordered in the Past 30 Days of this Admission     Date and Time Order Name Status Description    1/9/2019 2248 Urine Culture Aerobic Bacterial Preliminary     1/9/2019 1622 Blood culture Preliminary     1/9/2019 1622 Blood culture Preliminary     1/5/2019 0208 Blood culture Preliminary     1/5/2019 0208  Blood culture Preliminary

## 2019-01-10 NOTE — PLAN OF CARE
"Pt admitted for sepsis. Alert and only oriented to self. Very confused, seeing hallucinations of rats. Unable to follow commands at times. VSS on RA. Complaints of throat pain/soreness, \"Magic Mouthwash\" given with relief. No complaints of nausea. Poor appetite, consumed very little from mechanical soft diet, continued Calorie counts. Tachypnic, PO Lasix started today, voiding frequently and incontinent at times. No BM this shift. No PIV in place, pt refused new PIV placement. Psych and neuro consulted today neuro CT done today. PT and OT worked with pt today, up with assist of two to commode.  at bedside. Bed alarm on. Will continue with POC.   "

## 2019-01-10 NOTE — PLAN OF CARE
5A - up with assist x2    Discharge Planner PT   Patient plan for discharge: Did not discuss with pt  Current status: Greeted pt supine in bed and encouraged to participate in therapy. Assessed vitals and determined appropriate for in room mobilization (see below). Engaged pt in supine > sitting at edge of bed at supervision and sitting at edge of bed > supine at min A for LE management. Initiated seated trunk stability at edge of bed with reach task at SBA. Progressed to sit <> stand transfer and stepping pivot to commode at bedside at min A x2 and total assist of pericares. Attempted ambulation and LE strengthening exercises but pt declines. Increased confusion noted and pt not oriented to place or time, pt impulsive and frequently trying to return to supine. While on commode heart rate jumping between 80's and 110's with reported SOB and chest pain, alerted RN.   Barriers to return to prior living situation: medical condition, cognition, stairs, home set up  Recommendations for discharge: TCU  Rationale for recommendations: Pt is recommended to discharge to TCU to improve strength, endurance with functional mobility and balance in order to safely return to home environment.        Entered by: Jazmine Moyer 01/10/2019 12:55 PM            01/10/19 1126 01/10/19 1149 01/10/19 1156   Comments   Comments Pre-PT During PT Post PT - supine in bed   Vitals   Heart Rate 89 111  (transfer to commode) 103   BP (!) 119/97 --  142/40   BP - Mean 110 --  77

## 2019-01-10 NOTE — PLAN OF CARE
Discharge Planner OT   Patient plan for discharge: not discussed  Current status: Performed bed mobility supine to seated EOB with min A. Sat at EOB ~ 3 minutes x 2 reps with CGA-cues for posture and attempts to lay back on bed.Performed stand pivot transfer to [facility] with A x 2, cues for technique/safety. Dependent for clothing management and kimberly cares. Pt performed B UE exercises for strength/endurance-frequent cues, redirection and demo.  Barriers to return to prior living situation: Cognition, strength, endurance, balance.  Recommendations for discharge: Per plan established by the OT, the recommendation for dc location is TCU.  Rationale for recommendations: To progress functional independence and safety,

## 2019-01-10 NOTE — CONSULTS
Consult Date:  01/10/2019      PSYCHIATRIC CONSULTATION      IDENTIFICATION:  Ms. Jones is a 78-year-old white female who was admitted with a rapid change in mental status.  I am asked to evaluate her mental status by Dr Power.      In reviewing the electronic medical record and interviewing the patient's , it appears that she began to develop difficulties with memory and mental status perhaps 6 months ago with a  very slowly progressing course.  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  It has been resected x 2, and the patient was thought to be cancer free.  The patient was apparently being evaluated for possible dementia and presented to the [facility] due to weakness and nosebleed.  She was having confusion, episodes of agitation, and not eating and drinking well.  She went to the [facility] and then was transferred to us to rule out sepsis.  She came in with leukocytosis of 15,000 and a drop in hemoglobin.  She also had 1 episode of a bloody bowel movement.      According to the , yesterday the patient was seeing puppies in her room and today she was seeing rats.  She is apparently able to describe this.  By the time I saw her closer to noon, she was no longer coherent.  She was only oriented x 1, and even that was somewhat unclear.  She was moving her head back and forth, but making no sense and not following commands.      Given the fact that melanoma frequently goes to brain, I would suggest brain imaging.  The other issue is the rapid change in mental status.  Certainly from this morning until this noon, she had had a very marked decrease in her ability to communicate, and at this point I would think consideration should be given to EEG and possible LP.  I have discussed this with Neurology.  I would certainly start with a CT scan before any LP.  I have discussed this situation with Dr. Power, who has already put in a Neurological  consultation.      PAST MEDICAL HISTORY:  Melanoma, chronic back pain and leg pain, depression, hearing loss, heart murmur, hyperlipidemia, hypertension, and tinnitus.      ALLERGIES:  NOVOCAIN, MIRTAZAPINE, AND NEFAZODONE.      FAMILY HISTORY:  Positive for cancer.      FAMILY PSYCHIATRIC HISTORY:  Currently unclear.      SOCIAL HISTORY:  The patient is .  Her  is at the bedside and appropriately concerned.  She was a cigarette smoker until .  She does not abuse drugs or alcohol.      REVIEW OF SYSTEMS:  Unfortunately, the patient was completely unable to participate in a meaningful review of systems.  She was unable to even respond to questions about pain.      MENTAL STATUS:  On my interview, the patient was lying in her bed, looking from side-to-side.  She was not responding to verbal questions or commands.  If her  asked her a question, she would often respond with incoherent babbling, but this seems to be significantly worse mental status than the patient had earlier today.        Recent vital signs include a temperature of 98.9, heart rate of 103, respiration rate of 30, with 96% oxygen saturation and a blood pressure of 142/40.      ASSESSMENT:  Delirium, perhaps superimposed on dementia.  Rule out metastatic melanoma.  Rule out sepsis.        RECOMMENDATION:  Neurology consult, brain imaging, consider EEG and perhaps LP.         BREE WHEELER MD             D: 01/10/2019   T: 01/10/2019   MT: AUTUMN      Name:     ALEX ANAYA   MRN:      [id number]        Account:       [id number]   :      1940           Consult Date:  01/10/2019      Document: [id number]       cc: Heladio Boogie MD

## 2019-01-10 NOTE — PROGRESS NOTES
"Social Work Services Progress Note     Hospital Day: 6  Date of Initial Social Work Evaluation:  1/7/18  Collaborated with:  Primary team Gold 9, pt's spouse     Data:  Pt is a 78-year-old female admitted with concern for sepsis. TCU is recommended at discharge.      Intervention:  Pt's spouse present and requested an update on discharge planning. Updated pt's spouse. Pt not yet ready for discharge, will continue to f/u with TCU facilities as pt nears discharge.     Faxed referrals to:  1) [facility] (ph [phone])- left  1/9  2) [facility] (ph [phone])- accepted pending bed availability when pt is ready for discharge  3) [facility] ( [phone])- declined, \"no available or appropriate bed\"  4) [facility] (ph [phone])     Assessment:  Pursuing TCU placement     Plan:    Anticipated Disposition:  Facility:  TCU    Barriers to d/c plan:  Medical stability    Follow Up:  SW to follow for discharge planning     DALE Mena, LGSW  5A Unit   Pager [phone]  Phone [phone]      "

## 2019-01-10 NOTE — PROGRESS NOTES
Calorie Count    Intake recorded for: 1/9  Total Kcals: 595 Total Protein: 17g    Kcals from Hospital Food: 595  Protein: 17g    Kcals from Outside Food (average):0 Protein: 0g    # Meals Recorded: 50% orange juice, 25% coffee w/ cream, diet lemonade, less than 25% hash browns, peaches    # Supplements Recorded: 100% 1 Boost Shake

## 2019-01-11 ENCOUNTER — ALLIED HEALTH/NURSE VISIT (OUTPATIENT)
Dept: NEUROLOGY | Facility: CLINIC | Age: 79
DRG: 871 | End: 2019-01-11
Attending: PSYCHIATRY & NEUROLOGY
Payer: MEDICARE

## 2019-01-11 ENCOUNTER — APPOINTMENT (OUTPATIENT)
Dept: PHYSICAL THERAPY | Facility: CLINIC | Age: 79
DRG: 871 | End: 2019-01-11
Attending: INTERNAL MEDICINE
Payer: MEDICARE

## 2019-01-11 DIAGNOSIS — F41.1 GAD (GENERALIZED ANXIETY DISORDER): Primary | ICD-10-CM

## 2019-01-11 LAB
ANION GAP SERPL CALCULATED.3IONS-SCNC: 10 MMOL/L (ref 3–14)
BACTERIA SPEC CULT: ABNORMAL
BACTERIA SPEC CULT: ABNORMAL
BACTERIA SPEC CULT: NO GROWTH
BACTERIA SPEC CULT: NO GROWTH
BUN SERPL-MCNC: 10 MG/DL (ref 7–30)
CALCIUM SERPL-MCNC: 8 MG/DL (ref 8.5–10.1)
CHLORIDE SERPL-SCNC: 98 MMOL/L (ref 94–109)
CO2 SERPL-SCNC: 25 MMOL/L (ref 20–32)
CREAT SERPL-MCNC: 0.58 MG/DL (ref 0.52–1.04)
ERYTHROCYTE [DISTWIDTH] IN BLOOD BY AUTOMATED COUNT: 17.1 % (ref 10–15)
GFR SERPL CREATININE-BSD FRML MDRD: 88 ML/MIN/{1.73_M2}
GLUCOSE BLDC GLUCOMTR-MCNC: 99 MG/DL (ref 70–99)
GLUCOSE SERPL-MCNC: 91 MG/DL (ref 70–99)
HCT VFR BLD AUTO: 24.3 % (ref 35–47)
HGB BLD-MCNC: 7.4 G/DL (ref 11.7–15.7)
LACTATE BLD-SCNC: 1.1 MMOL/L (ref 0.7–2)
Lab: ABNORMAL
Lab: NORMAL
Lab: NORMAL
MAGNESIUM SERPL-MCNC: 2 MG/DL (ref 1.6–2.3)
MCH RBC QN AUTO: 25.7 PG (ref 26.5–33)
MCHC RBC AUTO-ENTMCNC: 30.5 G/DL (ref 31.5–36.5)
MCV RBC AUTO: 84 FL (ref 78–100)
PHOSPHATE SERPL-MCNC: 3.1 MG/DL (ref 2.5–4.5)
PLATELET # BLD AUTO: 314 10E9/L (ref 150–450)
POTASSIUM SERPL-SCNC: 3.3 MMOL/L (ref 3.4–5.3)
POTASSIUM SERPL-SCNC: 3.6 MMOL/L (ref 3.4–5.3)
RBC # BLD AUTO: 2.88 10E12/L (ref 3.8–5.2)
SODIUM SERPL-SCNC: 134 MMOL/L (ref 133–144)
SPECIMEN SOURCE: ABNORMAL
SPECIMEN SOURCE: NORMAL
SPECIMEN SOURCE: NORMAL
WBC # BLD AUTO: 11.1 10E9/L (ref 4–11)

## 2019-01-11 PROCEDURE — 84132 ASSAY OF SERUM POTASSIUM: CPT | Performed by: INTERNAL MEDICINE

## 2019-01-11 PROCEDURE — 25000132 ZZH RX MED GY IP 250 OP 250 PS 637: Mod: GY | Performed by: STUDENT IN AN ORGANIZED HEALTH CARE EDUCATION/TRAINING PROGRAM

## 2019-01-11 PROCEDURE — A9270 NON-COVERED ITEM OR SERVICE: HCPCS | Mod: GY | Performed by: INTERNAL MEDICINE

## 2019-01-11 PROCEDURE — 83735 ASSAY OF MAGNESIUM: CPT | Performed by: INTERNAL MEDICINE

## 2019-01-11 PROCEDURE — 36415 COLL VENOUS BLD VENIPUNCTURE: CPT | Performed by: INTERNAL MEDICINE

## 2019-01-11 PROCEDURE — 80048 BASIC METABOLIC PNL TOTAL CA: CPT | Performed by: INTERNAL MEDICINE

## 2019-01-11 PROCEDURE — A9270 NON-COVERED ITEM OR SERVICE: HCPCS | Mod: GY | Performed by: NURSE PRACTITIONER

## 2019-01-11 PROCEDURE — 40000193 ZZH STATISTIC PT WARD VISIT: Performed by: PHYSICAL THERAPIST

## 2019-01-11 PROCEDURE — 12000001 ZZH R&B MED SURG/OB UMMC

## 2019-01-11 PROCEDURE — 84100 ASSAY OF PHOSPHORUS: CPT | Performed by: INTERNAL MEDICINE

## 2019-01-11 PROCEDURE — 97530 THERAPEUTIC ACTIVITIES: CPT | Mod: GP | Performed by: PHYSICAL THERAPIST

## 2019-01-11 PROCEDURE — 95951 ZZHC EEG VIDEO < 12 HR: CPT | Mod: 52,ZF

## 2019-01-11 PROCEDURE — 95816 EEG AWAKE AND DROWSY: CPT | Mod: ZF

## 2019-01-11 PROCEDURE — 25000132 ZZH RX MED GY IP 250 OP 250 PS 637: Mod: GY | Performed by: INTERNAL MEDICINE

## 2019-01-11 PROCEDURE — 83605 ASSAY OF LACTIC ACID: CPT | Performed by: INTERNAL MEDICINE

## 2019-01-11 PROCEDURE — A9270 NON-COVERED ITEM OR SERVICE: HCPCS | Mod: GY | Performed by: STUDENT IN AN ORGANIZED HEALTH CARE EDUCATION/TRAINING PROGRAM

## 2019-01-11 PROCEDURE — 25000132 ZZH RX MED GY IP 250 OP 250 PS 637: Mod: GY | Performed by: NURSE PRACTITIONER

## 2019-01-11 PROCEDURE — 85027 COMPLETE CBC AUTOMATED: CPT | Performed by: INTERNAL MEDICINE

## 2019-01-11 PROCEDURE — 00000146 ZZHCL STATISTIC GLUCOSE BY METER IP

## 2019-01-11 PROCEDURE — 99233 SBSQ HOSP IP/OBS HIGH 50: CPT | Performed by: INTERNAL MEDICINE

## 2019-01-11 RX ADMIN — SALINE NASAL SPRAY 1 SPRAY: 1.5 SOLUTION NASAL at 08:56

## 2019-01-11 RX ADMIN — RANITIDINE 150 MG: 150 TABLET ORAL at 19:04

## 2019-01-11 RX ADMIN — MICONAZOLE NITRATE: 2 POWDER TOPICAL at 08:56

## 2019-01-11 RX ADMIN — HYDROCHLOROTHIAZIDE 25 MG: 25 TABLET ORAL at 08:51

## 2019-01-11 RX ADMIN — BUPROPION HYDROCHLORIDE 150 MG: 150 TABLET, FILM COATED, EXTENDED RELEASE ORAL at 08:51

## 2019-01-11 RX ADMIN — FUROSEMIDE 40 MG: 40 TABLET ORAL at 08:51

## 2019-01-11 RX ADMIN — LISINOPRIL 30 MG: 20 TABLET ORAL at 08:51

## 2019-01-11 RX ADMIN — SALINE NASAL SPRAY 1 SPRAY: 1.5 SOLUTION NASAL at 10:14

## 2019-01-11 RX ADMIN — DEXTRAN 70 AND HYPROMELLOSE 2910 1 DROP: 1; 3 SOLUTION/ DROPS OPHTHALMIC at 21:56

## 2019-01-11 RX ADMIN — POTASSIUM CHLORIDE 20 MEQ: 1.5 POWDER, FOR SOLUTION ORAL at 13:03

## 2019-01-11 RX ADMIN — GUAIFENESIN 10 ML: 100 SOLUTION ORAL at 19:04

## 2019-01-11 RX ADMIN — DIPHENHYDRAMINE HYDROCHLORIDE AND LIDOCAINE HYDROCHLORIDE AND ALUMINUM HYDROXIDE AND MAGNESIUM HYDRO 10 ML: KIT at 08:50

## 2019-01-11 RX ADMIN — SALINE NASAL SPRAY 1 SPRAY: 1.5 SOLUTION NASAL at 19:00

## 2019-01-11 RX ADMIN — SENNOSIDES AND DOCUSATE SODIUM 1 TABLET: 8.6; 5 TABLET ORAL at 08:51

## 2019-01-11 RX ADMIN — ATORVASTATIN CALCIUM 20 MG: 20 TABLET, FILM COATED ORAL at 19:04

## 2019-01-11 RX ADMIN — SALINE NASAL SPRAY 1 SPRAY: 1.5 SOLUTION NASAL at 21:55

## 2019-01-11 RX ADMIN — POTASSIUM CHLORIDE 40 MEQ: 750 TABLET, EXTENDED RELEASE ORAL at 10:49

## 2019-01-11 RX ADMIN — RANITIDINE 150 MG: 150 TABLET ORAL at 08:51

## 2019-01-11 RX ADMIN — SALINE NASAL SPRAY 1 SPRAY: 1.5 SOLUTION NASAL at 14:53

## 2019-01-11 RX ADMIN — MICONAZOLE NITRATE: 2 POWDER TOPICAL at 21:55

## 2019-01-11 RX ADMIN — VENLAFAXINE HYDROCHLORIDE 225 MG: 150 CAPSULE, EXTENDED RELEASE ORAL at 08:51

## 2019-01-11 RX ADMIN — SALINE NASAL SPRAY 1 SPRAY: 1.5 SOLUTION NASAL at 00:38

## 2019-01-11 RX ADMIN — GUAIFENESIN 10 ML: 100 SOLUTION ORAL at 00:51

## 2019-01-11 RX ADMIN — PANTOPRAZOLE SODIUM 40 MG: 40 TABLET, DELAYED RELEASE ORAL at 08:51

## 2019-01-11 RX ADMIN — SALINE NASAL SPRAY 1 SPRAY: 1.5 SOLUTION NASAL at 12:51

## 2019-01-11 RX ADMIN — GUAIFENESIN 10 ML: 100 SOLUTION ORAL at 10:49

## 2019-01-11 ASSESSMENT — ACTIVITIES OF DAILY LIVING (ADL)
ADLS_ACUITY_SCORE: 29
ADLS_ACUITY_SCORE: 27
ADLS_ACUITY_SCORE: 29
ADLS_ACUITY_SCORE: 27

## 2019-01-11 NOTE — PLAN OF CARE
VSS on 1 LPM NC with humidification- pt was refusing tent and RT confirmed NC was okay to use. Pt disoriented to place, time, and situation. Bed alarm on.  at bedside for part of shift- will return tomorrow morning. Pt had psych consult today- MD ordered EEG, MRI, and CT. CT completed- see results. MRI scheduled for sometime tomorrow. Pt has PIV SL. RN gave magic mouthwash once on shift- pt complaining of mouth and throat pain. Pt up to commode once on shift- good urine output and pt had one loose small BM. Will continue to monitor and follow POC.

## 2019-01-11 NOTE — PLAN OF CARE
[id number]:   Pt is VSS on 2LPM NC with humidification. Pt has low grade temp, TMax 100.2F. Pt disoriented to place time and situation. Pt up to the commode x1 this shift. 1 Small BM. PRN robitussin given x1. Pt frequently removed NC and pulse ox monitor. Will continue to monitor.

## 2019-01-11 NOTE — PLAN OF CARE
"Discharge Planner PT   Patient plan for discharge: TCU  Current status: Pt in/out of lucidity, presents with dementia-like symptoms.  Needs step by step cues and extra time for tasks.Bed mobility with Yasmani, sit<>stand transfer with Yasmani; not safe to ambulate today d/t pt resisting and reporting, \"I'm done.\"    Barriers to return to prior living situation: medical status; below baseline function  Recommendations for discharge: TCU  Rationale for recommendations: Pt is below baseline; she is demonstrating slow progression while in hospital in functional strength and mobility.  Pt is able to follow directions but requires extra time and repeated requests with step by step cues to complete task.       Entered by: Nery Connor 01/11/2019 1:13 PM       "

## 2019-01-11 NOTE — PLAN OF CARE
Pt admitted for sepsis. Alert and oriented to person and place only. Pt confused and hallucinating at times. Unable to verbally express needs at times. VSS on RA. Complaints of throat pain, Magic Mouthwash given with some relief. No complaints of nausea. Frequent, nonproductive cough, prn robitussin given once with little relief. Had difficulty swallowing pills this late AM, repeated swallowing and needs encouragement and direction. Poor appetite on mechanical soft diet, Boosts added as snacks in between meals. Replace K level of 3.3 with 60mEq, recheck at 1700 and next AM. EEG completed today. MRI attempted but pt would not remain still, MD notified. Voiding WDL, up with assist of 1-2 staff. One loose BM this shift, Miconazole powder applied to groin. EENT saw today, attempted to remove packing however nares were to dry. Continued Ocean spray q 2hrs.  at bedside encouraging pt to eat. Bed alarm in place. Will continue with POC.

## 2019-01-11 NOTE — PROGRESS NOTES
Calorie Count  Intake recorded for: 1/10  Total Kcals: 754 Total Protein: 26g  Kcals from Hospital Food: 754  Protein: 26g  Kcals from Outside Food (average):0 Protein: 0g  # Meals Recorded: less than 25% carton of milk  # Supplements Recorded: 100% 1.5 Boost Shakes

## 2019-01-11 NOTE — PROGRESS NOTES
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      Changes:  - continue to hold antibiotics for now  - swallow eval appreciated  - psych consult appreciated  - neuro consult on 1/10: getting1 hr EEG and MRI w and w/o contrast today    #  aortic insufficiency with hypervolemia  Secondary to volume resuscitation. Dyspneic and labored when moving back to bed from chair. Not hypoxic. Lung US shows diffuse B lines  - diuresed on 1/7. Euvolemic at this time. Can re-dose lasix if dyspneic. Gave x 1 on 11/7, 11/9. Satting 94-96% RA  - improving volume status  - echo on 1/5: Calcified aortic valve with moderate-to-severe aortic stenosis (peak velocity  4.2 m/s, mean gradient 41 mmHg, DVI 0.31, PATRICIA 1.1 cm2, SVI 46 mL/m2) and  moderate aortic insufficiency. Aortic insufficiency is likely contributing  somewhat to increased aortic valve gradient.  This study was compared with the study from 4/23/18: AS has worsened slightly  (peak velocity of 4.2 m/s, previously 3.8 m/s.)    # Possible septic angie:   Presented with hypothermia (94.2) , hypotension (BP 79/41), leukocytosis, elevated lactate (4.6 at OSH). Mild pyuria. CXR clear. Blood and urine cx NGTD. C diff negative. Called [facility] 1/7 and blood and urine cx (prior to start of antibiotics) are both no growth to date. Initial CRP and procal low. However CRP up to 70 on 1/7.  - Discontinued vanco  - Repeated CXR, stable  - ID consult for abx  recommendations appreciated.  - stopped antibiotics  - Trend CRP and procal in AM  - UA repeated. Pending blood cultures, urine culture pending. CXR unremarkable other than some pulm edema  - no clear source. Consider LP if worsening mental status persists    # Acute blood loss anemia  # Iron deficiency anemia  # Melena  Likely due to epistaxis. Hgb on admission 8.3 from 10.3 after multiple nose bleeds. Initially thought that epistaxis could explain bleed but then hgb continued to drop to 6.9 despite no further episodes of epistaxis. Though curiously melena resolved as well. Underwent EGD 1/6 which was negative for any cause of bleeding. Hgb has been stable above 7.  Iron 21. Ferritin 17. Iron sat 5%.  - switch back to home daily protonix  - trend hgb  - Will start oral iron every other day.    # Epistaxis  History of nasosinus melanoma (in remission). Seen by ENT and no evidence of recurrence of malignancy. with the below recs  - Dissolvable packing in place. This will dissolve on its own. No need for removal.   - No antibiotics for the nasal packing (surgicel)  - There may be some slow dark red oozing after the packing was placed - this is normal and likely from the Afrin and the Surgiflo  - Nasal saline spray to bilateral nasal cavities q2h daily starting in 48 hours (keeps the packing moist and will help with removal)  - If supplemental O2 is required, add humidifier (RT should be contacted to supply this)  - Humidifier to the room to increase the moisture in the air  - If patient experiences brisk bleeding, spray Afrin along the floor of the nose and hold digital pressure on bilateral nostrils for 20 minutes (holding pressure higher up on the nasal bones does not apply effective pressure to the septum. Instead, the pressure should be applied on the soft part of the nose do that the opening of the nostrils are completely pinched shut).  Nasal clips do not work and should not be used in place of digital  pressure. If bleeding continues after 20 minutes of digital pressure, repeat the above steps. If this fails to control the bleeding at that time, call ENT for further recommendations  - attempted to provide humidification via faceshield at ENT request but pt kept pulling it off    # Acute encephalopathy on chronic confusion--likely dementia   Acute encephalopathy likely due to sepsis. Appears to have some degree of chronic dementia ongoing over months/years. In considering differential for AMS workup undertaken by SICU. No obvious metabolic derangements. TSH normal. Labs/vitals not consistent with adrenal insufficiency, no evidence of seizure activity, no evidence of ingestion/tox. Evaluated by OT and did very poorly on cognitive testing  - was scheduled for outpt neurology eval and MRI but has not yet gotten to this appointment. Planning to defer to outpatient when she is back to her baseline  - consulted neurology. head CT discussed with ENT on 1/11. Will f/u MRI results, help with post discharge plans  - MRI pending  - EEG pending.   - reviewed reccs from neuro, psych    # DAVID due to sepsis:  Mild. Baseline Cr around 0.7. 1.0 on admission. Likely due to hypotension/sepsis. Resolving.   - Continue hydration until able to take PO  - Repeat Cr in am. Improved on 1/8    # Hypertension:  - restarted lisinopril on 1/7  - restarted hydrochlorothiazide  - much better controlled    # HLD: continue atorvastatin    # Depression:  Continue wellbutrin and effexor    # FEN:   Regular as tolerated  Stop IVF    Consulting Services: ENT, GI, neurology, psych    CODE: full  DVT: SCDs, anticoagulation contraindicated due to bleeding    Disposition Plan   Expected discharge in 2-3 days to rehab once hemodynamically stable, infection ruled out.     Entered: Andrew Moreno 01/11/2019, 10:33 AM       Pt's care was discussed with bedside RN, patient, and during Care Team Rounds.    ANDREW MORENO MD, [facility]  Internal Medicine  Hospitalist & Staff Physician  [facility]  Pager: [phone]  [email or web address]    Team: Medicine Gold 9  Page Cross Cover after 5 pm: pager [phone]     ___________________________________________________________________    Subjective & Interval Hx:    Dyspnea resolved. Less cough. No complaints today. No CP. Working with PT today. Denies fevers.     Last 24 hr care team notes reviewed.   ROS:  4 point ROS including Respiratory, CV, GI and , other than that noted in the HPI, is negative      Medications: Reviewed in EPIC. List below for reference    Physical Exam:    Blood pressure 127/56, pulse 94, temperature 99.6  F (37.6  C), temperature source Oral, resp. rate 20, weight 82.2 kg (181 lb 3.2 oz), SpO2 100 %, not currently breastfeeding.    General: awake, alert, lying in bed, comfortable, confused  HEENT: MMM, no epistaxis, NCAT  Chest/Resp: CTAB  Heart/CV: RRR, no MRG noted, trace LE in LE bilaterally  Abdomen/GI: soft,  mildly tender, mildly distended, +BS  Extremities/MSK: no swelling or erythema of joints  Skin: no rash or jaundice noted  Neuro/Psych: awake, alert, very confused. hallucinating    Lines/Tubes:   Peripheral IV 02/22/17 Left Lower forearm (Active)   Number of days: 682       Peripheral IV 02/22/17 Right Hand (Active)   Number of days: 682       Peripheral IV 12/12/18 Right Lower forearm (Active)   Number of days: 24       Peripheral IV 01/05/19 Left (Active)   Site Assessment WDL 1/5/2019  8:00 AM   Line Status Saline locked 1/5/2019  8:00 AM   Phlebitis Scale 0-->no symptoms 1/5/2019  8:00 AM   Infiltration Scale 0 1/5/2019  8:00 AM   Extravasation? No 1/5/2019  5:00 AM   Number of days: 0       Peripheral IV 01/05/19 Right (Active)   Site Assessment WDL 1/5/2019  8:00 AM   Line Status Infusing 1/5/2019  8:00 AM   Phlebitis Scale 0-->no symptoms 1/5/2019  8:00 AM   Infiltration Scale 0 1/5/2019  8:00 AM   Extravasation? No 1/5/2019  5:00 AM   Number of days: 0       Labs  & Studies of Note:  I personally reviewed all labs and imaging.     Unresulted Labs Ordered in the Past 30 Days of this Admission     Date and Time Order Name Status Description    1/9/2019 2248 Urine Culture Aerobic Bacterial Preliminary     1/9/2019 1622 Blood culture Preliminary     1/9/2019 1622 Blood culture Preliminary

## 2019-01-12 ENCOUNTER — ALLIED HEALTH/NURSE VISIT (OUTPATIENT)
Dept: NEUROLOGY | Facility: CLINIC | Age: 79
DRG: 871 | End: 2019-01-12
Attending: PSYCHIATRY & NEUROLOGY
Payer: MEDICARE

## 2019-01-12 DIAGNOSIS — R56.9 SEIZURE-LIKE ACTIVITY (H): Primary | ICD-10-CM

## 2019-01-12 LAB — POTASSIUM SERPL-SCNC: 3.5 MMOL/L (ref 3.4–5.3)

## 2019-01-12 PROCEDURE — 25000132 ZZH RX MED GY IP 250 OP 250 PS 637: Mod: GY | Performed by: INTERNAL MEDICINE

## 2019-01-12 PROCEDURE — A9270 NON-COVERED ITEM OR SERVICE: HCPCS | Mod: GY | Performed by: INTERNAL MEDICINE

## 2019-01-12 PROCEDURE — 25000132 ZZH RX MED GY IP 250 OP 250 PS 637: Mod: GY | Performed by: STUDENT IN AN ORGANIZED HEALTH CARE EDUCATION/TRAINING PROGRAM

## 2019-01-12 PROCEDURE — 12000001 ZZH R&B MED SURG/OB UMMC

## 2019-01-12 PROCEDURE — A9270 NON-COVERED ITEM OR SERVICE: HCPCS | Mod: GY | Performed by: STUDENT IN AN ORGANIZED HEALTH CARE EDUCATION/TRAINING PROGRAM

## 2019-01-12 PROCEDURE — 25000132 ZZH RX MED GY IP 250 OP 250 PS 637: Mod: GY | Performed by: NURSE PRACTITIONER

## 2019-01-12 PROCEDURE — 36415 COLL VENOUS BLD VENIPUNCTURE: CPT | Performed by: INTERNAL MEDICINE

## 2019-01-12 PROCEDURE — 95951 ZZHC EEG VIDEO EACH 24 HR: CPT | Mod: ZF

## 2019-01-12 PROCEDURE — 84132 ASSAY OF SERUM POTASSIUM: CPT | Performed by: INTERNAL MEDICINE

## 2019-01-12 PROCEDURE — A9270 NON-COVERED ITEM OR SERVICE: HCPCS | Mod: GY | Performed by: NURSE PRACTITIONER

## 2019-01-12 PROCEDURE — 99233 SBSQ HOSP IP/OBS HIGH 50: CPT | Performed by: INTERNAL MEDICINE

## 2019-01-12 RX ORDER — MULTIPLE VITAMINS W/ MINERALS TAB 9MG-400MCG
1 TAB ORAL DAILY
Status: DISCONTINUED | OUTPATIENT
Start: 2019-01-12 | End: 2019-01-25 | Stop reason: HOSPADM

## 2019-01-12 RX ORDER — HYDROXYZINE HYDROCHLORIDE 10 MG/1
10-20 TABLET, FILM COATED ORAL 3 TIMES DAILY PRN
Status: DISCONTINUED | OUTPATIENT
Start: 2019-01-12 | End: 2019-01-18

## 2019-01-12 RX ORDER — NYSTATIN 100000/ML
500000 SUSPENSION, ORAL (FINAL DOSE FORM) ORAL 4 TIMES DAILY
Status: DISCONTINUED | OUTPATIENT
Start: 2019-01-12 | End: 2019-01-25 | Stop reason: HOSPADM

## 2019-01-12 RX ADMIN — LISINOPRIL 30 MG: 20 TABLET ORAL at 07:54

## 2019-01-12 RX ADMIN — BUPROPION HYDROCHLORIDE 150 MG: 150 TABLET, FILM COATED, EXTENDED RELEASE ORAL at 07:57

## 2019-01-12 RX ADMIN — Medication 500000 UNITS: at 16:05

## 2019-01-12 RX ADMIN — Medication 500000 UNITS: at 11:12

## 2019-01-12 RX ADMIN — SALINE NASAL SPRAY 1 SPRAY: 1.5 SOLUTION NASAL at 07:53

## 2019-01-12 RX ADMIN — SENNOSIDES AND DOCUSATE SODIUM 1 TABLET: 8.6; 5 TABLET ORAL at 07:58

## 2019-01-12 RX ADMIN — SALINE NASAL SPRAY 1 SPRAY: 1.5 SOLUTION NASAL at 11:12

## 2019-01-12 RX ADMIN — SALINE NASAL SPRAY 1 SPRAY: 1.5 SOLUTION NASAL at 13:05

## 2019-01-12 RX ADMIN — SALINE NASAL SPRAY 1 SPRAY: 1.5 SOLUTION NASAL at 16:05

## 2019-01-12 RX ADMIN — SENNOSIDES AND DOCUSATE SODIUM 2 TABLET: 8.6; 5 TABLET ORAL at 20:16

## 2019-01-12 RX ADMIN — GUAIFENESIN 10 ML: 100 SOLUTION ORAL at 08:10

## 2019-01-12 RX ADMIN — Medication 500000 UNITS: at 20:20

## 2019-01-12 RX ADMIN — MULTIPLE VITAMINS W/ MINERALS TAB 1 TABLET: TAB at 11:12

## 2019-01-12 RX ADMIN — GUAIFENESIN 10 ML: 100 SOLUTION ORAL at 14:00

## 2019-01-12 RX ADMIN — HYDROCHLOROTHIAZIDE 25 MG: 25 TABLET ORAL at 08:00

## 2019-01-12 RX ADMIN — ATORVASTATIN CALCIUM 20 MG: 20 TABLET, FILM COATED ORAL at 20:16

## 2019-01-12 RX ADMIN — MINERAL SUPPLEMENT IRON 300 MG / 5 ML STRENGTH LIQUID 100 PER BOX UNFLAVORED 500 MG: at 11:12

## 2019-01-12 RX ADMIN — SALINE NASAL SPRAY 1 SPRAY: 1.5 SOLUTION NASAL at 04:18

## 2019-01-12 RX ADMIN — Medication 500000 UNITS: at 13:05

## 2019-01-12 RX ADMIN — PANTOPRAZOLE SODIUM 40 MG: 40 TABLET, DELAYED RELEASE ORAL at 07:54

## 2019-01-12 RX ADMIN — SALINE NASAL SPRAY 1 SPRAY: 1.5 SOLUTION NASAL at 20:16

## 2019-01-12 RX ADMIN — MICONAZOLE NITRATE: 2 POWDER TOPICAL at 08:00

## 2019-01-12 RX ADMIN — ACETAMINOPHEN 650 MG: 325 TABLET, FILM COATED ORAL at 08:10

## 2019-01-12 RX ADMIN — RANITIDINE 150 MG: 150 TABLET ORAL at 08:00

## 2019-01-12 RX ADMIN — SALINE NASAL SPRAY 1 SPRAY: 1.5 SOLUTION NASAL at 14:02

## 2019-01-12 RX ADMIN — FUROSEMIDE 40 MG: 40 TABLET ORAL at 07:57

## 2019-01-12 RX ADMIN — GUAIFENESIN 10 ML: 100 SOLUTION ORAL at 20:16

## 2019-01-12 RX ADMIN — SALINE NASAL SPRAY 1 SPRAY: 1.5 SOLUTION NASAL at 04:17

## 2019-01-12 RX ADMIN — SALINE NASAL SPRAY 1 SPRAY: 1.5 SOLUTION NASAL at 17:59

## 2019-01-12 RX ADMIN — VENLAFAXINE HYDROCHLORIDE 225 MG: 150 CAPSULE, EXTENDED RELEASE ORAL at 07:57

## 2019-01-12 RX ADMIN — RANITIDINE 150 MG: 150 TABLET ORAL at 20:16

## 2019-01-12 ASSESSMENT — ACTIVITIES OF DAILY LIVING (ADL)
ADLS_ACUITY_SCORE: 29

## 2019-01-12 NOTE — PLAN OF CARE
5A PT - cancel. Attempted pt in AM however, pt requiring A x 2 and PT was unable to get additional assistance therefore, PT checked back in PM however, there was multiple visitors and providers in room. Will reschedule for Monday 1/14. Encourage supine <> EOB multiple times a day and getting to chair with A x 2 + FWW + gait belt.

## 2019-01-12 NOTE — PROCEDURES
Procedure Date: 01/11/2019      EEG #       DATE OF RECORDING/SERVICE DATE:  01/11/2019       SOURCE FILE DURATION:  25 minutes      This is a routine EEG with no video.      PATIENT INFORMATION:  A 78-year-old female presents with a possible history of dementia presented hypothermic and hypotensive.  Since hospital admission, the patient has become more progressively encephalopathic.  EEG is being done to evaluate for seizures.      MEDICATIONS:  Wellbutrin, Lipitor, lisinopril, Effexor, Robitussin.     TECHNICAL SUMMARY: This EEG procedure was performed with 23 scalp electrodes in 10-20 system placements, and additional scalp, precordial and other surface electrodes used for electrical referencing and artifact detection.      BACKGROUND:  In the fully awake state, the patient has a 6 Hz parieto-occipital rhythm.  There is diffuse generalized delta-theta slowing with maximum slowing noted in the right temporal region.  The patient is awake for much of the file and we do not see sleep architecture.  The patient does briefly fall asleep and does have vertex waves and symmetric sleep spindles.      ACTIVATION PROCEDURES:  Photic stimulation and hyperventilation was not done.      EPILEPTIFORM DISCHARGES:  Patient does not have well-formed epileptiform discharges, but this may also be due to a lot of movement artifact and electromyogenic artifacts throughout the EEG recording and difficulty in defining focal discharges.      ICTAL:  The patient had an event at 09:40, during which time the EEG has a rhythmic theta discharge that appears to be higher voltage with very subtle spread to the right central region.  This spell is concerning for an electrographic seizure.  There is no video, therefore I am not able to analyze the clinical semiology, however, during this time, the technologist did ask the patient questions and there was no response.  She did not know what month she was born.  She was asked to give the  technologist her hands and she did not, so there was a transient alteration in awareness, it seems, which is concerning for an electroclinical seizure.  After this event, the patient continued to have slowing with some sharply-contoured theta discharge on the right, which may possibly be epileptiform discharges.  Again, there is a lot of electromyogenic artifact, making some segments of this EEG difficult to interpret.      IMPRESSION:  Awake and sleep EEG is abnormal due to the presence of 1 questionable right temporal lobe seizure.  Clinically, the patient was not responsive and appeared confused on technologist testing.  Additionally, there is sharply-contoured theta activity in the right hemisphere concerning for hemispheric epileptiform discharges.  This EEG has a lot of electromyogenic artifact and it is a short EEG with no video.  A longer video EEG should be given.  Clinical correlation is advised.         MARIA LUZ PAULA MD             D: 2019   T: 2019   MT: PAMELA      Name:     ALEX ANAYA   MRN:      [id number]        Account:        [id number]   :      1940           Procedure Date: 2019      Document: [id number]

## 2019-01-12 NOTE — PLAN OF CARE
Mental status this evening labile.  Late afternoon patient was restless and fearful.  After a nap, she became very directable, pleasant, and cooperative.  Persistent cough, Robitussin given. 24 hour EEG monitoring started this pm. Patient tolerating well.  Potassium level recheck this evening and was normal.  Triggered sepsis at HS, lactate drawn and normal.  Continue to monitor.

## 2019-01-12 NOTE — PROGRESS NOTES
[facility], [place]   Neurology Consult Progress Note   Gabino Jones  [id number]  01/12/2019    Subjective: Waxing waning mental status with notes indicated fearful and restless yesterday night and then pleasant, redirectable and cooperative after a nap. This morning, eating breakfast, alert to self and year, but not situation, hospital or month. She is pleasant and cooperative this morning. No hallucinations overnight.     Objective   /40 (BP Location: Left arm)   Pulse 88   Temp 97.4  F (36.3  C) (Axillary)   Resp 18   Wt 82.2 kg (181 lb 3.2 oz)   SpO2 96%   BMI 30.15 kg/m    General: No acute distress eating breakfast   HEENT: Normocephalic atraumatic  Cardiac: RRR  Chest: Wheezes over left anterior lung fields  Extremities: No significant lower remedy edema  Skin: Petechial rashes on bilateral arms  Psych: No hallucinations    Neuro:  Mental status: Awake, alert, responsive to questions.  Oriented to self, year.  Not to month or situation leading to hospitalization.  She intermittently follows simple commands including stick out your tongue and show me your thumb, but not show me number of fingers unspecified hand.  Decreased hearing may be in part related to some of her responsiveness  Cranial nerves: Blinks to threat bilaterally.  PERRL.  Does not follow finger with eyes, but looks laterally completely burying sclera on request.  Face is symmetric.  Reduced hearing.   Motor: Does not participate in formal strength exam.  Observed to be antigravity in bilateral arms while eating.  Able to bilateral leg antigravity briefly.   Sensory: Intact to gentle noxious stimulus  Coordination: Does not complete finger-nose-finger or heel-knee-shin  Gait: Not observed    Investigations    WBC 11.1, Hgb 7.4, Plt 314   UA WBC 18, LE moderate, nitite neg.   Urine culture 10 - 50k E. Coli     UA  Recent Labs   Lab 01/09/19  4654   COLOR Yellow   APPEARANCE Slightly Cloudy   URINEGLC  Negative   URINEBILI Negative   URINEKETONE Negative   SG 1.014   UBLD Trace*   URINEPH 6.5   PROTEIN 10*   NITRITE Negative   LEUKEST Moderate*   RBCU 12*   WBCU 18*     Micro   Recent Labs   Lab 01/09/19  2248   SDES Midstream Urine   SREQ Specimen received in preservative   CULT 10,000 to 50,000 colonies/mL  Escherichia coli  *  10,000 to 50,000 colonies/mL  mixed urogenital temo  Susceptibility testing not routinely done       Radiological Data  Recent Results (from the past 48 hour(s))   CT Head w/o Contrast    Narrative    CT HEAD W/O CONTRAST 1/10/2019 3:19 PM    Provided History: Altered level of consciousness (LOC), unexplained;  h/o melanoma. r/o new mass  ICD-10: Altered mental status    Comparison: MR 9/12/2018. CT 2/24/2014.    Technique: Using multidetector thin collimation helical acquisition  technique, axial, coronal and sagittal CT images from the skull base  to the vertex were obtained without intravenous contrast.     Findings:    Postsurgical changes of a right ethmoidectomy, partial sphenoidectomy  and right medial laura antrostomy. New opacification of the  remaining right sphenoid locule with mixed density material. Layering  material present in the left sphenoid locule and maxillary sinuses.    Postsurgical changes of a right frontal craniotomy. Stable frontal  cuneiform plate meningioma. No intracranial hemorrhage, mass effect,  or midline shift. The ventricles are proportionate to the cerebral  sulci. The gray to white matter differentiation of the cerebral  hemispheres is preserved. The basal cisterns are patent. Moderate  leukoaraiosis and moderate generalized parenchyma volume loss.     The mastoid air cells are clear.       Impression    Impression:   1. Postsurgical nasal surgery changes. Hyperdense material within the  right sphenoid locule, favored to be complex mucosal products of acute  sinusitis. However, direct visualization may be required as  hemorrhagic products from a  recurrent melanoma lesion may have a  similar appearance.  2. Stable frontal cuneiform plate meningioma.    I have personally reviewed the examination and initial interpretation  and I agree with the findings.    JOHNATHON ARIZMENDI MD     EEG  1/11/18 AM  IMPRESSION:  Awake and sleep EEG is abnormal due to the presence of 1 questionable right temporal lobe seizure.  Clinically, the patient was not responsive and appeared confused on technologist testing.  Additionally, there is sharply-contoured theta activity in the right hemisphere concerning for hemispheric epileptiform discharges.  This EEG has a lot of electromyogenic artifact and it is a short EEG with no video.  A longer video EEG should be given.  Clinical correlation is advised.     1/11/18 PM  Preliminary vEEG  This EEG is abnormal due to the presences of continuous generalized polymorphic delta/theta slowing with maximum slowing noted in the right temporal region. Thus far, patient is not in status epilepticus and no clear seizures. We will record over night. Please test with ROAR Seizure testing for any seizure like spells.     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 here is concern as an outpatient that she may have dementia (Alzheimer's or possibly Lewy Body), which has not been formally evaluated.  Most likely her underlying cognitive impairment is exacerbated by being in the hospital, but we do need to rule out intracranial disease and subclinical seizures. Since her clinical course has fluctuated, even multiple times during the same time, sometimes returning to baseline, which argues against a paraneoplastic or autoimmune etiology.  EEG noteworthy for questionable seizure  on initially recording, but no seizures or epileptiform activity in longer recording. Reasonable to leave on for another full day to ensure no additional questionable findings.   - Continue vEEG for today consider discontinuing tomorrow   - Recommend brain MRI with and without contrast   - Delirium precautions       Patient was seen and discussed with Dr. Johnson.     Samantha Barlow MD   Neurology Resident PGY2

## 2019-01-12 NOTE — PROGRESS NOTES
"ENT PROGRESS NOTE:  1/11/2018    S: Patient has had progressive decline in her mental status. No further epistaxis. Neurology and Psychiatry were both consulted and recommended a brain MRI to rule out metastases or other structural abnormalities. A CT head demonstrated fluid within the right maxillary and bilateral sphenoid sinuses. She has remained afebrile but her WBC is mildly elevated and her CRP has progressively risen. No definite source of infection (CXR clear, mild pyuria, blood and urine cx NGTD). Primary team plans to contact ID for further guidance.    O:  /52 (BP Location: Left arm)   Pulse 88   Temp 98.7  F (37.1  C) (Oral)   Resp 24   Wt 82.2 kg (181 lb 3.2 oz)   SpO2 94%   BMI 30.15 kg/m      Gen: Layingin bed, disoriented, asking nonsensical questions  Resp: nonlabored breathing on room air  Neuro: Does not follow commands or respond appropriately to questions  Face: HB1 bilaterally. No swelling, erythema or skin breakdown  Nose: See scope note. No epistaxis or rhinorrhea  Mouth: MMM, patient withdraws when palate is poked with a q-tip  Neck: No palpable LAD    Scope Note:  Due to history of melanoma, rigid nasal endoscopy was indicated. After obtaining verbal consent from her  the rigid sinus scope was inserted into her right nasal cavity. The cavity was filled with dark brown crusts that appear to be surgicel mixed with nasal secretions. Attempts were made to remove the crusts but they were firmly adherent and did not loosen with saline spray. The crusts were not removed due to the risk of bleeding. The scope could not be passed posteriorly due to obstruction by the crusts. However no purulence was seen.     LABS:    CRP: 9.2 (1/5) -> 74 (1/7) -> 160 (1/9)    CBC RESULTS:   Recent Labs   Lab Test 01/11/19  0613   WBC 11.1*   RBC 2.88*   HGB 7.4*   HCT 24.3*   MCV 84   MCH 25.7*   MCHC 30.5*   RDW 17.1*          IMAGING:  CT HEAD W/O CONTRAST 1/10/2019 3:19 PM  \"Impression: " "  1. Postsurgical nasal surgery changes. Hyperdense material within the right sphenoid locule, favored to be complex mucosal products of acute sinusitis. However, direct visualization may be required as hemorrhagic products from a recurrent melanoma lesion may have a similar appearance.  2. Stable frontal cuneiform plate meningioma.\"    A/P:  79yo female with dementia and history of sinonasal melanoma (no evidence of recurrence on last scan, discharged from Heme/Onc clinic) who was admitted for sepsis, and anemia due to epistaxis. Her epistaxis resolved but no cause for her sepsis has been identified and her mental status continues to decline. A CT scan demonstrated fluid within the maxillary and sphenoid sinuses, which likely represents blood products from her large epistaxis She had a recent CT scan prior to admission with no signs of recurrence.    - Agree with Brain MRI  - Aggressive nasal hygiene      - attempt to keep humidity on at all times      - begin nasal saline irrigations BID      - continue nasal saline spray q2h      - humidifier to patient room  - If patient experiences brisk bleeding, spray Afrin along the floor of the nose and hold digital pressure on bilateral nostrils for 20 minutes (holding pressure higher up on the nasal bones does not apply effective pressure to the septum. Instead, the pressure should be applied on the soft part of the nose do that the opening of the nostrils are completely pinched shut).  Nasal clips do not work and should not be used in place of digital pressure. If bleeding continues after 20 minutes of digital pressure, repeat the above steps. If this fails to control the bleeding at that time, call ENT for further recommendations.    Patient discussed with Dr. Jabari Gale MD  Otolaryngology-Head & Neck Surgery PGY2              "

## 2019-01-12 NOTE — PROCEDURES
Procedure Date: 01/11/2019      EEG #-1       DATE OF RECORDING/SERVICE DATE:  Video EEG day 1 on 01/11/2019.      SOURCE FILE DURATION:  3 hours and 13 minutes.      PATIENT INFORMATION:  A 78-year-old female with a history of mucosal malignant melanoma, presents with altered mental status.  EEG is being done to evaluate for seizures.  The patient is not on an anti-seizure medication.     TECHNICAL SUMMARY: This video EEG monitoring procedure was performed with 23 scalp electrodes in 10-20 system placements, and additional scalp, precordial and other surface electrodes used for electrical referencing and artifact detection. Video was reviewed intermittently by EEG technologist and physician for electroclinical seizures.      BACKGROUND:  The patient has diffuse generalized delta-theta slowing.  There is a parietooccipital rhythm up to 6 Hz in frequency.  There are superimposed higher voltage delta slowing with maximum slowing noted in the right temporal region.  The patient does have episodes of rhythmic delta activity noted in the right temporal chain.  This does not seem to evolve.  A good example of this is at 23:51:03.  During this time, the patient is lying in bed, yet it is not clear.  There is no behavioral testing or interaction.  This is not thought to represent a seizure.  Well-formed sleep architecture was not seen.  We do see some poorly-formed vertex waves.      EPILEPTIFORM DISCHARGES:  None.      ICTAL:  None.      IMPRESSION:  Video EEG day 1 is abnormal due to the presence of diffuse generalized delta-theta slowing with maximum rhythmic delta slowing in the right temporal chain.  These findings are consistent with a moderate encephalopathy with maximum cortical dysfunction in the right temporal region.  No electrographic seizures or epileptiform discharges are seen.  Clinical correlation is advised.         MARIA LUZ PAULA MD             D: 01/12/2019   T: 01/12/2019   MT: CHRISTIANA      Name:      ALEX ANAYA   MRN:      [id number]        Account:        [id number]   :      1940           Procedure Date: 2019      Document: [id number]

## 2019-01-12 NOTE — PLAN OF CARE
SLP: Treatment session cancelled - pt refused PO trials this AM and working with other providers on later attempt. Will continue to follow per POC.

## 2019-01-12 NOTE — PROGRESS NOTES
Preliminary Video EEG impression on January 11, 2019 25 minutes     Full report to follow. Please look in inpatient chart, under procedure section.     This EEG is abnormal due to the presences of continuous generalized polymorphic delta/theta slowing with maximum slowing noted in the right temporal region.  There are segments on the EEG during which patient does not answer questions such as the month she was born at 09[birth year] and the EEG does have a rhythmic right temporal lobe discharge concerning for seizure activity.  There was no video ordered on the EEG therefore it is difficult to interpret the semiology.  EEG is consistent with mild encephalopathy with maximum cortical dysfunction in the right temporal lobe.  Patient does have rhythmic temporal delta theta slowing concerning for seizures.  Further long-term video EEG should be considered to better investigate patient's if these are seizures.  The primary medicine team was paged and informed about EEG.     Leidy Cardona MD  Staff Epilepsy Neurologist   [phone]

## 2019-01-12 NOTE — PROGRESS NOTES
Preliminary Video EEG impression on January 11-12, 2019 until 6am     Full report to follow. Please look in inpatient chart, under procedure section.     This EEG is abnormal due to the presences of continuous generalized polymorphic delta/theta slowing with maximum slowing noted in the right temporal region.  EEG is consistent with moderate encephalopathy with maximum cortical dysfunction in the right temporal lobe. Patient does have rhythmic temporal delta slowing, but, no clear evolution was noted. No clear seizure or epileptiform discharges noted, in this day of recording.     Leidy Cardona MD  Staff Epilepsy Neurologist   [phone]

## 2019-01-12 NOTE — PROGRESS NOTES
Preliminary Video EEG impression on January 11, 2019 40 minutes     Full report to follow. Please look in inpatient chart, under procedure section.     This EEG is abnormal due to the presences of continuous generalized polymorphic delta/theta slowing with maximum slowing noted in the right temporal region. Thus far, patient is not in status epilepticus and no clear seizures. We will record over night. Please test with ROAR Seizure testing for any seizure like spells.       Leidy Cardona MD  Staff Epilepsy Neurologist   [phone]

## 2019-01-12 NOTE — PROGRESS NOTES
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      Changes:  - continue to hold antibiotics for now  - ENT did rigid scope, found crust without pus. Gave helpful reccs if she bleeds.  - abnormal EEG and 24 hour vEEG now in progress. No clear evidence of status epillepticus  - MRI w and w/o contrast attempted yesterday unsuccessfully. Will try today again as she's more alert.  - thrush, treating with oral nystatin    #  aortic insufficiency with hypervolemia  Secondary to volume resuscitation. Dyspneic and labored when moving back to bed from chair. Not hypoxic. Lung US shows diffuse B lines  - diuresed on 1/7. Euvolemic at this time. Can re-dose lasix if dyspneic. Gave x 1 on 11/7, 11/9. Satting 94-96% RA  - improving volume status  - echo on 1/5: Calcified aortic valve with moderate-to-severe aortic stenosis (peak velocity  4.2 m/s, mean gradient 41 mmHg, DVI 0.31, PATRICIA 1.1 cm2, SVI 46 mL/m2) and  moderate aortic insufficiency. Aortic insufficiency is likely contributing  somewhat to increased aortic valve gradient.  This study was compared with the study from 4/23/18: AS has worsened slightly  (peak velocity of 4.2 m/s, previously 3.8 m/s.)  - euvolemic today, though poor PO intake    # Possible septic angie:   Presented with hypothermia (94.2) , hypotension (BP 79/41), leukocytosis, elevated lactate (4.6 at OSH). Mild pyuria. CXR clear. Blood and urine cx NGTD. C diff negative. Called  [facility] 1/7 and blood and urine cx (prior to start of antibiotics) are both no growth to date. Initial CRP and procal low. However CRP up to 70 on 1/7.  - Discontinued vanco  - Repeated CXR, stable  - ID consult for abx recommendations appreciated.  - stopped antibiotics  - Trend CRP and procal in AM  - UA repeated. Pending blood cultures, urine culture pending. CXR unremarkable other than some pulm edema  - no clear source. Consider LP if worsening mental status persists    # Acute blood loss anemia  # Iron deficiency anemia  # Melena  Likely due to epistaxis. Hgb on admission 8.3 from 10.3 after multiple nose bleeds. Initially thought that epistaxis could explain bleed but then hgb continued to drop to 6.9 despite no further episodes of epistaxis. Though curiously melena resolved as well. Underwent EGD 1/6 which was negative for any cause of bleeding. Hgb has been stable above 7.  Iron 21. Ferritin 17. Iron sat 5%.  - switch back to home daily protonix  - trend hgb  - Will start oral iron every other day.    # Epistaxis  History of nasosinus melanoma (in remission). Seen by ENT and no evidence of recurrence of malignancy. with the below recs  - Dissolvable packing in place. This will dissolve on its own. No need for removal.   - No antibiotics for the nasal packing (surgicel)  - There may be some slow dark red oozing after the packing was placed - this is normal and likely from the Afrin and the Surgiflo  - Nasal saline spray to bilateral nasal cavities q2h daily starting in 48 hours (keeps the packing moist and will help with removal)  - If supplemental O2 is required, add humidifier ([facility] should be contacted to supply this)  - Humidifier to the room to increase the moisture in the air  - If patient experiences brisk bleeding, spray Afrin along the floor of the nose and hold digital pressure on bilateral nostrils for 20 minutes (holding pressure higher up on the nasal bones does not apply effective pressure to  the septum. Instead, the pressure should be applied on the soft part of the nose do that the opening of the nostrils are completely pinched shut).  Nasal clips do not work and should not be used in place of digital pressure. If bleeding continues after 20 minutes of digital pressure, repeat the above steps. If this fails to control the bleeding at that time, call ENT for further recommendations  - attempted to provide humidification via faceshield at ENT request but pt kept pulling it off  - see ENT consult. Appreciated.    # Acute encephalopathy on chronic confusion--likely dementia   Acute encephalopathy likely due to sepsis. Appears to have some degree of chronic dementia ongoing over months/years. In considering differential for AMS workup undertaken by SICU. No obvious metabolic derangements. TSH normal. Labs/vitals not consistent with adrenal insufficiency, no evidence of seizure activity, no evidence of ingestion/tox. Evaluated by OT and did very poorly on cognitive testing  - was scheduled for outpt neurology eval and MRI but has not yet gotten to this appointment. Planning to defer to outpatient when she is back to her baseline  - consulted neurology. head CT discussed with ENT on 1/11. Will f/u MRI results, help with post discharge plans  - MRI pending  - EEG pending.   - reviewed reccs from neuro, psych    # DAVID due to sepsis:  Mild. Baseline Cr around 0.7. 1.0 on admission. Likely due to hypotension/sepsis. Resolving.   - Continue hydration until able to take PO  - Repeat Cr in am. Improved on 1/8    # Hypertension:  - restarted lisinopril on 1/7  - restarted hydrochlorothiazide  - much better controlled    # HLD: continue atorvastatin    # Depression:  Continue wellbutrin and effexor    # FEN:   Regular as tolerated  Stop IVF    Consulting Services: ENT, GI, neurology, psych, speech    CODE: full  DVT: SCDs, anticoagulation contraindicated due to bleeding    Disposition Plan   Expected discharge in 2-3  days to rehab once hemodynamically stable, infection ruled out.     Entered: Andrew Moreno 01/12/2019, 10:13 AM       Pt's care was discussed with bedside RN, patient, and during Care Team Rounds.    ANDREW MORENO MD, [facility]  Internal Medicine Hospitalist & Staff Physician  [facility]  Pager: [phone]  [email or web address]    Team: Medicine [facility] 9  Page Cross Cover after 5 pm: pager [phone]     ___________________________________________________________________    Subjective & Interval Hx:    Dyspnea resolved. Less cough. No complaints today. No CP. Denies fevers. Tongue burns. OK trying MRI again today    Last 24 hr care team notes reviewed.   ROS:  4 point ROS including Respiratory, CV, GI and , other than that noted in the HPI, is negative      Medications: Reviewed in EPIC. List below for reference    Physical Exam:    Blood pressure 113/40, pulse 88, temperature 97.4  F (36.3  C), temperature source Axillary, resp. rate 18, weight 78.4 kg (172 lb 12.8 oz), SpO2 96 %, not currently breastfeeding.    General: awake, alert, lying in bed, comfortable, confused  HEENT: MMM, no epistaxis, NCAT. Thrush on tongue  Chest/Resp: CTAB  Heart/CV: RRR, no MRG noted, trace LE in LE bilaterally  Abdomen/GI: soft,  mildly tender, mildly distended, +BS  Extremities/MSK: no swelling or erythema of joints  Skin: no rash or jaundice noted  Neuro/Psych: awake, alert, very confused. hallucinating    Lines/Tubes:   Peripheral IV 02/22/17 Left Lower forearm (Active)   Number of days: 682       Peripheral IV 02/22/17 Right Hand (Active)   Number of days: 682       Peripheral IV 12/12/18 Right Lower forearm (Active)   Number of days: 24       Peripheral IV 01/05/19 Left (Active)   Site Assessment WDL 1/5/2019  8:00 AM   Line Status Saline locked 1/5/2019  8:00 AM   Phlebitis Scale 0-->no symptoms 1/5/2019  8:00 AM   Infiltration Scale 0 1/5/2019  8:00 AM   Extravasation? No 1/5/2019  5:00 AM   Number of  days: 0       Peripheral IV 01/05/19 Right (Active)   Site Assessment WDL 1/5/2019  8:00 AM   Line Status Infusing 1/5/2019  8:00 AM   Phlebitis Scale 0-->no symptoms 1/5/2019  8:00 AM   Infiltration Scale 0 1/5/2019  8:00 AM   Extravasation? No 1/5/2019  5:00 AM   Number of days: 0       Labs & Studies of Note:  I personally reviewed all labs and imaging.     Unresulted Labs Ordered in the Past 30 Days of this Admission     Date and Time Order Name Status Description    1/9/2019 1622 Blood culture Preliminary     1/9/2019 1622 Blood culture Preliminary

## 2019-01-12 NOTE — PLAN OF CARE
Time: 5060-3365  Reason for admission: Nosebleed/Sepsis   Vitals: VSS  Activity: Independently turns in bed  Pain: denies  Neuro: Alert to self and sometimes place. Disoriented to situation and time.  Cardiac:WNL   Respiratory: On RA Sats 97%  GI/: Voids on the bedside commode. Sometimes incontinent of stool.  Diet:Soft/Mechanical. Supplement provided  Lines: PIV is SL     Patient has a persistent cough. Robitussin is available. 24 hour EEG in place. Pt was awake most of the night. She was directable, but confused. Continue to monitor and follow POC.

## 2019-01-13 ENCOUNTER — APPOINTMENT (OUTPATIENT)
Dept: MRI IMAGING | Facility: CLINIC | Age: 79
DRG: 871 | End: 2019-01-13
Attending: INTERNAL MEDICINE
Payer: MEDICARE

## 2019-01-13 ENCOUNTER — ALLIED HEALTH/NURSE VISIT (OUTPATIENT)
Dept: NEUROLOGY | Facility: CLINIC | Age: 79
DRG: 871 | End: 2019-01-13
Attending: PSYCHIATRY & NEUROLOGY
Payer: MEDICARE

## 2019-01-13 DIAGNOSIS — R56.9 SEIZURE-LIKE ACTIVITY (H): Primary | ICD-10-CM

## 2019-01-13 LAB
CRP SERPL-MCNC: 110 MG/L (ref 0–8)
ERYTHROCYTE [DISTWIDTH] IN BLOOD BY AUTOMATED COUNT: 17 % (ref 10–15)
HCT VFR BLD AUTO: 26.9 % (ref 35–47)
HGB BLD-MCNC: 8.1 G/DL (ref 11.7–15.7)
MCH RBC QN AUTO: 25.8 PG (ref 26.5–33)
MCHC RBC AUTO-ENTMCNC: 30.1 G/DL (ref 31.5–36.5)
MCV RBC AUTO: 86 FL (ref 78–100)
PLATELET # BLD AUTO: 382 10E9/L (ref 150–450)
RBC # BLD AUTO: 3.14 10E12/L (ref 3.8–5.2)
WBC # BLD AUTO: 12 10E9/L (ref 4–11)

## 2019-01-13 PROCEDURE — A9270 NON-COVERED ITEM OR SERVICE: HCPCS | Mod: GY | Performed by: NURSE PRACTITIONER

## 2019-01-13 PROCEDURE — 25000132 ZZH RX MED GY IP 250 OP 250 PS 637: Mod: GY | Performed by: NURSE PRACTITIONER

## 2019-01-13 PROCEDURE — 12000001 ZZH R&B MED SURG/OB UMMC

## 2019-01-13 PROCEDURE — 85027 COMPLETE CBC AUTOMATED: CPT | Performed by: INTERNAL MEDICINE

## 2019-01-13 PROCEDURE — 25500064 ZZH RX 255 OP 636: Performed by: RADIOLOGY

## 2019-01-13 PROCEDURE — A9270 NON-COVERED ITEM OR SERVICE: HCPCS | Mod: GY | Performed by: STUDENT IN AN ORGANIZED HEALTH CARE EDUCATION/TRAINING PROGRAM

## 2019-01-13 PROCEDURE — A9270 NON-COVERED ITEM OR SERVICE: HCPCS | Mod: GY | Performed by: PHYSICIAN ASSISTANT

## 2019-01-13 PROCEDURE — 25000132 ZZH RX MED GY IP 250 OP 250 PS 637: Mod: GY | Performed by: INTERNAL MEDICINE

## 2019-01-13 PROCEDURE — 25000132 ZZH RX MED GY IP 250 OP 250 PS 637: Mod: GY | Performed by: STUDENT IN AN ORGANIZED HEALTH CARE EDUCATION/TRAINING PROGRAM

## 2019-01-13 PROCEDURE — 40000141 ZZH STATISTIC PERIPHERAL IV START W/O US GUIDANCE

## 2019-01-13 PROCEDURE — 25000132 ZZH RX MED GY IP 250 OP 250 PS 637: Mod: GY | Performed by: PHYSICIAN ASSISTANT

## 2019-01-13 PROCEDURE — A9270 NON-COVERED ITEM OR SERVICE: HCPCS | Mod: GY | Performed by: INTERNAL MEDICINE

## 2019-01-13 PROCEDURE — 36415 COLL VENOUS BLD VENIPUNCTURE: CPT | Performed by: INTERNAL MEDICINE

## 2019-01-13 PROCEDURE — 95951 ZZHC EEG VIDEO EACH 24 HR: CPT | Mod: ZF

## 2019-01-13 PROCEDURE — 86140 C-REACTIVE PROTEIN: CPT | Performed by: INTERNAL MEDICINE

## 2019-01-13 PROCEDURE — A9585 GADOBUTROL INJECTION: HCPCS | Performed by: RADIOLOGY

## 2019-01-13 PROCEDURE — 99233 SBSQ HOSP IP/OBS HIGH 50: CPT | Performed by: INTERNAL MEDICINE

## 2019-01-13 PROCEDURE — 70553 MRI BRAIN STEM W/O & W/DYE: CPT

## 2019-01-13 RX ORDER — GADOBUTROL 604.72 MG/ML
0.1 INJECTION INTRAVENOUS ONCE
Status: COMPLETED | OUTPATIENT
Start: 2019-01-13 | End: 2019-01-13

## 2019-01-13 RX ORDER — ALPRAZOLAM 0.25 MG
0.5 TABLET ORAL ONCE
Status: COMPLETED | OUTPATIENT
Start: 2019-01-13 | End: 2019-01-13

## 2019-01-13 RX ORDER — BENZONATATE 100 MG/1
100 CAPSULE ORAL 3 TIMES DAILY PRN
Status: DISCONTINUED | OUTPATIENT
Start: 2019-01-13 | End: 2019-01-25 | Stop reason: HOSPADM

## 2019-01-13 RX ADMIN — Medication 500000 UNITS: at 08:00

## 2019-01-13 RX ADMIN — ALPRAZOLAM 0.5 MG: 0.25 TABLET ORAL at 14:33

## 2019-01-13 RX ADMIN — SALINE NASAL SPRAY 1 SPRAY: 1.5 SOLUTION NASAL at 03:32

## 2019-01-13 RX ADMIN — SENNOSIDES AND DOCUSATE SODIUM 1 TABLET: 8.6; 5 TABLET ORAL at 19:42

## 2019-01-13 RX ADMIN — VENLAFAXINE HYDROCHLORIDE 225 MG: 150 CAPSULE, EXTENDED RELEASE ORAL at 07:32

## 2019-01-13 RX ADMIN — SENNOSIDES AND DOCUSATE SODIUM 1 TABLET: 8.6; 5 TABLET ORAL at 07:35

## 2019-01-13 RX ADMIN — ATORVASTATIN CALCIUM 20 MG: 20 TABLET, FILM COATED ORAL at 19:43

## 2019-01-13 RX ADMIN — SALINE NASAL SPRAY 1 SPRAY: 1.5 SOLUTION NASAL at 18:56

## 2019-01-13 RX ADMIN — LISINOPRIL 30 MG: 20 TABLET ORAL at 07:34

## 2019-01-13 RX ADMIN — Medication 500000 UNITS: at 12:03

## 2019-01-13 RX ADMIN — SALINE NASAL SPRAY 1 SPRAY: 1.5 SOLUTION NASAL at 03:33

## 2019-01-13 RX ADMIN — BUPROPION HYDROCHLORIDE 150 MG: 150 TABLET, FILM COATED, EXTENDED RELEASE ORAL at 07:33

## 2019-01-13 RX ADMIN — SALINE NASAL SPRAY 1 SPRAY: 1.5 SOLUTION NASAL at 22:40

## 2019-01-13 RX ADMIN — ACETAMINOPHEN 650 MG: 325 TABLET, FILM COATED ORAL at 07:31

## 2019-01-13 RX ADMIN — ACETAMINOPHEN 650 MG: 325 TABLET, FILM COATED ORAL at 19:42

## 2019-01-13 RX ADMIN — GUAIFENESIN 10 ML: 100 SOLUTION ORAL at 05:57

## 2019-01-13 RX ADMIN — SALINE NASAL SPRAY 1 SPRAY: 1.5 SOLUTION NASAL at 19:42

## 2019-01-13 RX ADMIN — RANITIDINE 150 MG: 150 TABLET ORAL at 07:34

## 2019-01-13 RX ADMIN — RANITIDINE 150 MG: 150 TABLET ORAL at 19:42

## 2019-01-13 RX ADMIN — SALINE NASAL SPRAY 1 SPRAY: 1.5 SOLUTION NASAL at 07:31

## 2019-01-13 RX ADMIN — HYDROCHLOROTHIAZIDE 25 MG: 25 TABLET ORAL at 07:35

## 2019-01-13 RX ADMIN — HYDROXYZINE HYDROCHLORIDE 10 MG: 10 TABLET ORAL at 14:33

## 2019-01-13 RX ADMIN — SALINE NASAL SPRAY 1 SPRAY: 1.5 SOLUTION NASAL at 00:02

## 2019-01-13 RX ADMIN — FUROSEMIDE 40 MG: 40 TABLET ORAL at 07:35

## 2019-01-13 RX ADMIN — SALINE NASAL SPRAY 1 SPRAY: 1.5 SOLUTION NASAL at 05:57

## 2019-01-13 RX ADMIN — Medication 500000 UNITS: at 19:48

## 2019-01-13 RX ADMIN — PANTOPRAZOLE SODIUM 40 MG: 40 TABLET, DELAYED RELEASE ORAL at 07:34

## 2019-01-13 RX ADMIN — SALINE NASAL SPRAY 1 SPRAY: 1.5 SOLUTION NASAL at 09:54

## 2019-01-13 RX ADMIN — MULTIPLE VITAMINS W/ MINERALS TAB 1 TABLET: TAB at 07:35

## 2019-01-13 RX ADMIN — MICONAZOLE NITRATE: 2 POWDER TOPICAL at 09:54

## 2019-01-13 RX ADMIN — GADOBUTROL 7.7 ML: 604.72 INJECTION INTRAVENOUS at 14:57

## 2019-01-13 RX ADMIN — SALINE NASAL SPRAY 1 SPRAY: 1.5 SOLUTION NASAL at 16:32

## 2019-01-13 RX ADMIN — GUAIFENESIN AND DEXTROMETHORPHAN HYDROBROMIDE 1 TABLET: 600; 30 TABLET, EXTENDED RELEASE ORAL at 22:39

## 2019-01-13 RX ADMIN — SALINE NASAL SPRAY 1 SPRAY: 1.5 SOLUTION NASAL at 12:03

## 2019-01-13 RX ADMIN — DEXTRAN 70 AND HYPROMELLOSE 2910 1 DROP: 1; 3 SOLUTION/ DROPS OPHTHALMIC at 08:56

## 2019-01-13 ASSESSMENT — ACTIVITIES OF DAILY LIVING (ADL)
ADLS_ACUITY_SCORE: 29

## 2019-01-13 NOTE — PROGRESS NOTES
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      Changes:  - continue to hold antibiotics for now, afebrile  - cough continues, c/w post-nasal drip. On afrin  - abnormal EEG and 24 hour vEEG complete.  \"EEG is consistent with moderate encephalopathy with maximum cortical dysfunction in the right temporal lobe\". No seizure activity  - MRI w and w/o contrast attempted yesterday unsuccessfully. Will try today again as she's more alert. Giving alprazolam due to claustrophobia. Discussed with family who said she's had in past  - starting dextromethorphan and benzonatate (has tolerated lidocaine multiple times this hospitalization)  - thrush, treating with oral nystatin    #  aortic insufficiency with hypervolemia  Secondary to volume resuscitation. Dyspneic and labored when moving back to bed from chair. Not hypoxic. Lung US shows diffuse B lines  - diuresed on 1/7. Euvolemic at this time. Can re-dose lasix if dyspneic. Gave x 1 on 11/7, 11/9. Satting 94-96% RA  - improving volume status  - echo on 1/5: Calcified aortic valve with moderate-to-severe aortic stenosis (peak velocity  4.2 m/s, mean gradient 41 mmHg, DVI 0.31, PATRICIA 1.1 cm2, SVI 46 mL/m2) and  moderate aortic insufficiency. Aortic insufficiency is likely contributing  somewhat to increased aortic valve gradient.  This study was compared with the study from 4/23/18: AS has worsened slightly  (peak velocity of 4.2 m/s, previously 3.8 " m/s.)  - euvolemic today, though poor PO intake. Improving with frequent supplement    # Possible septic shock, likely viral, cleared:   Presented with hypothermia (94.2) , hypotension (BP 79/41), leukocytosis, elevated lactate (4.6 at OSH). Mild pyuria. CXR clear. Blood and urine cx NGTD. C diff negative. Called [facility] 1/7 and blood and urine cx (prior to start of antibiotics) are both no growth to date. Initial CRP and procal low. However CRP up to 70 on 1/7.  - Discontinued vanco  - Repeated CXR, stable  - ID consult for abx recommendations appreciated.  - stopped antibiotics  - Trend CRP and procal in AM  - UA repeated. Pending blood cultures, urine culture pending. CXR unremarkable other than some pulm edema  - no clear source. Consider LP if worsening mental status persists    # Acute blood loss anemia  # Iron deficiency anemia  # Melena  Likely due to epistaxis. Hgb on admission 8.3 from 10.3 after multiple nose bleeds. Initially thought that epistaxis could explain bleed but then hgb continued to drop to 6.9 despite no further episodes of epistaxis. Though curiously melena resolved as well. Underwent EGD 1/6 which was negative for any cause of bleeding. Hgb has been stable above 7.  Iron 21. Ferritin 17. Iron sat 5%.  - switch back to home daily protonix  - trend hgb  - tolerating oral iron every other day.    # Epistaxis  History of nasosinus melanoma (in remission). Seen by ENT and no evidence of recurrence of malignancy. with the below recs  - Dissolvable packing in place. This will dissolve on its own. No need for removal.   - No antibiotics for the nasal packing (surgicel)  - There may be some slow dark red oozing after the packing was placed - this is normal and likely from the Afrin and the Surgiflo  - Nasal saline spray to bilateral nasal cavities q2h daily starting in 48 hours (keeps the packing moist and will help with removal)  - If supplemental O2 is required, add humidifier (RT should be  contacted to supply this)  - Humidifier to the room to increase the moisture in the air  - If patient experiences brisk bleeding, spray Afrin along the floor of the nose and hold digital pressure on bilateral nostrils for 20 minutes (holding pressure higher up on the nasal bones does not apply effective pressure to the septum. Instead, the pressure should be applied on the soft part of the nose do that the opening of the nostrils are completely pinched shut).  Nasal clips do not work and should not be used in place of digital pressure. If bleeding continues after 20 minutes of digital pressure, repeat the above steps. If this fails to control the bleeding at that time, call ENT for further recommendations  - attempted to provide humidification via faceshield at ENT request but pt kept pulling it off  - see ENT consult. Appreciated. No new bleeding as of 1/10, 1/11, 1/12, 1/13    # Acute encephalopathy on chronic confusion--likely dementia   Acute encephalopathy likely due to sepsis. Appears to have some degree of chronic dementia ongoing over months/years. In considering differential for AMS workup undertaken by SICU. No obvious metabolic derangements. TSH normal. Labs/vitals not consistent with adrenal insufficiency, no evidence of seizure activity, no evidence of ingestion/tox. Evaluated by OT and did very poorly on cognitive testing  - was scheduled for outpt neurology eval and MRI but has not yet gotten to this appointment. Planning to defer to outpatient when she is back to her baseline  - consulted neurology. head CT discussed with ENT on 1/11. Will f/u MRI results, help with post discharge plans  - MRI pending  - EEG showed moderate encephalopathy with maximum cortical dysfunction in the right temporal lobe. No seizures  - reviewed reccs from neuro, psych    # DAVID due to sepsis:  Mild. Baseline Cr around 0.7. 1.0 on admission. Likely due to hypotension/sepsis. Resolving.   - Continue hydration until able to  take PO  - Repeat Cr in am. Improved on 1/8. Stable. Check 2x/week    # Hypertension:  - restarted lisinopril on 1/7  - restarted hydrochlorothiazide  - better controlled    # HLD: continue atorvastatin    # Depression:  Continue wellbutrin and effexor    # Cough  - likely 2/2 post-nasal drip. No aspiration with swallowing.  - no s/sx of pna  - possibly 2/2 lisinopril, though has had before without troubles. Consider changing if cough persists.    # FEN:   Regular as tolerated  Stop IVF    Consulting Services: ENT, GI, neurology, psych, speech    CODE: full  DVT: SCDs, anticoagulation contraindicated due to bleeding    Disposition Plan   Expected discharge in 1-2 days to rehab once MRI complete, r/o'd modifiable processes.     Entered: Andrew Moreno 01/13/2019, 10:25 AM       Pt's care was discussed with bedside RN, patient, and during Care Team Rounds.    ANDREW MORENO MD, [facility]  Internal Medicine Hospitalist & Staff Physician  [facility]  Pager: [phone]  [email or web address]    Team: Medicine Gold 9  Page Cross Cover after 5 pm: pager [phone]     ___________________________________________________________________    Subjective & Interval Hx:    Dyspnea resolved. More cough today, dry without sputum. Recognized me today. No complaints today. No CP. Denies fevers. Tongue burns. OK trying MRI again today.     Last 24 hr care team notes reviewed.   ROS:  4 point ROS including Respiratory, CV, GI and , other than that noted in the HPI, is negative      Medications: Reviewed in EPIC. List below for reference    Physical Exam:    Blood pressure 115/58, pulse 88, temperature 97  F (36.1  C), temperature source Axillary, resp. rate 18, weight 78.4 kg (172 lb 12.8 oz), SpO2 97 %, not currently breastfeeding.    General: awake, alert, lying in bed, comfortable, less confused today than previous  HEENT: MMM, no epistaxis, NCAT. Thrush on tongue resolved. No oral lesions  Chest/Resp: CTAB  throughout  Heart/CV: RRR, no MRG noted, trace LE in LE bilaterally  Abdomen/GI: soft,  mildly tender, mildly distended, +BS  Extremities/MSK: no swelling or erythema of joints  Skin: no rash or jaundice noted  Neuro/Psych: awake, alert, recognized me, knew she's in hospital and I'm a doctor    Lines/Tubes:   Peripheral IV 02/22/17 Left Lower forearm (Active)   Number of days: 682       Peripheral IV 02/22/17 Right Hand (Active)   Number of days: 682       Peripheral IV 12/12/18 Right Lower forearm (Active)   Number of days: 24       Peripheral IV 01/05/19 Left (Active)   Site Assessment WDL 1/5/2019  8:00 AM   Line Status Saline locked 1/5/2019  8:00 AM   Phlebitis Scale 0-->no symptoms 1/5/2019  8:00 AM   Infiltration Scale 0 1/5/2019  8:00 AM   Extravasation? No 1/5/2019  5:00 AM   Number of days: 0       Peripheral IV 01/05/19 Right (Active)   Site Assessment WDL 1/5/2019  8:00 AM   Line Status Infusing 1/5/2019  8:00 AM   Phlebitis Scale 0-->no symptoms 1/5/2019  8:00 AM   Infiltration Scale 0 1/5/2019  8:00 AM   Extravasation? No 1/5/2019  5:00 AM   Number of days: 0       Labs & Studies of Note:  I personally reviewed all labs and imaging.     Unresulted Labs Ordered in the Past 30 Days of this Admission     Date and Time Order Name Status Description    1/9/2019 1622 Blood culture Preliminary     1/9/2019 1622 Blood culture Preliminary

## 2019-01-13 NOTE — PLAN OF CARE
Pt disoriented to place, situation and time. VSS and C/O generalized pain. PRN tylenol given x2, moderate relief. Pt appeared restless this pretty per family. PRN atarax ordered up in case pt becomes restless again. [facility]. Pt prefers to eat boosts, ice cream and fruit ice. Pt refused almost all other food. Up with A2, pt repositions self in bed. Bed alarm on d/t confusion. L PIV SL. K stable at 3.5. Last LA was 1.1. Pt on 24hr EEG monitoring, should finish up sometiume this pretty per MD. Pt needs MRI of brain but has not been able to remain still in past for test. Pt will attempt to have MRI tomorrow with some form of sedation once EEG monitoring is removed (electrodes are not compatible with MRI).

## 2019-01-13 NOTE — PROGRESS NOTES
Preliminary Video EEG impression on  until 6am     Full report to follow. Please look in inpatient chart, under procedure section.     This EEG is abnormal due to the presences of continuous generalized polymorphic delta/theta slowing with maximum slowing noted in the right temporal region.  Posterior dominant rhythm 6-7 hz and in sleep there is vertex waves and sleep spindles.     EEG is consistent with moderate encephalopathy with maximum cortical dysfunction in the right temporal lobe. Patient does have rhythmic temporal delta slowing, but, no seizures are noted.  No clear seizure or epileptiform discharges noted, in this day of recording.     EK-lead EKG shows irregular rhythm on EEG.  If clinically indicated further investigations may be considered.    Leidy Cardona MD  Staff Epilepsy Neurologist   [phone]

## 2019-01-13 NOTE — PLAN OF CARE
Time: 1900-0730    Reason for admission: Sepsis   Vitals: VSS  Activity: Turned independently in bed overnight. Ambulated to the bedside commode with A1 and walker.   Pain: denies  Neuro: A&Ox2, disoriented to time/place  Cardiac:WNL   Respiratory: On RA sats 97%  GI/: Voided on the bedside commode. No BM.   Diet:Mechanical/Soft, Did not want any food during the pretty/night. Took sips of water.   Lines: L PIV is SL  Skin/Wounds: Scabbing present inside nares. Ocean spray given through out the night      Pt appeared to be sleeping most of the pretty. She was couging a lot overnight. I gave her robitussin, but no relief.  Pt should have MRI of brain today at sometime. 24 hr EEG in place overnight.     Continue to monitor and follow POC.

## 2019-01-13 NOTE — PROCEDURES
Procedure Date: 01/12/2019      EEG #-2       DATE OF RECORDING/SERVICE DATE:  Video EEG day #2 on 01/12/2019.       SOURCE FILE DURATION:  23 hours and 22 minutes.       PATIENT INFORMATION:  A 78-year-old female who has a history of dementia. and presents with progressive decline.  The patient has had altered mental status.  EEG is being done to evaluate for seizures.      MEDICATIONS:  Effexor and Wellbutrin.     TECHNICAL SUMMARY: This video EEG monitoring procedure was performed with 23 scalp electrodes in 10-20 system placements, and additional scalp, precordial and other surface electrodes used for electrical referencing and artifact detection. Video was reviewed intermittently by EEG technologist and physician for electroclinical seizures.      BACKGROUND:  The patient has diffuse generalized theta slowing with superimposed delta slowing.  There is a tense maximum slowing noted in the right temporal region, and it may be rhythmic at times.  A good example is at 10:58:23, the patient does have a parietooccipital rhythm that is 6-7 Hz when she is fully stimulated.  In sleep, we do not see well-formed sleep architecture.  There is some instances of Phase reversal in the midline and spindle like activity which I suspect is poorly formed sleep architecture.      ACTIVATION PROCEDURES:  The patient is able to follow simple commands such as squeezing with her hands and wiggling toes.  She is not able to count.      EPILEPTIFORM DISCHARGES:  None.      ICTAL:  None.      IMPRESSION:  Video EEG day #2, wake and sleep is abnormal due to the presence of diffuse generalized delta-theta slowing, slow parietooccipital rhythm, maximum slowing is noted in the right temporal region.  These findings are consistent with a mild to moderate encephalopathy with maximum cortical dysfunction in the right temporal region.  No electrographic seizures or epileptiform discharges were seen on this day.  Clinical correlation is  advised.         MARIA LUZ PAULA MD             D: 2019   T: 2019   MT: AMANDA      Name:     ALEX ANAYA   MRN:      [id number]        Account:        [id number]   :      1940           Procedure Date: 2019      Document: [id number]

## 2019-01-13 NOTE — PROCEDURES
Procedure Date: 01/13/2019      EEG #-3       DATE OF RECORDING/SERVICE DATE:  VIDEO EEG DAY 3 on 1/13/2019       SOURCE FILE DURATION:  12 hours and 52 minutes.      PATIENT INFORMATION:  A 78-year-old female with a history of malignant melanoma presented with sepsis and progressive encephalopathy.  EEG is being done to evaluate for seizures.      MEDICATIONS:  Wellbutrin and Effexor.     TECHNICAL SUMMARY: This video EEG monitoring procedure was performed with 23 scalp electrodes in 10-20 system placements, and additional scalp, precordial and other surface electrodes used for electrical referencing and artifact detection. Video was reviewed intermittently by EEG technologist and physician for electroclinical seizures.      BACKGROUND:  In the fully awake state, patient has a 6-7 Hz parietooccipital rhythm.  There were bursts of intermittent generalized delta-theta slowing.  In sleep, she does have vertex waves, a mixture of delta-theta slowing and poorly-formed symmetric sleep spindles.  She does not have well-formed sleep architecture.      ACTIVATION PROCEDURES:  No photic stimulation or hyperventilation was done.  The patient was asked orientation questions such as her name, date, president and she was able to answer all these questions.  She did not know what year it was.      EPILEPTIFORM DISCHARGES:  None.      ICTAL:  None.      IMPRESSION:  Video EEG awake and sleep is abnormal due to the presence of intermittent generalized delta-theta slowing with maximum slowing noted in the right hemisphere.  These findings are consistent with a mild encephalopathy with maximum cortical dysfunction in the right temporal chain.  No electrographic seizures were seen on this day.  Of note, the patient does have an irregular heart rhythm on the EKG.  This is a 1-lead EKG.  If clinically indicated, further cardiac evaluation may be considered.         MARIA LUZ PAULA MD             D: 01/13/2019   T: 01/13/2019   MT:  KB      Name:     ALEX ANAYA   MRN:      [id number]        Account:        [id number]   :      1940           Procedure Date: 2019      Document: [id number]

## 2019-01-14 ENCOUNTER — APPOINTMENT (OUTPATIENT)
Dept: PHYSICAL THERAPY | Facility: CLINIC | Age: 79
DRG: 871 | End: 2019-01-14
Attending: INTERNAL MEDICINE
Payer: MEDICARE

## 2019-01-14 PROCEDURE — 40000193 ZZH STATISTIC PT WARD VISIT: Performed by: PHYSICAL THERAPIST

## 2019-01-14 PROCEDURE — 25000132 ZZH RX MED GY IP 250 OP 250 PS 637: Mod: GY | Performed by: NURSE PRACTITIONER

## 2019-01-14 PROCEDURE — 97110 THERAPEUTIC EXERCISES: CPT | Mod: GP | Performed by: PHYSICAL THERAPIST

## 2019-01-14 PROCEDURE — 25000132 ZZH RX MED GY IP 250 OP 250 PS 637: Mod: GY | Performed by: INTERNAL MEDICINE

## 2019-01-14 PROCEDURE — A9270 NON-COVERED ITEM OR SERVICE: HCPCS | Mod: GY | Performed by: STUDENT IN AN ORGANIZED HEALTH CARE EDUCATION/TRAINING PROGRAM

## 2019-01-14 PROCEDURE — A9270 NON-COVERED ITEM OR SERVICE: HCPCS | Mod: GY | Performed by: INTERNAL MEDICINE

## 2019-01-14 PROCEDURE — 25800025 ZZH RX 258: Performed by: INTERNAL MEDICINE

## 2019-01-14 PROCEDURE — 97116 GAIT TRAINING THERAPY: CPT | Mod: GP | Performed by: PHYSICAL THERAPIST

## 2019-01-14 PROCEDURE — 93010 ELECTROCARDIOGRAM REPORT: CPT | Performed by: INTERNAL MEDICINE

## 2019-01-14 PROCEDURE — 93005 ELECTROCARDIOGRAM TRACING: CPT

## 2019-01-14 PROCEDURE — 25000132 ZZH RX MED GY IP 250 OP 250 PS 637: Mod: GY | Performed by: STUDENT IN AN ORGANIZED HEALTH CARE EDUCATION/TRAINING PROGRAM

## 2019-01-14 PROCEDURE — A9270 NON-COVERED ITEM OR SERVICE: HCPCS | Mod: GY | Performed by: NURSE PRACTITIONER

## 2019-01-14 PROCEDURE — 12000001 ZZH R&B MED SURG/OB UMMC

## 2019-01-14 PROCEDURE — 99233 SBSQ HOSP IP/OBS HIGH 50: CPT | Performed by: INTERNAL MEDICINE

## 2019-01-14 RX ORDER — DEXTROSE MONOHYDRATE, SODIUM CHLORIDE, AND POTASSIUM CHLORIDE 50; 1.49; 9 G/1000ML; G/1000ML; G/1000ML
INJECTION, SOLUTION INTRAVENOUS CONTINUOUS
Status: DISPENSED | OUTPATIENT
Start: 2019-01-14 | End: 2019-01-14

## 2019-01-14 RX ADMIN — ATORVASTATIN CALCIUM 20 MG: 20 TABLET, FILM COATED ORAL at 20:44

## 2019-01-14 RX ADMIN — SALINE NASAL SPRAY 1 SPRAY: 1.5 SOLUTION NASAL at 21:51

## 2019-01-14 RX ADMIN — POTASSIUM CHLORIDE, DEXTROSE MONOHYDRATE AND SODIUM CHLORIDE: 150; 5; 900 INJECTION, SOLUTION INTRAVENOUS at 11:24

## 2019-01-14 RX ADMIN — BENZONATATE 100 MG: 100 CAPSULE ORAL at 11:24

## 2019-01-14 RX ADMIN — SENNOSIDES AND DOCUSATE SODIUM 1 TABLET: 8.6; 5 TABLET ORAL at 20:44

## 2019-01-14 RX ADMIN — RANITIDINE 150 MG: 150 TABLET ORAL at 20:44

## 2019-01-14 RX ADMIN — Medication 500000 UNITS: at 20:44

## 2019-01-14 RX ADMIN — SALINE NASAL SPRAY 1 SPRAY: 1.5 SOLUTION NASAL at 04:34

## 2019-01-14 RX ADMIN — MICONAZOLE NITRATE: 2 POWDER TOPICAL at 20:45

## 2019-01-14 RX ADMIN — BENZONATATE 100 MG: 100 CAPSULE ORAL at 20:43

## 2019-01-14 RX ADMIN — MULTIPLE VITAMINS W/ MINERALS TAB 1 TABLET: TAB at 09:15

## 2019-01-14 RX ADMIN — VENLAFAXINE HYDROCHLORIDE 225 MG: 150 CAPSULE, EXTENDED RELEASE ORAL at 09:15

## 2019-01-14 RX ADMIN — SALINE NASAL SPRAY 1 SPRAY: 1.5 SOLUTION NASAL at 20:44

## 2019-01-14 RX ADMIN — MICONAZOLE NITRATE: 2 POWDER TOPICAL at 09:15

## 2019-01-14 RX ADMIN — SALINE NASAL SPRAY 1 SPRAY: 1.5 SOLUTION NASAL at 11:24

## 2019-01-14 RX ADMIN — SALINE NASAL SPRAY 1 SPRAY: 1.5 SOLUTION NASAL at 14:53

## 2019-01-14 RX ADMIN — PANTOPRAZOLE SODIUM 40 MG: 40 TABLET, DELAYED RELEASE ORAL at 09:15

## 2019-01-14 RX ADMIN — DEXTRAN 70 AND HYPROMELLOSE 2910 1 DROP: 1; 3 SOLUTION/ DROPS OPHTHALMIC at 09:21

## 2019-01-14 RX ADMIN — RANITIDINE 150 MG: 150 TABLET ORAL at 09:15

## 2019-01-14 RX ADMIN — FUROSEMIDE 40 MG: 40 TABLET ORAL at 09:15

## 2019-01-14 RX ADMIN — SENNOSIDES AND DOCUSATE SODIUM 1 TABLET: 8.6; 5 TABLET ORAL at 09:15

## 2019-01-14 RX ADMIN — SALINE NASAL SPRAY 1 SPRAY: 1.5 SOLUTION NASAL at 09:16

## 2019-01-14 RX ADMIN — BUPROPION HYDROCHLORIDE 150 MG: 150 TABLET, FILM COATED, EXTENDED RELEASE ORAL at 09:15

## 2019-01-14 RX ADMIN — MINERAL SUPPLEMENT IRON 300 MG / 5 ML STRENGTH LIQUID 100 PER BOX UNFLAVORED 500 MG: at 09:14

## 2019-01-14 RX ADMIN — SALINE NASAL SPRAY 1 SPRAY: 1.5 SOLUTION NASAL at 02:07

## 2019-01-14 RX ADMIN — HYDROCHLOROTHIAZIDE 25 MG: 25 TABLET ORAL at 09:14

## 2019-01-14 RX ADMIN — LISINOPRIL 30 MG: 20 TABLET ORAL at 09:15

## 2019-01-14 RX ADMIN — Medication 500000 UNITS: at 11:24

## 2019-01-14 ASSESSMENT — ACTIVITIES OF DAILY LIVING (ADL)
ADLS_ACUITY_SCORE: 27
ADLS_ACUITY_SCORE: 27
ADLS_ACUITY_SCORE: 29
ADLS_ACUITY_SCORE: 27

## 2019-01-14 NOTE — PLAN OF CARE
"Pt admitted for Sepsis. Alert and oriented to everything but situation. Confusion waxes and wanes, no halluicinations this shift. VSS on RA. No complaints of pain or nausea. Poor appetite on mech soft diet, pt often says \"She's not hungary.\" Switched to NPO per anesthesia guidelines for MRI with sedation. MRI to be completed today. Voiding WDL. Had 1 BM this shift. 20 mEq KCL+D5+NS infusing at 75ml/hr. Continued Nasal spray q 2hr. Worked with PT today. Family at bedside. Will continue with POC.   "

## 2019-01-14 NOTE — PLAN OF CARE
Time: 1900-0730     Reason for admission: Sepsis   Vitals: VSS  Activity: Turned independently in bed overnight. Ambulated to the bedside commode with A1 and walker.   Pain: c/o generalized pain. Tylenol given   Neuro: A&Ox2, disoriented to time/place  Cardiac:WNL   Respiratory: On RA sats 97%  GI/: Voided on the bedside commode. No BM.   Diet:Mechanical/Soft, she ate ice cream and pudding overnight and took sips of water.   Lines: PIV is SL  Skin/Wounds: Scabbing present inside nares. Ocean spray given throughout the night.         Pt appeared to be sleeping most of the pretty. She was couging a lot overnight. I gave her Mucinex one time.   Continue to monitor and follow POC.

## 2019-01-14 NOTE — PROGRESS NOTES
CLINICAL NUTRITION SERVICES - REASSESSMENT NOTE   Nutrition Prescription    RECOMMENDATIONS FOR MDs/PROVIDERS TO ORDER:  Patient with poor po intake over the past week, not meeting estimated needs orally. Reordered calorie count, Goal for calorie count is for patient to consume ~ 1100 kcal /day and 57 gm protein /day to avoid FT ( ~ 60% higher end need).     Malnutrition Status:    Non-severe malnutrition in the context of acute illness     Recommendations already ordered by Registered Dietitian (RD):  Boost shake TID  Calorie count to quantify intake, need for nutrition support     Future/Additional Recommendations:  --If plan for TF, recommend Isosource 1.5        EVALUATION OF THE PROGRESS TOWARD GOALS   Diet: NPO for MRI now, previously on mechanical soft diet ( has no teeth?)+ boost shake supplements between meals    Intake: 0-25%meal intake since 1/8, has fair appetite per RN, (6 days of minimal intake).  D5+NS infusing at 75ml/hr.     --Visited with patient. She was sound asleep.   --[place] in both ears per chart, cognitive status altered     Calorie count:   1/10: 754 kcal and 26 gm protein from 1.5 boost shakes and milk   1/9: 595 kcal and 17 gm protein from 1 meal and 1 boost shake  1/8: 137 kcal and 4 gm protein from 25% of lunch.   Average 3 day intake: 495 kcal and 16 gm protein.  Met 32% estimated kcal and 21% estimated protein needs.       NEW FINDINGS   --History of Right sinonasal mucosal malignant melanoma s/p resection and radiation. In remission.   -- Presented to OSH due to near syncope in the setting of recent brisk nosebleed found to have elevated lactate and hypothermia with concern for sepsis.   --Acute encephalopathy, per providers note, likely due to sepsis. Appears to have some degree of chronic dementia ongoing over months/years.    MALNUTRITION  % Intake: </= 50% for >/= 5 days (severe)  % Weight Loss: Up to 1-2% in 1 week (non-severe)  Subcutaneous Fat Loss: Facial region:  Mild  Muscle  Loss: Upper arm (bicep, tricep) and Lower arm  (forearm):Mild  Fluid Accumulation/Edema: None noted  Malnutrition Diagnosis: Non-severe malnutrition in the context of acute illness     Previous Goals   Patient to consume % of nutritionally adequate meal trays TID, or the equivalent with supplements/snacks.  Evaluation: Not met    Previous Nutrition Diagnosis  Inadequate oral intake related to AMS as evidenced by H&P notes.    Evaluation: No change    CURRENT NUTRITION DIAGNOSIS  Inadequate oral intake related to AMS, inhibiting ability to take sufficient PO as evidenced by 0-25% meal intake recorded for the past 6 days, previous calorie count met 32% estimated kcal and 21% estimated protein needs.       INTERVENTIONS  Implementation  Medical food supplement therapy: continue with boost shake in between meals   Calorie count     Goals  Patient to consume % of nutritionally adequate meal trays TID, or the equivalent with supplements/snacks.    Monitoring/Evaluation  Progress toward goals will be monitored and evaluated per protocol.    Shelia Lafleur RD/BETO  Pager [phone]

## 2019-01-14 NOTE — PROGRESS NOTES
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      Changes:  - continue to hold antibiotics for now, afebrile  - MRI attempted yesterday which showed significant motion artifact and possible L frontal periventricular enhancing lesion.  - repeat MRI with and without contrast today to further assess lesion under anesthesia  - cough improving on symptomatic treatment. dextromethorphan & benzonatate (has tolerated lidocaine multiple times this hospitalization)  - thrush, treating with oral nystatin. Resolved  - neuro concerned about dementia- Alzheimers vs. Lewy Body with superimposed hospital acquired cognitive decline though concerned about periventricular lesion    # Acute encephalopathy on chronic confusion--likely dementia   Acute encephalopathy likely due to sepsis. Appears to have some degree of chronic dementia ongoing over months/years. In considering differential for AMS workup undertaken by SICU. No obvious metabolic derangements. TSH normal. Labs/vitals not consistent with adrenal insufficiency, no evidence of seizure activity, no evidence of ingestion/tox. Evaluated by OT and did very poorly on cognitive testing  - was scheduled for outpt neurology eval and MRI but has not yet gotten to this appointment. Planning to defer to outpatient when she is back to her baseline  - consulted neurology. head CT discussed with ENT on 1/11. Will f/u MRI results, help with post discharge plans  -  EEG showed moderate encephalopathy with maximum cortical dysfunction in the right temporal lobe. No seizures  - reviewed reccs from neuro again on 1/14. Appreciate their input.   - MRI pending. Repeat 1/14, needs to be done under anesthesia due to motion and claustrophobia    #  aortic insufficiency with hypervolemia  Secondary to volume resuscitation. Dyspneic and labored when moving back to bed from chair. Not hypoxic. Lung US shows diffuse B lines  - diuresed on 1/7. Euvolemic at this time. Can re-dose lasix if dyspneic. Gave x 1 on 11/7, 11/9. Satting 94-96% RA  - improving volume status  - echo on 1/5: Calcified aortic valve with moderate-to-severe aortic stenosis (peak velocity  4.2 m/s, mean gradient 41 mmHg, DVI 0.31, PATRICIA 1.1 cm2, SVI 46 mL/m2) and  moderate aortic insufficiency. Aortic insufficiency is likely contributing  somewhat to increased aortic valve gradient.  This study was compared with the study from 4/23/18: AS has worsened slightly  (peak velocity of 4.2 m/s, previously 3.8 m/s.)  - euvolemic today, though poor PO intake. Improving with frequent supplement    # Possible septic shock, likely viral, cleared:   Presented with hypothermia (94.2) , hypotension (BP 79/41), leukocytosis, elevated lactate (4.6 at OSH). Mild pyuria. CXR clear. Blood and urine cx NGTD. C diff negative. Called [facility] 1/7 and blood and urine cx (prior to start of antibiotics) are both no growth to date. Initial CRP and procal low. However CRP up to 70 on 1/7.  - Discontinued vanco  - Repeated CXR, stable  - ID consult for abx recommendations appreciated.  - stopped antibiotics  - Trend CRP and procal in AM  - UA repeated. Pending blood cultures, urine culture pending. CXR unremarkable other than some pulm edema  - no clear source. Consider LP if worsening mental status persists    # Acute blood loss anemia  # Iron deficiency anemia  # Melena  Likely due to epistaxis. Hgb on admission 8.3 from 10.3 after multiple nose  bleeds. Initially thought that epistaxis could explain bleed but then hgb continued to drop to 6.9 despite no further episodes of epistaxis. Though curiously melena resolved as well. Underwent EGD 1/6 which was negative for any cause of bleeding. Hgb has been stable above 7.  Iron 21. Ferritin 17. Iron sat 5%.  - switch back to home daily protonix  - trend hgb  - tolerating oral iron every other day.    # Epistaxis  History of nasosinus melanoma (in remission). Seen by ENT and no evidence of recurrence of malignancy. with the below recs  - Dissolvable packing in place. This will dissolve on its own. No need for removal.   - No antibiotics for the nasal packing (surgicel)  - There may be some slow dark red oozing after the packing was placed - this is normal and likely from the Afrin and the Surgiflo  - Nasal saline spray to bilateral nasal cavities q2h daily starting in 48 hours (keeps the packing moist and will help with removal)  - If supplemental O2 is required, add humidifier (RT should be contacted to supply this)  - Humidifier to the room to increase the moisture in the air  - If patient experiences brisk bleeding, spray Afrin along the floor of the nose and hold digital pressure on bilateral nostrils for 20 minutes (holding pressure higher up on the nasal bones does not apply effective pressure to the septum. Instead, the pressure should be applied on the soft part of the nose do that the opening of the nostrils are completely pinched shut).  Nasal clips do not work and should not be used in place of digital pressure. If bleeding continues after 20 minutes of digital pressure, repeat the above steps. If this fails to control the bleeding at that time, call ENT for further recommendations  - attempted to provide humidification via faceshield at ENT request but pt kept pulling it off  - see ENT consult. Appreciated. No new bleeding as of 1/10, 1/11, 1/12, 1/13    # DAVID due to sepsis:  Mild. Baseline Cr around  0.7. 1.0 on admission. Likely due to hypotension/sepsis. Resolving.   - Continue hydration until able to take PO  - Repeat Cr in am. Improved on 1/8. Stable. Check 2x/week    # Hypertension:  - restarted lisinopril on 1/7  - restarted hydrochlorothiazide  - better controlled    # HLD: continue atorvastatin    # Depression:  Continue wellbutrin and effexor    # Cough  - likely 2/2 post-nasal drip. No aspiration with swallowing.  - no s/sx of pna  - possibly 2/2 lisinopril, though has had before without troubles. Consider changing if cough persists.    # FEN:   Regular as tolerated  Stop IVF    Consulting Services: ENT, GI, neurology, psych, speech    CODE: full  DVT: SCDs, anticoagulation contraindicated due to bleeding    Disposition Plan   Expected discharge in 1-2 days to rehab once MRI complete, r/o'd modifiable processes.     Entered: Andrew Moreno 01/14/2019, 11:44 AM       Pt's care was discussed with bedside RN, patient, and during Care Team Rounds.    ANDREW MORENO MD, [facility]  Internal Medicine Hospitalist & Staff Physician  [facility]  Pager: [phone]  [email or web address]    Team: Medicine Gold 9  Page Cross Cover after 5 pm: pager [phone]     ___________________________________________________________________    Subjective & Interval Hx:    Dyspnea resolved. Less cough today. Recognized me today. No complaints today. No CP. Denies fevers. Tongue burns. OK trying MRI again today.     Last 24 hr care team notes reviewed.   ROS:  4 point ROS including Respiratory, CV, GI and , other than that noted in the HPI, is negative      Medications: Reviewed in EPIC. List below for reference    Physical Exam:    Blood pressure 128/63, pulse 88, temperature 97.8  F (36.6  C), temperature source Oral, resp. rate 20, weight 77 kg (169 lb 12.8 oz), SpO2 100 %, not currently breastfeeding.    General: awake, alert, lying in bed, comfortable, less confused today than previous  HEENT: MMM, no  epistaxis, NCAT. Thrush on tongue resolved. No oral lesions  Chest/Resp: CTAB throughout  Heart/CV: RRR, no MRG noted, trace LE in LE bilaterally  Abdomen/GI: soft,  mildly tender, mildly distended, +BS  Extremities/MSK: no swelling or erythema of joints  Skin: no rash or jaundice noted  Neuro/Psych: awake, alert, recognized me, knew she's in hospital and I'm a doctor    Lines/Tubes:   Peripheral IV 02/22/17 Left Lower forearm (Active)   Number of days: 682       Peripheral IV 02/22/17 Right Hand (Active)   Number of days: 682       Peripheral IV 12/12/18 Right Lower forearm (Active)   Number of days: 24       Peripheral IV 01/05/19 Left (Active)   Site Assessment [facility] 1/5/2019  8:00 AM   Line Status Saline locked 1/5/2019  8:00 AM   Phlebitis Scale 0-->no symptoms 1/5/2019  8:00 AM   Infiltration Scale 0 1/5/2019  8:00 AM   Extravasation? No 1/5/2019  5:00 AM   Number of days: 0       Peripheral IV 01/05/19 Right (Active)   Site Assessment [facility] 1/5/2019  8:00 AM   Line Status Infusing 1/5/2019  8:00 AM   Phlebitis Scale 0-->no symptoms 1/5/2019  8:00 AM   Infiltration Scale 0 1/5/2019  8:00 AM   Extravasation? No 1/5/2019  5:00 AM   Number of days: 0       Labs & Studies of Note:  I personally reviewed all labs and imaging.     Unresulted Labs Ordered in the Past 30 Days of this Admission     Date and Time Order Name Status Description    1/9/2019 1622 Blood culture Preliminary     1/9/2019 1622 Blood culture Preliminary

## 2019-01-14 NOTE — PLAN OF CARE
Pt A/Ox4, VSS and denies pain. [facility] soft diet. Up with A1-2 to bedside commode. MRI attempted today with xanax given before hand for mod sedation. Pt was unable to remain still and poor MRI images were obtained. MRI tech rec test be redone with pt under anesthesia. Will continue to monitor pt and follow POC.

## 2019-01-14 NOTE — PROGRESS NOTES
Neurology Consult Progress Note    Summary: Gabino Jones is a 78 year old woman with a history of right sinonasal mucosal malignant melanoma status post resection and radiation in 2014 who also has a history of hypertension, hyperlipidemia and possible dementia (Alzheimer's versus Lewy body disease) who has been encephalopathic on admission while being evaluated for sepsis.     Interval events: Overnight events.  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  She is unable to offer much history.  He has a nonproductive cough during this exam.    Objective:  /63 (BP Location: Left arm)   Pulse 88   Temp 97.8  F (36.6  C) (Oral)   Resp 20   Wt 77.7 kg (171 lb 3.2 oz)   SpO2 100%   BMI 28.49 kg/m       Mental Status: Alert, attends, tracks examiner.  Is able to say her full name, but is not oriented to month or place.  She cannot follow simple commands.  No evidence of active hallucinations.  Cranial nerves: Extraocular movements intact without any nystagmus, visual fields full to threat, face symmetric, tongue midline.  Motor: Moves all 4 extremities symmetrically.  No abnormal movements or jerks.  No asterixis on exam today.  Reflexes: No ankle clonus. Left toe upgoing.     Pertinent Investigations:     WBC 12     vEEG: Admit generalized delta theta slowing, maximal in the right hemisphere without any ictal or epileptiform findings.    Brain MRI: Significantly limited by motion.  There is a questionable small left frontal periventricular lesion that enhances.    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   Infection workup thus far has been negative, and the  primary team suspects that she presented with a viral etiology of her sepsis, which is now resolved.  Less agitated today than she has been in the past.  Neurologic workup is included a video EEG which did not slowing in the right hemisphere but no epileptic findings.  The brain MRI is relatively unchanged from her study in December, however there is a questionable enhancing lesion in the left periventricular space, that could be consistent with metastases.  However given the significant motion degradation, this MRI will need to be repeated in the future to further evaluate. I would recommend doing this in the short term once she is outpatient and mental status has normalized, so that she can tolerate the exam.     I am concerned that she may have dementia (Alzheimer's or possibly Lewy Body), which has not been formally evaluated.  Most likely her underlying cognitive impairment is exacerbated by being in the hospital, but we due need to rule out intracranial disease and subclinical seizures. Since her clinical course has fluctuated, even multiple times during the same time, sometimes returning to baseline, which argues against a paraneoplastic or autoimmune etiology. As an outpatient, she will need a cognitive evaluation and further evaluation by neurology.    Neurology has no further recommendations at this time.  Please call if any new concerns arise.    Patient seen and discussed with attending Dr. Palacios.    Mable Thomas PGY3  Neurology Consult Pager: [phone]    ATTENDING ADDENDUM: I discussed the case of Mrs Jones with resident Dr Thomas today, but I did not see the patient. I agree with the assessment and recommendations as above. Papi Palacios MD

## 2019-01-14 NOTE — PROGRESS NOTES
Brief Otolaryngology Progress Note    Primary team obtained MRI, however, there was significant motion artifact. ENT would like to have a repeat MRI with anxiolytics/sedation if possible.     Will Hutchinson MD PGY1  Otolaryngology

## 2019-01-15 ENCOUNTER — ANESTHESIA (OUTPATIENT)
Dept: SURGERY | Facility: CLINIC | Age: 79
DRG: 871 | End: 2019-01-15
Payer: MEDICARE

## 2019-01-15 ENCOUNTER — ANESTHESIA EVENT (OUTPATIENT)
Dept: SURGERY | Facility: CLINIC | Age: 79
DRG: 871 | End: 2019-01-15
Payer: MEDICARE

## 2019-01-15 ENCOUNTER — APPOINTMENT (OUTPATIENT)
Dept: PHYSICAL THERAPY | Facility: CLINIC | Age: 79
DRG: 871 | End: 2019-01-15
Attending: INTERNAL MEDICINE
Payer: MEDICARE

## 2019-01-15 ENCOUNTER — APPOINTMENT (OUTPATIENT)
Dept: SPEECH THERAPY | Facility: CLINIC | Age: 79
DRG: 871 | End: 2019-01-15
Attending: INTERNAL MEDICINE
Payer: MEDICARE

## 2019-01-15 ENCOUNTER — APPOINTMENT (OUTPATIENT)
Dept: OCCUPATIONAL THERAPY | Facility: CLINIC | Age: 79
DRG: 871 | End: 2019-01-15
Attending: INTERNAL MEDICINE
Payer: MEDICARE

## 2019-01-15 LAB
BACTERIA SPEC CULT: NO GROWTH
BACTERIA SPEC CULT: NO GROWTH
INTERPRETATION ECG - MUSE: NORMAL
Lab: NORMAL
Lab: NORMAL
SPECIMEN SOURCE: NORMAL
SPECIMEN SOURCE: NORMAL

## 2019-01-15 PROCEDURE — 40000133 ZZH STATISTIC OT WARD VISIT: Performed by: OCCUPATIONAL THERAPIST

## 2019-01-15 PROCEDURE — 25000132 ZZH RX MED GY IP 250 OP 250 PS 637: Mod: GY | Performed by: INTERNAL MEDICINE

## 2019-01-15 PROCEDURE — 40000556 ZZH STATISTIC PERIPHERAL IV START W US GUIDANCE

## 2019-01-15 PROCEDURE — 25000132 ZZH RX MED GY IP 250 OP 250 PS 637: Mod: GY | Performed by: STUDENT IN AN ORGANIZED HEALTH CARE EDUCATION/TRAINING PROGRAM

## 2019-01-15 PROCEDURE — 97530 THERAPEUTIC ACTIVITIES: CPT | Mod: GO | Performed by: OCCUPATIONAL THERAPIST

## 2019-01-15 PROCEDURE — 40000193 ZZH STATISTIC PT WARD VISIT

## 2019-01-15 PROCEDURE — 92526 ORAL FUNCTION THERAPY: CPT | Mod: GN

## 2019-01-15 PROCEDURE — 99232 SBSQ HOSP IP/OBS MODERATE 35: CPT | Performed by: INTERNAL MEDICINE

## 2019-01-15 PROCEDURE — 40000225 ZZH STATISTIC SLP WARD VISIT

## 2019-01-15 PROCEDURE — A9270 NON-COVERED ITEM OR SERVICE: HCPCS | Mod: GY | Performed by: STUDENT IN AN ORGANIZED HEALTH CARE EDUCATION/TRAINING PROGRAM

## 2019-01-15 PROCEDURE — A9270 NON-COVERED ITEM OR SERVICE: HCPCS | Mod: GY | Performed by: NURSE PRACTITIONER

## 2019-01-15 PROCEDURE — 97530 THERAPEUTIC ACTIVITIES: CPT | Mod: GP

## 2019-01-15 PROCEDURE — 97535 SELF CARE MNGMENT TRAINING: CPT | Mod: GO | Performed by: OCCUPATIONAL THERAPIST

## 2019-01-15 PROCEDURE — 12000001 ZZH R&B MED SURG/OB UMMC

## 2019-01-15 PROCEDURE — A9270 NON-COVERED ITEM OR SERVICE: HCPCS | Mod: GY | Performed by: INTERNAL MEDICINE

## 2019-01-15 PROCEDURE — 25000132 ZZH RX MED GY IP 250 OP 250 PS 637: Mod: GY | Performed by: NURSE PRACTITIONER

## 2019-01-15 PROCEDURE — 25000128 H RX IP 250 OP 636: Performed by: INTERNAL MEDICINE

## 2019-01-15 RX ADMIN — RANITIDINE 150 MG: 150 TABLET ORAL at 08:05

## 2019-01-15 RX ADMIN — SALINE NASAL SPRAY 1 SPRAY: 1.5 SOLUTION NASAL at 02:00

## 2019-01-15 RX ADMIN — LISINOPRIL 30 MG: 20 TABLET ORAL at 08:05

## 2019-01-15 RX ADMIN — MICONAZOLE NITRATE: 2 POWDER TOPICAL at 20:02

## 2019-01-15 RX ADMIN — DEXTRAN 70 AND HYPROMELLOSE 2910 1 DROP: 1; 3 SOLUTION/ DROPS OPHTHALMIC at 08:04

## 2019-01-15 RX ADMIN — ACETAMINOPHEN 650 MG: 325 TABLET, FILM COATED ORAL at 20:02

## 2019-01-15 RX ADMIN — RANITIDINE 150 MG: 150 TABLET ORAL at 20:02

## 2019-01-15 RX ADMIN — SALINE NASAL SPRAY 1 SPRAY: 1.5 SOLUTION NASAL at 10:34

## 2019-01-15 RX ADMIN — BENZONATATE 100 MG: 100 CAPSULE ORAL at 20:01

## 2019-01-15 RX ADMIN — SALINE NASAL SPRAY 1 SPRAY: 1.5 SOLUTION NASAL at 17:52

## 2019-01-15 RX ADMIN — SALINE NASAL SPRAY 1 SPRAY: 1.5 SOLUTION NASAL at 20:02

## 2019-01-15 RX ADMIN — Medication 500000 UNITS: at 16:08

## 2019-01-15 RX ADMIN — Medication 500000 UNITS: at 20:01

## 2019-01-15 RX ADMIN — FUROSEMIDE 40 MG: 40 TABLET ORAL at 08:05

## 2019-01-15 RX ADMIN — MICONAZOLE NITRATE: 2 POWDER TOPICAL at 08:06

## 2019-01-15 RX ADMIN — ATORVASTATIN CALCIUM 20 MG: 20 TABLET, FILM COATED ORAL at 20:01

## 2019-01-15 RX ADMIN — SENNOSIDES AND DOCUSATE SODIUM 1 TABLET: 8.6; 5 TABLET ORAL at 20:02

## 2019-01-15 RX ADMIN — BENZONATATE 100 MG: 100 CAPSULE ORAL at 08:04

## 2019-01-15 RX ADMIN — SALINE NASAL SPRAY 1 SPRAY: 1.5 SOLUTION NASAL at 08:06

## 2019-01-15 RX ADMIN — SALINE NASAL SPRAY 1 SPRAY: 1.5 SOLUTION NASAL at 16:08

## 2019-01-15 RX ADMIN — PANTOPRAZOLE SODIUM 40 MG: 40 TABLET, DELAYED RELEASE ORAL at 08:06

## 2019-01-15 RX ADMIN — SALINE NASAL SPRAY 1 SPRAY: 1.5 SOLUTION NASAL at 12:50

## 2019-01-15 RX ADMIN — SALINE NASAL SPRAY 1 SPRAY: 1.5 SOLUTION NASAL at 06:54

## 2019-01-15 RX ADMIN — Medication 500000 UNITS: at 08:06

## 2019-01-15 RX ADMIN — DEXTROSE AND SODIUM CHLORIDE: 5; 900 INJECTION, SOLUTION INTRAVENOUS at 17:51

## 2019-01-15 RX ADMIN — DEXTRAN 70 AND HYPROMELLOSE 2910 1 DROP: 1; 3 SOLUTION/ DROPS OPHTHALMIC at 22:03

## 2019-01-15 RX ADMIN — SALINE NASAL SPRAY 1 SPRAY: 1.5 SOLUTION NASAL at 00:09

## 2019-01-15 RX ADMIN — MULTIPLE VITAMINS W/ MINERALS TAB 1 TABLET: TAB at 08:05

## 2019-01-15 RX ADMIN — VENLAFAXINE HYDROCHLORIDE 225 MG: 150 CAPSULE, EXTENDED RELEASE ORAL at 08:05

## 2019-01-15 RX ADMIN — HYDROCHLOROTHIAZIDE 25 MG: 25 TABLET ORAL at 08:05

## 2019-01-15 RX ADMIN — SALINE NASAL SPRAY 1 SPRAY: 1.5 SOLUTION NASAL at 03:16

## 2019-01-15 RX ADMIN — SALINE NASAL SPRAY 1 SPRAY: 1.5 SOLUTION NASAL at 22:03

## 2019-01-15 RX ADMIN — Medication 500000 UNITS: at 12:50

## 2019-01-15 RX ADMIN — SENNOSIDES AND DOCUSATE SODIUM 1 TABLET: 8.6; 5 TABLET ORAL at 08:05

## 2019-01-15 RX ADMIN — BUPROPION HYDROCHLORIDE 150 MG: 150 TABLET, FILM COATED, EXTENDED RELEASE ORAL at 08:05

## 2019-01-15 RX ADMIN — DEXTRAN 70 AND HYPROMELLOSE 2910 1 DROP: 1; 3 SOLUTION/ DROPS OPHTHALMIC at 17:52

## 2019-01-15 ASSESSMENT — ACTIVITIES OF DAILY LIVING (ADL)
ADLS_ACUITY_SCORE: 27

## 2019-01-15 NOTE — PLAN OF CARE
Discharge Planner PT   Patient plan for discharge: not discussed  Current status: Pt greeted in supine, session limited d/t barriers in communication ([place] in both ears depsite hearing aids) and cognitive status. Pt needs Yasmani for supine>sit and CGA for sit<>stand. Pt takes 2 small steps with Yasmani for balance with FWW. Extensive time spent encouraging pt to ambulate with plan to end in chair however pt wishing to return to bed. Sit>supine SBA with assist for boosting in bed. Encouraged pt to be up in chair and to ambulate later on today.   Barriers to return to prior living situation: cognition, mobility  Recommendations for discharge: TCU  Rationale for recommendations: Pt is below baseline status in terms of mobility and would benefit from rehab stay prior to return to previous living situation.       Entered by: Nanci Case 01/15/2019 10:20 AM

## 2019-01-15 NOTE — PLAN OF CARE
Alert and oriented to self, garbled speech. VSS on RA, frequent cough. Tessalon given. PIV infusing IVF at 75 ml/hr. Was NPO until 2100 due to need for anesthesia with MRI. Coordination was not possible due to her add on status with anesthesia. They recommend calling OR desk to better coordinate time with MRI. Mechanical soft diet, fair appetite. Up to commode with A1. Voiding. Denies pain/nausea. Family at bedside and helpful. Bed alarm on. Continue with POC.

## 2019-01-15 NOTE — PLAN OF CARE
D/I: Pt is alert and oriented to self only, pleasantly confused with garbled speech. Vitally stable on RA. Tessalon given. Was supposed to have MRI with anesthesia, but not done yesterday due to inadequate coordination. On mechanical soft diet. PIV saline locked. Up to commode with 1 assist. Denies pain. Bed alarm on for safety.   P: Team will need to call OR desk to better coordinate with anesthesia and  MRI. Continue with POC.

## 2019-01-15 NOTE — PLAN OF CARE
Discharge Planner SLP   Patient plan for discharge: Unknown  Current status: PT seated upright in bed, confused but cooperative. Pt consumed puree and soft, no s/sx of aspiration.  Thin liquids via spoon and straw, cough x1 with larger sip from straw.  Continue dysphagia diet 2 and thin liquids. Pt must be seated upright, fully alert, reduce distractions during meals, small bites/sips. Pt requires feeding assistance and supervision with oral intake.  Barriers to return to prior living situation: Cognition, nutrition   Recommendations for discharge: TCU  Rationale for recommendations: SLP at next level of care to ensure diet tolerance, use of strategies and provide education        Entered by: Lyubov Carr 01/15/2019 2:37 PM

## 2019-01-15 NOTE — PROGRESS NOTES
[facility]  Internal Medicine Progress Note - Gold Service    Assessment & Plan   Gabino Jones is a 78 year old year old female admitted on 1/5/2019 for syncope, epistaxis, elevated lactate, and hypothermia with concern for sepsis.    Patient is overall stable.  Changes today include working on obtaining MRI to evaluate for brain lesion.    # Acute encephalopathy on chronic confusion--likely dementia   Acute encephalopathy likely due to sepsis. Appears to have some degree of chronic dementia ongoing over months/years. No obvious metabolic derangements. TSH normal. Labs/vitals not consistent with adrenal insufficiency, no evidence of seizure activity, no evidence of ingestion/tox. Evaluated by OT and did very poorly on cognitive testing  - EEG showed moderate encephalopathy with maximum cortical dysfunction in the right temporal lobe. No seizures. Appreciate neuro recs  - Repeat MRI pending; needs to be done under anesthesia due to motion and claustrophobia     #  aortic insufficiency with hypervolemia  Secondary to volume resuscitation. diuresed on 1/7. Euvolemic at this time. Can re-dose lasix if dyspneic. echo on 1/5: Calcified aortic valve with moderate-to-severe aortic stenosis (peak velocity  4.2 m/s, mean gradient 41 mmHg, DVI 0.31, PATRICIA 1.1 cm2, SVI 46 mL/m2) and moderate aortic insufficiency. Aortic insufficiency is likely contributing somewhat to increased aortic valve gradient.       # Possible septic shock, likely viral, cleared:   Presented with hypothermia (94.2) , hypotension (BP 79/41), leukocytosis, elevated lactate (4.6 at OSH). Mild pyuria. CXR clear. Blood and urine cx NGTD. C diff negative. Called [facility] 1/7 and blood and urine cx (prior to start of antibiotics) are both no growth to date. Initial CRP and procal low. Stopped antibiotics with ID recommendations.      # Acute blood loss anemia  # Iron deficiency anemia  # Melena  Likely due to epistaxis. Hgb  on admission 8.3 from 10.3 after multiple nose bleeds. Initially thought that epistaxis could explain bleed but then hgb continued to drop to 6.9 despite no further episodes of epistaxis. Though curiously melena resolved as well. Underwent EGD 1/6 which was negative for any cause of bleeding. Hgb has been stable above 7.  Iron 21. Ferritin 17. Iron sat 5%.  - switch back to home daily protonix  - trend hgb  - tolerating oral iron every other day.     # Epistaxis  History of nasosinus melanoma (in remission). Seen by ENT and no evidence of recurrence of malignancy. with the below recs  - Dissolvable packing in place. This will dissolve on its own. No need for removal.   - No antibiotics for the nasal packing (surgicel)  - There may be some slow dark red oozing after the packing was placed - this is normal and likely from the Afrin and the Surgiflo  - Nasal saline spray to bilateral nasal cavities q2h daily starting in 48 hours (keeps the packing moist and will help with removal)  - If supplemental O2 is required, add humidifier (RT should be contacted to supply this)  - Humidifier to the room to increase the moisture in the air  - If patient experiences brisk bleeding, spray Afrin along the floor of the nose and hold digital pressure on bilateral nostrils for 20 minutes (holding pressure higher up on the nasal bones does not apply effective pressure to the septum. Instead, the pressure should be applied on the soft part of the nose do that the opening of the nostrils are completely pinched shut).  Nasal clips do not work and should not be used in place of digital pressure. If bleeding continues after 20 minutes of digital pressure, repeat the above steps. If this fails to control the bleeding at that time, call ENT for further recommendations  - attempted to provide humidification via faceshield at ENT request but pt kept pulling it off  - see ENT consult. Appreciated. No new bleeding as of 1/10, 1/11, 1/12,  1/13     # DAVID due to sepsis:  Likely due to hypotension/sepsis. Resolved     # Hypertension:  - restarted lisinopril on 1/7  - restarted hydrochlorothiazide     # HLD: continue atorvastatin  # Depression: Continue wellbutrin and effexor       Diet: Orders Placed This Encounter      NPO per Anesthesia Guidelines for Procedure/Surgery Except for: Meds    Fluids: D5NS @ 100ml/hr for 500ml while NPO today  Peter Catheter: Peter Catheter: not present    DVT Prophylaxis: None given epistaxis  Expected discharge: 2 - 3 days, recommended to transitional care unit once MRI complete, continued clinical stability.    The patient's care was discussed with the Bedside Nurse, Patient and Patient's Family.    Casa De Luna MD  Internal Medicine Staff Hospitalist Service  [facility]  Please see sticky note for cross cover information  Text Page       Interval History   No acute events overnight. She denies difficulty breathing, headache, pain in chest, epistaxis, difficulty urinating. Family thinks her mental status is stable and relatively unchanged.      Data reviewed today: I reviewed all medications, new labs and imaging results over the last 24 hours.    Physical Exam   Vital signs last 24 hours:  Temp:  [96.3  F (35.7  C)-98.3  F (36.8  C)] 96.3  F (35.7  C)  Heart Rate:  [82-98] 82  Resp:  [16-20] 16  BP: (121-163)/(45-79) 143/55  SpO2:  [94 %-100 %] 97 %  Weight (last 5):   Vitals:    01/10/19 0900 01/12/19 0938 01/13/19 1132 01/14/19 0900   Weight: 82.2 kg (181 lb 3.2 oz) 78.4 kg (172 lb 12.8 oz) 77.7 kg (171 lb 3.2 oz) 77 kg (169 lb 12.8 oz)    01/15/19 1413   Weight: 75.9 kg (167 lb 4.8 oz)     HEENT: Moist mucus membranes, no scleral icterus  Resp: Breathing comfortably on room air, good air entry bilaterally, no wheezing/rhonchi  CV: Regular rate and rhythm, normal S1S2, no RMG, no JVD, no peripheral edema  Skin: No rashes, jaundice, or discoloration

## 2019-01-15 NOTE — PROGRESS NOTES
Medicine cross cover note -    Called re: difficulty obtaining MRI.  Patient was placed on add-on list for anesthesia to assist with her MRI and may not be able to get her scan tonight.  They have recommended calling the main OR desk in the am to get her scheduled with anesthesia rather than put in as an add-on.

## 2019-01-16 ENCOUNTER — APPOINTMENT (OUTPATIENT)
Dept: MRI IMAGING | Facility: CLINIC | Age: 79
DRG: 871 | End: 2019-01-16
Attending: INTERNAL MEDICINE
Payer: MEDICARE

## 2019-01-16 ENCOUNTER — APPOINTMENT (OUTPATIENT)
Dept: OCCUPATIONAL THERAPY | Facility: CLINIC | Age: 79
DRG: 871 | End: 2019-01-16
Attending: INTERNAL MEDICINE
Payer: MEDICARE

## 2019-01-16 LAB
ANION GAP SERPL CALCULATED.3IONS-SCNC: 9 MMOL/L (ref 3–14)
BUN SERPL-MCNC: 15 MG/DL (ref 7–30)
CALCIUM SERPL-MCNC: 8.4 MG/DL (ref 8.5–10.1)
CHLORIDE SERPL-SCNC: 103 MMOL/L (ref 94–109)
CO2 SERPL-SCNC: 24 MMOL/L (ref 20–32)
CREAT SERPL-MCNC: 0.83 MG/DL (ref 0.52–1.04)
CRP SERPL-MCNC: 25 MG/L (ref 0–8)
ERYTHROCYTE [DISTWIDTH] IN BLOOD BY AUTOMATED COUNT: 16.8 % (ref 10–15)
GFR SERPL CREATININE-BSD FRML MDRD: 67 ML/MIN/{1.73_M2}
GLUCOSE BLDC GLUCOMTR-MCNC: 114 MG/DL (ref 70–99)
GLUCOSE SERPL-MCNC: 82 MG/DL (ref 70–99)
HCT VFR BLD AUTO: 28.6 % (ref 35–47)
HGB BLD-MCNC: 8.5 G/DL (ref 11.7–15.7)
MCH RBC QN AUTO: 25.7 PG (ref 26.5–33)
MCHC RBC AUTO-ENTMCNC: 29.7 G/DL (ref 31.5–36.5)
MCV RBC AUTO: 86 FL (ref 78–100)
PLATELET # BLD AUTO: 473 10E9/L (ref 150–450)
POTASSIUM SERPL-SCNC: 3 MMOL/L (ref 3.4–5.3)
RBC # BLD AUTO: 3.31 10E12/L (ref 3.8–5.2)
SODIUM SERPL-SCNC: 136 MMOL/L (ref 133–144)
WBC # BLD AUTO: 7.8 10E9/L (ref 4–11)

## 2019-01-16 PROCEDURE — 25000132 ZZH RX MED GY IP 250 OP 250 PS 637: Mod: GY | Performed by: STUDENT IN AN ORGANIZED HEALTH CARE EDUCATION/TRAINING PROGRAM

## 2019-01-16 PROCEDURE — 80048 BASIC METABOLIC PNL TOTAL CA: CPT | Performed by: INTERNAL MEDICINE

## 2019-01-16 PROCEDURE — 25000132 ZZH RX MED GY IP 250 OP 250 PS 637: Mod: GY | Performed by: NURSE PRACTITIONER

## 2019-01-16 PROCEDURE — 25000132 ZZH RX MED GY IP 250 OP 250 PS 637: Mod: GY | Performed by: INTERNAL MEDICINE

## 2019-01-16 PROCEDURE — A9270 NON-COVERED ITEM OR SERVICE: HCPCS | Mod: GY | Performed by: INTERNAL MEDICINE

## 2019-01-16 PROCEDURE — 25000125 ZZHC RX 250: Performed by: NURSE ANESTHETIST, CERTIFIED REGISTERED

## 2019-01-16 PROCEDURE — 97535 SELF CARE MNGMENT TRAINING: CPT | Mod: GO

## 2019-01-16 PROCEDURE — 71000017 ZZH RECOVERY PHASE 1 LEVEL 3 EA ADDTL HR

## 2019-01-16 PROCEDURE — 36415 COLL VENOUS BLD VENIPUNCTURE: CPT | Performed by: INTERNAL MEDICINE

## 2019-01-16 PROCEDURE — A9270 NON-COVERED ITEM OR SERVICE: HCPCS | Mod: GY | Performed by: STUDENT IN AN ORGANIZED HEALTH CARE EDUCATION/TRAINING PROGRAM

## 2019-01-16 PROCEDURE — 25000128 H RX IP 250 OP 636: Performed by: NURSE ANESTHETIST, CERTIFIED REGISTERED

## 2019-01-16 PROCEDURE — 70553 MRI BRAIN STEM W/O & W/DYE: CPT

## 2019-01-16 PROCEDURE — 37000008 ZZH ANESTHESIA TECHNICAL FEE, 1ST 30 MIN

## 2019-01-16 PROCEDURE — A9585 GADOBUTROL INJECTION: HCPCS | Performed by: INTERNAL MEDICINE

## 2019-01-16 PROCEDURE — 12000001 ZZH R&B MED SURG/OB UMMC

## 2019-01-16 PROCEDURE — 71000016 ZZH RECOVERY PHASE 1 LEVEL 3 FIRST HR

## 2019-01-16 PROCEDURE — 85027 COMPLETE CBC AUTOMATED: CPT | Performed by: INTERNAL MEDICINE

## 2019-01-16 PROCEDURE — 40000133 ZZH STATISTIC OT WARD VISIT

## 2019-01-16 PROCEDURE — 99233 SBSQ HOSP IP/OBS HIGH 50: CPT | Performed by: INTERNAL MEDICINE

## 2019-01-16 PROCEDURE — 25000566 ZZH SEVOFLURANE, EA 15 MIN

## 2019-01-16 PROCEDURE — 00000146 ZZHCL STATISTIC GLUCOSE BY METER IP

## 2019-01-16 PROCEDURE — 40000169 ZZH STATISTIC PRE-PROCEDURE ASSESSMENT I

## 2019-01-16 PROCEDURE — 25000128 H RX IP 250 OP 636: Performed by: INTERNAL MEDICINE

## 2019-01-16 PROCEDURE — A9270 NON-COVERED ITEM OR SERVICE: HCPCS | Mod: GY | Performed by: NURSE PRACTITIONER

## 2019-01-16 PROCEDURE — 37000009 ZZH ANESTHESIA TECHNICAL FEE, EACH ADDTL 15 MIN

## 2019-01-16 PROCEDURE — 86140 C-REACTIVE PROTEIN: CPT | Performed by: INTERNAL MEDICINE

## 2019-01-16 PROCEDURE — 25500064 ZZH RX 255 OP 636: Performed by: INTERNAL MEDICINE

## 2019-01-16 RX ORDER — PROPOFOL 10 MG/ML
INJECTION, EMULSION INTRAVENOUS PRN
Status: DISCONTINUED | OUTPATIENT
Start: 2019-01-16 | End: 2019-01-16

## 2019-01-16 RX ORDER — LIDOCAINE HYDROCHLORIDE 20 MG/ML
INJECTION, SOLUTION INFILTRATION; PERINEURAL PRN
Status: DISCONTINUED | OUTPATIENT
Start: 2019-01-16 | End: 2019-01-16

## 2019-01-16 RX ORDER — FENTANYL CITRATE 50 UG/ML
INJECTION, SOLUTION INTRAMUSCULAR; INTRAVENOUS PRN
Status: DISCONTINUED | OUTPATIENT
Start: 2019-01-16 | End: 2019-01-16

## 2019-01-16 RX ORDER — METOPROLOL TARTRATE 1 MG/ML
INJECTION, SOLUTION INTRAVENOUS PRN
Status: DISCONTINUED | OUTPATIENT
Start: 2019-01-16 | End: 2019-01-16

## 2019-01-16 RX ORDER — NALOXONE HYDROCHLORIDE 0.4 MG/ML
.1-.4 INJECTION, SOLUTION INTRAMUSCULAR; INTRAVENOUS; SUBCUTANEOUS
Status: ACTIVE | OUTPATIENT
Start: 2019-01-16 | End: 2019-01-17

## 2019-01-16 RX ORDER — DEXAMETHASONE SODIUM PHOSPHATE 4 MG/ML
4 INJECTION, SOLUTION INTRA-ARTICULAR; INTRALESIONAL; INTRAMUSCULAR; INTRAVENOUS; SOFT TISSUE
Status: DISCONTINUED | OUTPATIENT
Start: 2019-01-16 | End: 2019-01-16 | Stop reason: HOSPADM

## 2019-01-16 RX ORDER — HYDRALAZINE HYDROCHLORIDE 20 MG/ML
2.5-5 INJECTION INTRAMUSCULAR; INTRAVENOUS EVERY 10 MIN PRN
Status: DISCONTINUED | OUTPATIENT
Start: 2019-01-16 | End: 2019-01-16 | Stop reason: HOSPADM

## 2019-01-16 RX ORDER — FENTANYL CITRATE 50 UG/ML
25-50 INJECTION, SOLUTION INTRAMUSCULAR; INTRAVENOUS
Status: DISCONTINUED | OUTPATIENT
Start: 2019-01-16 | End: 2019-01-16 | Stop reason: HOSPADM

## 2019-01-16 RX ORDER — ETOMIDATE 2 MG/ML
INJECTION INTRAVENOUS PRN
Status: DISCONTINUED | OUTPATIENT
Start: 2019-01-16 | End: 2019-01-16

## 2019-01-16 RX ORDER — ONDANSETRON 2 MG/ML
4 INJECTION INTRAMUSCULAR; INTRAVENOUS EVERY 30 MIN PRN
Status: DISCONTINUED | OUTPATIENT
Start: 2019-01-16 | End: 2019-01-16 | Stop reason: HOSPADM

## 2019-01-16 RX ORDER — ONDANSETRON 4 MG/1
4 TABLET, ORALLY DISINTEGRATING ORAL EVERY 30 MIN PRN
Status: DISCONTINUED | OUTPATIENT
Start: 2019-01-16 | End: 2019-01-16 | Stop reason: HOSPADM

## 2019-01-16 RX ORDER — PROPOFOL 10 MG/ML
INJECTION, EMULSION INTRAVENOUS CONTINUOUS PRN
Status: DISCONTINUED | OUTPATIENT
Start: 2019-01-16 | End: 2019-01-16

## 2019-01-16 RX ORDER — SODIUM CHLORIDE, SODIUM LACTATE, POTASSIUM CHLORIDE, CALCIUM CHLORIDE 600; 310; 30; 20 MG/100ML; MG/100ML; MG/100ML; MG/100ML
INJECTION, SOLUTION INTRAVENOUS CONTINUOUS PRN
Status: DISCONTINUED | OUTPATIENT
Start: 2019-01-16 | End: 2019-01-16

## 2019-01-16 RX ORDER — GADOBUTROL 604.72 MG/ML
7.5 INJECTION INTRAVENOUS ONCE
Status: COMPLETED | OUTPATIENT
Start: 2019-01-16 | End: 2019-01-16

## 2019-01-16 RX ORDER — EPHEDRINE SULFATE 50 MG/ML
INJECTION, SOLUTION INTRAMUSCULAR; INTRAVENOUS; SUBCUTANEOUS PRN
Status: DISCONTINUED | OUTPATIENT
Start: 2019-01-16 | End: 2019-01-16

## 2019-01-16 RX ORDER — SODIUM CHLORIDE, SODIUM LACTATE, POTASSIUM CHLORIDE, CALCIUM CHLORIDE 600; 310; 30; 20 MG/100ML; MG/100ML; MG/100ML; MG/100ML
INJECTION, SOLUTION INTRAVENOUS CONTINUOUS
Status: DISCONTINUED | OUTPATIENT
Start: 2019-01-16 | End: 2019-01-16 | Stop reason: HOSPADM

## 2019-01-16 RX ADMIN — SALINE NASAL SPRAY 1 SPRAY: 1.5 SOLUTION NASAL at 08:50

## 2019-01-16 RX ADMIN — SENNOSIDES AND DOCUSATE SODIUM 1 TABLET: 8.6; 5 TABLET ORAL at 20:17

## 2019-01-16 RX ADMIN — DEXTROSE AND SODIUM CHLORIDE: 5; 900 INJECTION, SOLUTION INTRAVENOUS at 08:48

## 2019-01-16 RX ADMIN — FUROSEMIDE 40 MG: 40 TABLET ORAL at 08:44

## 2019-01-16 RX ADMIN — ETOMIDATE 10 MG: 2 INJECTION INTRAVENOUS at 15:00

## 2019-01-16 RX ADMIN — MICONAZOLE NITRATE: 2 POWDER TOPICAL at 08:50

## 2019-01-16 RX ADMIN — RANITIDINE 150 MG: 150 TABLET ORAL at 20:17

## 2019-01-16 RX ADMIN — DEXTRAN 70 AND HYPROMELLOSE 2910 1 DROP: 1; 3 SOLUTION/ DROPS OPHTHALMIC at 08:52

## 2019-01-16 RX ADMIN — LIDOCAINE HYDROCHLORIDE 80 MG: 20 INJECTION, SOLUTION INFILTRATION; PERINEURAL at 15:01

## 2019-01-16 RX ADMIN — SUGAMMADEX 150 MG: 100 INJECTION, SOLUTION INTRAVENOUS at 16:39

## 2019-01-16 RX ADMIN — ROCURONIUM BROMIDE 50 MG: 10 INJECTION INTRAVENOUS at 15:03

## 2019-01-16 RX ADMIN — FENTANYL CITRATE 50 MCG: 50 INJECTION, SOLUTION INTRAMUSCULAR; INTRAVENOUS at 15:29

## 2019-01-16 RX ADMIN — Medication 500000 UNITS: at 08:45

## 2019-01-16 RX ADMIN — GADOBUTROL 7.5 ML: 604.72 INJECTION INTRAVENOUS at 17:07

## 2019-01-16 RX ADMIN — PROPOFOL 20 MG: 10 INJECTION, EMULSION INTRAVENOUS at 15:17

## 2019-01-16 RX ADMIN — MINERAL SUPPLEMENT IRON 300 MG / 5 ML STRENGTH LIQUID 100 PER BOX UNFLAVORED 500 MG: at 08:44

## 2019-01-16 RX ADMIN — RANITIDINE 150 MG: 150 TABLET ORAL at 08:44

## 2019-01-16 RX ADMIN — PANTOPRAZOLE SODIUM 40 MG: 40 TABLET, DELAYED RELEASE ORAL at 08:44

## 2019-01-16 RX ADMIN — VENLAFAXINE HYDROCHLORIDE 225 MG: 150 CAPSULE, EXTENDED RELEASE ORAL at 08:44

## 2019-01-16 RX ADMIN — PHENYLEPHRINE HYDROCHLORIDE 100 MCG: 10 INJECTION, SOLUTION INTRAMUSCULAR; INTRAVENOUS; SUBCUTANEOUS at 16:03

## 2019-01-16 RX ADMIN — SALINE NASAL SPRAY 1 SPRAY: 1.5 SOLUTION NASAL at 18:02

## 2019-01-16 RX ADMIN — ATORVASTATIN CALCIUM 20 MG: 20 TABLET, FILM COATED ORAL at 20:17

## 2019-01-16 RX ADMIN — PROPOFOL 75 MCG/KG/MIN: 10 INJECTION, EMULSION INTRAVENOUS at 15:12

## 2019-01-16 RX ADMIN — BUPROPION HYDROCHLORIDE 150 MG: 150 TABLET, FILM COATED, EXTENDED RELEASE ORAL at 08:44

## 2019-01-16 RX ADMIN — SALINE NASAL SPRAY 1 SPRAY: 1.5 SOLUTION NASAL at 04:36

## 2019-01-16 RX ADMIN — HYDROCHLOROTHIAZIDE 25 MG: 25 TABLET ORAL at 08:44

## 2019-01-16 RX ADMIN — SODIUM CHLORIDE, POTASSIUM CHLORIDE, SODIUM LACTATE AND CALCIUM CHLORIDE: 600; 310; 30; 20 INJECTION, SOLUTION INTRAVENOUS at 14:49

## 2019-01-16 RX ADMIN — SALINE NASAL SPRAY 1 SPRAY: 1.5 SOLUTION NASAL at 04:35

## 2019-01-16 RX ADMIN — PROPOFOL 20 MG: 10 INJECTION, EMULSION INTRAVENOUS at 15:13

## 2019-01-16 RX ADMIN — LISINOPRIL 30 MG: 20 TABLET ORAL at 08:44

## 2019-01-16 RX ADMIN — METOPROLOL TARTRATE 2 MG: 1 INJECTION, SOLUTION INTRAVENOUS at 15:26

## 2019-01-16 RX ADMIN — MICONAZOLE NITRATE: 2 POWDER TOPICAL at 20:18

## 2019-01-16 RX ADMIN — SALINE NASAL SPRAY 1 SPRAY: 1.5 SOLUTION NASAL at 20:18

## 2019-01-16 RX ADMIN — Medication 500000 UNITS: at 20:17

## 2019-01-16 RX ADMIN — SALINE NASAL SPRAY 1 SPRAY: 1.5 SOLUTION NASAL at 12:48

## 2019-01-16 RX ADMIN — SALINE NASAL SPRAY 1 SPRAY: 1.5 SOLUTION NASAL at 10:05

## 2019-01-16 RX ADMIN — PHENYLEPHRINE HYDROCHLORIDE 200 MCG: 10 INJECTION, SOLUTION INTRAMUSCULAR; INTRAVENOUS; SUBCUTANEOUS at 15:46

## 2019-01-16 ASSESSMENT — ACTIVITIES OF DAILY LIVING (ADL)
ADLS_ACUITY_SCORE: 27

## 2019-01-16 NOTE — PLAN OF CARE
PT 5A: Cancel - Pt off unit for sedation MRI. Will check back as pt returns and/or is appropriate.

## 2019-01-16 NOTE — ANESTHESIA POSTPROCEDURE EVALUATION
Anesthesia POST Procedure Evaluation    Patient: Gabino Jones   MRN:     [id number] Gender:   female   Age:    78 year old :      1940        Preoperative Diagnosis: Claustrophobia   Procedure(s):  Anesthesia Coverage Brain MRI With Contrast @1330   Postop Comments: No value filed.       Anesthesia Type:  General    Reportable Event: NO     PAIN: Uncomplicated   Sign Out status: Comfortable, Well controlled pain     PONV: No PONV   Sign Out status:  No Nausea or Vomiting     Neuro/Psych: Uneventful perioperative course   Sign Out Status: Preoperative baseline; Age appropriate mentation     Airway/Resp.: Uneventful perioperative course   Sign Out Status: Non labored breathing, age appropriate RR; Resp. Status within EXPECTED Parameters     CV: Uneventful perioperative course   Sign Out status: Appropriate BP and perfusion indices; Appropriate HR/Rhythm     Disposition:   Sign Out in:  PACU  Disposition:  Phase II; Home  Recovery Course: Uneventful  Follow-Up: Not required     Comments/Narrative:  Patient back to baseline - responds to voice but with inappropriate verbal response  Hemodynamically stable with , HR 75    Konrad Avila MD           Last Anesthesia Record Vitals:  CRNA VITALS  2019 1615 - 2019 1713      2019             ART BP:  0/0  (Abnormal)     ART Mean:  0          Last PACU/Preop Vitals:  Vitals:    19 0642 19 1645 19 1700   BP: 145/60 110/52 109/46   Pulse:  76    Resp:    Temp: 35.9  C (96.6  F) 36.5  C (97.7  F)    SpO2: 96% 100%          Electronically Signed By: Konrad Avila MD, 2019, 5:13 PM

## 2019-01-16 NOTE — PROGRESS NOTES
[facility], [place]  Internal Medicine Progress Note - Gold Service    Assessment & Plan   Gabino Jones is a 78 year old year old female admitted on 1/5/2019 for syncope, epistaxis, elevated lactate, and hypothermia with concern for sepsis.    Patient is overall stable.  Getting MRI this afternoon to evaluate for possible lesion. Replaced potassium, repeat tomorrow. CRP lower than prior.     # Acute encephalopathy on chronic confusion--likely dementia   Acute encephalopathy likely due to sepsis. Appears to have some degree of chronic dementia ongoing over months/years. No obvious metabolic derangements. TSH normal. Labs/vitals not consistent with adrenal insufficiency, no evidence of seizure activity, no evidence of ingestion/tox. Evaluated by OT and did very poorly on cognitive testing  - EEG showed moderate encephalopathy with maximum cortical dysfunction in the right temporal lobe. No seizures. Appreciate neuro recs  - Repeat MRI pending; needs to be done under anesthesia due to motion and claustrophobia     # Aortic insufficiency with hypervolemia  Secondary to volume resuscitation. diuresed on 1/7. Euvolemic at this time. Can re-dose lasix if dyspneic. echo on 1/5: Calcified aortic valve with moderate-to-severe aortic stenosis (peak velocity 4.2 m/s, mean gradient 41 mmHg, DVI 0.31, PATRICIA 1.1 cm2, SVI 46 mL/m2) and moderate aortic insufficiency. Aortic insufficiency is likely contributing somewhat to increased aortic valve gradient.     # Possible septic shock, likely viral, cleared:   Presented with hypothermia (94.2) , hypotension (BP 79/41), leukocytosis, elevated lactate (4.6 at OSH). Mild pyuria. CXR clear. Blood and urine cx NGTD. C diff negative. Called [facility] 1/7 and blood and urine cx (prior to start of antibiotics) are both no growth to date. Initial CRP and procal low. Stopped antibiotics with ID recommendations.      # Acute blood loss anemia  # Iron deficiency  anemia  # Melena  Likely due to epistaxis. Hgb on admission 8.3 from 10.3 after multiple nose bleeds. Initially thought that epistaxis could explain bleed but then hgb continued to drop to 6.9 despite no further episodes of epistaxis. Though curiously melena resolved as well. Underwent EGD 1/6 which was negative for any cause of bleeding. Hgb has been stable above 7.  Iron 21. Ferritin 17. Iron sat 5%.  - switched back to home daily protonix  - trend hgb PRN  - tolerating oral iron every other day.     # Epistaxis  History of nasosinus melanoma (in remission). Seen by ENT and no evidence of recurrence of malignancy. with the below recs  - Dissolvable packing in place. This will dissolve on its own. No need for removal.   - No antibiotics for the nasal packing (surgicel)  - There may be some slow dark red oozing after the packing was placed - this is normal and likely from the Afrin and the Surgiflo  - Nasal saline spray to bilateral nasal cavities q2h daily starting in 48 hours (keeps the packing moist and will help with removal)  - If supplemental O2 is required, add humidifier ([facility] should be contacted to supply this)  - Humidifier to the room to increase the moisture in the air  - If patient experiences brisk bleeding, spray Afrin along the floor of the nose and hold digital pressure on bilateral nostrils for 20 minutes (holding pressure higher up on the nasal bones does not apply effective pressure to the septum. Instead, the pressure should be applied on the soft part of the nose do that the opening of the nostrils are completely pinched shut).  Nasal clips do not work and should not be used in place of digital pressure. If bleeding continues after 20 minutes of digital pressure, repeat the above steps. If this fails to control the bleeding at that time, call ENT for further recommendations  - attempted to provide humidification via faceshield at ENT request but pt kept pulling it off  - see ENT consult.  Appreciated. No new bleeding as of 1/10, 1/11, 1/12, 1/13     # DAVID due to sepsis:  Likely due to hypotension/sepsis. Resolved     # Hypertension:  - Continue lisinopril, hydrochlorothiazide, furosemide     # HLD: continue atorvastatin  # Depression: Continue wellbutrin and effexor       Diet: Orders Placed This Encounter      NPO per Anesthesia Guidelines for Procedure/Surgery Except for: Meds    Fluids: D5NS @ 100ml/hr for 500ml while NPO today  Peter Catheter: Peter Catheter: not present    DVT Prophylaxis: None given epistaxis  Expected discharge: 1 - 2 days, recommended to transitional care unit once MRI complete, continued clinical stability.    The patient's care was discussed with the Bedside Nurse, Patient and Patient's Family.    Casa De Luna MD  Internal Medicine Staff Hospitalist Service  [facility]  Please see sticky note for cross cover information  Text Page       Interval History   No acute events overnight. This morning, she is not oriented to time/place. She denies difficulty breathing, chest pain, fever/chills. No weakness/numbness. Remainder 4pt ROS negative.      Data reviewed today: I reviewed all medications, new labs and imaging results over the last 24 hours.    Physical Exam   Vital signs last 24 hours:  Temp:  [96.3  F (35.7  C)-96.6  F (35.9  C)] 96.6  F (35.9  C)  Heart Rate:  [80-82] 80  Resp:  [16] 16  BP: (143-145)/(55-60) 145/60  SpO2:  [96 %-97 %] 96 %  Weight (last 5):   Vitals:    01/10/19 0900 01/12/19 0938 01/13/19 1132 01/14/19 0900   Weight: 82.2 kg (181 lb 3.2 oz) 78.4 kg (172 lb 12.8 oz) 77.7 kg (171 lb 3.2 oz) 77 kg (169 lb 12.8 oz)    01/15/19 1413   Weight: 75.9 kg (167 lb 4.8 oz)     HEENT: Moist mucus membranes, no scleral icterus  Resp: Breathing comfortably on room air, good air entry bilaterally, no wheezing/rhonchi  CV: Regular rate and rhythm, normal S1S2, no RMG, no JVD, no peripheral edema  Skin: No rashes, jaundice, or discoloration    Neuro: 5/5 strength in bilateral arms/legs, not oriented to place or time.

## 2019-01-16 NOTE — OR NURSING
Writer noted that potassium level 3.0 from this morning's lab draw. Writer contacted Dr. Kern to inquire regarding replacement. Awaiting call from Dr. Kern.

## 2019-01-16 NOTE — PLAN OF CARE
Pt A/Ox3 this AM, disoriented to place. Up with A1 to BSC, voiding well but no BM today. NPO for Brain MRI. MRI successfully obtained with sedation. L PIV SL. VSS and denies pain. Will continue to monitor pt and follow POC.

## 2019-01-16 NOTE — ANESTHESIA CARE TRANSFER NOTE
Patient: Gabino Jones    Procedure(s):  Anesthesia Coverage Brain MRI With Contrast @1330    Diagnosis: Claustrophobia  Diagnosis Additional Information: No value filed.    Anesthesia Type:   No value filed.     Note:  Airway :Face Mask          Vitals: (Last set prior to Anesthesia Care Transfer)    CRNA VITALS  1/16/2019 1615 - 1/16/2019 1645      1/16/2019             ART BP:  0/0  (Abnormal)     ART Mean:  0                Electronically Signed By: MELANIE Bauer [facility]  January 16, 2019  4:45 PM

## 2019-01-16 NOTE — PLAN OF CARE
Discharge Planner OT   Patient plan for discharge: not discussed  Current status: Pt sleepy upon arrival, agreeable to therapy. Pt engaged in self cares for g/h tasks with in-hand setup, cues for initiation/completion and sequencing.  Barriers to return to prior living situation: Cognition, strength, endurance, balance.  Recommendations for discharge: Per plan established by the OT, the recommendation for dc location is TCU.  Rationale for recommendations: To progress functional independence and safety,

## 2019-01-16 NOTE — PLAN OF CARE
Time: 1900-0730     Reason for admission: Sepsis   Vitals: VSS  Activity: Turned independently in bed overnight. Ambulated to the bedside commode with A1 and walker.   Pain: c/o generalized pain. Tylenol given   Neuro: A&Ox2, disoriented to time/place  Cardiac:WNL   Respiratory: On RA sats 97%  GI/: Voided on the bedside commode. No BM.   Diet:Mechanical/Soft, she ate ice cream and pudding overnight and took sips of water.   Lines: PIV is SL   Skin/Wounds: Scabbing present inside nares. Ocean spray given throughout the night. Eye drops given x2.         Pt appeared to be sleeping most of the pretty. I gave her Tessalon for her coughing and that seemed to help. Possible MRI under anesthesia to happen today.   Continue to monitor and follow POC.

## 2019-01-16 NOTE — PLAN OF CARE
Pt admitted for sepsis. Alert and oriented to self and place only, confusion waxes and wanes, follows commands. VSS on RA. No complaints of pain or nausea. Fair appetite today, did ok feeding self for breakfast. Was placed on NPO for anesthesia for MRI that was planned for today. MRI still has not happened, MD to try anesthesia again tomorrow. D5NS running at 100ml/hr in L PIV for NPO status. Voiding WDL. 1 BM today, ambulated into bathroom with assist of 1. Continued q 2hr nasal spray. Tesslon given to help with coughing. PT, OT, and Speech saw today. Will continue with POC and plan for MRI tomorrow.

## 2019-01-16 NOTE — ANESTHESIA PREPROCEDURE EVALUATION
Anesthesia Pre-Procedure Evaluation    Patient: Gabino Jones   MRN:     [id number] Gender:   female   Age:    78 year old :      1940        Preoperative Diagnosis: Claustrophobia   Procedure(s):  Anesthesia Coverage Brain MRI With Contrast @1330     Past Medical History:   Diagnosis Date     Cancer (H)      Chronic back pain      Chronic leg pain      Depression      Hearing loss      Heart murmur      Hyperlipidemia      Hypertension      Melanoma of nasal cavity (H) 2014     Ringing in ears       Past Surgical History:   Procedure Laterality Date     C ANESTH,CERV SPINE, SITTING POSITION       COLONOSCOPY N/A 2017    Procedure: COMBINED COLONOSCOPY, SINGLE OR MULTIPLE BIOPSY/POLYPECTOMY BY BIOPSY;;  Surgeon: Sathya Norman MD;  Location: [facility] OR     COLONOSCOPY WITH CO2 INSUFFLATION N/A 2017    Procedure: COLONOSCOPY WITH CO2 INSUFFLATION;  Combined,  [place], Gastroesophageal reflux disease without esophagitis  Flatulence, eructation, and gas pain, BMI 29.35, [facility] [phone];  Surgeon: Sathya Norman MD;  Location: [facility] OR     COLONOSCOPY, SUBMUCOSA RESECTION N/A 10/4/2017    Procedure: COLONOSCOPY, SUBMUCOSA RESECTION;  Colonoscopy With Endoscopic Polypectomy with clips;  Surgeon: Milton Javier MD;  Location: [facility] OR     COMBINED ESOPHAGOSCOPY, GASTROSCOPY, DUODENOSCOPY (EGD) WITH CO2 INSUFFLATION N/A 2017    Procedure: COMBINED ESOPHAGOSCOPY, GASTROSCOPY, DUODENOSCOPY (EGD) WITH CO2 INSUFFLATION;  Combined,  [place], Gastroesophageal reflux disease without esophagitis  Flatulence, eructation, and gas pain, BMI 29.35, [facility] [id number];  Surgeon: Sathya Norman MD;  Location: [facility] OR     ESOPHAGOSCOPY, GASTROSCOPY, DUODENOSCOPY (EGD), COMBINED N/A 2017    Procedure: COMBINED ESOPHAGOSCOPY, GASTROSCOPY, DUODENOSCOPY (EGD), BIOPSY SINGLE OR MULTIPLE;;  Surgeon: Sathya Norman MD;  Location: [facility] OR     ESOPHAGOSCOPY, GASTROSCOPY,  DUODENOSCOPY (EGD), COMBINED N/A 1/6/2019    Procedure: COMBINED ESOPHAGOSCOPY, GASTROSCOPY, DUODENOSCOPY (EGD);  Surgeon: Cailin Paulino MD;  Location:  GI     GALLBLADDER SURGERY       NECK SURGERY       OPTICAL TRACKING SYSTEM CRANIOTOMY, EXCISE TUMOR, COMBINED Right 2/22/2017    Procedure: COMBINED OPTICAL TRACKING SYSTEM CRANIOTOMY, EXCISE TUMOR;  Surgeon: Lenora Pérez MD;  Location:  OR     OPTICAL TRACKING SYSTEM ENDOSCOPIC ENDONASAL SURGERY  6/23/2014    Procedure: OPTICAL TRACKING SYSTEM ENDOSCOPIC ENDONASAL SURGERY;  Surgeon: Yolanda Whitaker MD;  Location:  OR     STOMACH SURGERY       TONSILLECTOMY       TUBAL LIGATION      1975          Anesthesia Evaluation     . Pt has had prior anesthetic. Type: General and MAC    No history of anesthetic complications          ROS/MED HX    ENT/Pulmonary:     (+)other ENT- Melanoma of nasal cavity, current admission prompted by severe epistaxis, , . .    Neurologic: Comment: Patient admitted with acute on chronic mental status changes as part of septic picture.  Currently at baseline.  Diagnosis possible dementia -Alzheimer's versus Lewy body disease.  MRI required to evaluate     (+)delerium     Cardiovascular:     (+) hypertension----. : . . . :. valvular problems/murmurs type: AS . Previous cardiac testing Echodate:1/5/2019results:Interpretation Summary  Technically difficult study.  Calcified aortic valve with moderate-to-severe aortic stenosis (peak velocity  4.2 m/s, mean gradient 41 mmHg, DVI 0.31, PATRICIA 1.1 cm2, SVI 46 mL/m2) and  moderate aortic insufficiency. Aortic insufficiency is likely contributing  somewhat to increased aortic valve gradient.  This study was compared with the study from 4/23/18: AS has worsened slightly  (peak velocity of 4.2 m/s, previously 3.8 m/s.)  _____________________________________________________________________________  __        Left Ventricle  Left ventricular size is  normal. Global and regional left ventricular function  is normal with an EF of 60-65%. Relative wall thickness is increased  consistent with concentric remodeling. Left ventricular diastolic function isdate: results:ECG reviewed date:1/5/2019 results:Atrial tachycardia date: results:          METS/Exercise Tolerance:     Hematologic: Comments: Hgb 8.5 s/p resuscitation      (+) Anemia, -      Musculoskeletal: Comment: Previous C spine surgery  (Last intubation with C MAC video laryngoscope without difficulty        GI/Hepatic:         Renal/Genitourinary: Comment: Resolved sepsis related DAVID.  GFR currently 88    (+) chronic renal disease, type: ARF, Pt does not require dialysis,       Endo:         Psychiatric:         Infectious Disease:         Malignancy:         Other:    (+) H/O Chronic Pain,                       PHYSICAL EXAM:   Mental Status/Neuro:    Airway:    Respiratory: Auscultation: CTAB     Resp. Rate: Normal     Resp. Effort: Normal      CV: Rhythm: Irregular  Heart: Murmur (Systolic; loud)   Comments: Patient is awake and alert, responsive but inappropriate.  Will not cooperate with airway exam but appears feasible  4/6 holosystolic murmur, intermittent arrhythmia, skin warm and dry and pulses strong bilaterally in radial                     Lab Results   Component Value Date    WBC 12.0 (H) 01/13/2019    HGB 8.1 (L) 01/13/2019    HCT 26.9 (L) 01/13/2019     01/13/2019    .0 (H) 01/13/2019     01/11/2019    POTASSIUM 3.5 01/12/2019    CHLORIDE 98 01/11/2019    CO2 25 01/11/2019    BUN 10 01/11/2019    CR 0.58 01/11/2019    GLC 91 01/11/2019    STEFFANIE 8.0 (L) 01/11/2019    PHOS 3.1 01/11/2019    MAG 2.0 01/11/2019    ALBUMIN 2.4 (L) 01/09/2019    PROTTOTAL 6.1 (L) 01/09/2019    ALT 25 01/09/2019    AST 33 01/09/2019    ALKPHOS 83 01/09/2019    BILITOTAL 0.4 01/09/2019    ANDRZEJ 25 01/05/2019    PTT 33 08/29/2014    INR 1.12 01/05/2019    TSH 1.72 12/12/2018       Preop Vitals  BP  "Readings from Last 3 Encounters:   01/16/19 145/60   12/12/18 165/80   09/12/18 148/70    Pulse Readings from Last 3 Encounters:   01/11/19 88   12/12/18 84   09/12/18 78      Resp Readings from Last 3 Encounters:   01/16/19 16   12/12/18 18   09/12/18 18    SpO2 Readings from Last 3 Encounters:   01/16/19 96%   12/12/18 99%   09/12/18 100%      Temp Readings from Last 1 Encounters:   01/16/19 35.9  C (96.6  F) (Axillary)    Ht Readings from Last 1 Encounters:   12/12/18 1.651 m (5' 5\")      Wt Readings from Last 1 Encounters:   01/15/19 75.9 kg (167 lb 4.8 oz)    Estimated body mass index is 27.84 kg/m  as calculated from the following:    Height as of 12/12/18: 1.651 m (5' 5\").    Weight as of this encounter: 75.9 kg (167 lb 4.8 oz).     LDA:  Peripheral IV 01/15/19 Left Upper forearm (Active)   Site Assessment WDL 1/16/2019  1:46 AM   Line Status Infusing 1/16/2019  1:46 AM   Phlebitis Scale 0-->no symptoms 1/16/2019  1:46 AM   Infiltration Scale 0 1/16/2019  1:46 AM   Extravasation? No 1/16/2019  1:46 AM   Number of days: 1            Assessment:   ASA SCORE: 3    NPO Status: > 6 hours since completed Solid [employer]   Documentation: H&P complete; Preop Testing complete; Consents complete   Proceeding: Proceed without further delay  Tobacco Use:  NO Active use of Tobacco/UNKNOWN Tobacco use status     Plan:   Anes. Type:  General   Pre-Induction: Midazolam IV; Acetaminophen PO   Induction:  IV (Standard); Etomidate   Airway: Oral ETT   Access/Monitoring: PIV; FloTrac   Maintenance: Balanced   Emergence: Procedure Site   Logistics: Same Day Surgery     Postop Pain/Sedation Strategy:  Standard-Options: Opioids PRN     PONV Management:  Adult Risk Factors: Female, Non-Smoker, Postop Opioids  Prevention: Ondansetron     CONSENT: Direct conversation   Plan and risks discussed with: Patient   Blood Products: Consent Deferred (Minimal Blood Loss)       Comments for Plan/Consent:  Plan:  MAC vs GA, probable GA given " difficulty with motion artifact yesterday.  Plan GA with etomidate (history of aortic stenosis), ETT, monitoring with clearsight BP monitor if available during induction.    MD Konrad Gillespie MD

## 2019-01-17 ENCOUNTER — APPOINTMENT (OUTPATIENT)
Dept: SPEECH THERAPY | Facility: CLINIC | Age: 79
DRG: 871 | End: 2019-01-17
Attending: INTERNAL MEDICINE
Payer: MEDICARE

## 2019-01-17 ENCOUNTER — APPOINTMENT (OUTPATIENT)
Dept: PHYSICAL THERAPY | Facility: CLINIC | Age: 79
DRG: 871 | End: 2019-01-17
Attending: INTERNAL MEDICINE
Payer: MEDICARE

## 2019-01-17 ENCOUNTER — APPOINTMENT (OUTPATIENT)
Dept: OCCUPATIONAL THERAPY | Facility: CLINIC | Age: 79
DRG: 871 | End: 2019-01-17
Attending: INTERNAL MEDICINE
Payer: MEDICARE

## 2019-01-17 LAB
ANION GAP SERPL CALCULATED.3IONS-SCNC: 10 MMOL/L (ref 3–14)
BUN SERPL-MCNC: 17 MG/DL (ref 7–30)
CALCIUM SERPL-MCNC: 8.4 MG/DL (ref 8.5–10.1)
CHLORIDE SERPL-SCNC: 100 MMOL/L (ref 94–109)
CO2 SERPL-SCNC: 25 MMOL/L (ref 20–32)
CREAT SERPL-MCNC: 1.01 MG/DL (ref 0.52–1.04)
GFR SERPL CREATININE-BSD FRML MDRD: 53 ML/MIN/{1.73_M2}
GLUCOSE SERPL-MCNC: 93 MG/DL (ref 70–99)
MAGNESIUM SERPL-MCNC: 1.9 MG/DL (ref 1.6–2.3)
POTASSIUM SERPL-SCNC: 3 MMOL/L (ref 3.4–5.3)
SODIUM SERPL-SCNC: 135 MMOL/L (ref 133–144)

## 2019-01-17 PROCEDURE — 92526 ORAL FUNCTION THERAPY: CPT | Mod: GN

## 2019-01-17 PROCEDURE — 40000225 ZZH STATISTIC SLP WARD VISIT

## 2019-01-17 PROCEDURE — 40000133 ZZH STATISTIC OT WARD VISIT

## 2019-01-17 PROCEDURE — 25000132 ZZH RX MED GY IP 250 OP 250 PS 637: Mod: GY | Performed by: STUDENT IN AN ORGANIZED HEALTH CARE EDUCATION/TRAINING PROGRAM

## 2019-01-17 PROCEDURE — A9270 NON-COVERED ITEM OR SERVICE: HCPCS | Mod: GY | Performed by: INTERNAL MEDICINE

## 2019-01-17 PROCEDURE — 99233 SBSQ HOSP IP/OBS HIGH 50: CPT | Performed by: INTERNAL MEDICINE

## 2019-01-17 PROCEDURE — 97535 SELF CARE MNGMENT TRAINING: CPT | Mod: GO

## 2019-01-17 PROCEDURE — 83735 ASSAY OF MAGNESIUM: CPT | Performed by: INTERNAL MEDICINE

## 2019-01-17 PROCEDURE — 25000132 ZZH RX MED GY IP 250 OP 250 PS 637: Mod: GY | Performed by: NURSE PRACTITIONER

## 2019-01-17 PROCEDURE — 97530 THERAPEUTIC ACTIVITIES: CPT | Mod: GP

## 2019-01-17 PROCEDURE — 80048 BASIC METABOLIC PNL TOTAL CA: CPT | Performed by: INTERNAL MEDICINE

## 2019-01-17 PROCEDURE — 12000001 ZZH R&B MED SURG/OB UMMC

## 2019-01-17 PROCEDURE — A9270 NON-COVERED ITEM OR SERVICE: HCPCS | Mod: GY | Performed by: NURSE PRACTITIONER

## 2019-01-17 PROCEDURE — 25000132 ZZH RX MED GY IP 250 OP 250 PS 637: Mod: GY | Performed by: INTERNAL MEDICINE

## 2019-01-17 PROCEDURE — 40000193 ZZH STATISTIC PT WARD VISIT

## 2019-01-17 PROCEDURE — A9270 NON-COVERED ITEM OR SERVICE: HCPCS | Mod: GY | Performed by: STUDENT IN AN ORGANIZED HEALTH CARE EDUCATION/TRAINING PROGRAM

## 2019-01-17 PROCEDURE — 36415 COLL VENOUS BLD VENIPUNCTURE: CPT | Performed by: INTERNAL MEDICINE

## 2019-01-17 PROCEDURE — 97110 THERAPEUTIC EXERCISES: CPT | Mod: GP

## 2019-01-17 PROCEDURE — 25000131 ZZH RX MED GY IP 250 OP 636 PS 637: Mod: GY | Performed by: INTERNAL MEDICINE

## 2019-01-17 RX ADMIN — BUPROPION HYDROCHLORIDE 150 MG: 150 TABLET, FILM COATED, EXTENDED RELEASE ORAL at 08:26

## 2019-01-17 RX ADMIN — MICONAZOLE NITRATE: 2 POWDER TOPICAL at 08:29

## 2019-01-17 RX ADMIN — SALINE NASAL SPRAY 1 SPRAY: 1.5 SOLUTION NASAL at 20:40

## 2019-01-17 RX ADMIN — Medication 500000 UNITS: at 12:58

## 2019-01-17 RX ADMIN — Medication 500000 UNITS: at 15:36

## 2019-01-17 RX ADMIN — MICONAZOLE NITRATE: 2 POWDER TOPICAL at 20:40

## 2019-01-17 RX ADMIN — RANITIDINE 150 MG: 150 TABLET ORAL at 08:26

## 2019-01-17 RX ADMIN — SALINE NASAL SPRAY 1 SPRAY: 1.5 SOLUTION NASAL at 12:59

## 2019-01-17 RX ADMIN — Medication 500000 UNITS: at 20:39

## 2019-01-17 RX ADMIN — SALINE NASAL SPRAY 1 SPRAY: 1.5 SOLUTION NASAL at 05:38

## 2019-01-17 RX ADMIN — BENZONATATE 100 MG: 100 CAPSULE ORAL at 15:40

## 2019-01-17 RX ADMIN — SALINE NASAL SPRAY 1 SPRAY: 1.5 SOLUTION NASAL at 11:01

## 2019-01-17 RX ADMIN — Medication 500000 UNITS: at 08:28

## 2019-01-17 RX ADMIN — SALINE NASAL SPRAY 1 SPRAY: 1.5 SOLUTION NASAL at 01:06

## 2019-01-17 RX ADMIN — RANITIDINE 150 MG: 150 TABLET ORAL at 20:39

## 2019-01-17 RX ADMIN — PANTOPRAZOLE SODIUM 40 MG: 40 TABLET, DELAYED RELEASE ORAL at 08:27

## 2019-01-17 RX ADMIN — SALINE NASAL SPRAY 1 SPRAY: 1.5 SOLUTION NASAL at 20:45

## 2019-01-17 RX ADMIN — ACETAMINOPHEN 650 MG: 325 TABLET, FILM COATED ORAL at 02:25

## 2019-01-17 RX ADMIN — LISINOPRIL 30 MG: 20 TABLET ORAL at 08:26

## 2019-01-17 RX ADMIN — BENZONATATE 100 MG: 100 CAPSULE ORAL at 02:25

## 2019-01-17 RX ADMIN — SALINE NASAL SPRAY 1 SPRAY: 1.5 SOLUTION NASAL at 08:27

## 2019-01-17 RX ADMIN — ATORVASTATIN CALCIUM 20 MG: 20 TABLET, FILM COATED ORAL at 20:40

## 2019-01-17 RX ADMIN — ACETAMINOPHEN 650 MG: 325 TABLET, FILM COATED ORAL at 18:06

## 2019-01-17 RX ADMIN — GUAIFENESIN AND DEXTROMETHORPHAN HYDROBROMIDE 1 TABLET: 600; 30 TABLET, EXTENDED RELEASE ORAL at 11:03

## 2019-01-17 RX ADMIN — ONDANSETRON 4 MG: 4 TABLET, ORALLY DISINTEGRATING ORAL at 15:44

## 2019-01-17 RX ADMIN — FUROSEMIDE 40 MG: 40 TABLET ORAL at 08:27

## 2019-01-17 RX ADMIN — HYDROCHLOROTHIAZIDE 25 MG: 25 TABLET ORAL at 08:27

## 2019-01-17 RX ADMIN — SALINE NASAL SPRAY 1 SPRAY: 1.5 SOLUTION NASAL at 15:37

## 2019-01-17 RX ADMIN — MULTIPLE VITAMINS W/ MINERALS TAB 1 TABLET: TAB at 08:25

## 2019-01-17 RX ADMIN — VENLAFAXINE HYDROCHLORIDE 225 MG: 150 CAPSULE, EXTENDED RELEASE ORAL at 08:25

## 2019-01-17 RX ADMIN — SALINE NASAL SPRAY 1 SPRAY: 1.5 SOLUTION NASAL at 02:25

## 2019-01-17 ASSESSMENT — ACTIVITIES OF DAILY LIVING (ADL)
ADLS_ACUITY_SCORE: 27

## 2019-01-17 NOTE — PROGRESS NOTES
"Social Work Services Progress Note     Hospital Day: 13  Date of Initial Social Work Evaluation:  1/7/18  Collaborated with:  Primary team Gold 9     Data:  Pt is a 78-year-old female admitted with concern for sepsis. TCU is recommended at discharge.      Intervention:  Updated by primary team that pt/spouse would like to continue to pursue TCU placement to see if pt can physically and cognitively improve. Faxed updated referrals to [facility], [facility], and [facility].      Faxed referrals to:  1) [facility] (ph [phone])- re-sent referral 1/17  2) [facility] (ph [phone])- previously accepted pending bed availability when pt is ready for discharge- resent referral 1/17  3) [facility] (ph [phone])- declined, \"no available or appropriate bed\"  4) [facility] (ph [phone])- resent referral 1/17     Assessment:  Pursuing TCU placement     Plan:    Anticipated Disposition:  Facility:  TCU    Barriers to d/c plan:  Medical stability    Follow Up:  SW to follow for discharge planning     DALE Mena, LGSW  5A Unit   Pager [phone]  Phone [phone]        "

## 2019-01-17 NOTE — PROGRESS NOTES
Calorie Count    Intake recorded for: 1/16/2019  Total Kcals: 0 Total Protein: 0g    Kcals from Hospital Food: 0   Protein: 0g    Kcals from Outside Food (average):0 Protein: 0g    # Meals Recorded: Zero meals ordered from kitchen. No food intake recorded     # Supplements Recorded: no intake recorded

## 2019-01-17 NOTE — PROGRESS NOTES
[facility], [place]  Internal Medicine Progress Note - Gold Service    Assessment & Plan   Gabino Jones is a 78 year old year old female admitted on 1/5/2019 for syncope, epistaxis, elevated lactate, and hypothermia with concern for sepsis.    Patient is overall stable.  Changes: MRI showed small lesion in left frontal lobe; see below. Had family discussion regarding goals of care. Hope is to go to rehab, get physically stronger and improved mentally while recovering from initial sepsis and acute anemia. Asking psychiatry to help optimize medications prior to discharge.    # Acute encephalopathy on chronic confusion--likely dementia   Acute encephalopathy likely due to dementia, possibly worsened by recent bleed with acute anemia. Appears to have some degree of chronic dementia ongoing over months/years. No obvious metabolic derangements. TSH normal. Labs/vitals not consistent with adrenal insufficiency, no evidence of seizure activity, no evidence of ingestion/tox. MRI lesion small, so unlikely causing this global encephalopathy. Evaluated by OT and did very poorly on cognitive testing  - EEG showed moderate encephalopathy with maximum cortical dysfunction in the right temporal lobe. No seizures. 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 Spoke with primary oncologist, Dr. Obregon, who will discuss at tumor conference. Given her current medical status, not likely candidate for aggressive treatment. Given small size, this is not likely causing her encephalopathy. More likely due to dementia     # Aortic insufficiency with hypervolemia  Secondary to volume resuscitation. diuresed on 1/7. Euvolemic at this time. Can re-dose lasix if dyspneic. echo on 1/5: Calcified aortic valve with moderate-to-severe aortic stenosis (peak velocity 4.2 m/s, mean gradient 41 mmHg, DVI 0.31, PATRICIA 1.1 cm2, SVI  46 mL/m2) and moderate aortic insufficiency. Aortic insufficiency is likely contributing somewhat to increased aortic valve gradient.     # Possible septic shock, likely viral, cleared:   Presented with hypothermia (94.2) , hypotension (BP 79/41), leukocytosis, elevated lactate (4.6 at OSH). Mild pyuria. CXR clear. Blood and urine cx NGTD. C diff negative. Initial CRP and procal low. Stopped antibiotics with ID recommendations.      # Acute blood loss anemia  # Iron deficiency anemia  # Melena  Likely due to epistaxis. Hgb on admission 8.3 from 10.3 after multiple nose bleeds. Initially thought that epistaxis could explain bleed but then hgb continued to drop to 6.9 despite no further episodes of epistaxis. Underwent EGD 1/6 which was negative for any cause of bleeding. Hgb has been stable above 7.  Iron 21. Ferritin 17. Iron sat 5%.  - switched back to home daily protonix  - trend hgb PRN  - tolerating oral iron every other day.     # Epistaxis  History of nasosinus melanoma (in remission). Seen by ENT and no evidence of recurrence of malignancy. with the below recs  - Dissolvable packing in place. This will dissolve on its own. No need for removal.   - No antibiotics for the nasal packing (surgicel)  - There may be some slow dark red oozing after the packing was placed - this is normal and likely from the Afrin and the Surgiflo  - Nasal saline spray to bilateral nasal cavities q2h daily starting in 48 hours (keeps the packing moist and will help with removal)  - If supplemental O2 is required, add humidifier (RT should be contacted to supply this)  - Humidifier to the room to increase the moisture in the air  - If patient experiences brisk bleeding, spray Afrin along the floor of the nose and hold digital pressure on bilateral nostrils for 20 minutes (holding pressure higher up on the nasal bones does not apply effective pressure to the septum. Instead, the pressure should be applied on the soft part of the nose  do that the opening of the nostrils are completely pinched shut).  Nasal clips do not work and should not be used in place of digital pressure. If bleeding continues after 20 minutes of digital pressure, repeat the above steps. If this fails to control the bleeding at that time, call ENT for further recommendations  - attempted to provide humidification via faceshield at ENT request but pt kept pulling it off  - see ENT consult. Appreciated. No new bleeding as of 1/10, 1/11, 1/12, 1/13     # DAVID due to sepsis:  Likely due to hypotension/sepsis. Resolved     # Hypertension:  - Continue lisinopril, hydrochlorothiazide, furosemide     # HLD: continue atorvastatin  # Depression: Continue wellbutrin and effexor. Psychiatry consulted       Diet: Orders Placed This Encounter      Dysphagia Diet Level 2 [facility] Altered Thin Liquids (water, ice chips, juice, milk gelatin, ice cream, etc)  Fluids: oral liquids  Peter Catheter: Peter Catheter: not present    DVT Prophylaxis: None given epistaxis  Expected discharge: 1 - 2 days, recommended to transitional care unit once plan for cognitive function in place    The patient's care was discussed with the Bedside Nurse, Patient and Patient's Family.    Casa De Luna MD  Internal Medicine Staff Hospitalist Service  [facility]  Please see sticky note for cross cover information  Text Page       Interval History   No acute events overnight. This morning, she is not oriented to place or time. Denies pain in stomach, limbs. No fever/chills. No difficulty breathing. Remainder 4 point ROS negative.      Data reviewed today: I reviewed all medications, new labs and imaging results over the last 24 hours. I personally reviewed MRI brain    Physical Exam   Vital signs last 24 hours:  Temp:  [95.7  F (35.4  C)-97.8  F (36.6  C)] 96.8  F (36  C)  Pulse:  [71-81] 71  Heart Rate:  [73-85] 85  Resp:  [16-20] 16  BP: ()/(43-83) 113/43  SpO2:  [94 %-98 %] 98 %  Weight  (last 5):   Vitals:    01/10/19 0900 01/12/19 0938 01/13/19 1132 01/14/19 0900   Weight: 82.2 kg (181 lb 3.2 oz) 78.4 kg (172 lb 12.8 oz) 77.7 kg (171 lb 3.2 oz) 77 kg (169 lb 12.8 oz)    01/15/19 1413   Weight: 75.9 kg (167 lb 4.8 oz)     HEENT: Moist mucus membranes, no scleral icterus  Resp: Breathing comfortably on room air, good air entry bilaterally, no wheezing/rhonchi  CV: Regular rate and rhythm, normal S1S2, 2/6 systolic murmur, no JVD, no peripheral edema  Skin: No rashes, jaundice, or discoloration   Neuro: 5/5 strength in bilateral arms/legs, not oriented to place or time.

## 2019-01-17 NOTE — PLAN OF CARE
Time: 1900-0730     Reason for admission: Sepsis   Vitals: VSS  Activity: Turned independently in bed overnight. Ambulated to the bedside commode with A1 and walker.   Pain: c/o generalized pain. Tylenol given   Neuro: A&O to self, disoriented to time/place/situation   Cardiac:WNL   Respiratory: On RA sats 97%  GI/: Voided on the bedside commode. Medium BM on the commode x1. Incont. Of stool x1.   Diet:Mechanical/Soft, she ate ice cream and pudding overnight and took sips of water.   Lines: PIV is SL   Skin/Wounds: Scabbing present inside nares. Ocean spray given throughout the night. Eye drops given x1.         Pt appeared to be sleeping most of the pretty. I gave her Tessalon for her coughing and that seemed to help.   Continue to monitor and follow POC.

## 2019-01-17 NOTE — PLAN OF CARE
5A - up to/from commode & bedside chair with FWW and hand on gaitbelt     Discharge Planner PT   Patient plan for discharge: Did not discuss with pt today  Current status: Greeted pt sitting up in bed just finishing lunch and agreeable to therapy. AVSS and appropriate for mobilization. Engaged pt in supine to sitting at EOB at min A. Progressed to sit <> stand transfer x2 with FWW at CGA. Initiated stepping pivot transfer from EOB to bedside recliner at [facility]. Despite thorough encouragement pt declines ambulation or more sit <> stands for LE strength. Engaged pt in seated LE exercises. Pt demonstrates confusion and is not oriented to time or place, on top of confusion pt is [place] and has a difficult time following commands requiring frequent repetition and single step commands.   Barriers to return to prior living situation: medical condition, cognition  Recommendations for discharge: TCU  Rationale for recommendations: Pt is recommended to discharge to TCU setting for continued LE strength, functional mobility, stairs and improved balance in order to safely return to home environment.        Entered by: Jazmine Moyer 01/17/2019 1:44 PM

## 2019-01-17 NOTE — PLAN OF CARE
OT/5A:   Discharge Planner OT   Patient plan for discharge: Did not discuss.   Current status: Patient transferring supine <> sit EOB with SBA. Min A to complete SPT with 2WW from EOB <> bedside commode. CGA for balance management while patient completing clothing management and pericares. Extended time required for communication and verbal/visual cues for initiation of tasks and sequencing. Patient completing supine facial hygiene and hair grooming with set-up and cues to initiate task.   Barriers to return to prior living situation: Medical Status, Cognition, Generalized Weakness, Balance  Recommendations for discharge: TCU  Rationale for recommendations: Patient below baseline ADL/mobility independence and safety. Would benefit from ongoing therapies to maximize progress before returning to prior living situation. Per PT eval, patient will have 24 hour assist from  and/or daughter available.        Entered by: Martha Ontiveros 01/17/2019 12:00 PM

## 2019-01-18 ENCOUNTER — APPOINTMENT (OUTPATIENT)
Dept: OCCUPATIONAL THERAPY | Facility: CLINIC | Age: 79
DRG: 871 | End: 2019-01-18
Attending: INTERNAL MEDICINE
Payer: MEDICARE

## 2019-01-18 ENCOUNTER — APPOINTMENT (OUTPATIENT)
Dept: SPEECH THERAPY | Facility: CLINIC | Age: 79
DRG: 871 | End: 2019-01-18
Attending: INTERNAL MEDICINE
Payer: MEDICARE

## 2019-01-18 ENCOUNTER — APPOINTMENT (OUTPATIENT)
Dept: PHYSICAL THERAPY | Facility: CLINIC | Age: 79
DRG: 871 | End: 2019-01-18
Attending: INTERNAL MEDICINE
Payer: MEDICARE

## 2019-01-18 LAB
ANION GAP SERPL CALCULATED.3IONS-SCNC: 10 MMOL/L (ref 3–14)
BUN SERPL-MCNC: 21 MG/DL (ref 7–30)
CALCIUM SERPL-MCNC: 8.5 MG/DL (ref 8.5–10.1)
CHLORIDE SERPL-SCNC: 99 MMOL/L (ref 94–109)
CO2 SERPL-SCNC: 27 MMOL/L (ref 20–32)
CREAT SERPL-MCNC: 1.02 MG/DL (ref 0.52–1.04)
ERYTHROCYTE [DISTWIDTH] IN BLOOD BY AUTOMATED COUNT: 16.5 % (ref 10–15)
GFR SERPL CREATININE-BSD FRML MDRD: 52 ML/MIN/{1.73_M2}
GLUCOSE SERPL-MCNC: 94 MG/DL (ref 70–99)
HCT VFR BLD AUTO: 29.9 % (ref 35–47)
HGB BLD-MCNC: 8.8 G/DL (ref 11.7–15.7)
MCH RBC QN AUTO: 25.5 PG (ref 26.5–33)
MCHC RBC AUTO-ENTMCNC: 29.4 G/DL (ref 31.5–36.5)
MCV RBC AUTO: 87 FL (ref 78–100)
PLATELET # BLD AUTO: 524 10E9/L (ref 150–450)
POTASSIUM SERPL-SCNC: 3 MMOL/L (ref 3.4–5.3)
POTASSIUM SERPL-SCNC: 3.6 MMOL/L (ref 3.4–5.3)
RBC # BLD AUTO: 3.45 10E12/L (ref 3.8–5.2)
SODIUM SERPL-SCNC: 136 MMOL/L (ref 133–144)
WBC # BLD AUTO: 10.7 10E9/L (ref 4–11)

## 2019-01-18 PROCEDURE — 99232 SBSQ HOSP IP/OBS MODERATE 35: CPT | Performed by: INTERNAL MEDICINE

## 2019-01-18 PROCEDURE — A9270 NON-COVERED ITEM OR SERVICE: HCPCS | Mod: GY | Performed by: INTERNAL MEDICINE

## 2019-01-18 PROCEDURE — 85027 COMPLETE CBC AUTOMATED: CPT | Performed by: INTERNAL MEDICINE

## 2019-01-18 PROCEDURE — 40000133 ZZH STATISTIC OT WARD VISIT

## 2019-01-18 PROCEDURE — 97535 SELF CARE MNGMENT TRAINING: CPT | Mod: GO

## 2019-01-18 PROCEDURE — 99232 SBSQ HOSP IP/OBS MODERATE 35: CPT | Performed by: PSYCHIATRY & NEUROLOGY

## 2019-01-18 PROCEDURE — A9270 NON-COVERED ITEM OR SERVICE: HCPCS | Mod: GY | Performed by: NURSE PRACTITIONER

## 2019-01-18 PROCEDURE — 92526 ORAL FUNCTION THERAPY: CPT | Mod: GN

## 2019-01-18 PROCEDURE — 25000132 ZZH RX MED GY IP 250 OP 250 PS 637: Mod: GY | Performed by: INTERNAL MEDICINE

## 2019-01-18 PROCEDURE — 25000131 ZZH RX MED GY IP 250 OP 636 PS 637: Mod: GY | Performed by: INTERNAL MEDICINE

## 2019-01-18 PROCEDURE — 25000132 ZZH RX MED GY IP 250 OP 250 PS 637: Mod: GY | Performed by: STUDENT IN AN ORGANIZED HEALTH CARE EDUCATION/TRAINING PROGRAM

## 2019-01-18 PROCEDURE — 36415 COLL VENOUS BLD VENIPUNCTURE: CPT | Performed by: INTERNAL MEDICINE

## 2019-01-18 PROCEDURE — 40000225 ZZH STATISTIC SLP WARD VISIT

## 2019-01-18 PROCEDURE — 80048 BASIC METABOLIC PNL TOTAL CA: CPT | Performed by: INTERNAL MEDICINE

## 2019-01-18 PROCEDURE — A9270 NON-COVERED ITEM OR SERVICE: HCPCS | Mod: GY | Performed by: STUDENT IN AN ORGANIZED HEALTH CARE EDUCATION/TRAINING PROGRAM

## 2019-01-18 PROCEDURE — 25000132 ZZH RX MED GY IP 250 OP 250 PS 637: Mod: GY | Performed by: NURSE PRACTITIONER

## 2019-01-18 PROCEDURE — 84132 ASSAY OF SERUM POTASSIUM: CPT | Performed by: INTERNAL MEDICINE

## 2019-01-18 PROCEDURE — 97530 THERAPEUTIC ACTIVITIES: CPT | Mod: GP

## 2019-01-18 PROCEDURE — 12000001 ZZH R&B MED SURG/OB UMMC

## 2019-01-18 PROCEDURE — 97110 THERAPEUTIC EXERCISES: CPT | Mod: GO

## 2019-01-18 PROCEDURE — 40000193 ZZH STATISTIC PT WARD VISIT

## 2019-01-18 RX ORDER — POTASSIUM CHLORIDE 1.5 G/1.58G
20 POWDER, FOR SOLUTION ORAL 2 TIMES DAILY
Status: DISCONTINUED | OUTPATIENT
Start: 2019-01-18 | End: 2019-01-25 | Stop reason: HOSPADM

## 2019-01-18 RX ORDER — OLANZAPINE 5 MG/1
5 TABLET ORAL DAILY PRN
Status: DISCONTINUED | OUTPATIENT
Start: 2019-01-18 | End: 2019-01-25 | Stop reason: HOSPADM

## 2019-01-18 RX ADMIN — Medication 500000 UNITS: at 08:20

## 2019-01-18 RX ADMIN — SENNOSIDES AND DOCUSATE SODIUM 2 TABLET: 8.6; 5 TABLET ORAL at 20:17

## 2019-01-18 RX ADMIN — SALINE NASAL SPRAY 1 SPRAY: 1.5 SOLUTION NASAL at 04:17

## 2019-01-18 RX ADMIN — SALINE NASAL SPRAY 1 SPRAY: 1.5 SOLUTION NASAL at 15:59

## 2019-01-18 RX ADMIN — HYDROCHLOROTHIAZIDE 25 MG: 25 TABLET ORAL at 08:21

## 2019-01-18 RX ADMIN — ONDANSETRON 4 MG: 4 TABLET, ORALLY DISINTEGRATING ORAL at 17:59

## 2019-01-18 RX ADMIN — RANITIDINE 150 MG: 150 TABLET ORAL at 08:21

## 2019-01-18 RX ADMIN — SALINE NASAL SPRAY 1 SPRAY: 1.5 SOLUTION NASAL at 18:01

## 2019-01-18 RX ADMIN — SALINE NASAL SPRAY 1 SPRAY: 1.5 SOLUTION NASAL at 13:20

## 2019-01-18 RX ADMIN — SALINE NASAL SPRAY 1 SPRAY: 1.5 SOLUTION NASAL at 08:30

## 2019-01-18 RX ADMIN — SALINE NASAL SPRAY 1 SPRAY: 1.5 SOLUTION NASAL at 04:18

## 2019-01-18 RX ADMIN — POTASSIUM CHLORIDE 20 MEQ: 1.5 POWDER, FOR SOLUTION ORAL at 08:18

## 2019-01-18 RX ADMIN — VENLAFAXINE HYDROCHLORIDE 225 MG: 150 CAPSULE, EXTENDED RELEASE ORAL at 08:20

## 2019-01-18 RX ADMIN — SALINE NASAL SPRAY 1 SPRAY: 1.5 SOLUTION NASAL at 11:33

## 2019-01-18 RX ADMIN — Medication 500000 UNITS: at 20:18

## 2019-01-18 RX ADMIN — POTASSIUM CHLORIDE 20 MEQ: 1.5 POWDER, FOR SOLUTION ORAL at 20:18

## 2019-01-18 RX ADMIN — BUPROPION HYDROCHLORIDE 150 MG: 150 TABLET, FILM COATED, EXTENDED RELEASE ORAL at 08:20

## 2019-01-18 RX ADMIN — RANITIDINE 150 MG: 150 TABLET ORAL at 20:17

## 2019-01-18 RX ADMIN — MINERAL SUPPLEMENT IRON 300 MG / 5 ML STRENGTH LIQUID 100 PER BOX UNFLAVORED 500 MG: at 08:20

## 2019-01-18 RX ADMIN — ACETAMINOPHEN 650 MG: 325 TABLET, FILM COATED ORAL at 16:03

## 2019-01-18 RX ADMIN — SENNOSIDES AND DOCUSATE SODIUM 1 TABLET: 8.6; 5 TABLET ORAL at 08:20

## 2019-01-18 RX ADMIN — Medication 500000 UNITS: at 11:32

## 2019-01-18 RX ADMIN — ATORVASTATIN CALCIUM 20 MG: 20 TABLET, FILM COATED ORAL at 20:17

## 2019-01-18 RX ADMIN — MICONAZOLE NITRATE: 2 POWDER TOPICAL at 20:18

## 2019-01-18 RX ADMIN — SALINE NASAL SPRAY 1 SPRAY: 1.5 SOLUTION NASAL at 20:18

## 2019-01-18 RX ADMIN — POTASSIUM CHLORIDE 40 MEQ: 750 TABLET, EXTENDED RELEASE ORAL at 08:19

## 2019-01-18 RX ADMIN — LISINOPRIL 30 MG: 20 TABLET ORAL at 08:21

## 2019-01-18 RX ADMIN — SALINE NASAL SPRAY 1 SPRAY: 1.5 SOLUTION NASAL at 13:18

## 2019-01-18 RX ADMIN — SALINE NASAL SPRAY 1 SPRAY: 1.5 SOLUTION NASAL at 22:48

## 2019-01-18 RX ADMIN — MULTIPLE VITAMINS W/ MINERALS TAB 1 TABLET: TAB at 08:21

## 2019-01-18 RX ADMIN — FUROSEMIDE 40 MG: 40 TABLET ORAL at 08:21

## 2019-01-18 RX ADMIN — MICONAZOLE NITRATE: 2 POWDER TOPICAL at 08:31

## 2019-01-18 RX ADMIN — PANTOPRAZOLE SODIUM 40 MG: 40 TABLET, DELAYED RELEASE ORAL at 08:21

## 2019-01-18 RX ADMIN — Medication 500000 UNITS: at 15:59

## 2019-01-18 ASSESSMENT — ACTIVITIES OF DAILY LIVING (ADL)
ADLS_ACUITY_SCORE: 27

## 2019-01-18 NOTE — PLAN OF CARE
Time: 1900-0730     Reason for admission: Sepsis   Vitals: VSS  Activity: Turned independently in bed overnight. Ambulated to the bedside commode with A1 and walker.   Pain: denies  Neuro: A&O to self, disoriented to time/place/situation   Cardiac:WNL   Respiratory: On RA sats 97%  GI/: Voided on the bedside commode. Medium BM on the commode x1.   Diet:Mechanical/Soft. Took sips of water overnight.   Lines: PIV is SL   Skin/Wounds: Scabbing present inside nares. Ocean spray given throughout the night. Eye drops given x1.         Pt appeared to be sleeping most of the pretty. Pt is ready for discharge, waiting on placement to a facility.   Continue to monitor and follow POC.

## 2019-01-18 NOTE — PLAN OF CARE
Discharge Planner OT   Patient plan for discharge: Did not discuss.   Current status: Performed bed mobility supine to seated EOB with min A.  Intermittent CGA for sitting EOB, pt attempting to lay back down. Engaged in self cares for simple g/h tasks with set up and cues for task initiation/completion and one step directives. Prolonged time to complete tasks. Performed supine BUE/LE strength and endurance exercises with demo, cues for technique and attention to task.   Barriers to return to prior living situation: Medical Status, Cognition, Generalized Weakness, Balance  Recommendations for discharge: After collaboration with the OT, the recommended dc location is now TCU  Rationale for recommendations: Patient below baseline ADL/mobility independence and safety. Would benefit from ongoing therapies to maximize progress before returning to prior living situation. Per PT eval, patient will have 24 hour assist from  and/or daughter available.

## 2019-01-18 NOTE — PROGRESS NOTES
"Social Work Services Progress Note    Hospital Day: 14  Date of Initial Social Work Evaluation:  1/7/19  Collaborated with:  facilities    Data: Pt is a 78-year-old female admitted with concern for sepsis. TCU is recommended at discharge.     Intervention:    Faxed referrals to:  1) [facility] (ph [phone])- re-sent referral 1/17 1/18: spoke with Lanny in admissions, she is reviewing but bed not available until next Thursday     2) [facility] [place] (ph [phone])- previously accepted pending bed availability when pt is ready for discharge- resent referral 1/17 1/18: LVM for admissions, weekend admissions line is [phone], will have weekend SW also follow up -- updated at 3:30 PM facility has no openings until end of next week at best    3) [facility] (ph [phone])- declined, \"no available or appropriate bed\"    4) [facility] ([place] [phone])- resent referral 1/17 1/18: LVM for admissions    Assessment: Patient is ready to discharge today, in need of TCU Placement.     Plan:    Anticipated Disposition:  Facility:  TCU    Barriers to d/c plan:  disposition    Follow Up:  sw following    DALE Alexandre  5B  (Medical/Surgical)  Phone: [phone]  Pager: [phone]     "

## 2019-01-18 NOTE — PROGRESS NOTES
Calorie Count    Intake recorded for: 1/17/2019  Total Kcals: 2231 Total Protein: 67g    Kcals from Hospital Food: 2231   Protein: 67g    Kcals from Outside Food (average):0 Protein: 0g    # Meals Recorded: 3 meals ordered (first - less than 25% mashed potatoes w/ gravy)  (second - 100% 1 ice cream, 8oz orange juice, less than 25% 1 piece Kinyarwanda toast)    # Supplements Recorded: 100% 4 Boost Plus Shakes, 1 Boost Plus

## 2019-01-18 NOTE — PROGRESS NOTES
[facility], [place]  Internal Medicine Progress Note - Gold Service    Assessment & Plan   Gabino Jones is a 78 year old year old female admitted on 1/5/2019 for syncope, epistaxis, elevated lactate, and hypothermia with concern for sepsis.    Patient is overall stable.  Changes: Psychiatry consult completed; no need for long-term mood stabilizer/antipsychotic medication but can use olanzapine PRN agitation. 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 This led to acute worsening of chronic cognitive impairment in setting of chronic brain injury/dementia. MRI from September showed extensive white matter changes presumably caused by treatment related vasculopathic disease. No obvious metabolic derangements. TSH normal. Labs/vitals not consistent with adrenal insufficiency, no evidence of seizure activity on EEG, no evidence of ingestion/tox. 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 Spoke with primary oncologist, Dr. Obregon, who will discuss at tumor conference. Given her current medical status, not likely candidate for aggressive treatment. Given small size, this is not likely causing her encephalopathy     # Aortic insufficiency with hypervolemia  Secondary to volume resuscitation. diuresed on 1/7. Euvolemic at this time. Can re-dose lasix if dyspneic. echo on 1/5: Calcified aortic valve with moderate-to-severe aortic stenosis (peak velocity 4.2 m/s, mean gradient 41  mmHg, DVI 0.31, PATRICIA 1.1 cm2, SVI 46 mL/m2) and moderate aortic insufficiency. Aortic insufficiency is likely contributing somewhat to increased aortic valve gradient.     # Possible septic shock, likely viral, cleared:   Presented with hypothermia (94.2) , hypotension (BP 79/41), leukocytosis, elevated lactate (4.6 at OSH). Mild pyuria. CXR clear. Blood and urine cx NGTD. C diff negative. Initial CRP and procal low. Stopped antibiotics with ID recommendations.      # Acute blood loss anemia  # Iron deficiency anemia  # Melena  Likely due to epistaxis. Hgb on admission 8.3 from 10.3 after multiple nose bleeds. Initially thought that epistaxis could explain bleed but then hgb continued to drop to 6.9 despite no further episodes of epistaxis. Underwent EGD 1/6 which was negative for any cause of bleeding. Hgb has been stable above 7.  Iron 21. Ferritin 17. Iron sat 5%.  - switched back to home daily protonix  - trend hgb PRN  - tolerating oral iron every other day.     # Epistaxis  History of nasosinus melanoma (in remission). Seen by ENT and no evidence of recurrence of malignancy. with the below recs  - Dissolvable packing in place. This will dissolve on its own. No need for removal.   - No antibiotics for the nasal packing (surgicel)  - There may be some slow dark red oozing after the packing was placed - this is normal and likely from the Afrin and the Surgiflo  - Nasal saline spray to bilateral nasal cavities q2h daily starting in 48 hours (keeps the packing moist and will help with removal)  - If supplemental O2 is required, add humidifier (RT should be contacted to supply this)  - Humidifier to the room to increase the moisture in the air  - If patient experiences brisk bleeding, spray Afrin along the floor of the nose and hold digital pressure on bilateral nostrils for 20 minutes (holding pressure higher up on the nasal bones does not apply effective pressure to the septum. Instead, the pressure should be  applied on the soft part of the nose do that the opening of the nostrils are completely pinched shut).  Nasal clips do not work and should not be used in place of digital pressure. If bleeding continues after 20 minutes of digital pressure, repeat the above steps. If this fails to control the bleeding at that time, call ENT for further recommendations  - attempted to provide humidification via faceshield at ENT request but pt kept pulling it off  - see ENT consult. Appreciated. No new bleeding as of 1/10, 1/11, 1/12, 1/13     # DAVID due to sepsis:  Likely due to hypotension/sepsis. Resolved     # Hypertension:  - Continue lisinopril, hydrochlorothiazide, furosemide     # HLD: continue atorvastatin  # Depression: Continue wellbutrin and effexor       Diet: Orders Placed This Encounter      Dysphagia Diet Level 2 [facility] Altered Thin Liquids (water, ice chips, juice, milk gelatin, ice cream, etc)  Fluids: oral liquids  Peter Catheter: Peter Catheter: not present    DVT Prophylaxis: None given epistaxis  Expected discharge: 1 - 2 days, recommended to transitional care unit    The patient's care was discussed with the Bedside Nurse, Patient and Patient's Family.    Casa De Luna MD  Internal Medicine Staff Hospitalist Service  [facility]  Please see sticky note for cross cover information  Text Page       Interval History   No acute events overnight. Feels well this morning. No bleeding, no chest pain, no difficulty breathing. No abdominal pain. Knows she's in the hospital but not which one. Knows the year and month. Remainder 4 point ROS negative.    Data reviewed today: I reviewed all medications, new labs and imaging results over the last 24 hours. I personally reviewed MRI brain    Physical Exam   Vital signs last 24 hours:  Temp:  [95.5  F (35.3  C)-97.2  F (36.2  C)] 95.9  F (35.5  C)  Pulse:  [88] 88  Heart Rate:  [80-82] 80  Resp:  [16] 16  BP: (109-137)/(40-61) 111/46  SpO2:  [92 %-98 %]  92 %  Weight (last 5):   Vitals:    01/10/19 0900 01/12/19 0938 01/13/19 1132 01/14/19 0900   Weight: 82.2 kg (181 lb 3.2 oz) 78.4 kg (172 lb 12.8 oz) 77.7 kg (171 lb 3.2 oz) 77 kg (169 lb 12.8 oz)    01/15/19 1413   Weight: 75.9 kg (167 lb 4.8 oz)     HEENT: Moist mucus membranes, no scleral icterus  Resp: Breathing comfortably on room air, good air entry bilaterally, no wheezing/rhonchi  CV: Regular rate and rhythm, normal S1S2, 2/6 systolic murmur, no JVD, no peripheral edema  Skin: No rashes, jaundice, or discoloration   Neuro: 5/5 strength in bilateral arms/legs, oriented to time but not place.

## 2019-01-18 NOTE — PLAN OF CARE
Discharge Planner SLP   Patient plan for discharge: Unknown  Current status: Recommend dysphagia diet 2 with thin liquids with 1:1 supervision, small sips and bites, upright posture during PO intake.  Pt not yet appropriate to advance diet due to ongoing difficulty following commands, oral dysphagia.  SLP to follow for diet advancement.  Barriers to return to prior living situation: Dysphagia, cognition, language  Recommendations for discharge: Defer to OT/PT  Rationale for recommendations: Swallowing function below baseline status       Entered by: Lyubov Carr 01/18/2019 11:32 AM

## 2019-01-18 NOTE — PLAN OF CARE
5A - up to bedside recliner and commode with FWW with hand on gait belt x1-2    Discharge Planner PT   Patient plan for discharge: TCU  Current status: Greeted pt supine in bed with family at bedside, with encouragement pt participates in therapy. Engaged pt in supine to sitting at EOB with HOB elevated at min A with bilateral hand hold assist. Progressed to sit <> stand transfer with FWW at min A. Initiated stepping pivot transfer with FWW to recliner chair at CGA. Encouraged pt to walk and to do LE strengthening exercises but both were repeatedly declined. Pt continues to have difficulty with command following secondary to cognitive status and [place]. Pt able to follow single step commands 50% of the time.   Barriers to return to prior living situation: medial condition, cognition, home set up  Recommendations for discharge: TCU  Rationale for recommendations: Pt is recommended to discharge to TCU setting for continued skilled PT services to improve bed mobility, ambulation and balance in order to safely return to home environment.        Entered by: Jazmine Moyer 01/18/2019 2:57 PM

## 2019-01-18 NOTE — PLAN OF CARE
Pt disoriented to time and situation. C/O generalized pain. PRN tylenol given x1. On mech soft diet. On veena counts. L PIV SL. Pt with dry cough, PRN mucinex and tessalon pearls given x1, minor relief. SW determining placement for pt. WilL continue to monitor pt and follow POC.

## 2019-01-19 LAB — POTASSIUM SERPL-SCNC: 3.8 MMOL/L (ref 3.4–5.3)

## 2019-01-19 PROCEDURE — A9270 NON-COVERED ITEM OR SERVICE: HCPCS | Mod: GY | Performed by: INTERNAL MEDICINE

## 2019-01-19 PROCEDURE — 25000132 ZZH RX MED GY IP 250 OP 250 PS 637: Mod: GY | Performed by: STUDENT IN AN ORGANIZED HEALTH CARE EDUCATION/TRAINING PROGRAM

## 2019-01-19 PROCEDURE — A9270 NON-COVERED ITEM OR SERVICE: HCPCS | Mod: GY | Performed by: STUDENT IN AN ORGANIZED HEALTH CARE EDUCATION/TRAINING PROGRAM

## 2019-01-19 PROCEDURE — 25000132 ZZH RX MED GY IP 250 OP 250 PS 637: Mod: GY | Performed by: INTERNAL MEDICINE

## 2019-01-19 PROCEDURE — 84132 ASSAY OF SERUM POTASSIUM: CPT | Performed by: INTERNAL MEDICINE

## 2019-01-19 PROCEDURE — 25000132 ZZH RX MED GY IP 250 OP 250 PS 637: Mod: GY | Performed by: NURSE PRACTITIONER

## 2019-01-19 PROCEDURE — 36415 COLL VENOUS BLD VENIPUNCTURE: CPT | Performed by: INTERNAL MEDICINE

## 2019-01-19 PROCEDURE — A9270 NON-COVERED ITEM OR SERVICE: HCPCS | Mod: GY | Performed by: NURSE PRACTITIONER

## 2019-01-19 PROCEDURE — 99232 SBSQ HOSP IP/OBS MODERATE 35: CPT | Performed by: INTERNAL MEDICINE

## 2019-01-19 PROCEDURE — 12000001 ZZH R&B MED SURG/OB UMMC

## 2019-01-19 RX ORDER — POLYETHYLENE GLYCOL 3350 17 G/17G
17 POWDER, FOR SOLUTION ORAL DAILY PRN
Status: DISCONTINUED | OUTPATIENT
Start: 2019-01-19 | End: 2019-01-25 | Stop reason: HOSPADM

## 2019-01-19 RX ORDER — BISACODYL 10 MG
10 SUPPOSITORY, RECTAL RECTAL DAILY PRN
Status: DISCONTINUED | OUTPATIENT
Start: 2019-01-19 | End: 2019-01-25 | Stop reason: HOSPADM

## 2019-01-19 RX ADMIN — SALINE NASAL SPRAY 1 SPRAY: 1.5 SOLUTION NASAL at 02:12

## 2019-01-19 RX ADMIN — RANITIDINE 150 MG: 150 TABLET ORAL at 09:06

## 2019-01-19 RX ADMIN — SENNOSIDES AND DOCUSATE SODIUM 2 TABLET: 8.6; 5 TABLET ORAL at 20:26

## 2019-01-19 RX ADMIN — SENNOSIDES AND DOCUSATE SODIUM 2 TABLET: 8.6; 5 TABLET ORAL at 09:07

## 2019-01-19 RX ADMIN — LISINOPRIL 30 MG: 20 TABLET ORAL at 09:07

## 2019-01-19 RX ADMIN — SALINE NASAL SPRAY 1 SPRAY: 1.5 SOLUTION NASAL at 03:57

## 2019-01-19 RX ADMIN — POLYETHYLENE GLYCOL 3350 17 G: 17 POWDER, FOR SOLUTION ORAL at 12:50

## 2019-01-19 RX ADMIN — BISACODYL 10 MG: 10 SUPPOSITORY RECTAL at 12:27

## 2019-01-19 RX ADMIN — SALINE NASAL SPRAY 1 SPRAY: 1.5 SOLUTION NASAL at 00:18

## 2019-01-19 RX ADMIN — Medication 500000 UNITS: at 20:26

## 2019-01-19 RX ADMIN — PANTOPRAZOLE SODIUM 40 MG: 40 TABLET, DELAYED RELEASE ORAL at 09:09

## 2019-01-19 RX ADMIN — BUPROPION HYDROCHLORIDE 150 MG: 150 TABLET, FILM COATED, EXTENDED RELEASE ORAL at 09:06

## 2019-01-19 RX ADMIN — RANITIDINE 150 MG: 150 TABLET ORAL at 20:26

## 2019-01-19 RX ADMIN — FUROSEMIDE 40 MG: 40 TABLET ORAL at 09:06

## 2019-01-19 RX ADMIN — HYDROCHLOROTHIAZIDE 25 MG: 25 TABLET ORAL at 09:06

## 2019-01-19 RX ADMIN — ATORVASTATIN CALCIUM 20 MG: 20 TABLET, FILM COATED ORAL at 20:26

## 2019-01-19 RX ADMIN — POTASSIUM CHLORIDE 20 MEQ: 1.5 POWDER, FOR SOLUTION ORAL at 09:07

## 2019-01-19 RX ADMIN — MULTIPLE VITAMINS W/ MINERALS TAB 1 TABLET: TAB at 09:07

## 2019-01-19 RX ADMIN — POTASSIUM CHLORIDE 20 MEQ: 1.5 POWDER, FOR SOLUTION ORAL at 20:26

## 2019-01-19 RX ADMIN — VENLAFAXINE HYDROCHLORIDE 225 MG: 150 CAPSULE, EXTENDED RELEASE ORAL at 09:07

## 2019-01-19 RX ADMIN — Medication 500000 UNITS: at 17:47

## 2019-01-19 RX ADMIN — MICONAZOLE NITRATE: 2 POWDER TOPICAL at 09:08

## 2019-01-19 RX ADMIN — Medication 500000 UNITS: at 12:33

## 2019-01-19 RX ADMIN — MICONAZOLE NITRATE: 2 POWDER TOPICAL at 20:25

## 2019-01-19 RX ADMIN — Medication 500000 UNITS: at 09:52

## 2019-01-19 RX ADMIN — SALINE NASAL SPRAY 1 SPRAY: 1.5 SOLUTION NASAL at 06:02

## 2019-01-19 ASSESSMENT — ACTIVITIES OF DAILY LIVING (ADL)
ADLS_ACUITY_SCORE: 27

## 2019-01-19 NOTE — PLAN OF CARE
Pt VSS on RA. Pt alert, disoriented to place. Up with 1 assist and bed alarm activated. Contact precautions maintained for MRSA. DD2 pureed diet with thin liquids and tolerating well. Voiding x 1 during night shift, no BM. PIV saline locked. 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 Hearing aide at bedside, using pocket talker to communicate with patient. Patient very pleasant and sleeping most of night shift. Possible discharge to memory care unit once placement available.

## 2019-01-19 NOTE — PROGRESS NOTES
Social Work Services Progress Note    Hospital Day: 15  Date of Initial Social Work Evaluation:  1/7/19  Collaborated with:  TCU Admissions, EMR, Pt Spouse - [place], Bedside Team    Data:  Pt is a 78-year-old female admitted with concern for sepsis. TCU is recommended at discharge.     Intervention:  GUILHERME contacted by bedside team to confirm pt ready for discharge today to TCU. Pt referrals reviewed and follow up with [facility] initiated. VM left for admissions to contact with review status of referral sent earlier in week. GUILHERME contacted pt spouse for consideration of additional referrals as facilities closes to home had no availability until end of next week. Spouse agreed to referral being sent to [facility] in [place] ([phone]). Spouse had concerns of distance from home and asked questions regarding transfers between facilities when bed available. SW encouraged follow up with facility if that decision arose.        Assessment:  See RN, PT/OT, medical team notes    Plan:    Anticipated Disposition:  Facility:  TBD    Barriers to d/c plan:  Accepting facility    Follow Up:  SW will continue to follow for discharge needs    Asim FIGUEROA  Weekend SW  Pager: [phone]  On Call Pager: [phone] 4pm - Midnight

## 2019-01-19 NOTE — PROGRESS NOTES
[facility], [place]  Internal Medicine Progress Note - Gold Service    Assessment & Plan   Gabino Jones is a 78 year old year old female admitted on 1/5/2019 for syncope, epistaxis, elevated lactate, and hypothermia with concern for sepsis.    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 This led to acute worsening of chronic cognitive impairment in setting of chronic brain injury/dementia. MRI from September showed extensive white matter changes presumably caused by treatment related vasculopathic disease; stable on MRI from this admission. No obvious metabolic derangements. TSH normal. Labs/vitals not consistent with adrenal insufficiency, no evidence of seizure activity on EEG, no evidence of ingestion/tox. 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 Spoke with primary oncologist, Dr. Obregon, who will discuss at tumor conference. Given her current medical status, not likely candidate for aggressive treatment. Given small size, this is not likely causing her encephalopathy     # Aortic insufficiency with hypervolemia  Secondary to volume resuscitation. diuresed on 1/7. Euvolemic at this time. Can re-dose lasix if dyspneic. echo on 1/5: Calcified aortic valve with moderate-to-severe aortic stenosis (peak velocity 4.2 m/s, mean gradient 41 mmHg, DVI 0.31, PATRICIA 1.1 cm2, SVI 46 mL/m2) and moderate aortic insufficiency. Aortic insufficiency is likely contributing somewhat to increased aortic valve gradient.     #  Possible septic shock, likely viral, cleared:   Presented with hypothermia (94.2) , hypotension (BP 79/41), leukocytosis, elevated lactate (4.6 at OSH). Mild pyuria. CXR clear. Blood and urine cx NGTD. C diff negative. Initial CRP and procal low. Stopped antibiotics with ID recommendations.      # Acute blood loss anemia  # Iron deficiency anemia  # Melena  Likely due to epistaxis. Hgb on admission 8.3 from 10.3 after multiple nose bleeds. Initially thought that epistaxis could explain bleed but then hgb continued to drop to 6.9 despite no further episodes of epistaxis. Underwent EGD 1/6 which was negative for any cause of bleeding. Hgb has been stable above 7.  Iron 21. Ferritin 17. Iron sat 5%.  - switched back to home daily protonix  - trend hgb PRN  - tolerating oral iron every other day.     # Epistaxis  History of nasosinus melanoma (in remission). Seen by ENT and no evidence of recurrence of malignancy. with the below recs  - Dissolvable packing in place. This will dissolve on its own. No need for removal.   - No antibiotics for the nasal packing (surgicel)  - There may be some slow dark red oozing after the packing was placed - this is normal and likely from the Afrin and the Surgiflo  - Nasal saline spray to bilateral nasal cavities q2h daily starting in 48 hours (keeps the packing moist and will help with removal)  - If supplemental O2 is required, add humidifier (RT should be contacted to supply this)  - Humidifier to the room to increase the moisture in the air  - If patient experiences brisk bleeding, spray Afrin along the floor of the nose and hold digital pressure on bilateral nostrils for 20 minutes (holding pressure higher up on the nasal bones does not apply effective pressure to the septum. Instead, the pressure should be applied on the soft part of the nose do that the opening of the nostrils are completely pinched shut).  Nasal clips do not work and should not be used in place of digital  pressure. If bleeding continues after 20 minutes of digital pressure, repeat the above steps. If this fails to control the bleeding at that time, call ENT for further recommendations  - attempted to provide humidification via faceshield at ENT request but pt kept pulling it off  - see ENT consult. Appreciated. No new bleeding as of 1/10, 1/11, 1/12, 1/13     # DAVID due to sepsis:  Likely due to hypotension/sepsis. Resolved     # Hypertension:  - Continue lisinopril, hydrochlorothiazide, furosemide     # HLD: continue atorvastatin  # Depression: Continue wellbutrin and effexor. Consider stopping wellbutrin in future as it can lower seizure threshold (though mental status stable right now, so will continue)       Diet: Orders Placed This Encounter      Dysphagia Diet Level 2 [facility] Altered Thin Liquids (water, ice chips, juice, milk gelatin, ice cream, etc)  Fluids: oral liquids  Peter Catheter: Peter Catheter: not present    DVT Prophylaxis: None given epistaxis  Expected discharge: 1 - 2 days, recommended to transitional care unit    The patient's care was discussed with the Bedside Nurse, Patient and Patient's Family.    Casa De Luna MD  Internal Medicine Staff Hospitalist Service  [facility]  Please see sticky note for cross cover information  Text Page       Interval History   No acute events overnight. Denies chest pain, difficulty breathing, abdominal pain, fever/chills. Has not had bowel movement in 4 days. Remainder 4 point ROS negative.    Data reviewed today: I reviewed all medications, new labs and imaging results over the last 24 hours.    Physical Exam   Vital signs last 24 hours:  Temp:  [95.6  F (35.3  C)-97.4  F (36.3  C)] 95.6  F (35.3  C)  Pulse:  [87] 87  Heart Rate:  [80-82] 80  Resp:  [16-18] 18  BP: (109-123)/(40-47) 123/47  SpO2:  [92 %-96 %] 95 %  Weight (last 5):   Vitals:    01/10/19 0900 01/12/19 0938 01/13/19 1132 01/14/19 0900   Weight: 82.2 kg (181 lb 3.2 oz)  78.4 kg (172 lb 12.8 oz) 77.7 kg (171 lb 3.2 oz) 77 kg (169 lb 12.8 oz)    01/15/19 1413   Weight: 75.9 kg (167 lb 4.8 oz)     HEENT: Moist mucus membranes, no scleral icterus  Resp: Breathing comfortably on room air, good air entry bilaterally, no wheezing/rhonchi  CV: Regular rate and rhythm, normal S1S2, 2/6 systolic murmur, no JVD, no peripheral edema  Skin: No rashes, jaundice, or discoloration   Neuro: 5/5 strength in bilateral arms/legs, oriented to time [facility]

## 2019-01-19 NOTE — PLAN OF CARE
VSS on RA. Orientation waxes and wanes. Disorientated to place most of shift; pt easy to reorient. Pt has poor PO intake; calorie counts maintained and pt encouraged to eat and drink; pt tolerates boost supplement drinks. On DD2 diet with thin liquids. ODT Zofran given x1 for nausea. Complaints of generalized pain- tylenol given PRN. Patient hard of hearing; pocket talker at bedside and pt also has hearing aid at bedside. 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 Family at bedside all day and involved in care plan. Pt medically stable for discharge; waiting TCU placement. Continue to monitor and follow POC.

## 2019-01-19 NOTE — PROGRESS NOTES
Calorie Count    Intake recorded for: 1/18  Total Kcals: 983 Total Protein: 29g    Kcals from Hospital Food: 983   Protein: 29g    Kcals from Outside Food (average): 0   Protein: 0g    # Meals Recorded: 100% fruit ice, pears, 50% lemonade, tea.    # Supplements Recorded: 100% 1.75 Boost Shake.

## 2019-01-19 NOTE — CONSULTS
[facility], [place]   Psychiatry Consultation - Follow-up note        Interim History:   Reason for consultation: Assist in management of delirium superimposed on dementia  Since the last consultation note, staff has noticed less episodes of confusion and less episodes of agitation.  She slept fairly well last night.  She has been taking her medications.  No episodes of agitation reported overnight.    I met with the patient earlier in the morning during which time she was in her room by herself.  She was resting comfortably and watching television while eating her breakfast.  She spent some time talking to me about her breakfast and why she did not care to eat much of it this morning.  She had no complaints regarding her mood or thought processes.  She denied the occurrence of hallucinations.  She did not report any paranoid ideations and feels fairly comfortable in her room.    I returned later in the day to meet with her family present as well.  On my return, her  and daughter were present for a visit.  The patient rested comfortably with headphones on while we talked.  They were both happy to report that the patient appears less confused and in a better mood today.  They also noticed these improvements yesterday.  They had questions regarding these episodes of confusion which we discussed.  They seem fairly accepting of the fact that she likely has a dementia after they had noticed a decline in her memory over the past 8 months.  They noticed a significant change in her cognition and episodes of mood fluctuations and confusion over the past 1 month.  They have seen significant improvements over the past 2 days.           Medications:       atorvastatin  20 mg Oral Daily     buPROPion  150 mg Oral Daily     ferrous sulfate  500 mg Oral Q48H     furosemide  40 mg Oral Daily     hydrochlorothiazide  25 mg Oral Daily     influenza Vac Split High-Dose  0.5 mL Intramuscular Prior to  discharge     lisinopril  30 mg Oral Daily     miconazole   Topical BID     multivitamin w/minerals  1 tablet Oral Daily     nystatin  500,000 Units Oral 4x Daily     pantoprazole  40 mg Oral Daily     potassium chloride  20 mEq Oral BID     ranitidine  150 mg Oral BID     senna-docusate  1-2 tablet Oral BID     sodium chloride  1 spray Both Nostrils Q2H     venlafaxine  225 mg Oral Daily with breakfast          Allergies:     Allergies   Allergen Reactions     Novocain [Procaine] Unknown and Rash     Mirtazapine      Nefazodone Unknown and Rash          Labs:     Recent Results (from the past 24 hour(s))   Basic metabolic panel    Collection Time: 01/18/19  6:38 AM   Result Value Ref Range    Sodium 136 133 - 144 mmol/L    Potassium 3.0 (L) 3.4 - 5.3 mmol/L    Chloride 99 94 - 109 mmol/L    Carbon Dioxide 27 20 - 32 mmol/L    Anion Gap 10 3 - 14 mmol/L    Glucose 94 70 - 99 mg/dL    Urea Nitrogen 21 7 - 30 mg/dL    Creatinine 1.02 0.52 - 1.04 mg/dL    GFR Estimate 52 (L) >60 mL/min/[1.73_m2]    GFR Estimate If Black 61 >60 mL/min/[1.73_m2]    Calcium 8.5 8.5 - 10.1 mg/dL   CBC with platelets    Collection Time: 01/18/19  6:38 AM   Result Value Ref Range    WBC 10.7 4.0 - 11.0 10e9/L    RBC Count 3.45 (L) 3.8 - 5.2 10e12/L    Hemoglobin 8.8 (L) 11.7 - 15.7 g/dL    Hematocrit 29.9 (L) 35.0 - 47.0 %    MCV 87 78 - 100 fl    MCH 25.5 (L) 26.5 - 33.0 pg    MCHC 29.4 (L) 31.5 - 36.5 g/dL    RDW 16.5 (H) 10.0 - 15.0 %    Platelet Count 524 (H) 150 - 450 10e9/L   Potassium    Collection Time: 01/18/19 11:57 AM   Result Value Ref Range    Potassium 3.6 3.4 - 5.3 mmol/L          Psychiatric Examination:     /46   Pulse 88   Temp 95.9  F (35.5  C) (Oral)   Resp 16   Wt 75.9 kg (167 lb 4.8 oz)   SpO2 92%   BMI 27.84 kg/m    Weight is 167 lbs 4.8 oz  Body mass index is 27.84 kg/m .  Orthostatic Vitals     None            Appearance: awake, alert  Attitude:  cooperative  Eye Contact:  fair  Mood:  better  Affect:   appropriate and in normal range  Speech:  decreased prosody  Psychomotor Behavior:  no evidence of tardive dyskinesia, dystonia, or tics  Throught Process:  Tight and concrete  Associations:  no loose associations  Thought Content:  no evidence of suicidal ideation or homicidal ideation and no evidence of psychotic thought  Insight:  limited  Judgement:  limited  Oriented to:  She was oriented to herself as well as her family members.  She was able to correctly state the month of January and the date is the 18th.  She correctly identified the year as 2019.  She incorrectly identified her [facility].  She was not able to correctly state how long she has been in the hospital.  She was not able to correctly state why she is in the hospital.  Attention Span and Concentration:  limited  Recent and Remote Memory:  limited           DIagnoses:   Delirium  Neurocognitive disorder is highly suspected per historical report  History of major depressive disorder      Recommendations:     If the patient displays symptoms of severe psychosis or significant agitation during which the safety of the patient or others becomes a concern, initiating antipsychotic medication would that be warranted however her recent presentation has not yet crossed that threshold.  I discussed this with her family today who expressed understanding.  We reviewed indications to use antipsychotic medications as well as the risks associated with them.      Future treatment considerations should include reevaluating the need to continue Wellbutrin which may lower the patient's seizure threshold noting that her recent MRI revealed left frontal brain lesions which could also potentially put her at a higher risk of future seizures.    Please reconsult with Psychiatry as needed.

## 2019-01-20 LAB — POTASSIUM SERPL-SCNC: 4.1 MMOL/L (ref 3.4–5.3)

## 2019-01-20 PROCEDURE — 36415 COLL VENOUS BLD VENIPUNCTURE: CPT | Performed by: INTERNAL MEDICINE

## 2019-01-20 PROCEDURE — A9270 NON-COVERED ITEM OR SERVICE: HCPCS | Mod: GY | Performed by: INTERNAL MEDICINE

## 2019-01-20 PROCEDURE — 25000132 ZZH RX MED GY IP 250 OP 250 PS 637: Mod: GY | Performed by: STUDENT IN AN ORGANIZED HEALTH CARE EDUCATION/TRAINING PROGRAM

## 2019-01-20 PROCEDURE — 84100 ASSAY OF PHOSPHORUS: CPT | Performed by: INTERNAL MEDICINE

## 2019-01-20 PROCEDURE — 84132 ASSAY OF SERUM POTASSIUM: CPT | Performed by: INTERNAL MEDICINE

## 2019-01-20 PROCEDURE — 83735 ASSAY OF MAGNESIUM: CPT | Performed by: INTERNAL MEDICINE

## 2019-01-20 PROCEDURE — 99232 SBSQ HOSP IP/OBS MODERATE 35: CPT | Performed by: INTERNAL MEDICINE

## 2019-01-20 PROCEDURE — A9270 NON-COVERED ITEM OR SERVICE: HCPCS | Mod: GY | Performed by: NURSE PRACTITIONER

## 2019-01-20 PROCEDURE — 25000132 ZZH RX MED GY IP 250 OP 250 PS 637: Mod: GY | Performed by: NURSE PRACTITIONER

## 2019-01-20 PROCEDURE — 12000001 ZZH R&B MED SURG/OB UMMC

## 2019-01-20 PROCEDURE — A9270 NON-COVERED ITEM OR SERVICE: HCPCS | Mod: GY | Performed by: STUDENT IN AN ORGANIZED HEALTH CARE EDUCATION/TRAINING PROGRAM

## 2019-01-20 PROCEDURE — 25000132 ZZH RX MED GY IP 250 OP 250 PS 637: Mod: GY | Performed by: INTERNAL MEDICINE

## 2019-01-20 RX ADMIN — PANTOPRAZOLE SODIUM 40 MG: 40 TABLET, DELAYED RELEASE ORAL at 08:16

## 2019-01-20 RX ADMIN — MINERAL SUPPLEMENT IRON 300 MG / 5 ML STRENGTH LIQUID 100 PER BOX UNFLAVORED 500 MG: at 09:11

## 2019-01-20 RX ADMIN — HYDROCHLOROTHIAZIDE 25 MG: 25 TABLET ORAL at 08:16

## 2019-01-20 RX ADMIN — SENNOSIDES AND DOCUSATE SODIUM 2 TABLET: 8.6; 5 TABLET ORAL at 20:44

## 2019-01-20 RX ADMIN — DEXTRAN 70 AND HYPROMELLOSE 2910 1 DROP: 1; 3 SOLUTION/ DROPS OPHTHALMIC at 16:05

## 2019-01-20 RX ADMIN — SENNOSIDES AND DOCUSATE SODIUM 2 TABLET: 8.6; 5 TABLET ORAL at 08:15

## 2019-01-20 RX ADMIN — POTASSIUM CHLORIDE 20 MEQ: 1.5 POWDER, FOR SOLUTION ORAL at 08:16

## 2019-01-20 RX ADMIN — LISINOPRIL 30 MG: 20 TABLET ORAL at 08:16

## 2019-01-20 RX ADMIN — BUPROPION HYDROCHLORIDE 150 MG: 150 TABLET, FILM COATED, EXTENDED RELEASE ORAL at 08:16

## 2019-01-20 RX ADMIN — Medication 500000 UNITS: at 12:05

## 2019-01-20 RX ADMIN — ATORVASTATIN CALCIUM 20 MG: 20 TABLET, FILM COATED ORAL at 20:43

## 2019-01-20 RX ADMIN — MICONAZOLE NITRATE: 2 POWDER TOPICAL at 20:43

## 2019-01-20 RX ADMIN — RANITIDINE 150 MG: 150 TABLET ORAL at 20:43

## 2019-01-20 RX ADMIN — Medication 500000 UNITS: at 08:15

## 2019-01-20 RX ADMIN — POTASSIUM CHLORIDE 20 MEQ: 1.5 POWDER, FOR SOLUTION ORAL at 20:43

## 2019-01-20 RX ADMIN — Medication 500000 UNITS: at 20:43

## 2019-01-20 RX ADMIN — ACETAMINOPHEN 650 MG: 325 TABLET, FILM COATED ORAL at 10:35

## 2019-01-20 RX ADMIN — MICONAZOLE NITRATE: 2 POWDER TOPICAL at 08:18

## 2019-01-20 RX ADMIN — RANITIDINE 150 MG: 150 TABLET ORAL at 08:16

## 2019-01-20 RX ADMIN — POLYETHYLENE GLYCOL 3350 17 G: 17 POWDER, FOR SOLUTION ORAL at 08:15

## 2019-01-20 RX ADMIN — FUROSEMIDE 40 MG: 40 TABLET ORAL at 08:16

## 2019-01-20 RX ADMIN — Medication 500000 UNITS: at 16:01

## 2019-01-20 RX ADMIN — VENLAFAXINE HYDROCHLORIDE 225 MG: 150 CAPSULE, EXTENDED RELEASE ORAL at 08:16

## 2019-01-20 RX ADMIN — MULTIPLE VITAMINS W/ MINERALS TAB 1 TABLET: TAB at 08:16

## 2019-01-20 ASSESSMENT — ACTIVITIES OF DAILY LIVING (ADL)
ADLS_ACUITY_SCORE: 27

## 2019-01-20 NOTE — PROGRESS NOTES
[facility], [place]  Internal Medicine Progress Note - Gold Service    Assessment & Plan   Gabino Jones is a 78 year old year old female admitted on 1/5/2019 for syncope, epistaxis, elevated lactate, and hypothermia with concern for sepsis.    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 This led to acute worsening of chronic cognitive impairment in setting of chronic brain injury/dementia. MRI from September showed extensive white matter changes presumably caused by treatment related vasculopathic disease; stable on MRI from this admission. No obvious metabolic derangements. TSH normal. Labs/vitals not consistent with adrenal insufficiency, no evidence of seizure activity on EEG, no evidence of ingestion/tox. 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 Spoke with primary oncologist, Dr. Obregon, who will discuss at tumor conference. Given her current medical status, not likely candidate for aggressive treatment. Given small size, this is not likely causing her encephalopathy     # Aortic insufficiency with hypervolemia  Secondary to volume resuscitation. diuresed on 1/7. Euvolemic at this time. Can re-dose lasix if dyspneic. echo on 1/5: Calcified aortic valve with moderate-to-severe aortic stenosis (peak velocity 4.2 m/s, mean gradient 41 mmHg, DVI 0.31, PATRICIA 1.1 cm2, SVI 46 mL/m2) and moderate aortic insufficiency. Aortic insufficiency is likely contributing somewhat to increased aortic valve gradient.     #  Possible septic shock, likely viral, cleared:   Presented with hypothermia (94.2) , hypotension (BP 79/41), leukocytosis, elevated lactate (4.6 at OSH). Mild pyuria. CXR clear. Blood and urine cx NGTD. C diff negative. Initial CRP and procal low. Stopped antibiotics with ID recommendations.      # Acute blood loss anemia  # Iron deficiency anemia  # Melena  Likely due to epistaxis. Hgb on admission 8.3 from 10.3 after multiple nose bleeds. Initially thought that epistaxis could explain bleed but then hgb continued to drop to 6.9 despite no further episodes of epistaxis. Underwent EGD 1/6 which was negative for any cause of bleeding. Hgb has been stable above 7.  Iron 21. Ferritin 17. Iron sat 5%.  - switched back to home daily protonix  - trend hgb PRN bleeding concerns  - tolerating oral iron every other day.     # Epistaxis  History of nasosinus melanoma (in remission). Seen by ENT and no evidence of recurrence of malignancy. with the below recs  - Dissolvable packing in place. This will dissolve on its own. No need for removal.   - No antibiotics for the nasal packing (surgicel)  - There may be some slow dark red oozing after the packing was placed - this is normal and likely from the Afrin and the Surgiflo  - Nasal saline spray to bilateral nasal cavities q2h daily starting in 48 hours (keeps the packing moist and will help with removal)  - If supplemental O2 is required, add humidifier (RT should be contacted to supply this)  - Humidifier to the room to increase the moisture in the air  - If patient experiences brisk bleeding, spray Afrin along the floor of the nose and hold digital pressure on bilateral nostrils for 20 minutes (holding pressure higher up on the nasal bones does not apply effective pressure to the septum. Instead, the pressure should be applied on the soft part of the nose do that the opening of the nostrils are completely pinched shut).  Nasal clips do not work and should not be used in place  of digital pressure. If bleeding continues after 20 minutes of digital pressure, repeat the above steps. If this fails to control the bleeding at that time, call ENT for further recommendations  - Long term, will give nasal saline spray 4x/day rather than Q2h     # DAVID due to sepsis:  Likely due to hypotension/sepsis. Resolved     # Hypertension:  - Continue lisinopril, hydrochlorothiazide, furosemide     # HLD: continue atorvastatin  # Depression: Continue wellbutrin and effexor. Consider stopping wellbutrin in future as it can lower seizure threshold (though mental status stable right now, so will continue)       Diet: Orders Placed This Encounter      Dysphagia Diet Level 2 [facility] Altered Thin Liquids (water, ice chips, juice, milk gelatin, ice cream, etc)  Fluids: oral liquids  Peter Catheter: Peter Catheter: not present    DVT Prophylaxis: None given epistaxis  Expected discharge: 1 - 2 days, recommended to transitional care unit    The patient's care was discussed with the Bedside Nurse, Patient and Patient's Family.    Casa De Luna MD  Internal Medicine Staff Hospitalist Service  [facility]  Please see sticky note for cross cover information  Text Page       Interval History   No acute events overnight. She denies difficulty breathing, chest pain, abdominal pain, changes in urination, fever/chills. Remainder 4 point ROS negative.    Data reviewed today: I reviewed all medications, new labs and imaging results over the last 24 hours.    Physical Exam   Vital signs last 24 hours:  Temp:  [95.3  F (35.2  C)-96.5  F (35.8  C)] 95.3  F (35.2  C)  Heart Rate:  [79-88] 85  Resp:  [16-18] 16  BP: ()/(46-61) 97/61  SpO2:  [96 %-100 %] 99 %  Weight (last 5):   Vitals:    01/10/19 0900 01/12/19 0938 01/13/19 1132 01/14/19 0900   Weight: 82.2 kg (181 lb 3.2 oz) 78.4 kg (172 lb 12.8 oz) 77.7 kg (171 lb 3.2 oz) 77 kg (169 lb 12.8 oz)    01/15/19 1413   Weight: 75.9 kg (167 lb 4.8 oz)      HEENT: Moist mucus membranes, no scleral icterus  Resp: Breathing comfortably on room air, good air entry bilaterally, no wheezing/rhonchi  CV: Regular rate and rhythm, normal S1S2, 2/6 systolic murmur, no JVD, no peripheral edema  Skin: No rashes, jaundice, or discoloration   Neuro: 5/5 strength in bilateral arms/legs, oriented to time but not oriented to place

## 2019-01-20 NOTE — PLAN OF CARE
VSS on RA. Disorientated to place/situation. 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 Pocket talker at bedside for assistance with hearing. Pt having increased abdominal discomfort today. Scheduled senna given and PRN miralax and suppository given. Pt had 2 small  BM and is passing gas. Pt feeling better after BM. Pt has poor PO intake; only tolerating water and boost supplements. Denies nausea and pain. Bed alarm in place for safety. Pt up with SBA and in the chair today. Uses commode at bedside.  in room. Pt medically stable. Waiting for TCU placement- most likely will be end of next week. Continue to monitor and follow POC.

## 2019-01-20 NOTE — PROGRESS NOTES
Social Work Services Progress Note    Hospital Day: 16  Date of Initial Social Work Evaluation:  1/7/19  Collaborated with:  [facility], [facility]    Data:  Weekend SW requested to follow up on placed referrals. Pt is a 78-year-old female admitted with concern for sepsis. TCU is recommended at discharge.      Intervention:  Weekend SW attempted to follow up on referrals.  Voicemail message left with [facility].  [facility] admissions indicated they received referral and would assess and suggested SW check with regular admissions staff (P[facility]) on Monday.    Assessment:  Pt needing TCU placement upon discharge.    Plan:    Anticipated Disposition:  Facility:  TBD    Barriers to d/c plan:  Accepting facility    Follow Up:  Unit SW to continue to follow up with referrals.    Soo Yeon Han, MSW, [facility]  Weekend pager: [phone]

## 2019-01-20 NOTE — PLAN OF CARE
Pt alert, disoriented to place and time. Up with 1 assist in room and using bedside commode. Voiding spontaneously and 1 BM overnight. DD2 diet with thin liquids and tolerating. Bed alarm activated for safety. Contact precautions maintained for MRSA. PIV saline locked. Possibly discharging to facility once placement available. Pt very hard of hearing, using pocket talker to communicate.

## 2019-01-21 ENCOUNTER — APPOINTMENT (OUTPATIENT)
Dept: PHYSICAL THERAPY | Facility: CLINIC | Age: 79
DRG: 871 | End: 2019-01-21
Attending: INTERNAL MEDICINE
Payer: MEDICARE

## 2019-01-21 ENCOUNTER — APPOINTMENT (OUTPATIENT)
Dept: SPEECH THERAPY | Facility: CLINIC | Age: 79
DRG: 871 | End: 2019-01-21
Attending: INTERNAL MEDICINE
Payer: MEDICARE

## 2019-01-21 ENCOUNTER — APPOINTMENT (OUTPATIENT)
Dept: OCCUPATIONAL THERAPY | Facility: CLINIC | Age: 79
DRG: 871 | End: 2019-01-21
Attending: INTERNAL MEDICINE
Payer: MEDICARE

## 2019-01-21 LAB
MAGNESIUM SERPL-MCNC: 2.3 MG/DL (ref 1.6–2.3)
PHOSPHATE SERPL-MCNC: 3.9 MG/DL (ref 2.5–4.5)

## 2019-01-21 PROCEDURE — 97530 THERAPEUTIC ACTIVITIES: CPT | Mod: GP | Performed by: PHYSICAL THERAPIST

## 2019-01-21 PROCEDURE — 40000193 ZZH STATISTIC PT WARD VISIT: Performed by: PHYSICAL THERAPIST

## 2019-01-21 PROCEDURE — 97110 THERAPEUTIC EXERCISES: CPT | Mod: GP | Performed by: PHYSICAL THERAPIST

## 2019-01-21 PROCEDURE — 40000225 ZZH STATISTIC SLP WARD VISIT

## 2019-01-21 PROCEDURE — 97535 SELF CARE MNGMENT TRAINING: CPT | Mod: GO

## 2019-01-21 PROCEDURE — 25000132 ZZH RX MED GY IP 250 OP 250 PS 637: Mod: GY | Performed by: INTERNAL MEDICINE

## 2019-01-21 PROCEDURE — 25000132 ZZH RX MED GY IP 250 OP 250 PS 637: Mod: GY | Performed by: NURSE PRACTITIONER

## 2019-01-21 PROCEDURE — A9270 NON-COVERED ITEM OR SERVICE: HCPCS | Mod: GY | Performed by: NURSE PRACTITIONER

## 2019-01-21 PROCEDURE — 40000133 ZZH STATISTIC OT WARD VISIT

## 2019-01-21 PROCEDURE — 97110 THERAPEUTIC EXERCISES: CPT | Mod: GO

## 2019-01-21 PROCEDURE — A9270 NON-COVERED ITEM OR SERVICE: HCPCS | Mod: GY | Performed by: INTERNAL MEDICINE

## 2019-01-21 PROCEDURE — A9270 NON-COVERED ITEM OR SERVICE: HCPCS | Mod: GY | Performed by: STUDENT IN AN ORGANIZED HEALTH CARE EDUCATION/TRAINING PROGRAM

## 2019-01-21 PROCEDURE — 25000132 ZZH RX MED GY IP 250 OP 250 PS 637: Mod: GY | Performed by: STUDENT IN AN ORGANIZED HEALTH CARE EDUCATION/TRAINING PROGRAM

## 2019-01-21 PROCEDURE — 92526 ORAL FUNCTION THERAPY: CPT | Mod: GN

## 2019-01-21 PROCEDURE — 12000001 ZZH R&B MED SURG/OB UMMC

## 2019-01-21 PROCEDURE — 97530 THERAPEUTIC ACTIVITIES: CPT | Mod: GO

## 2019-01-21 PROCEDURE — 99232 SBSQ HOSP IP/OBS MODERATE 35: CPT | Performed by: INTERNAL MEDICINE

## 2019-01-21 RX ORDER — AMOXICILLIN 250 MG
1 CAPSULE ORAL 2 TIMES DAILY
Qty: 120 TABLET | Refills: 0 | DISCHARGE
Start: 2019-01-21 | End: 2019-02-25

## 2019-01-21 RX ORDER — FUROSEMIDE 40 MG
40 TABLET ORAL DAILY
Qty: 30 TABLET | Refills: 0 | DISCHARGE
Start: 2019-01-21 | End: 2019-02-25

## 2019-01-21 RX ORDER — OLANZAPINE 5 MG/1
5 TABLET ORAL DAILY PRN
DISCHARGE
Start: 2019-01-21 | End: 2019-02-25

## 2019-01-21 RX ORDER — NYSTATIN 100000/ML
500000 SUSPENSION, ORAL (FINAL DOSE FORM) ORAL 4 TIMES DAILY
Qty: 200 ML | Refills: 0 | DISCHARGE
Start: 2019-01-21 | End: 2019-02-25

## 2019-01-21 RX ORDER — POTASSIUM CHLORIDE 1.5 G/1.58G
20 POWDER, FOR SOLUTION ORAL 2 TIMES DAILY
Qty: 60 PACKET | Refills: 0 | DISCHARGE
Start: 2019-01-21 | End: 2019-02-25

## 2019-01-21 RX ORDER — BISACODYL 10 MG
10 SUPPOSITORY, RECTAL RECTAL DAILY PRN
DISCHARGE
Start: 2019-01-21 | End: 2019-02-25

## 2019-01-21 RX ORDER — ONDANSETRON 4 MG/1
4 TABLET, ORALLY DISINTEGRATING ORAL EVERY 6 HOURS PRN
DISCHARGE
Start: 2019-01-21 | End: 2019-02-25

## 2019-01-21 RX ORDER — BENZONATATE 100 MG/1
100 CAPSULE ORAL 3 TIMES DAILY PRN
DISCHARGE
Start: 2019-01-21 | End: 2019-02-25

## 2019-01-21 RX ORDER — MULTIPLE VITAMINS W/ MINERALS TAB 9MG-400MCG
1 TAB ORAL DAILY
Qty: 30 TABLET | Refills: 0 | DISCHARGE
Start: 2019-01-21 | End: 2019-02-25

## 2019-01-21 RX ORDER — HYDROCHLOROTHIAZIDE 25 MG/1
25 TABLET ORAL DAILY
Qty: 30 TABLET | Refills: 0 | DISCHARGE
Start: 2019-01-21 | End: 2019-02-25

## 2019-01-21 RX ORDER — POLYETHYLENE GLYCOL 3350 17 G/17G
17 POWDER, FOR SOLUTION ORAL DAILY PRN
DISCHARGE
Start: 2019-01-21 | End: 2019-02-25

## 2019-01-21 RX ORDER — DIPHENHYDRAMINE HYDROCHLORIDE AND LIDOCAINE HYDROCHLORIDE AND ALUMINUM HYDROXIDE AND MAGNESIUM HYDRO
10 KIT EVERY 6 HOURS PRN
DISCHARGE
Start: 2019-01-21 | End: 2019-02-25

## 2019-01-21 RX ADMIN — HYDROCHLOROTHIAZIDE 25 MG: 25 TABLET ORAL at 08:38

## 2019-01-21 RX ADMIN — VENLAFAXINE HYDROCHLORIDE 225 MG: 150 CAPSULE, EXTENDED RELEASE ORAL at 08:37

## 2019-01-21 RX ADMIN — RANITIDINE 150 MG: 150 TABLET ORAL at 08:37

## 2019-01-21 RX ADMIN — MICONAZOLE NITRATE: 2 POWDER TOPICAL at 08:39

## 2019-01-21 RX ADMIN — ATORVASTATIN CALCIUM 20 MG: 20 TABLET, FILM COATED ORAL at 20:17

## 2019-01-21 RX ADMIN — RANITIDINE 150 MG: 150 TABLET ORAL at 20:17

## 2019-01-21 RX ADMIN — PANTOPRAZOLE SODIUM 40 MG: 40 TABLET, DELAYED RELEASE ORAL at 08:38

## 2019-01-21 RX ADMIN — MICONAZOLE NITRATE: 2 POWDER TOPICAL at 20:16

## 2019-01-21 RX ADMIN — Medication 500000 UNITS: at 20:17

## 2019-01-21 RX ADMIN — FUROSEMIDE 40 MG: 40 TABLET ORAL at 08:38

## 2019-01-21 RX ADMIN — MULTIPLE VITAMINS W/ MINERALS TAB 1 TABLET: TAB at 08:38

## 2019-01-21 RX ADMIN — DEXTRAN 70 AND HYPROMELLOSE 2910 1 DROP: 1; 3 SOLUTION/ DROPS OPHTHALMIC at 13:32

## 2019-01-21 RX ADMIN — Medication 500000 UNITS: at 13:14

## 2019-01-21 RX ADMIN — POTASSIUM CHLORIDE 20 MEQ: 1.5 POWDER, FOR SOLUTION ORAL at 20:17

## 2019-01-21 RX ADMIN — BUPROPION HYDROCHLORIDE 150 MG: 150 TABLET, FILM COATED, EXTENDED RELEASE ORAL at 08:38

## 2019-01-21 RX ADMIN — SENNOSIDES AND DOCUSATE SODIUM 2 TABLET: 8.6; 5 TABLET ORAL at 08:37

## 2019-01-21 RX ADMIN — POTASSIUM CHLORIDE 20 MEQ: 1.5 POWDER, FOR SOLUTION ORAL at 08:37

## 2019-01-21 RX ADMIN — Medication 500000 UNITS: at 17:45

## 2019-01-21 RX ADMIN — LISINOPRIL 30 MG: 20 TABLET ORAL at 08:37

## 2019-01-21 RX ADMIN — ACETAMINOPHEN 650 MG: 325 TABLET, FILM COATED ORAL at 20:17

## 2019-01-21 RX ADMIN — SENNOSIDES AND DOCUSATE SODIUM 1 TABLET: 8.6; 5 TABLET ORAL at 20:17

## 2019-01-21 RX ADMIN — Medication 500000 UNITS: at 08:37

## 2019-01-21 ASSESSMENT — ACTIVITIES OF DAILY LIVING (ADL)
ADLS_ACUITY_SCORE: 27

## 2019-01-21 NOTE — PLAN OF CARE
"Discharge Planner PT   Patient plan for discharge: TCU  Current status: Pt more confused and lethargic today.  Needing cues to stay on task and is able to follow 50-75% of commands today.  present and helpful in motivating patient.  Pt talking about a \"gecko\" and \"babies\" in her bed ( not sure what she was talking about).  Bed mobility with Yasmani, extra time.  Transfers with Yasmani in 2WW.  Unable to progress to gait today d/t confusion and lethargy, not safe.  Barriers to return to prior living situation: medical status, cognition, below baseline function  Recommendations for discharge: TCU  Rationale for recommendations: to progress functional independence and decrease caregiver need for assist.         Entered by: Nery Connor 01/21/2019 3:07 PM       "

## 2019-01-21 NOTE — PROGRESS NOTES
[facility], [place]  Internal Medicine Progress Note - Gold Service    Assessment & Plan   Gabino Jones is a 78 year old year old female admitted on 1/5/2019 for syncope, epistaxis, elevated lactate, and hypothermia with concern for sepsis.    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 This led to acute worsening of chronic cognitive impairment in setting of chronic brain injury/dementia. MRI from September showed extensive white matter changes presumably caused by treatment related vasculopathic disease; stable on MRI from this admission. No obvious metabolic derangements. TSH normal. Labs/vitals not consistent with adrenal insufficiency, no evidence of seizure activity on EEG, no evidence of ingestion/tox. 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 Spoke with primary oncologist, Dr. Obregon, who will discuss at tumor conference. Given her current medical status, not likely candidate for aggressive treatment. Given small size, this is not likely causing her encephalopathy     # Aortic insufficiency with hypervolemia  Secondary to volume resuscitation. diuresed on 1/7. Euvolemic at this time. Can re-dose lasix if dyspneic. echo on 1/5: Calcified aortic valve with moderate-to-severe aortic stenosis (peak velocity 4.2 m/s, mean gradient 41 mmHg, DVI 0.31, PATRICIA 1.1 cm2, SVI 46 mL/m2) and moderate aortic insufficiency. Aortic insufficiency is likely contributing somewhat to increased aortic valve gradient.     #  Possible septic shock, likely viral, cleared:   Presented with hypothermia (94.2) , hypotension (BP 79/41), leukocytosis, elevated lactate (4.6 at OSH). Mild pyuria. CXR clear. Blood and urine cx NGTD. C diff negative. Initial CRP and procal low. Stopped antibiotics with ID recommendations.      # Acute blood loss anemia  # Iron deficiency anemia  # Melena  Likely due to epistaxis. Hgb on admission 8.3 from 10.3 after multiple nose bleeds. Initially thought that epistaxis could explain bleed but then hgb continued to drop to 6.9 despite no further episodes of epistaxis. Underwent EGD 1/6 which was negative for any cause of bleeding. Hgb has been stable above 7.  Iron 21. Ferritin 17. Iron sat 5%.  - switched back to home daily protonix  - trend hgb PRN bleeding concerns  - tolerating oral iron every other day.     # Epistaxis  History of nasosinus melanoma (in remission). Seen by ENT and no evidence of recurrence of malignancy. with the below recs  - Dissolvable packing in place. This will dissolve on its own. No need for removal.   - No antibiotics for the nasal packing (surgicel)  - There may be some slow dark red oozing after the packing was placed - this is normal and likely from the Afrin and the Surgiflo  - Nasal saline spray to bilateral nasal cavities q2h daily starting in 48 hours (keeps the packing moist and will help with removal)  - If supplemental O2 is required, add humidifier (RT should be contacted to supply this)  - Humidifier to the room to increase the moisture in the air  - If patient experiences brisk bleeding, spray Afrin along the floor of the nose and hold digital pressure on bilateral nostrils for 20 minutes (holding pressure higher up on the nasal bones does not apply effective pressure to the septum. Instead, the pressure should be applied on the soft part of the nose do that the opening of the nostrils are completely pinched shut).  Nasal clips do not work and should not be used in place  of digital pressure. If bleeding continues after 20 minutes of digital pressure, repeat the above steps. If this fails to control the bleeding at that time, call ENT for further recommendations  - Long term, will give nasal saline spray 4x/day rather than Q2h     # DAVID due to sepsis:  Likely due to hypotension/sepsis. Resolved     # Hypertension:  - Continue lisinopril, hydrochlorothiazide, furosemide     # HLD: continue atorvastatin  # Depression: Continue wellbutrin and effexor. Consider stopping wellbutrin in future as it can lower seizure threshold (though mental status stable right now, so will continue)       Diet: Orders Placed This Encounter      Dysphagia Diet Level 2 [facility] Altered Thin Liquids (water, ice chips, juice, milk gelatin, ice cream, etc)      Advance Diet as Tolerated  Fluids: oral liquids  Peter Catheter: Peter Catheter: not present    DVT Prophylaxis: None given epistaxis  Expected discharge: 1 - 2 days, recommended to transitional care unit    The patient's care was discussed with the Bedside Nurse, Patient and Patient's Family.    Casa De Luna MD  Internal Medicine Staff Hospitalist Service  [facility]  Please see sticky note for cross cover information  Text Page       Interval History   No acute events overnight. This morning she is eating breakfast. Reports she does not like eating solid foods. No nausea, vomiting. No chest pain/difficulty breathing.     Remainder 4 point ROS negative.    Data reviewed today: I reviewed all medications, new labs and imaging results over the last 24 hours.    Physical Exam   Vital signs last 24 hours:  Temp:  [97  F (36.1  C)-97.8  F (36.6  C)] 97.3  F (36.3  C)  Heart Rate:  [76-81] 81  Resp:  [16] 16  BP: (107-124)/(44-66) 124/66  SpO2:  [96 %-100 %] 100 %  Weight (last 5):   Vitals:    01/10/19 0900 01/12/19 0938 01/13/19 1132 01/14/19 0900   Weight: 82.2 kg (181 lb 3.2 oz) 78.4 kg (172 lb 12.8 oz) 77.7 kg (171 lb 3.2 oz) 77  kg (169 lb 12.8 oz)    01/15/19 1413   Weight: 75.9 kg (167 lb 4.8 oz)     HEENT: Moist mucus membranes, no scleral icterus  Resp: Breathing comfortably on room air, good air entry bilaterally, no wheezing/rhonchi  CV: Regular rate and rhythm, normal S1S2, 2/6 systolic murmur, no JVD, no peripheral edema  Skin: No rashes, jaundice, or discoloration   Neuro: 5/5 strength in bilateral arms/legs, oriented to time but not oriented to place

## 2019-01-21 NOTE — PLAN OF CARE
Discharge Planner SLP   Patient plan for discharge: Did not discuss  Current status: pt seen during breakfast meal. RN noted some coughing today and saw pt take out her dentures shortly after. Pt was seen with soft solids and thin liquids. Dentures are ill fitting which appear to impact mastication, however when pt consumed solids at a slow rate she was able to swallow and clear her oral cavity functionally. No s/sx of aspiration with thin liquids.     Continue dysphagia diet 2 and thin liquids. Try denture adhesive d/t pt's ill fitting dentures. Upright postioning is required, slow eating rate, and alternate solids and liquids.     Barriers to return to prior living situation: Below baseline, cognition  Recommendations for discharge: TCU  Rationale for recommendations: SLP at next level of care for management of dysphagia - per conversation with  during a previous session soft solids are likely going to be pt's baseline diet however ongoing education is required          Entered by: Lisa Hutchinson 01/21/2019 10:22 AM

## 2019-01-21 NOTE — PLAN OF CARE
VSS on RA. Disorientated to place. Pt thinks she is at [facility]. 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 Pocket talker at bedside for assistance with hearing. Miralax and senna given. Pt had 2 bowel movements and voiding WNL. Pt has poor appetite. Only tolerating boost supplements, ice cream and water. Pt up with SBA and a walker; up in chair periodically. Bed alarm in place.  at bedside and attentive to cares. Pt medically stable; waiting for TCU placement. Continue to monitor and follow POC

## 2019-01-21 NOTE — PLAN OF CARE
Pt alert, disoriented to place and situation. VSS on RA. Up with 1 assist and walker in room, using bedside commode for voiding overnight. DD2 diet with thin liquids and tolerating. Bed alarm activated for safety, fall precautions maintained. Contact Iso maintained for MRSA. Pt states she is not having any pain during night shift. Fluids and boost shakes encouraged for increased intake, patient has poor appetite. Using pocket talker to communicate with patient as she is [place] in left ear and deaf in right ear. 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 Continue with POC, discharge to facility when available.

## 2019-01-21 NOTE — PROGRESS NOTES
Social Work Services Progress Note    Hospital Day: 17  Date of Initial Social Work Evaluation:  1/7/19  Collaborated with:  Facilities, spouse    Data:  Gabino is a 78 year old female awaiting tcu placement. Family very adamant on her being only at certain facilities due to location.     Intervention:    1) [facility] ( [phone])- re-sent referral 1/17 1/18: spoke with Lanny in admissions, she is reviewing but bed not available until next Thursday 1/21 LVM x2 today for admissions to discuss plan for this week    [facility], [place]: Main: [phone], admission: [phone], Fax: [phone], faxed referral, LVM for admissions    Assessment: sw met with spouse and discussed plan. He also states he would like medical transport arranged upon discharge. sw made him very aware there would be a large bill for this transport.     Plan:    Anticipated Disposition:  Facility:  TCU    Barriers to d/c plan: placement in their area    Follow Up:  ragini following    DALE Alexandre  5B  (Medical/Surgical)  Phone: [phone]  Pager: [phone]

## 2019-01-21 NOTE — PLAN OF CARE
Discharge Planner OT   Patient plan for discharge: Did not discuss.   Current status: Pt seated in chair upon arrival. Performed seated BUE/LE strength and endurance exercises with demos, tactile prompts and redirection to task. Performed sit to stands x 3 with walker and min/mod A.  Engaged in self cares for simple g/h tasks with set up and cues for task initiation/completion and one step directives. Following directives ~50% of time.   Barriers to return to prior living situation: Medical Status, Cognition, Generalized Weakness, Balance  Recommendations for discharge: After collaboration with the OT, the recommended dc location is now TCU  Rationale for recommendations: Patient below baseline ADL/mobility independence and safety. Would benefit from ongoing therapies to maximize progress before returning to prior living situation. Per PT eval, patient will have 24 hour assist from  and/or daughter available.

## 2019-01-21 NOTE — PROGRESS NOTES
CLINICAL NUTRITION SERVICES - REASSESSMENT NOTE     Nutrition Prescription    Malnutrition Status:    Non-severe malnutrition in the context of acute illness    Recommendations already ordered by Registered Dietitian (RD):  Weigh patient    Future/Additional Recommendations:  - Continue supplements with and between meals.  Of note, each Boost Plus Shake (made with ice cream) provides 490 kcal and 16 g protein.    - Monitor weight trends along with PO to help determine if additional nutrition intervention warranted (may benefit from FT/TF if PO intake and/or weight decreases further)     EVALUATION OF THE PROGRESS TOWARD GOALS   Diet: DD2/Thin Liquids + Boost Shake with meals AND between meals + Not Appropriate for Room Service (receiving standard meal trays TID)    Intake: Eating mostly 25% of meals documented, poor appetite noted and requires encouragement to eat.  Variable intake overall (see calorie counts below).  Prefers boost, ice cream, and water.  Per SLP note this morning, pt's dentures are ill fitting what may impact chewing, but when eats at a slow rate she was able to swallow and clear oral cavity appropriately.  Pt is being sent Boost Shakes with meals and between meals (5 total per day).      Calorie Counts  1/18: 983 kcal and 29 g protein (63% kcal needs and 39% protein needs)  1/17: 2231 kcal and 67 g protein (>100% kcal needs, 89% protein needs)  1/16: no food intake recorded  *3-day avg PO intake = 1071 kcal and 32 g protein (69% kcal needs and 43% protein needs     NEW FINDINGS   Weight: No new weight since 1/15, but pt did have documented 4 lb wt loss over 10 days (1/5-1/15, 2.3% wt loss)    Meds: lasix, Thera-Vit-M    MALNUTRITION  % Intake: < 75% for > 7 days (non-severe)  % Weight Loss: > 2% in 1 week (severe)  Subcutaneous Fat Loss: Facial region: Mild per RD note on 1/14  Muscle Loss: Upper arm (bicep, tricep) and Lower arm  (forearm): Mild per RD note on 1/14  Fluid Accumulation/Edema: None  noted per provider note today  Malnutrition Diagnosis: Non-severe malnutrition in the context of acute illness    Previous Goals   Patient to consume % of nutritionally adequate meal trays TID, or the equivalent with supplements/snacks.  Evaluation: Not met    Previous Nutrition Diagnosis  Inadequate oral intake related to AMS, inhibiting ability to take sufficient PO as evidenced by 0-25% meal intake recorded for the past 6 days, previous calorie count met 32% estimated kcal and 21% estimated protein needs.   Evaluation: Improving, updated    CURRENT NUTRITION DIAGNOSIS  Inadequate oral intake related to poor appetite and likely AMS also hindering adequate PO as evidenced by 3-day avg calorie counts meeting <70% estimated kcal needs, requiring reminders/encouragement to eat, and 2.3% wt loss over the past 10 days      INTERVENTIONS  Implementation  Chart review.  Pt with other cares during visit.    Ordered weight    Goals  Patient to consume % of nutritionally adequate meal trays TID, or the equivalent with supplements/snacks.    Monitoring/Evaluation  Progress toward goals will be monitored and evaluated per protocol.    Carmen Voss RD, LD   5A/5B RD Pager: [phone]

## 2019-01-22 ENCOUNTER — APPOINTMENT (OUTPATIENT)
Dept: GENERAL RADIOLOGY | Facility: CLINIC | Age: 79
DRG: 871 | End: 2019-01-22
Attending: INTERNAL MEDICINE
Payer: MEDICARE

## 2019-01-22 ENCOUNTER — APPOINTMENT (OUTPATIENT)
Dept: OCCUPATIONAL THERAPY | Facility: CLINIC | Age: 79
DRG: 871 | End: 2019-01-22
Attending: INTERNAL MEDICINE
Payer: MEDICARE

## 2019-01-22 DIAGNOSIS — R41.0 DELIRIUM: ICD-10-CM

## 2019-01-22 DIAGNOSIS — C30.0 MALIGNANT MELANOMA OF NASAL CAVITY (H): Primary | ICD-10-CM

## 2019-01-22 DIAGNOSIS — M89.9 SKULL LESION: ICD-10-CM

## 2019-01-22 LAB
ANION GAP SERPL CALCULATED.3IONS-SCNC: 6 MMOL/L (ref 3–14)
BUN SERPL-MCNC: 35 MG/DL (ref 7–30)
CALCIUM SERPL-MCNC: 9.1 MG/DL (ref 8.5–10.1)
CHLORIDE SERPL-SCNC: 97 MMOL/L (ref 94–109)
CO2 SERPL-SCNC: 27 MMOL/L (ref 20–32)
CREAT SERPL-MCNC: 1.1 MG/DL (ref 0.52–1.04)
ERYTHROCYTE [DISTWIDTH] IN BLOOD BY AUTOMATED COUNT: 16.6 % (ref 10–15)
GFR SERPL CREATININE-BSD FRML MDRD: 48 ML/MIN/{1.73_M2}
GLUCOSE BLDC GLUCOMTR-MCNC: 100 MG/DL (ref 70–99)
GLUCOSE SERPL-MCNC: 92 MG/DL (ref 70–99)
HCT VFR BLD AUTO: 30.5 % (ref 35–47)
HGB BLD-MCNC: 9.2 G/DL (ref 11.7–15.7)
MCH RBC QN AUTO: 26 PG (ref 26.5–33)
MCHC RBC AUTO-ENTMCNC: 30.2 G/DL (ref 31.5–36.5)
MCV RBC AUTO: 86 FL (ref 78–100)
PLATELET # BLD AUTO: 504 10E9/L (ref 150–450)
POTASSIUM SERPL-SCNC: 4.4 MMOL/L (ref 3.4–5.3)
RBC # BLD AUTO: 3.54 10E12/L (ref 3.8–5.2)
SODIUM SERPL-SCNC: 130 MMOL/L (ref 133–144)
WBC # BLD AUTO: 7.7 10E9/L (ref 4–11)

## 2019-01-22 PROCEDURE — A9270 NON-COVERED ITEM OR SERVICE: HCPCS | Mod: GY | Performed by: STUDENT IN AN ORGANIZED HEALTH CARE EDUCATION/TRAINING PROGRAM

## 2019-01-22 PROCEDURE — 00000146 ZZHCL STATISTIC GLUCOSE BY METER IP

## 2019-01-22 PROCEDURE — 97535 SELF CARE MNGMENT TRAINING: CPT | Mod: GO | Performed by: OCCUPATIONAL THERAPIST

## 2019-01-22 PROCEDURE — 40000141 ZZH STATISTIC PERIPHERAL IV START W/O US GUIDANCE

## 2019-01-22 PROCEDURE — A9270 NON-COVERED ITEM OR SERVICE: HCPCS | Mod: GY | Performed by: INTERNAL MEDICINE

## 2019-01-22 PROCEDURE — 25000132 ZZH RX MED GY IP 250 OP 250 PS 637: Mod: GY | Performed by: INTERNAL MEDICINE

## 2019-01-22 PROCEDURE — 25000131 ZZH RX MED GY IP 250 OP 636 PS 637: Mod: GY | Performed by: INTERNAL MEDICINE

## 2019-01-22 PROCEDURE — 73030 X-RAY EXAM OF SHOULDER: CPT | Mod: RT

## 2019-01-22 PROCEDURE — 25000132 ZZH RX MED GY IP 250 OP 250 PS 637: Mod: GY | Performed by: NURSE PRACTITIONER

## 2019-01-22 PROCEDURE — 99233 SBSQ HOSP IP/OBS HIGH 50: CPT | Performed by: INTERNAL MEDICINE

## 2019-01-22 PROCEDURE — 25000128 H RX IP 250 OP 636: Performed by: INTERNAL MEDICINE

## 2019-01-22 PROCEDURE — 25000132 ZZH RX MED GY IP 250 OP 250 PS 637: Mod: GY | Performed by: STUDENT IN AN ORGANIZED HEALTH CARE EDUCATION/TRAINING PROGRAM

## 2019-01-22 PROCEDURE — A9270 NON-COVERED ITEM OR SERVICE: HCPCS | Mod: GY | Performed by: NURSE PRACTITIONER

## 2019-01-22 PROCEDURE — 85027 COMPLETE CBC AUTOMATED: CPT | Performed by: INTERNAL MEDICINE

## 2019-01-22 PROCEDURE — 36415 COLL VENOUS BLD VENIPUNCTURE: CPT | Performed by: INTERNAL MEDICINE

## 2019-01-22 PROCEDURE — 80048 BASIC METABOLIC PNL TOTAL CA: CPT | Performed by: INTERNAL MEDICINE

## 2019-01-22 PROCEDURE — 12000001 ZZH R&B MED SURG/OB UMMC

## 2019-01-22 PROCEDURE — 40000133 ZZH STATISTIC OT WARD VISIT: Performed by: OCCUPATIONAL THERAPIST

## 2019-01-22 RX ADMIN — ONDANSETRON 4 MG: 4 TABLET, ORALLY DISINTEGRATING ORAL at 12:47

## 2019-01-22 RX ADMIN — POTASSIUM CHLORIDE 20 MEQ: 1.5 POWDER, FOR SOLUTION ORAL at 09:04

## 2019-01-22 RX ADMIN — ACETAMINOPHEN 650 MG: 325 TABLET, FILM COATED ORAL at 17:57

## 2019-01-22 RX ADMIN — PANTOPRAZOLE SODIUM 40 MG: 40 TABLET, DELAYED RELEASE ORAL at 09:05

## 2019-01-22 RX ADMIN — Medication 500000 UNITS: at 09:06

## 2019-01-22 RX ADMIN — SENNOSIDES AND DOCUSATE SODIUM 2 TABLET: 8.6; 5 TABLET ORAL at 09:05

## 2019-01-22 RX ADMIN — BUPROPION HYDROCHLORIDE 150 MG: 150 TABLET, FILM COATED, EXTENDED RELEASE ORAL at 09:04

## 2019-01-22 RX ADMIN — POLYETHYLENE GLYCOL 3350 17 G: 17 POWDER, FOR SOLUTION ORAL at 12:47

## 2019-01-22 RX ADMIN — HYDROCHLOROTHIAZIDE 25 MG: 25 TABLET ORAL at 09:04

## 2019-01-22 RX ADMIN — SENNOSIDES AND DOCUSATE SODIUM 1 TABLET: 8.6; 5 TABLET ORAL at 20:55

## 2019-01-22 RX ADMIN — ATORVASTATIN CALCIUM 20 MG: 20 TABLET, FILM COATED ORAL at 20:55

## 2019-01-22 RX ADMIN — MINERAL SUPPLEMENT IRON 300 MG / 5 ML STRENGTH LIQUID 100 PER BOX UNFLAVORED 500 MG: at 10:42

## 2019-01-22 RX ADMIN — LISINOPRIL 30 MG: 20 TABLET ORAL at 09:04

## 2019-01-22 RX ADMIN — MICONAZOLE NITRATE: 2 POWDER TOPICAL at 21:04

## 2019-01-22 RX ADMIN — MICONAZOLE NITRATE: 2 POWDER TOPICAL at 09:03

## 2019-01-22 RX ADMIN — FUROSEMIDE 40 MG: 40 TABLET ORAL at 09:05

## 2019-01-22 RX ADMIN — SODIUM CHLORIDE 500 ML: 9 INJECTION, SOLUTION INTRAVENOUS at 17:17

## 2019-01-22 RX ADMIN — RANITIDINE 150 MG: 150 TABLET ORAL at 09:04

## 2019-01-22 RX ADMIN — Medication 500000 UNITS: at 13:34

## 2019-01-22 RX ADMIN — DEXTRAN 70 AND HYPROMELLOSE 2910 1 DROP: 1; 3 SOLUTION/ DROPS OPHTHALMIC at 09:08

## 2019-01-22 RX ADMIN — Medication 500000 UNITS: at 17:22

## 2019-01-22 RX ADMIN — Medication 500000 UNITS: at 21:01

## 2019-01-22 RX ADMIN — MULTIPLE VITAMINS W/ MINERALS TAB 1 TABLET: TAB at 09:05

## 2019-01-22 RX ADMIN — VENLAFAXINE HYDROCHLORIDE 225 MG: 150 CAPSULE, EXTENDED RELEASE ORAL at 09:04

## 2019-01-22 RX ADMIN — POTASSIUM CHLORIDE 20 MEQ: 1.5 POWDER, FOR SOLUTION ORAL at 20:55

## 2019-01-22 RX ADMIN — RANITIDINE 150 MG: 150 TABLET ORAL at 20:55

## 2019-01-22 ASSESSMENT — ACTIVITIES OF DAILY LIVING (ADL)
ADLS_ACUITY_SCORE: 27

## 2019-01-22 NOTE — PROVIDER NOTIFICATION
Primary team paged: pt states right shoulder/arm very painful from 'fall at home'.  ROM cannot be performed d/t pain.

## 2019-01-22 NOTE — PROGRESS NOTES
[facility], [place]  Internal Medicine Progress Note - Gold Service    Assessment & Plan   Gabino Jones is a 78 year old year old female admitted on 1/5/2019 for syncope, epistaxis, elevated lactate, and hypothermia with concern for sepsis.    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 This led to acute worsening of chronic cognitive impairment in setting of chronic brain injury/dementia. MRI from September showed extensive white matter changes presumably caused by treatment related vasculopathic disease; stable on MRI from this admission. No obvious metabolic derangements. TSH normal. Labs/vitals not consistent with adrenal insufficiency, no evidence of seizure activity on EEG, no evidence of ingestion/tox. 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 Spoke with primary oncologist, Dr. Obregon, who will discuss at tumor conference. Given her current medical status, not likely candidate for aggressive treatment. Given small size, this is not likely causing her encephalopathy     # Aortic insufficiency with hypervolemia  Secondary to volume resuscitation. diuresed on 1/7. Euvolemic at this time. Can re-dose lasix if dyspneic. echo on 1/5: Calcified aortic valve with moderate-to-severe aortic stenosis (peak velocity 4.2 m/s, mean  gradient 41 mmHg, DVI 0.31, PATRICIA 1.1 cm2, SVI 46 mL/m2) and moderate aortic insufficiency. Aortic insufficiency is likely contributing somewhat to increased aortic valve gradient.     # Possible septic shock, likely viral, cleared:   Presented with hypothermia (94.2) , hypotension (BP 79/41), leukocytosis, elevated lactate (4.6 at OSH). Mild pyuria. CXR clear. Blood and urine cx NGTD. C diff negative. Initial CRP and procal low. Stopped antibiotics with ID recommendations.      # Acute blood loss anemia  # Iron deficiency anemia  # Melena  Likely due to epistaxis. Hgb on admission 8.3 from 10.3 after multiple nose bleeds. Initially thought that epistaxis could explain bleed but then hgb continued to drop to 6.9 despite no further episodes of epistaxis. Underwent EGD 1/6 which was negative for any cause of bleeding. Hgb has been stable above 7.  Iron 21. Ferritin 17. Iron sat 5%.  - switched back to home daily protonix  - trend hgb PRN bleeding concerns  - tolerating oral iron every other day.     # Epistaxis  History of nasosinus melanoma (in remission). Seen by ENT and no evidence of recurrence of malignancy. with the below recs  - Dissolvable packing in place. This will dissolve on its own. No need for removal.   - No antibiotics for the nasal packing (surgicel)  - There may be some slow dark red oozing after the packing was placed - this is normal and likely from the Afrin and the Surgiflo  - Nasal saline spray to bilateral nasal cavities q2h daily starting in 48 hours (keeps the packing moist and will help with removal)  - If supplemental O2 is required, add humidifier (RT should be contacted to supply this)  - Humidifier to the room to increase the moisture in the air  - If patient experiences brisk bleeding, spray Afrin along the floor of the nose and hold digital pressure on bilateral nostrils for 20 minutes (holding pressure higher up on the nasal bones does not apply effective pressure to the septum.  Instead, the pressure should be applied on the soft part of the nose do that the opening of the nostrils are completely pinched shut).  Nasal clips do not work and should not be used in place of digital pressure. If bleeding continues after 20 minutes of digital pressure, repeat the above steps. If this fails to control the bleeding at that time, call ENT for further recommendations  - Long term, will give nasal saline spray 4x/day rather than Q2h     # DAVID due to sepsis:  Likely due to hypotension/sepsis. Resolved     # Hypertension:  - Continue lisinopril, hydrochlorothiazide, furosemide     # HLD: continue atorvastatin  # Depression: Continue wellbutrin and effexor. Consider stopping wellbutrin in future as it can lower seizure threshold (though mental status stable right now, so will continue)       Diet: Orders Placed This Encounter      Dysphagia Diet Level 2 [facility] Altered Thin Liquids (water, ice chips, juice, milk gelatin, ice cream, etc)      Advance Diet as Tolerated  Fluids: oral liquids  Peter Catheter: Peter Catheter: not present    DVT Prophylaxis: None given epistaxis  Expected discharge: 1 - 2 days, recommended to transitional care unit    The patient's care was discussed with the bedside nurse, patient and care-coordinator     LORI Boykin  Internal Medicine Hospitalist service  Pager- [phone]      Interval History   No acute events overnight. Nursing notes reviewed   Patient denies any new symptoms this morning; has ongoing right shoulder pain     Remainder 4 point ROS negative.    Data reviewed today: I reviewed all medications, new labs and imaging results over the last 24 hours.    Physical Exam   Vital signs last 24 hours:  Temp:  [97.3  F (36.3  C)-97.8  F (36.6  C)] 97.4  F (36.3  C)  Pulse:  [76-89] 76  Heart Rate:  [80-81] 81  Resp:  [16-18] 18  BP: (107-124)/(39-66) 110/48  SpO2:  [97 %-100 %] 100 %  Weight (last 5):   Vitals:    01/12/19 0938 01/13/19 1132 01/14/19 0900  01/15/19 1413   Weight: 78.4 kg (172 lb 12.8 oz) 77.7 kg (171 lb 3.2 oz) 77 kg (169 lb 12.8 oz) 75.9 kg (167 lb 4.8 oz)    01/21/19 0855   Weight: 76 kg (167 lb 9.6 oz)     HEENT: Moist mucus membranes, no scleral icterus  Resp: Breathing comfortably on room air, good air entry bilaterally, no wheezing/rhonchi  CV: Regular rate and rhythm, normal S1S2, 2/6 systolic murmur, no JVD, no peripheral edema  Skin: No rashes, jaundice, or discoloration   Neuro: 5/5 strength in bilateral arms/legs, oriented to time but not oriented to place

## 2019-01-22 NOTE — PLAN OF CARE
Discharge Planner OT   Patient plan for discharge: not discussed  Current status: Ambulating in room with close CGA, following behind with chair. Somewhat unsteady and needing physical assist for FWW management at times, and VCs for pathfinding. Min -mod A for seated G/H in bathroom. Quite fatigued with activity, nauseous throughout.  Barriers to return to prior living situation: Decreased ADL independence and activity tolerance, cognition  Recommendations for discharge: TCU  Rationale for recommendations: pt below baseline in terms of ADL independence, cognition, and mobility       Entered by: José Fisher 01/22/2019 1:45 PM

## 2019-01-22 NOTE — PLAN OF CARE
Time: 1900-0730     Reason for admission: Sepsis   Vitals: VSS  Activity: Turned independently in bed overnight. Ambulated to the bedside commode with A1 and walker. Bed alarm on for safety.   Pain: c/o generalized pain. Tylenol given   Neuro: A&O to self, disoriented to time/place/situation   Cardiac:WNL   Respiratory: On RA sats 97%  GI/: Voided on the bedside commode. Medium BM on the commode x1. Incont. Of stool x1.   Diet:Mechanical/Soft  Lines: PIV is SL   Skin/Wounds: Scabbing present inside nares.      Pt appeared to be sleeping most of the pretty. Plan to discharge to LTC when placement/bed availability confirmed. Continue to monitor and follow POC.

## 2019-01-22 NOTE — PROGRESS NOTES
"Social Work Services Progress Note    Hospital Day: 18  Date of Initial Social Work Evaluation:  1/7/19  Collaborated with:  Facilities, spouse    Data:  Gabino is a 78 year old female awaiting tcu placement. Family very adamant on her being only at certain facilities due to location.     Intervention:    1) [facility] ( [phone]), F: [phone] - re-sent referral 1/17 1/18: spoke with Lanny in admissions, she is reviewing but bed not available until next Thursday 1/21 LVM x2 today for admissions to discuss plan for this week  1/22: LVM again 9:45 am, spoke with admissions at 11 am, requested new notes again. Faxed at 11 am    [facility], [place]: Main: [phone], admission: [phone], Fax: [phone], faxed referral, LVM for admissions, spoke with them yesterday, no beds until Thursday but reviewing  1/22: LVM for follow up, they called back saying \"no bed\" again. SW called back yet again and requested clarification for clinically accepted or denied for when bed is available     Assessment: sw met with spouse and discussed plan. He also states he would like medical transport arranged upon discharge. sw made him very aware there would be a large bill for this transport.     Plan:    Anticipated Disposition:  Facility:  TCU    Barriers to d/c plan: placement in their area    Follow Up:  ragini following    Flores HOLGUIN, MSW  5B  (Medical/Surgical)  Phone: [phone]  Pager: [phone]     "

## 2019-01-22 NOTE — DISCHARGE SUMMARY
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 Ensure no further blood loss, potassium level normal               Unresulted Labs Ordered in the Past 30 Days of this Admission     Date and Time Order Name Status Description    1/22/2019 0709 Basic metabolic panel In process           Hospital Course   Ms Jones was admitted for syncope, epistaxis, elevated lactate, and hypothermia due to sepsis and blood loss anemia. ENT was consulted due to epistaxis and blood loss. Packing was placed. There was no sign of recurrence of sinonasal melanoma. EGD showed no sign of bleed. Was restarted on home pantoprazole. Initial procalcitonin and CRP were low. Hypothermia, leukocytosis, and elevated lactate (septic shock) were thought secondary to viral illness, and antibiotics were discontinued. No sign of urinary, blood, or lung infection.    Ms Jones presented with altered mental status. This improved during admission as her illness improved. However, over time her cognitive function was been declining, particularly over the past few months. Neurology was consulted. EEG was normal. Metabolic derangements were ruled out. MRI head showed chronic white matter changes from history of radiation. It showed a small 5mm left frontal brain lesion concerning for possible metastasis. Spoke  with primary oncologist, Dr. Obregon, who will discuss her imaging at tumor board and recommended LP for cytology to decide on treatment which was performed before discharge. Result of LP will be followed by Dr Obregon as an outpatient. Given her functional status, she is not a good candidate for aggressive treatment. The lesion is not thought to be causing her progressive decline. She will be discharged to TCU to continue her physical and mental recovery following her prolonged hospitalization for septic shock and blood loss anemia. Psychiatry was consulted and did not recommend starting mood stabilizers or neuroleptics. If she has severe agitation or aggression in the future (has not had during hospitalization or at home), could consider PRN olanzapine.    Patient will need follow up with primary care in 1 week along with follow up with oncology as scheduled ;also will need neurology referral on discharge for repeat cognitive evaluation    Consultations This Hospital Stay   ENT IP CONSULT  ENT IP CONSULT  PHARMACY TO DOSE VANCO  GI LUMINAL ADULT IP CONSULT  PHYSICAL THERAPY ADULT IP CONSULT  OCCUPATIONAL THERAPY ADULT IP CONSULT  GI LUMINAL ADULT IP CONSULT  VASCULAR ACCESS CARE ADULT IP CONSULT  PHYSICAL THERAPY ADULT IP CONSULT  OCCUPATIONAL THERAPY ADULT IP CONSULT  VASCULAR ACCESS CARE ADULT IP CONSULT  INFECTIOUS DISEASE GENERAL ADULT IP CONSULT  VASCULAR ACCESS CARE ADULT IP CONSULT  SWALLOW EVAL SPEECH PATH AT BEDSIDE IP CONSULT  VASCULAR ACCESS CARE ADULT IP CONSULT  PSYCHIATRY IP CONSULT  NEUROLOGY GENERAL ADULT IP CONSULT  VASCULAR ACCESS CARE ADULT IP CONSULT  NEUROLOGY GENERAL ADULT IP CONSULT  ANESTHESIOLOGY IP CONSULT  VASCULAR ACCESS CARE ADULT IP CONSULT  PSYCHIATRY IP CONSULT  PHYSICAL THERAPY ADULT IP CONSULT  OCCUPATIONAL THERAPY ADULT IP CONSULT    Code Status   Full Code    Time Spent on this Encounter   Camille GARCIA, personally saw the patient today and spent greater than 30 minutes discharging  this patient.       LORI Boykin  Internal Medicine Hospitalist service  P:[phone]  ______________________________________________________________________    Physical Exam   Vital Signs: Temp: 96.9  F (36.1  C) Temp src: Axillary BP: 130/42 Pulse: 76 Heart Rate: 81 Resp: 16 SpO2: 100 % O2 Device: None (Room air)    Weight: 167 lbs 9.6 oz   General; patient was not in apparent distress  HEENT: Moist mucus membranes, no scleral icterus  Resp: Breathing comfortably on room air, good air entry bilaterally, no wheezing/rhonchi  CV: Regular rate and rhythm, normal S1S2, 2/6 systolic murmur, no JVD, no peripheral edema  Skin: No rashes, jaundice, or discoloration   Neuro: 5/5 strength in bilateral arms/legs, oriented to time but not oriented to place              Primary Care Physician   MIMI RAMIREZ MD    Discharge Disposition   Discharged to short-term care facility  Condition at discharge: Stable    Significant Results and Procedures   Most Recent 3 CBC's:  Recent Labs   Lab Test 01/22/19  0751 01/18/19  0638 01/16/19  0620   WBC 7.7 10.7 7.8   HGB 9.2* 8.8* 8.5*   MCV 86 87 86   * 524* 473*     Most Recent 3 BMP's:  Recent Labs   Lab Test 01/22/19  0751 01/20/19  0754 01/19/19  0733  01/18/19  0638 01/17/19  0649   *  --   --   --  136 135   POTASSIUM 4.4 4.1 3.8   < > 3.0* 3.0*   CHLORIDE 97  --   --   --  99 100   CO2 27  --   --   --  27 25   BUN 35*  --   --   --  21 17   CR 1.10*  --   --   --  1.02 1.01   ANIONGAP 6  --   --   --  10 10   STEFFANIE 9.1  --   --   --  8.5 8.4*   GLC 92  --   --   --  94 93    < > = values in this interval not displayed.   ,   Results for orders placed or performed during the hospital encounter of 01/05/19   XR Chest Port 1 View    Narrative    XR CHEST PORT 1 VW 1/5/2019 2:56 AM    History: r/o CAP    Comparison: 12/12/2018    Findings: Single AP view of the chest. The costophrenic angles are  excluded from the field-of-view. Calcifications noted over the  left  hemidiaphragm. The heart size is normal. Mild calcifications of the  aortic arch. No focal pulmonary opacities. No pneumothorax. Anterior  cervical spinal fusion hardware.      Impression    Impression: No focal pulmonary opacities.    I have personally reviewed the examination and initial interpretation  and I agree with the findings.    SAVANAH LABOY MD   XR Chest Port 1 View    Narrative    Chest one view portable semiupright    HISTORY: Cough    COMPARISON STUDY: 1/5/2019    FINDINGS: Slight increase in diffuse interstitial opacities  bilaterally. Cardiac silhouette is nonenlarged. Anterior cervical  fusion changes. Calcified granuloma bilaterally.      Impression    IMPRESSION: Interstitial opacities are increased likely representing  pulmonary edema.    JAYY PERSON MD   XR Chest Port 1 View    Narrative    XR CHEST PORT 1 VW  1/9/2019 4:45 PM      HISTORY: cough, sepsis protocol    COMPARISON: Chest x-ray 1/7/2019    TECHNIQUE: Semiupright frontal view the chest    FINDINGS: Stable prominent interstitial markings predominantly in the  apices and bases, sparing the mid lung zones. There is a opacity in  the left lower lung zone, stable. No new focal airspace opacity,  pneumothorax, or pleural effusion. Cardiac mediastinal silhouette is  within normal limits. Indistinct pulmonary vasculature. Trachea is  midline. Atherosclerotic calcified gauge of the aortic arch. Surgical  clips in the upper abdomen. Calcified pulmonary granulomas projecting  over the medial left hemidiaphragm. Postoperative changes of anterior  cervical spinal fusion, incompletely visualized. Degenerative changes  bilateral glenohumeral joints and acromioclavicular joints.      Impression    IMPRESSION:   1. Stable prominent interstitial lung markings. Differential includes  interstitial pulmonary edema, atypical infection, and interstitial  lung disease.  2. Stable airspace opacity in the left lower lobe. No new focal  airspace  opacities    I have personally reviewed the examination and initial interpretation  and I agree with the findings.    ESTEFANY ALVARENGA MD   XR Chest Port 1 View    Narrative    EXAM: TEMPORARY  1/10/2019 6:21 AM     HISTORY:  Concern for pulmonary edema       COMPARISON: X-ray 1/9/2019 and CT of the chest with 12/12/2018.    FINDINGS: AP view of the chest. Partially visualized cervical fusion  hardware. Trachea is midline. Stable cardiac mediastinal silhouette.  Atherosclerotic calcifications of the aortic arch. Stable interstitial  markings throughout the lungs. No pleural effusion. Patchy left  basilar opacity.. No pneumothorax. Calcified granulomas project over  the left hemidiaphragm. The upper abdomen is unremarkable.  Degenerative changes of the shoulders.      Impression    IMPRESSION: Although the interstitial changes are unchanged from  1/9/2019 they are new when compared with the CT from 12/12/2018. The  changes suggest underlying pulmonary edema..    I have personally reviewed the examination and initial interpretation  and I agree with the findings.    ESTEFANY ALVARENGA MD   CT Head w/o Contrast    Narrative    CT HEAD W/O CONTRAST 1/10/2019 3:19 PM    Provided History: Altered level of consciousness (LOC), unexplained;  h/o melanoma. r/o new mass  ICD-10: Altered mental status    Comparison: MR 9/12/2018. CT 2/24/2014.    Technique: Using multidetector thin collimation helical acquisition  technique, axial, coronal and sagittal CT images from the skull base  to the vertex were obtained without intravenous contrast.     Findings:    Postsurgical changes of a right ethmoidectomy, partial sphenoidectomy  and right medial laura antrostomy. New opacification of the  remaining right sphenoid locule with mixed density material. Layering  material present in the left sphenoid locule and maxillary sinuses.    Postsurgical changes of a right frontal craniotomy. Stable frontal  cuneiform plate meningioma. No  intracranial hemorrhage, mass effect,  or midline shift. The ventricles are proportionate to the cerebral  sulci. The gray to white matter differentiation of the cerebral  hemispheres is preserved. The basal cisterns are patent. Moderate  leukoaraiosis and moderate generalized parenchyma volume loss.     The mastoid air cells are clear.       Impression    Impression:   1. Postsurgical nasal surgery changes. Hyperdense material within the  right sphenoid locule, favored to be complex mucosal products of acute  sinusitis. However, direct visualization may be required as  hemorrhagic products from a recurrent melanoma lesion may have a  similar appearance.  2. Stable frontal cuneiform plate meningioma.    I have personally reviewed the examination and initial interpretation  and I agree with the findings.    JOHNATHON ARIZMENDI MD   MR Brain w/o & w Contrast    Narrative     MR BRAIN W/O & W CONTRAST 1/13/2019 4:04 PM    Provided History: Altered level of consciousness (LOC), unexplained;  history of melanoma. r/o recurrence/brain mets..  ICD-10:    Comparison: Brain MRI 9/12/2018, head CT 1/10/2019.    Technique: Multiplanar T1-weighted, axial FLAIR, and susceptibility  images were obtained without intravenous contrast. Following  intravenous gadolinium-based contrast administration, axial  T2-weighted, diffusion, and T1-weighted images (in multiple planes)  were obtained.    Contrast: 7.7 mL intravenous Gadavist    Findings: Multiple images degraded by motion.    Confluent abnormal T2 signal in the frontal lobe white matter. Other  areas of patchy T2 hyperattenuation in the centrum semiovale and  subcortical white matter as well as in the juliocesar. No abnormal  restricted diffusion. Air-fluid level in the left sphenoid locule.  Complete opacification of the right sphenoid locule. Mucosal  thickening in the maxillary sinuses and ethmoid air cells. Bilateral  mastoid effusions, right greater than left.    There is no mass  effect, midline shift, or evidence of intracranial  hemorrhage. The ventricles are proportionate to the cerebral sulci.  Moderate generalized parenchymal volume loss.     Postcontrast images demonstrate possible 5 x 5 mm enhancing focus in  the periventricular / subependymal white matter of the left frontal  lobe on series 15, image 13. No other enhancing foci within the brain  or calvarium. Enhancing dural based lesion on the cribriform plate  measuring 9 x 9 mm.     The major vascular intracranial flow-voids are present.       Impression    Impression:    1. Exam is significantly degraded by motion artifact to a near  nondiagnostic examination.   2. There is a possible enhancing focus in the periventricular /  subependymal white matter of the left frontal lobe measuring 5 mm.  This was not present on March 2018 dated study. This may represent a  metastatic lesion, although a repeat examination should be considered.  3. Extensive sinonasal mostly inflammatory changes. Local tumor  recurrence evaluation is limited given extensive artifact.   4. Bilateral mastoid effusions, right greater than left.  5. No significant change in the cribriform plate meningioma.  6. Stable extensive mostly bifrontal white matter changes which are  presumed to be related to treatment related changes.    I have personally reviewed the examination and initial interpretation  and I agree with the findings.    MIKEL BELTRAN MD   MR Brain w/o & w Contrast    Narrative     MR BRAIN W/O & W CONTRAST 1/16/2019 5:07 PM    History: Altered level of consciousness (LOC), unexplained; Melanoma,  local/satellite/in-transit recurrence, workup; repeat. high movement.  claustrophobia. anesthesia consulted.    Comparison: MRI 1/11/2019.    Technique: Multiplanar T1-weighted, axial FLAIR, and susceptibility  images were obtained without intravenous contrast. Following  intravenous gadolinium-based contrast administration, axial  T2-weighted, diffusion, and  T1-weighted images (in multiple planes)  were obtained.    Contrast: 7.5 mL of Gadavist    Findings: No significant change of the confluent T2 hyperintensity of  the bilateral frontal lobe white matter. Additional scattered T2  hyperintense foci throughout the bilateral subcortical white matter  and juliocesar. No abnormal findings on susceptibility or diffusion-weighted  imaging.  There is no mass effect, midline shift, or evidence of intracranial  hemorrhage. The ventricles are proportionate to the cerebral sulci.  Mild to moderate generalized cerebral volume loss.    Redemonstration of a 4 x 4 millimeter enhancing focus within the  periventricular white matter adjacent the left frontal horn.  Redemonstration of a dural based enhancing lesion on the cribriform  plate measuring approximately 9 x 8 mm.    No definite abnormality of the skull marrow signal is noted. The major  vascular intracranial flow-voids are present. Bilateral mastoid  effusions. Decreased opacification of the right sphenoid locule with  partial opacification of the left sphenoid locule. Bilateral maxillary  and ethmoid air cell mucosal thickening. The orbits are grossly  unremarkable.      Impression    Impression:    1. Redemonstration of the 5 mm enhancing focus in the left frontal  lobe concerning for a metastatic lesion given patient's history.  2. Mildly improved sinonasal disease in comparison to the prior exam.  3. Stable cribriform plate meningioma.  4. Stable bifrontal confluent white matter changes may be related to  prior treatment.  5. Bilateral mastoid effusions.    I have personally reviewed the examination and initial interpretation  and I agree with the findings.    ROM HURST MD       Discharge Orders      Medication Therapy Management Referral      General info for SNF    Length of Stay Estimate: Short Term Care: Estimated # of Days <30  Condition at Discharge: Stable  Level of care:skilled   Rehabilitation Potential:  Good  Admission H&P remains valid and up-to-date: Yes  Recent Chemotherapy: N/A  Use Nursing Home Standing Orders: Yes     Mantoux instructions    Give two-step Mantoux (PPD) Per Facility Policy Yes     Reason for your hospital stay    You were admitted due to blood loss and infection, which led to worsening of your mental status. The bleeding and infection have improved and your mental status is improving as well. We suspect your mental decline is due to chronic changes of the brain after radiation, similar to dementia. Your oncologist is looking at a small area of the brain to see if it is consistent with a malignancy or another cause; he will help determine the next step of action.     Activity - Up with nursing assistance     Follow Up and recommended labs and tests    Follow up with primary care provider in 1-2 weeks after leaving TCU. Ensure no further blood loss, potassium level normal     Full Code     Physical Therapy Adult Consult    Evaluate and treat as clinically indicated.    Reason:  Deconditioning, difficulty walking     Occupational Therapy Adult Consult    Evaluate and treat as clinically indicated.    Reason:  Physical deconditioning, cognitive impairment     Advance Diet as Tolerated    Follow this diet upon discharge:       Snacks/Supplements Adult: Boost Shake; Between Meals      Snacks/Supplements Adult: Boost Shake; With Meals      Dysphagia Diet Level 2 [facility] Altered Thin Liquids (water, ice chips, juice, milk gelatin, ice cream, etc)     Discharge Medications   Current Discharge Medication List      START taking these medications    Details   benzonatate (TESSALON) 100 MG capsule Take 1 capsule (100 mg) by mouth 3 times daily as needed for cough (procaine listed as allergy, but patient has gotten lidocaine numerous times this admission without complications)    Associated Diagnoses: Cough      bisacodyl (DULCOLAX) 10 MG suppository Place 1 suppository (10 mg) rectally daily as needed for  constipation    Associated Diagnoses: Constipation, unspecified constipation type      ferrous sulfate 300 (60 Fe) MG/5ML syrup Take 8.33 mLs (500 mg) by mouth every 48 hours  Qty: 125 mL, Refills: 0    Associated Diagnoses: Iron deficiency anemia due to chronic blood loss      furosemide (LASIX) 40 MG tablet Take 1 tablet (40 mg) by mouth daily  Qty: 30 tablet, Refills: 0    Associated Diagnoses: Hypertension goal BP (blood pressure) < 140/90      hydrochlorothiazide (HYDRODIURIL) 25 MG tablet Take 1 tablet (25 mg) by mouth daily  Qty: 30 tablet, Refills: 0    Associated Diagnoses: Hypertension goal BP (blood pressure) < 140/90      hypromellose-dextran (ARTIFICAL TEARS) 0.1-0.3 % ophthalmic solution Place 1 drop into both eyes every hour as needed for dry eyes    Associated Diagnoses: Dry eyes      magic mouthwash (FIRST-MOUTHWASH BLM) compounding kit Swish and swallow 10 mLs in mouth every 6 hours as needed for mouth sores    Associated Diagnoses: Candidal skin infection      miconazole (MICATIN/MICRO GUARD) 2 % external powder Apply topically 2 times daily    Associated Diagnoses: Candidal skin infection      multivitamin w/minerals (THERA-VIT-M) tablet Take 1 tablet by mouth daily  Qty: 30 tablet, Refills: 0    Associated Diagnoses: Malnutrition, unspecified type (H)      nystatin (MYCOSTATIN) 443409 UNIT/ML suspension Take 5 mLs (500,000 Units) by mouth 4 times daily for 10 days  Qty: 200 mL, Refills: 0    Associated Diagnoses: Candidal skin infection      OLANZapine (ZYPREXA) 5 MG tablet Take 1 tablet (5 mg) by mouth daily as needed (Agitation)    Associated Diagnoses: Altered mental status, unspecified altered mental status type      ondansetron (ZOFRAN-ODT) 4 MG ODT tab Take 1 tablet (4 mg) by mouth every 6 hours as needed for nausea or vomiting    Associated Diagnoses: Nausea      polyethylene glycol (MIRALAX/GLYCOLAX) packet Take 17 g by mouth daily as needed for constipation    Associated Diagnoses:  Constipation, unspecified constipation type      potassium chloride (KLOR-CON) 20 MEQ packet Take 20 mEq by mouth 2 times daily  Qty: 60 packet, Refills: 0    Associated Diagnoses: Hypertension goal BP (blood pressure) < 140/90      sodium chloride (OCEAN) 0.65 % nasal spray Spray 1 spray into both nostrils 4 times daily  Qty: 6 mL, Refills: 0    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)         CONTINUE these medications which have CHANGED    Details   ranitidine (ZANTAC) 150 MG tablet Take 1 tablet (150 mg) by mouth 2 times daily  Qty: 60 tablet, Refills: 0    Associated Diagnoses: Gastroesophageal reflux disease, esophagitis presence not specified      senna-docusate (SENOKOT-S/PERICOLACE) 8.6-50 MG tablet Take 1 tablet by mouth 2 times daily  Qty: 120 tablet, Refills: 0    Associated Diagnoses: Flatulence, eructation, and gas pain         CONTINUE these medications which have NOT CHANGED    Details   acetaminophen (TYLENOL) 500 MG tablet Take 1 tablet (500 mg) by mouth every 6 hours as needed  Qty: 100 tablet, Refills: 3    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)      atorvastatin (LIPITOR) 20 MG tablet Take 1 tablet (20 mg) by mouth daily  Qty: 90 tablet, Refills: 3    Associated Diagnoses: Hypercholesteremia      buPROPion (WELLBUTRIN XL) 150 MG 24 hr tablet TAKE 1 TABLET(150 MG) BY MOUTH EVERY MORNING  Qty: 90 tablet, Refills: 1    Associated Diagnoses: JITENDRA (generalized anxiety disorder)      lisinopril (PRINIVIL,ZESTRIL) 30 MG tablet Take 1 tablet (30 mg) by mouth daily  Qty: 90 tablet, Refills: 0    Associated Diagnoses: Hypertension goal BP (blood pressure) < 140/90      pantoprazole (PROTONIX) 40 MG EC tablet Take 1 tablet (40 mg) by mouth daily Take 30-60 minutes before a meal.  Qty: 90 tablet, Refills: 1    Associated Diagnoses: Gastroesophageal reflux disease without esophagitis      venlafaxine (EFFEXOR-XR) 75 MG 24 hr capsule TAKE 3 CAPSULES BY MOUTH  DAILY  Qty: 270 capsule, Refills: 2    Associated Diagnoses: JITENDRA (generalized anxiety disorder)         STOP taking these medications       ALPRAZolam (XANAX) 0.25 MG tablet Comments:   Reason for Stopping:         GuaiFENesin 100 MG PACK Comments:   Reason for Stopping:         hydrochlorothiazide (MICROZIDE) 12.5 MG capsule Comments:   Reason for Stopping:         hydrOXYzine (ATARAX) 25 MG tablet Comments:   Reason for Stopping:         ibuprofen (ADVIL/MOTRIN) 800 MG tablet Comments:   Reason for Stopping:         potassium chloride (MICRO-K) 10 MEQ CR capsule Comments:   Reason for Stopping:         Simethicone 180 MG CAPS Comments:   Reason for Stopping:         VITAMIN D, CHOLECALCIFEROL, PO Comments:   Reason for Stopping:             Allergies   Allergies   Allergen Reactions     Novocain [Procaine] Unknown and Rash     Mirtazapine      Nefazodone Unknown and Rash

## 2019-01-22 NOTE — PROGRESS NOTES
SPIRITUAL HEALTH SERVICES  SPIRITUAL ASSESSMENT Progress Note  [facility] ([place]) [address]     REFERRAL SOURCE: Length-of-stay      I visited [place] to introduce [facility] / offer spiritual and emotional support, in light of extended hospitalization and answers to Oncology Distress screen.    Unfortunately, my attempts to communicate were unsuccessful. I encouraged Gabino to consider using pocket talker to no avail.    PLAN: [facility] remains available to provide support as needed.    Ke Harris  Chaplain Resident  Pager [phone]

## 2019-01-22 NOTE — PLAN OF CARE
[id number]: VSS on room air, AOx1- confused on situation, time and answers correct in hospital but not sure which one. Pt up with asst 1/walker/gaitbelt to bedside commode. 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  Pt deaf in right ear- decreased hearing in L but does have hearing aid and in pocket talker at bedside. Pt tolerated ambulating to bathroom with walker. Pt given Zofran for intermittent nausea. Pt had 250 ml output- Na 130- 500cc Bolus ordered now. Creatinine 1.11. Pt given senna and miralax today. Pt on DD2, thin liquids- appetite poor. Pt likes boost and magic cups and ice cream. Pt c.o right shoulder pain- shoulder xray ordered- xray called for pt @ 1520- awaiting transport. Bed and Chair alarm used at all times- pt frequently rounded on. Call light within reach, family at bedside now- Plan for discharge to TCU/Memory care possibly Thursday.

## 2019-01-22 NOTE — PLAN OF CARE
3082-6013: VSS on room air. AOx2- forgetful and disorientated to situation. Plan for placement in memory care when bed opens. Pt denies pain/n/v. Appetite poor. 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 Pocket talker at bedside. Pt's dentures at bedside- tried wearing today but quite large. Pt uses bedside commode for urine output but is incontinent at times. NO bowel movement. Bed alarm on always, pt tolerated sitting up in chair this am with chair alarm.  PIV saline locked. Pt frequently rounded on- bed alarm in hand. Will continue to follow POC/monitor.    nursing home ([facility])  Financial/Insurance: [facility] for seniors insurance.      Resources and Interventions:  Current Resources: LTACH ([facility]);     Advanced Care Plans/Directives on file:: No     Patient/Caregiver understanding: Spoke with Ariella ROBLEDO at [facility]. Gricel Kang, RN  Clinic Care Coordinator  [phone]  [email or web address]

## 2019-01-23 ENCOUNTER — APPOINTMENT (OUTPATIENT)
Dept: PHYSICAL THERAPY | Facility: CLINIC | Age: 79
DRG: 871 | End: 2019-01-23
Attending: INTERNAL MEDICINE
Payer: MEDICARE

## 2019-01-23 ENCOUNTER — APPOINTMENT (OUTPATIENT)
Dept: SPEECH THERAPY | Facility: CLINIC | Age: 79
DRG: 871 | End: 2019-01-23
Attending: INTERNAL MEDICINE
Payer: MEDICARE

## 2019-01-23 ENCOUNTER — APPOINTMENT (OUTPATIENT)
Dept: OCCUPATIONAL THERAPY | Facility: CLINIC | Age: 79
DRG: 871 | End: 2019-01-23
Attending: INTERNAL MEDICINE
Payer: MEDICARE

## 2019-01-23 DIAGNOSIS — C43.31 MALIGNANT MELANOMA OF NOSE (H): Primary | ICD-10-CM

## 2019-01-23 DIAGNOSIS — R41.0 DELIRIUM: ICD-10-CM

## 2019-01-23 LAB
ANION GAP SERPL CALCULATED.3IONS-SCNC: 8 MMOL/L (ref 3–14)
BUN SERPL-MCNC: 32 MG/DL (ref 7–30)
CALCIUM SERPL-MCNC: 8.7 MG/DL (ref 8.5–10.1)
CHLORIDE SERPL-SCNC: 98 MMOL/L (ref 94–109)
CO2 SERPL-SCNC: 25 MMOL/L (ref 20–32)
CREAT SERPL-MCNC: 1.02 MG/DL (ref 0.52–1.04)
GFR SERPL CREATININE-BSD FRML MDRD: 52 ML/MIN/{1.73_M2}
GLUCOSE SERPL-MCNC: 99 MG/DL (ref 70–99)
POTASSIUM SERPL-SCNC: 4.7 MMOL/L (ref 3.4–5.3)
SODIUM SERPL-SCNC: 131 MMOL/L (ref 133–144)

## 2019-01-23 PROCEDURE — A9270 NON-COVERED ITEM OR SERVICE: HCPCS | Mod: GY | Performed by: NURSE PRACTITIONER

## 2019-01-23 PROCEDURE — 25000132 ZZH RX MED GY IP 250 OP 250 PS 637: Mod: GY | Performed by: INTERNAL MEDICINE

## 2019-01-23 PROCEDURE — 80048 BASIC METABOLIC PNL TOTAL CA: CPT | Performed by: INTERNAL MEDICINE

## 2019-01-23 PROCEDURE — 40000225 ZZH STATISTIC SLP WARD VISIT

## 2019-01-23 PROCEDURE — 97530 THERAPEUTIC ACTIVITIES: CPT | Mod: GO

## 2019-01-23 PROCEDURE — 97530 THERAPEUTIC ACTIVITIES: CPT | Mod: GP

## 2019-01-23 PROCEDURE — A9270 NON-COVERED ITEM OR SERVICE: HCPCS | Mod: GY | Performed by: INTERNAL MEDICINE

## 2019-01-23 PROCEDURE — 12000001 ZZH R&B MED SURG/OB UMMC

## 2019-01-23 PROCEDURE — 97110 THERAPEUTIC EXERCISES: CPT | Mod: GO

## 2019-01-23 PROCEDURE — 25000132 ZZH RX MED GY IP 250 OP 250 PS 637: Mod: GY | Performed by: STUDENT IN AN ORGANIZED HEALTH CARE EDUCATION/TRAINING PROGRAM

## 2019-01-23 PROCEDURE — 40000133 ZZH STATISTIC OT WARD VISIT

## 2019-01-23 PROCEDURE — 99233 SBSQ HOSP IP/OBS HIGH 50: CPT | Performed by: INTERNAL MEDICINE

## 2019-01-23 PROCEDURE — 92526 ORAL FUNCTION THERAPY: CPT | Mod: GN

## 2019-01-23 PROCEDURE — A9270 NON-COVERED ITEM OR SERVICE: HCPCS | Mod: GY | Performed by: STUDENT IN AN ORGANIZED HEALTH CARE EDUCATION/TRAINING PROGRAM

## 2019-01-23 PROCEDURE — 36415 COLL VENOUS BLD VENIPUNCTURE: CPT | Performed by: INTERNAL MEDICINE

## 2019-01-23 PROCEDURE — 40000193 ZZH STATISTIC PT WARD VISIT

## 2019-01-23 PROCEDURE — 25000132 ZZH RX MED GY IP 250 OP 250 PS 637: Mod: GY | Performed by: NURSE PRACTITIONER

## 2019-01-23 RX ADMIN — Medication 500000 UNITS: at 18:19

## 2019-01-23 RX ADMIN — Medication 500000 UNITS: at 20:33

## 2019-01-23 RX ADMIN — MULTIPLE VITAMINS W/ MINERALS TAB 1 TABLET: TAB at 08:27

## 2019-01-23 RX ADMIN — SENNOSIDES AND DOCUSATE SODIUM 2 TABLET: 8.6; 5 TABLET ORAL at 08:27

## 2019-01-23 RX ADMIN — POTASSIUM CHLORIDE 20 MEQ: 1.5 POWDER, FOR SOLUTION ORAL at 20:33

## 2019-01-23 RX ADMIN — MICONAZOLE NITRATE: 2 POWDER TOPICAL at 08:28

## 2019-01-23 RX ADMIN — BUPROPION HYDROCHLORIDE 150 MG: 150 TABLET, FILM COATED, EXTENDED RELEASE ORAL at 08:28

## 2019-01-23 RX ADMIN — ACETAMINOPHEN 650 MG: 325 TABLET, FILM COATED ORAL at 18:19

## 2019-01-23 RX ADMIN — ATORVASTATIN CALCIUM 20 MG: 20 TABLET, FILM COATED ORAL at 20:33

## 2019-01-23 RX ADMIN — RANITIDINE 150 MG: 150 TABLET ORAL at 08:27

## 2019-01-23 RX ADMIN — HYDROCHLOROTHIAZIDE 25 MG: 25 TABLET ORAL at 08:27

## 2019-01-23 RX ADMIN — DEXTRAN 70 AND HYPROMELLOSE 2910 1 DROP: 1; 3 SOLUTION/ DROPS OPHTHALMIC at 08:29

## 2019-01-23 RX ADMIN — Medication 500000 UNITS: at 08:27

## 2019-01-23 RX ADMIN — LISINOPRIL 30 MG: 20 TABLET ORAL at 08:28

## 2019-01-23 RX ADMIN — FUROSEMIDE 40 MG: 40 TABLET ORAL at 08:28

## 2019-01-23 RX ADMIN — MICONAZOLE NITRATE: 2 POWDER TOPICAL at 20:32

## 2019-01-23 RX ADMIN — Medication 500000 UNITS: at 13:39

## 2019-01-23 RX ADMIN — VENLAFAXINE HYDROCHLORIDE 225 MG: 150 CAPSULE, EXTENDED RELEASE ORAL at 08:27

## 2019-01-23 RX ADMIN — POTASSIUM CHLORIDE 20 MEQ: 1.5 POWDER, FOR SOLUTION ORAL at 08:28

## 2019-01-23 RX ADMIN — RANITIDINE 150 MG: 150 TABLET ORAL at 20:33

## 2019-01-23 RX ADMIN — SENNOSIDES AND DOCUSATE SODIUM 2 TABLET: 8.6; 5 TABLET ORAL at 20:33

## 2019-01-23 ASSESSMENT — ACTIVITIES OF DAILY LIVING (ADL)
ADLS_ACUITY_SCORE: 27

## 2019-01-23 NOTE — PLAN OF CARE
[id number]: VSS on room air, up Asst 1/walker/gaitbelt. AO to self- mentation waxes/wanes. Pt blind in Left eye, decreased vision in R. Pt deaf in Right ear, decreased hearing in L- Pocket talker used- to left ear. Glasses on. Pt tolerating ambulating to bathroom x3 and shower. Pt c/o stomachache following several bm's. All bm's formed. Pt's appetite poor- pt encouraged to drink Boost and eat Magic Cups (xtra in patient freezer). Tylenol given for generalized discomfort- family suggested.  Pt bed & chair alarm activated at all times. Family at bedside- plan for TCU placement when bed available. Pt rfrequently rounded on. Call light within reach, will continue to follow POC/monitor.

## 2019-01-23 NOTE — PROGRESS NOTES
[facility], [place]  Internal Medicine Progress Note - Gold Service    Assessment & Plan   Gabino Jones is a 78 year old year old female admitted on 1/5/2019 for syncope, epistaxis, elevated lactate, and hypothermia with concern for sepsis.    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 This led to acute worsening of chronic cognitive impairment in setting of chronic brain injury/dementia. MRI from September showed extensive white matter changes presumably caused by treatment related vasculopathic disease; stable on MRI from this admission. No obvious metabolic derangements. TSH normal. Labs/vitals not consistent with adrenal insufficiency, no evidence of seizure activity on EEG, no evidence of ingestion/tox. 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 Spoke with primary oncologist, Dr. Obregon, who will discuss at tumor conference. Given her current medical status, not likely candidate for aggressive treatment. Given small size, this is not likely causing her encephalopathy       # Aortic insufficiency with hypervolemia  Secondary to volume resuscitation. diuresed on 1/7. Euvolemic at this time. Can re-dose lasix if dyspneic. echo on 1/5: Calcified aortic valve with moderate-to-severe aortic stenosis (peak velocity 4.2 m/s, mean gradient 41 mmHg, DVI 0.31, PATRICIA 1.1 cm2, SVI 46 mL/m2) and moderate aortic insufficiency. Aortic insufficiency is likely contributing somewhat to increased aortic valve gradient.     #  Possible septic shock, likely viral, cleared:   Presented with hypothermia (94.2) , hypotension (BP 79/41), leukocytosis, elevated lactate (4.6 at OSH). Mild pyuria. CXR clear. Blood and urine cx NGTD. C diff negative. Initial CRP and procal low. Stopped antibiotics with ID recommendations.      # Acute blood loss anemia  # Iron deficiency anemia  # Melena  Likely due to epistaxis. Hgb on admission 8.3 from 10.3 after multiple nose bleeds. Initially thought that epistaxis could explain bleed but then hgb continued to drop to 6.9 despite no further episodes of epistaxis. Underwent EGD 1/6 which was negative for any cause of bleeding. Hgb has been stable above 7.  Iron 21. Ferritin 17. Iron sat 5%.  - switched back to home daily protonix  - trend hgb PRN bleeding concerns  - tolerating oral iron every other day.     # Epistaxis  History of nasosinus melanoma (in remission). Seen by ENT and no evidence of recurrence of malignancy. with the below recs  - Dissolvable packing in place. This will dissolve on its own. No need for removal.   - No antibiotics for the nasal packing (surgicel)  - There may be some slow dark red oozing after the packing was placed - this is normal and likely from the Afrin and the Surgiflo  - Nasal saline spray to bilateral nasal cavities q2h daily starting in 48 hours (keeps the packing moist and will help with removal)  - If supplemental O2 is required, add humidifier (RT should be contacted to supply this)  - Humidifier to the room to increase the moisture in the air  - If patient experiences brisk bleeding, spray Afrin along the floor of the nose and hold digital pressure on bilateral nostrils for 20 minutes (holding pressure higher up on the nasal bones does not apply effective pressure to the septum. Instead, the pressure should be applied on the soft part of the nose do that the opening of the nostrils are completely pinched shut).  Nasal clips do not work and should not be used in place  of digital pressure. If bleeding continues after 20 minutes of digital pressure, repeat the above steps. If this fails to control the bleeding at that time, call ENT for further recommendations  - Long term, will give nasal saline spray 4x/day rather than Q2h     # DAVID due to sepsis:  Likely due to hypotension/sepsis. Resolved     # Hypertension:  - Continue lisinopril, hydrochlorothiazide, furosemide     # HLD: continue atorvastatin  # Depression: Continue wellbutrin and effexor. Consider stopping wellbutrin in future as it can lower seizure threshold (though mental status stable right now, so will continue)       Diet: Orders Placed This Encounter      Dysphagia Diet Level 2 [facility] Altered Thin Liquids (water, ice chips, juice, milk gelatin, ice cream, etc)      Advance Diet as Tolerated  Fluids: oral liquids  Peter Catheter: Peter Catheter: not present    DVT Prophylaxis: None given epistaxis  Expected discharge: 1 - 2 days, recommended to transitional care unit    The patient's care was discussed with the bedside nurse, patient and care-coordinator     LORI Boykin  Internal Medicine Hospitalist service  Pager- [phone]      Interval History   No acute events overnight. Nursing notes reviewed   Patient denies any new symptoms this morning    Remainder 4 point ROS negative.    Data reviewed today: I reviewed all medications, new labs and imaging results over the last 24 hours.    Physical Exam   Vital signs last 24 hours:  Temp:  [96.1  F (35.6  C)-96.9  F (36.1  C)] 96.1  F (35.6  C)  Pulse:  [78] 78  Heart Rate:  [82-87] 87  Resp:  [16-22] 22  BP: ()/(41-45) 126/41  SpO2:  [98 %-100 %] 98 %  Weight (last 5):   Vitals:    01/12/19 0938 01/13/19 1132 01/14/19 0900 01/15/19 1413   Weight: 78.4 kg (172 lb 12.8 oz) 77.7 kg (171 lb 3.2 oz) 77 kg (169 lb 12.8 oz) 75.9 kg (167 lb 4.8 oz)    01/21/19 0855   Weight: 76 kg (167 lb 9.6 oz)     HEENT: Moist mucus membranes, no scleral icterus  Resp:  Breathing comfortably on room air, good air entry bilaterally, no wheezing/rhonchi  CV: Regular rate and rhythm, normal S1S2, 2/6 systolic murmur, no JVD, no peripheral edema  Skin: No rashes, jaundice, or discoloration   Neuro: 5/5 strength in bilateral arms/legs, oriented to time but not oriented to place

## 2019-01-23 NOTE — PROGRESS NOTES
"Social Work Services Progress Note    Hospital Day: 19  Date of Initial Social Work Evaluation:  1/7/19  Collaborated with:  Facilities, spouse    Data:  Gabino is a 78 year old female awaiting tcu placement. Family very adamant on her being only at certain facilities due to location.     Intervention:    [facility] (ph [phone]), F: [phone] - re-sent referral 1/17 1/18: spoke with Lanny in admissions, she is reviewing but bed not available until next Thursday 1/21 LVM x2 today for admissions to discuss plan for this week  1/22: LVM again 9:45 am, spoke with admissions at 11 am, requested new notes again. Faxed at 11 am  1/23: LVM 10 am, LVM 1:15 PM, asked  to find her and call SW back today regarding if they can take this patient    [facility], [facility]: Main: [phone], admission: [phone], Fax: [phone], faxed referral, LVM for admissions, spoke with them yesterday, no beds until Thursday but reviewing  1/22: LVM for follow up, they called back saying \"no bed\" again. SW called back yet again and requested clarification for clinically accepted or denied for when bed is available     [facility] (ph [phone])- previously accepted pending bed availability when pt is ready for discharge- resent referral 1/17 1/18: LVM for admissions, weekend admissions line is [phone], will have weekend SW also follow up -- updated at 3:30 PM facility has no openings until end of next week at [facility] ([place] [phone])- declined, \"no available or appropriate bed\"     [facility] ([place] [phone])- resent referral 1/17 1/18: LVM for admissions    Assessment: sw met with spouse and discussed plan. He also states he would like medical transport arranged upon discharge. sw made him very aware there would be a large bill for this transport. Met with spouse and daughter and discussed barriers to placement and need for " additional referrals. They also stated they could take her home and admit her to the facility from home when they have an opening.    Plan:    Anticipated Disposition:  Facility:  TCU    Barriers to d/c plan: placement in their area    Follow Up:  sw following    Flores HOLGUIN, MSW  5B  (Medical/Surgical)  Phone: [phone]  Pager: [phone]

## 2019-01-23 NOTE — PLAN OF CARE
Discharge Planner SLP   Patient plan for discharge: Unknown  Current status: Recommend dysphagia diet 2 and thin liquids with 1:1 supervision, small sips and bites, seated with upright posture with PO intake.  Pt not yet appropriate for diet advancement due to cognition and inconsistent fit of dentures impacting her ability to masticate.  SLP to follow for diet advancment and PO tolerance.  Barriers to return to prior living situation: Below baseline, cognition  Recommendations for discharge: TCU  Rationale for recommendations: SLP at next level of care for management of dysphagia        Entered by: Lyubov Carr 01/23/2019 9:48 AM

## 2019-01-23 NOTE — PLAN OF CARE
Patient disoriented to time and situation. No c/o pain. Patient blind in left eye, decreased vision in right. Deaf in in right ear and decreased hearing in left. Pocket talker at bed side. Up with assist x 1 to BSC. Voiding without difficulty. Mega BM this shift. Patient slept between cares. Bed alarm on for safety. Will continue to monitor and follow POC.

## 2019-01-23 NOTE — PLAN OF CARE
Discharge Planner OT   Patient plan for discharge: not discussed  Current status: Pt supine in bed upon arrival, sleepy/fatigued. Pt tolerated supine B UE/LE exercises for strength and endurance with demo, frequent cues and tactile prompts. Attempted bed mobility x 3 reps to sit at EOB, pt rolled side to side with min A with no progression to sitting up due to fatigue.  Barriers to return to prior living situation: Decreased ADL independence and activity tolerance, cognition  Recommendations for discharge: Per plan established by the OT, the recommendation for dc location is TCU  Rationale for recommendations: pt below baseline in terms of ADL independence, cognition, and mobility

## 2019-01-23 NOTE — PLAN OF CARE
5A - ambulation with FWW and hand on gait belt with quick option for sitting if needed    Discharge Planner PT   Patient plan for discharge: TCU, per family  Current status: Greeted pt sitting up in recliner and encouraged for participating in therapy. AVSS and appropriate for mobilization. Engaged pt in sitting to standing with FWW at CGA. Initiated ambulation from recliner to bathroom for ~10ft at CGA. Initiated safe transfer to toilet with use of grab bars, pt required dependent donning/doffing brief and pericares. Engaged pt in ambulation with FWW back to bed with difficulty due to pt declining to walk back and choosing to sit without warning. Therapist able to encourage ambulation of ~10ft back to bed with FWW at CGA. Pt declines further therapy intervention.   Barriers to return to prior living situation: medical condition, cognition  Recommendations for discharge: TCU  Rationale for recommendations: Pt is recommended to discharge to TCU setting for improved LE strengthening, increased endurance with functional mobility and balance for safe return to home environment.        Entered by: Jazmine Moyer 01/23/2019 4:33 PM

## 2019-01-23 NOTE — PLAN OF CARE
Time: 3846-6217  Reason for admission/Dx:   Hypotension [I95.9]     [Vitals]: /45 (BP Location: Right arm)   Pulse 76   Temp 96.4  F (35.8  C) (Oral)   Resp 18   Wt 76 kg (167 lb 9.6 oz)   SpO2 99%   BMI 27.89 kg/m    [Labs/Lactic]: Cr 1.10     [Electrolytes]:   Potassium (mmol/L)   Date Value   01/22/2019 4.4     Magnesium (mg/dL)   Date Value   01/20/2019 2.3     Phosphorus (mg/dL)   Date Value   01/20/2019 3.9      [Imaging]: XR of R shoulder unremarkable.    [Cardiac]: Ex, pt has audible murmur  [Pain/PRN/s]: R shoulder pain managed with PRN PO tylenol    [Respiratory]: WDL  [Lines]:  PIV SL   [Infusions]: 500 ml NS bolus infused.    [Neuros]: Ex, pt confused to time, situation and place.   -Bed alarm on for safety  -Pt [place]/deaf, and using pocket talker for assessments.  -Pt blind in L eye, w/ decreased vision in R eye.           [GI]:Orders Placed This Encounter      Dysphagia Diet Level 2 Mech Altered Thin Liquids (water, ice chips, juice, milk gelatin, ice cream, etc)      Advance Diet as Tolerated    -Pt did not have a BM this evening, 2 doses of senna given.    -Tolerated PO intake with poor appetite.  Pt does better w/ boost shakes and vanilla ice cream.  [Activity]: Pt up w/ assist 1 and walker    []: Pt voiding, no episodes of incontinence reported.     [Skin/Wounds]: Pt has blanching coccyx and reddened groin folds.  Antifungal powder applied to folds.           Shift changes: Pt received one time bolus this evening and had XR of R shoulder.    Plan:  Plan for possible discharge to TCU on Thursday once placement found.

## 2019-01-24 ENCOUNTER — APPOINTMENT (OUTPATIENT)
Dept: OCCUPATIONAL THERAPY | Facility: CLINIC | Age: 79
DRG: 871 | End: 2019-01-24
Attending: INTERNAL MEDICINE
Payer: MEDICARE

## 2019-01-24 ENCOUNTER — APPOINTMENT (OUTPATIENT)
Dept: SPEECH THERAPY | Facility: CLINIC | Age: 79
DRG: 871 | End: 2019-01-24
Attending: INTERNAL MEDICINE
Payer: MEDICARE

## 2019-01-24 LAB
ANION GAP SERPL CALCULATED.3IONS-SCNC: 10 MMOL/L (ref 3–14)
BUN SERPL-MCNC: 35 MG/DL (ref 7–30)
CALCIUM SERPL-MCNC: 8.9 MG/DL (ref 8.5–10.1)
CHLORIDE SERPL-SCNC: 98 MMOL/L (ref 94–109)
CO2 SERPL-SCNC: 24 MMOL/L (ref 20–32)
CREAT SERPL-MCNC: 1.08 MG/DL (ref 0.52–1.04)
GFR SERPL CREATININE-BSD FRML MDRD: 49 ML/MIN/{1.73_M2}
GLUCOSE SERPL-MCNC: 91 MG/DL (ref 70–99)
POTASSIUM SERPL-SCNC: 5 MMOL/L (ref 3.4–5.3)
SODIUM SERPL-SCNC: 132 MMOL/L (ref 133–144)

## 2019-01-24 PROCEDURE — 97110 THERAPEUTIC EXERCISES: CPT | Mod: GO

## 2019-01-24 PROCEDURE — A9270 NON-COVERED ITEM OR SERVICE: HCPCS | Mod: GY | Performed by: NURSE PRACTITIONER

## 2019-01-24 PROCEDURE — 40000225 ZZH STATISTIC SLP WARD VISIT

## 2019-01-24 PROCEDURE — A9270 NON-COVERED ITEM OR SERVICE: HCPCS | Mod: GY | Performed by: STUDENT IN AN ORGANIZED HEALTH CARE EDUCATION/TRAINING PROGRAM

## 2019-01-24 PROCEDURE — 80048 BASIC METABOLIC PNL TOTAL CA: CPT | Performed by: INTERNAL MEDICINE

## 2019-01-24 PROCEDURE — 25000132 ZZH RX MED GY IP 250 OP 250 PS 637: Mod: GY | Performed by: INTERNAL MEDICINE

## 2019-01-24 PROCEDURE — A9270 NON-COVERED ITEM OR SERVICE: HCPCS | Mod: GY | Performed by: INTERNAL MEDICINE

## 2019-01-24 PROCEDURE — 12000001 ZZH R&B MED SURG/OB UMMC

## 2019-01-24 PROCEDURE — 97535 SELF CARE MNGMENT TRAINING: CPT | Mod: GO

## 2019-01-24 PROCEDURE — 92526 ORAL FUNCTION THERAPY: CPT | Mod: GN

## 2019-01-24 PROCEDURE — 36415 COLL VENOUS BLD VENIPUNCTURE: CPT | Performed by: INTERNAL MEDICINE

## 2019-01-24 PROCEDURE — 25000132 ZZH RX MED GY IP 250 OP 250 PS 637: Mod: GY | Performed by: NURSE PRACTITIONER

## 2019-01-24 PROCEDURE — 99233 SBSQ HOSP IP/OBS HIGH 50: CPT | Performed by: INTERNAL MEDICINE

## 2019-01-24 PROCEDURE — 25000132 ZZH RX MED GY IP 250 OP 250 PS 637: Mod: GY | Performed by: STUDENT IN AN ORGANIZED HEALTH CARE EDUCATION/TRAINING PROGRAM

## 2019-01-24 PROCEDURE — 40000133 ZZH STATISTIC OT WARD VISIT

## 2019-01-24 PROCEDURE — 25000131 ZZH RX MED GY IP 250 OP 636 PS 637: Mod: GY | Performed by: INTERNAL MEDICINE

## 2019-01-24 RX ADMIN — POTASSIUM CHLORIDE 20 MEQ: 1.5 POWDER, FOR SOLUTION ORAL at 21:02

## 2019-01-24 RX ADMIN — BUPROPION HYDROCHLORIDE 150 MG: 150 TABLET, FILM COATED, EXTENDED RELEASE ORAL at 08:37

## 2019-01-24 RX ADMIN — MICONAZOLE NITRATE: 2 POWDER TOPICAL at 21:10

## 2019-01-24 RX ADMIN — MULTIPLE VITAMINS W/ MINERALS TAB 1 TABLET: TAB at 08:37

## 2019-01-24 RX ADMIN — MICONAZOLE NITRATE: 2 POWDER TOPICAL at 09:19

## 2019-01-24 RX ADMIN — Medication 500000 UNITS: at 21:01

## 2019-01-24 RX ADMIN — SENNOSIDES AND DOCUSATE SODIUM 1 TABLET: 8.6; 5 TABLET ORAL at 21:02

## 2019-01-24 RX ADMIN — LISINOPRIL 30 MG: 20 TABLET ORAL at 08:40

## 2019-01-24 RX ADMIN — FUROSEMIDE 40 MG: 40 TABLET ORAL at 08:37

## 2019-01-24 RX ADMIN — RANITIDINE 150 MG: 150 TABLET ORAL at 08:38

## 2019-01-24 RX ADMIN — Medication 500000 UNITS: at 12:28

## 2019-01-24 RX ADMIN — ONDANSETRON 4 MG: 4 TABLET, ORALLY DISINTEGRATING ORAL at 14:26

## 2019-01-24 RX ADMIN — ACETAMINOPHEN 650 MG: 325 TABLET, FILM COATED ORAL at 17:20

## 2019-01-24 RX ADMIN — SENNOSIDES AND DOCUSATE SODIUM 2 TABLET: 8.6; 5 TABLET ORAL at 08:37

## 2019-01-24 RX ADMIN — Medication 500000 UNITS: at 08:37

## 2019-01-24 RX ADMIN — Medication 500000 UNITS: at 17:12

## 2019-01-24 RX ADMIN — RANITIDINE 150 MG: 150 TABLET ORAL at 21:02

## 2019-01-24 RX ADMIN — POTASSIUM CHLORIDE 20 MEQ: 1.5 POWDER, FOR SOLUTION ORAL at 09:19

## 2019-01-24 RX ADMIN — ACETAMINOPHEN 650 MG: 325 TABLET, FILM COATED ORAL at 00:37

## 2019-01-24 RX ADMIN — MINERAL SUPPLEMENT IRON 300 MG / 5 ML STRENGTH LIQUID 100 PER BOX UNFLAVORED 500 MG: at 08:38

## 2019-01-24 RX ADMIN — HYDROCHLOROTHIAZIDE 25 MG: 25 TABLET ORAL at 08:37

## 2019-01-24 RX ADMIN — ATORVASTATIN CALCIUM 20 MG: 20 TABLET, FILM COATED ORAL at 21:02

## 2019-01-24 RX ADMIN — VENLAFAXINE HYDROCHLORIDE 225 MG: 150 CAPSULE, EXTENDED RELEASE ORAL at 08:37

## 2019-01-24 ASSESSMENT — ACTIVITIES OF DAILY LIVING (ADL)
ADLS_ACUITY_SCORE: 27

## 2019-01-24 NOTE — PLAN OF CARE
Discharge Planner OT   Patient plan for discharge: not discussed  Current status: Performed bed mobility supine to seated EOB with min A.  Intermittent prompts for posture sitting EOB. Engaged in self cares for simple g/h tasks with set up and cues for task initiation/completion and providing one step directives. Prolonged time to complete tasks. Performed supine BUE/LE strength and endurance exercises with demo, cues for technique and attention/redirection to task. Ax 2 for repositioning and boost in bed.  Barriers to return to prior living situation: Decreased ADL independence and activity tolerance, cognition  Recommendations for discharge: Per plan established by the OT, the recommendation for dc location is TCU  Rationale for recommendations: pt below baseline in terms of ADL independence, cognition, and mobility

## 2019-01-24 NOTE — PLAN OF CARE
Discharge Planner SLP   Patient plan for discharge: Unknown  Current status: Recommend dysphagia diet 2 with thin liquids, small sips and bites, seated with upright posture during PO intake.  Feeding assistance for set up.  Pt present with a functional oral dysphagia, dysphagia diet 2 is WFL without dentures in place.  Per RN report, dentures do not fit properly at this time.  SLP will discharge at this time, can consult if change in status of dentures or swallowing function.  Barriers to return to prior living situation: Dysphagia, cognition  Recommendations for discharge: Defer to PT/OT  Rationale for recommendations: Functional oropharyngeal function.       Entered by: Lyubov Carr 01/24/2019 9:50 AM    Speech Language Therapy Discharge Summary    Reason for therapy discharge:    All goals and outcomes met, no further needs identified.  No further expectations of functional progress.    Progress towards therapy goal(s). See goals on Care Plan in [facility] electronic health record for goal details.  Goals partially met.  Barriers to achieving goals:   Dentures do not fit adequately in mouth to advance diet at this time .    Therapy recommendation(s):    No further therapy is recommended.

## 2019-01-24 NOTE — PLAN OF CARE
/57 (BP Location: Left arm)   Pulse 78   Temp 97.3  F (36.3  C) (Axillary)   Resp 18   Wt 76.2 kg (167 lb 14.4 oz)   SpO2 99%   BMI 27.94 kg/m       Pt alert, disoriented to place/situation. Confused at times. VSS on RA. Up A1+walker in room and to bathroom. Voiding spontaneously and 2 BM this shift. Pt denied pain or nausea.  Bed alarm activated for safety. Contact Precautions maintained for MRSA infection. DD2 diet with thin liquids and tolerating well, poor appetite but prefers the boost supplements and shakes. PIV saline locked. Using pocket talker to communicate with patient due to [place] status. Continue with POC, possible discharge to TCU facility.       **Addendum 1445, pt refused to get off of toilet stating nausea, PO zofran brought to pt but pt refused. Pt had to be lifted off of toilet using easy stand as she was trying to slide onto the floor. Pt returned to bed safely, given PO zofran MD barbara aware of encounter, lumbar puncture ordered for tomorrow 1/25 at 0830, son chula signed consent and is aware of current POC.

## 2019-01-24 NOTE — PROGRESS NOTES
"Social Work Services Progress Note     Hospital Day: 20  Date of Initial Social Work Evaluation:  1/7/19  Collaborated with:  Facilities, spouse     Data:  Gabino is a 78 year old female awaiting tcu placement. Patient now has 15 TCU referrals. If continues to be denied, need to readdress the plan     Intervention:    [facility] (ph [phone]), F: [phone] - re-sent referral 1/17 1/18: spoke with Lanny in admissions, she is reviewing but bed not available until next Thursday 1/21 LVM x2 today for admissions to discuss plan for this week  1/22: LVM again 9:45 am, spoke with admissions at 11 am, requested new notes again. Faxed at 11 am  1/23: LVM 10 am, LVM 1:15 PM, asked  to find her and call SW back today regarding if they can take this patient  1/24: no openings     [facility], [place]: Main: [phone], admission: [phone], Fax: [phone], faxed referral, LVM for admissions, spoke with them yesterday, no beds until Thursday but reviewing  1/22: LVM for follow up, they called back saying \"no bed\" again. SW called back yet again and requested clarification for clinically accepted or denied for when bed is available      [facility] [place] ([place] [phone])- previously accepted pending bed availability when pt is ready for discharge- resent referral 1/17 1/18: LVM for admissions, weekend admissions line is [phone], will have weekend SW also follow up -- updated at 3:30 PM facility has no openings until end of next week at [facility] (ph [phone])- declined, \"no available or appropriate bed\"     [facility] (ph [phone])- resent referral 1/17 1/18: LVM for admissions     [facility] [place] T: [phone], F: [phone], Admissions [place]: T [phone]     [facility] [place] LVM, no appropriate bed     [facility] LVM at 10:30 no appropriate bed     [facility]: No beds until next " [facility]     [facility]: LVM for Lashanda in admissions at 10:45, no appropriate bed      [facility]: no beds     [facility] [place]: no appropriate bed     [facility] [place]: no appropriate bed     [facility]: assessing     Assessment: sw met with spouse and discussed plan. He also states he would like medical transport arranged upon discharge. sw made him very aware there would be a large bill for this transport. Met with spouse and daughter and discussed barriers to placement and need for additional referrals. They also stated they could take her home and admit her to the facility from home when they have an opening.     Plan:    Anticipated Disposition:  Facility:  TCU    Barriers to d/c plan: placement in their area    Follow Up:  sw following     DALE Alexandre  5B  (Medical/Surgical)  Phone: [phone]  Pager: [phone]

## 2019-01-24 NOTE — PROGRESS NOTES
[facility], [place]  Internal Medicine Progress Note - Gold Service    Assessment & Plan   Gabino Jones is a 78 year old year old female admitted on 1/5/2019 for syncope, epistaxis, elevated lactate, and hypothermia with concern for sepsis.    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 This led to acute worsening of chronic cognitive impairment in setting of chronic brain injury/dementia. MRI from September showed extensive white matter changes presumably caused by treatment related vasculopathic disease; stable on MRI from this admission. No obvious metabolic derangements. TSH normal. Labs/vitals not consistent with adrenal insufficiency, no evidence of seizure activity on EEG, no evidence of ingestion/tox. 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 Spoke with primary oncologist, Dr. Obregon, who will discuss at tumor conference. Given her current medical status, not likely candidate for aggressive treatment. Given small size, this is not likely causing her encephalopathy  Discussed with oncologist Dr Obregon who is requesting a LP to look for leptomeningeal involvement   Consulted neuro radiology for the procedure to be done tomorrow        # Aortic insufficiency with hypervolemia  Secondary to volume resuscitation. diuresed on 1/7. Euvolemic at this time. Can re-dose lasix if dyspneic. echo on 1/5: Calcified aortic valve with moderate-to-severe aortic stenosis (peak velocity 4.2 m/s, mean gradient 41 mmHg, DVI 0.31,  PATRICIA 1.1 cm2, SVI 46 mL/m2) and moderate aortic insufficiency. Aortic insufficiency is likely contributing somewhat to increased aortic valve gradient.     # Possible septic shock, likely viral, cleared:   Presented with hypothermia (94.2) , hypotension (BP 79/41), leukocytosis, elevated lactate (4.6 at OSH). Mild pyuria. CXR clear. Blood and urine cx NGTD. C diff negative. Initial CRP and procal low. Stopped antibiotics with ID recommendations.      # Acute blood loss anemia  # Iron deficiency anemia  # Melena  Likely due to epistaxis. Hgb on admission 8.3 from 10.3 after multiple nose bleeds. Initially thought that epistaxis could explain bleed but then hgb continued to drop to 6.9 despite no further episodes of epistaxis. Underwent EGD 1/6 which was negative for any cause of bleeding. Hgb has been stable above 7.  Iron 21. Ferritin 17. Iron sat 5%.  - switched back to home daily protonix  - trend hgb PRN bleeding concerns  - tolerating oral iron every other day.     # Epistaxis  History of nasosinus melanoma (in remission). Seen by ENT and no evidence of recurrence of malignancy. with the below recs  - Dissolvable packing in place. This will dissolve on its own. No need for removal.   - No antibiotics for the nasal packing (surgicel)  - There may be some slow dark red oozing after the packing was placed - this is normal and likely from the Afrin and the Surgiflo  - Nasal saline spray to bilateral nasal cavities q2h daily starting in 48 hours (keeps the packing moist and will help with removal)  - If supplemental O2 is required, add humidifier (RT should be contacted to supply this)  - Humidifier to the room to increase the moisture in the air  - If patient experiences brisk bleeding, spray Afrin along the floor of the nose and hold digital pressure on bilateral nostrils for 20 minutes (holding pressure higher up on the nasal bones does not apply effective pressure to the septum. Instead, the pressure should be  applied on the soft part of the nose do that the opening of the nostrils are completely pinched shut).  Nasal clips do not work and should not be used in place of digital pressure. If bleeding continues after 20 minutes of digital pressure, repeat the above steps. If this fails to control the bleeding at that time, call ENT for further recommendations  - Long term, will give nasal saline spray 4x/day rather than Q2h     # DAVID due to sepsis:  Likely due to hypotension/sepsis. Resolved     # Hypertension:  - Continue lisinopril, hydrochlorothiazide, furosemide     # HLD: continue atorvastatin  # Depression: Continue wellbutrin and effexor. Consider stopping wellbutrin in future as it can lower seizure threshold (though mental status stable right now, so will continue)       Diet: Orders Placed This Encounter      Dysphagia Diet Level 2 [facility] Altered Thin Liquids (water, ice chips, juice, milk gelatin, ice cream, etc)      Advance Diet as Tolerated  Fluids: oral liquids  Peter Catheter: Peter Catheter: not present    DVT Prophylaxis: None given epistaxis  Expected discharge: 1 - 2 days, recommended to transitional care unit    The patient's care was discussed with the bedside nurse, patient and care-coordinator     LORI Boykin  Internal Medicine Hospitalist service  Pager- [phone]      Interval History   No acute events overnight. Nursing notes reviewed   Patient denies any new symptoms this morning    Remainder 4 point ROS negative.    Data reviewed today: I reviewed all medications, new labs and imaging results over the last 24 hours.    Physical Exam   Vital signs last 24 hours:  Temp:  [96.2  F (35.7  C)-97.3  F (36.3  C)] 96.2  F (35.7  C)  Heart Rate:  [73-78] 78  Resp:  [18] 18  BP: (128-142)/(46-57) 132/46  SpO2:  [98 %-99 %] 98 %  Weight (last 5):   Vitals:    01/13/19 1132 01/14/19 0900 01/15/19 1413 01/21/19 0855   Weight: 77.7 kg (171 lb 3.2 oz) 77 kg (169 lb 12.8 oz) 75.9 kg (167 lb 4.8 oz)  76 kg (167 lb 9.6 oz)    01/24/19 1049   Weight: 76.2 kg (167 lb 14.4 oz)     HEENT: Moist mucus membranes, no scleral icterus  Resp: Breathing comfortably on room air, good air entry bilaterally, no wheezing/rhonchi  CV: Regular rate and rhythm, normal S1S2, 2/6 systolic murmur, no JVD, no peripheral edema  Skin: No rashes, jaundice, or discoloration   Neuro: 5/5 strength in bilateral arms/legs, oriented to time but not oriented to place

## 2019-01-24 NOTE — PLAN OF CARE
Pt alert, disoriented to place and situation. Confused at times. VSS on RA. Up with 1 assist and walker in room and to bathroom. Voiding spontaneously and 1 BM overnight. Pt having some abdominal pain, PRN tylenol given x 1. Bed alarm activated for safety. Contact Precautions maintained for MRSA infection. DD2 diet with thin liquids and tolerating well, poor appetite but prefers the boost supplements and shakes. PIV saline locked. Using pocket talker to communicate with patient due to [place] status. Continue with POC, possible discharge to TCU facility.

## 2019-01-25 ENCOUNTER — APPOINTMENT (OUTPATIENT)
Dept: GENERAL RADIOLOGY | Facility: CLINIC | Age: 79
DRG: 871 | End: 2019-01-25
Attending: INTERNAL MEDICINE
Payer: MEDICARE

## 2019-01-25 VITALS
WEIGHT: 167.9 LBS | RESPIRATION RATE: 20 BRPM | TEMPERATURE: 96.9 F | SYSTOLIC BLOOD PRESSURE: 139 MMHG | HEART RATE: 78 BPM | DIASTOLIC BLOOD PRESSURE: 59 MMHG | OXYGEN SATURATION: 98 % | BODY MASS INDEX: 27.94 KG/M2

## 2019-01-25 LAB
APPEARANCE CSF: CLEAR
COLOR CSF: COLORLESS
EV RNA SPEC QL NAA+PROBE: NEGATIVE
GLUCOSE CSF-MCNC: 62 MG/DL (ref 40–70)
GRAM STN SPEC: NORMAL
GRAM STN SPEC: NORMAL
HSV1 DNA CSF QL NAA+PROBE: NOT DETECTED
HSV2 DNA CSF QL NAA+PROBE: NOT DETECTED
MICROBIOLOGIST REVIEW: NORMAL
PROT CSF-MCNC: 31 MG/DL (ref 15–60)
RBC # CSF MANUAL: 2 /UL (ref 0–2)
SPECIMEN SOURCE: NORMAL
SPECIMEN SOURCE: NORMAL
TUBE # CSF: NORMAL #
WBC # CSF MANUAL: 0 /UL (ref 0–5)

## 2019-01-25 PROCEDURE — 25000132 ZZH RX MED GY IP 250 OP 250 PS 637: Mod: GY | Performed by: INTERNAL MEDICINE

## 2019-01-25 PROCEDURE — 84157 ASSAY OF PROTEIN OTHER: CPT | Performed by: INTERNAL MEDICINE

## 2019-01-25 PROCEDURE — A9270 NON-COVERED ITEM OR SERVICE: HCPCS | Mod: GY | Performed by: INTERNAL MEDICINE

## 2019-01-25 PROCEDURE — 009U3ZX DRAINAGE OF SPINAL CANAL, PERCUTANEOUS APPROACH, DIAGNOSTIC: ICD-10-PCS | Performed by: RADIOLOGY

## 2019-01-25 PROCEDURE — 25000132 ZZH RX MED GY IP 250 OP 250 PS 637: Mod: GY | Performed by: NURSE PRACTITIONER

## 2019-01-25 PROCEDURE — 87498 ENTEROVIRUS PROBE&REVRS TRNS: CPT | Performed by: INTERNAL MEDICINE

## 2019-01-25 PROCEDURE — 25000125 ZZHC RX 250: Performed by: INTERNAL MEDICINE

## 2019-01-25 PROCEDURE — 25000128 H RX IP 250 OP 636: Performed by: HOSPITALIST

## 2019-01-25 PROCEDURE — 88108 CYTOPATH CONCENTRATE TECH: CPT | Performed by: INTERNAL MEDICINE

## 2019-01-25 PROCEDURE — 00000102 ZZHCL STATISTIC CYTO WRIGHT STAIN TC: Performed by: INTERNAL MEDICINE

## 2019-01-25 PROCEDURE — A9270 NON-COVERED ITEM OR SERVICE: HCPCS | Mod: GY | Performed by: NURSE PRACTITIONER

## 2019-01-25 PROCEDURE — 87075 CULTR BACTERIA EXCEPT BLOOD: CPT | Performed by: INTERNAL MEDICINE

## 2019-01-25 PROCEDURE — 82945 GLUCOSE OTHER FLUID: CPT | Performed by: INTERNAL MEDICINE

## 2019-01-25 PROCEDURE — 87999 UNLISTED MICROBIOLOGY PX: CPT | Performed by: INTERNAL MEDICINE

## 2019-01-25 PROCEDURE — 90662 IIV NO PRSV INCREASED AG IM: CPT | Performed by: PHYSICIAN ASSISTANT

## 2019-01-25 PROCEDURE — 99239 HOSP IP/OBS DSCHRG MGMT >30: CPT | Performed by: INTERNAL MEDICINE

## 2019-01-25 PROCEDURE — A9270 NON-COVERED ITEM OR SERVICE: HCPCS | Mod: GY | Performed by: STUDENT IN AN ORGANIZED HEALTH CARE EDUCATION/TRAINING PROGRAM

## 2019-01-25 PROCEDURE — 87102 FUNGUS ISOLATION CULTURE: CPT | Performed by: INTERNAL MEDICINE

## 2019-01-25 PROCEDURE — 62270 DX LMBR SPI PNXR: CPT

## 2019-01-25 PROCEDURE — 89050 BODY FLUID CELL COUNT: CPT | Performed by: INTERNAL MEDICINE

## 2019-01-25 PROCEDURE — 25000128 H RX IP 250 OP 636: Performed by: PHYSICIAN ASSISTANT

## 2019-01-25 PROCEDURE — 25000132 ZZH RX MED GY IP 250 OP 250 PS 637: Mod: GY | Performed by: STUDENT IN AN ORGANIZED HEALTH CARE EDUCATION/TRAINING PROGRAM

## 2019-01-25 PROCEDURE — 87529 HSV DNA AMP PROBE: CPT | Performed by: INTERNAL MEDICINE

## 2019-01-25 PROCEDURE — 87205 SMEAR GRAM STAIN: CPT | Performed by: INTERNAL MEDICINE

## 2019-01-25 PROCEDURE — 87070 CULTURE OTHR SPECIMN AEROBIC: CPT | Performed by: INTERNAL MEDICINE

## 2019-01-25 RX ORDER — LIDOCAINE HYDROCHLORIDE 10 MG/ML
30 INJECTION, SOLUTION EPIDURAL; INFILTRATION; INTRACAUDAL; PERINEURAL ONCE
Status: COMPLETED | OUTPATIENT
Start: 2019-01-25 | End: 2019-01-25

## 2019-01-25 RX ORDER — NICOTINE POLACRILEX 4 MG
15-30 LOZENGE BUCCAL
Status: DISCONTINUED | OUTPATIENT
Start: 2019-01-25 | End: 2019-01-25

## 2019-01-25 RX ORDER — DEXTROSE MONOHYDRATE 25 G/50ML
25-50 INJECTION, SOLUTION INTRAVENOUS
Status: DISCONTINUED | OUTPATIENT
Start: 2019-01-25 | End: 2019-01-25

## 2019-01-25 RX ORDER — DEXTROSE MONOHYDRATE 25 G/50ML
25-50 INJECTION, SOLUTION INTRAVENOUS
Status: CANCELLED | OUTPATIENT
Start: 2019-01-25

## 2019-01-25 RX ORDER — NICOTINE POLACRILEX 4 MG
15-30 LOZENGE BUCCAL
Status: CANCELLED | OUTPATIENT
Start: 2019-01-25

## 2019-01-25 RX ADMIN — VENLAFAXINE HYDROCHLORIDE 225 MG: 150 CAPSULE, EXTENDED RELEASE ORAL at 11:58

## 2019-01-25 RX ADMIN — POTASSIUM CHLORIDE 20 MEQ: 1.5 POWDER, FOR SOLUTION ORAL at 11:59

## 2019-01-25 RX ADMIN — SODIUM CHLORIDE 500 ML: 9 INJECTION, SOLUTION INTRAVENOUS at 00:25

## 2019-01-25 RX ADMIN — MULTIPLE VITAMINS W/ MINERALS TAB 1 TABLET: TAB at 11:59

## 2019-01-25 RX ADMIN — FUROSEMIDE 40 MG: 40 TABLET ORAL at 11:59

## 2019-01-25 RX ADMIN — LIDOCAINE HYDROCHLORIDE 8 ML: 10 INJECTION, SOLUTION EPIDURAL; INFILTRATION; INTRACAUDAL; PERINEURAL at 08:58

## 2019-01-25 RX ADMIN — Medication 500000 UNITS: at 11:59

## 2019-01-25 RX ADMIN — LISINOPRIL 30 MG: 20 TABLET ORAL at 11:59

## 2019-01-25 RX ADMIN — RANITIDINE 150 MG: 150 TABLET ORAL at 11:59

## 2019-01-25 RX ADMIN — INFLUENZA A VIRUS A/MICHIGAN/45/2015 X-275 (H1N1) ANTIGEN (FORMALDEHYDE INACTIVATED), INFLUENZA A VIRUS A/SINGAPORE/INFIMH-16-0019/2016 IVR-186 (H3N2) ANTIGEN (FORMALDEHYDE INACTIVATED), AND INFLUENZA B VIRUS B/MARYLAND/15/2016 BX-69A (A B/COLORADO/6/2017-LIKE VIRUS) ANTIGEN (FORMALDEHYDE INACTIVATED) 0.5 ML: 60; 60; 60 INJECTION, SUSPENSION INTRAMUSCULAR at 12:10

## 2019-01-25 RX ADMIN — BUPROPION HYDROCHLORIDE 150 MG: 150 TABLET, FILM COATED, EXTENDED RELEASE ORAL at 11:59

## 2019-01-25 RX ADMIN — HYDROCHLOROTHIAZIDE 25 MG: 25 TABLET ORAL at 11:59

## 2019-01-25 ASSESSMENT — ACTIVITIES OF DAILY LIVING (ADL)
ADLS_ACUITY_SCORE: 27

## 2019-01-25 NOTE — BRIEF OP NOTE
[facility] [place]    Brief Operative Note    Pre-operative diagnosis: Claustrophobia  Post-operative diagnosis Acute encephalopathy, delirium  Procedure: Procedure(s):  Anesthesia Coverage Brain MRI With Contrast @1330  Surgeon: Dr. Graves, Dr. Gonzalez  Anesthesia: General   Estimated blood loss: None  Drains: None  Specimens: 10 ml's CSF, including anaerobic sample  Findings:   See imaging report.  Complications: None.  Implants: None.

## 2019-01-25 NOTE — PLAN OF CARE
Physical Therapy Discharge Summary    Reason for therapy discharge:    Discharged to transitional care facility.    Progress towards therapy goal(s). See goals on Care Plan in [facility] electronic health record for goal details.  Goals not met.  Barriers to achieving goals:   discharge from facility.    Therapy recommendation(s):    Continued therapy is recommended.  Rationale/Recommendations:  to progress her IND and safety with functional mobility.

## 2019-01-25 NOTE — PLAN OF CARE
Time: 8625-4112  Reason for admission/Dx:   Hypotension [I95.9]     [Vitals]: /45 (BP Location: Left arm)   Pulse 78   Temp 97.5  F (36.4  C) (Axillary)   Resp 16   Wt 76.2 kg (167 lb 14.4 oz)   SpO2 98%   BMI 27.94 kg/m    [Labs/Lactic]: Pending spinal tap tomorrow    [BG]:   Glucose Values Bedside Glucose (mg/dl )  GLUCOSE   Latest Ref Rng & Units - 70 - 99 mg/dL   1/24/2019 -- 91   1/23/2019 -- 99   Some recent data might be hidden        [Electrolytes]:   Potassium (mmol/L)   Date Value   01/24/2019 5.0     Magnesium (mg/dL)   Date Value   01/20/2019 2.3     Phosphorus (mg/dL)   Date Value   01/20/2019 3.9          [Cardiac]: WDL  [Pain/PRN/s]: PRN PO tylenol given for R shoulder pain along with ice pack    [Respiratory]: WDL  [Lines]:  PIV SL   [Infusions]: N/A    [Neuros]: Ex, pt confused at baseline. Only oriented to person.  Pt cooperative.  Using pocket talker to communicate as pt very [place].  Bed alarm on for safety.         [GI]:Orders Placed This Encounter      Dysphagia Diet Level 2 Mech Altered Thin Liquids (water, ice chips, juice, milk gelatin, ice cream, etc)      Advance Diet as Tolerated    -No stools noted this evening.  -Pt has poor appetite, but was able to drink 2 boost shakes this evening.    [Activity]: Pt requires assist 1 w  Walker.    []: Ex, pt did not void this evening.  Bladder scanned for less than 300mL. Team notified of possible dehydration.     [Skin/Wounds]: Blanching redness on coccyx noted.  Antifungal powder applied to groin             Shift changes: No changes this evening aside from possible dehydration.   Plan: Plan for spinal tap in AM, and discharge to TCU once placement found

## 2019-01-25 NOTE — PLAN OF CARE
Discharge orders received from MD. RN to RN report given to TCU.  updated with discharge plan. PIV removed. Flu vaccine given. Transported via [place].

## 2019-01-25 NOTE — PLAN OF CARE
Occupational Therapy Discharge Summary    Reason for therapy discharge:    Discharged to transitional care facility.    Progress towards therapy goal(s). [facility] for goal details.  Goals partially met.  Barriers to achieving goals:   discharge from facility.    Therapy recommendation(s):    Continued therapy is recommended.  Rationale/Recommendations:  To progress ADL/IADL independence and safety.

## 2019-01-25 NOTE — PLAN OF CARE
Pt alert, oriented to self only. Bed alarm on. VSS on RA. Jena, pocket talker working well. 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 Up with assist x 1 + walker. 500 ml bolus given. Large void x 1, unmeasured (missed the hat). No BM. No c/o pain. Right PIV, SL. Plan for spinal tap today. SW following for TCU placement.

## 2019-01-25 NOTE — PROGRESS NOTES
Social Work Services Discharge Note     Patient Name:  Gabino Jones     Anticipated Discharge Date:  1/25/19     Discharge Disposition:   U:  [address]  [place] [address]  T: [phone], F: [phone], Admissions [place]: T [phone]     Following MD:  Louie GANDARA     Pre-Admission Screening (PAS) online form has been completed.  The Level of Care (LOC) is:  Determined  Confirmation Code is: [id number]  Patient/caregiver informed of referral to [facility] Line for Pre-Admission Screening for skilled nursing facility (SNF) placement and to expect a phone call post discharge from SNF.     Additional Services/Equipment Arranged:  [facility] transport today 12 pm     Patient / Family response to discharge plan:  in agreement     Persons notified of above discharge plan:  Patient, spouse MD Henok, RN, facility     Staff Discharge Instructions:  Please fax discharge orders and signed hard scripts for any controlled substances.  Please print a packet and send with patient.      CTS Handoff completed:  YES     Medicare Notice of Rights provided to the patient/family:  YES     DALE Alexandre  5B  (Medical/Surgical)  Phone: [phone]  Pager: [phone]

## 2019-01-25 NOTE — PROGRESS NOTES
[facility], [place]     Neuroradiology Procedure Note     Lumbar Puncture Under Fluoroscopic Guidance: L2-L3     1/25/2019 9:36 AM    Performed by: Clovis Gonzalez MD  Authorized by: MD Clovis Richardson MD    Indications: abnormal neurologic exam    Consent given by: Family who states understanding of the procedure being performed after discussing the risks, benefits and alternatives.    Prior to the start of the procedure and with procedural staff participation, I verbally confirmed the patient s identity using two indicators, relevant allergies, that the procedure was appropriate and matched the consent or emergent situation, and that the correct equipment/implants were available. Immediately prior to starting the procedure I conducted the Time Out with the procedural staff and re-confirmed the patient s name, procedure, and site/side. (The Joint Commission universal protocol was followed.) Yes    Procedure:    Under sterile conditions the patient was positioned lying prone. Using fluoroscopy, needle entrance side was defined.  Betadine solution and sterile drapes were utilized.  Local anesthetic at the site: 5 ml of lidocaine 1% without epinephrine.    A 22 gauge 3.5 inch spinal needle was inserted at the L2-L3 interspace.  Opening Pressurewas not checked.  A total of 10mL of clear and colorless spinal fluid was obtained and sent to the laboratory.   After the needle was removed, a bandaid and pressure were applied and the patient was instructed to stay horizontal until the results were back.    Complications:  None  Patient tolerance: Patient tolerated the procedure well with no immediate complications.    Condition: Stable Patient is transferred to Inpatient unit for post procedure observation.    Clovis Gonzalez 1/25/2019 9:36 AM

## 2019-01-26 LAB — COPATH REPORT: NORMAL

## 2019-01-28 ENCOUNTER — PATIENT OUTREACH (OUTPATIENT)
Dept: CARE COORDINATION | Facility: CLINIC | Age: 79
End: 2019-01-28

## 2019-01-28 LAB — LAB SCANNED RESULT: NORMAL

## 2019-01-28 NOTE — PROGRESS NOTES
Clinic Care Coordination Contact  Care Coordination Transition Communication    Referral Source: IP Handoff    Clinical Data: Patient was hospitalized at [facility] from 1/5/19 to 1/25/19 with diagnosis of Septic shock  Acute blood loss anemia  Epistaxis  History of sinonasal melanoma  Left frontal brain lesion  Acute encephalopathy  Delirium  Dementia  Radiation related vasculopathic changes in the brain  DAVID  Hypertension  Hyperlipidemia  Depression     Aortic insufficiency    Transition to Facility:              Facility Name: [facility]              Contact name and phone number/fax: Kerrie carvajal  [phone]    Plan: RN/SW Care Coordinator will await notification from facility staff informing RN/SW Care Coordinator of patient's discharge plans/needs. RN/SW Care Coordinator will review chart and outreach to facility staff every 4 weeks and as needed.     Sandra De Oliveira RN, CCM - Care Coordinator     1/28/2019    8:54 AM  [phone]

## 2019-01-30 LAB
BACTERIA SPEC CULT: NO GROWTH
SPECIMEN SOURCE: NORMAL

## 2019-02-04 ENCOUNTER — MEDICAL CORRESPONDENCE (OUTPATIENT)
Dept: HEALTH INFORMATION MANAGEMENT | Facility: CLINIC | Age: 79
End: 2019-02-04

## 2019-02-06 ENCOUNTER — TRANSFERRED RECORDS (OUTPATIENT)
Dept: HEALTH INFORMATION MANAGEMENT | Facility: CLINIC | Age: 79
End: 2019-02-06

## 2019-02-06 LAB
ALT SERPL-CCNC: 18 U/L (ref 9–55)
AST SERPL-CCNC: 23 U/L (ref 10–40)
CREAT SERPL-MCNC: 2.06 MG/DL (ref 0.55–1.02)
GFR SERPL CREATININE-BSD FRML MDRD: 23 ML/MIN/1.73M2
GLUCOSE SERPL-MCNC: 144 MG/DL (ref 70–100)
POTASSIUM SERPL-SCNC: 4.3 MMOL/L (ref 3.5–5.2)

## 2019-02-08 LAB
BACTERIA SPEC CULT: NORMAL
Lab: NORMAL
SPECIMEN SOURCE: NORMAL

## 2019-02-11 NOTE — PROGRESS NOTES
SUBJECTIVE:   Gabino Jones is a 78 year old female who presents to clinic today for the following health issues:      History of Present Illness   Frequency of exercise:  None  Medication side effects:  None  Additional concerns today:  No        Hospital Follow-up Visit:    Hospital/Nursing Home/[facility] (01/05/19- 01/25/19) & Rehab Facility: [facility]   Date of Admission:  1/25/2019 ([facility])  Date of Discharge: 2/4/2019 ([facility])  Reason(s) for Admission: nosebleeds , sepsis, altered mental status             Problems taking medications regularly:  None       Medication changes since discharge: Discharge summary indicates pt taking lasix 40mg daily- family does not think she has been taking it at home       Problems adhering to non-medication therapy: none    Summary of hospitalization:  [facility] discharge summary reviewed.  Diagnostic Tests/Treatments reviewed.  Follow up needed: consider neuro consult  Other Healthcare Providers Involved in Patient s Care: n/a           Update since discharge: stable, some fluctuation     Post Discharge Medication Reconciliation: discharge medications reconciled, continue medications without change.  Plan of care communicated with patient and family     Coding guidelines for this visit:  Type of Medical   Decision Making Face-to-Face Visit       within 7 Days of discharge Face-to-Face Visit        within 14 days of discharge   Moderate Complexity [id number]   High Complexity [id number]          She is living with  and their adult daughter moved in with them to care for Gabino. Daughter used to be nursing assistant.     Daughter and  provide history: Pt is non-communicative.   Started with bad nose bleed over the course of 2 days, by the second day more significant flow of blood all day.  More prone to epistaxis after nasal surgery for melanoma.  But after 2 days of nose bleeds, she was weak and collapsed. They called  "911. Taken to [facility] and then transferred and admitted at the [facility] for 3 weeks from 01/05/19- 01/25/19.  Then transitioned to long term care- there for just under 2 weeks-  for strengthening. Family reports \"not much benefit\" from LTCF.   Discharged 02/04/2019 from [facility].     Mental status changes  She has underlying dementia. But has rapidly gone downhill. \"This is big change from 2 mo ago.\"   Her speech is \"jibberish\"- unintelligible. When they can understand, not appropriate for context - ie asking for her sister who has been dead for 10 yrs.   She is not feeding herself. Doesn't want to eat.  At nursing home told to ask for meds from PCP to stimulate her appetite  Trying to get [id number] veena down her. She is refusing  Hard to chew due to dentures- soft diet.  Boost meal supplements.  Likes breakfast sausage biscuit  Drinking a lot of water.   At the [facility]- MRI showing new brain lesion, not thought to be causing sx, no metabolic cause. Psych did not advise mood stabilizers.  Per daughter met with neurology but \"not sure what they could do\".     Mood lability and swings/agitation   Can be \"demure then switch quickly to episodes like temper tantrums\" at times.   Hitting/kicking at daughter. Defiant about cares at times.  Hard to get her here today- resists. They were almost 20min late because she was not cooperative.  Hard to get her up to go to the bathroom before bed  Fights against them  Had rx for prn olanzapine- does not appear it was needed; unclear if used at LTC    Sleep trouble  Not sleeping at night. Will talk for hours, \"jibberish\"- unintelligible.   She is wandering in the house. Gait is not stable, uses walker    Hypertension & DAVID & electrolyte abnormalities  Blood pressures have been running low since hospitalization.   MA staff unable to hear BP on cuff. Here today 102/65 on machine  Prior to hospitalization was on hydrochlorothiazide 12.5mg and lisinopril 30mg.   Current med list from " "Long Term Care- hydrochlorothiazide 25mg and lasix 40mg and lisinopril 30mg on her med list.   Labs from long term care- 02/01/2019 showing worsening creatinine: SrCr 2.06 (H), GFR 23 (L), BUN 70 (H),  (L), K 4.3  \"came home to use dehydrated\"- is drinking a lot of water 3-4 \"jugs of water\"- 20 -30 oz jug. Some power aid  Daughter reports they told them to get salt into her, sports drinks.   Daughter thought \"she came home to us dehydrated\". She wants water- drinking 60-80 oz they estimate per day.    Epistaxis- \"tiny spots of bleeding\".  states she picks at nose and then it bleeds.   Labs from Long Term Care 02/01/19: Hgb 9.5 g/dl    Problem list and histories reviewed & adjusted, as indicated.  Additional history: as documented      Patient Active Problem List   Diagnosis     Malignant melanoma of nasal cavity (H)     Nasal pain     Metastatic malignant melanoma (H)     Proximal humerus fracture     Abnormal chest CT     Aortic stenosis     Hypertension goal BP (blood pressure) < 140/90     Skull lesion     JITENDRA (generalized anxiety disorder)     Mixed hyperlipidemia     Mild recurrent major depression (H)     Tubular adenoma of colon     Past Surgical History:   Procedure Laterality Date     ANESTHESIA OUT OF OR MRI N/A 1/16/2019    Procedure: Anesthesia Coverage Brain MRI With Contrast @1330;  Surgeon: GENERIC ANESTHESIA PROVIDER;  Location: [facility] OR     C ANESTH,CERV SPINE, SITTING POSITION       COLONOSCOPY N/A 7/7/2017    Procedure: COMBINED COLONOSCOPY, SINGLE OR MULTIPLE BIOPSY/POLYPECTOMY BY BIOPSY;;  Surgeon: Sathya Norman MD;  Location:  OR     COLONOSCOPY WITH CO2 INSUFFLATION N/A 7/7/2017    Procedure: COLONOSCOPY WITH CO2 INSUFFLATION;  Combined,  [place], Gastroesophageal reflux disease without esophagitis  Flatulence, eructation, and gas pain, BMI 29.35, [facility] [phone];  Surgeon: Sathya Norman MD;  Location: [facility] OR     COLONOSCOPY, SUBMUCOSA RESECTION N/A " 10/4/2017    Procedure: COLONOSCOPY, SUBMUCOSA RESECTION;  Colonoscopy With Endoscopic Polypectomy with clips;  Surgeon: Milton Javier MD;  Location: [facility] OR     COMBINED ESOPHAGOSCOPY, GASTROSCOPY, DUODENOSCOPY (EGD) WITH CO2 INSUFFLATION N/A 2017    Procedure: COMBINED ESOPHAGOSCOPY, GASTROSCOPY, DUODENOSCOPY (EGD) WITH CO2 INSUFFLATION;  Combined,  [place], Gastroesophageal reflux disease without esophagitis  Flatulence, eructation, and gas pain, BMI 29.35, [facility] [phone];  Surgeon: Sathya Norman MD;  Location: [facility] OR     ESOPHAGOSCOPY, GASTROSCOPY, DUODENOSCOPY (EGD), COMBINED N/A 2017    Procedure: COMBINED ESOPHAGOSCOPY, GASTROSCOPY, DUODENOSCOPY (EGD), BIOPSY SINGLE OR MULTIPLE;;  Surgeon: Sathya Norman MD;  Location: [facility] OR     ESOPHAGOSCOPY, GASTROSCOPY, DUODENOSCOPY (EGD), COMBINED N/A 2019    Procedure: COMBINED ESOPHAGOSCOPY, GASTROSCOPY, DUODENOSCOPY (EGD);  Surgeon: Cailin Paulino MD;  Location:  GI     GALLBLADDER SURGERY       NECK SURGERY       OPTICAL TRACKING SYSTEM CRANIOTOMY, EXCISE TUMOR, COMBINED Right 2017    Procedure: COMBINED OPTICAL TRACKING SYSTEM CRANIOTOMY, EXCISE TUMOR;  Surgeon: Lenora Pérez MD;  Location:  OR     OPTICAL TRACKING SYSTEM ENDOSCOPIC ENDONASAL SURGERY  2014    Procedure: OPTICAL TRACKING SYSTEM ENDOSCOPIC ENDONASAL SURGERY;  Surgeon: Yolanda Whitaker MD;  Location:  OR     STOMACH SURGERY       TONSILLECTOMY       TUBAL LIGATION      1975       Social History     Tobacco Use     Smoking status: Former Smoker     Packs/day: 1.00     Years: 40.00     Pack years: 40.00     Types: Cigarettes     Last attempt to quit: 6/10/2004     Years since quittin.7     Smokeless tobacco: Never Used   Substance Use Topics     Alcohol use: No     Family History   Problem Relation Age of Onset     Prostate Cancer Father      Cancer Sister         SCLC     Cancer Sister       Macular Degeneration Daughter      Glaucoma No family hx of          Current Outpatient Medications   Medication Sig Dispense Refill     acetaminophen (TYLENOL) 500 MG tablet Take 1 tablet (500 mg) by mouth every 6 hours as needed 100 tablet 3     atorvastatin (LIPITOR) 20 MG tablet Take 1 tablet (20 mg) by mouth daily 90 tablet 3     benzonatate (TESSALON) 100 MG capsule Take 1 capsule (100 mg) by mouth 3 times daily as needed for cough (procaine listed as allergy, but patient has gotten lidocaine numerous times this admission without complications) 30 capsule 1     buPROPion (WELLBUTRIN XL) 150 MG 24 hr tablet TAKE 1 TABLET(150 MG) BY MOUTH EVERY MORNING 90 tablet 1     hydrochlorothiazide (HYDRODIURIL) 12.5 MG tablet Take 1 tablet (12.5 mg) by mouth daily 90 tablet 0     lisinopril (PRINIVIL/ZESTRIL) 30 MG tablet Take 1 tablet (30 mg) by mouth daily 90 tablet 0     OLANZapine (ZYPREXA) 2.5 MG tablet Take 1 tablet (2.5 mg) by mouth At Bedtime 10 tablet 0     pantoprazole (PROTONIX) 40 MG EC tablet Take 1 tablet (40 mg) by mouth daily Take 30-60 minutes before a meal. 90 tablet 1     ranitidine (ZANTAC) 150 MG tablet Take 150 mg by mouth       venlafaxine (EFFEXOR-XR) 75 MG 24 hr capsule TAKE 3 CAPSULES BY MOUTH DAILY 270 capsule 2     Allergies   Allergen Reactions     Novocain [Procaine] Unknown and Rash     Mirtazapine      Nefazodone Unknown and Rash     BP Readings from Last 3 Encounters:   01/25/19 139/59   12/12/18 165/80   09/12/18 148/70    Wt Readings from Last 3 Encounters:   02/25/19 69.5 kg (153 lb 4.8 oz)   01/24/19 76.2 kg (167 lb 14.4 oz)   12/12/18 80.7 kg (178 lb)                  Labs reviewed in EPIC    ROS:  CONSTITUTIONAL: NEGATIVE for fever, chills, change in weight  INTEGUMENTARY/SKIN: NEGATIVE for worrisome rashes, moles or lesions  EYES: NEGATIVE for vision changes or irritation  ENT/MOUTH: NEGATIVE for ear, mouth and throat problems  RESP: NEGATIVE for SOB; cough on and off- not new, was  "smoker. Tessalon was started in the hospital and has been helpful, they would like to continue that   CV: NEGATIVE for chest pain, palpitations or peripheral edema  GI: NEGATIVE for nausea, abdominal pain, heartburn, or change in bowel habits  : NEGATIVE for frequency, dysuria, or hematuria  MUSCULOSKELETAL: + shoulder pain, not new \"everything hurts me\".  NEURO: as above  ENDOCRINE: NEGATIVE for temperature intolerance, skin/hair changes  HEME: NEGATIVE for bleeding problems  PSYCHIATRIC: as above    OBJECTIVE:     /65   Pulse 76   Temp 97.6  F (36.4  C) (Temporal)   Resp 16   Ht 1.651 m (5' 5\")   Wt 69.5 kg (153 lb 4.8 oz)   LMP  (LMP Unknown)   SpO2 100%   Breastfeeding? No   BMI 25.51 kg/m    Body mass index is 25.51 kg/m .  GENERAL: frail and elderly, pale, sitting in a wheel chair, looking away from others in the room; hard of hearing.   EYES: Eyes grossly normal to inspection, PERRL and conjunctivae and sclerae normal  HENT: ear canals and TM's normal, nose and mouth without ulcers or lesions  NECK: no adenopathy, no asymmetry, masses, or scars and thyroid normal to palpation  RESP: lungs clear to auscultation - no rales, rhonchi or wheezes  CV: regular rate and rhythm, prominent systolic murmur, no peripheral edema   ABDOMEN: soft, nontender, and bowel sounds normal  MS: no gross musculoskeletal defects noted, no edema  NEURO: pt sits in her wheelchair; she speaks periodically during the visit but her words are not decipherable the majority of the time. At one point she looks at her daughter and asks where her  is (he is sitting in front of her). She is oriented to self.   PSYCH: confused and affect flat, sits looking away with chin resting on hand most of visit.     Diagnostic Test Results:  Results for orders placed or performed in visit on 02/25/19   Basic metabolic panel  (Ca, Cl, CO2, Creat, Gluc, K, Na, BUN)   Result Value Ref Range    Sodium 131 (L) 133 - 144 mmol/L    " "Potassium 4.2 3.4 - 5.3 mmol/L    Chloride 97 94 - 109 mmol/L    Carbon Dioxide 29 20 - 32 mmol/L    Anion Gap 5 3 - 14 mmol/L    Glucose 118 (H) 70 - 99 mg/dL    Urea Nitrogen 64 (H) 7 - 30 mg/dL    Creatinine 1.34 (H) 0.52 - 1.04 mg/dL    GFR Estimate 38 (L) >60 mL/min/[1.73_m2]    GFR Estimate If Black 44 (L) >60 mL/min/[1.73_m2]    Calcium 8.9 8.5 - 10.1 mg/dL   CBC with platelets   Result Value Ref Range    WBC 7.5 4.0 - 11.0 10e9/L    RBC Count 3.50 (L) 3.8 - 5.2 10e12/L    Hemoglobin 9.3 (L) 11.7 - 15.7 g/dL    Hematocrit 30.1 (L) 35.0 - 47.0 %    MCV 86 78 - 100 fl    MCH 26.6 26.5 - 33.0 pg    MCHC 30.9 (L) 31.5 - 36.5 g/dL    RDW 17.0 (H) 10.0 - 15.0 %    Platelet Count 341 150 - 450 10e9/L       ASSESSMENT/PLAN:     1. Hospital discharge follow-up  Reviewed [facility] discharge summary, labs, imaging reports, consult notes from ID, neurology and psych.   - CBC with platelets    2. Altered mental status, unspecified altered mental status type  Dementia with delirium- and marked change in mental status since hospitalization.   Waxing and waning course inpatient, but since discharge is not able to communicate meaningfully, has agitated behaviors, disrupted sleep, and decreased interest in food.  Discussed neurology consult with the family- daughter and . At this time they would like to put off consult \"I don't know what they are going to do. We met with them in the hospital\". Referral placed but family declined to schedule.   Will discuss with PCP at f/u visit in 2 weeks.     3. Hypertension goal BP (blood pressure) < 140/90  Blood pressure is low today  Advised discontinuing lasix (family thinks she has been off of that since being home)  Will decrease hydrochlorothiazide from 25mg to 12.5mg   Continue lisinopril  BMP results as above - improved creatinine since leaving long term care.   Recheck in 2 weeks. Close followup  - lisinopril (PRINIVIL/ZESTRIL) 30 MG tablet; Take 1 tablet (30 mg) by mouth " daily  Dispense: 90 tablet; Refill: 0  - hydrochlorothiazide (HYDRODIURIL) 12.5 MG tablet; Take 1 tablet (12.5 mg) by mouth daily  Dispense: 90 tablet; Refill: 0    4. Acute kidney injury (H)  See #3- improving  - Basic metabolic panel  (Ca, Cl, CO2, Creat, Gluc, K, Na, BUN)    5. Epistaxis  hgb is low, but stable.   - CBC with platelets    6. Metastatic malignant melanoma (H)  Follow up per oncology    7. Cough  refilled  - benzonatate (TESSALON) 100 MG capsule; Take 1 capsule (100 mg) by mouth 3 times daily as needed for cough (procaine listed as allergy, but patient has gotten lidocaine numerous times this admission without complications)  Dispense: 30 capsule; Refill: 1    8. Agitation  Discussed episodes of agitation, adrian at night where she is combative, or wandering the home  Trial low dose zyprexa as below if needed  - OLANZapine (ZYPREXA) 2.5 MG tablet; Take 1 tablet (2.5 mg) by mouth At Bedtime  Dispense: 10 tablet; Refill: 0    9. JITENDRA (generalized anxiety disorder)  Refilled.   - buPROPion (WELLBUTRIN XL) 150 MG 24 hr tablet; TAKE 1 TABLET(150 MG) BY MOUTH EVERY MORNING  Dispense: 90 tablet; Refill: 1    10. Alteration in eating processes  Discussed offering food/meals; not able to force her to eat.   Ensure good hydration and offering meal supplement shakes.       Follow Up: in 2 weeks   The patient was instructed to contact clinic for worsening symptoms, non-improvement as expected/discussed, and for questions regarding medications or treatment plan. Discussed parameters for follow up and included in After Visit Summary given to patient.      Kay Cao PA-C  [facility]

## 2019-02-22 LAB
FUNGUS SPEC CULT: NORMAL
SPECIMEN SOURCE: NORMAL

## 2019-02-25 ENCOUNTER — OFFICE VISIT (OUTPATIENT)
Dept: FAMILY MEDICINE | Facility: CLINIC | Age: 79
End: 2019-02-25
Payer: MEDICARE

## 2019-02-25 DIAGNOSIS — I10 HYPERTENSION GOAL BP (BLOOD PRESSURE) < 140/90: ICD-10-CM

## 2019-02-25 DIAGNOSIS — F41.1 GAD (GENERALIZED ANXIETY DISORDER): ICD-10-CM

## 2019-02-25 DIAGNOSIS — N17.9 ACUTE KIDNEY INJURY (H): ICD-10-CM

## 2019-02-25 DIAGNOSIS — R41.82 ALTERED MENTAL STATUS, UNSPECIFIED ALTERED MENTAL STATUS TYPE: ICD-10-CM

## 2019-02-25 DIAGNOSIS — Z09 HOSPITAL DISCHARGE FOLLOW-UP: Primary | ICD-10-CM

## 2019-02-25 DIAGNOSIS — C43.9 METASTATIC MALIGNANT MELANOMA (H): ICD-10-CM

## 2019-02-25 DIAGNOSIS — R05.9 COUGH: ICD-10-CM

## 2019-02-25 DIAGNOSIS — R04.0 EPISTAXIS: ICD-10-CM

## 2019-02-25 DIAGNOSIS — R45.1 AGITATION: ICD-10-CM

## 2019-02-25 DIAGNOSIS — R63.8 ALTERATION IN EATING PROCESSES: ICD-10-CM

## 2019-02-25 DIAGNOSIS — F33.0 MILD RECURRENT MAJOR DEPRESSION (H): ICD-10-CM

## 2019-02-25 LAB
ERYTHROCYTE [DISTWIDTH] IN BLOOD BY AUTOMATED COUNT: 17 % (ref 10–15)
HCT VFR BLD AUTO: 30.1 % (ref 35–47)
HGB BLD-MCNC: 9.3 G/DL (ref 11.7–15.7)
MCH RBC QN AUTO: 26.6 PG (ref 26.5–33)
MCHC RBC AUTO-ENTMCNC: 30.9 G/DL (ref 31.5–36.5)
MCV RBC AUTO: 86 FL (ref 78–100)
PLATELET # BLD AUTO: 341 10E9/L (ref 150–450)
RBC # BLD AUTO: 3.5 10E12/L (ref 3.8–5.2)
WBC # BLD AUTO: 7.5 10E9/L (ref 4–11)

## 2019-02-25 PROCEDURE — 85027 COMPLETE CBC AUTOMATED: CPT | Performed by: PHYSICIAN ASSISTANT

## 2019-02-25 PROCEDURE — 99214 OFFICE O/P EST MOD 30 MIN: CPT | Performed by: PHYSICIAN ASSISTANT

## 2019-02-25 PROCEDURE — 36415 COLL VENOUS BLD VENIPUNCTURE: CPT | Performed by: PHYSICIAN ASSISTANT

## 2019-02-25 PROCEDURE — 80048 BASIC METABOLIC PNL TOTAL CA: CPT | Performed by: PHYSICIAN ASSISTANT

## 2019-02-25 RX ORDER — LISINOPRIL 30 MG/1
30 TABLET ORAL DAILY
Qty: 90 TABLET | Refills: 0 | Status: SHIPPED | OUTPATIENT
Start: 2019-02-25 | End: 2019-01-01

## 2019-02-25 RX ORDER — OLANZAPINE 2.5 MG/1
2.5 TABLET, FILM COATED ORAL AT BEDTIME
Qty: 10 TABLET | Refills: 0 | Status: SHIPPED | OUTPATIENT
Start: 2019-02-25 | End: 2019-01-01 | Stop reason: DRUGHIGH

## 2019-02-25 RX ORDER — PANTOPRAZOLE SODIUM 40 MG/1
TABLET, DELAYED RELEASE ORAL
COMMUNITY
Start: 2017-05-09 | End: 2019-02-25

## 2019-02-25 RX ORDER — BENZONATATE 100 MG/1
100 CAPSULE ORAL 3 TIMES DAILY PRN
Qty: 30 CAPSULE | Refills: 1 | Status: SHIPPED | OUTPATIENT
Start: 2019-02-25 | End: 2019-01-01

## 2019-02-25 RX ORDER — HYDROCHLOROTHIAZIDE 12.5 MG/1
12.5 TABLET ORAL DAILY
Qty: 90 TABLET | Refills: 0 | Status: SHIPPED | OUTPATIENT
Start: 2019-02-25 | End: 2019-01-01

## 2019-02-25 RX ORDER — BUPROPION HYDROCHLORIDE 150 MG/1
TABLET ORAL
Qty: 90 TABLET | Refills: 1 | Status: SHIPPED | OUTPATIENT
Start: 2019-02-25 | End: 2019-01-01

## 2019-02-25 RX ORDER — HYDROCHLOROTHIAZIDE 25 MG/1
12.5 TABLET ORAL
COMMUNITY
End: 2019-02-25 | Stop reason: DRUGHIGH

## 2019-02-25 ASSESSMENT — ANXIETY QUESTIONNAIRES
GAD7 TOTAL SCORE: 14
2. NOT BEING ABLE TO STOP OR CONTROL WORRYING: SEVERAL DAYS
5. BEING SO RESTLESS THAT IT IS HARD TO SIT STILL: NEARLY EVERY DAY
7. FEELING AFRAID AS IF SOMETHING AWFUL MIGHT HAPPEN: NOT AT ALL
GAD7 TOTAL SCORE: 14
7. FEELING AFRAID AS IF SOMETHING AWFUL MIGHT HAPPEN: NOT AT ALL
3. WORRYING TOO MUCH ABOUT DIFFERENT THINGS: SEVERAL DAYS
4. TROUBLE RELAXING: NEARLY EVERY DAY
1. FEELING NERVOUS, ANXIOUS, OR ON EDGE: NEARLY EVERY DAY
6. BECOMING EASILY ANNOYED OR IRRITABLE: NEARLY EVERY DAY
GAD7 TOTAL SCORE: 14

## 2019-02-25 ASSESSMENT — PATIENT HEALTH QUESTIONNAIRE - PHQ9
10. IF YOU CHECKED OFF ANY PROBLEMS, HOW DIFFICULT HAVE THESE PROBLEMS MADE IT FOR YOU TO DO YOUR WORK, TAKE CARE OF THINGS AT HOME, OR GET ALONG WITH OTHER PEOPLE: NOT DIFFICULT AT ALL
SUM OF ALL RESPONSES TO PHQ QUESTIONS 1-9: 17
SUM OF ALL RESPONSES TO PHQ QUESTIONS 1-9: 17

## 2019-02-25 ASSESSMENT — MIFFLIN-ST. JEOR: SCORE: 1176.24

## 2019-02-25 NOTE — PATIENT INSTRUCTIONS
Blood pressure here is low.   Prior to hospitalization she was taking lisinopril 30mg and hydrochlorothiazide 12.5mg     Be sure she is NOT taking furosemide 40mg daily.   Also lets reduce hydrochlorothiazide back to 12.5mg.   Continue lisinopril 30mg daily  Recheck labs today    If agitated at night- olanzapine 2.5mg at bedtime.     Follow up in 2 weeks with  - blood pressure recheck     Miralax - Try like Tbsp daily - can adjust dosing    Recheck hemoglobin

## 2019-02-26 PROBLEM — A41.9 SEPSIS (H): Status: RESOLVED | Noted: 2019-01-05 | Resolved: 2019-02-26

## 2019-02-26 LAB
ANION GAP SERPL CALCULATED.3IONS-SCNC: 5 MMOL/L (ref 3–14)
BUN SERPL-MCNC: 64 MG/DL (ref 7–30)
CALCIUM SERPL-MCNC: 8.9 MG/DL (ref 8.5–10.1)
CHLORIDE SERPL-SCNC: 97 MMOL/L (ref 94–109)
CO2 SERPL-SCNC: 29 MMOL/L (ref 20–32)
CREAT SERPL-MCNC: 1.34 MG/DL (ref 0.52–1.04)
GFR SERPL CREATININE-BSD FRML MDRD: 38 ML/MIN/{1.73_M2}
GLUCOSE SERPL-MCNC: 118 MG/DL (ref 70–99)
POTASSIUM SERPL-SCNC: 4.2 MMOL/L (ref 3.4–5.3)
SODIUM SERPL-SCNC: 131 MMOL/L (ref 133–144)

## 2019-02-26 RX ORDER — OLANZAPINE 2.5 MG/1
TABLET, FILM COATED ORAL
Qty: 90 TABLET | Refills: 0 | OUTPATIENT
Start: 2019-02-26

## 2019-02-26 ASSESSMENT — PATIENT HEALTH QUESTIONNAIRE - PHQ9: SUM OF ALL RESPONSES TO PHQ QUESTIONS 1-9: 17

## 2019-02-26 ASSESSMENT — ANXIETY QUESTIONNAIRES: GAD7 TOTAL SCORE: 14

## 2019-02-27 ENCOUNTER — TELEPHONE (OUTPATIENT)
Dept: FAMILY MEDICINE | Facility: CLINIC | Age: 79
End: 2019-02-27

## 2019-02-27 NOTE — TELEPHONE ENCOUNTER
Kay Cao PA-C Peterson, Katie Rae, PA-C             Please contact pt with the following message:     Pt sodium level in the blood is mildly low but stable. May have more improvement with dropping dose of hydrochlorothiazide.   Kidney function is below normal but improved from when it was last checked at the long term care facility.   Hgb is low (showing her continued anemia), but stable compared to labs at Long term care.   Will recheck labs in 2 weeks after BP med changes.     Kay Cao PA-C         Left detailed message.

## 2019-02-28 ENCOUNTER — PATIENT OUTREACH (OUTPATIENT)
Dept: CARE COORDINATION | Facility: CLINIC | Age: 79
End: 2019-02-28

## 2019-02-28 NOTE — PROGRESS NOTES
Clinic Care Coordination Contact    Situation: Patient chart reviewed by care coordinator.    Left message on spouse's voicemail. Requested return call.     Background: 78 year old female that was admitted to [facility] on 1/4/19 with a significant nose bleed. Patient was transferred to [facility] and was inpatient from 1/5/19 to 1/25/19 with sepsis and altered mental status.      Assessment: Patient was discharged to [facility] for rehab. She discharged to home on 2/4/19. Saw patient in PCP clinic on 2/25/19.     Patient has had significant change in mental status. Is non-communicative, speaks in jbberish. Patient is wandering in the home at night. Patient can be combative. One daughter has moved in to the home to assist spouse in caring for patient.     No indication of home care.      Plan/Recommendations: Left message with spouse. Will await return call to determine if further assistance is needed.     Patient is to have follow up labs and appointment in clinic around 3/11/19. No appointment scheduled.    Clinic care coordinator will await return call and follow up in 2-4 weeks.    Sandra De Oliveira RN, CCM - Care Coordinator     2/28/2019    10:32 AM  [phone]

## 2019-03-01 VITALS
RESPIRATION RATE: 16 BRPM | TEMPERATURE: 97.6 F | HEART RATE: 76 BPM | HEIGHT: 65 IN | WEIGHT: 153.3 LBS | SYSTOLIC BLOOD PRESSURE: 102 MMHG | DIASTOLIC BLOOD PRESSURE: 65 MMHG | BODY MASS INDEX: 25.54 KG/M2 | OXYGEN SATURATION: 100 %

## 2019-03-06 DIAGNOSIS — R45.1 AGITATION: ICD-10-CM

## 2019-03-07 ENCOUNTER — OFFICE VISIT (OUTPATIENT)
Dept: OTOLARYNGOLOGY | Facility: CLINIC | Age: 79
End: 2019-03-07
Payer: MEDICARE

## 2019-03-07 DIAGNOSIS — C43.31 MALIGNANT MELANOMA OF NOSE (H): Primary | ICD-10-CM

## 2019-03-07 ASSESSMENT — PAIN SCALES - GENERAL: PAINLEVEL: NO PAIN (0)

## 2019-03-07 NOTE — LETTER
RE: Gabino Jones  [address]  [place] [facility] [place] [id number]     Dear Colleague,    Thank you for referring your patient, Gabino Jones, to the [facility] EAR NOSE [facility] THROAT at [facility]. Please see a copy of my visit note below.    DIAGNOSIS:  Right-sided sinonasal mucosal malignant melanoma.        TREATMENT:  The patient underwent endoscopic resection on 07/23/2014 and completed postoperative radiation therapy on September 11, 2014.      HISTORY OF PRESENT ILLNESS:  Ms. Jones is a 78-year-old female.  She comes to the Otolaryngology Clinic for a tumor surveillance followup.  The patient was admitted to the hospital on January of 2018 with epistaxis as well as encephalopathy and sepsis.  She was found to have a new lesion on the frontal lobe which is about 5.0 mm, suspicious for possible metastatic disease.  The patient also has deteriorated regarding her dementia.  In talking to the family, she is quite disoriented and they think that her dementia is deteriorating her quite rapidly.  From an otolaryngology standpoint, she has not had any more epistaxis.        MEDICATIONS:     Current Outpatient Medications   Medication Sig Dispense Refill     acetaminophen (TYLENOL) 500 MG tablet Take 1 tablet (500 mg) by mouth every 6 hours as needed 100 tablet 3     atorvastatin (LIPITOR) 20 MG tablet Take 1 tablet (20 mg) by mouth daily 90 tablet 3     benzonatate (TESSALON) 100 MG capsule Take 1 capsule (100 mg) by mouth 3 times daily as needed for cough (procaine listed as allergy, but patient has gotten lidocaine numerous times this admission without complications) 30 capsule 1     buPROPion (WELLBUTRIN XL) 150 MG 24 hr tablet TAKE 1 TABLET(150 MG) BY MOUTH EVERY MORNING 90 tablet 1     hydrochlorothiazide (HYDRODIURIL) 12.5 MG tablet Take 1 tablet (12.5 mg) by mouth daily 90 tablet 0     lisinopril (PRINIVIL/ZESTRIL) 30 MG tablet Take 1 tablet (30 mg) by mouth daily 90 tablet 0      OLANZapine (ZYPREXA) 2.5 MG tablet Take 1 tablet (2.5 mg) by mouth At Bedtime 10 tablet 0     pantoprazole (PROTONIX) 40 MG EC tablet Take 1 tablet (40 mg) by mouth daily Take 30-60 minutes before a meal. 90 tablet 1     ranitidine (ZANTAC) 150 MG tablet Take 150 mg by mouth       venlafaxine (EFFEXOR-XR) 75 MG 24 hr capsule TAKE 3 CAPSULES BY MOUTH DAILY 270 capsule 2       ALLERGIES:    Allergies   Allergen Reactions     Novocain [Procaine] Unknown and Rash     Mirtazapine      Nefazodone Unknown and Rash       HABITS/SOCIAL HISTORY: No smoking, no drinking    PAST MEDICAL HISTORY:   Past Medical History:   Diagnosis Date     Anxiety      Arthritis      Cancer (H)      Chronic back pain      Chronic leg pain      Dementia      Depression      Depressive disorder 1/01/191992     Gastroesophageal reflux disease      Hearing loss      Heart murmur      History of radiation therapy     Treatment for nasal cancer     Hoarseness      Hyperlipidemia      Hypertension      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     Reduced vision      Ringing in ears      Sensorineural hearing loss      Tinnitus         FAMILY HISTORY:    Family History   Problem Relation Age of Onset     Prostate Cancer Father      Cancer Sister         SCLC     Cancer Sister      Macular Degeneration Daughter      Glaucoma No family hx of      PHYSICAL EXAMINATION:   Constitutional: The patient was well-groomed and in no acute distress.    Skin: Warm and pink.    Neurologic: Alert and oriented x3. Cranial nerves III-XII within normal limits. Voice quality is normal.    Psychiatric: The patient's affect was calm, cooperative and appropriate.    Respiratory: Breathing comfortably without stridor or exertion of accessory muscles.    Eyes: Pupils were equal and reactive. Extraocular movements are intact.    Head: Normocephalic and atraumatic. No lesions or scars.    Ears: External auditory canals and tympanic membranes were clear.    Nose: Sinuses were  nontender. Anterior rhinoscopy revealed midline septum and absence of purulence or polyps.    Oral cavity/Oropharynx: Normal tongue, floor of mouth, buccal mucosa and palate. No lesions or masses on inspection or palpation. No abnormal lymph tissue in the oropharynx.    Neck: The parotids are soft without masses. Supple with normal laryngeal and tracheal landmarks.    Lymphatic: There is no palpable lymphadenopathy or other masses in the neck or parotids.       PROCEDURE: Nasal endoscopy: The risks and benefits of the procedure were discussed and written consent was obtained. A timeout was performed. The patient's nose was sprayed with lidocaine and Afrin, and a 0-degree nasal endoscope was used to gain access into the nasal cavity.  Septum is in the midline.  There is abundant sinonasal crust bilaterally.  I was able to clear the left side totally of crusting.  I do not see any evidence of masses or lesions on the left.  Normal inferior middle turbinates are present.  The nasopharynx is normal.  On the right side, I removed most of the crusting but the patient is not tolerating the cleaning very well, so I did have to stop.  I do not see any masses or lesions, but I was not able to clear all of the crust to get a much better examination.        IMAGING:  I did review the MRI that was done in January of 2018 and this did not show any masses or lesions within the sinonasal passages.      ASSESSMENT:  A 78-year-old female with right-sided sinonasal mucosal malignant melanoma, status post-surgery followed by radiation therapy completed on September of 2014.       PLAN:   We are going to start her on Ponaris to try to get this crusting under control.  I would like to see her back in three months.  Also, we are going to reconnect her with Dr. Garett Obregon for followup of her new brain lesion.      ECG/ms     Yolanda Whitaker M.D.  Otolaryngology- Head & Neck Surgery  [phone]

## 2019-03-07 NOTE — PROGRESS NOTES
DIAGNOSIS:  Right-sided sinonasal mucosal malignant melanoma.        TREATMENT:  The patient underwent endoscopic resection on 07/23/2014 and completed postoperative radiation therapy on September 11, 2014.      HISTORY OF PRESENT ILLNESS:  Ms. Jones is a 78-year-old female.  She comes to the Otolaryngology Clinic for a tumor surveillance followup.  The patient was admitted to the hospital on January of 2018 with epistaxis as well as encephalopathy and sepsis.  She was found to have a new lesion on the frontal lobe which is about 5.0 mm, suspicious for possible metastatic disease.  The patient also has deteriorated regarding her dementia.  In talking to the family, she is quite disoriented and they think that her dementia is deteriorating her quite rapidly.  From an otolaryngology standpoint, she has not had any more epistaxis.        MEDICATIONS:     Current Outpatient Medications   Medication Sig Dispense Refill     acetaminophen (TYLENOL) 500 MG tablet Take 1 tablet (500 mg) by mouth every 6 hours as needed 100 tablet 3     atorvastatin (LIPITOR) 20 MG tablet Take 1 tablet (20 mg) by mouth daily 90 tablet 3     benzonatate (TESSALON) 100 MG capsule Take 1 capsule (100 mg) by mouth 3 times daily as needed for cough (procaine listed as allergy, but patient has gotten lidocaine numerous times this admission without complications) 30 capsule 1     buPROPion (WELLBUTRIN XL) 150 MG 24 hr tablet TAKE 1 TABLET(150 MG) BY MOUTH EVERY MORNING 90 tablet 1     hydrochlorothiazide (HYDRODIURIL) 12.5 MG tablet Take 1 tablet (12.5 mg) by mouth daily 90 tablet 0     lisinopril (PRINIVIL/ZESTRIL) 30 MG tablet Take 1 tablet (30 mg) by mouth daily 90 tablet 0     OLANZapine (ZYPREXA) 2.5 MG tablet Take 1 tablet (2.5 mg) by mouth At Bedtime 10 tablet 0     pantoprazole (PROTONIX) 40 MG EC tablet Take 1 tablet (40 mg) by mouth daily Take 30-60 minutes before a meal. 90 tablet 1     ranitidine (ZANTAC) 150 MG tablet Take 150 mg by  mouth       venlafaxine (EFFEXOR-XR) 75 MG 24 hr capsule TAKE 3 CAPSULES BY MOUTH DAILY 270 capsule 2       ALLERGIES:    Allergies   Allergen Reactions     Novocain [Procaine] Unknown and Rash     Mirtazapine      Nefazodone Unknown and Rash       HABITS/SOCIAL HISTORY: No smoking, no drinking    PAST MEDICAL HISTORY:   Past Medical History:   Diagnosis Date     Anxiety      Arthritis      Cancer (H)      Chronic back pain      Chronic leg pain      Dementia      Depression      Depressive disorder 1/01/191992     Gastroesophageal reflux disease      Hearing loss      Heart murmur      History of radiation therapy     Treatment for nasal cancer     Hoarseness      Hyperlipidemia      Hypertension      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     Reduced vision      Ringing in ears      Sensorineural hearing loss      Tinnitus         FAMILY HISTORY:    Family History   Problem Relation Age of Onset     Prostate Cancer Father      Cancer Sister         SCLC     Cancer Sister      Macular Degeneration Daughter      Glaucoma No family hx of        REVIEW OF SYSTEMS:  12 point ROS was negative other than the symptoms noted above in the HPI.    PHYSICAL EXAMINATION:   Constitutional: The patient was well-groomed and in no acute distress.    Skin: Warm and pink.    Neurologic: Alert and oriented x3. Cranial nerves III-XII within normal limits. Voice quality is normal.    Psychiatric: The patient's affect was calm, cooperative and appropriate.    Respiratory: Breathing comfortably without stridor or exertion of accessory muscles.    Eyes: Pupils were equal and reactive. Extraocular movements are intact.    Head: Normocephalic and atraumatic. No lesions or scars.    Ears: External auditory canals and tympanic membranes were clear.    Nose: Sinuses were nontender. Anterior rhinoscopy revealed midline septum and absence of purulence or polyps.    Oral cavity/Oropharynx: Normal tongue, floor of mouth, buccal mucosa and palate. No  lesions or masses on inspection or palpation. No abnormal lymph tissue in the oropharynx.    Neck: The parotids are soft without masses. Supple with normal laryngeal and tracheal landmarks.    Lymphatic: There is no palpable lymphadenopathy or other masses in the neck or parotids.       PROCEDURE: Nasal endoscopy: The risks and benefits of the procedure were discussed and written consent was obtained. A timeout was performed. The patient's nose was sprayed with lidocaine and Afrin, and a 0-degree nasal endoscope was used to gain access into the nasal cavity.  Septum is in the midline.  There is abundant sinonasal crust bilaterally.  I was able to clear the left side totally of crusting.  I do not see any evidence of masses or lesions on the left.  Normal inferior middle turbinates are present.  The nasopharynx is normal.  On the right side, I removed most of the crusting but the patient is not tolerating the cleaning very well, so I did have to stop.  I do not see any masses or lesions, but I was not able to clear all of the crust to get a much better examination.        IMAGING:  I did review the MRI that was done in January of 2018 and this did not show any masses or lesions within the sinonasal passages.      ASSESSMENT:  A 78-year-old female with right-sided sinonasal mucosal malignant melanoma, status post-surgery followed by radiation therapy completed on September of 2014.       PLAN:   We are going to start her on Ponaris to try to get this crusting under control.  I would like to see her back in three months.  Also, we are going to reconnect her with Dr. Garett Obregon for followup of her new brain lesion.      ECG/ms     Yolanda Whitaker M.D.  Otolaryngology- Head & Neck Surgery  [phone]

## 2019-03-07 NOTE — PATIENT INSTRUCTIONS
1. Please follow-up in clinic in  3 months  2. Please call the ENT clinic with any questions,concerns, new or worsening symptoms.    -Clinic number is [phone]   - [facility] direct line (Dr. Barboza's nurse) [phone]  3. We will arrange an appointment with Dr. Obregon. We will call you with this information.   4. Please see below regarding Ponaris nasal oil     Ponaris Nasal Emollient   This cold season reach for all-natural Ponaris to relieve your stuffy, congested nose, post-nasal drip, and nasal dryness.  Once standard in [employer]'s medical space kit, this effective formula contains oils of pine, eucalyptus, peppermint, cajeput, and cottonseed.   Ponaris is a compound of carefully selected mucosal lubricating and moisturizing botanical oils, specially treated through the exclusive J-R iodization process since 1931.   Exclusive Prescribable Therapy Colds (Sinusitis) Atrophic Rhinitis (acute, chronic, allergic) Post-nasal drip Epistaxis (due to irritations of dried mucous membranes) Rebound sinus reaction from drugs and smog   Active Ingredients   Ponaris is a specially prepared iodized botanical oil blend that contains oils of pine, eucalyptus, peppermint, cajeput in a cottonseed oil base. Total Iodine 0.4% - 0.6% by weight. Assimilable hence non-lipoid potential.   Directions  With head tilted back, place 1 or 2 drops in each nostril once or twice a day, or as directed by a physician. After using, immediately screw cap or dropper tightly back onto bottle. Exposure to air and light may cause product to darken, but this will not compromise it's effectiveness. Bottle must be stored in an upright position with dropper bulb drained of product.

## 2019-03-07 NOTE — TELEPHONE ENCOUNTER
"Requested Prescriptions   Pending Prescriptions Disp Refills     OLANZapine (ZYPREXA) 2.5 MG tablet [Pharmacy Med Name: OLANZAPINE 2.5MG TABLETS] 10 tablet 0     Sig: TAKE 1 TABLET(2.5 MG) BY MOUTH AT BEDTIME    Antipsychotic Medications Failed - 3/6/2019 11:55 PM       Failed - Lipid panel on file within the past 12 months    Recent Labs   Lab Test 08/01/17  0914  07/29/16   CHOL 228*   < > 166.0   TRIG 139   < > 63.0   HDL 55   < > 71.0*   *   < > 82.4   NHDL 173*   < >  --    CHOLHDLRATIO  --   --  2.3    < > = values in this interval not displayed.          Passed - Blood pressure under 140/90 in past 12 months    BP Readings from Last 3 Encounters:   02/25/19 102/65   01/25/19 139/59   12/12/18 165/80          Passed - Patient is 12 years of age or older       Passed - CBC on file in past 12 months    Recent Labs   Lab Test 02/25/19  1655   WBC 7.5   RBC 3.50*   HGB 9.3*   HCT 30.1*             Passed - Heart Rate on file within past 12 months    Pulse Readings from Last 3 Encounters:   02/25/19 76   01/23/19 78   12/12/18 84          Passed - A1c or Glucose on file in past 12 months    Recent Labs   Lab Test 02/25/19  1655   *     Please review patients last 3 weights. If a weight gain of >10 lbs exists, you may refill the prescription once after instructing the patient to schedule an appointment within the next 30 days.    Wt Readings from Last 3 Encounters:   02/25/19 69.5 kg (153 lb 4.8 oz)   01/24/19 76.2 kg (167 lb 14.4 oz)   12/12/18 80.7 kg (178 lb)          Passed - Medication is active on med list       Passed - Patient is not pregnant       Passed - No positve pregnancy test on file in past 12 months       Passed - Recent (6 mo) or future (30 days) visit within the authorizing provider's specialty    Patient had office visit in the last 6 months or has a visit in the next 30 days with authorizing provider or within the authorizing provider's specialty.  See \"Patient Info\" tab in " "inbasket, or \"Choose Columns\" in Meds & Orders section of the refill encounter.            OLANZapine (ZYPREXA) 2.5 MG tablet  Routing refill request to provider for review/approval because:  Labs out of range:  Fasting lipid panel and CBC  Due for 2 week follow up   Next 5 appointments (look out 90 days)    Apr 03, 2019  3:20 PM CDT  [facility] Long with Rebekah Boogie MD  [facility] ([facility] [id number] [place] [address], [address]  [facility] [id number] [phone]   May 06, 2019  2:00 PM CDT  Return Visit with Blake Hobbs MD  [facility] ([facility]) [address] [place] [address] [phone]        Giulia Whitley RN, BSN     "

## 2019-03-08 RX ORDER — OLANZAPINE 2.5 MG/1
TABLET, FILM COATED ORAL
Qty: 10 TABLET | Refills: 0 | OUTPATIENT
Start: 2019-03-08

## 2019-03-08 NOTE — TELEPHONE ENCOUNTER
Please call pt's family- daughter or - have they been using the olanzapine at bedtime? Has it been helpful? Are they requesting the refill or is this an auto refill from the pharmacy?   Kay Cao PA-C

## 2019-03-21 NOTE — PROGRESS NOTES
Clinic Care Coordination Contact  [facility]/Voicemail    Referral Source: IP Handoff    Clinical Data: Care Coordinator Outreach    Outreach attempted x 2.  Left message on voicemail with call back information and requested return call.    Plan: Care Coordinator will mail out care coordination introduction letter with care coordinator contact information and explanation of care coordination services.     Care Coordinator will do further outreaches if no response from message and letter.     Sandra De Oliveira RN, CCM - Care Coordinator     3/21/2019    11:20 AM  [phone]

## 2019-03-21 NOTE — LETTER
[place] CARE COORDINATION  [address] [address] [address]    March 21, 2019    Gabino Jones  [address]  [facility] [address]      Dear Gabino,    I am a clinic care coordinator who works with MIMI RAMIREZ MD, MD at [facility] [place]. I have been trying to reach you recently to introduce Clinic Care Coordination and to see if there was anything I could assist you with.  I wanted to introduce myself and provide you with my contact information so that you can call me with questions or concerns about your health care. Below is a description of clinic care coordination and how I can further assist you.     The clinic care coordinator is a registered nurse and/or  who understand the health care system. The goal of clinic care coordination is to help you manage your health and improve access to the [facility] system in the most efficient manner. The registered nurse can assist you in meeting your health care goals by providing education, coordinating services, and strengthening the communication among your providers. The  can assist you with financial, behavioral, psychosocial, chemical dependency, counseling, and/or psychiatric resources.    Please feel free to contact me at [phone], with any questions or concerns. We at [facility] are focused on providing you with the highest-quality healthcare experience possible and that all starts with you.     Sincerely,     Sandra De Oliveira RN CCM

## 2019-03-28 NOTE — LETTER
[facility]  [address], [address]  [place] [address]  [phone]          March 28, 2019    Gabino Jones                                                                                                                     [address]  [facility] [address]            Dear Gabino,    We received a notice that you are to be scheduled with a specialty clinic. The referral has been placed by your provider and you can call to schedule an appointment directly.     Enclosed, you will find the referral with the phone number to call to schedule an appointment.    Please call us if you have any questions or concerns.      Sincerely,         Kay Cao PA-C

## 2019-03-28 NOTE — TELEPHONE ENCOUNTER
You placed a referral for patient to neurology on 3/1/19.  Patient has not scheduled as of yet.      Please review and forward to team if follow up with the patient is needed.     Thank you!  Nydia/Clinic Referrals Dyad II

## 2019-04-01 NOTE — TELEPHONE ENCOUNTER
RECORDS STATUS - ALL OTHER DIAGNOSIS      RECORDS RECEIVED FROM: [facility]   DATE RECEIVED: 4/1/19   NOTES STATUS DETAILS   OFFICE NOTE from referring provider  Epic   OFFICE NOTE from medical oncologist  Epic   DISCHARGE SUMMARY from hospital  Epic   DISCHARGE REPORT from the ER  CE   OPERATIVE REPORT  Epic   MEDICATION LIST  [facility]   CLINICAL TRIAL TREATMENTS TO DATE     LABS     PATHOLOGY REPORTS  Epic/Complete   ANYTHING RELATED TO DIAGNOSIS  Epic   GENONOMIC TESTING     TYPE:     IMAGING (NEED IMAGES & REPORT)     CT SCANS  PACS   MRI  PACS   MAMMO     ULTRASOUND     PET     X Ray        PACS

## 2019-04-10 NOTE — TELEPHONE ENCOUNTER
Protonix  Routing refill request to provider for review/approval because:  Appears to be a break in medication - should have been out around 2/7/19    Next 5 appointments (look out 90 days)    Apr 23, 2019  2:20 PM CDT  MyChart Long with Rebekah Boogie MD  [facility]) [address], [address]  [facility] [id number] [phone]   May 06, 2019  2:00 PM CDT  Return Visit with Blake Hobbs MD  [facility] ([facility]) [address] [place] [address]  [phone]        Kay Shine, RN, BSN

## 2019-04-11 NOTE — TELEPHONE ENCOUNTER
Tylenol  Prescription approved per [facility] Refill Protocol.    Kay Shine, RN, BSN

## 2019-04-15 NOTE — TELEPHONE ENCOUNTER
Request received for refill of potassium CL 10 MEQ ER capsules  Take two capsules by mouth daily.    Did not see this dose in the medication history.  Provider, please review.

## 2019-04-16 NOTE — PROGRESS NOTES
SUBJECTIVE:   Gabino Jones is a 79 year old female who presents to clinic today for the following health issues:    HPI  Acute Illness   Acute illness concerns: cough, congestion   Onset: 2-3 days now     Fever: no     Chills/Sweats: no     Headache (location?): no     Sinus Pressure:YES    Conjunctivitis:  no    Ear Pain: no    Rhinorrhea: no     Congestion: YES    Sore Throat: no      Cough: YES-non-productive    Wheeze: YES- some    Decreased Appetite: YES    Nausea: YES    Vomiting: no     Diarrhea:  no     Dysuria/Freq.: no     Fatigue/Achiness: no     Sick/Strep Exposure: no      Therapies Tried and outcome: cough drops, tessalon     Hyperlipidemia Follow-Up      Rate your low fat/cholesterol diet?: not monitoring fat    Taking statin?  Yes,     Other lipid medications/supplements?:  none    Hypertension Follow-up      Outpatient blood pressures are not being checked.    Low Salt Diet: low salt    The patient is accompanied by her Daughter today.      Cough   The patient states that she has had a bad cough, but it gets very bad. She notes that she almost throws up to due the cough. She also notes that her appetite has decreased.   Her Daughter notes that rubbing Vicks helps the cough.    Agitation   The patient's Daughter notes that Olanzapine helps at bed. The patient has good days and bad days. Her Daughter notes that the patient tends to get very anxious and upset.   The patient's Daughter notes that the symptoms can occur during anytime of the day.     Diet   The patient's Daughter notes that the patient has oatmeal and eats solid foods.     Bad breath   The patient's Daughter notes that the patient has bad breath. She notes that the patient keeps good dental hygiene and soaks dentures overnight.    Nose bleeds   The patient's Daughter notes that the patient had really bad nose bleeds. She notes that the patient had such a bad nose bleed, that she passed out and went to the hospital.   The patient  visited ENT and her nose got cleaned out. Her Daughter also uses oils to help with nose bleeds.     Heart   The patient has an Echocardiogram scheduled for next week. Her Daughter notes that the patient has thickening of arteries.     Additional history: as documented    Reviewed and updated as needed this visit by clinical staff         Reviewed and updated as needed this visit by Provider         Patient Active Problem List   Diagnosis     Malignant melanoma of nasal cavity (H)     Nasal pain     Metastatic malignant melanoma (H)     Proximal humerus fracture     Abnormal chest CT     Aortic stenosis     Hypertension goal BP (blood pressure) < 140/90     Skull lesion     JITENDRA (generalized anxiety disorder)     Mixed hyperlipidemia     Mild recurrent major depression (H)     Tubular adenoma of colon     Past Surgical History:   Procedure Laterality Date     ANESTHESIA OUT OF OR MRI N/A 1/16/2019    Procedure: Anesthesia Coverage Brain MRI With Contrast @1330;  Surgeon: GENERIC ANESTHESIA PROVIDER;  Location: [facility] OR     C ANESTH,CERV SPINE, SITTING POSITION       COLONOSCOPY N/A 7/7/2017    Procedure: COMBINED COLONOSCOPY, SINGLE OR MULTIPLE BIOPSY/POLYPECTOMY BY BIOPSY;;  Surgeon: Sathya Norman MD;  Location: [facility] OR     COLONOSCOPY WITH CO2 INSUFFLATION N/A 7/7/2017    Procedure: COLONOSCOPY WITH CO2 INSUFFLATION;  Combined,  [place], Gastroesophageal reflux disease without esophagitis  Flatulence, eructation, and gas pain, BMI 29.35, [facility] [id number];  Surgeon: Sathya Norman MD;  Location: [facility] OR     COLONOSCOPY, SUBMUCOSA RESECTION N/A 10/4/2017    Procedure: COLONOSCOPY, SUBMUCOSA RESECTION;  Colonoscopy With Endoscopic Polypectomy with clips;  Surgeon: Milton Javier MD;  Location: [facility] OR     COMBINED ESOPHAGOSCOPY, GASTROSCOPY, DUODENOSCOPY (EGD) WITH CO2 INSUFFLATION N/A 7/7/2017    Procedure: COMBINED ESOPHAGOSCOPY, GASTROSCOPY, DUODENOSCOPY (EGD) WITH CO2 INSUFFLATION;   Combined,  [place], Gastroesophageal reflux disease without esophagitis  Flatulence, eructation, and gas pain, BMI 29.35, [facility] [id number];  Surgeon: Sathya Norman MD;  Location: [facility] OR     ESOPHAGOSCOPY, GASTROSCOPY, DUODENOSCOPY (EGD), COMBINED N/A 2017    Procedure: COMBINED ESOPHAGOSCOPY, GASTROSCOPY, DUODENOSCOPY (EGD), BIOPSY SINGLE OR MULTIPLE;;  Surgeon: Sathya Norman MD;  Location: [facility] OR     ESOPHAGOSCOPY, GASTROSCOPY, DUODENOSCOPY (EGD), COMBINED N/A 2019    Procedure: COMBINED ESOPHAGOSCOPY, GASTROSCOPY, DUODENOSCOPY (EGD);  Surgeon: Cailin Paulino MD;  Location:  GI     GALLBLADDER SURGERY       NECK SURGERY       OPTICAL TRACKING SYSTEM CRANIOTOMY, EXCISE TUMOR, COMBINED Right 2017    Procedure: COMBINED OPTICAL TRACKING SYSTEM CRANIOTOMY, EXCISE TUMOR;  Surgeon: Lenora Pérez MD;  Location:  OR     OPTICAL TRACKING SYSTEM ENDOSCOPIC ENDONASAL SURGERY  2014    Procedure: OPTICAL TRACKING SYSTEM ENDOSCOPIC ENDONASAL SURGERY;  Surgeon: Yolanda Whitaker MD;  Location:  OR     STOMACH SURGERY       TONSILLECTOMY       TUBAL LIGATION             Social History     Tobacco Use     Smoking status: Former Smoker     Packs/day: 1.00     Years: 40.00     Pack years: 40.00     Types: Cigarettes     Last attempt to quit: 6/10/2004     Years since quittin.8     Smokeless tobacco: Never Used   Substance Use Topics     Alcohol use: No     Family History   Problem Relation Age of Onset     Prostate Cancer Father      Cancer Sister         SCLC     Cancer Sister      Macular Degeneration Daughter      Glaucoma No family hx of          Current Outpatient Medications   Medication Sig Dispense Refill     acetaminophen (TYLENOL) 500 MG tablet Take 1 tablet (500 mg) by mouth every 6 hours as needed 100 tablet 3     atorvastatin (LIPITOR) 20 MG tablet Take 1 tablet (20 mg) by mouth daily 90 tablet 3     benzonatate  (TESSALON) 100 MG capsule TAKE 1 CAPSULE BY MOUTH THREE TIMES DAILY( EVERY EIGHT HOURS) AS NEEDED FOR COUGH 30 capsule 0     buPROPion (WELLBUTRIN XL) 150 MG 24 hr tablet TAKE 1 TABLET(150 MG) BY MOUTH EVERY MORNING 90 tablet 1     hydrochlorothiazide (HYDRODIURIL) 12.5 MG tablet Take 1 tablet (12.5 mg) by mouth daily 90 tablet 0     lisinopril (PRINIVIL/ZESTRIL) 30 MG tablet Take 1 tablet (30 mg) by mouth daily 90 tablet 0     melatonin 1 MG TABS tablet        OLANZapine (ZYPREXA) 2.5 MG tablet Take 1 tablet (2.5 mg) by mouth At Bedtime 10 tablet 0     pantoprazole (PROTONIX) 40 MG EC tablet Take 1 tablet (40 mg) by mouth daily Take 30-60 minutes before a meal. 90 tablet 1     potassium chloride ER (K-DUR/KLOR-CON M) 10 MEQ CR tablet Take 1 tablet (10 mEq) by mouth 2 times daily 60 tablet 3     ranitidine (ZANTAC) 150 MG tablet Take 150 mg by mouth       SENEXON-S 8.6-50 MG tablet TK 1 TO 2 TS PO BID  1     venlafaxine (EFFEXOR-XR) 75 MG 24 hr capsule TAKE 3 CAPSULES BY MOUTH DAILY 270 capsule 2     Allergies   Allergen Reactions     Novocain [Procaine] Unknown and Rash     Mirtazapine      Nefazodone Unknown and Rash     Recent Labs   Lab Test 02/25/19  1655 02/06/19 01/09/19  1653  01/05/19  0322  12/12/18  1245  08/01/17  0914  02/15/17  0937  08/10/16  1050 07/29/16   LDL  --   --   --   --   --   --   --   --   --  145*  --   --   --  80 82.4   HDL  --   --   --   --   --   --   --   --   --  55  --   --   --  60 71.0*   TRIG  --   --   --   --   --   --   --   --   --  139  --   --   --  100 63.0   ALT  --  18  --  25  --  23   < > 16   < > 20   < > 15  --  18  --    CR 1.34* 2.06*   < > 0.60   < > 1.00   < > 0.76   < > 0.78   < > 0.86  --  0.73 0.76   GFRESTIMATED 38* 23*   < > 87   < > 54*   < > 73   < > 72   < > 64   < > 78  --    GFRESTBLACK 44* 26*   < > >90   < > 62  --  89   < > 87   < > 77   < > >90   GFR Calc    --    POTASSIUM 4.2 4.3   < > 3.1*   < > 3.8   < > 4.5   < > 3.8   < >  "3.9  --  4.3 4.2   TSH  --   --   --   --   --   --   --  1.72  --   --   --  2.27  --  2.32  --     < > = values in this interval not displayed.      BP Readings from Last 3 Encounters:   04/23/19 100/60   02/25/19 102/65   01/25/19 139/59    Wt Readings from Last 3 Encounters:   04/23/19 80.3 kg (177 lb)   02/25/19 69.5 kg (153 lb 4.8 oz)   01/24/19 76.2 kg (167 lb 14.4 oz)        ROS:  CONSTITUTIONAL: NEGATIVE for fever, chills, change in weight; POSITIVE for decreased appetite  ENT/MOUTH: NEGATIVE for ear, mouth and throat problems; POSITIVE for congestion and sinus pressure   RESP: NEGATIVE for SOB; POSITIVE for cough and wheezing   CV: NEGATIVE for chest pain, palpitations or peripheral edema  NEURO: NEGATIVE for weakness, dizziness or paresthesias; POSITIVE for nausea   PSYCHIATRIC: NEGATIVE for changes in mood or affect    This document serves as a record of the services and decisions personally performed and made by Rebekah Boogie MD. It was created on her behalf by Corina Zarate, a trained medical scribe. The creation of this document is based the provider's statements to the medical scribe.    Corina Zarate April 23, 2019 2:50 PM  OBJECTIVE:     /60   Pulse 85   Temp 98.3  F (36.8  C) (Temporal)   Ht 1.651 m (5' 5\")   Wt 80.3 kg (177 lb)   LMP  (LMP Unknown)   SpO2 100%   BMI 29.45 kg/m    Body mass index is 29.45 kg/m .  GENERAL: healthy, alert and no distress, very hard of hearing, she is not orientated at times   HENT: ear canals and TM's normal, mouth without ulcers or lesions  NECK: no adenopathy, no asymmetry, masses, or scars and thyroid normal to palpation  RESP: no rales, rhonchi or wheezes, coarse breath sounds throughout but no rales or rhonchi noted  CV: regular rate and rhythm, normal S1 S2, no S3 or S4, no murmur, click or rub, no peripheral edema and peripheral pulses strong  MS: no gross musculoskeletal defects noted, no edema  PSYCH: mentation appears normal, affect " "normal/bright    Diagnostic Test Results:  No results found for this or any previous visit (from the past 24 hour(s)).    ASSESSMENT/PLAN:   1. Agitation  Patient's Daughter reports Olanzapine helps patient at bedtime.   She reports patient has \"good and bad days\".  Patient's Daughter reports patient tends to get \"very nervous\".  Patient's Daughter also reports patient's symptoms can occur during the day at anytime.   Discussed plan with patient.  Patient agreed.   Offered increase in Olanzapine dosage to twice daily- patient's Daughter declined at this time due to concern of patient sleeping excessively with dose increase to twice daily.   Patient's Daughter expressed interest in having patient continue with evening medication- that could help with sleep at night.   Discussed plan with patient.   Patient agreed.   Patient will start on higher dosage of Olanzapine at 5 MG daily.   If symptoms worsen or patient becomes too drowsy, patient or patient's Daughter will contact me.   - OLANZapine (ZYPREXA) 5 MG tablet; Take 1 tablet (5 mg) by mouth At Bedtime  Dispense: 30 tablet; Refill: 3    2. Acute bronchitis, unspecified organism  Patient reports having congestion, cough, wheezing, decreased appetite, nausea and sinus pressure.   She reports \"almost throwing up due to cough\".   Patient's Daughter reports rubbing Vicks on patient's chest helps the cough.   Patient's Daughter reports patient has tried Tessalon and cough drops- with some relief of symptoms.   Exam showed coarse breath sounds throughout but no rales or rhonchi noted.   Discussed plan with patient.  Patient agreed.  Doxycyline hyclate has been prescribed.  If symptoms worsen or patient develops a fever, patient or patient's Daughter will contact me.   - doxycycline hyclate (VIBRAMYCIN) 100 MG capsule; Take 1 capsule (100 mg) by mouth 2 times daily for 10 days  Dispense: 20 capsule; Refill: 0    3. Hypertension goal BP (blood pressure) < 140/90  Doing " well. Well controlled. Tolerating medication.  No change in plan.     4. Mixed hyperlipidemia  Labs have been ordered.  Awaiting results.   - Lipid panel reflex to direct LDL Fasting    5. Metastatic malignant melanoma (H)  Patient will follow up with Oncology as planned.     6. Encounter for long-term (current) use of medications  Labs have been ordered.  Will notify with results.   - Comprehensive metabolic panel (BMP + Alb, Alk Phos, ALT, AST, Total. Bili, TP)    Follow up- Come back in 3 months for recheck.     The information in this document, created by the medical scribe for me, accurately reflects the services I personally performed and the decisions made by me. I have reviewed and approved this document for accuracy prior to leaving the patient care area.    MIMI RAMIREZ MD, MD  [facility]

## 2019-04-24 NOTE — TELEPHONE ENCOUNTER
Fax from [facility] [place] wanting OK for 90 day supply of Zyprexa.  They rec'd #[phone] yesterday with 3 add'l refills.  I started new order, approve if OK for 90 days.

## 2019-05-08 NOTE — TELEPHONE ENCOUNTER
"I spoke with the pts  ([facility] on file) and pt there too. Pt is still Coughing, glands are swollen, cheeks and ears are hurting, sore throat. No fever.  feels like she is doing a little better. Started to cough today and then got a nose bleed. It did not bleed very much. No wheezing. No SOB or difficulty breathing. When she starts to cough she coughs for 3-4 minutes.     [facility] [place].     From LOV 4/23/2019:    2. Acute bronchitis, unspecified organism  Patient reports having congestion, cough, wheezing, decreased appetite, nausea and sinus pressure.   She reports \"almost throwing up due to cough\".   Patient's Daughter reports rubbing Vicks on patient's chest helps the cough.   Patient's Daughter reports patient has tried Tessalon and cough drops- with some relief of symptoms.   Exam showed coarse breath sounds throughout but no rales or rhonchi noted.   Discussed plan with patient.  Patient agreed.  Doxycyline hyclate has been prescribed.  If symptoms worsen or patient develops a fever, patient or patient's Daughter will contact me.   - doxycycline hyclate (VIBRAMYCIN) 100 MG capsule; Take 1 capsule (100 mg) by mouth 2 times daily for 10 days  Dispense: 20 capsule; Refill: 0    Nhi Llanos, RN, BSN    "

## 2019-05-08 NOTE — TELEPHONE ENCOUNTER
Reason for Call:  Medication or medication refill:    Do you use a [facility]?  Name of the pharmacy and phone number for the current request:  [facility]    Name of the medication requested: doxycycline hyclate (VIBRAMYCIN) 100 MG capsule    Other request: patient  states patient finished medication and still has persistent cough. Would like a refill of medication . Please call with questions or once filled    Can we leave a detailed message on this number? YES    Phone number patient can be reached at: Cell number on file:    Telephone Information:   Mobile [phone]       Best Time: ANY    Call taken on 5/8/2019 at 3:14 PM by Beatriz Staples

## 2019-05-08 NOTE — TELEPHONE ENCOUNTER
Called patient and left message on identified machine (ok per message below) of medications sent below and to call if further question.

## 2019-05-13 NOTE — PROGRESS NOTES
CARDIOLOGY CONSULTATION    Referring Provider:  Yolanda Gil*  Primary Care Provider:   Susie Harrison  Indication for Consultation:  LV apical mass    HPI: Gabino Jones is a 79 year old female  referred by Yolanda Gil, for evaluation of an LV apical mass. The patient's risk factor profile is: (+) HTN [Rx], (-) diabetes, (-) hyperlipidemia [Rx], (+) former  tobacco use, (-) family Hx CAD. The patient has a history of aortic stenosis with mean gradient of 22 mm Hg and PATRICIA 1.2 cm2 in 2014.  Her ECHO (Apr 2018) showed mild-moderate aortic insufficiency and moderate aortic stenosis (1.2 cm2).  Her most recent ECHO (Apr 2019) showed normal LV function, possible bicuspid aortic valve, severe aortic stenosis (peak aortic velocity 4.6 m/sec, mean gradient gradient 50 mmHg, PATRICIA 0.8 cm^2 by the continuity equation), and moderate AI.    She has no other evidence of cardiovascular disease (CAD, CHF, arrhythmia).  She has no Hx of rheumatic fever.     The patient denies a history of chest discomfort.  She notes OSBORN with walking up a single flight of steps or walking 50 feet.  This is progression from her last visit. She denies PND, orthopnea, pedal edema, palpitations, and syncope.  She has had lightheadedness related to body position with associated imbalance in gait.  The lightheadedness is not related to activity.  The patient has melanoma of the sinus activities and is currently in remission.  She had surgery and XRT. She had a routine follow up appointment and as part of that visit, she was scheduled for a chest CT scan.  That study showed a questionable mass in the LV apex.  A follow up cardiac MRI showed no abnormality in the LV apex.    She lives with her family.  She is independent.  She does not drive.  She leads a relatively sedentary lifestyle.    Home BPs in 120s-130s/70s.              She was hospitalized with epistaxis in Jan 2019 and she was reportedly transfused.      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     Reduced vision      Ringing in ears      Sensorineural hearing loss      Tinnitus        CURRENT MEDICATIONS:  Current Outpatient Medications   Medication Sig Dispense Refill     acetaminophen (TYLENOL) 500 MG tablet Take 1 tablet (500 mg) by mouth every 6 hours as needed 100 tablet 3     atorvastatin (LIPITOR) 20 MG tablet Take 1 tablet (20 mg) by mouth daily 90 tablet 3     benzonatate (TESSALON) 100 MG capsule TAKE 1 CAPSULE BY MOUTH THREE TIMES DAILY( EVERY EIGHT HOURS) AS NEEDED FOR COUGH 30 capsule 0     buPROPion (WELLBUTRIN XL) 150 MG 24 hr tablet TAKE 1 TABLET(150 MG) BY MOUTH EVERY MORNING 90 tablet 1     doxycycline hyclate (VIBRAMYCIN) 100 MG capsule Take 1 capsule (100 mg) by mouth 2 times daily for 5 days 10 capsule 0     hydrochlorothiazide (HYDRODIURIL) 12.5 MG tablet Take 1 tablet (12.5 mg) by mouth daily 90 tablet 0     lisinopril (PRINIVIL/ZESTRIL) 30 MG tablet Take 1 tablet (30 mg) by mouth daily 90 tablet 0     melatonin 1 MG TABS tablet        OLANZapine (ZYPREXA) 5 MG tablet Take 1 tablet (5 mg) by mouth At Bedtime 90 tablet 0     pantoprazole (PROTONIX) 40 MG EC tablet Take 1 tablet (40 mg) by mouth daily Take 30-60 minutes before a meal. 90 tablet 1     potassium chloride ER (K-DUR/KLOR-CON M) 10 MEQ CR tablet Take 1 tablet (10 mEq) by mouth 2 times daily 60 tablet 3     ranitidine (ZANTAC) 150 MG tablet Take 150 mg by mouth       SENEXON-S 8.6-50 MG tablet TK 1 TO 2 TS PO BID  1     venlafaxine (EFFEXOR-XR) 75 MG 24 hr capsule TAKE 3 CAPSULES BY MOUTH DAILY  270 capsule 2       PAST SURGICAL HISTORY:  Past Surgical History:   Procedure Laterality Date     ANESTHESIA OUT OF OR MRI N/A 1/16/2019    Procedure: Anesthesia Coverage Brain MRI With Contrast @1330;  Surgeon: GENERIC ANESTHESIA PROVIDER;  Location: [facility] OR     C ANESTH,CERV SPINE, SITTING POSITION       COLONOSCOPY N/A 7/7/2017    Procedure: COMBINED COLONOSCOPY, SINGLE OR MULTIPLE BIOPSY/POLYPECTOMY BY BIOPSY;;  Surgeon: Sathya Norman MD;  Location: [facility]     COLONOSCOPY WITH CO2 INSUFFLATION N/A 7/7/2017    Procedure: COLONOSCOPY WITH CO2 INSUFFLATION;  Combined,  [place], Gastroesophageal reflux disease without esophagitis  Flatulence, eructation, and gas pain, BMI 29.35, [facility] [phone];  Surgeon: Sathya Norman MD;  Location: [facility] OR     COLONOSCOPY, SUBMUCOSA RESECTION N/A 10/4/2017    Procedure: COLONOSCOPY, SUBMUCOSA RESECTION;  Colonoscopy With Endoscopic Polypectomy with clips;  Surgeon: Milton Javier MD;  Location: [facility] OR     COMBINED ESOPHAGOSCOPY, GASTROSCOPY, DUODENOSCOPY (EGD) WITH CO2 INSUFFLATION N/A 7/7/2017    Procedure: COMBINED ESOPHAGOSCOPY, GASTROSCOPY, DUODENOSCOPY (EGD) WITH CO2 INSUFFLATION;  Combined,  [place], Gastroesophageal reflux disease without esophagitis  Flatulence, eructation, and gas pain, BMI 29.35, [facility] [id number];  Surgeon: Sathya Norman MD;  Location: [facility] OR     ESOPHAGOSCOPY, GASTROSCOPY, DUODENOSCOPY (EGD), COMBINED N/A 7/7/2017    Procedure: COMBINED ESOPHAGOSCOPY, GASTROSCOPY, DUODENOSCOPY (EGD), BIOPSY SINGLE OR MULTIPLE;;  Surgeon: Sathya Norman MD;  Location: [facility] OR     ESOPHAGOSCOPY, GASTROSCOPY, DUODENOSCOPY (EGD), COMBINED N/A 1/6/2019    Procedure: COMBINED ESOPHAGOSCOPY, GASTROSCOPY, DUODENOSCOPY (EGD);  Surgeon: Cailin Paulino MD;  Location: [facility] GI     GALLBLADDER SURGERY       NECK SURGERY       OPTICAL TRACKING SYSTEM CRANIOTOMY, EXCISE TUMOR, COMBINED Right 2/22/2017    Procedure:  COMBINED OPTICAL TRACKING SYSTEM CRANIOTOMY, EXCISE TUMOR;  Surgeon: Lenora Pérez MD;  Location: [facility] OR     OPTICAL TRACKING SYSTEM ENDOSCOPIC ENDONASAL SURGERY  6/23/2014    Procedure: OPTICAL TRACKING SYSTEM ENDOSCOPIC ENDONASAL SURGERY;  Surgeon: Yolanda Whitaker MD;  Location: [facility] OR     STOMACH SURGERY       TONSILLECTOMY       TUBAL LIGATION      1975       ALLERGIES  Novocain [procaine]; Mirtazapine; and Nefazodone    FAMILY HX:  Family History   Problem Relation Age of Onset     Prostate Cancer Father      Cancer Sister         SCLC     Cancer Sister      Macular Degeneration Daughter      Glaucoma No family hx of        SOCIAL HX:  History     Social History     Marital Status:      Spouse Name: N/A     Number of Children: N/A     Years of Education: N/A     Social History Main Topics     Smoking status: Former Smoker     Quit date: 06/10/2004     Smokeless tobacco: Never Used     Alcohol Use: No     Drug Use: No     Sexual Activity: Not on file     Other Topics Concern     None     Social History Narrative       ROS:  Constitutional: No fever, chills, or sweats. No weight gain/loss.   HEENT: No visual disturbance, ear ache, epistaxis, sore throat.   Allergies/Immunologic: Negative.   Respiratory: No cough, hemoptysis.   Cardiovascular: As per HPI.   GI: No nausea, vomiting, hematemesis, melena, or hematochezia.   : No urinary frequency, dysuria, or hematuria.   Integument: No rash.   Psychiatric: No anxiety / depression.   Neuro: No speech disturbance, focal sensory or motor deficit.   Endocrinology: No polyuria / polyphagia.   Musculoskeletal: No myalgia.    VITAL SIGNS:  /75 (BP Location: Left arm, Cuff Size: Adult Regular)   Pulse 74   Temp 97.5  F (36.4  C) (Oral)   Wt 80.4 kg (177 lb 3.2 oz)   LMP  (LMP Unknown)   SpO2 100%   BMI 29.49 kg/m    Body mass index is 29.49 kg/m .  Wt Readings from Last 2 Encounters:   04/23/19 80.3 kg (177 lb)   02/25/19  69.5 kg (153 lb 4.8 oz)   Repeat Blood Pressure:  BP Pulse Site Cuff Size Time Date   138 74 Left arm Adult Regular  4:16 PM 2019     Orthostatic Vitals  BP Pulse Position Site Cuff Size Time Date   138 --- Standing Left arm ---  5:20 PM 2019     Pain Information  Score Location Time Date   Moderate Pain (4) Other - see comment  4:16 PM 2019   No peak flow data filed.      PHYSICAL EXAM  Gabino Jones is a 79 year old female.in no acute distress.  HEENT: Eyes Nonicteric.  Neck: JVP normal.  Carotids +3/3 bilaterally without bruits.  Lungs: CTA.  Cor: RRR. Normal S1 and S2.  There is a SUNITHA Grade III/VI radiating from the RUSB to the carotids, more prominent than last year.  Early diastolic murmur, Grade II/VI RLSB.  No rub, or gallop.  PMI in Lf 5th ICS.  Abd: Soft, nontender, nondistended.  NABS.  No pulsatile mass.  Extremities: No C/C/E.  Pulses +3/3 symmetric in upper and lower extremities.  Neuro: Grossly intact.  Psych: A&O x 3.  Skin: No rash.    LABS  Recent Labs   Lab Test  17   0914  08/10/16   1050 16   CHOL  228*  160  166.0   HDL  55  60  71.0*   LDL  145*  80  82.4   TRIG  139  100  63.0   CHOLHDLRATIO   --    --   2.3         EK16  NSR with normal intervals and axes.  No ST shift, TWI, or Q waves.  No ectopy.  No LVH.    ECHO: 14  Global and regional left ventricular function is hyperkinetic with an EF of 65-70%. Global right ventricular function is normal. Moderate aortic stenosis. The peak aortic velocity is 3.2 m/sec. The mean gradient across the aortic valve is 22 mmHg. The aortic valve area is 1.2 cm2 by the continuity equation (with an LVOT of 23 mm). Moderate aortic insufficiency. Moderate tricuspid insufficiency is present. Moderate pulmonary hypertension. Right ventricular systolic pressure is 40 mmHg above the right atrial pressure. The inferior vena cava wi dilated at 2 cm without respiratory variability, consistent with increased right atrial  pressure. No pericardial effusion is present.    ECHO (5/6/16):  Interpretation Summary  Global and regional left ventricular function is normal with an EF of 60-65%.  Right ventricular function, chamber size, wall motion, and thickness are normal.  Mild to moderate aortic insufficiency is present.  Mild aortic stenosis is present.  Pulmonary artery systolic pressure is normal.  No pericardial effusion is present.    ECHO (4/23/18):  Mild to moderate aortic insufficiency is present.  Moderate aortic stenosis is present.  The aortic valve area is 1.2 cm^2, by the continuity equation.  Mild progression of aortic stenosis since last study in 5/2016.    ECHO:  4/30/19  Interpretation Summary:   Global and regional left ventricular function is hyperkinetic with an EF of 65-70%.  Severe aortic valve calcification is present.  Cannot exclude a bicuspid aortic valve.  Severe aortic stenosis is present.  The peak aortic velocity is 4.6 m/sec.  The mean gradient across the aortic valve is 50 mmHg.  The aortic valve area is 0.8 cm^2, by the continuity equation.  Moderate aortic insufficiency is present.  This study was compared with the study from 1/5/19 and aortic stenosis appears worse .    Cardiac MRI (5/6/16):  IMPRESSION:  1.  Normal left ventricular size and systolic function with a calculated ejection fraction of  69 %.  2.  Normal right ventricular size and systolic function with a calculated ejection fraction of 69%.   3.  There is decreased leaflet excursion with moderate sclerosis of the aortic valve with flow acceleration consistent with moderate aortic stenosis and mild aortic insufficiency.  4.  On delayed enhancement imaging, there is no abnormal hyperenhancement to suggest myocardial scar/inflammation/infiltration.  5.  There is no area of infarction or mass noted in the left ventricular apex.     STRESS TEST:  None    CARDIAC CATH: None    CT CHEST:  4/13/16  Findings: Coronary arterial calcifications. Heart  is not enlarged. Focal hypodensity left ventricular apex. In the Main pulmonary and aorta are not enlarged. No mediastinal, hilar or axillary adenopathy. Calcified left paraesophageal node. Calcified granuloma in the in the  left lower lobe medially. Mild interlobular septal thickening.   6 mm nodule left lower lobe image 42. Unchanged  3 mm nodule right upper lobe image 17. Unchanged  4 mm subpleural nodule left lower lobe image 33. Unchanged  Other scattered nodules are also unchanged     Moderate centrilobular emphysematous changes in the lungs.     Evaluation of the upper abdomen is limited. Enlarged ita hepatis  lymph nodes are decreased. Cholecystectomy clips.       Bones: No suspicious bony lesions.     IMPRESSION:  1. Stable pulmonary nodules for nearly 2 years.  2. Focal hypodensity in the left ventricular apex could represent an infarct versus ischemia. Cardiac MRI could be helpful in further evaluation.  3. Subtle groundglass opacity within the lungs could represent infection or inflammatory possibly drug induced or chronic aspiration    CARDIAC MRI:  5/6/16  IMPRESSION:  1. Normal left ventricular size and systolic function with a calculated ejection fraction of 69 %.  2. Normal right ventricular size and systolic function with a calculated ejection fraction of 69%.   3. There is decreased leaflet excursion with moderate sclerosis of the aortic valve with flow acceleration consistent with moderate aortic stenosis and mild aortic insufficiency.  4. On delayed enhancement imaging, there is no abnormal hyperenhancement to suggest myocardial scar/inflammation/infiltration.  5. There is no area of infarction or mass noted in the left ventricular apex.       ASSESSMENT AND PLAN:   1. Aortic stenosis.  Class III Symptoms.  Progression over past 6 months.  ECHO (Apr 2019) shows moderate AI and severe aortic stenosis with AVE 0.8 cm2.  Recommend referral to TAVR clinic and pre procedural coronary angiogram to  assess for associated CAD.  Once TAVR team has had the opportunity to assess her as a candidate for TAVR, I will plan on doing the coronary angiogram.  From prognostic standpoint, it is my understanding that her melanoma is in remission.  She carries a diagnosis of Alzheimer's dementia but her prognosis is > 1 year.      2. R/O Cardiac Mass.  The CT scan reported out a hypodensity in the LV apex but this could not be further defined.  Cardiac MRI showed no mass.    3. Lipids.  LDL increased from 80 to 145 mg/dl.  Patient on Pravastatin 40 mg qd.  Convert to Lipitor 20 mg qd.    4. HTN.  Well controlled.  Continue on Lisinopril 30 qd and HCTZ 12.5 qd.  The patient's lightheadedness does not appear to be orthostatic in nature.  Given the prior bleeding, check CBC today.      FOLLOW UP:  TAVR clinic, next month    Blake Hobbs MD    Divisions of Cardiology  [facility]  Patient Care Team:  Rebekah Boogie MD as PCP - General (Family Practice)  Garett Obregon MD as MD (Oncology)  Laron Archibald MD as MD (Ophthalmology)  Yolanda Whitaker MD as MD (Otolaryngology)  Matt Quiroga MD as MD (Ophthalmology)  Rebekah Boogie MD as Assigned PCP  Marilin Sesay, RN as Nurse Coordinator (Oncology)  YOLANDA WHITAKER

## 2019-05-13 NOTE — NURSING NOTE
Gabino Jones's goals for this visit include: Follow up on results  She requests these members of her care team be copied on today's visit information: PCP    PCP: Rebekah Boogie    Referring Provider:  No referring provider defined for this encounter.    LMP  (LMP Unknown)     Do you need any medication refills at today's visit? NO

## 2019-05-13 NOTE — PATIENT INSTRUCTIONS
The following is a summary of your office visit:    Medications started today:none    Medications stopped today:none    Medication dose change: none    Nurse contact information: Guerita Cerna RN  Cardiology Care Coordinator  [phone] Phone  [phone] Fax    Appointments made today: Plan for appointment with the TAVR clinic at the [facility] [place]    Patient instructions:Dr Hobbs talked to you about your echo results. You have aortic valve disease and he would like you to see the TAVR clinic at the [facility] to discuss what type of repair would be best for you. This would be the time to have interested family members present.  You might have a surgical repair in which they would open your chest, or using a catheter in your leg and fixing the valve that way. If the TAVR providers feel you will benefit from the procedure, you may need a coronary angiogram done at the [facility] with Dr Hobbs.   You will be called by the TAVR coordinator this week for an appointment.     If you have had any blood work, imaging or other testing completed we will be in touch within 1-2 weeks regarding the results. If you have any questions, concerns or need to schedule a follow up, please contact us at [phone]. If you are needing refills please contact your pharmacy. For urgent after hour care please call the [facility] at [phone] or the [facility] at [phone] and ask to speak to the cardiologist on call.    It was a pleasure meeting with you today. Please let us know if there is anything else we can do for you so that we can be sure you are leaving completely satisfied with your care experience.     Your Cardiology Team at [facility]  RN Care Coordinator: Guerita UREÑA: Alla              Patient Education     Heart Valve Problems: Aortic Stenosis  Aortic stenosis means your aortic valve has a problem opening. The left ventricle has to work  harder to push the blood through the valve. In some cases, this extra work will make the muscle of the ventricle thicken. In time, the extra work can tire the heart and cause the heart muscle to weaken. Stenosis usually get worse slowly, over many years. But sometimes it can quickly get worse.     Open aortic valve with stenosis (viewed from above).        Open aortic valve with stenosis (viewed from above). Cross section of heart showing aortic valve with stenosis.   Possible causes  Calcium deposits can form on the aortic valve as you get older. These deposits make the valve stiff and hard to open. In some cases, you may have been born with an abnormal aortic valve. Or your aortic valve may have been damaged by rheumatic fever or a heart infection.     Treating aortic stenosis  In many cases, treatment won t be needed unless you have symptoms. If you do have symptoms, medicines may help relieve them. If the stenosis is severe, your doctor may recommend surgery to replace the valve, even if you don t have symptoms.  Date Last Reviewed: 6/1/2016 2000-2018 [employer]. [address] [place] [address], [place] [address]. All rights reserved. This information is not intended as a substitute for professional medical care. Always follow your healthcare professional's instructions.

## 2019-05-15 NOTE — TELEPHONE ENCOUNTER
----- Message from Guerita Cerna RN sent at 5/15/2019 10:30 AM CDT -----  Regarding: TAVR referral  Hi,     Dr Hobbs would like patient to be seen.    Thank you    Guerita

## 2019-05-15 NOTE — TELEPHONE ENCOUNTER
Pt referred to valve clinic by Dr. Hobbs. Left message on 5/15/19 requesting patient call me back to schedule.

## 2019-05-15 NOTE — TELEPHONE ENCOUNTER
Called and left message on 's voicemail that patient's Hgb has dropped a little more since her last check. Dr Hobbs recommends it be rechecked in a few weeks. Recommended she have it checked at her next appointment on 6/13. Left phone number if they have any questions.     DBaSINGH hoover

## 2019-05-20 NOTE — TELEPHONE ENCOUNTER
ranitidine (ZANTAC) 150 MG tablet    Routing refill request to provider for review/approval because:  Drug not active on patient's medication list

## 2019-05-23 NOTE — TELEPHONE ENCOUNTER
ATORVASTATIN 20MG TABLETS  Last Written Prescription Date:  04/03/2018  Last Fill Quantity: 90 TABLETS,  # refills: 3   Last office visit: 5/13/2019 with prescribing provider:    Last refill per Pharmacy   Future Office Visit:      Molina Medley CMA ([facility])

## 2019-05-30 NOTE — TELEPHONE ENCOUNTER
Pending Prescriptions:                       Disp   Refills    benzonatate (TESSALON) 100 MG capsule      30 cap*0        Sig: TAKE 1 CAPSULE BY MOUTH THREE TIMES DAILY( EVERY           EIGHT HOURS) AS NEEDED FOR COUGH    Routing refill request to provider for review/approval because:  Drug not on the [facility] refill protocol

## 2019-06-13 NOTE — LETTER
6/13/2019       RE: Gabino Jones  [address]  [facility] [address]     Dear Colleague,    Thank you for referring your patient, Gabino Jones, to the [facility] EAR NO[facility]ROAT at [facility]. Please see a copy of my visit note below.    DIAGNOSIS:  Right-sided sinonasal mucosal malignant melanoma.      TREATMENT:  The patient underwent endoscopic resection on 07/23/2014.  She completed postoperative radiation therapy on 09/11/2014.      HISTORY OF PRESENT ILLNESS:  Ms. Jones is a 79-year-old female who is back to the Otolaryngology Clinic for tumor surveillance followup.  The patient has recovered quite well from her last hospital admission back in January of 2019.  Unfortunately, the patient did cancel her appointment with Dr. Garett Obregon from Oncology.  I wanted her to see Dr. Obregon regarding her possible metastatic melanoma.  The patient is here today with her .  He states that she is actually doing much better. He is telling me that they are using the Ponaris on a regular basis and this has improved the nasal crusting.  Also, they were slightly confused regarding the oncology visit that they had in April.  She does not have any new symptoms.  No epistaxis, no nasal congestion or obstruction.        MEDICATIONS:     Current Outpatient Medications   Medication Sig Dispense Refill     acetaminophen (TYLENOL) 500 MG tablet Take 1 tablet (500 mg) by mouth every 6 hours as needed 100 tablet 3     atorvastatin (LIPITOR) 20 MG tablet Take 1 tablet (20 mg) by mouth daily 90 tablet 3     benzonatate (TESSALON) 100 MG capsule TAKE 1 CAPSULE BY MOUTH THREE TIMES DAILY( EVERY EIGHT HOURS) AS NEEDED FOR COUGH 60 capsule 0     buPROPion (WELLBUTRIN XL) 150 MG 24 hr tablet TAKE 1 TABLET(150 MG) BY MOUTH EVERY MORNING 90 tablet 1     hydrochlorothiazide (HYDRODIURIL) 12.5 MG tablet Take 1 tablet (12.5 mg) by mouth daily 90 tablet 0     lisinopril (PRINIVIL/ZESTRIL) 30 MG tablet  Take 1 tablet (30 mg) by mouth daily 90 tablet 0     melatonin 1 MG TABS tablet        OLANZapine (ZYPREXA) 5 MG tablet Take 1 tablet (5 mg) by mouth At Bedtime 90 tablet 0     pantoprazole (PROTONIX) 40 MG EC tablet Take 1 tablet (40 mg) by mouth daily Take 30-60 minutes before a meal. 90 tablet 1     potassium chloride ER (K-DUR/KLOR-CON M) 10 MEQ CR tablet Take 1 tablet (10 mEq) by mouth 2 times daily 60 tablet 3     ranitidine (ZANTAC) 150 MG tablet Take 1 tablet (150 mg) by mouth At Bedtime 90 tablet 1     SENEXON-S 8.6-50 MG tablet TK 1 TO 2 TS PO BID  1     venlafaxine (EFFEXOR-XR) 75 MG 24 hr capsule TAKE 3 CAPSULES BY MOUTH DAILY 270 capsule 2       ALLERGIES:    Allergies   Allergen Reactions     Novocain [Procaine] Unknown and Rash     Mirtazapine      Nefazodone Unknown and Rash       HABITS/SOCIAL HISTORY: No smoking, no drinking.    PAST MEDICAL HISTORY:   Past Medical History:   Diagnosis Date     Anxiety      Arthritis      Cancer (H)      Chronic back pain      Chronic leg pain      Dementia      Depression      Depressive disorder 1/01/191992     Gastroesophageal reflux disease      Hearing loss      Heart murmur      History of radiation therapy     Treatment for nasal cancer     Hoarseness      Hyperlipidemia      Hypertension      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     Reduced vision      Ringing in ears      Sensorineural hearing loss      Tinnitus         FAMILY HISTORY:    Family History   Problem Relation Age of Onset     Prostate Cancer Father      Cancer Sister         SCLC     Cancer Sister      Macular Degeneration Daughter      Glaucoma No family hx of        REVIEW OF SYSTEMS:  12 point ROS was negative other than the symptoms noted above in the HPI.     PHYSICAL EXAMINATION:   Constitutional: The patient was well-groomed and in no acute distress. The patient is slightly pale.    Skin: Warm and pink.    Neurologic: Alert and oriented x3. Cranial nerves III-XII within normal limits.  Voice quality is normal.    Psychiatric: The patient's affect was calm, cooperative and appropriate.    Respiratory: Breathing comfortably without stridor or exertion of accessory muscles.    Eyes: Pupils were equal and reactive. Extraocular movements are intact.    Head: Normocephalic and atraumatic. No lesions or scars.    Ears: External auditory canals and tympanic membranes were clear.    Nose: Sinuses were nontender. Anterior rhinoscopy revealed midline septum and absence of purulence or polyps.    Oral cavity/Oropharynx: Normal tongue, floor of mouth, buccal mucosa and palate. No lesions or masses on inspection or palpation. No abnormal lymph tissue in the oropharynx.    Neck: The parotids are soft without masses. Supple with normal laryngeal and tracheal landmarks.    Lymphatic: There is no palpable lymphadenopathy or other masses in the neck or parotids.      PROCEDURE: Nasal endoscopy: The risks and benefits of the procedure were discussed and written consent was obtained. A timeout was performed. The patient's nose was sprayed with lidocaine and Afrin, and a 0-degree nasal endoscope was used to gain access into the nasal cavity.  Septum is in the midline.  On the right side, evidence of prior surgery on the sinuses.  There is no evidence of masses or lesions on today's examination.  Very minimal crusting and secretions, but everything looks normal.      ASSESSMENT:  This is a 79-year-old female with right-sided mucosal malignant melanoma, status post-surgery followed by radiation therapy.  The patient unfortunately did not see Dr. Obregon from Oncology regarding  the possible metastatic melanoma.        PLAN:  We are going to make sure we got her in to see Dr. Garett Obregon.  We are going to order an MRI of the sinus and the brain as well as a CT chest, abdomen and pelvis before she sees Dr. Garett Obregon.  From an otolaryngology standpoint, I would like to see her back in six months.       Yolanda FELIX  POONAM Whitaker.  Otolaryngology- Head & Neck Surgery  [phone]

## 2019-06-13 NOTE — NURSING NOTE
"Chief Complaint   Patient presents with     RECHECK     sinus cancer follow up      Blood pressure 137/51, pulse 74, temperature 97.5  F (36.4  C), height 1.63 m (5' 4.17\"), weight 82.1 kg (181 lb), not currently breastfeeding.    Sung Strauss LPN    "

## 2019-06-13 NOTE — PATIENT INSTRUCTIONS
1. Please follow-up in clinic in 6 months.   2. Please call the ENT clinic with any questions,concerns, new or worsening symptoms.    -Clinic number is [phone]   - Ml's direct line (Dr. Barboza's nurse) [phone]    3. We need to arrange follow-up with Dr. Obregon. We also need you to get updated MRI and CT scans.

## 2019-06-13 NOTE — PROGRESS NOTES
DIAGNOSIS:  Right-sided sinonasal mucosal malignant melanoma.      TREATMENT:  The patient underwent endoscopic resection on 07/23/2014.  She completed postoperative radiation therapy on 09/11/2014.      HISTORY OF PRESENT ILLNESS:  Ms. Jones is a 79-year-old female who is back to the Otolaryngology Clinic for tumor surveillance followup.  The patient has recovered quite well from her last hospital admission back in January of 2019.  Unfortunately, the patient did cancel her appointment with Dr. Garett Obregon from Oncology.  I wanted her to see Dr. Obregon regarding her possible metastatic melanoma.  The patient is here today with her .  He states that she is actually doing much better. He is telling me that they are using the Ponaris on a regular basis and this has improved the nasal crusting.  Also, they were slightly confused regarding the oncology visit that they had in April.  She does not have any new symptoms.  No epistaxis, no nasal congestion or obstruction.        MEDICATIONS:     Current Outpatient Medications   Medication Sig Dispense Refill     acetaminophen (TYLENOL) 500 MG tablet Take 1 tablet (500 mg) by mouth every 6 hours as needed 100 tablet 3     atorvastatin (LIPITOR) 20 MG tablet Take 1 tablet (20 mg) by mouth daily 90 tablet 3     benzonatate (TESSALON) 100 MG capsule TAKE 1 CAPSULE BY MOUTH THREE TIMES DAILY( EVERY EIGHT HOURS) AS NEEDED FOR COUGH 60 capsule 0     buPROPion (WELLBUTRIN XL) 150 MG 24 hr tablet TAKE 1 TABLET(150 MG) BY MOUTH EVERY MORNING 90 tablet 1     hydrochlorothiazide (HYDRODIURIL) 12.5 MG tablet Take 1 tablet (12.5 mg) by mouth daily 90 tablet 0     lisinopril (PRINIVIL/ZESTRIL) 30 MG tablet Take 1 tablet (30 mg) by mouth daily 90 tablet 0     melatonin 1 MG TABS tablet        OLANZapine (ZYPREXA) 5 MG tablet Take 1 tablet (5 mg) by mouth At Bedtime 90 tablet 0     pantoprazole (PROTONIX) 40 MG EC tablet Take 1 tablet (40 mg) by mouth daily Take 30-60 minutes before a  meal. 90 tablet 1     potassium chloride ER (K-DUR/KLOR-CON M) 10 MEQ CR tablet Take 1 tablet (10 mEq) by mouth 2 times daily 60 tablet 3     ranitidine (ZANTAC) 150 MG tablet Take 1 tablet (150 mg) by mouth At Bedtime 90 tablet 1     SENEXON-S 8.6-50 MG tablet TK 1 TO 2 TS PO BID  1     venlafaxine (EFFEXOR-XR) 75 MG 24 hr capsule TAKE 3 CAPSULES BY MOUTH DAILY 270 capsule 2       ALLERGIES:    Allergies   Allergen Reactions     Novocain [Procaine] Unknown and Rash     Mirtazapine      Nefazodone Unknown and Rash       HABITS/SOCIAL HISTORY: No smoking, no drinking.    PAST MEDICAL HISTORY:   Past Medical History:   Diagnosis Date     Anxiety      Arthritis      Cancer (H)      Chronic back pain      Chronic leg pain      Dementia      Depression      Depressive disorder 1/01/191992     Gastroesophageal reflux disease      Hearing loss      Heart murmur      History of radiation therapy     Treatment for nasal cancer     Hoarseness      Hyperlipidemia      Hypertension      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     Reduced vision      Ringing in ears      Sensorineural hearing loss      Tinnitus         FAMILY HISTORY:    Family History   Problem Relation Age of Onset     Prostate Cancer Father      Cancer Sister         SCLC     Cancer Sister      Macular Degeneration Daughter      Glaucoma No family hx of        REVIEW OF SYSTEMS:  12 point ROS was negative other than the symptoms noted above in the HPI.     PHYSICAL EXAMINATION:   Constitutional: The patient was well-groomed and in no acute distress. The patient is slightly pale.    Skin: Warm and pink.    Neurologic: Alert and oriented x3. Cranial nerves III-XII within normal limits. Voice quality is normal.    Psychiatric: The patient's affect was calm, cooperative and appropriate.    Respiratory: Breathing comfortably without stridor or exertion of accessory muscles.    Eyes: Pupils were equal and reactive. Extraocular movements are intact.    Head:  Normocephalic and atraumatic. No lesions or scars.    Ears: External auditory canals and tympanic membranes were clear.    Nose: Sinuses were nontender. Anterior rhinoscopy revealed midline septum and absence of purulence or polyps.    Oral cavity/Oropharynx: Normal tongue, floor of mouth, buccal mucosa and palate. No lesions or masses on inspection or palpation. No abnormal lymph tissue in the oropharynx.    Neck: The parotids are soft without masses. Supple with normal laryngeal and tracheal landmarks.    Lymphatic: There is no palpable lymphadenopathy or other masses in the neck or parotids.      PROCEDURE: Nasal endoscopy: The risks and benefits of the procedure were discussed and written consent was obtained. A timeout was performed. The patient's nose was sprayed with lidocaine and Afrin, and a 0-degree nasal endoscope was used to gain access into the nasal cavity.  Septum is in the midline.  On the right side, evidence of prior surgery on the sinuses.  There is no evidence of masses or lesions on today's examination.  Very minimal crusting and secretions, but everything looks normal.      ASSESSMENT:  This is a 79-year-old female with right-sided mucosal malignant melanoma, status post-surgery followed by radiation therapy.  The patient unfortunately did not see Dr. Obregon from Oncology regarding  the possible metastatic melanoma.        PLAN:  We are going to make sure we got her in to see Dr. Garett Obregon.  We are going to order an MRI of the sinus and the brain as well as a CT chest, abdomen and pelvis before she sees Dr. Garett Obregon.  From an otolaryngology standpoint, I would like to see her back in six months.       Yolanda Whitaker M.D.  Otolaryngology- Head & Neck Surgery  [phone]

## 2019-06-14 NOTE — TELEPHONE ENCOUNTER
RECORDS RECEIVED FROM:   DATE RECEIVED:   NOTES STATUS DETAILS   OFFICE NOTE from referring provider  Internal    OFFICE NOTE from other cardiologists - 1 yr Internal    DISCHARGE SUMMARY from hospital/ER - 1 yr Internal    CTS OPERATIVE REPORT - all   N/A    MEDICATION LIST   Internal    DIAGNOSTIC PROCEDURES     PFT - most recent   N/A    Chest XR - most recent   Internal    IMAGING (DISC & REPORT)      ECHO's - most recent   Internal    Vein Mapping - most recent   N/A    Coronary Angiogram - most recent   N/A    Chest CT/CTA Coronary arteries with calcium score - most recent Internal    MRI - most recent   Internal 5-6-16   PET Scan - Most recent   Internal 6-10-14   Carotid Ultrasound - Most recent   N/A

## 2019-06-17 NOTE — PROGRESS NOTES
Date: 6/17/2019    Time of Call: 1:03 PM     Diagnosis:  Severe aortic stenosis     [ TORB ] Ordering provider: Drew Oates MD  Order:   1. Labs CMP and CBC  2. CT TAVR  3. PFT's  4. Bilateral carotid US     Order received by: Ml Neri RN     Follow-up/additional notes:

## 2019-06-19 PROBLEM — M51.379 DDD (DEGENERATIVE DISC DISEASE), LUMBOSACRAL: Status: ACTIVE | Noted: 2019-01-01

## 2019-06-19 PROBLEM — M79.7 FIBROMYALGIA: Status: ACTIVE | Noted: 2019-01-01

## 2019-06-19 PROBLEM — F03.90 SENILE DEMENTIA (H): Status: ACTIVE | Noted: 2019-01-01

## 2019-06-19 PROBLEM — J44.9 COPD (CHRONIC OBSTRUCTIVE PULMONARY DISEASE) (H): Status: ACTIVE | Noted: 2019-01-01

## 2019-06-19 NOTE — NURSING NOTE
"Oncology Rooming Note    June 19, 2019 2:55 PM   Gabino Jones is a 79 year old female who presents for:    Chief Complaint   Patient presents with     Oncology Clinic Visit     Return Melanoma of Nasal Cavity     Initial Vitals: BP (!) 136/96   Pulse 79   Temp 98.4  F (36.9  C) (Oral)   Resp 16   Wt 83 kg (183 lb)   LMP  (LMP Unknown)   SpO2 100%   BMI 31.24 kg/m   Estimated body mass index is 31.24 kg/m  as calculated from the following:    Height as of 6/13/19: 1.63 m (5' 4.17\").    Weight as of this encounter: 83 kg (183 lb). Body surface area is 1.94 meters squared.  Moderate Pain (5) Comment: legs   No LMP recorded (lmp unknown). Patient is postmenopausal.  Allergies reviewed: Yes  Medications reviewed: Yes    Medications: Medication refills not needed today.  Pharmacy name entered into [facility]: [facility] [address] [place] [address] [address] [facility] [address]    Clinical concerns: Nerve pain;       Giovana Starks CMA              "

## 2019-06-19 NOTE — PROGRESS NOTES
[facility]  FOLLOW-UP VISIT NOTE    PATIENT NAME: Gabino Jones MRN # [id number]  DATE OF VISIT: Jun 19, 2019 YOB: 1940    REFERRING PROVIDER: Yolanda Whitaker MD   PHYSICIANS  [address]  [place] [address]    CANCER TYPE: Malignant Melanoma  STAGE:   ECOG PS: 1    TREATMENT SUMMARY:  Gabino presented with difficulty to breathe for previous 6-7 months due to partial nasal obstruction starting December 2013. She had been following with a local ENT surgeon and was prescribed nasal drops several courses of antibiotics for presumed ethmoid sinusitis.  Most recently in the last couple of weeks she has passed a few blood clots from her nose. She had an episode of epistaxis in April of 2014 which resolved on its own. On 06/03/14, she underwent right intranasal endoscopic sinus surgery for her ethmoid sinusitis as well as resection of the right nasal mass measuring 2.5 x 2 x 1.2 cm at [facility]. Following the procedure, she continued to have hemorrhage from the right nasal cavity and was taken back to the OR that same day for a repeat nasal endoscopy, where it was determined that it was a posterior bleed and that the mass had not been completely resected. The remainder of the mass was resected and the bleeding stopped. The following morning (6/04/14), she underwent another endoscopy at [facility] due to continued bleeding, and her right nasal cavity was packed. The histopathology from her recent resection revealed malignant melanoma.  The margins were positive.  This prompted a referral to [facility] and she was seen in ENT surgery by Dr. Whitaker and Dr. Ron Quiroga in Radiation/Oncology in addition to Medical Oncology.  She was worked up with a staging PET/CT scan and a brain MRI.  The PET/CT scan revealed minimal uptake in the local area which could very well be from recent surgery or residual  disease.  She had multiple subcentimeter pulmonary nodules with the largest one being 6 mm in the left lower lobe.  She had a history of pulmonary nodules which had been previously followed for 3 years. The PET was also significant for an enlarged portacaval node with mild FDG uptake and FDG uptake in the hepatic flexure of her colon.  Her most recent colonoscopy has been merely 2 months ago and was completely normal.  Her brain MRI was limited because of motion artifact. There were no enlarged lymph nodes in the head and neck region or any other site of increased FDG uptake that would be suspicious for definitive metastatic disease.      Her case was reviewed at the multi-specialty tumor board and she was recommended endoscopic resection of the melanoma followed by post operative radiation therapy. She underwent a rerevision surgery on 06/23/2014 using an endoscopic endonasal approach. Tumor was located at the anterior skull base between the middle turbinate and the septum. Final pathology report showed clear margins. She was started on radiation therapy under care of Dr. Ron Quiroga in July. Unfortunately she fell and fractured her left humerus in end of August. It was decided not to proceed with surgical intervention as this would involve intubation which would be difficult given ongoing radiation therapy. She had a difficult hospital course with UTI, confusion. She was managed conservatively and radiation therapy was resumed - eventually completed on September 11, 2014.     She has been followed with surveillance imaging since 2014. MRI in 2016 and again on 2/15/17 showed a 13 mm enhancing T1 hypointense lesion in the right side of the frontal bone. This was biopsied by neurosurgery on 2/22/17 and was benign.       ALAN Lloyd is being followed for her malignant melanoma status post resection on 6/04/14 and then re-resection on 06/24/14 with negative margins followed by radiation therapy from July through  September 11, 2014.     She is accompanied by her  at this clinic visit. She missed getting her hearing aids and had a real hard time understanding anything. She has no energy and is SOB at rest. She can sleep all day long.     She had brain MRI done earlier today and comes in for a follow up       PAST MEDICAL HISTORY   1. HTN  2. Dyslipidemia  3. Depression on medications  4. Pulmonary nodules  5. Cholecystectomy  6. OA - Back and Neck surgeries done   7. Fibromyalgia      CURRENT OUTPATIENT MEDICATIONS     Current Outpatient Medications   Medication Sig     acetaminophen (TYLENOL) 500 MG tablet Take 1 tablet (500 mg) by mouth every 6 hours as needed     atorvastatin (LIPITOR) 20 MG tablet Take 1 tablet (20 mg) by mouth daily     benzonatate (TESSALON) 100 MG capsule TAKE 1 CAPSULE BY MOUTH THREE TIMES DAILY( EVERY EIGHT HOURS) AS NEEDED FOR COUGH     buPROPion (WELLBUTRIN XL) 150 MG 24 hr tablet TAKE 1 TABLET(150 MG) BY MOUTH EVERY MORNING     hydrochlorothiazide (HYDRODIURIL) 12.5 MG tablet Take 1 tablet (12.5 mg) by mouth daily     lisinopril (PRINIVIL/ZESTRIL) 30 MG tablet Take 1 tablet (30 mg) by mouth daily     melatonin 1 MG TABS tablet      OLANZapine (ZYPREXA) 5 MG tablet Take 1 tablet (5 mg) by mouth At Bedtime     pantoprazole (PROTONIX) 40 MG EC tablet Take 1 tablet (40 mg) by mouth daily Take 30-60 minutes before a meal.     potassium chloride ER (K-DUR/KLOR-CON M) 10 MEQ CR tablet Take 1 tablet (10 mEq) by mouth 2 times daily     ranitidine (ZANTAC) 150 MG tablet Take 1 tablet (150 mg) by mouth At Bedtime     SENEXON-S 8.6-50 MG tablet TK 1 TO 2 TS PO BID     venlafaxine (EFFEXOR-XR) 75 MG 24 hr capsule TAKE 3 CAPSULES BY MOUTH DAILY     No current facility-administered medications for this visit.           ALLERGIES     Allergies   Allergen Reactions     Novocain [Procaine] Unknown and Rash     Mirtazapine      Nefazodone Unknown and Rash      PHYSICAL EXAM   BP (!) 136/96   Pulse 79   Temp  98.4  F (36.9  C) (Oral)   Resp 16   Wt 83 kg (183 lb)   LMP  (LMP Unknown)   SpO2 100%   BMI 31.24 kg/m     SpO2 Readings from Last 4 Encounters:   09/12/18 100%   07/20/18 98%   04/30/18 98%   03/14/18 99%     Wt Readings from Last 3 Encounters:   06/19/19 83 kg (183 lb)   06/13/19 82.1 kg (181 lb)   05/13/19 80.4 kg (177 lb 3.2 oz)     GEN: NAD  HEENT: pupils equal, EOMI, no icterus. Oropharynx is clear.   NECK: no obvious cervical or supraclavicular lymphadenopathy  LUNGS: clear bilaterally  CV: regular rate and rhythm, harsh systolic murmur   ABDOMEN: soft, non-tender, non-distended, normal bowel sounds, no hepatosplenomegaly  EXT: warm, well perfused, no edema  NEURO: alert  SKIN: no rashes     LABORATORY AND IMAGING STUDIES     Recent Labs   Lab Test 05/13/19  1722 04/23/19  1511 02/25/19  1655 02/06/19 01/24/19  0728 01/23/19  0657    127* 131*  --  132* 131*   POTASSIUM 3.6 4.0 4.2 4.3 5.0 4.7   CHLORIDE 104 96 97  --  98 98   CO2 26 26 29  --  24 25   ANIONGAP 9 5 5  --  10 8   BUN 19 7 64*  --  35* 32*   CR 0.86 0.86 1.34* 2.06* 1.08* 1.02   * 99 118* 144* 91 99   STEFFANIE 8.6 8.8 8.9  --  8.9 8.7     Recent Labs   Lab Test 01/20/19  0754 01/17/19  0649 01/11/19  0613 01/10/19  0645 01/10/19  0432 01/09/19  1653 01/05/19  0322   MAG 2.3 1.9 2.0  --  2.7* 1.5* 1.9   PHOS 3.9  --  3.1 3.9  --  1.6* 3.6     Recent Labs   Lab Test 05/13/19  1722 02/25/19  1655 01/22/19  0751 01/18/19  0638 01/16/19  0620  01/07/19  0748  12/12/18  1245 09/12/18  1126 03/14/18  1350 12/13/17  1026   WBC 8.6 7.5 7.7 10.7 7.8   < > 15.8*   < > 10.3 9.5 6.8 8.6   HGB 8.2* 9.3* 9.2* 8.8* 8.5*   < > 7.4*   < > 10.2* 10.5* 11.4* 9.3*    341 504* 524* 473*   < > 233   < > 282 339 225 275   MCV 82 86 86 87 86   < > 86   < > 83 84 88 91   NEUTROPHIL  --   --   --   --   --   --  93.0  --  76.5 77.7 73.8 74.0    < > = values in this interval not displayed.     Recent Labs   Lab Test 04/23/19  1511 02/06/19  01/09/19  1653 01/05/19  0322  12/12/18  1245 09/12/18  1126 03/14/18  1350   BILITOTAL 0.3  --  0.4 0.4  --  0.3 0.4 0.3   ALKPHOS 112  --  83 89  --  96 107 83   ALT 19 18 25 23   < > 16 19 13   AST 19 23 33 38   < > 19 20 19   ALBUMIN 3.4  --  2.4* 2.8*  --  3.2* 3.3* 3.5   LDH  --   --   --   --   --  208 182 168    < > = values in this interval not displayed.     TSH   Date Value Ref Range Status   12/12/2018 1.72 0.40 - 4.00 mU/L Final   02/15/2017 2.27 0.40 - 4.00 mU/L Final   08/10/2016 2.32 0.40 - 4.00 mU/L Final     No results for input(s): CEA in the last 82623 hours.  Results for orders placed or performed in visit on 06/19/19   MRI Brain w & w/o contrast    Narrative    MR BRAIN W/O & W CONTRAST 6/19/2019 1:26 PM    History:  Right-sided sinonasal mucosal malignant melanoma. previous  focus in the periventricular / subependymal white matter of the left  frontal lobe . please evaluate; Malignant melanoma of nasal cavity  (H); Brain lesion  ICD-10: Malignant melanoma of nasal cavity (H); Brain lesion    Comparison:  1/16/19 dated MRI.     Technique: Sagittal and coronal T1-weighted and axial T2-weighted  images with fat saturation of the face and nasopharynx from the roofs  of the orbits through the base of C2 were obtained without intravenous  contrast. Following intravenous administration of gadolinium, axial  and coronal T1-weighted images with fat saturation of the face were  also obtained.    Contrast: 8ml Gadavist    Findings: Motion degraded study. This limits the interpretation.  Redemonstration of a punctate (5.1 mm) enhancing focus in the left  frontal paraventricular subependymal region, not significantly changed  in size. There is an 8.8 mm extra-axial dural-based lesion along the  cribriform plate in the anterior cranial fossa compatible with a  meningioma. This is unchanged. Postradiation changes in the anterior  frontal white matter. No new lesions are identified. There is no  evidence of  acute infarction. Sinonasal postsurgical changes are seen.  Inflammatory mucosal thickening of the nasal sinuses. Complete  opacification of the right ethmoid air cells. Previously seen sphenoid  sinus, there are changes have resolved. The left mastoid air cells are  clear.    FLAIR weighted sequence demonstrates numerous foci of FLAIR  hyperintensities in bilateral frontal, parietal and temporal white  matter. Several similar signal changes in the juliocesar. These are most  compatible with moderate chronic microvascular ischemic changes.    Right frontal craniotomy changes are seen. Abnormal signal along the  bone about the craniotomies with adjacent enhancement of the scalp  overlying the bone. These are indeterminate and attention on follow-up  is recommended.      Impression    Impression:     No change in left frontal periventricular/subependymal enhancing 5 mm  focus. Given lack of increase in size over 6 months make metastasis  less likely.    Unchanged 8.8 mm meningioma along the anterior cranial  fossa/cribriform plate.    Postradiation changes in bifrontal lobes.    Postsurgical changes in the sinonasal region with no definite evidence  of recurrent tumor however interpretation is suboptimal given  extensive motion artifact.    MIKEL BELTRAN MD     Study Date: 04/30/2019 01:45 PM  Age: 79 yrs  Gender: Female  Patient Location: [facility]  Reason For Study: Nonrheumatic aortic valve stenosis  Ordering Physician: YONAS HAILE  Referring Physician: YONAS HAILE  Performed By: Tatum Ward RDCS     BSA: 1.9 m2  Height: 65 in  Weight: 177 lb  HR: 82  BP: 153/63 mmHg  _____________________________________________________________________________    Procedure  Echocardiogram with two-dimensional, color and spectral Doppler performed.  _____________________________________________________________________________    Interpretation Summary     Global and regional left ventricular function is hyperkinetic with an EF of  65-70%.  Severe aortic valve calcification is present.  Cannot exclude a bicuspid aortic valve.  Severe aortic stenosis is present.  The peak aortic velocity is 4.6 m/sec.  The mean gradient across the aortic valve is50 mmHg.  The aortic valve area is 0.8 cm^2, by the continuity equation.  Moderate aortic insufficiency is present.  This study was compared with the study from 1/5/19 and aortic stenosis is  appears worse .  _____________________________________________________________________________         ASSESSMENT AND PLAN   79 year old female with right sinonasal mucosal malignant melanoma status post resection on 6/04/14 and then re-resection on 06/24/14 with negative margins followed by radiation therapy from July through September 11, 2014 (6000 cGy in 30 fractions).   b-aubrie and c-KIT status unknown  New worsening cognitive status    We reviewed her scans from earlier today. She has a 5 mm lesion in left frontal periventricular/subependymal region. This was first seen on scan in January of this year. It has been stable since January which is quite reassuring. I would favor continued observation. I will also see if Dr. Barboza and tumor board has a different recommendation.     There is no evidence of recurrent disease. She is nearly 5 years out from surgical resection and radiation, with no recurrence of disease in the brain.      She has CT scan of chest/abd/pelvis scheduled on July 10th. I will add CT neck to this for completeness. I will see her after her scans. She will follow up with Dr. Barboza the following day.     She is on venlafaxine 75 mg daily, buproprion 150 mg daily and olanzapine 5 mg po at bedtime. We will continue with the same for now.      She has a follow up in cardiology at the next follow up visit on July 10th for her severe aortic stenosis.       Over 25 min of direct face to face time spent with patient with more than 50% time spent in counseling and coordinating care.

## 2019-06-19 NOTE — LETTER
6/19/2019       RE: Gabino Jones  [address]  [facility] [address]     Dear Colleague,    Thank you for referring your patient, Gabino Jones, to the [facility]. Please see a copy of my visit note below.    [facility]  FOLLOW-UP VISIT NOTE    PATIENT NAME: Gabino Jones MRN # [id number]  DATE OF VISIT: Jun 19, 2019 YOB: 1940    REFERRING PROVIDER: Yolanda Whitaker MD   PHYSICIANS  [address]  [place] [address]    CANCER TYPE: Malignant Melanoma  STAGE:   ECOG PS: 1    TREATMENT SUMMARY:  Gabino presented with difficulty to breathe for previous 6-7 months due to partial nasal obstruction starting December 2013. She had been following with a local ENT surgeon and was prescribed nasal drops several courses of antibiotics for presumed ethmoid sinusitis.  Most recently in the last couple of weeks she has passed a few blood clots from her nose. She had an episode of epistaxis in April of 2014 which resolved on its own. On 06/03/14, she underwent right intranasal endoscopic sinus surgery for her ethmoid sinusitis as well as resection of the right nasal mass measuring 2.5 x 2 x 1.2 cm at [facility]. Following the procedure, she continued to have hemorrhage from the right nasal cavity and was taken back to the OR that same day for a repeat nasal endoscopy, where it was determined that it was a posterior bleed and that the mass had not been completely resected. The remainder of the mass was resected and the bleeding stopped. The following morning (6/04/14), she underwent another endoscopy at [facility] due to continued bleeding, and her right nasal cavity was packed. The histopathology from her recent resection revealed malignant melanoma.  The margins were positive.  This prompted a referral to [facility] and she was seen in ENT surgery by Dr. Whitaker and Dr. Velasquez  Junaid in Radiation/Oncology in addition to Medical Oncology.  She was worked up with a staging PET/CT scan and a brain MRI.  The PET/CT scan revealed minimal uptake in the local area which could very well be from recent surgery or residual disease.  She had multiple subcentimeter pulmonary nodules with the largest one being 6 mm in the left lower lobe.  She had a history of pulmonary nodules which had been previously followed for 3 years. The PET was also significant for an enlarged portacaval node with mild FDG uptake and FDG uptake in the hepatic flexure of her colon.  Her most recent colonoscopy has been merely 2 months ago and was completely normal.  Her brain MRI was limited because of motion artifact. There were no enlarged lymph nodes in the head and neck region or any other site of increased FDG uptake that would be suspicious for definitive metastatic disease.      Her case was reviewed at the multi-specialty tumor board and she was recommended endoscopic resection of the melanoma followed by post operative radiation therapy. She underwent a rerevision surgery on 06/23/2014 using an endoscopic endonasal approach. Tumor was located at the anterior skull base between the middle turbinate and the septum. Final pathology report showed clear margins. She was started on radiation therapy under care of Dr. Ron Quiroga in July. Unfortunately she fell and fractured her left humerus in end of August. It was decided not to proceed with surgical intervention as this would involve intubation which would be difficult given ongoing radiation therapy. She had a difficult hospital course with UTI, confusion. She was managed conservatively and radiation therapy was resumed - eventually completed on September 11, 2014.     She has been followed with surveillance imaging since 2014. MRI in 2016 and again on 2/15/17 showed a 13 mm enhancing T1 hypointense lesion in the right side of the frontal bone. This was biopsied by  neurosurgery on 2/22/17 and was benign.       ALAN Lloyd is being followed for her malignant melanoma status post resection on 6/04/14 and then re-resection on 06/24/14 with negative margins followed by radiation therapy from July through September 11, 2014.     She is accompanied by her  at this clinic visit. She missed getting her hearing aids and had a real hard time understanding anything. She has no energy and is SOB at rest. She can sleep all day long.     She had brain MRI done earlier today and comes in for a follow up       PAST MEDICAL HISTORY   1. HTN  2. Dyslipidemia  3. Depression on medications  4. Pulmonary nodules  5. Cholecystectomy  6. OA - Back and Neck surgeries done   7. Fibromyalgia      CURRENT OUTPATIENT MEDICATIONS     Current Outpatient Medications   Medication Sig     acetaminophen (TYLENOL) 500 MG tablet Take 1 tablet (500 mg) by mouth every 6 hours as needed     atorvastatin (LIPITOR) 20 MG tablet Take 1 tablet (20 mg) by mouth daily     benzonatate (TESSALON) 100 MG capsule TAKE 1 CAPSULE BY MOUTH THREE TIMES DAILY( EVERY EIGHT HOURS) AS NEEDED FOR COUGH     buPROPion (WELLBUTRIN XL) 150 MG 24 hr tablet TAKE 1 TABLET(150 MG) BY MOUTH EVERY MORNING     hydrochlorothiazide (HYDRODIURIL) 12.5 MG tablet Take 1 tablet (12.5 mg) by mouth daily     lisinopril (PRINIVIL/ZESTRIL) 30 MG tablet Take 1 tablet (30 mg) by mouth daily     melatonin 1 MG TABS tablet      OLANZapine (ZYPREXA) 5 MG tablet Take 1 tablet (5 mg) by mouth At Bedtime     pantoprazole (PROTONIX) 40 MG EC tablet Take 1 tablet (40 mg) by mouth daily Take 30-60 minutes before a meal.     potassium chloride ER (K-DUR/KLOR-CON M) 10 MEQ CR tablet Take 1 tablet (10 mEq) by mouth 2 times daily     ranitidine (ZANTAC) 150 MG tablet Take 1 tablet (150 mg) by mouth At Bedtime     SENEXON-S 8.6-50 MG tablet TK 1 TO 2 TS PO BID     venlafaxine (EFFEXOR-XR) 75 MG 24 hr capsule TAKE 3 CAPSULES BY MOUTH DAILY     No current  facility-administered medications for this visit.           ALLERGIES     Allergies   Allergen Reactions     Novocain [Procaine] Unknown and Rash     Mirtazapine      Nefazodone Unknown and Rash      PHYSICAL EXAM   BP (!) 136/96   Pulse 79   Temp 98.4  F (36.9  C) (Oral)   Resp 16   Wt 83 kg (183 lb)   LMP  (LMP Unknown)   SpO2 100%   BMI 31.24 kg/m      SpO2 Readings from Last 4 Encounters:   09/12/18 100%   07/20/18 98%   04/30/18 98%   03/14/18 99%     Wt Readings from Last 3 Encounters:   06/19/19 83 kg (183 lb)   06/13/19 82.1 kg (181 lb)   05/13/19 80.4 kg (177 lb 3.2 oz)     GEN: NAD  HEENT: pupils equal, EOMI, no icterus. Oropharynx is clear.   NECK: no obvious cervical or supraclavicular lymphadenopathy  LUNGS: clear bilaterally  CV: regular rate and rhythm, harsh systolic murmur   ABDOMEN: soft, non-tender, non-distended, normal bowel sounds, no hepatosplenomegaly  EXT: warm, well perfused, no edema  NEURO: alert  SKIN: no rashes     LABORATORY AND IMAGING STUDIES     Recent Labs   Lab Test 05/13/19  1722 04/23/19  1511 02/25/19  1655 02/06/19 01/24/19  0728 01/23/19  0657    127* 131*  --  132* 131*   POTASSIUM 3.6 4.0 4.2 4.3 5.0 4.7   CHLORIDE 104 96 97  --  98 98   CO2 26 26 29  --  24 25   ANIONGAP 9 5 5  --  10 8   BUN 19 7 64*  --  35* 32*   CR 0.86 0.86 1.34* 2.06* 1.08* 1.02   * 99 118* 144* 91 99   STEFFANIE 8.6 8.8 8.9  --  8.9 8.7     Recent Labs   Lab Test 01/20/19  0754 01/17/19  0649 01/11/19  0613 01/10/19  0645 01/10/19  0432 01/09/19  1653 01/05/19  0322   MAG 2.3 1.9 2.0  --  2.7* 1.5* 1.9   PHOS 3.9  --  3.1 3.9  --  1.6* 3.6     Recent Labs   Lab Test 05/13/19  1722 02/25/19  1655 01/22/19  0751 01/18/19  0638 01/16/19  0620  01/07/19  0748  12/12/18  1245 09/12/18  1126 03/14/18  1350 12/13/17  1026   WBC 8.6 7.5 7.7 10.7 7.8   < > 15.8*   < > 10.3 9.5 6.8 8.6   HGB 8.2* 9.3* 9.2* 8.8* 8.5*   < > 7.4*   < > 10.2* 10.5* 11.4* 9.3*    341 504* 524* 473*   < > 233    < > 282 339 225 275   MCV 82 86 86 87 86   < > 86   < > 83 84 88 91   NEUTROPHIL  --   --   --   --   --   --  93.0  --  76.5 77.7 73.8 74.0    < > = values in this interval not displayed.     Recent Labs   Lab Test 04/23/19  1511 02/06/19 01/09/19  1653 01/05/19  0322  12/12/18  1245 09/12/18  1126 03/14/18  1350   BILITOTAL 0.3  --  0.4 0.4  --  0.3 0.4 0.3   ALKPHOS 112  --  83 89  --  96 107 83   ALT 19 18 25 23   < > 16 19 13   AST 19 23 33 38   < > 19 20 19   ALBUMIN 3.4  --  2.4* 2.8*  --  3.2* 3.3* 3.5   LDH  --   --   --   --   --  208 182 168    < > = values in this interval not displayed.     TSH   Date Value Ref Range Status   12/12/2018 1.72 0.40 - 4.00 mU/L Final   02/15/2017 2.27 0.40 - 4.00 mU/L Final   08/10/2016 2.32 0.40 - 4.00 mU/L Final     No results for input(s): CEA in the last 97392 hours.  Results for orders placed or performed in visit on 06/19/19   MRI Brain w & w/o contrast    Narrative    MR BRAIN W/O & W CONTRAST 6/19/2019 1:26 PM    History:  Right-sided sinonasal mucosal malignant melanoma. previous  focus in the periventricular / subependymal white matter of the left  frontal lobe . please evaluate; Malignant melanoma of nasal cavity  (H); Brain lesion  ICD-10: Malignant melanoma of nasal cavity (H); Brain lesion    Comparison:  1/16/19 dated MRI.     Technique: Sagittal and coronal T1-weighted and axial T2-weighted  images with fat saturation of the face and nasopharynx from the roofs  of the orbits through the base of C2 were obtained without intravenous  contrast. Following intravenous administration of gadolinium, axial  and coronal T1-weighted images with fat saturation of the face were  also obtained.    Contrast: 8ml Gadavist    Findings: Motion degraded study. This limits the interpretation.  Redemonstration of a punctate (5.1 mm) enhancing focus in the left  frontal paraventricular subependymal region, not significantly changed  in size. There is an 8.8 mm extra-axial  dural-based lesion along the  cribriform plate in the anterior cranial fossa compatible with a  meningioma. This is unchanged. Postradiation changes in the anterior  frontal white matter. No new lesions are identified. There is no  evidence of acute infarction. Sinonasal postsurgical changes are seen.  Inflammatory mucosal thickening of the nasal sinuses. Complete  opacification of the right ethmoid air cells. Previously seen sphenoid  sinus, there are changes have resolved. The left mastoid air cells are  clear.    FLAIR weighted sequence demonstrates numerous foci of FLAIR  hyperintensities in bilateral frontal, parietal and temporal white  matter. Several similar signal changes in the juliocesar. These are most  compatible with moderate chronic microvascular ischemic changes.    Right frontal craniotomy changes are seen. Abnormal signal along the  bone about the craniotomies with adjacent enhancement of the scalp  overlying the bone. These are indeterminate and attention on follow-up  is recommended.      Impression    Impression:     No change in left frontal periventricular/subependymal enhancing 5 mm  focus. Given lack of increase in size over 6 months make metastasis  less likely.    Unchanged 8.8 mm meningioma along the anterior cranial  fossa/cribriform plate.    Postradiation changes in bifrontal lobes.    Postsurgical changes in the sinonasal region with no definite evidence  of recurrent tumor however interpretation is suboptimal given  extensive motion artifact.    MIKEL BELTRAN MD     Study Date: 04/30/2019 01:45 PM  Age: 79 yrs  Gender: Female  Patient Location: [facility]  Reason For Study: Nonrheumatic aortic valve stenosis  Ordering Physician: YONAS HAILE  Referring Physician: YONAS HAILE  Performed By: Tatum Ward RDCS     BSA: 1.9 m2  Height: 65 in  Weight: 177 lb  HR: 82  BP: 153/63 mmHg  _____________________________________________________________________________    Procedure  Echocardiogram  with two-dimensional, color and spectral Doppler performed.  _____________________________________________________________________________    Interpretation Summary     Global and regional left ventricular function is hyperkinetic with an EF of 65-70%.  Severe aortic valve calcification is present.  Cannot exclude a bicuspid aortic valve.  Severe aortic stenosis is present.  The peak aortic velocity is 4.6 m/sec.  The mean gradient across the aortic valve is50 mmHg.  The aortic valve area is 0.8 cm^2, by the continuity equation.  Moderate aortic insufficiency is present.  This study was compared with the study from 1/5/19 and aortic stenosis is  appears worse .  _____________________________________________________________________________         ASSESSMENT AND PLAN   79 year old female with right sinonasal mucosal malignant melanoma status post resection on 6/04/14 and then re-resection on 06/24/14 with negative margins followed by radiation therapy from July through September 11, 2014 (6000 cGy in 30 fractions).   b-aubrie and c-KIT status unknown  New worsening cognitive status    We reviewed her scans from earlier today. She has a 5 mm lesion in left frontal periventricular/subependymal region. This was first seen on scan in January of this year. It has been stable since January which is quite reassuring. I would favor continued observation. I will also see if Dr. Barboza and tumor board has a different recommendation.     There is no evidence of recurrent disease. She is nearly 5 years out from surgical resection and radiation, with no recurrence of disease in the brain.      She has CT scan of chest/abd/pelvis scheduled on July 10th. I will add CT neck to this for completeness. I will see her after her scans. She will follow up with Dr. Barboza the following day.     She is on venlafaxine 75 mg daily, buproprion 150 mg daily and olanzapine 5 mg po at bedtime. We will continue with the same for now.      She has a  follow up in cardiology at the next follow up visit on July 10th for her severe aortic stenosis.       Over 25 min of direct face to face time spent with patient with more than 50% time spent in counseling and coordinating care.          Again, thank you for allowing me to participate in the care of your patient.      Sincerely,    Garett Obregon MD

## 2019-06-20 PROBLEM — I51.89 OTHER ILL-DEFINED HEART DISEASES: Status: ACTIVE | Noted: 2019-01-01

## 2019-06-20 PROBLEM — I35.0 SEVERE AORTIC STENOSIS: Status: ACTIVE | Noted: 2019-01-01

## 2019-06-20 NOTE — PATIENT INSTRUCTIONS
You were seen today in the Cardiovascular Clinic at the [facility].      Cardiology Providers you saw during your visit:  Dr. Cortés and Dr. Jimenez    Recommendations:  Have the TAVR procedure.     We are going to schedule you for a CT angiogram of the chest, abdomen, and pelvis (CT TAVR):  -No caffeine, alcohol or smoking 12 hours prior to test  -Nothing by mouth 3 hours prior, however it is OK to take your medications with a sip of water.  -If you are on oral diabetes medications:  Do not take on the day of the procedure.  If you take Glucophage or Metformin:  Do not take 48 hours post procedure.    -Report to the [facility] waiting room in the [facility], [place] [address]      You will be scheduled for a Coronary Angiogram at the [facility], [address] [place] [address], [place] [address]. You are to check in at the [facility] Waiting Area at.     Pre procedure instructions:  1. Please make arrangements to have a  as you will not be allowed to drive following the procedure.  2. Do not eat or drink after midnight the day of your procedure.  3. You may take your usual morning medications with a sip of water the morning of your procedure.  4. Take a 325 mg aspirin the day before and the day of your procedure.  5. Make arrangements to have someone stay with you the night after your procedure.      Please call me if you have questions regarding these instructions or your scheduled procedure.      Follow-up:  Meet with a second surgeon and the PAC clinic.      Thank you for your visit today!     Ml Neri RN  Structural Heart Care Coordinator   TAVR, MitraClip and Watchman Programs  [facility]  Office: [phone]    Maria Alejandra Wagner RN  Structural Heart Care Coordinator   TAVR, MitraClip and Watchman Programs  [facility]  Office:  [phone]    [facility]  [address]  Cardiology Clinic [address]  [place] [address]

## 2019-06-20 NOTE — LETTER
6/20/2019      RE: Gabnio Jones  [address]  [facility] [id number]       Dear Colleague,    Thank you for the opportunity to participate in the care of your patient, Gabino Jones, at the [facility] HEART [facility] at [facility]. Please see a copy of my visit note below.    [facility] HEART CARE  CARDIOVASCULAR DIVISION    VALVE CLINIC INITIAL CONSULTATION    PRIMARY CARDIOLOGIST: Dr. 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  Her severe aortic stenosis is characterized with an area of 0.8 cm2, mean gradient 50 mmHg and peak velocity of 4.6 m/sec with LVEF 65-70% by echocardiogram on 4/30/19.  Additional notable medical history of hypertension, hyperlipidemia, prior tobacco use, and prior melanoma in remission following surgery and XRT. She previously had a chest CT which suggested a mass in her LV however subsequent cardiac MRI and echo did not show any mass.    Today she reports that she is very limited by fatigue and dyspnea. She is still recovering from a recent prolonged hospitalization and nursing home stay beginning in January. She continues to get stronger but is still unsteady on her feet and often needs the help of her  to walk. She denies chest pain, lower extremity edema, orthopnea, and PND.          PAST MEDICAL HISTORY:     Past Medical History:   Diagnosis Date     Anxiety      Arthritis      Cancer (H)      Chronic back pain      Chronic leg pain      Dementia      Depression      Depressive disorder 1/01/191992     Gastroesophageal reflux disease      Hearing loss      Heart murmur      History of radiation therapy     Treatment for nasal cancer     Hoarseness      Hyperlipidemia      Hypertension      Melanoma of  nasal cavity (H) 03/2014     Reduced vision      Ringing in ears      Sensorineural hearing loss      Tinnitus         PAST SURGICAL HISTORY:     Past Surgical History:   Procedure Laterality Date     ANESTHESIA OUT OF OR MRI N/A 1/16/2019    Procedure: Anesthesia Coverage Brain MRI With Contrast @1330;  Surgeon: GENERIC ANESTHESIA PROVIDER;  Location: [facility] OR     C ANESTH,CERV SPINE, SITTING POSITION       COLONOSCOPY N/A 7/7/2017    Procedure: COMBINED COLONOSCOPY, SINGLE OR MULTIPLE BIOPSY/POLYPECTOMY BY BIOPSY;;  Surgeon: Sathya Norman MD;  Location: [facility] OR     COLONOSCOPY WITH CO2 INSUFFLATION N/A 7/7/2017    Procedure: COLONOSCOPY WITH CO2 INSUFFLATION;  Combined,  [place], Gastroesophageal reflux disease without esophagitis  Flatulence, eructation, and gas pain, BMI 29.35, [facility] [phone];  Surgeon: Sathya Norman MD;  Location: [facility] OR     COLONOSCOPY, SUBMUCOSA RESECTION N/A 10/4/2017    Procedure: COLONOSCOPY, SUBMUCOSA RESECTION;  Colonoscopy With Endoscopic Polypectomy with clips;  Surgeon: Milton Javier MD;  [facility]     COMBINED ESOPHAGOSCOPY, GASTROSCOPY, DUODENOSCOPY (EGD) WITH CO2 INSUFFLATION N/A 7/7/2017    Procedure: COMBINED ESOPHAGOSCOPY, GASTROSCOPY, DUODENOSCOPY (EGD) WITH CO2 INSUFFLATION;  Combined,  [place], Gastroesophageal reflux disease without esophagitis  Flatulence, eructation, and gas pain, BMI 29.35, [facility] [phone];  Surgeon: Sathya Norman MD;  Location: [facility] OR     ESOPHAGOSCOPY, GASTROSCOPY, DUODENOSCOPY (EGD), COMBINED N/A 7/7/2017    Procedure: COMBINED ESOPHAGOSCOPY, GASTROSCOPY, DUODENOSCOPY (EGD), BIOPSY SINGLE OR MULTIPLE;;  Surgeon: Sathya Norman MD;  Location: [facility] OR     ESOPHAGOSCOPY, GASTROSCOPY, DUODENOSCOPY (EGD), COMBINED N/A 1/6/2019    Procedure: COMBINED ESOPHAGOSCOPY, GASTROSCOPY, DUODENOSCOPY (EGD);  Surgeon: Cailin Paulino MD;  [facility]     GALLBLADDER SURGERY       NECK  SURGERY       OPTICAL TRACKING SYSTEM CRANIOTOMY, EXCISE TUMOR, COMBINED Right 2/22/2017    Procedure: COMBINED OPTICAL TRACKING SYSTEM CRANIOTOMY, EXCISE TUMOR;  Surgeon: Lenora Pérez MD;  [facility] OR     OPTICAL TRACKING SYSTEM ENDOSCOPIC ENDONASAL SURGERY  6/23/2014    Procedure: OPTICAL TRACKING SYSTEM ENDOSCOPIC ENDONASAL SURGERY;  Surgeon: Yolanda Whitaker MD;  Location: [facility] OR     STOMACH SURGERY       TONSILLECTOMY       TUBAL LIGATION      1975        CURRENT MEDICATIONS:     Current Outpatient Medications   Medication Sig Dispense Refill     acetaminophen (TYLENOL) 500 MG tablet Take 1 tablet (500 mg) by mouth every 6 hours as needed 100 tablet 3     atorvastatin (LIPITOR) 20 MG tablet Take 1 tablet (20 mg) by mouth daily 90 tablet 3     benzonatate (TESSALON) 100 MG capsule TAKE 1 CAPSULE BY MOUTH THREE TIMES DAILY( EVERY EIGHT HOURS) AS NEEDED FOR COUGH 60 capsule 0     buPROPion (WELLBUTRIN XL) 150 MG 24 hr tablet TAKE 1 TABLET(150 MG) BY MOUTH EVERY MORNING 90 tablet 1     hydrochlorothiazide (HYDRODIURIL) 12.5 MG tablet Take 1 tablet (12.5 mg) by mouth daily 90 tablet 0     lisinopril (PRINIVIL/ZESTRIL) 30 MG tablet Take 1 tablet (30 mg) by mouth daily 90 tablet 0     melatonin 1 MG TABS tablet        OLANZapine (ZYPREXA) 5 MG tablet Take 1 tablet (5 mg) by mouth At Bedtime 90 tablet 0     pantoprazole (PROTONIX) 40 MG EC tablet Take 1 tablet (40 mg) by mouth daily Take 30-60 minutes before a meal. 90 tablet 1     potassium chloride ER (K-DUR/KLOR-CON M) 10 MEQ CR tablet Take 1 tablet (10 mEq) by mouth 2 times daily 60 tablet 3     ranitidine (ZANTAC) 150 MG tablet Take 1 tablet (150 mg) by mouth At Bedtime 90 tablet 1     SENEXON-S 8.6-50 MG tablet TK 1 TO 2 TS PO BID  1     venlafaxine (EFFEXOR-XR) 75 MG 24 hr capsule TAKE 3 CAPSULES BY MOUTH DAILY 270 capsule 2        ALLERGIES:     Allergies   Allergen Reactions     Novocain [Procaine] Unknown and Rash      Mirtazapine      Nefazodone Unknown and Rash        FAMILY HISTORY:     Family History   Problem Relation Age of Onset     Prostate Cancer Father      Cancer Sister         SCLC     Cancer Sister      Macular Degeneration Daughter      Glaucoma No family hx of         SOCIAL HISTORY:     Social History     Socioeconomic History     Marital status:      Spouse name: None     Number of children: None     Years of education: None     Highest education level: None   Occupational History     None   Social Needs     Financial resource strain: None     Food insecurity:     Worry: None     Inability: None     Transportation needs:     Medical: None     Non-medical: None   Tobacco Use     Smoking status: Former Smoker     Packs/day: 1.00     Years: 40.00     Pack years: 40.00     Types: Cigarettes     Last attempt to quit: 6/10/2004     Years since quitting: 15.0     Smokeless tobacco: Never Used   Substance and Sexual Activity     Alcohol use: No     Drug use: No     Sexual activity: Not Currently     Partners: Male     Birth control/protection: Post-menopausal, Female Surgical   Lifestyle     Physical activity:     Days per week: None     Minutes per session: None     Stress: None   Relationships     Social connections:     Talks on phone: None     Gets together: None     Attends Bahai service: None     Active member of club or organization: None     Attends meetings of clubs or organizations: None     Relationship status: None     Intimate partner violence:     Fear of current or ex partner: None     Emotionally abused: None     Physically abused: None     Forced sexual activity: None   Other Topics Concern     Parent/sibling w/ CABG, MI or angioplasty before 65F 55M? Not Asked   Social History Narrative     None        REVIEW OF SYSTEMS:     Constitutional: No fevers or chills  Skin: No new rash or itching  Eyes: No acute change in vision  Ears/Nose/Throat: No purulent rhinorrhea, new hearing loss, or new  "vertigo  Respiratory: No cough or hemoptysis  Cardiovascular: See HPI  Gastrointestinal: No change in appetite, vomiting, hematemesis or diarrhea  Genitourinary: No dysuria or hematuria  Musculoskeletal: No new back pain, neck pain or muscle pain  Neurologic: No new headaches, focal weakness or behavior changes  Psychiatric: No hallucinations, excessive alcohol consumption or illegal drug usage  Hematologic/Lymphatic/Immunologic: No bleeding, chills, fever, night sweats or weight loss  Endocrine: No new cold intolerance, heat intolerance, polyphagia, polydipsia or polyuria      PHYSICAL EXAMINATION:     /54 (BP Location: Right arm, Patient Position: Chair, Cuff Size: Adult Large)   Pulse 74   Ht 1.651 m (5' 5\")   Wt 83.5 kg (184 lb)   LMP  (LMP Unknown)   SpO2 95%   BMI 30.62 kg/m       GENERAL: No acute distress.  HEENT: EOMI. Sclerae white, not injected. Pharynx without erythema or exudate.   Neck: No adenopathy. No thyromegaly. Symmetrical.   Heart: Regular rate and rhythm. Normal S1 with diminished S2. Harsh crescendo decrescendo late peaking systolic murmur with radiation to carotids. Delayed carotid pulses.   Lungs: Clear to auscultation. No ronchi, wheezes, rales.   Abdomen: Soft, nontender, nondistended. Bowel sounds present.  Extremities: No clubbing, cyanosis, or edema.   Neurologic: Alert and oriented to person/place/time, normal speech and affect. No focal deficits.  Skin: No petechiae, purpura or rash.     LABORATORY DATA:     LIPID RESULTS:  Lab Results   Component Value Date    CHOL 118 04/23/2019    HDL 58 04/23/2019    LDL 43 04/23/2019    TRIG 87 04/23/2019    CHOLHDLRATIO 2.3 07/29/2016       LIVER ENZYME RESULTS:  Lab Results   Component Value Date    AST 19 06/20/2019    ALT 18 06/20/2019       CBC RESULTS:  Lab Results   Component Value Date    WBC 8.2 06/20/2019    RBC 3.47 (L) 06/20/2019    HGB 7.6 (L) 06/20/2019    HCT 27.2 (L) 06/20/2019    MCV 78 06/20/2019    MCH 21.9 (L) " 06/20/2019    MCHC 27.9 (L) 06/20/2019    RDW 18.0 (H) 06/20/2019     06/20/2019       BMP RESULTS:  Lab Results   Component Value Date     06/20/2019    POTASSIUM 4.2 06/20/2019    CHLORIDE 102 06/20/2019    CO2 26 06/20/2019    ANIONGAP 5 06/20/2019    GLC 86 06/20/2019    BUN 19 06/20/2019    CR 0.78 06/20/2019    GFRESTIMATED 73 06/20/2019    GFRESTBLACK 84 06/20/2019    STEFFANIE 8.6 06/20/2019        A1C RESULTS:  No results found for: A1C    INR RESULTS:  Lab Results   Component Value Date    INR 1.12 01/05/2019    INR 1.19 (H) 08/29/2014          PROCEDURES & FURTHER ASSESSMENTS:     Echo 4/30/19  Interpretation Summary     Global and regional left ventricular function is hyperkinetic with an EF of 65-70%.  Severe aortic valve calcification is present.  Cannot exclude a bicuspid aortic valve.  Severe aortic stenosis is present.  The peak aortic velocity is 4.6 m/sec.  The mean gradient across the aortic valve is50 mmHg.  The aortic valve area is 0.8 cm^2, by the continuity equation.  Moderate aortic insufficiency is present.  This study was compared with the study from 1/5/19 and aortic stenosis is appears worse .    Cardiac MRI 5/6/16  IMPRESSION:  1.  Normal left ventricular size and systolic function with a  calculated ejection fraction of  69 %.  2.  Normal right ventricular size and systolic function with a  calculated ejection fraction of 69%.   3.  There is decreased leaflet excursion with moderate sclerosis of  the aortic valve with flow acceleration consistent with moderate  aortic stenosis and mild aortic insufficiency.  4.  On delayed enhancement imaging, there is no abnormal  hyperenhancement to suggest myocardial scar/inflammation/infiltration.  5.  There is no area of infarction or mass noted in the left  ventricular apex.     CT TAVR: pending    Coronary Angiogram and Right Heart Catheterization: pending    PFTs: pending    Carotid Ultrasound: pending      STS RISK SCORE: 2.5%  FRAILTY  SCORE: 3/5  NYHA CLASS: II     CLINICAL IMPRESSION:     Gabino Jones is a 79 year old female with symptomatic severe aortic stenosis who is a good candidate for transcatheter aortic valve replacement based on age, frailty, co-morbidities and overall surgical mortality risk estimation of 2.5% based on the STS score.      The pre-procedural TAVR evaluation currently requires additional assessment with CT TAVR, carotid US, pulmonary function testing, and coronary angiogram/right heart cath prior to presentation to the Heart team for discussion during our multi-disciplinary conference for consensus decision and procedural planning.    Plan Summary:  1) Severe Aortic Stenosis:  -CT TAVR, carotid US, pulmonary function testing, and coronary angiogram/right heart cath   -Presentation of data to the Heart team to assess the optimal treatment of her severe aortic stenosis    Patient was evaluated in clinic with Dr. Cortés of interventional cardiology and Dr. Jimenez of cardiothoracic surgery.    Momo Gan MD  Interventional Cardiology Fellow    I have seen and examined the patient with the TAVR team. I agree with the assessment and plan of the note above.I have reviewed pertinent labs.     Manuel Cortés MD  Interventional Cardiology  Pager: [phone]      CC  Patient Care Team:  Rebekah Boogie MD as PCP - General (Family Practice)  Garett Obregon MD as MD (Oncology)  Laron Archibald MD as MD (Ophthalmology)  Yolanda Whitaker MD as MD (Otolaryngology)  Matt Quiroga MD as MD (Ophthalmology)  Marilin Sesay, RN as Nurse Coordinator (Oncology)  Rebekah Boogie MD as Assigned PCP  YOANS HAILE

## 2019-06-20 NOTE — PROGRESS NOTES
[facility] HEART CARE  CARDIOVASCULAR DIVISION    VALVE CLINIC INITIAL CONSULTATION    PRIMARY CARDIOLOGIST: Dr. 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  Her severe aortic stenosis is characterized with an area of 0.8 cm2, mean gradient 50 mmHg and peak velocity of 4.6 m/sec with LVEF 65-70% by echocardiogram on 4/30/19.  Additional notable medical history of hypertension, hyperlipidemia, prior tobacco use, and prior melanoma in remission following surgery and XRT. She previously had a chest CT which suggested a mass in her LV however subsequent cardiac MRI and echo did not show any mass.    Today she reports that she is very limited by fatigue and dyspnea. She is still recovering from a recent prolonged hospitalization and nursing home stay beginning in January. She continues to get stronger but is still unsteady on her feet and often needs the help of her  to walk. She denies chest pain, lower extremity edema, orthopnea, and PND.          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     Reduced vision      Ringing in ears      Sensorineural hearing loss      Tinnitus         PAST SURGICAL HISTORY:     Past Surgical History:   Procedure Laterality Date     ANESTHESIA OUT OF OR MRI N/A 1/16/2019    Procedure: Anesthesia Coverage Brain MRI With Contrast @1330;   Surgeon: GENERIC ANESTHESIA PROVIDER;  Location: [facility] OR     C ANESTH,CERV SPINE, SITTING POSITION       COLONOSCOPY N/A 7/7/2017    Procedure: COMBINED COLONOSCOPY, SINGLE OR MULTIPLE BIOPSY/POLYPECTOMY BY BIOPSY;;  Surgeon: Sathya Norman MD;  Location: [facility] OR     COLONOSCOPY WITH CO2 INSUFFLATION N/A 7/7/2017    Procedure: COLONOSCOPY WITH CO2 INSUFFLATION;  Combined,  [place], Gastroesophageal reflux disease without esophagitis  Flatulence, eructation, and gas pain, BMI 29.35, [facility] [id number];  Surgeon: Sathya Norman MD;  Location: [facility] OR     COLONOSCOPY, SUBMUCOSA RESECTION N/A 10/4/2017    Procedure: COLONOSCOPY, SUBMUCOSA RESECTION;  Colonoscopy With Endoscopic Polypectomy with clips;  Surgeon: Milton Javier MD;  Location: [facility] OR     COMBINED ESOPHAGOSCOPY, GASTROSCOPY, DUODENOSCOPY (EGD) WITH CO2 INSUFFLATION N/A 7/7/2017    Procedure: COMBINED ESOPHAGOSCOPY, GASTROSCOPY, DUODENOSCOPY (EGD) WITH CO2 INSUFFLATION;  Combined,  [place], Gastroesophageal reflux disease without esophagitis  Flatulence, eructation, and gas pain, BMI 29.35, [facility] [id number];  Surgeon: Sathya Norman MD;  Location: [facility] OR     ESOPHAGOSCOPY, GASTROSCOPY, DUODENOSCOPY (EGD), COMBINED N/A 7/7/2017    Procedure: COMBINED ESOPHAGOSCOPY, GASTROSCOPY, DUODENOSCOPY (EGD), BIOPSY SINGLE OR MULTIPLE;;  Surgeon: Sathya Norman MD;  Location: [facility] OR     ESOPHAGOSCOPY, GASTROSCOPY, DUODENOSCOPY (EGD), COMBINED N/A 1/6/2019    Procedure: COMBINED ESOPHAGOSCOPY, GASTROSCOPY, DUODENOSCOPY (EGD);  Surgeon: Cailin Paulino MD;  Location:  GI     GALLBLADDER SURGERY       NECK SURGERY       OPTICAL TRACKING SYSTEM CRANIOTOMY, EXCISE TUMOR, COMBINED Right 2/22/2017    Procedure: COMBINED OPTICAL TRACKING SYSTEM CRANIOTOMY, EXCISE TUMOR;  Surgeon: Lenora Pérez MD;  Location:  OR     OPTICAL TRACKING SYSTEM ENDOSCOPIC ENDONASAL SURGERY  6/23/2014    Procedure:  OPTICAL TRACKING SYSTEM ENDOSCOPIC ENDONASAL SURGERY;  Surgeon: Yolanda Whitaker MD;  Location: [facility] OR     STOMACH SURGERY       TONSILLECTOMY       TUBAL LIGATION      1975        CURRENT MEDICATIONS:     Current Outpatient Medications   Medication Sig Dispense Refill     acetaminophen (TYLENOL) 500 MG tablet Take 1 tablet (500 mg) by mouth every 6 hours as needed 100 tablet 3     atorvastatin (LIPITOR) 20 MG tablet Take 1 tablet (20 mg) by mouth daily 90 tablet 3     benzonatate (TESSALON) 100 MG capsule TAKE 1 CAPSULE BY MOUTH THREE TIMES DAILY( EVERY EIGHT HOURS) AS NEEDED FOR COUGH 60 capsule 0     buPROPion (WELLBUTRIN XL) 150 MG 24 hr tablet TAKE 1 TABLET(150 MG) BY MOUTH EVERY MORNING 90 tablet 1     hydrochlorothiazide (HYDRODIURIL) 12.5 MG tablet Take 1 tablet (12.5 mg) by mouth daily 90 tablet 0     lisinopril (PRINIVIL/ZESTRIL) 30 MG tablet Take 1 tablet (30 mg) by mouth daily 90 tablet 0     melatonin 1 MG TABS tablet        OLANZapine (ZYPREXA) 5 MG tablet Take 1 tablet (5 mg) by mouth At Bedtime 90 tablet 0     pantoprazole (PROTONIX) 40 MG EC tablet Take 1 tablet (40 mg) by mouth daily Take 30-60 minutes before a meal. 90 tablet 1     potassium chloride ER (K-DUR/KLOR-CON M) 10 MEQ CR tablet Take 1 tablet (10 mEq) by mouth 2 times daily 60 tablet 3     ranitidine (ZANTAC) 150 MG tablet Take 1 tablet (150 mg) by mouth At Bedtime 90 tablet 1     SENEXON-S 8.6-50 MG tablet TK 1 TO 2 TS PO BID  1     venlafaxine (EFFEXOR-XR) 75 MG 24 hr capsule TAKE 3 CAPSULES BY MOUTH DAILY 270 capsule 2        ALLERGIES:     Allergies   Allergen Reactions     Novocain [Procaine] Unknown and Rash     Mirtazapine      Nefazodone Unknown and Rash        FAMILY HISTORY:     Family History   Problem Relation Age of Onset     Prostate Cancer Father      Cancer Sister         SCLC     Cancer Sister      Macular Degeneration Daughter      Glaucoma No family hx of         SOCIAL HISTORY:     Social History      Socioeconomic History     Marital status:      Spouse name: None     Number of children: None     Years of education: None     Highest education level: None   Occupational History     None   Social Needs     Financial resource strain: None     Food insecurity:     Worry: None     Inability: None     Transportation needs:     Medical: None     Non-medical: None   Tobacco Use     Smoking status: Former Smoker     Packs/day: 1.00     Years: 40.00     Pack years: 40.00     Types: Cigarettes     Last attempt to quit: 6/10/2004     Years since quitting: 15.0     Smokeless tobacco: Never Used   Substance and Sexual Activity     Alcohol use: No     Drug use: No     Sexual activity: Not Currently     Partners: Male     Birth control/protection: Post-menopausal, Female Surgical   Lifestyle     Physical activity:     Days per week: None     Minutes per session: None     Stress: None   Relationships     Social connections:     Talks on phone: None     Gets together: None     Attends [employer] service: None     Active member of club or organization: None     Attends meetings of clubs or organizations: None     Relationship status: None     Intimate partner violence:     Fear of current or ex partner: None     Emotionally abused: None     Physically abused: None     Forced sexual activity: None   Other Topics Concern     Parent/sibling w/ CABG, MI or angioplasty before 65F 55M? Not Asked   Social History Narrative     None        REVIEW OF SYSTEMS:     Constitutional: No fevers or chills  Skin: No new rash or itching  Eyes: No acute change in vision  Ears/Nose/Throat: No purulent rhinorrhea, new hearing loss, or new vertigo  Respiratory: No cough or hemoptysis  Cardiovascular: See HPI  Gastrointestinal: No change in appetite, vomiting, hematemesis or diarrhea  Genitourinary: No dysuria or hematuria  Musculoskeletal: No new back pain, neck pain or muscle pain  Neurologic: No new headaches, focal weakness or behavior  "changes  Psychiatric: No hallucinations, excessive alcohol consumption or illegal drug usage  Hematologic/Lymphatic/Immunologic: No bleeding, chills, fever, night sweats or weight loss  Endocrine: No new cold intolerance, heat intolerance, polyphagia, polydipsia or polyuria      PHYSICAL EXAMINATION:     /54 (BP Location: Right arm, Patient Position: Chair, Cuff Size: Adult Large)   Pulse 74   Ht 1.651 m (5' 5\")   Wt 83.5 kg (184 lb)   LMP  (LMP Unknown)   SpO2 95%   BMI 30.62 kg/m      GENERAL: No acute distress.  HEENT: EOMI. Sclerae white, not injected. Pharynx without erythema or exudate.   Neck: No adenopathy. No thyromegaly. Symmetrical.   Heart: Regular rate and rhythm. Normal S1 with diminished S2. Harsh crescendo decrescendo late peaking systolic murmur with radiation to carotids. Delayed carotid pulses.   Lungs: Clear to auscultation. No ronchi, wheezes, rales.   Abdomen: Soft, nontender, nondistended. Bowel sounds present.  Extremities: No clubbing, cyanosis, or edema.   Neurologic: Alert and oriented to person/place/time, normal speech and affect. No focal deficits.  Skin: No petechiae, purpura or rash.     LABORATORY DATA:     LIPID RESULTS:  Lab Results   Component Value Date    CHOL 118 04/23/2019    HDL 58 04/23/2019    LDL 43 04/23/2019    TRIG 87 04/23/2019    CHOLHDLRATIO 2.3 07/29/2016       LIVER ENZYME RESULTS:  Lab Results   Component Value Date    AST 19 06/20/2019    ALT 18 06/20/2019       CBC RESULTS:  Lab Results   Component Value Date    WBC 8.2 06/20/2019    RBC 3.47 (L) 06/20/2019    HGB 7.6 (L) 06/20/2019    HCT 27.2 (L) 06/20/2019    MCV 78 06/20/2019    MCH 21.9 (L) 06/20/2019    MCHC 27.9 (L) 06/20/2019    RDW 18.0 (H) 06/20/2019     06/20/2019       BMP RESULTS:  Lab Results   Component Value Date     06/20/2019    POTASSIUM 4.2 06/20/2019    CHLORIDE 102 06/20/2019    CO2 26 06/20/2019    ANIONGAP 5 06/20/2019    GLC 86 06/20/2019    BUN 19 06/20/2019    " CR 0.78 06/20/2019    GFRESTIMATED 73 06/20/2019    GFRESTBLACK 84 06/20/2019    STEFFANIE 8.6 06/20/2019        A1C RESULTS:  No results found for: A1C    INR RESULTS:  Lab Results   Component Value Date    INR 1.12 01/05/2019    INR 1.19 (H) 08/29/2014          PROCEDURES & FURTHER ASSESSMENTS:     Echo 4/30/19  Interpretation Summary     Global and regional left ventricular function is hyperkinetic with an EF of 65-70%.  Severe aortic valve calcification is present.  Cannot exclude a bicuspid aortic valve.  Severe aortic stenosis is present.  The peak aortic velocity is 4.6 m/sec.  The mean gradient across the aortic valve is50 mmHg.  The aortic valve area is 0.8 cm^2, by the continuity equation.  Moderate aortic insufficiency is present.  This study was compared with the study from 1/5/19 and aortic stenosis is appears worse .    Cardiac MRI 5/6/16  IMPRESSION:  1.  Normal left ventricular size and systolic function with a  calculated ejection fraction of  69 %.  2.  Normal right ventricular size and systolic function with a  calculated ejection fraction of 69%.   3.  There is decreased leaflet excursion with moderate sclerosis of  the aortic valve with flow acceleration consistent with moderate  aortic stenosis and mild aortic insufficiency.  4.  On delayed enhancement imaging, there is no abnormal  hyperenhancement to suggest myocardial scar/inflammation/infiltration.  5.  There is no area of infarction or mass noted in the left  ventricular apex.     CT TAVR: pending    Coronary Angiogram and Right Heart Catheterization: pending    PFTs: pending    Carotid Ultrasound: pending      STS RISK SCORE: 2.5%  FRAILTY SCORE: 3/5  NYHA CLASS: II     CLINICAL IMPRESSION:     Gabino Jones is a 79 year old female with symptomatic severe aortic stenosis who is a good candidate for transcatheter aortic valve replacement based on age, frailty, co-morbidities and overall surgical mortality risk estimation of 2.5% based on the STS  score.      The pre-procedural TAVR evaluation currently requires additional assessment with CT TAVR, carotid US, pulmonary function testing, and coronary angiogram/right heart cath prior to presentation to the Heart team for discussion during our multi-disciplinary conference for consensus decision and procedural planning.    Plan Summary:  1) Severe Aortic Stenosis:  -CT TAVR, carotid US, pulmonary function testing, and coronary angiogram/right heart cath   -Presentation of data to the Heart team to assess the optimal treatment of her severe aortic stenosis    Patient was evaluated in clinic with Dr. Cortés of interventional cardiology and Dr. Jimenez of cardiothoracic surgery.          Momo Gan MD  Interventional Cardiology Fellow    I have seen and examined the patient with the TAVR team. I agree with the assessment and plan of the note above.I have reviewed pertinent labs.     Manuel Cortés MD  Interventional Cardiology  Pager: [phone]        Patient Care Team:  Rebekah Boogie MD as PCP - General (Family Practice)  Garett Obregon MD as MD (Oncology)  Laron Archibald MD as MD (Ophthalmology)  Yolanda Whitaekr MD as MD (Otolaryngology)  Matt Quiroga MD as MD (Ophthalmology)  Marilin Sesay, RN as Nurse Coordinator (Oncology)  Rebekah Boogie MD as Assigned PCP  YONAS HAILE

## 2019-06-20 NOTE — NURSING NOTE
TAVR testing that was performed:    KCCQ-12 questionnaire collected: Yes.    Test: Yes  5M walk: No  Picture taken: YES    Charlotte Giles CMA    1:14 PM

## 2019-06-20 NOTE — LETTER
6/20/2019      RE: Gabino Jones  [address]  [facility] [address]       Dear Colleague,    Thank you for the opportunity to participate in the care of your patient, Gabino Jones, at the [facility] HEART [facility] Please see a copy of my visit note below.    [facility] CARDIOTHORACIC SURGERY CONSULT  Patient Name: Gabino Jones  Medical Record Number: [id number]  YOB: 1940  Room Number: Room/bed info not found  Referring Physician: Dr. 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  Her severe aortic stenosis is characterized with an area of 0.8 cm2, mean gradient 50 mmHg and peak velocity of 4.6 m/sec with LVEF 65-70% by echocardiogram on 4/30/19.  Additional notable medical history of hypertension, hyperlipidemia, prior tobacco use, and prior melanoma in remission following surgery and XRT. She previously had a chest CT which suggested a mass in her LV however subsequent cardiac MRI and echo did not show any mass.     Today she reports that she is very limited by fatigue and dyspnea. She is still recovering from a recent prolonged hospitalization and nursing home stay beginning in January. She continues to get stronger but is still unsteady on her feet and often needs the help of her  to walk. She denies chest pain, lower extremity edema, orthopnea, and PND.    Assessment and Plan:  Gabino Jones is a 79 year old female with severe aortic stenosis  1) Agree with workup for TAVR - she would be intermediate risk for surgery despite STS score of 2.5% given her age, frailty, and comorbidities including fibromyalgia  2) Please call with questions or concerns.     Thank you for the  opportunity to participate in the care of this patient.    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     Reduced vision      Ringing in ears      Sensorineural hearing loss      Tinnitus        Past Surgical History:  Past Surgical History:   Procedure Laterality Date     ANESTHESIA OUT OF OR MRI N/A 1/16/2019    Procedure: Anesthesia Coverage Brain MRI With Contrast @1330;  Surgeon: GENERIC ANESTHESIA PROVIDER;  [facility]: [facility]     C ANESTH,CERV SPINE, SITTING POSITION       COLONOSCOPY N/A 7/7/2017    Procedure: COMBINED COLONOSCOPY, SINGLE OR MULTIPLE BIOPSY/POLYPECTOMY BY BIOPSY;;  Surgeon: Sathya Norman MD;  Location[facility]     COLONOSCOPY WITH CO2 INSUFFLATION N/A 7/7/2017    Procedure: COLONOSCOPY WITH CO2 INSUFFLATION;  Combined,  [place], Gastroesophageal reflux disease without esophagitis  Flatulence, eructation, and gas pain, BMI 29.35, [facility] [id number];  Surgeon: Sathya Norman MD;  Location: [facility]     COLONOSCOPY, SUBMUCOSA RESECTION N/A 10/4/2017    Procedure: COLONOSCOPY, SUBMUCOSA RESECTION;  Colonoscopy With Endoscopic Polypectomy with clips;  Surgeon: Milton Javier MD;  [facility]     COMBINED ESOPHAGOSCOPY, GASTROSCOPY, DUODENOSCOPY (EGD) WITH CO2 INSUFFLATION N/A 7/7/2017    Procedure: COMBINED ESOPHAGOSCOPY, GASTROSCOPY, DUODENOSCOPY (EGD) WITH CO2 INSUFFLATION;  Combined,  [place], Gastroesophageal reflux disease without esophagitis  Flatulence, eructation, and gas pain, BMI 29.35, [facility]  [phone];  Surgeon: Sathya Norman MD;  Location[facility]     ESOPHAGOSCOPY, GASTROSCOPY, DUODENOSCOPY (EGD), COMBINED N/A 7/7/2017    Procedure: COMBINED ESOPHAGOSCOPY, GASTROSCOPY, DUODENOSCOPY (EGD), BIOPSY SINGLE OR MULTIPLE;;  Surgeon: Sathya Norman MD;  [facility]     ESOPHAGOSCOPY, GASTROSCOPY, DUODENOSCOPY (EGD), COMBINED N/A 1/6/2019    Procedure: COMBINED ESOPHAGOSCOPY, GASTROSCOPY, DUODENOSCOPY (EGD);  Surgeon: Cailin Paulino MD;  Location: [facility]     GALLBLADDER SURGERY       NECK SURGERY       OPTICAL TRACKING SYSTEM CRANIOTOMY, EXCISE TUMOR, COMBINED Right 2/22/2017    Procedure: COMBINED OPTICAL TRACKING SYSTEM CRANIOTOMY, EXCISE TUMOR;  Surgeon: Lenora Pérez MD;  [facility]     OPTICAL TRACKING SYSTEM ENDOSCOPIC ENDONASAL SURGERY  6/23/2014    Procedure: OPTICAL TRACKING SYSTEM ENDOSCOPIC ENDONASAL SURGERY;  Surgeon: Yolanda Whitaker MD;  [facility]: [facility]     STOMACH SURGERY       TONSILLECTOMY       TUBAL LIGATION      1975        Family History:   Family History   Problem Relation Age of Onset     Prostate Cancer Father      Cancer Sister         SCLC     Cancer Sister      Macular Degeneration Daughter      Glaucoma No family hx of        Social History:  Social History     Socioeconomic History     Marital status:      Spouse name: Not on file     Number of children: Not on file     Years of education: Not on file     Highest education level: Not on file   Occupational History     Not on file   Social Needs     Financial resource strain: Not on file     Food insecurity:     Worry: Not on file     Inability: Not on file     Transportation needs:     Medical: Not on file     Non-medical: Not on file   Tobacco Use     Smoking status: Former Smoker     Packs/day: 1.00     Years: 40.00     Pack years: 40.00     Types: Cigarettes     Last attempt to quit: 6/10/2004     Years since quitting: 15.0     Smokeless tobacco:  Never Used   Substance and Sexual Activity     Alcohol use: No     Drug use: No     Sexual activity: Not Currently     Partners: Male     Birth control/protection: Post-menopausal, Female Surgical   Lifestyle     Physical activity:     Days per week: Not on file     Minutes per session: Not on file     Stress: Not on file   Relationships     Social connections:     Talks on phone: Not on file     Gets together: Not on file     Attends [employer]ism service: Not on file     Active member of club or organization: Not on file     Attends meetings of clubs or organizations: Not on file     Relationship status: Not on file     Intimate partner violence:     Fear of current or ex partner: Not on file     Emotionally abused: Not on file     Physically abused: Not on file     Forced sexual activity: Not on file   Other Topics Concern     Parent/sibling w/ CABG, MI or angioplasty before 65F 55M? Not Asked   Social History Narrative     Not on file       Allergies:     Allergies   Allergen Reactions     Novocain [Procaine] Unknown and Rash     Mirtazapine      Nefazodone Unknown and Rash       Medications:  Current Outpatient Medications   Medication     acetaminophen (TYLENOL) 500 MG tablet     atorvastatin (LIPITOR) 20 MG tablet     benzonatate (TESSALON) 100 MG capsule     buPROPion (WELLBUTRIN XL) 150 MG 24 hr tablet     hydrochlorothiazide (HYDRODIURIL) 12.5 MG tablet     lisinopril (PRINIVIL/ZESTRIL) 30 MG tablet     melatonin 1 MG TABS tablet     OLANZapine (ZYPREXA) 5 MG tablet     pantoprazole (PROTONIX) 40 MG EC tablet     potassium chloride ER (K-DUR/KLOR-CON M) 10 MEQ CR tablet     ranitidine (ZANTAC) 150 MG tablet     SENEXON-S 8.6-50 MG tablet     venlafaxine (EFFEXOR-XR) 75 MG 24 hr capsule     No current facility-administered medications for this visit.        Review of Systems:   A 10 point ROS was performed and is negative other than HPI.    Physical Exam:  Temp:  [98.4  F (36.9  C)] 98.4  F (36.9  C)  Pulse:   [74-79] 74  Resp:  [16] 16  BP: (128-136)/(54-96) 128/54  SpO2:  [95 %-100 %] 95 %    Gen: NAD, resting comfortably in chair   Lungs: CTAB, non-labored breathing   CV: regular rhythm, normal rate   Abd: Soft, not tender, not distended   Ext: Motor, sensation, pulses intact   Neuro: AOx3    Labs:  Lab Results   Component Value Date    WBC 8.2 06/20/2019     Lab Results   Component Value Date    RBC 3.47 06/20/2019     Lab Results   Component Value Date    HGB 7.6 06/20/2019     Lab Results   Component Value Date    HCT 27.2 06/20/2019     No components found for: MCT  Lab Results   Component Value Date    MCV 78 06/20/2019     Lab Results   Component Value Date    MCH 21.9 06/20/2019     Lab Results   Component Value Date    MCHC 27.9 06/20/2019     Lab Results   Component Value Date    RDW 18.0 06/20/2019     Lab Results   Component Value Date     06/20/2019       Last Comprehensive Metabolic Panel:  Sodium   Date Value Ref Range Status   06/20/2019 133 133 - 144 mmol/L Final     Potassium   Date Value Ref Range Status   06/20/2019 4.2 3.4 - 5.3 mmol/L Final     Chloride   Date Value Ref Range Status   06/20/2019 102 94 - 109 mmol/L Final     Carbon Dioxide   Date Value Ref Range Status   06/20/2019 26 20 - 32 mmol/L Final     Anion Gap   Date Value Ref Range Status   06/20/2019 5 3 - 14 mmol/L Final     Glucose   Date Value Ref Range Status   06/20/2019 86 70 - 99 mg/dL Final     Urea Nitrogen   Date Value Ref Range Status   06/20/2019 19 7 - 30 mg/dL Final     Creatinine   Date Value Ref Range Status   06/20/2019 0.78 0.52 - 1.04 mg/dL Final     GFR Estimate   Date Value Ref Range Status   06/20/2019 73 >60 mL/min/[1.73_m2] Final     Comment:     Non  GFR Calc  Starting 12/18/2018, serum creatinine based estimated GFR (eGFR) will be   calculated using the Chronic Kidney Disease Epidemiology Collaboration   (CKD-EPI) equation.       Calcium   Date Value Ref Range Status   06/20/2019 8.6 8.5 -  10.1 mg/dL Final     Imaging:  Echo 4/30/19  Interpretation Summary     Global and regional left ventricular function is hyperkinetic with an EF of 65-70%.  Severe aortic valve calcification is present.  Cannot exclude a bicuspid aortic valve.  Severe aortic stenosis is present.  The peak aortic velocity is 4.6 m/sec.  The mean gradient across the aortic valve is50 mmHg.  The aortic valve area is 0.8 cm^2, by the continuity equation.  Moderate aortic insufficiency is present.  This study was compared with the study from 1/5/19 and aortic stenosis is appears worse .     Cardiac MRI 5/6/16  IMPRESSION:  1.  Normal left ventricular size and systolic function with a  calculated ejection fraction of  69 %.  2.  Normal right ventricular size and systolic function with a  calculated ejection fraction of 69%.   3.  There is decreased leaflet excursion with moderate sclerosis of  the aortic valve with flow acceleration consistent with moderate  aortic stenosis and mild aortic insufficiency.  4.  On delayed enhancement imaging, there is no abnormal  hyperenhancement to suggest myocardial scar/inflammation/infiltration.  5.  There is no area of infarction or mass noted in the left  ventricular apex.      CT TAVR: pending     Coronary Angiogram and Right Heart Catheterization: pending     PFTs: pending     Carotid Ultrasound: pending     CC:  Dr. Blake Hobbs MD    Please do not hesitate to contact me if you have any questions/concerns.     Sincerely,     Clem Jimenez MD

## 2019-06-20 NOTE — PROGRESS NOTES
[facility] CARDIOTHORACIC SURGERY CONSULT  Patient Name: Gabino Jones  Medical Record Number: [id number]  YOB: 1940  Room Number: Room/bed info not found  Referring Physician: Dr. 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  Her severe aortic stenosis is characterized with an area of 0.8 cm2, mean gradient 50 mmHg and peak velocity of 4.6 m/sec with LVEF 65-70% by echocardiogram on 4/30/19.  Additional notable medical history of hypertension, hyperlipidemia, prior tobacco use, and prior melanoma in remission following surgery and XRT. She previously had a chest CT which suggested a mass in her LV however subsequent cardiac MRI and echo did not show any mass.     Today she reports that she is very limited by fatigue and dyspnea. She is still recovering from a recent prolonged hospitalization and nursing home stay beginning in January. She continues to get stronger but is still unsteady on her feet and often needs the help of her  to walk. She denies chest pain, lower extremity edema, orthopnea, and PND.    Assessment and Plan:  Gabino Jones is a 79 year old female with severe aortic stenosis  1) Agree with workup for TAVR - she would be intermediate risk for surgery despite STS score of 2.5% given her age, frailty, and comorbidities including fibromyalgia  2) Please call with questions or concerns.     Thank you for the opportunity to participate in the care of this patient.    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     Reduced vision      Ringing in ears      Sensorineural hearing loss      Tinnitus        Past Surgical History:  Past Surgical History:   Procedure Laterality Date     ANESTHESIA OUT OF OR MRI N/A 1/16/2019    Procedure: Anesthesia Coverage Brain MRI With Contrast @1330;  Surgeon: GENERIC ANESTHESIA PROVIDER;  [facility]     C ANESTH,CERV SPINE, SITTING POSITION       COLONOSCOPY N/A 7/7/2017    Procedure: COMBINED COLONOSCOPY, SINGLE OR MULTIPLE BIOPSY/POLYPECTOMY BY BIOPSY;;  Surgeon: Sathya Norman MD;  Location: [facility]     COLONOSCOPY WITH CO2 INSUFFLATION N/A 7/7/2017    Procedure: COLONOSCOPY WITH CO2 INSUFFLATION;  Combined,  [place], Gastroesophageal reflux disease without esophagitis  Flatulence, eructation, and gas pain, BMI 29.35, [facility] [phone];  Surgeon: Sathya Norman MD;  [facility]: [facility]     COLONOSCOPY, SUBMUCOSA RESECTION N/A 10/4/2017    Procedure: COLONOSCOPY, SUBMUCOSA RESECTION;  Colonoscopy With Endoscopic Polypectomy with clips;  Surgeon: Milton Javier MD;  [facility]     COMBINED ESOPHAGOSCOPY, GASTROSCOPY, DUODENOSCOPY (EGD) WITH CO2 INSUFFLATION N/A 7/7/2017    Procedure: COMBINED ESOPHAGOSCOPY, GASTROSCOPY, DUODENOSCOPY (EGD) WITH CO2 INSUFFLATION;  Combined,  [place], Gastroesophageal reflux disease without esophagitis  Flatulence, eructation, and gas pain, BMI 29.35, [facility] [phone];  Surgeon: Sathya Norman MD;  Location: [facility]     ESOPHAGOSCOPY, GASTROSCOPY, DUODENOSCOPY (EGD), COMBINED N/A 7/7/2017    Procedure: COMBINED ESOPHAGOSCOPY, GASTROSCOPY, DUODENOSCOPY (EGD), BIOPSY SINGLE OR MULTIPLE;;  Surgeon: Sathya Norman MD;  [facility]      ESOPHAGOSCOPY, GASTROSCOPY, DUODENOSCOPY (EGD), COMBINED N/A 1/6/2019    Procedure: COMBINED ESOPHAGOSCOPY, GASTROSCOPY, DUODENOSCOPY (EGD);  Surgeon: Cailin Paulino MD;  Location:  GI     GALLBLADDER SURGERY       NECK SURGERY       OPTICAL TRACKING SYSTEM CRANIOTOMY, EXCISE TUMOR, COMBINED Right 2/22/2017    Procedure: COMBINED OPTICAL TRACKING SYSTEM CRANIOTOMY, EXCISE TUMOR;  Surgeon: Lenora Pérez MD;  Location:  OR     OPTICAL TRACKING SYSTEM ENDOSCOPIC ENDONASAL SURGERY  6/23/2014    Procedure: OPTICAL TRACKING SYSTEM ENDOSCOPIC ENDONASAL SURGERY;  Surgeon: Yolanda Whitaker MD;  Location:  OR     STOMACH SURGERY       TONSILLECTOMY       TUBAL LIGATION      1975        Family History:   Family History   Problem Relation Age of Onset     Prostate Cancer Father      Cancer Sister         SCLC     Cancer Sister      Macular Degeneration Daughter      Glaucoma No family hx of        Social History:  Social History     Socioeconomic History     Marital status:      Spouse name: Not on file     Number of children: Not on file     Years of education: Not on file     Highest education level: Not on file   Occupational History     Not on file   Social Needs     Financial resource strain: Not on file     Food insecurity:     Worry: Not on file     Inability: Not on file     Transportation needs:     Medical: Not on file     Non-medical: Not on file   Tobacco Use     Smoking status: Former Smoker     Packs/day: 1.00     Years: 40.00     Pack years: 40.00     Types: Cigarettes     Last attempt to quit: 6/10/2004     Years since quitting: 15.0     Smokeless tobacco: Never Used   Substance and Sexual Activity     Alcohol use: No     Drug use: No     Sexual activity: Not Currently     Partners: Male     Birth control/protection: Post-menopausal, Female Surgical   Lifestyle     Physical activity:     Days per week: Not on file     Minutes per session: Not on file      Stress: Not on file   Relationships     Social connections:     Talks on phone: Not on file     Gets together: Not on file     Attends Anabaptism service: Not on file     Active member of club or organization: Not on file     Attends meetings of clubs or organizations: Not on file     Relationship status: Not on file     Intimate partner violence:     Fear of current or ex partner: Not on file     Emotionally abused: Not on file     Physically abused: Not on file     Forced sexual activity: Not on file   Other Topics Concern     Parent/sibling w/ CABG, MI or angioplasty before 65F 55M? Not Asked   Social History Narrative     Not on file       Allergies:     Allergies   Allergen Reactions     Novocain [Procaine] Unknown and Rash     Mirtazapine      Nefazodone Unknown and Rash       Medications:  Current Outpatient Medications   Medication     acetaminophen (TYLENOL) 500 MG tablet     atorvastatin (LIPITOR) 20 MG tablet     benzonatate (TESSALON) 100 MG capsule     buPROPion (WELLBUTRIN XL) 150 MG 24 hr tablet     hydrochlorothiazide (HYDRODIURIL) 12.5 MG tablet     lisinopril (PRINIVIL/ZESTRIL) 30 MG tablet     melatonin 1 MG TABS tablet     OLANZapine (ZYPREXA) 5 MG tablet     pantoprazole (PROTONIX) 40 MG EC tablet     potassium chloride ER (K-DUR/KLOR-CON M) 10 MEQ CR tablet     ranitidine (ZANTAC) 150 MG tablet     SENEXON-S 8.6-50 MG tablet     venlafaxine (EFFEXOR-XR) 75 MG 24 hr capsule     No current facility-administered medications for this visit.        Review of Systems:   A 10 point ROS was performed and is negative other than HPI.    Physical Exam:  Temp:  [98.4  F (36.9  C)] 98.4  F (36.9  C)  Pulse:  [74-79] 74  Resp:  [16] 16  BP: (128-136)/(54-96) 128/54  SpO2:  [95 %-100 %] 95 %    Gen: NAD, resting comfortably in chair   Lungs: CTAB, non-labored breathing   CV: regular rhythm, normal rate   Abd: Soft, not tender, not distended   Ext: Motor, sensation, pulses intact   Neuro: AOx3    Labs:  Lab  Results   Component Value Date    WBC 8.2 06/20/2019     Lab Results   Component Value Date    RBC 3.47 06/20/2019     Lab Results   Component Value Date    HGB 7.6 06/20/2019     Lab Results   Component Value Date    HCT 27.2 06/20/2019     No components found for: MCT  Lab Results   Component Value Date    MCV 78 06/20/2019     Lab Results   Component Value Date    MCH 21.9 06/20/2019     Lab Results   Component Value Date    MCHC 27.9 06/20/2019     Lab Results   Component Value Date    RDW 18.0 06/20/2019     Lab Results   Component Value Date     06/20/2019       Last Comprehensive Metabolic Panel:  Sodium   Date Value Ref Range Status   06/20/2019 133 133 - 144 mmol/L Final     Potassium   Date Value Ref Range Status   06/20/2019 4.2 3.4 - 5.3 mmol/L Final     Chloride   Date Value Ref Range Status   06/20/2019 102 94 - 109 mmol/L Final     Carbon Dioxide   Date Value Ref Range Status   06/20/2019 26 20 - 32 mmol/L Final     Anion Gap   Date Value Ref Range Status   06/20/2019 5 3 - 14 mmol/L Final     Glucose   Date Value Ref Range Status   06/20/2019 86 70 - 99 mg/dL Final     Urea Nitrogen   Date Value Ref Range Status   06/20/2019 19 7 - 30 mg/dL Final     Creatinine   Date Value Ref Range Status   06/20/2019 0.78 0.52 - 1.04 mg/dL Final     GFR Estimate   Date Value Ref Range Status   06/20/2019 73 >60 mL/min/[1.73_m2] Final     Comment:     Non  GFR Calc  Starting 12/18/2018, serum creatinine based estimated GFR (eGFR) will be   calculated using the Chronic Kidney Disease Epidemiology Collaboration   (CKD-EPI) equation.       Calcium   Date Value Ref Range Status   06/20/2019 8.6 8.5 - 10.1 mg/dL Final     Imaging:  Echo 4/30/19  Interpretation Summary     Global and regional left ventricular function is hyperkinetic with an EF of 65-70%.  Severe aortic valve calcification is present.  Cannot exclude a bicuspid aortic valve.  Severe aortic stenosis is present.  The peak aortic  velocity is 4.6 m/sec.  The mean gradient across the aortic valve is50 mmHg.  The aortic valve area is 0.8 cm^2, by the continuity equation.  Moderate aortic insufficiency is present.  This study was compared with the study from 1/5/19 and aortic stenosis is appears worse .     Cardiac MRI 5/6/16  IMPRESSION:  1.  Normal left ventricular size and systolic function with a  calculated ejection fraction of  69 %.  2.  Normal right ventricular size and systolic function with a  calculated ejection fraction of 69%.   3.  There is decreased leaflet excursion with moderate sclerosis of  the aortic valve with flow acceleration consistent with moderate  aortic stenosis and mild aortic insufficiency.  4.  On delayed enhancement imaging, there is no abnormal  hyperenhancement to suggest myocardial scar/inflammation/infiltration.  5.  There is no area of infarction or mass noted in the left  ventricular apex.      CT TAVR: pending     Coronary Angiogram and Right Heart Catheterization: pending     PFTs: pending     Carotid Ultrasound: pending     CC:  Dr. Blake Hobbs MD

## 2019-06-20 NOTE — NURSING NOTE
Vitals completed successfully and medication reconciled. EKG done.   Charlotte Giles CMA  1:09 PM  Chief Complaint   Patient presents with     New Patient     reason for visit: Discuss TAVR

## 2019-06-25 NOTE — TELEPHONE ENCOUNTER
Received fax requesting refill for ibuprofen 800 mg tablets  Sig - take 1 tablet by mouth every four hours as needed for moderate pain.      This medication is not in current or past medication history.  Provider, please review.

## 2019-06-25 NOTE — TELEPHONE ENCOUNTER
Call pt.   Denied, not recommended with HTN, advise OTC doing Ibuprofen 1-2 tabs occasional for pain not controlled by Tylenol. Sindy Manzo

## 2019-06-26 NOTE — PROGRESS NOTES
Called and left a message for patient's  to review MRI brain results. Left direct line for him to return call to discuss.     Patient is scheduled for CT neck and Chest/abdomen/pelvis CT on 7/10. We will review scans at tumor board following her 7/10 CT scans.     Ml Joseph, RN, BSN

## 2019-07-08 NOTE — TELEPHONE ENCOUNTER
Requested Prescriptions   Pending Prescriptions Disp Refills     benzonatate (TESSALON) 100 MG capsule 60 capsule 0     Sig: TAKE 1 CAPSULE BY MOUTH THREE TIMES DAILY( EVERY EIGHT HOURS) AS NEEDED FOR COUGH       There is no refill protocol information for this order        Last Written Prescription Date:  5/31/19  Last Fill Quantity: 60,  # refills: 0   Last office visit: 4/23/2019 with prescribing provider:  Rebekah Boogie     Future Office Visit:

## 2019-07-09 NOTE — TELEPHONE ENCOUNTER
Pending Prescriptions:                       Disp   Refills    hydrochlorothiazide (HYDRODIURIL) 12.5 MG*90 tab*0            Sig: TAKE 1 TABLET(12.5 MG) BY MOUTH DAILY    LOV 4/23/2019    Prescription approved per [facility] Refill Protocol.    Nhi Llanos, RN, BSN

## 2019-07-10 NOTE — NURSING NOTE
"Oncology Rooming Note    July 10, 2019 5:13 PM   Gabino Jones is a 79 year old female who presents for:    Chief Complaint   Patient presents with     Oncology Clinic Visit     RETURN VISIT; [facility]; VITALS COMPLETED BY CMA      Initial Vitals: /60   Pulse 70   Temp 97.9  F (36.6  C) (Oral)   Resp 16   Ht 1.651 m (5' 5\")   LMP  (LMP Unknown)   SpO2 98%   BMI 30.62 kg/m   Estimated body mass index is 30.62 kg/m  as calculated from the following:    Height as of this encounter: 1.651 m (5' 5\").    Weight as of 6/20/19: 83.5 kg (184 lb). Body surface area is 1.96 meters squared.  Severe Pain (6) Comment: Data Unavailable   No LMP recorded (lmp unknown). Patient is postmenopausal.  Allergies reviewed: Yes  Medications reviewed: Yes    Medications: Medication refills not needed today.  Pharmacy name entered into [employer]: [facility] [address] [place] [address] [address] [facility] AT [facility] [address]    Clinical concerns: No new concerns today  Dr. Obregon was notified.      Samantha Marie              "

## 2019-07-10 NOTE — LETTER
7/10/2019       RE: Gabino Jones  [address]  [facility] [id number]     Dear Colleague,    Thank you for referring your patient, Gabino Jones, to the [facility]. Please see a copy of my visit note below.    [facility]  FOLLOW-UP VISIT NOTE    PATIENT NAME: Gabino Jones MRN # [id number]  DATE OF VISIT: Jul 10, 2019 YOB: 1940    REFERRING PROVIDER: Yolanda Whitaker MD   PHYSICIANS  [address]  [place] [address]    CANCER TYPE: Malignant Melanoma  STAGE:   ECOG PS: 1    TREATMENT SUMMARY:  Gabino presented with difficulty to breathe for previous 6-7 months due to partial nasal obstruction starting December 2013. She had been following with a local ENT surgeon and was prescribed nasal drops several courses of antibiotics for presumed ethmoid sinusitis.  Most recently in the last couple of weeks she has passed a few blood clots from her nose. She had an episode of epistaxis in April of 2014 which resolved on its own. On 06/03/14, she underwent right intranasal endoscopic sinus surgery for her ethmoid sinusitis as well as resection of the right nasal mass measuring 2.5 x 2 x 1.2 cm at [facility]. Following the procedure, she continued to have hemorrhage from the right nasal cavity and was taken back to the OR that same day for a repeat nasal endoscopy, where it was determined that it was a posterior bleed and that the mass had not been completely resected. The remainder of the mass was resected and the bleeding stopped. The following morning (6/04/14), she underwent another endoscopy at [facility] due to continued bleeding, and her right nasal cavity was packed. The histopathology from her recent resection revealed malignant melanoma.  The margins were positive.  This prompted a referral to [facility] and she was seen in ENT surgery by Dr. Whitaker and Dr. Velasquez  Junaid in Radiation/Oncology in addition to Medical Oncology.  She was worked up with a staging PET/CT scan and a brain MRI.  The PET/CT scan revealed minimal uptake in the local area which could very well be from recent surgery or residual disease.  She had multiple subcentimeter pulmonary nodules with the largest one being 6 mm in the left lower lobe.  She had a history of pulmonary nodules which had been previously followed for 3 years. The PET was also significant for an enlarged portacaval node with mild FDG uptake and FDG uptake in the hepatic flexure of her colon.  Her most recent colonoscopy has been merely 2 months ago and was completely normal.  Her brain MRI was limited because of motion artifact. There were no enlarged lymph nodes in the head and neck region or any other site of increased FDG uptake that would be suspicious for definitive metastatic disease.      Her case was reviewed at the multi-specialty tumor board and she was recommended endoscopic resection of the melanoma followed by post operative radiation therapy. She underwent a rerevision surgery on 06/23/2014 using an endoscopic endonasal approach. Tumor was located at the anterior skull base between the middle turbinate and the septum. Final pathology report showed clear margins. She was started on radiation therapy under care of Dr. Ron Quiroga in July. Unfortunately she fell and fractured her left humerus in end of August. It was decided not to proceed with surgical intervention as this would involve intubation which would be difficult given ongoing radiation therapy. She had a difficult hospital course with UTI, confusion. She was managed conservatively and radiation therapy was resumed - eventually completed on September 11, 2014.     She has been followed with surveillance imaging since 2014. MRI in 2016 and again on 2/15/17 showed a 13 mm enhancing T1 hypointense lesion in the right side of the frontal bone. This was biopsied by  neurosurgery on 2/22/17 and was benign.       ALAN Lloyd is being followed for her malignant melanoma status post resection on 6/04/14 and then re-resection on 06/24/14 with negative margins followed by radiation therapy from July through September 11, 2014.     She is accompanied by her  at this clinic visit. She missed getting her hearing aids and had a real hard time understanding anything. She has no energy and is SOB at rest. She can sleep all day long.     She had brain MRI done earlier today and comes in for a follow up       PAST MEDICAL HISTORY   1. HTN  2. Dyslipidemia  3. Depression on medications  4. Pulmonary nodules  5. Cholecystectomy  6. OA - Back and Neck surgeries done   7. Fibromyalgia      CURRENT OUTPATIENT MEDICATIONS     Current Outpatient Medications   Medication Sig     acetaminophen (TYLENOL) 500 MG tablet Take 1 tablet (500 mg) by mouth every 6 hours as needed     atorvastatin (LIPITOR) 20 MG tablet Take 1 tablet (20 mg) by mouth daily     benzonatate (TESSALON) 100 MG capsule TAKE 1 CAPSULE BY MOUTH THREE TIMES DAILY( EVERY EIGHT HOURS) AS NEEDED FOR COUGH     buPROPion (WELLBUTRIN XL) 150 MG 24 hr tablet TAKE 1 TABLET(150 MG) BY MOUTH EVERY MORNING     hydrochlorothiazide (HYDRODIURIL) 12.5 MG tablet TAKE 1 TABLET(12.5 MG) BY MOUTH DAILY     lisinopril (PRINIVIL/ZESTRIL) 30 MG tablet Take 1 tablet (30 mg) by mouth daily     melatonin 1 MG TABS tablet      OLANZapine (ZYPREXA) 5 MG tablet Take 1 tablet (5 mg) by mouth At Bedtime     pantoprazole (PROTONIX) 40 MG EC tablet Take 1 tablet (40 mg) by mouth daily Take 30-60 minutes before a meal.     potassium chloride ER (K-DUR/KLOR-CON M) 10 MEQ CR tablet Take 1 tablet (10 mEq) by mouth 2 times daily     ranitidine (ZANTAC) 150 MG tablet Take 1 tablet (150 mg) by mouth At Bedtime     SENEXON-S 8.6-50 MG tablet TK 1 TO 2 TS PO BID     venlafaxine (EFFEXOR-XR) 75 MG 24 hr capsule TAKE 3 CAPSULES BY MOUTH DAILY     No current  "facility-administered medications for this visit.           ALLERGIES     Allergies   Allergen Reactions     Novocain [Procaine] Unknown and Rash     Mirtazapine      Nefazodone Unknown and Rash      PHYSICAL EXAM   /60   Pulse 70   Temp 97.9  F (36.6  C) (Oral)   Resp 16   Ht 1.651 m (5' 5\")   LMP  (LMP Unknown)   SpO2 98%   BMI 30.62 kg/m      SpO2 Readings from Last 4 Encounters:   09/12/18 100%   07/20/18 98%   04/30/18 98%   03/14/18 99%     Wt Readings from Last 3 Encounters:   06/20/19 83.5 kg (184 lb)   06/19/19 83 kg (183 lb)   06/13/19 82.1 kg (181 lb)     GEN: NAD  HEENT: pupils equal, EOMI, no icterus. Oropharynx is clear.   NECK: no obvious cervical or supraclavicular lymphadenopathy  LUNGS: clear bilaterally  CV: regular rate and rhythm, harsh systolic murmur   ABDOMEN: soft, non-tender, non-distended, normal bowel sounds, no hepatosplenomegaly  EXT: warm, well perfused, no edema  NEURO: alert  SKIN: no rashes     LABORATORY AND IMAGING STUDIES     Recent Labs   Lab Test 06/20/19  1124 05/13/19  1722 04/23/19  1511 02/25/19  1655 02/06/19 01/24/19  0728    139 127* 131*  --  132*   POTASSIUM 4.2 3.6 4.0 4.2 4.3 5.0   CHLORIDE 102 104 96 97  --  98   CO2 26 26 26 29  --  24   ANIONGAP 5 9 5 5  --  10   BUN 19 19 7 64*  --  35*   CR 0.78 0.86 0.86 1.34* 2.06* 1.08*   GLC 86 100* 99 118* 144* 91   STEFFANIE 8.6 8.6 8.8 8.9  --  8.9     Recent Labs   Lab Test 01/20/19  0754 01/17/19  0649 01/11/19  0613 01/10/19  0645 01/10/19  0432 01/09/19  1653 01/05/19  0322   MAG 2.3 1.9 2.0  --  2.7* 1.5* 1.9   PHOS 3.9  --  3.1 3.9  --  1.6* 3.6     Recent Labs   Lab Test 06/20/19  1124 05/13/19  1722 02/25/19  1655 01/22/19  0751 01/18/19  0638  01/07/19  0748  12/12/18  1245 09/12/18  1126 03/14/18  1350 12/13/17  1026   WBC 8.2 8.6 7.5 7.7 10.7   < > 15.8*   < > 10.3 9.5 6.8 8.6   HGB 7.6* 8.2* 9.3* 9.2* 8.8*   < > 7.4*   < > 10.2* 10.5* 11.4* 9.3*    413 341 504* 524*   < > 233   < > 282 339 " 225 275   MCV 78 82 86 86 87   < > 86   < > 83 84 88 91   NEUTROPHIL  --   --   --   --   --   --  93.0  --  76.5 77.7 73.8 74.0    < > = values in this interval not displayed.     Recent Labs   Lab Test 06/20/19  1124 04/23/19  1511 02/06/19 01/09/19  1653  12/12/18  1245 09/12/18  1126 03/14/18  1350   BILITOTAL 0.3 0.3  --  0.4   < > 0.3 0.4 0.3   ALKPHOS 105 112  --  83   < > 96 107 83   ALT 18 19 18 25   < > 16 19 13   AST 19 19 23 33   < > 19 20 19   ALBUMIN 3.3* 3.4  --  2.4*   < > 3.2* 3.3* 3.5   LDH  --   --   --   --   --  208 182 168    < > = values in this interval not displayed.     TSH   Date Value Ref Range Status   12/12/2018 1.72 0.40 - 4.00 mU/L Final   02/15/2017 2.27 0.40 - 4.00 mU/L Final   08/10/2016 2.32 0.40 - 4.00 mU/L Final     No results for input(s): CEA in the last [id number] hours.  Results for orders placed or performed during the hospital encounter of 07/10/19   CT Soft Tissue Neck w Contrast    Narrative    CT SOFT TISSUE NECK W CONTRAST 7/10/2019 1:11 PM    History:  melanoma - follow up; Malignant melanoma of nose (H)  ICD-10: Malignant melanoma of nose (H)      Comparison:  Neck CT 12/12/2018, 3/19/2015 and face MRI 8/30/2017     Technique: Following intravenous administration of nonionic iodinated  contrast medium, thin section helical CT images were obtained from the  skull base down to the level of the aortic arch.  Axial, coronal and  sagittal reformations were performed with 2-3 mm slice thickness  reconstruction. Images were reviewed in soft tissue, lung and bone  windows.    Contrast: 120 cc of isovue 370    Findings:   Again noted is the postoperative changes of right middle turbinectomy,  medial antrectomy and ethmoidectomy. Unchanged mild sinus mucosal  thickening. There is no evidence to suggest locally recurrent tumor in  the remaining sinus cavity.    Evaluation of the mucosal space demonstrates no evident abnormality in  the nasopharynx, oropharynx, hypopharynx or the  glottis. The tongue  base appears normal. The major salivary glands appear unremarkable.  The thyroid gland appears normal.    There is no evident cervical lymphadenopathy. The fascial spaces in  the neck are intact bilaterally. 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    Postoperative changes of right temporal craniectomy and cranioplasty.  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  Bilateral pseudophakia. Postoperative changes of C3-4 and C5-7 ACDF  with dorsal and ventral lateral osseous ankylosis. There is stable  slight anterolisthesis of C2 on C3    The mastoid air cells are relatively clear. Subpleural reticular  changes in the visualized apical lungs.      Impression    Impression:  No evidence of local recurrence of metastatic sinonasal mucosal  melanoma in the neck. There is no cervical lymphadenopathy.    I have personally reviewed the examination and initial interpretation  and I agree with the findings.    GUSTAVO WEBB MD     EXAMINATION: CT CHEST/ABDOMEN/PELVIS W CONTRAST, 7/10/2019 1:29 PM     TECHNIQUE:  Helical CT images from the thoracic inlet through the  symphysis pubis were obtained  with contrast. Contrast dose: iopamidol  (ISOVUE-370) solution 120 mL     COMPARISON: CT chest, abdomen, and pelvis 12/12/2018     HISTORY: Right-sided sinonasal mucosal malignant melanoma.; Malignant  melanoma of nasal cavity (H)     FINDINGS:     Chest: Small bilateral calcified and noncalcified nodules are similar  to prior. Representative example being a 6 mm nodule in the lateral  left lower lobe (series 3 image 116) previously measuring 6 mm. Mild  biapical pleural thickening. Centrilobular emphysematous changes.  Scattered fibroatelectatic changes of the lung similar to prior. No  new focal consolidations. No  pneumothorax. No large pleural effusion.  Central tracheobronchial tree is clear.     Thyroid gland is unremarkable. Heart is normal size. Moderate coronary  artery calcifications. No large pericardial effusion. Aortic valve  annulus and leaflets calcifications. Ascending aorta and main  pulmonary artery are normal caliber. Small amount of fluid in the  aortopulmonary window. Normal branching pattern of the aortic arch.  Atherosclerotic calcifications of the thoracic aorta. No large central  pulmonary embolism. 8 mm right perihilar lymph node, not enlarged by  size criteria. 6 mm precarinal lymph node similar to prior, also not  enlarged by size criteria. Calcified left hilar and left  paraesophageal lymph nodes. No new or enlarging mediastinal, hilar, or  axillary lymphadenopathy.     Abdomen and pelvis: No suspicious hepatic lesions. Hepatic vasculature  is patent. Improved intrahepatic biliary ductal dilation from prior.  There is still mild prominence of the common bile duct measuring 0.9  cm. Cholecystectomy. 2 small focuses of interspersed fat within the  pancreatic head, otherwise pancreas is unremarkable. Calcified splenic  granulomas, otherwise unremarkable spleen. Thickening of the adrenal  glands without nodules prior. No hydronephrosis or nephrolithiasis.  Unchanged subcentimeter hypodensities in the kidneys too small to  accurately characterize but likely represent cysts. The bladder is  unremarkable. Anteverted uterus. No adnexal masses. The small and  large bowel is normal caliber. Moderate colonic stool burden. Sigmoid  colon diverticulosis without evidence for acute diverticulitis. No  intra-abdominal free air or fluid.     No new or enlarging abdominal or pelvic lymph nodes by size criteria.  No inguinal lymphadenopathy by size criteria. No abdominal aortic  aneurysm. Moderate to severe atherosclerotic calcifications of the  abdominal aorta and iliacs.      Bones and soft tissues: Moderate  degenerative changes of the  thoracolumbar spine with scattered Schmorl's nodes. Degenerative  changes of the hips. No suspicious osseous lesions. ACDF changes in  the lower cervical spine.                                                                      IMPRESSION: In this patient with history of malignant melanoma:  1. No new or enlarging pulmonary nodules. Small calcified and  noncalcified nodules are unchanged.  2. No evidence for metastatic disease in the chest, abdomen, or  pelvis.      I have personally reviewed the examination and initial interpretation  and I agree with the findings.     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   b-aubrie and c-KIT status unknown  New worsening cognitive status    We reviewed her scans from earlier today.  There is no evidence of recurrent disease. She is over  5 years out from surgical resection and radiation, with no recurrence of disease in the brain.      She has CT scan of chest/abd/pelvis  and CT neck which have been negative for recurrent metastatic disease. I do not feel that her melanoma would recur after being disease free for 5 years. I do not see benefit in scheduled imaging for her. I will not schedule follow up in medical oncology for her at this time. I would see her any time as needed if there is a change in her clinical status.     Her last MRI was done on  6/19/19 which is stable/unchanged from 1/16/19 and a benign etiology was favored for the 5 mm periventricular lesion noted on a previous brain MRI in Jan 2019.     She has a follow up in cardiology for her severe aortic stenosis.     Addendum: Her case was reviewed in ENT tumor board and a repeat MRI brain has been recommended in 3 months to  ensure that the brain lesion is indeed benign.     Over 25 min of direct face to face time spent with patient with more than 50% time spent in counseling and coordinating care.          Again, thank you for allowing me to participate in the care of your patient.      Sincerely,    Garett Obregon MD

## 2019-07-10 NOTE — PROGRESS NOTES
[facility] ER CANCER CLINIC  FOLLOW-UP VISIT NOTE    PATIENT NAME: Gabino Jones MRN # [id number]  DATE OF VISIT: Jul 10, 2019 YOB: 1940    REFERRING PROVIDER: Yolanda Whitaker MD   PHYSICIANS  [address]  [place] [address]    CANCER TYPE: Malignant Melanoma  STAGE:   ECOG PS: 1    TREATMENT SUMMARY:  Gabino presented with difficulty to breathe for previous 6-7 months due to partial nasal obstruction starting December 2013. She had been following with a local ENT surgeon and was prescribed nasal drops several courses of antibiotics for presumed ethmoid sinusitis.  Most recently in the last couple of weeks she has passed a few blood clots from her nose. She had an episode of epistaxis in April of 2014 which resolved on its own. On 06/03/14, she underwent right intranasal endoscopic sinus surgery for her ethmoid sinusitis as well as resection of the right nasal mass measuring 2.5 x 2 x 1.2 cm at [facility]. Following the procedure, she continued to have hemorrhage from the right nasal cavity and was taken back to the OR that same day for a repeat nasal endoscopy, where it was determined that it was a posterior bleed and that the mass had not been completely resected. The remainder of the mass was resected and the bleeding stopped. The following morning (6/04/14), she underwent another endoscopy at [facility] due to continued bleeding, and her right nasal cavity was packed. The histopathology from her recent resection revealed malignant melanoma.  The margins were positive.  This prompted a referral to [facility] and she was seen in ENT surgery by Dr. Whitaker and Dr. Ron Quiroga in Radiation/Oncology in addition to Medical Oncology.  She was worked up with a staging PET/CT scan and a brain MRI.  The PET/CT scan revealed minimal uptake in the local area which could very well be from recent surgery or residual  disease.  She had multiple subcentimeter pulmonary nodules with the largest one being 6 mm in the left lower lobe.  She had a history of pulmonary nodules which had been previously followed for 3 years. The PET was also significant for an enlarged portacaval node with mild FDG uptake and FDG uptake in the hepatic flexure of her colon.  Her most recent colonoscopy has been merely 2 months ago and was completely normal.  Her brain MRI was limited because of motion artifact. There were no enlarged lymph nodes in the head and neck region or any other site of increased FDG uptake that would be suspicious for definitive metastatic disease.      Her case was reviewed at the multi-specialty tumor board and she was recommended endoscopic resection of the melanoma followed by post operative radiation therapy. She underwent a rerevision surgery on 06/23/2014 using an endoscopic endonasal approach. Tumor was located at the anterior skull base between the middle turbinate and the septum. Final pathology report showed clear margins. She was started on radiation therapy under care of Dr. Ron Quiroga in July. Unfortunately she fell and fractured her left humerus in end of August. It was decided not to proceed with surgical intervention as this would involve intubation which would be difficult given ongoing radiation therapy. She had a difficult hospital course with UTI, confusion. She was managed conservatively and radiation therapy was resumed - eventually completed on September 11, 2014.     She has been followed with surveillance imaging since 2014. MRI in 2016 and again on 2/15/17 showed a 13 mm enhancing T1 hypointense lesion in the right side of the frontal bone. This was biopsied by neurosurgery on 2/22/17 and was benign.       ALAN Lloyd is being followed for her malignant melanoma status post resection on 6/04/14 and then re-resection on 06/24/14 with negative margins followed by radiation therapy from July through  September 11, 2014.     She is accompanied by her  at this clinic visit. She missed getting her hearing aids and had a real hard time understanding anything. She has no energy and is SOB at rest. She can sleep all day long.     She had brain MRI done earlier today and comes in for a follow up       PAST MEDICAL HISTORY   1. HTN  2. Dyslipidemia  3. Depression on medications  4. Pulmonary nodules  5. Cholecystectomy  6. OA - Back and Neck surgeries done   7. Fibromyalgia      CURRENT OUTPATIENT MEDICATIONS     Current Outpatient Medications   Medication Sig     acetaminophen (TYLENOL) 500 MG tablet Take 1 tablet (500 mg) by mouth every 6 hours as needed     atorvastatin (LIPITOR) 20 MG tablet Take 1 tablet (20 mg) by mouth daily     benzonatate (TESSALON) 100 MG capsule TAKE 1 CAPSULE BY MOUTH THREE TIMES DAILY( EVERY EIGHT HOURS) AS NEEDED FOR COUGH     buPROPion (WELLBUTRIN XL) 150 MG 24 hr tablet TAKE 1 TABLET(150 MG) BY MOUTH EVERY MORNING     hydrochlorothiazide (HYDRODIURIL) 12.5 MG tablet TAKE 1 TABLET(12.5 MG) BY MOUTH DAILY     lisinopril (PRINIVIL/ZESTRIL) 30 MG tablet Take 1 tablet (30 mg) by mouth daily     melatonin 1 MG TABS tablet      OLANZapine (ZYPREXA) 5 MG tablet Take 1 tablet (5 mg) by mouth At Bedtime     pantoprazole (PROTONIX) 40 MG EC tablet Take 1 tablet (40 mg) by mouth daily Take 30-60 minutes before a meal.     potassium chloride ER (K-DUR/KLOR-CON M) 10 MEQ CR tablet Take 1 tablet (10 mEq) by mouth 2 times daily     ranitidine (ZANTAC) 150 MG tablet Take 1 tablet (150 mg) by mouth At Bedtime     SENEXON-S 8.6-50 MG tablet TK 1 TO 2 TS PO BID     venlafaxine (EFFEXOR-XR) 75 MG 24 hr capsule TAKE 3 CAPSULES BY MOUTH DAILY     No current facility-administered medications for this visit.           ALLERGIES     Allergies   Allergen Reactions     Novocain [Procaine] Unknown and Rash     Mirtazapine      Nefazodone Unknown and Rash      PHYSICAL EXAM   /60   Pulse 70   Temp 97.9  " F (36.6  C) (Oral)   Resp 16   Ht 1.651 m (5' 5\")   LMP  (LMP Unknown)   SpO2 98%   BMI 30.62 kg/m     SpO2 Readings from Last 4 Encounters:   09/12/18 100%   07/20/18 98%   04/30/18 98%   03/14/18 99%     Wt Readings from Last 3 Encounters:   06/20/19 83.5 kg (184 lb)   06/19/19 83 kg (183 lb)   06/13/19 82.1 kg (181 lb)     GEN: NAD  HEENT: pupils equal, EOMI, no icterus. Oropharynx is clear.   NECK: no obvious cervical or supraclavicular lymphadenopathy  LUNGS: clear bilaterally  CV: regular rate and rhythm, harsh systolic murmur   ABDOMEN: soft, non-tender, non-distended, normal bowel sounds, no hepatosplenomegaly  EXT: warm, well perfused, no edema  NEURO: alert  SKIN: no rashes     LABORATORY AND IMAGING STUDIES     Recent Labs   Lab Test 06/20/19  1124 05/13/19  1722 04/23/19  1511 02/25/19  1655 02/06/19 01/24/19  0728    139 127* 131*  --  132*   POTASSIUM 4.2 3.6 4.0 4.2 4.3 5.0   CHLORIDE 102 104 96 97  --  98   CO2 26 26 26 29  --  24   ANIONGAP 5 9 5 5  --  10   BUN 19 19 7 64*  --  35*   CR 0.78 0.86 0.86 1.34* 2.06* 1.08*   GLC 86 100* 99 118* 144* 91   STEFFANIE 8.6 8.6 8.8 8.9  --  8.9     Recent Labs   Lab Test 01/20/19  0754 01/17/19  0649 01/11/19  0613 01/10/19  0645 01/10/19  0432 01/09/19  1653 01/05/19  0322   MAG 2.3 1.9 2.0  --  2.7* 1.5* 1.9   PHOS 3.9  --  3.1 3.9  --  1.6* 3.6     Recent Labs   Lab Test 06/20/19  1124 05/13/19  1722 02/25/19  1655 01/22/19  0751 01/18/19  0638  01/07/19  0748  12/12/18  1245 09/12/18  1126 03/14/18  1350 12/13/17  1026   WBC 8.2 8.6 7.5 7.7 10.7   < > 15.8*   < > 10.3 9.5 6.8 8.6   HGB 7.6* 8.2* 9.3* 9.2* 8.8*   < > 7.4*   < > 10.2* 10.5* 11.4* 9.3*    413 341 504* 524*   < > 233   < > 282 339 225 275   MCV 78 82 86 86 87   < > 86   < > 83 84 88 91   NEUTROPHIL  --   --   --   --   --   --  93.0  --  76.5 77.7 73.8 74.0    < > = values in this interval not displayed.     Recent Labs   Lab Test 06/20/19  1124 04/23/19  1511 02/06/19 " 01/09/19  1653  12/12/18  1245 09/12/18  1126 03/14/18  1350   BILITOTAL 0.3 0.3  --  0.4   < > 0.3 0.4 0.3   ALKPHOS 105 112  --  83   < > 96 107 83   ALT 18 19 18 25   < > 16 19 13   AST 19 19 23 33   < > 19 20 19   ALBUMIN 3.3* 3.4  --  2.4*   < > 3.2* 3.3* 3.5   LDH  --   --   --   --   --  208 182 168    < > = values in this interval not displayed.     TSH   Date Value Ref Range Status   12/12/2018 1.72 0.40 - 4.00 mU/L Final   02/15/2017 2.27 0.40 - 4.00 mU/L Final   08/10/2016 2.32 0.40 - 4.00 mU/L Final     No results for input(s): CEA in the last 70079 hours.  Results for orders placed or performed during the hospital encounter of 07/10/19   CT Soft Tissue Neck w Contrast    Narrative    CT SOFT TISSUE NECK W CONTRAST 7/10/2019 1:11 PM    History:  melanoma - follow up; Malignant melanoma of nose (H)  ICD-10: Malignant melanoma of nose (H)      Comparison:  Neck CT 12/12/2018, 3/19/2015 and face MRI 8/30/2017     Technique: Following intravenous administration of nonionic iodinated  contrast medium, thin section helical CT images were obtained from the  skull base down to the level of the aortic arch.  Axial, coronal and  sagittal reformations were performed with 2-3 mm slice thickness  reconstruction. Images were reviewed in soft tissue, lung and bone  windows.    Contrast: 120 cc of isovue 370    Findings:   Again noted is the postoperative changes of right middle turbinectomy,  medial antrectomy and ethmoidectomy. Unchanged mild sinus mucosal  thickening. There is no evidence to suggest locally recurrent tumor in  the remaining sinus cavity.    Evaluation of the mucosal space demonstrates no evident abnormality in  the nasopharynx, oropharynx, hypopharynx or the glottis. The tongue  base appears normal. The major salivary glands appear unremarkable.  The thyroid gland appears normal.    There is no evident cervical lymphadenopathy. The fascial spaces in  the neck are intact bilaterally. 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    Postoperative changes of right temporal craniectomy and cranioplasty.  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  Bilateral pseudophakia. Postoperative changes of C3-4 and C5-7 ACDF  with dorsal and ventral lateral osseous ankylosis. There is stable  slight anterolisthesis of C2 on C3    The mastoid air cells are relatively clear. Subpleural reticular  changes in the visualized apical lungs.      Impression    Impression:  No evidence of local recurrence of metastatic sinonasal mucosal  melanoma in the neck. There is no cervical lymphadenopathy.    I have personally reviewed the examination and initial interpretation  and I agree with the findings.    GUSTAVO WEBB MD     EXAMINATION: CT CHEST/ABDOMEN/PELVIS W CONTRAST, 7/10/2019 1:29 PM     TECHNIQUE:  Helical CT images from the thoracic inlet through the  symphysis pubis were obtained  with contrast. Contrast dose: iopamidol  (ISOVUE-370) solution 120 mL     COMPARISON: CT chest, abdomen, and pelvis 12/12/2018     HISTORY: Right-sided sinonasal mucosal malignant melanoma.; Malignant  melanoma of nasal cavity (H)     FINDINGS:     Chest: Small bilateral calcified and noncalcified nodules are similar  to prior. Representative example being a 6 mm nodule in the lateral  left lower lobe (series 3 image 116) previously measuring 6 mm. Mild  biapical pleural thickening. Centrilobular emphysematous changes.  Scattered fibroatelectatic changes of the lung similar to prior. No  new focal consolidations. No pneumothorax. No large pleural effusion.  Central tracheobronchial tree is clear.     Thyroid gland is unremarkable. Heart is normal size. Moderate coronary  artery calcifications. No large pericardial effusion. Aortic valve  annulus and leaflets  calcifications. Ascending aorta and main  pulmonary artery are normal caliber. Small amount of fluid in the  aortopulmonary window. Normal branching pattern of the aortic arch.  Atherosclerotic calcifications of the thoracic aorta. No large central  pulmonary embolism. 8 mm right perihilar lymph node, not enlarged by  size criteria. 6 mm precarinal lymph node similar to prior, also not  enlarged by size criteria. Calcified left hilar and left  paraesophageal lymph nodes. No new or enlarging mediastinal, hilar, or  axillary lymphadenopathy.     Abdomen and pelvis: No suspicious hepatic lesions. Hepatic vasculature  is patent. Improved intrahepatic biliary ductal dilation from prior.  There is still mild prominence of the common bile duct measuring 0.9  cm. Cholecystectomy. 2 small focuses of interspersed fat within the  pancreatic head, otherwise pancreas is unremarkable. Calcified splenic  granulomas, otherwise unremarkable spleen. Thickening of the adrenal  glands without nodules prior. No hydronephrosis or nephrolithiasis.  Unchanged subcentimeter hypodensities in the kidneys too small to  accurately characterize but likely represent cysts. The bladder is  unremarkable. Anteverted uterus. No adnexal masses. The small and  large bowel is normal caliber. Moderate colonic stool burden. Sigmoid  colon diverticulosis without evidence for acute diverticulitis. No  intra-abdominal free air or fluid.     No new or enlarging abdominal or pelvic lymph nodes by size criteria.  No inguinal lymphadenopathy by size criteria. No abdominal aortic  aneurysm. Moderate to severe atherosclerotic calcifications of the  abdominal aorta and iliacs.      Bones and soft tissues: Moderate degenerative changes of the  thoracolumbar spine with scattered Schmorl's nodes. Degenerative  changes of the hips. No suspicious osseous lesions. ACDF changes in  the lower cervical spine.                                                                       IMPRESSION: In this patient with history of malignant melanoma:  1. No new or enlarging pulmonary nodules. Small calcified and  noncalcified nodules are unchanged.  2. No evidence for metastatic disease in the chest, abdomen, or  pelvis.      I have personally reviewed the examination and initial interpretation  and I agree with the findings.     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   b-aubrie and c-KIT status unknown  New worsening cognitive status    We reviewed her scans from earlier today.  There is no evidence of recurrent disease. She is over  5 years out from surgical resection and radiation, with no recurrence of disease in the brain.      She has CT scan of chest/abd/pelvis  and CT neck which have been negative for recurrent metastatic disease. I do not feel that her melanoma would recur after being disease free for 5 years. I do not see benefit in scheduled imaging for her. I will not schedule follow up in medical oncology for her at this time. I would see her any time as needed if there is a change in her clinical status.     Her last MRI was done on  6/19/19 which is stable/unchanged from 1/16/19 and a benign etiology was favored for the 5 mm periventricular lesion noted on a previous brain MRI in Jan 2019.     She has a follow up in cardiology for her severe aortic stenosis.     Addendum: Her case was reviewed in ENT tumor board and a repeat MRI brain has been recommended in 3 months to ensure that the brain lesion is indeed benign.     Over 25 min of direct face to face time spent with patient with more than 50% time spent in counseling and coordinating care.

## 2019-07-11 NOTE — TELEPHONE ENCOUNTER
Pending Prescriptions:                       Disp   Refills    lisinopril (PRINIVIL/ZESTRIL) 30 MG table*90 tab*0            Sig: TAKE 1 TABLET(30 MG) BY MOUTH DAILY    BP Readings from Last 3 Encounters:   07/10/19 149/60   06/20/19 128/54   06/19/19 (!) 136/96     Routing refill request to provider for review/approval because:  Labs out of range:  B/P  LOV 4/23/2019    Nhi Llanos, RN, BSN

## 2019-07-15 NOTE — TELEPHONE ENCOUNTER
[facility] Call Center    Phone Message    May a detailed message be left on voicemail: yes    Reason for Call: Other: Return Call from [place] regarding an MRI    Action Taken: Message routed to:  [facility] ([facility] ENT

## 2019-07-16 NOTE — TELEPHONE ENCOUNTER
Zantac  Sent 5/20/19 with 6 month supply. Refill not appropriate at this time.     Kay Shine, RN, BSN

## 2019-07-18 PROBLEM — Z98.890 STATUS POST CORONARY ANGIOGRAM: Status: ACTIVE | Noted: 2019-01-01

## 2019-07-18 NOTE — PROGRESS NOTES
Pt back from CCL s/p RHC and CORS.  VSS.  Pt alert and oriented x4.  Rt wrist site F/D/I.  TR band on-inflated with 12cc.  Hand and fingers dusky with cap refill of 1-2 sec., (per cath lab nurse, SINGH Avila this unchanged from [facility]). pt has good feeling in her fingers. Sats 98% on pts right thumb.    Pt's family at the bedside.

## 2019-07-18 NOTE — PROGRESS NOTES
Pt's VSS.Pt sleepy but easily aroused.Pt tolerating clear liquids.Pt TR band had 12cc in it when pt arrived back from cath lab.At 1515 air was started to be removed from the band.Pt fingers were slightly blue and delayed cap refill >3secs before started releasing air.When got down to 7cc the site started bleeding so air was reinflated to 11. Right radial pulse has been nl the entire time.Fingers are now normal color with good cap refill.Unable to get a clear picture of the site thru the TR band but area around intact.Notified Silverio MORAES and he stated to wait another 20 minutes and start releasing air again.Silverio also stated that he would ask [facility]bs to come and talk to family.Reported off to Diana WINTERS and so far down to 7cc.Family at bedside updated on pt status.

## 2019-07-18 NOTE — PROGRESS NOTES
[facility]   Interventional Cardiology        Consenting/Education for Coronary Angiogram/Right Heart Catheterization     Patient understands during the portion for right heart catheterization a fine tube (catheter) is put into the vein of the groin/neck.  It is carefully passed along until it reaches the heart and then goes up into the blood vessels of the lungs. This is done to measure a variety of pressures in your heart and can tell us how well the heart is filling and emptying, as well as monitor fluid status. During the portion for the coronary angiogram a fine tube (catheter) is put into the artery in the groin/arm. The tube is carefully passed into each coronary artery. A series of pictures are taken using x-rays and a contrast medium (x-ray dye). The contrast medium is injected to look for any blockages or narrowing in the arteries of the heart.  At the end of the procedure the artery may be closed with a special plug, Angio Seal, to stop the bleeding.     Patient also understands risks and complications of the procedure which include, but are not limited to bruising/swelling around the incision site, infection, bleeding, allergic reaction to local anesthetic, air embolism, arterial puncture, stroke, heart attack.     Patient verbalized understanding of risks and benefits of the coronary angiogram, right heart catheterization and has elected to proceed with the procedure.     Ashish Case PA-C  [facility] Cardiology Team

## 2019-07-18 NOTE — IP AVS SNAPSHOT
[address] [address]                                    After Visit Summary   7/18/2019    Gabino Jones    MRN: [id number]           After Visit Summary Signature Page    I have received my discharge instructions, and my questions have been answered. I have discussed any challenges I see with this plan with the nurse or doctor.    ..........................................................................................................................................  Patient/Patient Representative Signature      ..........................................................................................................................................  Patient Representative Print Name and Relationship to Patient    ..................................................               ................................................  Date                                   Time    ..........................................................................................................................................  Reviewed by Signature/Title    ...................................................              ..............................................  Date                                               Time          22EPIC Rev 08/18

## 2019-07-18 NOTE — IP AVS SNAPSHOT
MRN:[id number]                      After Visit Summary   7/18/2019    Gabino Jones    MRN: [id number]           Visit Information        Department      7/18/2019  8:17 AM [address] [facility] [place]          Review of your medicines      UNREVIEWED medicines. 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)      Dose:  500 mg  Take 1 tablet (500 mg) by mouth every 6 hours as needed  Quantity:  100 tablet  Refills:  3     atorvastatin 20 MG tablet  Commonly known as:  LIPITOR  Used for:  Hypercholesteremia      Dose:  20 mg  Take 1 tablet (20 mg) by mouth daily  Quantity:  90 tablet  Refills:  3     benzonatate 100 MG capsule  Commonly known as:  TESSALON  Used for:  Cough      TAKE 1 CAPSULE BY MOUTH THREE TIMES DAILY( EVERY EIGHT HOURS) AS NEEDED FOR COUGH  Quantity:  60 capsule  Refills:  0     buPROPion 150 MG 24 hr tablet  Commonly known as:  WELLBUTRIN XL  Used for:  JITENDRA (generalized anxiety disorder)      TAKE 1 TABLET(150 MG) BY MOUTH EVERY MORNING  Quantity:  90 tablet  Refills:  1     hydrochlorothiazide 12.5 MG tablet  Commonly known as:  HYDRODIURIL  Used for:  Hypertension goal BP (blood pressure) < 140/90      TAKE 1 TABLET(12.5 MG) BY MOUTH DAILY  Quantity:  90 tablet  Refills:  0     lisinopril 30 MG tablet  Commonly known as:  PRINIVIL/ZESTRIL  Used for:  Hypertension goal BP (blood pressure) < 140/90      TAKE 1 TABLET(30 MG) BY MOUTH DAILY  Quantity:  90 tablet  Refills:  0     melatonin 1 MG Tabs tablet      Refills:  0     OLANZapine 5 MG tablet  Commonly known as:  zyPREXA  Used for:  Agitation      Dose:  5 mg  Take 1 tablet (5 mg) by mouth At Bedtime  Quantity:  90 tablet  Refills:  0     pantoprazole 40 MG EC tablet  Commonly known as:  PROTONIX  Used for:  Gastroesophageal reflux disease without esophagitis      Dose:  40 mg  Take 1 tablet (40 mg) by mouth  daily Take 30-60 minutes before a meal.  Quantity:  90 tablet  Refills:  1     potassium chloride ER 10 MEQ CR tablet  Commonly known as:  K-DUR/KLOR-CON M  Used for:  Hypokalemia      Dose:  10 mEq  Take 1 tablet (10 mEq) by mouth 2 times daily  Quantity:  60 tablet  Refills:  3     ranitidine 150 MG tablet  Commonly known as:  ZANTAC  Used for:  Gastroesophageal reflux disease, esophagitis presence not specified      Dose:  150 mg  Take 1 tablet (150 mg) by mouth At Bedtime  Quantity:  90 tablet  Refills:  1     SENEXON-S 8.6-50 MG tablet  Generic drug:  senna-docusate      TK 1 TO 2 TS PO BID  Refills:  1     venlafaxine 75 MG 24 hr capsule  Commonly known as:  EFFEXOR-XR  Used for:  JITENDRA (generalized anxiety disorder)      TAKE 3 CAPSULES BY MOUTH DAILY  Quantity:  270 capsule  Refills:  2              Protect others around you: Learn how to safely use, store and throw away your medicines at www.disposemymeds.org.       Follow-ups after your visit       Additional Services     Follow-Up with Cardiac Advanced Practice Provider      Follow up appointment should be made in 2 weeks after discharge           Your next 10 appointments already scheduled    Sep 16, 2019 11:30 AM CDT  MR BRAIN W/O & W CONTRAST with [id number]  [facility] ([facility]) [address] [address] [phone]   How do I prepare for my exam? (Food and drink instructions)  **If you will be receiving sedation or general anesthesia, please see special notes below.**    How do I prepare for my exam? (Other instructions)  Take your medicines as usual, unless your doctor tells you not to.  You may or may not receive intravenous (IV) contrast for this exam pending the discretion of the Radiologist. You do not need to do anything special to prepare.    **If you will be receiving sedation or general anesthesia, please see special notes below.**    What should I wear: The MRI  machine uses a strong magnet. Due to increased risk of metallic materials/threading in clothing, for your safety, you will be requested to change into a hospital gown. Please remove any body piercings and hair extensions before you arrive. You will also remove watches, jewelry, hairpins, wallets, dentures, partial dental plates and hearing aids. You may wear contact lenses, and you may be able to wear your rings. We have a safe place to keep your personal items, but it is safer to leave them at home.    How long does the exam take: Most tests take 30 to 60 minutes. HOWEVER, IF YOUR DOCTOR PRESCRIBES ANESTHESIA please plan on spending four to five hours in the recovery room.    What should I bring: Bring a list of your current medicines to your exam (including vitamins, minerals and over-the-counter drugs).  If you are a minor (under age 18) you will need to bring a parent or legal guardian with you to the exam.    Do I need a : **If you will be receiving sedation or general anesthesia, please see special notes below.**    What should I do after the exam: No restrictions, you may resume normal activities.    What is this test: MRI (magnetic resonance imaging) uses a strong magnet and radio waves to look inside the body. An MRA (magnetic resonance angiogram) does the same thing, but it lets us look at your blood vessels. A computer turns the radio waves into pictures showing cross sections of the body, much like slices of bread. This helps us see any problems more clearly. You may receive fluid (called  contrast ) before or during your scan. The fluid helps us see the pictures better. We give the fluid through an IV (small needle in your arm).    Who should I call with questions: Please call the Imaging Department at your exam site with any questions. Directions, parking instructions, and other information are available on our website, [email or web address].    How do I prepare if I m having sedation or  anesthesia?  **IMPORTANT**  THE INSTRUCTIONS BELOW ARE ONLY FOR THOSE PATIENTS WHO HAVE BEEN TOLD THEY WILL RECEIVE SEDATION OR GENERAL ANESTHESIA DURING THEIR MRI PROCEDURE:    IF YOU WILL RECEIVE ORAL SEDATION (take medicine by mouth to help you relax during your exam):  You must get the medicine from your doctor before you arrive. Bring the medicine to the exam. Do not take it at home.  Arrive one hour early with a drive. Your  must remain on site for the duration of the exam. Your medicine may make you sleepy. After the exam, you may not drive, take a bus or take a taxi by yourself.    IF YOU WILL RECEIVE SEDATION WITH AN IV OR ANESTHESIA (deeper level of sedation ordered and administered by a health professional):  Arrive 1 1/2 hours early. Bring someone who can take you home after the test. You may not drive, take a bus or take a taxi by yourself.  No eating 8 hours before your exam. You may have clear liquids up until 4 hours before your exam. (Clear liquids include water, clear tea, black coffee and fruit juice without pulp.)  You may spend up to four to five hours in the recovery room.     Dec 12, 2019  1:00 PM CST  (Arrive by 12:45 PM)  Return Visit with Yolanda Whitaker MD  [facility] ([facility]) [address] [place] [address] [phone]         Care Instructions       After Care Instructions     Discharge Instructions - IF on Metformin (Glucophage or Glucovance) or Metformin containing medications      IF on Metformin (Glucophage or Glucovance) or Metformin containing medications , schedule a Basic Metabolic Panel at [facility] Primary [facility] in 48 - 72 hours post procedure and PRIOR TO resuming the Metformin or Metformin containing medications.  Hold Metformin (Glucophage or Glucovance) or Metformin containing medications until after the Basic Metabolic Panel on the 2nd or 3rd day following the procedure.   May resume after blood draw is complete.           Further instructions from your care team       Going Home after an Angiogram with TR Band Placement        After you go home:    Have an adult stay with you for 24 hours.    Drink plenty of fluids.    You may eat your normal diet, unless your doctor tells you otherwise.    For 24 hours:  - Relax and take it easy.  - Do NOT smoke.  - Do NOT make any important or legal decisions.  - Do NOT drive or operate machines at home or at work.  - Do NOT drink alcohol.      Remove the Band-Aid after 24 hours. If there is minor oozing, apply another Band-aid and remove it after 12 hours.    For 2 days, do NOT have sex or do any heavy exercise.    Do NOT take a bath, or use a hot tub or pool for at least 3 days. You may shower.    Care of wrist or arm site  It is normal to have soreness at the puncture site and mild tingling in your hand for up to 3 days.    For 2 days, do not use your hand or arm to support your weight (such as rising from a chair) or bend your wrist (such as lifting a garage door).    For 2 days, do not lift more than 5 pounds or exercise your arm (tennis, golf or bowling).                    If you start bleeding from the site in your arm:    Sit down and press firmly on the site with your fingers for 10 minutes. Call your doctor as soon as you can.    If the bleeding stops, sit still and keep your wrist straight for 2 hours.    Call your doctor if:    You have a large or growing hard lump around the site.      The site is red, swollen, hot or tender.    Blood or fluid is draining from the site.    You have chills or a fever greater than 101 F (38 C).    Your leg or arm turns bluish, feels numb or cool.    You have hives, a rash or unusual itching.    Call 911 right away if you have:    Bleeding that does not stop.    Heavy bleeding.    [facility] [place]:  [phone] (7 days a week)        Additional Information About Your  "Visit       CEY[employer]hart Information    [email or web address] lets you send messages to your doctor, view your test results, renew your prescriptions, schedule appointments and more. To sign up, go to [email or web address] . Click on \"Log in\" on the left side of the screen, which will take you to the Welcome page. Then click on \"Sign up Now\" on the right side of the page.     You will be asked to enter the access code listed below, as well as some personal information. Please follow the directions to create your username and password.     Your access code is: [email or web address]  Expires: 2019  5:19 AM     Your access code will  in 60 days. If you need help or a new code, please call your [facility] clinic or [phone].       Care EveryWhere ID    This is your Care EveryWhere ID. This could be used by other organizations to access your [facility] medical records  [id number]       Your Vitals Were     Blood Pressure   100/45 (BP Location: Left arm)          Pulse   73          Temperature   97.7  F (36.5  C) (Oral)          Respirations   16          Last Period    (LMP Unknown)             Pulse Oximetry   97%          Primary Care Provider Office Phone # Fax #    Rebekah Boogie [phone]      Equal Access to Services    GAEL OCONNELL AH: Hadii rivka ku hadasho Soomaali, waaxda luqadaha, qaybta kaalmada adeegyada, vito cazares hayclif wright . So [facility] [phone].    ATENCIÓN: Si habla español, tiene a velazquez disposición servicios gratuitos de asistencia lingüística. Llame al [phone].    We comply with applicable federal civil rights laws and [place] laws. We do not discriminate on the basis of race, color, national origin, age, disability, sex, sexual orientation, or gender identity.           Thank you!    Thank you for choosing [facility] for your care. Our goal is always to provide you with excellent care. Hearing back from our patients is one way we can continue to improve our " services. Please take a few minutes to complete the written survey that you may receive in the mail after you visit with us. Thank you!            Medication List      ASK your doctor about these medications          Morning Afternoon Evening Bedtime As Needed    acetaminophen 500 MG tablet  Also known as:  TYLENOL  INSTRUCTIONS:  Take 1 tablet (500 mg) by mouth every 6 hours as needed                     atorvastatin 20 MG tablet  Also known as:  LIPITOR  INSTRUCTIONS:  Take 1 tablet (20 mg) by mouth daily                     benzonatate 100 MG capsule  Also known as:  TESSALON  INSTRUCTIONS:  TAKE 1 CAPSULE BY MOUTH THREE TIMES DAILY( EVERY EIGHT HOURS) AS NEEDED FOR COUGH                     buPROPion 150 MG 24 hr tablet  Also known as:  WELLBUTRIN XL  INSTRUCTIONS:  TAKE 1 TABLET(150 MG) BY MOUTH EVERY MORNING                     hydrochlorothiazide 12.5 MG tablet  Also known as:  HYDRODIURIL  INSTRUCTIONS:  TAKE 1 TABLET(12.5 MG) BY MOUTH DAILY                     lisinopril 30 MG tablet  Also known as:  PRINIVIL/ZESTRIL  INSTRUCTIONS:  TAKE 1 TABLET(30 MG) BY MOUTH DAILY                     melatonin 1 MG Tabs tablet                     OLANZapine 5 MG tablet  Also known as:  zyPREXA  INSTRUCTIONS:  Take 1 tablet (5 mg) by mouth At Bedtime                     pantoprazole 40 MG EC tablet  Also known as:  PROTONIX  INSTRUCTIONS:  Take 1 tablet (40 mg) by mouth daily Take 30-60 minutes before a meal.                     potassium chloride ER 10 MEQ CR tablet  Also known as:  K-DUR/KLOR-CON M  INSTRUCTIONS:  Take 1 tablet (10 mEq) by mouth 2 times daily                     ranitidine 150 MG tablet  Also known as:  ZANTAC  INSTRUCTIONS:  Take 1 tablet (150 mg) by mouth At Bedtime                     SENEXON-S 8.6-50 MG tablet  INSTRUCTIONS:  TK 1 TO 2 TS PO BID  Generic drug:  senna-docusate                     venlafaxine 75 MG 24 hr capsule  Also known as:  EFFEXOR-XR  INSTRUCTIONS:  TAKE 3 CAPSULES BY MOUTH  DAILY

## 2019-07-18 NOTE — Clinical Note
Potential access sites were evaluated for patency using ultrasound.   The right radial artery and right jugular vein were selected. Access was obtained under with Sonosite guidance using a standard 18 guage needle and Micropuncture 21 gauge for Radial  with direct visualization of needle entry.

## 2019-07-18 NOTE — DISCHARGE INSTRUCTIONS
Going Home after an Angiogram with TR Band Placement        After you go home:    Have an adult stay with you for 24 hours.    Drink plenty of fluids.    You may eat your normal diet, unless your doctor tells you otherwise.    For 24 hours:  - Relax and take it easy.  - Do NOT smoke.  - Do NOT make any important or legal decisions.  - Do NOT drive or operate machines at home or at work.  - Do NOT drink alcohol.      Remove the Band-Aid after 24 hours. If there is minor oozing, apply another Band-aid and remove it after 12 hours.    For 2 days, do NOT have sex or do any heavy exercise.    Do NOT take a bath, or use a hot tub or pool for at least 3 days. You may shower.    Care of wrist or arm site  It is normal to have soreness at the puncture site and mild tingling in your hand for up to 3 days.    For 2 days, do not use your hand or arm to support your weight (such as rising from a chair) or bend your wrist (such as lifting a garage door).    For 2 days, do not lift more than 5 pounds or exercise your arm (tennis, golf or bowling).                    If you start bleeding from the site in your arm:    Sit down and press firmly on the site with your fingers for 10 minutes. Call your doctor as soon as you can.    If the bleeding stops, sit still and keep your wrist straight for 2 hours.    Call your doctor if:    You have a large or growing hard lump around the site.      The site is red, swollen, hot or tender.    Blood or fluid is draining from the site.    You have chills or a fever greater than 101 F (38 C).    Your leg or arm turns bluish, feels numb or cool.    You have hives, a rash or unusual itching.    Call 911 right away if you have:    Bleeding that does not stop.    Heavy bleeding.    [facility] [place]:  [phone] (7 days a week)

## 2019-07-18 NOTE — Clinical Note
RRA sheath pulled, t-r band placed, hemostasis achieved.  Rijv sheath pulled, manaul pressure held, hemostasis achieved.

## 2019-07-18 NOTE — PROGRESS NOTES
"Deflated TR band .  Right wrist bruised.  Within a few minutes of deflation, right wrist site started to develop a hematoma.  Re-inflated TR band to 7cc.  Will attempt to deflate in 20\".  "

## 2019-07-19 NOTE — TELEPHONE ENCOUNTER
Zyprexa  Medication is being filled for 1 time refill only due to:  Patient needs to be seen because 3 month recheck.    Kay Shine, RN, BSN

## 2019-07-19 NOTE — PROGRESS NOTES
Right radial wrist site bruised with old hematoma which is marked.  Tolerated food, fluids, ambulation and urination all without issues.  Reviewed discharge instructions with patient, her daughter and her .  PIV removed.  Discharged to home with family.

## 2019-07-25 NOTE — PROGRESS NOTES
Gabino's case was discussed at TAVR conference.    The plan will be for her to proceed with TAVR   Harding Valve  Size 23 mm  Approach: TF  Sivakumar: Yes  Anesthesia: MAC    Additional testings/orders/information discussed:   No additional testing needed at this time.

## 2019-07-29 NOTE — TELEPHONE ENCOUNTER
Refill request for senexon.  Pt reported this medication.  Provider, please review and advise.    SENEXON-S 8.6-50 MG tablet  1 3/25/2019  Yes   Sig: TK 1 TO 2 TS PO BID   Class: Historical

## 2019-07-29 NOTE — TELEPHONE ENCOUNTER
[facility] Call Center    Phone Message    May a detailed message be left on voicemail: yes    Reason for Call: Other: Pt's  called, he would like a call back to discuss next steps after the appt on 7/18/19.      Action Taken: Message routed to:  Adult Clinics: Cardiology p [phone]

## 2019-07-30 NOTE — TELEPHONE ENCOUNTER
Called and spoke to , Henok. Notified that there is a note in patient's chart that plan is for patient to be set up for TAVR. A message has been sent to the support staff and he should be receiving a call soon to set up the appointment. Asked him to call if he does not get a call in the next few days.     DBalcmichelle, SINGH

## 2019-08-14 NOTE — PROGRESS NOTES
Cardiology Clinic Note  August 21, 2019    Chief Complaint: Angiogram follow-up    HPI:  Gabino Jones is a 79 year old with a history of hypertension, hyperlipidemia, prior tobacco use, chronic anemia and severe aortic valvular stenosis who is currently being evaluated for a TAVR.  As part of her TAVR work-up she underwent coronary angiogram on 7/18 demonstrating diffuse nonobstructive coronary artery disease with 40% mLAD, 50-60% pLCx with FFR 0.89 and 70%dRCA with FFR 0.83. Her pLCx and dRCA lesions were noted to be physiologically insignificant based on FFR and she was medically managed. Her right heart catherization demonstrated mildly elevated left and right sided filling pressures with mean RA of 10 and mean PAW 21. She presents to clinic today for angiogram follow-up.    Today the patient reports feeling alright. She continues to experience fatigue and dyspnea r/t her severe aortic stenosis. She can only walk about 50 feet before becoming short of breath and having to rest. She denies any recent chest pain or pressure. She denies any palpitations. She reports occasional lightheadedness. She was admitted to [facility] last week after an episode of loss of consciousness in the setting of severe abdominal pain. Her hemoglobin was low and she required a blood transfusion. Her pain was ultimately thought to be r/t to colitis. She was discharged home on antibiotics. She denies recurrent loss of consciousness. She is tolerating her medications without significant side effects.     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     Reduced vision      Ringing in ears      Sensorineural hearing loss      Tinnitus          Medications:    Current Outpatient Medications on File Prior to Visit:  acetaminophen (TYLENOL) 500 MG tablet Take 1 tablet (500 mg) by mouth every 6 hours as needed   atorvastatin (LIPITOR) 20 MG tablet Take 1 tablet (20 mg) by mouth daily   benzonatate (TESSALON) 100 MG capsule TAKE 1 CAPSULE BY MOUTH THREE TIMES DAILY( EVERY EIGHT HOURS) AS NEEDED FOR COUGH   buPROPion (WELLBUTRIN XL) 150 MG 24 hr tablet TAKE 1 TABLET(150 MG) BY MOUTH EVERY MORNING   hydrochlorothiazide (HYDRODIURIL) 12.5 MG tablet TAKE 1 TABLET(12.5 MG) BY MOUTH DAILY   lisinopril (PRINIVIL/ZESTRIL) 30 MG tablet TAKE 1 TABLET(30 MG) BY MOUTH DAILY   melatonin 1 MG TABS tablet    OLANZapine (ZYPREXA) 5 MG tablet Take 1 tablet (5 mg) by mouth At Bedtime   pantoprazole (PROTONIX) 40 MG EC tablet Take 1 tablet (40 mg) by mouth daily Take 30-60 minutes before a meal.   potassium chloride ER (K-DUR/KLOR-CON M) 10 MEQ CR tablet Take 1 tablet (10 mEq) by mouth 2 times daily   ranitidine (ZANTAC) 150 MG tablet Take 1 tablet (150 mg) by mouth At Bedtime   SENEXON-S 8.6-50 MG tablet TK 1 TO 2 TS PO BID   venlafaxine (EFFEXOR-XR) 75 MG 24 hr capsule TAKE 3 CAPSULES BY MOUTH DAILY     No current facility-administered medications on file prior to visit.     Allergies:      Allergies   Allergen Reactions     Novocain [Procaine] Unknown and Rash     Mirtazapine      Nefazodone Unknown and Rash     Family and social history:    Family History   Problem Relation Age of Onset     Prostate Cancer Father      Cancer Sister         SCLC     Cancer Sister      Macular Degeneration Daughter      Glaucoma No family hx of        Social History     Socioeconomic History     Marital status:      Spouse name: Not on file     Number of children: Not on file     Years of education: Not on file     Highest education level: Not on file   Occupational History     Not  on file   Social Needs     Financial resource strain: Not on file     Food insecurity:     Worry: Not on file     Inability: Not on file     Transportation needs:     Medical: Not on file     Non-medical: Not on file   Tobacco Use     Smoking status: Former Smoker     Packs/day: 1.00     Years: 40.00     Pack years: 40.00     Types: Cigarettes     Last attempt to quit: 6/10/2004     Years since quitting: 15.1     Smokeless tobacco: Never Used   Substance and Sexual Activity     Alcohol use: No     Drug use: No     Sexual activity: Not Currently     Partners: Male     Birth control/protection: Post-menopausal, Female Surgical   Lifestyle     Physical activity:     Days per week: Not on file     Minutes per session: Not on file     Stress: Not on file   Relationships     Social connections:     Talks on phone: Not on file     Gets together: Not on file     Attends [employer]ism service: Not on file     Active member of club or organization: Not on file     Attends meetings of clubs or organizations: Not on file     Relationship status: Not on file     Intimate partner violence:     Fear of current or ex partner: Not on file     Emotionally abused: Not on file     Physically abused: Not on file     Forced sexual activity: Not on file   Other Topics Concern     Parent/sibling w/ CABG, MI or angioplasty before 65F 55M? Not Asked   Social History Narrative     Not on file     Review of Systems:  Skin: No skin rash or ulcers.  Eyes: No red eye.  Ears/Nose/Throat: No ear discharge, nasal congestion, sore throat or dysphagia.  Respiratory: No cough or hemoptysis.  Cardiovascular: See HPI.    Gastrointestinal: No abdominal pain, nausea, vomiting, hematemesis or melena.  Genitourinary: No increased frequency or urgency of urine. No dysuria or hematuria.  Musculoskeletal: No polyarthralgia or myalgias.  Neurologic: No headaches, seizure or focal weakness.  Psychiatric: No hallucinations.  Hematologic/Lymphatic/Immunologic: No  "bleeding tendency.  Endocrine: No heat or cold intolerance, abnormal facial hair or alopecia.    Vital signs:  BP (!) 155/77 (BP Location: Left arm, Patient Position: Chair, Cuff Size: Adult Large)   Pulse 72   Ht 1.651 m (5' 5\")   Wt 88.5 kg (195 lb)   LMP  (LMP Unknown)   SpO2 98%   BMI 32.45 kg/m     Wt Readings from Last 2 Encounters:   06/20/19 83.5 kg (184 lb)   06/19/19 83 kg (183 lb)     Physical Exam:  Gen: NAD.    HEENT: No conjunctival pallor or scleral icterus, MMM. Clear oropharynx.    Neck: No JVD. No thyroid enlargement or cervical adenopathy.    Chest: Clear to auscultation bilaterally.    CV: Normal first and second heart sounds. 3/6 SUNITHA across the precordium  Abdomen: Soft, non-tender, non-distended, BS+.  Ext: No edema. Warm and well perfused with normal capillary refill.    Skin: No skin rash or ulcers.  Neuro: alert, oriented and appropriately conversant.    Psych: Normal affect and speech.    Labs:    CBC RESULTS:   Recent Labs   Lab Test 08/21/19  1457 07/18/19  0854   WBC  --  8.3   RBC  --  3.79*   HGB 9.3* 7.9*   HCT  --  28.6*   MCV  --  76*   MCH  --  20.8*   MCHC  --  27.6*   RDW  --  18.7*   PLT  --  333     Diagnostics:      Mild elevated Pulmonary Hypertension.    Right sided filling pressures are mildly elevated.    Left sided filling pressures are moderately elevated.    Moderate stenosis (70%) distal RCA, physiologically insignificant (FFR 0.83)    Moderate ostial LCx (50%), physiologically insignificant (FFR 0.89)    Pressure wire/FFR was performed on the lesion. FFR: 0.89.    Pressure wire/FFR was performed on the lesion. FFR: 0.83.          Plan     Coronary revascularization not warranted.  Proceed with TAVR vs. SAVR based on valve clinic consensus.   Coronary Findings     Diagnostic   Dominance: Right   Left Anterior Descending   Mid LAD lesion is 40% stenosed.   Left Circumflex   Ost Cx to Prox Cx lesion is 50% stenosed.   Prox Cx to Mid Cx lesion is 60% stenosed. " Pressure wire/FFR was performed on the lesion. FFR: 0.89. The LMCA was engaged with a 6 Fr Ikari LF 3.5 GC and the FFR of the ostial and mid LCx were measured with the Opsens wire. The FFR at peak hyperemia was 0.89.   Right Coronary Artery   Prox RCA to Mid RCA lesion is 30% stenosed.   Dist RCA lesion is 70% stenosed. Pressure wire/FFR was performed on the lesion. FFR: 0.83. The RCA was engaged with a 6 Fr Ikari RT 1.0 GC and the FFR of the distal RCA was measured with the Opsens wire. The FFR at peak hyperemia was 0.83.     Assessment and Plan:  Gabino Jones is a 79 year old with a history of hypertension, hyperlipidemia, prior tobacco use, chronic anemia and severe aortic valvular stenosis who recently underwent coronary angiogram as part of her TAVR evaluation. She was noted to have moderate nonobstructive disease with 40% mLAD, 50-60% pLCx with FFR 0.89 and 70%dRCA with FFR 0.83 and was continued on medical therapy. Today she presents for angiogram follow-up.     She continues to experience fatigue and dyspnea r/t to her aortic stenosis, but denies any new cardiovascular symptoms. Her puncture site is healed and she is tolerating her medications without significant side effects. She was recently admitted to [facility] following a syncopal episode in the setting of severe abdominal pain.  She was diagnosed with colitis and started on antibiotics. She was also noted to be anemic during her stay and required a blood transfusion.Today her hemoglobin is stable at 9.3. Recommend ongoing CAD risk factor modification with statin therapy. Start ASA therapy 81 mg daily and continue Protonix for GI protection. Proceed with TAVR on 9/4.     Follow-up: RTC in post-TAVR.

## 2019-08-19 NOTE — TELEPHONE ENCOUNTER
I've never seen Ms. Jones before, so I'd prefer to see her in office to ensure she is doing better rather than just have the lab drawn.    Thank you.  Julissa Vargas, CNP

## 2019-08-19 NOTE — PROGRESS NOTES
Subjective     Gabino Jones is a 79 year old female who presents to clinic today for the following health issues:    HPI       Hospital Follow-up Visit:    Hospital/Nursing Home/IP Rehab Facility: [facility]  Date of Admission: 8/13/2019  Date of Discharge: 8/16/2019  Reason(s) for Admission: Gastro            Problems taking medications regularly:  None       Medication changes since discharge: On Antibiotics with one day left       Problems adhering to non-medication therapy:  None  Summary of hospitalization:  CareEverywhere information obtained and reviewed  Diagnostic Tests/Treatments reviewed.  Follow up needed: Hemoglobin rechecked  Other Healthcare Providers Involved in Patient s Care:         Specialist appointment - 9/4/19 - Aortic Valve Replacement  Update since discharge: improved. Information from patient and spouse    Post Discharge Medication Reconciliation: discharge medications reconciled, continue medications without change.  Plan of care communicated with patient and family     Coding guidelines for this visit:  Type of Medical   Decision Making Face-to-Face Visit       within 7 Days of discharge Face-to-Face Visit        within 14 days of discharge   Moderate Complexity [id number]   High Complexity [id number]          Additional HPI:  Brief Hospital Course obtained from Discharge Summary from 8/16/19  BRIEF HOSPITAL COURSE: Gabino Jones is a 79 y.o. female who was admitted on 8/13/19 with sycope and diarrhea. CT scan shows colitis. C diff negative. Started on ceftriaxone and metronidazole with improvement of symptoms and leukocytosis. Hg was low on 8/14. 1 Unit pRBCs ordered and GI consulted and iron levels checked. Iron dextran given on 8/14. On 8/15 she was transitioned to oral abx (cipro/flagyl) and her Hg trended up to 8.2 until 8/16. On 8/16/2019, Gabino Jones was evaluated and determined safe for discharge. With home health and some observation at home.       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)     Genetic testing     Gastroesophageal reflux disease with esophagitis     Fibromyalgia     Environmental and seasonal allergies     DDD (degenerative disc disease), lumbosacral     COPD (chronic obstructive pulmonary disease) (H)     Chronic hyponatremia     Cancer-related pain     Acute renal insufficiency     Severe aortic stenosis     Other ill-defined heart diseases     Status post coronary angiogram     Past Surgical History:   Procedure Laterality Date     ANESTHESIA OUT OF OR MRI N/A 1/16/2019    Procedure: Anesthesia Coverage Brain MRI With Contrast @1330;  Surgeon: GENERIC ANESTHESIA PROVIDER;  Location: [facility] OR     C ANESTH,CERV SPINE, SITTING POSITION       COLONOSCOPY N/A 7/7/2017    Procedure: COMBINED COLONOSCOPY, SINGLE OR MULTIPLE BIOPSY/POLYPECTOMY BY BIOPSY;;  Surgeon: Sathya Norman MD;  Location: [facility] OR     COLONOSCOPY WITH CO2 INSUFFLATION N/A 7/7/2017    Procedure: COLONOSCOPY WITH CO2 INSUFFLATION;  Combined,  [place], Gastroesophageal reflux disease without esophagitis  Flatulence, eructation, and gas pain, BMI 29.35, [facility] [phone];  Surgeon: Sathya Norman MD;  Location: [facility] OR     COLONOSCOPY, SUBMUCOSA RESECTION N/A 10/4/2017    Procedure: COLONOSCOPY, SUBMUCOSA RESECTION;  Colonoscopy With Endoscopic Polypectomy with clips;  Surgeon: Milton Javier MD;  Location: [facility] OR     COMBINED ESOPHAGOSCOPY, GASTROSCOPY, DUODENOSCOPY (EGD) WITH  CO2 INSUFFLATION N/A 7/7/2017    Procedure: COMBINED ESOPHAGOSCOPY, GASTROSCOPY, DUODENOSCOPY (EGD) WITH CO2 INSUFFLATION;  Combined,  [place], Gastroesophageal reflux disease without esophagitis  Flatulence, eructation, and gas pain, BMI 29.35, [place] [phone];  Surgeon: Sathya Norman MD;  Location: [facility] OR     CV CORONARY ANGIOGRAM N/A 7/18/2019    Procedure: CV CORONARY ANGIOGRAM;  Surgeon: Blake Hobbs MD;  Location:  HEART CARDIAC CATH LAB     CV CORONARY ANGIOGRAM N/A 7/18/2019    Procedure: Coronary Angiogram;  Surgeon: Blake Hobbs MD;  Location:  HEART CARDIAC CATH LAB     CV FRACTIONAL FLOW RESERVE Bilateral 7/18/2019    Procedure: Fractional Flow Reserve;  Surgeon: Blake Hobbs MD;  Location:  HEART CARDIAC CATH LAB     CV RIGHT HEART CATH N/A 7/18/2019    Procedure: CV RIGHT HEART CATH;  Surgeon: Blake Hobbs MD;  Location:  HEART CARDIAC CATH LAB     CV RIGHT HEART CATH N/A 7/18/2019    Procedure: Right Heart Cath;  Surgeon: Blake Hobbs MD;  Location:  HEART CARDIAC CATH LAB     ESOPHAGOSCOPY, GASTROSCOPY, DUODENOSCOPY (EGD), COMBINED N/A 7/7/2017    Procedure: COMBINED ESOPHAGOSCOPY, GASTROSCOPY, DUODENOSCOPY (EGD), BIOPSY SINGLE OR MULTIPLE;;  Surgeon: Sathya Norman MD;  Location:  OR     ESOPHAGOSCOPY, GASTROSCOPY, DUODENOSCOPY (EGD), COMBINED N/A 1/6/2019    Procedure: COMBINED ESOPHAGOSCOPY, GASTROSCOPY, DUODENOSCOPY (EGD);  Surgeon: Cailin Paulino MD;  Location:  GI     GALLBLADDER SURGERY       NECK SURGERY       OPTICAL TRACKING SYSTEM CRANIOTOMY, EXCISE TUMOR, COMBINED Right 2/22/2017    Procedure: COMBINED OPTICAL TRACKING SYSTEM CRANIOTOMY, EXCISE TUMOR;  Surgeon: Lenora Pérez MD;  Location:  OR     OPTICAL TRACKING SYSTEM ENDOSCOPIC ENDONASAL SURGERY  6/23/2014    Procedure: OPTICAL TRACKING SYSTEM ENDOSCOPIC ENDONASAL SURGERY;  Surgeon: Yolanda Whitaker MD;   Location: [facility] OR     STOMACH SURGERY       TONSILLECTOMY       TUBAL LIGATION      1975       Social History     Tobacco Use     Smoking status: Former Smoker     Packs/day: 1.00     Years: 40.00     Pack years: 40.00     Types: Cigarettes     Last attempt to quit: 6/10/2004     Years since quitting: 15.2     Smokeless tobacco: Never Used   Substance Use Topics     Alcohol use: No     Family History   Problem Relation Age of Onset     Prostate Cancer Father      Cancer Sister         SCLC     Cancer Sister      Macular Degeneration Daughter      Glaucoma No family hx of          Current Outpatient Medications   Medication Sig Dispense Refill     acetaminophen (TYLENOL) 500 MG tablet Take 1 tablet (500 mg) by mouth every 6 hours as needed 100 tablet 3     atorvastatin (LIPITOR) 20 MG tablet Take 1 tablet (20 mg) by mouth daily 90 tablet 3     benzonatate (TESSALON) 100 MG capsule TAKE 1 CAPSULE BY MOUTH THREE TIMES DAILY( EVERY EIGHT HOURS) AS NEEDED FOR COUGH 60 capsule 0     ciprofloxacin (CIPRO) 500 MG tablet Take 500 mg by mouth 2 times daily       hydrochlorothiazide (HYDRODIURIL) 12.5 MG tablet TAKE 1 TABLET(12.5 MG) BY MOUTH DAILY 90 tablet 0     lisinopril (PRINIVIL/ZESTRIL) 30 MG tablet TAKE 1 TABLET(30 MG) BY MOUTH DAILY 90 tablet 0     melatonin 1 MG TABS tablet        metroNIDAZOLE (FLAGYL) 500 MG tablet Take 500 mg by mouth 3 times daily       OLANZapine (ZYPREXA) 5 MG tablet Take 1 tablet (5 mg) by mouth At Bedtime 30 tablet 0     pantoprazole (PROTONIX) 40 MG EC tablet Take 1 tablet (40 mg) by mouth daily Take 30-60 minutes before a meal. 90 tablet 1     potassium chloride ER (K-DUR/KLOR-CON M) 10 MEQ CR tablet Take 1 tablet (10 mEq) by mouth 2 times daily 60 tablet 3     ranitidine (ZANTAC) 150 MG tablet Take 1 tablet (150 mg) by mouth At Bedtime 90 tablet 1     SENEXON-S 8.6-50 MG tablet TK 1 TO 2 TS PO  tablet 1     venlafaxine (EFFEXOR-XR) 75 MG 24 hr capsule TAKE 3 CAPSULES BY MOUTH DAILY  "270 capsule 2     buPROPion (WELLBUTRIN XL) 150 MG 24 hr tablet TAKE 1 TABLET(150 MG) BY MOUTH EVERY MORNING (Patient not taking: Reported on 8/21/2019) 90 tablet 1     Allergies   Allergen Reactions     Novocain [Procaine] Unknown and Rash     Mirtazapine      Nefazodone Unknown and Rash     Recent Labs   Lab Test 07/18/19  0854 06/20/19  1124  04/23/19  1511  02/06/19 12/12/18  1245  08/01/17  0914  02/15/17  0937  08/10/16  1050   LDL  --   --   --  43  --   --   --   --   --  145*  --   --   --  80   HDL  --   --   --  58  --   --   --   --   --  55  --   --   --  60   TRIG  --   --   --  87  --   --   --   --   --  139  --   --   --  100   ALT  --  18  --  19  --  18   < > 16   < > 20   < > 15  --  18   CR 0.84 0.78   < > 0.86   < > 2.06*   < > 0.76   < > 0.78   < > 0.86  --  0.73   GFRESTIMATED 66 73   < > 64   < > 23*   < > 73   < > 72   < > 64   < > 78   GFRESTBLACK 77 84   < > 75   < > 26*   < > 89   < > 87   < > 77   < > >90   GFR Calc     POTASSIUM 4.9 4.2   < > 4.0   < > 4.3   < > 4.5   < > 3.8   < > 3.9  --  4.3   TSH  --   --   --   --   --   --   --  1.72  --   --   --  2.27  --  2.32    < > = values in this interval not displayed.      BP Readings from Last 3 Encounters:   08/21/19 120/76   07/18/19 117/59   07/10/19 149/60    Wt Readings from Last 3 Encounters:   08/21/19 88.5 kg (195 lb)   06/20/19 83.5 kg (184 lb)   06/19/19 83 kg (183 lb)                    Reviewed and updated as needed this visit by Provider          Review of Systems   ROS COMP: Constitutional, HEENT, cardiovascular, pulmonary, GI, , musculoskeletal, neuro, skin, endocrine and psych systems are negative, except as otherwise noted.      Objective    /76   Pulse 76   Temp 98.4  F (36.9  C) (Temporal)   Ht 1.651 m (5' 5\")   Wt 88.5 kg (195 lb)   LMP  (LMP Unknown)   SpO2 97%   BMI 32.45 kg/m    Body mass index is 32.45 kg/m .  Physical Exam   GENERAL: alert, no distress and pale  RESP: lungs clear " "to auscultation - no rales, rhonchi or wheezes  CV: regular rates and rhythm, normal S1 S2, no S3 or S4, grade 3/6 SUNITHA murmur heard best over the right upper sternal border, peripheral pulses strong and no peripheral edema  ABDOMEN: soft, nontender, no hepatosplenomegaly, no masses and bowel sounds normal  MS: no gross musculoskeletal defects noted, no edema  SKIN: no suspicious lesions or rashes  NEURO: Normal strength and tone, mentation intact and speech normal  PSYCH: mentation appears normal, affect normal/bright    Diagnostic Test Results:  Labs reviewed in Epic    Hemoglobin 9.3 8/21/19    Assessment & Plan     1. Anemia, unspecified type  Patient s/p hospital discharge on 8/16/19 after being admitted for lower abdominal pain and diarrhea. She was discharged on oral antibiotics (Cipro and Flagyl) and last doses will be today.  She states she is feeling better, but expresses fatigue from aortic stenosis.  Hemoglobin at time of discharge was 8.2, today, trended up to 9.3. She will follow-up with GI for colonoscopy in 6-8 weeks.  After this visit today, she is heading to the [facility] for a preoperative exam for an Aortic Valve Replacement on 9/4/2019 and will be meeting with her cardiologist after the exam today.    - Hemoglobin    2. Agitation  Controlled.  Refilled the following prescription today.  - OLANZapine (ZYPREXA) 5 MG tablet; Take 1 tablet (5 mg) by mouth At Bedtime  Dispense: 30 tablet; Refill: 0    3. JITENDRA (generalized anxiety disorder)  Controlled.  Refilled the following prescription today.  - buPROPion (WELLBUTRIN XL) 150 MG 24 hr tablet; TAKE 1 TABLET(150 MG) BY MOUTH EVERY MORNING  Dispense: 90 tablet; Refill: 1     BMI:   Estimated body mass index is 32.45 kg/m  as calculated from the following:    Height as of this encounter: 1.651 m (5' 5\").    Weight as of this encounter: 88.5 kg (195 lb).           Patient Instructions   Preoperative Exam today and see Cardiology at 3:30.  Hemoglobin lab " today.    Please call or return to clinic for any questions, concerns, or symptoms that are not improving or becoming worse.    Julissa Vargas CNP        Return today (on 8/21/2019) for Preop Exam at the [facility].    Julissa Vargas, CNP  [facility]

## 2019-08-19 NOTE — PROGRESS NOTES
"Clinic Care Coordination Contact    Clinic Care Coordination Contact  OUTREACH    Referral Information:  Referral Source: [facility]    Primary Diagnosis: GI Disorders    Chief Complaint   Patient presents with     Clinic Care Coordination - Post Hospital     RN        Universal Utilization: Patient utilizes the [facility] system and [facility] for primary care.  Clinic Utilization  Difficulty keeping appointments:: No  Compliance Concerns: No  No-Show Concerns: No  No PCP office visit in Past Year: No  Utilization    Last refreshed: 8/19/2019  4:51 PM:  Hospital Admissions 1           Last refreshed: 8/19/2019  4:51 PM:  ED Visits 0           Last refreshed: 8/19/2019  4:51 PM:  No Show Count (past year) 1              Current as of: 8/19/2019  4:51 PM              Clinical Concerns:  Current Medical Concerns:  RN CC spoke with patient's spouse (consent to communicate on file), as spouse states patient does not talk on the phone.  Patient was inpatient at [facility] 8/13/19-8/16/19 due to diarrhea, syncope and lower abdominal pain.  Patient is being treated for colitis, antibiotics reviewed with spouse.  Patient's hemoglobin was low during hospitalization requiring 1 unit PRBC's.  Patient will have hospital follow up with primary care 8/21/19.  Spouse reports patient only occasionally complains of abdominal discomfort right before a bowel movement.  Spouse states patient had a few bouts of diarrhea since discharge \"which have calmed down now.\"  Spouse declines any questions or concerns from writer, but did take down writer's contact information.    Current Behavioral Concerns: No concerns at this time.    Education Provided to patient: RN CC educated spouse on care coordination services and reviewed hospital discharge instructions.      Health Maintenance Reviewed:    Clinical Pathway: None    Medication Management:  Patient's spouse sets up and manages patient's medication.  Spouse declined to complete " medication reconciliation with writer stating they would go over it at patient's upcoming hospital follow up visit on 8/21/19.     Functional Status:  Dependent ADLs:: Ambulation-walker, Bathing, Dressing  Dependent IADLs:: Medication Management, Money Management, Transportation, Meal Preparation, Shopping, Laundry, Cooking, Cleaning, Incontinence  Bed or wheelchair confined:: No  Mobility Status: Independent w/Device  Fallen 2 or more times in the past year?: No  Any fall with injury in the past year?: No    Living Situation:  Current living arrangement:: I live in a private home with spouse  Type of residence:: Private home - stairs    Diet/Exercise/Sleep:  Diet:: Regular  Inadequate nutrition (GOAL):: No  Food Insecurity: No  Tube Feeding: No  Exercise:: Currently not exercising  Inadequate activity/exercise (GOAL):: No  Significant changes in sleep pattern (GOAL): No    Transportation:  Transportation concerns (GOAL):: No  Transportation means:: Family, Regular car     Psychosocial:  Adventism or spiritual beliefs that impact treatment:: No  Mental health DX:: Yes  Mental health DX how managed:: Medication  Mental health management concern (GOAL):: No  Informal Support system:: Children, Spouse     Financial/Insurance:   Financial/Insurance concerns (GOAL):: No       Resources and Interventions:  Current Resources:    ;   Community Resources: None  Supplies used at home:: Incontinence Supplies  Equipment Currently Used at Home: shower chair, walker, rolling, wheelchair, manual, cane, straight, grab bar, tub/shower    Advance Care Plan/Directive  Advanced Care Plans/Directives on file:: No  Advanced Care Plan/Directive Status: Considering Options    Referrals Placed: None     Goals: n/a        Patient/Caregiver understanding: Spouse verbalized understanding of hospital discharge instructions and declined care coordination services at this time.       Future Appointments              In 2 days Julissa Vargas,  CNP [facility] [place]    In 2 days Lexis Saab NP [facility] Heart Care, [facility]    In 4 weeks [id number] [facility] MRI, [facility]    In 3 months Yolanda Whitaker MD [facility] Ear Nose and Throat, [facility]          Plan:   Patient will continue to follow treatment plan as directed and follow up with PCP and specialists with future concerns.   RN CC will make no further outreaches.    Melissa Behl BSN, RN, PHN  Primary Care Clinical RN Care Coordinator  [facility]   [phone]

## 2019-08-19 NOTE — TELEPHONE ENCOUNTER
Zyprexa & HCTZ  Routing refill request to provider for review/approval because:  Constance given x1 and patient did not follow up, please advise  Labs out of range:  Creatinine    Next 5 appointments (look out 90 days)    Aug 21, 2019 11:00 AM CDT  [facility] Visit with [facility] [address], [address]  [facility] [id number] [phone]        Kay Shine, RN, BSN

## 2019-08-19 NOTE — TELEPHONE ENCOUNTER
Reason for Call: Request for an order or referral:    Order or referral being requested: hemoglobin    Date needed: as soon as possible    Has the patient been seen by the PCP for this problem? NO    Additional comments: was seen at [facility] and has appt to follow-up with Julissa today but is wondering if she can just come in this morning for labs, and not see a provider    Phone number Patient can be reached at:  Home number on file [phone] (home)    Best Time:  any    Can we leave a detailed message on this number?  YES    Call taken on 8/19/2019 at 10:17 AM by Daya Stevens

## 2019-08-19 NOTE — TELEPHONE ENCOUNTER
Pt's  informed (c2c on file).  They rescheduled the appt to Wednesday to fit their schedule better.

## 2019-08-20 NOTE — TELEPHONE ENCOUNTER
Left VM to contact us so I can make her PAC clinic Appointment before her TAVR procedure on 9/4/19.  Left my contact number

## 2019-08-21 NOTE — ANESTHESIA PREPROCEDURE EVALUATION
Anesthesia Pre-Procedure Evaluation    Patient: Gabino Jones   MRN:     [id number] Gender:   female   Age:    79 year old :      1940        Preoperative Diagnosis: heart valve condition   Procedure(s):  Transcatheter ([place]) Aortic Valve Replacement  Cardiopulmonary Bypass Standby     Past Medical History:   Diagnosis Date     Anxiety      Aortic stenosis 2016     Arthritis      Cancer (H)      Chronic back pain      Chronic hyponatremia 7/10/2015     Chronic leg pain      COPD (chronic obstructive pulmonary disease) (H) 2019     Dementia      Depressive disorder      Gastroesophageal reflux disease      Hearing loss      Heart murmur      History of radiation therapy     Treatment for nasal cancer     Hoarseness      Hyperlipidemia      Hypertension      Melanoma of nasal cavity (H) 2014     Reduced vision      Ringing in ears      Sensorineural hearing loss      Tinnitus       Past Surgical History:   Procedure Laterality Date     ANESTHESIA OUT OF OR MRI N/A 2019    Procedure: Anesthesia Coverage Brain MRI With Contrast @1330;  Surgeon: GENERIC ANESTHESIA PROVIDER;  Location: [facility] OR     C ANESTH,CERV SPINE, SITTING POSITION       COLONOSCOPY N/A 2017    Procedure: COMBINED COLONOSCOPY, SINGLE OR MULTIPLE BIOPSY/POLYPECTOMY BY BIOPSY;;  Surgeon: Sathya Norman MD;  Location: [facility] OR     COLONOSCOPY WITH CO2 INSUFFLATION N/A 2017    Procedure: COLONOSCOPY WITH CO2 INSUFFLATION;  Combined,  [place], Gastroesophageal reflux disease without esophagitis  Flatulence, eructation, and gas pain, BMI 29.35, [facility] [id number];  Surgeon: Sathya Norman MD;  Location: [facility] OR     COLONOSCOPY, SUBMUCOSA RESECTION N/A 10/4/2017    Procedure: COLONOSCOPY, SUBMUCOSA RESECTION;  Colonoscopy With Endoscopic Polypectomy with clips;  Surgeon: [place] Javier MD;  Location: [facility] OR     COMBINED ESOPHAGOSCOPY, GASTROSCOPY, DUODENOSCOPY (EGD) WITH CO2 INSUFFLATION  N/A 7/7/2017    Procedure: COMBINED ESOPHAGOSCOPY, GASTROSCOPY, DUODENOSCOPY (EGD) WITH CO2 INSUFFLATION;  Combined,  [place], Gastroesophageal reflux disease without esophagitis  Flatulence, eructation, and gas pain, BMI 29.35, [place] [phone];  Surgeon: Sathya Norman MD;  Location: [facility] OR     CV CORONARY ANGIOGRAM N/A 7/18/2019    Procedure: CV CORONARY ANGIOGRAM;  Surgeon: Blake Hobbs MD;  Location:  HEART CARDIAC CATH LAB     CV CORONARY ANGIOGRAM N/A 7/18/2019    Procedure: Coronary Angiogram;  Surgeon: Blake Hobbs MD;  Location:  HEART CARDIAC CATH LAB     CV FRACTIONAL FLOW RESERVE Bilateral 7/18/2019    Procedure: Fractional Flow Reserve;  Surgeon: Blake Hobbs MD;  Location:  HEART CARDIAC CATH LAB     CV RIGHT HEART CATH N/A 7/18/2019    Procedure: CV RIGHT HEART CATH;  Surgeon: Blake Hobbs MD;  Location:  HEART CARDIAC CATH LAB     CV RIGHT HEART CATH N/A 7/18/2019    Procedure: Right Heart Cath;  Surgeon: Blake Hobbs MD;  Location:  HEART CARDIAC CATH LAB     ESOPHAGOSCOPY, GASTROSCOPY, DUODENOSCOPY (EGD), COMBINED N/A 7/7/2017    Procedure: COMBINED ESOPHAGOSCOPY, GASTROSCOPY, DUODENOSCOPY (EGD), BIOPSY SINGLE OR MULTIPLE;;  Surgeon: Sathya Norman MD;  Location: [facility] OR     ESOPHAGOSCOPY, GASTROSCOPY, DUODENOSCOPY (EGD), COMBINED N/A 1/6/2019    Procedure: COMBINED ESOPHAGOSCOPY, GASTROSCOPY, DUODENOSCOPY (EGD);  Surgeon: Cailin Paulino MD;  Location:  GI     GALLBLADDER SURGERY       NECK SURGERY       OPTICAL TRACKING SYSTEM CRANIOTOMY, EXCISE TUMOR, COMBINED Right 2/22/2017    Procedure: COMBINED OPTICAL TRACKING SYSTEM CRANIOTOMY, EXCISE TUMOR;  Surgeon: Lenora Pérez MD;  Location:  OR     OPTICAL TRACKING SYSTEM ENDOSCOPIC ENDONASAL SURGERY  6/23/2014    Procedure: OPTICAL TRACKING SYSTEM ENDOSCOPIC ENDONASAL SURGERY;  Surgeon: Yolanda Whitaker MD;  Location:  OR      STOMACH SURGERY       TONSILLECTOMY       TUBAL LIGATION      1975          Anesthesia Evaluation     . Pt has had prior anesthetic. Type: General and MAC    No history of anesthetic complications          ROS/MED HX    ENT/Pulmonary: Comment: Some swallowing difficulty    (+)other ENT- hx melanoma of nasal cavity, tobacco use, Past use COPD, , . .    Neurologic: Comment: Intermittent mild confusion, difficult recall      Cardiovascular:     (+) Dyslipidemia, hypertension----. : . Last EF: 65-70% date: 4/30/19 NYHA classification: III. OSBORN, orthopnea, fainting (syncope). :. valvular problems/murmurs type: AS severe:. pulmonary hypertension, Previous cardiac testing Echodate:4/30/19results:date: results:ECG reviewed date:7/18/19 results:SR, nonspecific T wave abnormalityCath date: 7/18/19 results:         (-) taking anticoagulants/antiplatelets   METS/Exercise Tolerance: Comment: Requires assistance in care by family 1 - Eating, dressing   Hematologic:     (+) Anemia, History of Transfusion no previous transfusion reaction -      Musculoskeletal: Comment: Previous C spine surgery          GI/Hepatic:     (+) GERD Asymptomatic on medication,       Renal/Genitourinary:     (+) chronic renal disease, type: ARF and CRI, Pt does not require dialysis, Pt has no history of transplant,       Endo:     (+) Obesity, .      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 Rate: Normal     Resp.  "Effort: Normal      CV: Rhythm: Regular  Heart: Murmur (Systolic)  Edema: RLE; LLE   Comments: Intermittent mild confusion and difficulty with recall assisted by .     Dental: Dentures  Dentures: Upper; Lower                LABS:  CBC:   Lab Results   Component Value Date    WBC 8.3 07/18/2019    WBC 8.2 06/20/2019    HGB 7.9 (L) 07/18/2019    HGB 7.6 (L) 06/20/2019    HCT 28.6 (L) 07/18/2019    HCT 27.2 (L) 06/20/2019     07/18/2019     06/20/2019     BMP:   Lab Results   Component Value Date     (L) 07/18/2019     06/20/2019    POTASSIUM 4.9 07/18/2019    POTASSIUM 4.2 06/20/2019    CHLORIDE 98 07/18/2019    CHLORIDE 102 06/20/2019    CO2 25 07/18/2019    CO2 26 06/20/2019    BUN 22 07/18/2019    BUN 19 06/20/2019    CR 0.84 07/18/2019    CR 0.78 06/20/2019     (H) 07/18/2019    GLC 86 06/20/2019     COAGS:   Lab Results   Component Value Date    PTT 33 08/29/2014    INR 1.12 01/05/2019     POC:   Lab Results   Component Value Date     (H) 01/22/2019     OTHER:   Lab Results   Component Value Date    PH 7.37 06/23/2014    LACT 0.9 01/10/2019    STEFFANIE 8.8 07/18/2019    PHOS 3.9 01/20/2019    MAG 2.3 01/20/2019    ALBUMIN 3.3 (L) 06/20/2019    PROTTOTAL 7.4 06/20/2019    ALT 18 06/20/2019    AST 19 06/20/2019    ALKPHOS 105 06/20/2019    BILITOTAL 0.3 06/20/2019    ANDRZEJ 25 01/05/2019    TSH 1.72 12/12/2018    CRP 25.0 (H) 01/16/2019        Preop Vitals    BP Readings from Last 3 Encounters:   08/21/19 120/76   07/18/19 117/59   07/10/19 149/60    Pulse Readings from Last 3 Encounters:   08/21/19 76   07/18/19 77   07/10/19 70      Resp Readings from Last 3 Encounters:   07/18/19 16   07/10/19 16   06/19/19 16    SpO2 Readings from Last 3 Encounters:   08/21/19 97%   07/18/19 99%   07/10/19 98%      Temp Readings from Last 1 Encounters:   08/21/19 98.4  F (36.9  C) (Temporal)    Ht Readings from Last 1 Encounters:   08/21/19 1.651 m (5' 5\")      Wt Readings from Last 1 " "Encounters:   08/21/19 88.5 kg (195 lb)    Estimated body mass index is 32.45 kg/m  as calculated from the following:    Height as of an earlier encounter on 8/21/19: 1.651 m (5' 5\").    Weight as of an earlier encounter on 8/21/19: 88.5 kg (195 lb).     LDA:  Right Radial Interventional Procedure Access (Active)   Number of days: 34        Assessment:   ASA SCORE: 4            Plan:   Anes. Type:  General   Pre-Medication: None   Induction:  IV (Standard)   Airway: ETT; Oral   Access/Monitoring: PIV; 2nd PIV   Maintenance: Balanced     Blood products: Blood in Room     Postop Plan:   Postop Pain: Opioids  Postop Sedation/Airway: Not planned  Disposition: Inpatient/Admit     PONV Management:   Adult Risk Factors: Female, Postop Opioids     CONSENT: Direct conversation                      PAC Discussion and Assessment    ASA Classification: 3  Case is suitable for: [place]  Anesthetic techniques and relevant risks discussed:   Invasive monitoring and risk discussed: No  Types:   Possibility and Risk of blood transfusion discussed: Yes  NPO instructions given:   Additional anesthetic preparation and risks discussed:   Needs early admission to pre-op area:   Other:     PAC Resident/NP Anesthesia Assessment:  Gabino Jones is a 79 year old female scheduled to undergo TAVR, transfemoral approach on 9/4/19 by Dr. Oates. She has the following specific operative considerations:   - RCRI : No serious cardiac risks.    - VTE risk: 0.5%  - JESSICA # of risks 3/8 = Intermediate risk  - Risk of PONV score = 3.  If > 2, anti-emetic intervention recommended. If 3 or > anti emetic intervention recommended with two or more meds    Patient wants to be more asleep than for angiogram. She thought that was very painful.    --Severe aortic stenosis with above procedure now planned with MAC. Patient comfortable with plan. NYHA class III.  --HLD. atorvastatin. HTN. Will hold hydrochlorothiazide and lisinopril on DOS. Recent cath with no " hemodynamically significant lesions. All testing above. Activity limited by weakness. Per service patient was instructed to take  mg day before and day of surgery.   --Former smoker 40 pack years. Denies pulmonary symptoms.  --GERD. Will take Ranitidine on DOS. Protonix at HS. Hospitalized on 8/13/19 for abdominal pain and diarrhea. Treated for colitis with antibiotics. Cipro and Flagyl will be finished tomorrow. Improved symptoms.   --Anxiety/depression. Will take bupropion on DOS. Zyprexa and  Effexor at HS.   --Hearing loss.   --History of melanoma s/p surgical resection and radiation. 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   --Anemia, s/p blood transfusion and iron infusion on 8/14/19. Hgb today 9.3. Due to recent blood transfusion patient will return day before surgery to have repeat type and screen. Orders and instructions provided.   --Difficult IV stick.         Patient was discussed with Dr Banegas.      Reviewed and Signed by PAC Mid-Level Provider/Resident  Mid-Level Provider/Resident: MELANIE Dubois, CNS  Date: 8/21/19  Time: 1:15pm    Attending Anesthesiologist Anesthesia Assessment:        Anesthesiologist:   Date:   Time:   Pass/Fail:   Disposition:     PAC Pharmacist Assessment:        Pharmacist:   Date:   Time:    MELANIE Sena

## 2019-08-21 NOTE — TELEPHONE ENCOUNTER
FUTURE VISIT INFORMATION      SURGERY INFORMATION:    Date: 19    Location: [facility] OR    Surgeon:  Pedro Be Ranjit John    Anesthesia Type:  MAC    RECORDS REQUESTED FROM:       Primary Care Provider: Rebekah Ring    Pertinent Medical History: COPD, Lung nodule, Aortic stenosis, Hypertension    Most recent EKG+ Tracin/15/19- [facility]- requested tracing    Most recent ECHO: 19    Most recent Coronary Angiogram: 19    Most recent PFT's: 19

## 2019-08-21 NOTE — TELEPHONE ENCOUNTER
Pending Prescriptions:                       Disp   Refills    OLANZapine (ZYPREXA) 5 MG tablet          30 tab*0            Sig: Take 1 tablet (5 mg) by mouth At Bedtime    Sent today    Nhi Llanos, RN, BSN

## 2019-08-21 NOTE — PATIENT INSTRUCTIONS
Preparing for Your Surgery      Name:  Gabino Jones   MRN:  [id number]   :  1940   Today's Date:  2019     Arriving for surgery:  Surgery date:  19  Arrival time:  5:30AM  Please come to:       [facility] [address]  [address] [place]  [address]    - ? parking is available in front of the hospital      -    Please proceed to [address] on the 3rd floor. [phone]?     - ?If you are in need of directions, wheelchair or escort please stop at the Information Desk in the lobby.  Inform the information person that you are here for surgery; a wheelchair and escort to [address] will be provided.?     What can I eat or drink?  -  You may have solid food or milk products until 8 hours prior to your surgery. 9/3/19, 11:30PM  -  You may have water, apple juice or 7up/Sprite until 2 hours prior to your surgery. 19, 5:30AM    Which medicines can I take?  Take Aspirin per Cardiology instructions.  Hold NSAIDS for 10 days per Cardiology instructions.  -  Do NOT take these medications in the morning, the day of surgery:    Hydrochlorothiazide  Lisinopril    Senexon       -  Please take these medications the day of surgery:  Please take Aspirin 325 mg the day before surgery and the morning of surgery per Cardiology instructions.    Acetaminophen  Bupropion(Wellbutrin)    Refresh eye drops      Potassium   Ranitidine(Zantac)    Venlafaxine(Effexor)  Ponaris nasal drops    -As Needed:   Benzonatate(Tessalon) Ocean Nasal spray     How do I prepare myself?  -  Take two showers: one the night before surgery; and one the morning of surgery.         Use Scrubcare or Hibiclens to wash from neck down, soap is to remain on skin up to one minute. Do not get soap in your eyes or ears.  You may use your own shampoo and conditioner; no other hair products.   -  Do NOT use lotion, powder, deodorant, or antiperspirant the day of your surgery.  -  Do NOT wear any makeup, fingernail  polish or jewelry.  - Do not bring your own medications to the hospital, except for inhalers and eye drops.  -  Bring your ID and insurance card.    [place] [facility] [place] work to be completed 9/3/19 at 1PM.     Questions or Concerns:  -If you have questions or concerns regarding the day of surgery, please call [phone].     -For questions after surgery please call your surgeons office.           AFTER YOUR SURGERY  Breathing exercises   Breathing exercises help you recover faster. Take deep breaths and let the air out slowly. This will:     Help you wake up after surgery.    Help prevent complications like pneumonia.  Preventing complications will help you go home sooner.   We may give you a breathing device (incentive spirometer) to encourage you to breathe deeply.   Nausea and vomiting   You may feel sick to your stomach after surgery; if so, let your nurse know.    Pain control:  After surgery, you may have pain. Our goal is to help you manage your pain. Pain medicine will help you feel comfortable enough to do activities that will help you heal.  These activities may include breathing exercises, walking and physical therapy.   To help your health care team treat your pain we will ask: 1) If you have pain  2) where it is located 3) describe your pain in your words  Methods of pain control include medications given by mouth, vein or by nerve block for some surgeries.  Sequential Compression Device (SCD) or Pneumo Boots:  You may need to wear SCD S on your legs or feet. These are wraps connected to a machine that pumps in air and releases it. The repeated pumping helps prevent blood clots from forming.

## 2019-08-21 NOTE — LETTER
8/21/2019      RE: Gabino Jones  [address]  [facility] [id number]       Dear Colleague,    Thank you for the opportunity to participate in the care of your patient, Gabino Jones, at the [facility] at [facility]. Please see a copy of my visit note below.    Cardiology Clinic Note  August 21, 2019    Chief Complaint: Angiogram follow-up    HPI:  Gabino Jones is a 79 year old with a history of hypertension, hyperlipidemia, prior tobacco use, chronic anemia and severe aortic valvular stenosis who is currently being evaluated for a TAVR.  As part of her TAVR work-up she underwent coronary angiogram on 7/18 demonstrating diffuse nonobstructive coronary artery disease with 40% mLAD, 50-60% pLCx with FFR 0.89 and 70%dRCA with FFR 0.83. Her pLCx and dRCA lesions were noted to be physiologically insignificant based on FFR and she was medically managed. Her right heart catherization demonstrated mildly elevated left and right sided filling pressures with mean RA of 10 and mean PAW 21. She presents to clinic today for angiogram follow-up.    Today the patient reports feeling alright. She continues to experience fatigue and dyspnea r/t her severe aortic stenosis. She can only walk about 50 feet before becoming short of breath and having to rest. She denies any recent chest pain or pressure. She denies any palpitations. She reports occasional lightheadedness. She was admitted to [facility] last week after an episode of loss of consciousness in the setting of severe abdominal pain. Her hemoglobin was low and she required a blood transfusion. Her pain was ultimately thought to be r/t to colitis. She was discharged home on antibiotics. She denies recurrent loss of consciousness. She is tolerating her medications without significant side effects.     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     Reduced vision      Ringing in ears      Sensorineural hearing loss      Tinnitus          Medications:    Current Outpatient Medications on File Prior to Visit:  acetaminophen (TYLENOL) 500 MG tablet Take 1 tablet (500 mg) by mouth every 6 hours as needed   atorvastatin (LIPITOR) 20 MG tablet Take 1 tablet (20 mg) by mouth daily   benzonatate (TESSALON) 100 MG capsule TAKE 1 CAPSULE BY MOUTH THREE TIMES DAILY( EVERY EIGHT HOURS) AS NEEDED FOR COUGH   buPROPion (WELLBUTRIN XL) 150 MG 24 hr tablet TAKE 1 TABLET(150 MG) BY MOUTH EVERY MORNING   hydrochlorothiazide (HYDRODIURIL) 12.5 MG tablet TAKE 1 TABLET(12.5 MG) BY MOUTH DAILY   lisinopril (PRINIVIL/ZESTRIL) 30 MG tablet TAKE 1 TABLET(30 MG) BY MOUTH DAILY   melatonin 1 MG TABS tablet    OLANZapine (ZYPREXA) 5 MG tablet Take 1 tablet (5 mg) by mouth At Bedtime   pantoprazole (PROTONIX) 40 MG EC tablet Take 1 tablet (40 mg) by mouth daily Take 30-60 minutes before a meal.   potassium chloride ER (K-DUR/KLOR-CON M) 10 MEQ CR tablet Take 1 tablet (10 mEq) by mouth 2 times daily   ranitidine (ZANTAC) 150 MG tablet Take 1 tablet (150 mg) by mouth At Bedtime   SENEXON-S 8.6-50 MG tablet TK 1 TO 2 TS PO BID   venlafaxine (EFFEXOR-XR) 75 MG 24 hr capsule TAKE 3 CAPSULES BY MOUTH DAILY     No current facility-administered medications on file prior to visit.     Allergies:      Allergies   Allergen Reactions     Novocain [Procaine] Unknown and Rash     Mirtazapine      Nefazodone Unknown and Rash     Family and social history:    Family History   Problem Relation Age of Onset     Prostate Cancer Father      Cancer Sister         SCLC     Cancer Sister       Macular Degeneration Daughter      Glaucoma No family hx of        Social History     Socioeconomic History     Marital status:      Spouse name: Not on file     Number of children: Not on file     Years of education: Not on file     Highest education level: Not on file   Occupational History     Not on file   Social Needs     Financial resource strain: Not on file     Food insecurity:     Worry: Not on file     Inability: Not on file     Transportation needs:     Medical: Not on file     Non-medical: Not on file   Tobacco Use     Smoking status: Former Smoker     Packs/day: 1.00     Years: 40.00     Pack years: 40.00     Types: Cigarettes     Last attempt to quit: 6/10/2004     Years since quitting: 15.1     Smokeless tobacco: Never Used   Substance and Sexual Activity     Alcohol use: No     Drug use: No     Sexual activity: Not Currently     Partners: Male     Birth control/protection: Post-menopausal, Female Surgical   Lifestyle     Physical activity:     Days per week: Not on file     Minutes per session: Not on file     Stress: Not on file   Relationships     Social connections:     Talks on phone: Not on file     Gets together: Not on file     Attends [employer] service: Not on file     Active member of club or organization: Not on file     Attends meetings of clubs or organizations: Not on file     Relationship status: Not on file     Intimate partner violence:     Fear of current or ex partner: Not on file     Emotionally abused: Not on file     Physically abused: Not on file     Forced sexual activity: Not on file   Other Topics Concern     Parent/sibling w/ CABG, MI or angioplasty before 65F 55M? Not Asked   Social History Narrative     Not on file     Review of Systems:  Skin: No skin rash or ulcers.  Eyes: No red eye.  Ears/Nose/Throat: No ear discharge, nasal congestion, sore throat or dysphagia.  Respiratory: No cough or hemoptysis.  Cardiovascular: See HPI.    Gastrointestinal: No  "abdominal pain, nausea, vomiting, hematemesis or melena.  Genitourinary: No increased frequency or urgency of urine. No dysuria or hematuria.  Musculoskeletal: No polyarthralgia or myalgias.  Neurologic: No headaches, seizure or focal weakness.  Psychiatric: No hallucinations.  Hematologic/Lymphatic/Immunologic: No bleeding tendency.  Endocrine: No heat or cold intolerance, abnormal facial hair or alopecia.    Vital signs:  BP (!) 155/77 (BP Location: Left arm, Patient Position: Chair, Cuff Size: Adult Large)   Pulse 72   Ht 1.651 m (5' 5\")   Wt 88.5 kg (195 lb)   LMP  (LMP Unknown)   SpO2 98%   BMI 32.45 kg/m      Wt Readings from Last 2 Encounters:   06/20/19 83.5 kg (184 lb)   06/19/19 83 kg (183 lb)     Physical Exam:  Gen: NAD.    HEENT: No conjunctival pallor or scleral icterus, MMM. Clear oropharynx.    Neck: No JVD. No thyroid enlargement or cervical adenopathy.    Chest: Clear to auscultation bilaterally.    CV: Normal first and second heart sounds. 3/6 SUNITHA across the precordium  Abdomen: Soft, non-tender, non-distended, BS+.  Ext: No edema. Warm and well perfused with normal capillary refill.    Skin: No skin rash or ulcers.  Neuro: alert, oriented and appropriately conversant.    Psych: Normal affect and speech.    Labs:    CBC RESULTS:   Recent Labs   Lab Test 08/21/19  1457 07/18/19  0854   WBC  --  8.3   RBC  --  3.79*   HGB 9.3* 7.9*   HCT  --  28.6*   MCV  --  76*   MCH  --  20.8*   MCHC  --  27.6*   RDW  --  18.7*   PLT  --  333     Diagnostics:      Mild elevated Pulmonary Hypertension.    Right sided filling pressures are mildly elevated.    Left sided filling pressures are moderately elevated.    Moderate stenosis (70%) distal RCA, physiologically insignificant (FFR 0.83)    Moderate ostial LCx (50%), physiologically insignificant (FFR 0.89)    Pressure wire/FFR was performed on the lesion. FFR: 0.89.    Pressure wire/FFR was performed on the lesion. FFR: 0.83.          Plan     Coronary " revascularization not warranted.  Proceed with TAVR vs. SAVR based on valve clinic consensus.   Coronary Findings     Diagnostic   Dominance: Right   Left Anterior Descending   Mid LAD lesion is 40% stenosed.   Left Circumflex   Ost Cx to Prox Cx lesion is 50% stenosed.   Prox Cx to Mid Cx lesion is 60% stenosed. Pressure wire/FFR was performed on the lesion. FFR: 0.89. The LMCA was engaged with a 6 Fr Ikari LF 3.5 GC and the FFR of the ostial and mid LCx were measured with the Opsens wire. The FFR at peak hyperemia was 0.89.   Right Coronary Artery   Prox RCA to Mid RCA lesion is 30% stenosed.   Dist RCA lesion is 70% stenosed. Pressure wire/FFR was performed on the lesion. FFR: 0.83. The RCA was engaged with a 6 Fr Ikari RT 1.0 GC and the FFR of the distal RCA was measured with the Opsens wire. The FFR at peak hyperemia was 0.83.     Assessment and Plan:  Gabino Jones is a 79 year old with a history of hypertension, hyperlipidemia, prior tobacco use, chronic anemia and severe aortic valvular stenosis who recently underwent coronary angiogram as part of her TAVR evaluation. She was noted to have moderate nonobstructive disease with 40% mLAD, 50-60% pLCx with FFR 0.89 and 70%dRCA with FFR 0.83 and was continued on medical therapy. Today she presents for angiogram follow-up.     She continues to experience fatigue and dyspnea r/t to her aortic stenosis, but denies any new cardiovascular symptoms. Her puncture site is healed and she is tolerating her medications without significant side effects. She was recently admitted to [facility] following a syncopal episode in the setting of severe abdominal pain.  She was diagnosed with colitis and started on antibiotics. She was also noted to be anemic during her stay and required a blood transfusion.Today her hemoglobin is stable at 9.3. Recommend ongoing CAD risk factor modification with statin therapy. Start ASA therapy 81 mg daily and continue Protonix for GI  protection. Proceed with TAVR on 9/4.     Follow-up: RTC in post-TAVR.    Please do not hesitate to contact me if you have any questions/concerns.     Sincerely,     Lexis Saab NP

## 2019-08-21 NOTE — PATIENT INSTRUCTIONS
You were seen today in the Cardiovascular Clinic at the [facility].    Cardiology provider you saw during your visit Lexis Saab NP.    1. Continue current medications.  2. Start ASA 81 mg daily for treatment of CAD.  3. Proceed with TAVR on .  4. Please make a follow-up in cardiology clinic post-TAVR.    Questions and [phone].   First press #1 for the University and then press #3 for Medical Questions to reach the Cardiology triage nurse.     On Call Cardiologist for after hours or on weekends: [phone], press option #4 and ask to speak to the on-call Cardiologist.

## 2019-08-21 NOTE — H&P
Pre-Operative H & P     CC:  Preoperative exam to assess for increased cardiopulmonary risk while undergoing surgery and anesthesia.    Date of Encounter: 8/21/2019  Primary Care Physician:  Rebekah Boogie  Reason for visit: severe aortic stenosis  KASI Jones is a 79 year old female who presents for pre-operative H & P in preparation for TAVR, transfemoral approach with Dr. Oates on 9/4/19 at [facility] [place], [place]. History is obtained from the patient and .     Patient with history of severe aortic stenosis, possible bicuspid aortic valve recently evaluated by Cardiology in consideration for TAVR. Her aortic stenosis is characterized by PATRICIA of 0.8 cm2, mean gradient 50 mmHg and peak velocity of 4.6 m/sec with LVEF 65-70% by echocardiogram on 4/30/19. Her symptoms include progressive fatigue and dyspnea. She has been counseled for above procedure.     Patient's history is otherwise significant for HLD, HTN, smoking, GERD, and melanoma s/p surgical resection and radiation therapy. 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 On 8/13/19 patient was admitted to local hospital for diarrhea and sycope. C diff ruled out. CT scan indicated colitis and she was treated with ceftriaxone and metronidazole, later transitioning to Cipro. Was anemic Hgb 8.4 and was transfused. She was also given iron dextran on 8/14/19.     Patient reports with her . She is in a wheelchair. She is feeling stronger and just now beginning to walk around her home. She is assisted in her care by her family. Reports continued chronic cough, worsened by lying down. Denies chest pain or irregular HR. No recent syncope.       Past Medical History  Past Medical History:   Diagnosis Date     Anxiety      Aortic stenosis 4/25/2016     Arthritis      Blindness of left eye     after radiation     Cancer (H)      Chronic back pain      Chronic hyponatremia 7/10/2015     Chronic leg pain       COPD (chronic obstructive pulmonary disease) (H) 6/19/2019     Dementia      Depressive disorder 1/01/191992     Gastroesophageal reflux disease      Hearing loss      Heart murmur      History of blood transfusion      History of radiation therapy     Treatment for nasal cancer     Hoarseness      Hyperlipidemia      Hypertension      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     Reduced vision      Ringing in ears      Sensorineural hearing loss      Tinnitus        Past Surgical History  Past Surgical History:   Procedure Laterality Date     ANESTHESIA OUT OF OR MRI N/A 1/16/2019    Procedure: Anesthesia Coverage Brain MRI With Contrast @1330;  Surgeon: GENERIC ANESTHESIA PROVIDER;  Location: [facility] OR     C ANESTH,CERV SPINE, SITTING POSITION       COLONOSCOPY N/A 7/7/2017    Procedure: COMBINED COLONOSCOPY, SINGLE OR MULTIPLE BIOPSY/POLYPECTOMY BY BIOPSY;;  Surgeon: Sathya Norman MD;  Location: [facility] OR     COLONOSCOPY WITH CO2 INSUFFLATION N/A 7/7/2017    Procedure: COLONOSCOPY WITH CO2 INSUFFLATION;  Combined,  [place], Gastroesophageal reflux disease without esophagitis  Flatulence, eructation, and gas pain, BMI 29.35, [facility] [phone];  Surgeon: Sathya Norman MD;  Location: [facility] OR     COLONOSCOPY, SUBMUCOSA RESECTION N/A 10/4/2017    Procedure: COLONOSCOPY, SUBMUCOSA RESECTION;  Colonoscopy With Endoscopic Polypectomy with clips;  Surgeon: Milton Javier MD;  Location: [facility] OR     COMBINED ESOPHAGOSCOPY, GASTROSCOPY, DUODENOSCOPY (EGD) WITH CO2 INSUFFLATION N/A 7/7/2017    Procedure: COMBINED ESOPHAGOSCOPY, GASTROSCOPY, DUODENOSCOPY (EGD) WITH CO2 INSUFFLATION;  Combined,  [place], Gastroesophageal reflux disease without esophagitis  Flatulence, eructation, and gas pain, BMI 29.35, [facility] [id number];  Surgeon: Sathya Norman MD;  Location: [facility] OR     CV CORONARY ANGIOGRAM N/A 7/18/2019    Procedure: CV CORONARY ANGIOGRAM;  Surgeon: Blake Hobbs MD;  Location:   HE[facility] CARDIAC CATH LAB     CV CORONARY ANGIOGRAM N/A 7/18/2019    Procedure: Coronary Angiogram;  Surgeon: Blake Hobbs MD;  Location:  HEART CARDIAC CATH LAB     CV FRACTIONAL FLOW RESERVE Bilateral 7/18/2019    Procedure: Fractional Flow Reserve;  Surgeon: Blake Hobbs MD;  Location:  HEART CARDIAC CATH LAB     CV RIGHT HEART CATH N/A 7/18/2019    Procedure: CV RIGHT HEART CATH;  Surgeon: Blake Hobbs MD;  Location:  HEART CARDIAC CATH LAB     CV RIGHT HEART CATH N/A 7/18/2019    Procedure: Right Heart Cath;  Surgeon: Blake Hobbs MD;  Location:  HEART CARDIAC CATH LAB     ESOPHAGOSCOPY, GASTROSCOPY, DUODENOSCOPY (EGD), COMBINED N/A 7/7/2017    Procedure: COMBINED ESOPHAGOSCOPY, GASTROSCOPY, DUODENOSCOPY (EGD), BIOPSY SINGLE OR MULTIPLE;;  Surgeon: Sathya Norman MD;  Location:  OR     ESOPHAGOSCOPY, GASTROSCOPY, DUODENOSCOPY (EGD), COMBINED N/A 1/6/2019    Procedure: COMBINED ESOPHAGOSCOPY, GASTROSCOPY, DUODENOSCOPY (EGD);  Surgeon: Cailin Paulino MD;  Location:  GI     GALLBLADDER SURGERY       NECK SURGERY       OPTICAL TRACKING SYSTEM CRANIOTOMY, EXCISE TUMOR, COMBINED Right 2/22/2017    Procedure: COMBINED OPTICAL TRACKING SYSTEM CRANIOTOMY, EXCISE TUMOR;  Surgeon: Lenora Pérez MD;  Location:  OR     OPTICAL TRACKING SYSTEM ENDOSCOPIC ENDONASAL SURGERY  6/23/2014    Procedure: OPTICAL TRACKING SYSTEM ENDOSCOPIC ENDONASAL SURGERY;  Surgeon: Yolanda Whitaker MD;  Location:  OR     STOMACH SURGERY       TONSILLECTOMY       TUBAL LIGATION      1975       Hx of Blood transfusions/reactions: Last transfusion 8/14/19. No known reactions.     Hx of abnormal bleeding or anti-platelet use: Denies.     Menstrual history: No LMP recorded (lmp unknown). Patient is postmenopausal.    Steroid use in the last year: Unknown.     Personal or FH with difficulty with Anesthesia:  Denies.     Prior to Admission Medications  Current  Outpatient Medications   Medication Sig Dispense Refill     acetaminophen (TYLENOL) 500 MG tablet Take 1 tablet (500 mg) by mouth every 6 hours as needed (Patient taking differently: Take 500-1,000 mg by mouth 2 times daily ) 100 tablet 3     atorvastatin (LIPITOR) 20 MG tablet Take 1 tablet (20 mg) by mouth daily (Patient taking differently: Take 20 mg by mouth At Bedtime ) 90 tablet 3     benzonatate (TESSALON) 100 MG capsule TAKE 1 CAPSULE BY MOUTH THREE TIMES DAILY( EVERY EIGHT HOURS) AS NEEDED FOR COUGH (Patient taking differently: Take 100 mg by mouth 2 times daily as needed (PT IS TAKING EVERY MORNING AND A NIGHT - PRN) TAKE 1 CAPSULE BY MOUTH THREE TIMES DAILY( EVERY EIGHT HOURS) AS NEEDED FOR COUGH) 60 capsule 0     buPROPion (WELLBUTRIN XL) 150 MG 24 hr tablet TAKE 1 TABLET(150 MG) BY MOUTH EVERY MORNING (Patient taking differently: Take 150 mg by mouth every morning TAKE 1 TABLET(150 MG) BY MOUTH EVERY MORNING) 90 tablet 1     carboxymethylcellulose PF (REFRESH PLUS) 0.5 % ophthalmic solution Place 2 drops into both eyes 2 times daily       ciprofloxacin (CIPRO) 500 MG tablet Take 500 mg by mouth 2 times daily       hydrochlorothiazide (HYDRODIURIL) 12.5 MG tablet TAKE 1 TABLET(12.5 MG) BY MOUTH DAILY (Patient taking differently: Take 12.5 mg by mouth every morning TAKE 1 TABLET(12.5 MG) BY MOUTH DAILY) 90 tablet 0     lisinopril (PRINIVIL/ZESTRIL) 30 MG tablet TAKE 1 TABLET(30 MG) BY MOUTH DAILY (Patient taking differently: TAKE 1 TABLET(30 MG) BY MOUTH DAILY - TAKING EVERY MORNING) 90 tablet 0     melatonin 1 MG TABS tablet Take 1 mg by mouth At Bedtime        metroNIDAZOLE (FLAGYL) 500 MG tablet Take 500 mg by mouth 3 times daily       Misc Natural Product Nasal (PONARIS) SOLN Spray 2 drops in nostril 2 times daily       OLANZapine (ZYPREXA) 5 MG tablet Take 1 tablet (5 mg) by mouth At Bedtime 30 tablet 0     pantoprazole (PROTONIX) 40 MG EC tablet Take 1 tablet (40 mg) by mouth daily Take 30-60  minutes before a meal. (Patient taking differently: Take 40 mg by mouth At Bedtime Take 30-60 minutes before a meal.) 90 tablet 1     potassium chloride ER (K-DUR/KLOR-CON M) 10 MEQ CR tablet Take 1 tablet (10 mEq) by mouth 2 times daily 60 tablet 3     ranitidine (ZANTAC) 150 MG tablet Take 1 tablet (150 mg) by mouth At Bedtime (Patient taking differently: Take 150 mg by mouth every morning ) 90 tablet 1     SENEXON-S 8.6-50 MG tablet TK 1 TO 2 TS PO BID (Patient taking differently: Take 1 tablet by mouth every other day TK 1 TO 2 TS PO BID) 100 tablet 1     sodium chloride (OCEAN) 0.65 % nasal spray Spray 1 spray into both nostrils daily as needed for congestion       venlafaxine (EFFEXOR-XR) 75 MG 24 hr capsule TAKE 3 CAPSULES BY MOUTH DAILY (Patient taking differently: Take 150 mg by mouth At Bedtime TAKE 3 CAPSULES BY MOUTH DAILY) 270 capsule 2     aspirin (ASA) 81 MG EC tablet Take 1 tablet (81 mg) by mouth daily 90 tablet 3       Allergies  Allergies   Allergen Reactions     Novocain [Procaine] Unknown and Rash     Mirtazapine      Nefazodone Unknown and Rash       Social History  Social History     Socioeconomic History     Marital status:      Spouse name: Not on file     Number of children: Not on file     Years of education: Not on file     Highest education level: Not on file   Occupational History     Not on file   Social Needs     Financial resource strain: Not on file     Food insecurity:     Worry: Not on file     Inability: Not on file     Transportation needs:     Medical: Not on file     Non-medical: Not on file   Tobacco Use     Smoking status: Former Smoker     Packs/day: 1.00     Years: 40.00     Pack years: 40.00     Types: Cigarettes     Last attempt to quit: 6/10/2004     Years since quitting: 15.2     Smokeless tobacco: Never Used   Substance and Sexual Activity     Alcohol use: No     Drug use: No     Sexual activity: Not Currently     Partners: Male     Birth control/protection:  Post-menopausal, Female Surgical   Lifestyle     Physical activity:     Days per week: Not on file     Minutes per session: Not on file     Stress: Not on file   Relationships     Social connections:     Talks on phone: Not on file     Gets together: Not on file     Attends Alevism service: Not on file     Active member of club or organization: Not on file     Attends meetings of clubs or organizations: Not on file     Relationship status: Not on file     Intimate partner violence:     Fear of current or ex partner: Not on file     Emotionally abused: Not on file     Physically abused: Not on file     Forced sexual activity: Not on file   Other Topics Concern     Parent/sibling w/ CABG, MI or angioplasty before 65F 55M? Not Asked   Social History Narrative     Not on file       Family History  Family History   Problem Relation Age of Onset     Prostate Cancer Father      Cancer Sister         SCLC     Cancer Sister      Macular Degeneration Daughter      Glaucoma No family hx of        Review of Systems    ROS/MED HX    The complete review of systems is negative other than noted in the HPI or here.   ENT/Pulmonary:     (+)other ENT- hx melanoma of nasal cavity, tobacco use, Past use COPD, , . .    Neurologic:  - neg neurologic ROS     Cardiovascular:     (+) Dyslipidemia, hypertension----. : . . . :. valvular problems/murmurs type: AS severe:. Previous cardiac testing Echodate:4/30/19results:date: results:ECG reviewed date:7/18/19 results:SR, nonspecific T wave abnormalityCath date: 7/18/19 results:         (-) CHF   METS/Exercise Tolerance:  1 - Eating, dressing   Hematologic: Comments:       (+) Anemia, -      Musculoskeletal: Comment: Previous C spine surgery          GI/Hepatic:     (+) GERD Asymptomatic on medication,       Renal/Genitourinary: Comment: Resolved sepsis related DAVID.  GFR currently 88    (+) chronic renal disease, type: ARF, Pt does not require dialysis, Pt has no history of transplant,      "  Endo:  - neg endo ROS       Psychiatric:     (+) psychiatric history depression and anxiety      Infectious Disease:   (+) MRSA,       Malignancy:   (+) Malignancy History of Skin  Skin CA Remission status post Surgery,         Other:    (+) C-spine cleared: N/A, H/O Chronic Pain,no other significant disability            PHYSICAL EXAM:   Mental Status/Neuro: A/A/O; Age Appropriate   Airway: Facies: Feasible  Mallampati: II  Mouth/Opening: Full  TM distance: > 6 cm  Neck ROM: Limited   Respiratory: Auscultation: CTAB     Resp. Rate: Normal     Resp. Effort: Normal      CV: Rhythm: Regular  Heart: Murmur   Comments:      Preop Vitals    BP Readings from Last 3 Encounters:   08/21/19 120/76   07/18/19 117/59   07/10/19 149/60    Pulse Readings from Last 3 Encounters:   08/21/19 76   07/18/19 77   07/10/19 70      Resp Readings from Last 3 Encounters:   07/18/19 16   07/10/19 16   06/19/19 16    SpO2 Readings from Last 3 Encounters:   08/21/19 97%   07/18/19 99%   07/10/19 98%      Temp Readings from Last 1 Encounters:   08/21/19 98.4  F (36.9  C) (Temporal)    Ht Readings from Last 1 Encounters:   08/21/19 1.651 m (5' 5\")      Wt Readings from Last 1 Encounters:   08/21/19 88.5 kg (195 lb)    Estimated body mass index is 32.45 kg/m  as calculated from the following:    Height as of an earlier encounter on 8/21/19: 1.651 m (5' 5\").    Weight as of an earlier encounter on 8/21/19: 88.5 kg (195 lb).       Temp: 97.9  F (36.6  C) Temp src: Oral BP: (!) 141/80 Pulse: 77   Resp: 16 SpO2: 100 %         194 lbs 15.95 oz  5' 5\"   Body mass index is 32.45 kg/m .       Physical Exam  Constitutional: Awake, alert, cooperative, no apparent distress, and appears stated age. In w/c. Accompanied by .  Eyes: Pupils equal, round and reactive to light, extra ocular muscles intact, sclera clear, conjunctiva normal. Glasses on.  HENT: Normocephalic, oral pharynx with moist mucus membranes, dentures. No goiter appreciated. "   Respiratory: Clear to auscultation bilaterally, no crackles or wheezing. No obvious dyspnea at rest. Occasional congested cough.   Cardiovascular: Regular rate and rhythm, systolic murmur noted.  Carotids +2, no bruits. Mild bilateral lower extremity edema. Palpable pulses to radial  DP and PT arteries.   GI: Normal bowel sounds, soft, non-distended, non-tender, no masses palpated. Difficult exam due to obese abdomen.  Lymph/Hematologic: No cervical lymphadenopathy and no supraclavicular lymphadenopathy.  Genitourinary: Deferred.   Skin: Warm and dry.    Musculoskeletal: Limited ROM of neck. There is no redness, warmth, or swelling of the joints. Gross motor strength is limited.     Neurologic: Awake, alert, oriented to name, place and time. Has periods of intermittent mild confusion and  assists with history details.    Neuropsychiatric: Calm, cooperative. Normal affect.     Labs: (personally reviewed)  Lab Results   Component Value Date    WBC 8.3 07/18/2019     Lab Results   Component Value Date    RBC 3.79 07/18/2019     Lab Results   Component Value Date    HGB 9.3 08/21/2019     Lab Results   Component Value Date    HCT 28.6 07/18/2019     Lab Results   Component Value Date    MCV 76 07/18/2019     Lab Results   Component Value Date    MCH 20.8 07/18/2019     Lab Results   Component Value Date    MCHC 27.6 07/18/2019     Lab Results   Component Value Date    RDW 18.7 07/18/2019     Lab Results   Component Value Date     07/18/2019     Last Comprehensive Metabolic Panel:  Sodium   Date Value Ref Range Status   08/21/2019 132 (L) 133 - 144 mmol/L Final     Potassium   Date Value Ref Range Status   08/21/2019 4.1 3.4 - 5.3 mmol/L Final     Chloride   Date Value Ref Range Status   08/21/2019 101 94 - 109 mmol/L Final     Carbon Dioxide   Date Value Ref Range Status   08/21/2019 25 20 - 32 mmol/L Final     Anion Gap   Date Value Ref Range Status   08/21/2019 6 3 - 14 mmol/L Final     Glucose   Date  Value Ref Range Status   08/21/2019 101 (H) 70 - 99 mg/dL Final     Urea Nitrogen   Date Value Ref Range Status   08/21/2019 3 (L) 7 - 30 mg/dL Final     Creatinine   Date Value Ref Range Status   08/21/2019 0.65 0.52 - 1.04 mg/dL Final     GFR Estimate   Date Value Ref Range Status   08/21/2019 84 >60 mL/min/[1.73_m2] Final     Comment:     Non  GFR Calc  Starting 12/18/2018, serum creatinine based estimated GFR (eGFR) will be   calculated using the Chronic Kidney Disease Epidemiology Collaboration   (CKD-EPI) equation.       Calcium   Date Value Ref Range Status   08/21/2019 8.5 8.5 - 10.1 mg/dL Final     Lab Results   Component Value Date    AST 66 08/21/2019     Lab Results   Component Value Date    ALT 49 08/21/2019     No results found for: BILICONJ   Lab Results   Component Value Date    BILITOTAL 0.4 08/21/2019     Lab Results   Component Value Date    ALBUMIN 3.4 08/21/2019     Lab Results   Component Value Date    PROTTOTAL 7.6 08/21/2019      Lab Results   Component Value Date    ALKPHOS 80 08/21/2019   INR 1.01  EKG: Personally reviewed 8/15/19 Normal sinus rhythm, cannot rule out anterior infarct, age undetermined  Cardiac echo: 4/30/19   Interpretation Summary     Global and regional left ventricular function is hyperkinetic with an EF of  65-70%.  Severe aortic valve calcification is present.  Cannot exclude a bicuspid aortic valve.  Severe aortic stenosis is present.  The peak aortic velocity is 4.6 m/sec.  The mean gradient across the aortic valve is50 mmHg.  The aortic valve area is 0.8 cm^2, by the continuity equation.  Moderate aortic insufficiency is present.  This study was compared with the study from 1/5/19 and aortic stenosis is  appears worse .    [facility] 2016  IMPRESSION:  1.  Normal left ventricular size and systolic function with a  calculated ejection fraction of  69 %.  2.  Normal right ventricular size and systolic function with a  calculated ejection fraction of  69%.   3.  There is decreased leaflet excursion with moderate sclerosis of  the aortic valve with flow acceleration consistent with moderate  aortic stenosis and mild aortic insufficiency.  4.  On delayed enhancement imaging, there is no abnormal  hyperenhancement to suggest myocardial scar/inflammation/infiltration.  5.  There is no area of infarction or mass noted in the left  ventricular apex.   7/18/19 Cardiac catheterization  Conclusion   Mild elevated Pulmonary Hypertension.  Right sided filling pressures are mildly elevated.  Left sided filling pressures are moderately elevated.  Moderate stenosis (70%) distal RCA, physiologically insignificant (FFR 0.83)  Moderate ostial LCx (50%), physiologically insignificant (FFR 0.89)  Pressure wire/FFR was performed on the lesion. FFR: 0.89.  Pressure wire/FFR was performed on the lesion. FFR: 0.83.  PFT's:   Most Recent Breeze Pulmonary Function Testing    FVC-Pred   Date Value Ref Range Status   06/20/2019 2.66 L      FVC-Pre   Date Value Ref Range Status   06/20/2019 2.72 L      FVC-%Pred-Pre   Date Value Ref Range Status   06/20/2019 102 %      FEV1-Pre   Date Value Ref Range Status   06/20/2019 2.19 L      FEV1-%Pred-Pre   Date Value Ref Range Status   06/20/2019 108 %      FEV1FVC-Pred   Date Value Ref Range Status   06/20/2019 77 %      FEV1FVC-Pre   Date Value Ref Range Status   06/20/2019 80 %      No results found for: 20029    FEFMax-Pre   Date Value Ref Range Status   06/20/2019 4.92 L/sec      FEFMax-%Pred-Pre   Date Value Ref Range Status   06/20/2019 97 %      ExpTime-Pre   Date Value Ref Range Status   06/20/2019 7.26 sec      FIFMax-Pre   Date Value Ref Range Status   06/20/2019 3.40 L/sec      FEV1FEV6-Pred   Date Value Ref Range Status   06/20/2019 78 %      FEV1FEV6-Pre   Date Value Ref Range Status   06/20/2019 80 %      Radiologist Consult for Cards 6/18/19  IMPRESSION:   1. No acute findings within the chest, abdomen or pelvis.  2. Stable  scattered pulmonary nodules measuring up to 6 mm in the left  lower lobe, attention on follow-up.  3. Colonic diverticulosis, without CT findings to suggest  diverticulitis.  4. For additional cardiac findings please refer to the dedicated  cardiology report.    CTA 7/10/19  FINDINGS:     1.  Severely calcified tricuspidaortic valve. Aortic valve calcium  score is 3570. Planimeter valve area is 1.1 sq cm, consistent with  severe aortic stenosis. The LVOT is Moderately calcified. The  ascending aorta is mildly calcified, aortic arch is  moderately  calcified, the descending thoracic aorta is mildly calcified. There is  mild mitral annular calcification.  2.  There are no adverse aortic root features, ie coronary heights are  adequate and there is no significant aortic root calcification.  3.  There are significant native coronary calcifications.   4.  The arch vessel branching pattern is normal.   5.  The suprarenal abdominal aorta is mildly calcified; infrarenal  abdominal aorta is moderately calcified  6.  Widely patent origins of celiac trunk, superior mesenteric artery  and inferior mesenteric artery.  Bilateral renal arteries with widely  patent origins. There is an accessory right renal artery.  7.  There is no acute aortic pathology, such as dissection, intramural  hematoma, or contained rupture.     6/19/19 US Carotid                                                                   Impression:     1. Right side:         Degree of stenosis of the internal carotid artery: 50 to 69%, peak  velocities measure up to 181/22 cm/s in the mid internal carotid  artery.      2. Left side:          Degree of stenosis of the internal carotid artery: Less than 50 %.   7/10/19 CT CAP                                                                   IMPRESSION: In this patient with history of malignant melanoma:  1. No new or enlarging pulmonary nodules. Small calcified and  noncalcified nodules are unchanged.  2. No  evidence for metastatic disease in the chest, abdomen, or  pelvis.      Imaging, PFTs and cardiac testing reviewed by this provider    Outside records reviewed from: Care Everywhere    ASSESSMENT and PLAN  Gabino Jones is a 79 year old female scheduled to undergo TAVR, transfemoral approach on 9/4/19 by Dr. Oates. She has the following specific operative considerations:   - RCRI : No serious cardiac risks.    - Anesthesia considerations:  Refer to PAC assessment in anesthesia records  - VTE risk: 0.5%  - JESSICA # of risks 3/8 = Intermediate risk  - Risk of PONV score = 3.  If > 2, anti-emetic intervention recommended. If 3 or > anti emetic intervention recommended with two or more meds    --Severe aortic stenosis with above procedure now planned with MAC. Patient comfortable with plan. NYHA class III.  --HLD. atorvastatin. HTN. Will hold hydrochlorothiazide and lisinopril on DOS. Recent cath with no hemodynamically significant lesions. All testing above. Activity limited by weakness. Per service patient was instructed to take  mg day before and day of surgery.   --Former smoker 40 pack years. Denies pulmonary symptoms.  --GERD. Will take Ranitidine on DOS. Protonix at HS. Hospitalized on 8/13/19 for abdominal pain and diarrhea. Treated for colitis with antibiotics. Cipro and Flagyl will be finished tomorrow. Improved symptoms.   --Anxiety/depression. Will take bupropion on DOS. Zyprexa and  Effexor at HS.   --Hearing loss.   --History of melanoma s/p surgical resection and radiation. 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   --Anemia, s/p blood transfusion and iron infusion on 8/14/19. Hgb today 9.3. Due to recent blood transfusion patient will return day before surgery to have repeat type and screen. Orders and instructions provided.   --Difficult IV stick.     Arrival time, NPO, shower and medication instructions provided by nursing staff today. Preparing For Your Surgery handout given.      Patient was  discussed with Dr Banegas.    MELANIE Sena CNS  Preoperative Assessment Center  [facility]  [facility]  Phone: [phone]  Fax: [phone]

## 2019-08-21 NOTE — NURSING NOTE
Vitals completed successfully and medication reconciled.     Charlotte Giles CMA  3:26 PM  Chief Complaint   Patient presents with     Follow Up      return TAVR- 4 week post op CORS and RHC follow up

## 2019-08-21 NOTE — PATIENT INSTRUCTIONS
Preoperative Exam today and see Cardiology at 3:30.  Hemoglobin lab today.    Please call or return to clinic for any questions, concerns, or symptoms that are not improving or becoming worse.    Julissa Vargas, CNP

## 2019-09-16 NOTE — TELEPHONE ENCOUNTER
Spoke with Talat( patient's ). He wanted to know about having labs today while she is here for an MRI. I told talat she will have the labs done the morning of the TAVR procedure. I let him know she will be first case 7:30am and we will need him to have her check in  To the 3 C lobby @ 5:30am. He understood and said he wrote everything down.

## 2019-09-17 NOTE — TELEPHONE ENCOUNTER
Pt called with recent MRI results.  answered and stated she was sleeping. Results given to .     Impression:  1. Stable planum sphenoidale meningioma.  2. Stable focus of enhancement in the subependymal region anterior to  the anterior horn of the left lateral ventricle.  3. Stable postoperative changes in the right sinonasal region without  definite evidence for recurrence.  4. Stable post radiation changes in the bilateral frontal lobe white  Matter.    Explained that everything appears stable. F/o with  as scheduled. Direct line given for additional questions/concerns.     Natice Schwab, RN BSN

## 2019-09-18 PROBLEM — I35.0 AORTIC STENOSIS, SEVERE: Status: ACTIVE | Noted: 2019-01-01

## 2019-09-18 NOTE — PROVIDER NOTIFICATION
Time:1730  Notified: CSI on call  Reason: Arrhythmia. PSVT JJ=[id number]'s. Pt as asymptomatic.   MD ordered:K=4.0 and Mag=1.9.  MD said she would follow up

## 2019-09-18 NOTE — PLAN OF CARE
S/p TAVR today.   Neuro: ALOx4. Neuro intact. Eyak  Cardiac: SR. Pt had a run of asymptomatic PSVT ( ZB=[id number]'s). BP stable.   Respiratory: On RA. CAPNO #'s WDL  GI/: No BM. Good UO.   Diet/appetite: Tolerated clear lieq diet. Advanced diet to regular cardiac diet  Activity: Ended bed rest ~1630. Ambulated in room.   Pain: Denied  Skin:TR band was removed per protocol.  R radial access site drsg CDI. Bilateral groin site bruised and it is  w/o hematoma.   LDA's: PIVx2- SL  Plan:  Possible discharge to home after tests completed.

## 2019-09-18 NOTE — PLAN OF CARE
Pt is A/Ox4. Pt settled in PACVU by ICU RN. Pt going to transfer to . HR is SR 70-80's. BP stable. No BM or urine output. Report will be given to 6C RN. Will continue to monitor and notify MD of changes.

## 2019-09-18 NOTE — OP NOTE
OPERATIVE DATE: 9/18/2019    PRE-OPERATIVE DIAGNOSIS:  1) Severe aortic stenosis  2) Metastatic melanoma  3) Hypertension  4) Hyperlipidemia    POST-OPERATIVE DIAGNOSIS:  1) Severe aortic stenosis  2) Metastatic melanoma  3) Hypertension  4) Hyperlipidemia    PROCEDURE:  1) Temporary pacemaker insertion  2) Iliofemoral artery angiography  3) Ascending aorta angiography  4) Left heart catheterization  5) [place] cerebral protection deployment   6) Successful transfemoral transcatheter aortic valve replacement with 23mm S3 valve  7) Arteriotomy closure with Manta    SURGEON: Jose Antonio Kaye MD    CARDIOLOGIST: Taj Santos MD, Drew Oates MD    FELLOW: Momo Hahn MD    ANESTHESIA: monitored anesthesia care    ESTIMATED BLOOD LOSS: 20 cc    INDICATIONS:  Ms. Gabino Jones is a 79 year old year old female with severe, symptomatic aortic stenosis.  We were asked to evaluate for TAVR appropriateness.  Risks and benefits of the operation were explained to the patient and their family including, but not limited to, bleeding, infection, stroke and even death.  They understood these risks and agreed to proceed electively.    OPERATIVE REPORT:  The patient was transferred to the operating room and positioned supine on the OR table. Monitored care was begun with sedation by anethesia.  The patients chest, abdomen and bilateral lower extremities were clipped, prepped and draped in sterile fashion.  A pre-procedure time-out was performed confirming the correct patient, correct site and correct procedure.    Transthoracicechocardiography showed good function of the LV.     Arterial access of the right and left femoral arteries and left common femoral vein was performed by interventional cardiology.    A temporary transvenous pacemaker was placed by the cardiology team.    Next the right radial artery was accessed and a 6 Fr sheath inserted. The [place] device was guided into the ascending aorta and both nets  deployed in the innominate artery and left common carotid artery.    A Lunderquist wire was advanced to support the [place] sheath insertion. The patient was systemically heparinized and a final ACT was > 300 seconds.  A pigtail catheter was advanced to the aortic root and angiogram performed to confirm optimal, co-planar implant angle.  The pigtail was placed in the non-coronary cusp.    The LV was accessed using an AL-1 catheter over a straight wire.  A pigtail catheter was advanced into the LV over and exchange wire.  The pigtail catheter was used to advance a Lunderquist Safari wire into the LV and the pigtail catheter was removed.    The 23 mm S3 bioprosthetic valve was positioned across the native aortic valve and deployed using rapid pacing.    Transthoracic echocardiography and root aortogram showed trace paravalvular insufficiency that improved with removal of the Safari wire.    The deployment system and wires were removed along with the [place] device.  The femoral access was made hemostatic with a Manta at the delivery site and an angioseal in the left femoral artery sheath location.    A final iliofemoral angiogram showed no extravasation or stenosis.    The patient was then transferred from the operating bed to PACU in stable condition.    All needle, sponge and instrument counts were correct at the end of the case.    Jose Antonio Kaye MD  Cardiothoracic Surgery  [phone]

## 2019-09-18 NOTE — ANESTHESIA CARE TRANSFER NOTE
Patient: Gabino Jones    Procedure(s):  Transcatheter (Harding size 23) Aortic Valve Replacement, Sentinal device placement, perfusion standby, cardiacc surgery standby, trans thorasic echocardiogram  Cardiopulmonary Bypass Standby    Diagnosis: heart valve condition  Diagnosis Additional Information: No value filed.    Anesthesia Type:   General     Note:  Airway :Nasal Cannula  Patient transferred to:PACU  Comments: Anesthesia Care Transfer Note      Transfer to:  PACU    Patient Vital Signs:  Stable    Airway:  None    Patient transported to PACU with supplemental O2.  Patient alert and breathing comfortably.  VSS.  Bilat leg to remain flat x4 hr starting at 1000.  Care transferred with report to PACU RN.    Matty Hair CRNAHandoff Report: Identifed the Patient, Identified the Reponsible Provider, Reviewed the pertinent medical history, Discussed the surgical course, Reviewed Intra-OP anesthesia mangement and issues during anesthesia, Set expectations for post-procedure period and Allowed opportunity for questions and acknowledgement of understanding      Vitals: (Last set prior to Anesthesia Care Transfer)    CRNA VITALS  9/18/2019 0926 - 9/18/2019 1007      9/18/2019             Resp Rate (set):  10                Electronically Signed By: MELANIE Jimenez CRNA  September 18, 2019  10:07 AM

## 2019-09-18 NOTE — OR NURSING
"I was told by the Cath Lab nurse and CRNA that the Right radial band should not be decompressed for 2 hours, not until 1200. The volume is 10cc of air in the radial band.    Pt c/o \"very dry mouth\". Ice chips given sparingly and pt swallowed without any difficulty  "

## 2019-09-18 NOTE — Clinical Note
Potential access sites were evaluated for patency using ultrasound.   All access vessels were selected. Access was obtained under with Sonosite guidance using a standard 18/21 guage needle with direct visualization of needle entry.

## 2019-09-18 NOTE — PROGRESS NOTES
Transfer  Transferred to: 6C  Via: bed  Reason for transfer: Pt inappropriate for PACU  Family: Pt  and daughter aware of pt transfer  Belongings: Sent with pt  Chart: Sent with pt  Report called from: PACU prior to pt arrival    Pt A/Ox4. Pt lethargic, yet arousable. See flowsheets for VS, BPs intermittently high, CSI paged. NSR. RA. Denies pain. NS started @ 50ml/hr per order. R/L groin sites WDL, CMS intact, weak pedal pulses. R arm with TR band, +2 radial pulse. Pt reports numbness/tingling improving in R arm. Per report, pt to be on bedrest until 1600, pt aware.    RN/RN skin assessment completed by Gloria HINSON RN and Tahira BLAKE RN. Small skin tear noted on pt bottom (scant/small bleeding noted).     Will continue to monitor pt.

## 2019-09-18 NOTE — H&P
[facility]      CSI History and Physicial  Gabino Jones MRN: [id number]  1940  Date of Admission:9/18/2019  Primary care provider: Rebekah Boogie         Chief Complaint:   Shortness of breath         History of Present Illness:   Gabino Jones is a 79 year old female with a past medical history significant for HTN, HLD, prior tobacco use, chronic anemia, non-obstructive CAD, and severe aortic stenosis. She was recently seen in clinic for progressive dyspnea on exertion limiting her to about 50 feet of walking.     Patient visited in the PACU. She feels well, on acute complaints. Denies chest pain, dyspnea, fever, chills, headache, vision change. Of note, patient is blind in the left eye at baseline.           Review of Systems:    10 point review of systems negative except for stated above in HPI.          Past Medical History:   Medical History reviewed.   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   Past Surgical History:   Procedure Laterality Date     ANESTHESIA OUT OF OR MRI N/A 1/16/2019    Procedure: Anesthesia Coverage Brain MRI With Contrast @1330;  Surgeon: GENERIC ANESTHESIA PROVIDER;  Location: [facility] OR     C ANESTH,CERV SPINE, SITTING POSITION       COLONOSCOPY N/A  7/7/2017    Procedure: COMBINED COLONOSCOPY, SINGLE OR MULTIPLE BIOPSY/POLYPECTOMY BY BIOPSY;;  Surgeon: Sathya Norman MD;  Location: [facility] OR     COLONOSCOPY WITH CO2 INSUFFLATION N/A 7/7/2017    Procedure: COLONOSCOPY WITH CO2 INSUFFLATION;  Combined,  [place], Gastroesophageal reflux disease without esophagitis  Flatulence, eructation, and gas pain, BMI 29.35, [facility] [phone];  Surgeon: Sathya Norman MD;  Location: [facility] OR     COLONOSCOPY, SUBMUCOSA RESECTION N/A 10/4/2017    Procedure: COLONOSCOPY, SUBMUCOSA RESECTION;  Colonoscopy With Endoscopic Polypectomy with clips;  Surgeon: Milton Javier MD;  Location: [facility] OR     COMBINED ESOPHAGOSCOPY, GASTROSCOPY, DUODENOSCOPY (EGD) WITH CO2 INSUFFLATION N/A 7/7/2017    Procedure: COMBINED ESOPHAGOSCOPY, GASTROSCOPY, DUODENOSCOPY (EGD) WITH CO2 INSUFFLATION;  Combined,  [place], Gastroesophageal reflux disease without esophagitis  Flatulence, eructation, and gas pain, BMI 29.35, [facility] [id number];  Surgeon: Sathya Norman MD;  Location: [facility] OR     CV CORONARY ANGIOGRAM N/A 7/18/2019    Procedure: CV CORONARY ANGIOGRAM;  Surgeon: Blake Hobbs MD;  Location:  HEART CARDIAC CATH LAB     CV CORONARY ANGIOGRAM N/A 7/18/2019    Procedure: Coronary Angiogram;  Surgeon: Blake Hobbs MD;  Location:  HEART CARDIAC CATH LAB     CV FRACTIONAL FLOW RESERVE Bilateral 7/18/2019    Procedure: Fractional Flow Reserve;  Surgeon: Blake Hobbs MD;  Location:  HEART CARDIAC CATH LAB     CV RIGHT HEART CATH N/A 7/18/2019    Procedure: CV RIGHT HEART CATH;  Surgeon: Blake Hobbs MD;  Location:  HEART CARDIAC CATH LAB     CV RIGHT HEART CATH N/A 7/18/2019    Procedure: Right Heart Cath;  Surgeon: Blake Hobbs MD;  Location:  HEART CARDIAC CATH LAB     ESOPHAGOSCOPY, GASTROSCOPY, DUODENOSCOPY (EGD), COMBINED N/A 7/7/2017    Procedure: COMBINED ESOPHAGOSCOPY, GASTROSCOPY, DUODENOSCOPY (EGD), BIOPSY  SINGLE OR MULTIPLE;;  Surgeon: Sathya Norman MD;  Location:  OR     ESOPHAGOSCOPY, GASTROSCOPY, DUODENOSCOPY (EGD), COMBINED N/A 1/6/2019    Procedure: COMBINED ESOPHAGOSCOPY, GASTROSCOPY, DUODENOSCOPY (EGD);  Surgeon: Cailin Paulino MD;  Location:  GI     GALLBLADDER SURGERY       NECK SURGERY       OPTICAL TRACKING SYSTEM CRANIOTOMY, EXCISE TUMOR, COMBINED Right 2/22/2017    Procedure: COMBINED OPTICAL TRACKING SYSTEM CRANIOTOMY, EXCISE TUMOR;  Surgeon: Lenora Pérez MD;  Location:  OR     OPTICAL TRACKING SYSTEM ENDOSCOPIC ENDONASAL SURGERY  6/23/2014    Procedure: OPTICAL TRACKING SYSTEM ENDOSCOPIC ENDONASAL SURGERY;  Surgeon: Yolanda Whitaker MD;  Location:  OR     STOMACH SURGERY       TONSILLECTOMY       TUBAL LIGATION      1975             Social History:   Social History reviewed.  Social History     Tobacco Use     Smoking status: Former Smoker     Packs/day: 1.00     Years: 40.00     Pack years: 40.00     Types: Cigarettes     Last attempt to quit: 6/10/2004     Years since quitting: 15.2     Smokeless tobacco: Never Used   Substance Use Topics     Alcohol use: No             Family History:   Family History reviewed.   Family History   Problem Relation Age of Onset     Prostate Cancer Father      Cancer Sister         SCLC     Cancer Sister      Macular Degeneration Daughter      Glaucoma No family hx of              Allergies:     Allergies   Allergen Reactions     Novocain [Procaine] Unknown and Rash     Mirtazapine      Nefazodone Unknown and Rash             Medications:   Medications Reviewed.   Current Facility-Administered Medications   Medication     aspirin EC tablet 81 mg     ceFAZolin (ANCEF) intermittent infusion 2 g in 100 mL dextrose PRE-MIX     EPINEPHrine (ADRENALIN) 5 mg in sodium chloride 0.9 % 250 mL infusion     lidocaine (LMX4) cream     lidocaine 1 % 0.1-1 mL     norepinephrine (LEVOPHED) 16 mg in  mL infusion      "Patient is NOT allergic to contrast dye OR was given pre-procedure oral steroids for treatment     sodium chloride (PF) 0.9% PF flush 3 mL     sodium chloride (PF) 0.9% PF flush 3 mL     sodium chloride 0.9% infusion     Facility-Administered Medications Ordered in Other Encounters   Medication     lactated ringers infusion             Physical Exam:   Vitals were reviewed.  Blood pressure 125/55, pulse 70, temperature 98.2  F (36.8  C), temperature source Oral, resp. rate 17, height 1.651 m (5' 5\"), weight 87.3 kg (192 lb 7.4 oz), SpO2 100 %, not currently breastfeeding.    General: AAOx3, NAD  Skin: Not jaundiced, no rash, no ecchymoses  HEENT: MMM, PERRLA, EOM intact  CV: RRR, normal S1S2, no murmur, clicks, rubs  Resp: Limited exam due to bed rest, clear to auscultation bilaterally  Abd: Soft, non-tender, BS+, no masses appreciated  Extremities: warm and well perfused, palpable pulses, no edema. Groin incisions c/d/i, no hematoma  Neuro: No lateralizing symptoms or focal neurologic deficits        Labs:   Routine Labs:  Lab Results   Component Value Date    TROPI  08/29/2014     <0.015  The 99th percentile for upper reference range is 0.045 ug/L.  Troponin values in   the range of 0.045 - 0.120 ug/L may be associated with risks of adverse   clinical events.   Effective 7/30/2014, the reference range for this assay has changed to reflect   new instrumentation/methodology.      TROPI  08/29/2014     <0.015  The 99th percentile for upper reference range is 0.045 ug/L.  Troponin values in   the range of 0.045 - 0.120 ug/L may be associated with risks of adverse   clinical events.   Effective 7/30/2014, the reference range for this assay has changed to reflect   new instrumentation/methodology.       CMP  Recent Labs   Lab 09/18/19  0655      POTASSIUM 4.2   CHLORIDE 100   CO2 25   ANIONGAP 8   GLC 95   BUN 16   CR 0.77   GFRESTIMATED 73   GFRESTBLACK 85   STEFFANIE 8.8   PROTTOTAL 7.8   ALBUMIN 3.5   BILITOTAL 0.3 "   ALKPHOS 86   AST 20   ALT 25     CBC  Recent Labs   Lab 09/18/19  0655   WBC 7.6   RBC 4.12   HGB 10.7*   HCT 36.7   MCV 89   MCH 26.0*   MCHC 29.2*   RDW Dimorphic population - unable to calculate        INR  Recent Labs   Lab 09/18/19  0655   INR 0.98           Diagnostics:      Post-op EKG 9/18/2019      Pre-op EKG 9/18/2019      Assessment and Plan:     Gabino Jones is a 79 year old female with a past medical history significant for HTN, HLD, prior tobacco use, chronic anemia, non-obstructive CAD, and severe aortic stenosis who presents for TAVR.      # Severe aortic stenosis, now s/p Transcatheter Aortic Valve Replacement with a 23mm ESIII.   Prior to procedure, pt noted to have a mean gradient 50 mmHG, PATRICIA 0.8 cm^2, and a PV of 4.6m/s- Sheath sites soft without hematoma - RFA closed with MANTA device. [place] device used. LVEDP 13mmHg. Per report, had slightly wider QRS intraoperatively following valve placement.   - Bedrest per protocol.    - Neuro checks, per protocol.  - Echocardiogram tomorrow.   - Monitor groin sites.   - EKG in morning.   - Peter can be removed once patient is out of bed.   - Plavix and ASA for anti-platelet therapy.   - Cardiac rehab/PT/OT.  - Diurese as needed/able.   - Daily weights.   - Strict intake/output.       # Hypertension  - Hold PTA lisinopril 30mg, hydrochlorothiazide 12.5mg for now. Reintroduce as BP increases    #Non-obstructive CAD  - PTA atorvastatin 20mg     #. Depressive disorder   - PTA effexor XR75mg daily  - PTA Wellbutrin 150mg daily  - Hold PTA zyprexa at bedtime for now. Resume pending normal rhythm post-procedure    #Chronic anemia  Noted to have acute epistaxis with major bleed 1/2019. Has had subsequent chronic anemia suspected to be in part secondary to iron deficiency. Recent admission to [facility] following syncopal episode, found to have low hgb and subsequently transfused 1U PRBC.   - CBC daily  - Transfuse for Hgb  <8.0    #Hyponatremia  Sodium 132 on admission. Historically has been low-normal (~133). LVEDP slightly above NML. May benefit from gentle diuresis.   - BMP daily    FEN: Cardiac diet  Prophylaxis:  DVT: SCDs. PPI: PTA protonix 40mg every day   Disposition: Obs admission to [facility].   Code Status: FULL CODE      Ashish Case PA-C  [facility] Cardiology Team      ATTENDING NOTE:  Patient has been seen and evaluated by me on 09/19/2019. I have reviewed the H&P.  Please refer to it for additional details.  I have reviewed today's vital signs, medications, labs, and imaging results.  I have reviewed and edited, as necessary, the history, review of systems, physical examination, and assessment and plan.  I have discussed my assessment and plan with the physician assistant.  I have seen and examined the patient today as part of a shared visit with Silverio Case PA-C.  Gabino Jones is a 79 year old female with risk factor profile (+) HTN, (-) DM, (+) hypercholesterolemia, severe aortic stenosis (mean gradient 50 mmHG, PATRICIA 0.8 cm^2, and a PV of 4.6m/s), mild CAD, underwent elective TAVR today with 23 mm Loco 3.  No immediate complications.  Arteriotomy (RT) closed with Manta device without complications.  Patient examined on telemetry post procedure.  Unable to appreciate systolic or diastolic murmur.  RFA site healing well.  Plan on repeat ECHO tomorrow.      Blake Hobbs MD     Cardiovascular Division

## 2019-09-18 NOTE — PROGRESS NOTES
Date: 9/18/2019    Time of Call: 8:08 AM     Diagnosis:  S/p TAVR     [ TORB ] Ordering provider: Drew Oates MD  Order:   Echo  Labs  EKG     Order received by: Paris Wagner RN     Follow-up/additional notes:   S/p TAVR orders

## 2019-09-19 NOTE — PROGRESS NOTES
[facility]   Cardiology Consults  Progress Note     Interval History:  - no acute events overnight  - 12 beat run of NSVT, no other notable arrhythmia on tele     Physical Exam:  Temp:  [97.4  F (36.3  C)-98.2  F (36.8  C)] 97.9  F (36.6  C)  Pulse:  [68-90] 79  Heart Rate:  [68-88] 88  Resp:  [12-23] 18  BP: (105-160)/(46-99) 105/53  SpO2:  [95 %-100 %] 97 %      Intake/Output Summary (Last 24 hours) at 9/19/2019 1220  Last data filed at 9/19/2019 1127  Gross per 24 hour   Intake 765.83 ml   Output 725 ml   Net 40.83 ml     Wt:   Wt Readings from Last 5 Encounters:   09/19/19 87.6 kg (193 lb 1.6 oz)   08/21/19 88.5 kg (195 lb)   08/21/19 88.5 kg (195 lb)   08/21/19 88.5 kg (195 lb)   06/20/19 83.5 kg (184 lb)       GEN: NAD, awake, alert, very hard of hearing  Pulm: Coarse expiratory breath sounds, but no crackles, rhonchi, wheeze  Cardiac: RRR, no murmurs, rubs  Vascular: no lower extremity edema and 1+ palpable pedal pulses  Ext: Left groin bruised, no palpable mass  GI: soft, non distended  Neuro: CN II-XII grossly intact    Medications:    aspirin  81 mg Oral Daily     atorvastatin  20 mg Oral At Bedtime     buPROPion  150 mg Oral QAM     carboxymethylcellulose PF  2 drop Both Eyes BID     clopidogrel  75 mg Oral Daily     hydrochlorothiazide  12.5 mg Oral Daily     melatonin  1 mg Oral At Bedtime     pantoprazole  40 mg Oral QAM AC     ranitidine  150 mg Oral At Bedtime     venlafaxine  150 mg Oral At Bedtime         Labs:   CMP  Recent Labs   Lab 09/19/19  0601 09/18/19  1028 09/18/19  0655    132* 133   POTASSIUM 4.0 4.0 4.2   CHLORIDE 103 102 100   CO2 24 26 25   ANIONGAP 7 5 8   GLC 97 92 95   BUN 13 15 16   CR 0.66 0.78 0.77   GFRESTIMATED 84 72 73   GFRESTBLACK >90 83 85   STEFFANIE 8.7 8.2* 8.8   MAG 1.9 1.9  --    PHOS 3.7 3.6  --    PROTTOTAL  --  6.8 7.8   ALBUMIN  --  3.2* 3.5   BILITOTAL  --  0.3 0.3   ALKPHOS  --  78 86   AST  --  19 20   ALT  --  20 25      CBC  Recent Labs   Lab 09/19/19  0601 09/18/19  1028 09/18/19  0655   WBC 8.2 10.2 7.6   RBC 3.51* 3.59* 4.12   HGB 9.2* 9.4* 10.7*   HCT 30.9* 32.8* 36.7   MCV 88 91 89   MCH 26.2* 26.2* 26.0*   MCHC 29.8* 28.7* 29.2*   RDW Dimorphic population - unable to calculate Dimorphic population - unable to calculate Dimorphic population - unable to calculate    235 281     INR  Recent Labs   Lab 09/18/19  0655   INR 0.98     Arterial Blood GasNo lab results found in last 7 days.    Diagnostics:    Post-op EKG 9/19/2019u  Sinus rhythm       Pre-op EKG 9/18/2019      ASSESSMENT/PLAN:  Gabino Jones is a 79 year old female with a past medical history significant for HTN, HLD, prior tobacco use, chronic anemia, non-obstructive CAD, and severe aortic stenosis who presents for TAVR.      # Severe aortic stenosis, now s/p Transcatheter Aortic Valve Replacement with a 23mm ESIII.   Prior to procedure, pt noted to have a mean gradient 50 mmHG, PATRICIA 0.8 cm^2, and a PV of 4.6m/s- Sheath sites soft without hematoma - RFA closed with MANTA device. [place] device used. LVEDP 13mmHg. Per report, had slightly wider QRS intraoperatively following valve placement however, rhythm stable overnight. Post-op EKG HOD 1 stable vs. Pre-op. Echocardiogram pending official read.     - Plavix and ASA for anti-platelet therapy.   - Cardiac rehab/PT/OT.  - Lifelong abx for any dental procedures      # Hypertension  - resume PTA hydrochlorothiazide 12.5mg daily  - Hold PTA lisinopril 30mg given lower BP. Reintroduce as BP increases     #Non-obstructive CAD  - PTA atorvastatin 20mg      #. Depressive disorder   - PTA effexor XR75mg daily  - PTA Wellbutrin 150mg daily  - Hold PTA zyprexa at bedtime for now. Resume pending normal rhythm post-procedure     #Chronic anemia - stable  Noted to have acute epistaxis with major bleed 1/2019. Has had subsequent chronic anemia suspected to be in part secondary to iron deficiency. Recent admission to [facility]  hospital following syncopal episode, found to have low hgb and subsequently transfused 1U PRBC.  Hgb appears stable this admission ~9  - CBC daily  - Transfuse for Hgb <8.0     #Hyponatremia - resolved  Sodium 132 on admission. Historically has been low-normal (~133). LVEDP slightly above NML.    - BMP daily    Patient discussed with Dr. Cintron, who agrees with above plan.      Ashish Case PA-C  [facility] Cardiology Team

## 2019-09-19 NOTE — PROGRESS NOTES
09/19/19 0950   Quick Adds   Type of Visit Initial Occupational Therapy Evaluation   Living Environment   Lives With spouse;child(prudencio), adult   Living Arrangements house   Home Accessibility stairs to enter home;stairs within home   Number of Stairs, Main Entrance 1   Stair Railings, Main Entrance railings safe and in good condition   Number of Stairs, Within Home, Primary other (see comments)  (1 flight into basement, pt does not need to use)   Stair Railings, Within Home, Primary railings safe and in good condition   Transportation Anticipated family or friend will provide   Living Environment Comment Pt lives in rambler style home. Has tub shower with grab bars inside and shower chair. Pt lives with  and adult daughter who assist her with ADL/IADL as needed, both work ( is a part time )   Self-Care   Usual Activity Tolerance moderate   Current Activity Tolerance fair   Regular Exercise Yes   Activity/Exercise Type biking;walking   Exercise Amount/Frequency 2 times/wk   Equipment Currently Used at Home shower chair;grab bar, tub/shower;wheelchair, manual;walker, rolling;cane, straight;crutches   Activity/Exercise/Self-Care Comment Pt reported decline in ADL I in last year 2/2 SOB and fatigue. Reported biking and walking occasionally, difficult to determine frequency, pt stated that she tries to exercise at least once a week but unsure on accuracy of this. Pt reported enjoying [place] and spending time with family.   Functional Level   Ambulation 2-->assistive person  (Holds onto  during ambulation at home)   Transferring 2-->assistive person  ( and daughter assist PRN)   Toileting 2-->assistive person  (Assistance with transfers PRN)   Bathing 3-->assistive equipment and person  (Daughter assists, reports  will not assist )   Dressing 2-->assistive person   Eating 0-->independent   Communication 2-->difficulty understanding (not related to language barrier)  (hearing  "related)   Swallowing 0-->swallows foods/liquids without difficulty   Cognition 1 - attention or memory deficits  (Pt reports incorrect diagnosis of demenia (?) see below)   Fall history within last six months yes   Number of times patient has fallen within last six months 1   Which of the above functional risks had a recent onset or change? ambulation;transferring;toileting;bathing;dressing;fall history   Prior Functional Level Comment Pt reports \"sliding\" off toilet. Ambulates while holding onto  or daughter at home, occasionally uses w/c in community for longer distances. Has cane, walker, crutches that  has from previous injuries but does not use at baseline       Present no   General Information   Onset of Illness/Injury or Date of Surgery - Date 09/18/19   Referring Physician Momo Gan MD   Patient/Family Goals Statement Return home, to PLOF   Additional Occupational Profile Info/Pertinent History of Current Problem \"Gabino Jones is a 79 year old female with a past medical history significant for HTN, HLD, prior tobacco use, chronic anemia, non-obstructive CAD, and severe aortic stenosis. She was recently seen in clinic for progressive dyspnea on exertion limiting her to about 50 feet of walking. \", now s/p TAVR   Precautions/Limitations fall precautions  (TAVR precautions)   Weight-Bearing Status - LUE other (see comments)  (10#)   Weight-Bearing Status - RUE other (see comments)  (10#)   Weight-Bearing Status - LLE full weight-bearing   Weight-Bearing Status - RLE full weight-bearing   Heart Disease Risk Factors Smoking;Lack of physical activity;Overweight;Medical history;Age   General Info Comments Activity: Up in  chair   Cognitive Status Examination   Orientation person;time  (Reported location as [place])   Level of Consciousness alert   Executive Function Self awareness/monitoring impaired   Cognitive Comment Pt often responded to questions with " "unrelated answers, difficult to determine if this is related to cognition or hearing deficits. Reported that she was given a diagnosis of alzheimer's/dementia but that this was \"incorrect\", states that family feels she has cognitive deficits but pt reports \"I am probably sharper than they are\". Limited awareness/insight into own safety and condition. WIill continue to monitor    Visual Perception   Visual Perception No deficits were identified   Visual Perception Comments Pt is blind in L eye but reports no other concenrs   Sensory Examination   Sensory Quick Adds Other (describe)   Sensory Comments Occasional n/t in BUE and BLE   Pain Assessment   Patient Currently in Pain No   Integumentary/Edema   Integumentary/Edema other (describe)   Integumentary/Edema Comments Reports occasional BLE edema, none noted this tx   Posture   Posture kyphosis   Range of Motion (ROM)   ROM Quick Adds Shoulder, Right;Shoulder, Left   ROM Comment Shoulder AROM flexion limited to 80 degrees, internal/external rotation WFL but painful. Pt reports hx of left arm fracture but otherwise etiology unclear, pt reports \"they're just stiff\". All other UE movements WFL    Strength   Strength Comments Defer formal MMT 2/2 precautions and pain, roughly 2-/5 as pt unable to move through full ROM    Hand Strength   Hand Strength Comments WFL   Instrumental Activities of Daily Living (IADL)   Previous Responsibilities housekeeping  (Light housekeeping,  and daughter complete all other)   Activities of Daily Living Analysis   Impairments Contributing to Impaired Activities of Daily Living balance impaired;cognition impaired;post surgical precautions;ROM decreased;strength decreased   General Therapy Interventions   Planned Therapy Interventions ADL retraining;IADL retraining;balance training;bed mobility training;strengthening;home program guidelines;progressive activity/exercise;risk factor education;cognition   Clinical Impression   Criteria " "for Skilled Therapeutic Interventions Met yes, treatment indicated   OT Diagnosis Decreased ADL I   Influenced by the following impairments Precautions, deconditioning, cognition, balance impairment   Assessment of Occupational Performance 3-5 Performance Deficits   Identified Performance Deficits Functional mobility, dressing, bathing, transferring, home management    Clinical Decision Making (Complexity) Low complexity   Therapy Frequency 5x/week   Predicted Duration of Therapy Intervention (days/wks) 1 week   Anticipated Discharge Disposition Transitional Care Facility   Risks and Benefits of Treatment have been explained. Yes   Patient, Family & other staff in agreement with plan of care Yes   Clinical Impression Comments Pt will benefit from skilled OT intervention to address the above deficits for improved ADL I. At this time, pt is not safe to return home.    [facility] AM-PAC  \"6 Clicks\" Daily Activity Inpatient Short Form   1. Putting on and taking off regular lower body clothing? 3 - A Little   2. Bathing (including washing, rinsing, drying)? 2 - A Lot   3. Toileting, which includes using toilet, bedpan or urinal? 3 - A Little   4. Putting on and taking off regular upper body clothing? 3 - A Little   5. Taking care of personal grooming such as brushing teeth? 3 - A Little   6. Eating meals? 4 - None   Daily Activity Raw Score (Score out of 24.Lower scores equate to lower levels of function) 18   Total Evaluation Time   Total Evaluation Time (Minutes) 18     "

## 2019-09-19 NOTE — PLAN OF CARE
"Pt reports \"feeling stiff\" but otherwise ok today. Tylenol given x1. Communication difficult at times-unsure if related to pt being Elem or any cognitive deficits. Unsteady on her feet when oob-loses her balance at times; needs to use walker when up. Spoke w/spouse on phone and he stated pt was having some difficulties with walking/balance at home prior to procedure.  R & L groin sites unchanged. Drsg in place on L/liquid bandage on R.   Assisted pt in ordering meals-poor appetite.  Post-TAVR Echo completed.   Awaiting input from therapy and social work regarding possibility of rehab placement vs going home.  "

## 2019-09-19 NOTE — CONSULTS
Brief Note     attempted to meet with pt this am. Pt sleeping and not rousing to loud voice. SW will follow up as able.    DALE Perez, [facility]   Covering 6C  P: [phone]  Pager: [phone] or [phone]

## 2019-09-19 NOTE — DISCHARGE SUMMARY
[address] [address]  p: [phone]    Discharge Summary: Cardiology Service    Gabino Jones MRN# [id number]   YOB: 1940 Age: 79 year old       Admission Date: 9/18/2019  Discharge Date: 09/19/19      Discharge Diagnoses:  1. Severe aortic stenosis s/p TAVR 9/18/2019  2. Hypertension  3. Non-obstructive CAD  4. Depressive disorder  5. Chronic anemia     Pertinent Procedures:  1. TAVR    Consults:  None    Imaging with results:  Echocardiogram 9/19/2019:  Interpretation Summary  TAVR with 23 mm Harding Loco 3.The mean gradient is 15 mmHg.Trace aortic  insufficiency is present.  Global and regional left ventricular function is hyperkinetic with an EF of  65-70%. This likely contributes to the gradient across TAVR.  Right ventricular function, chamber size, wall motion, and thickness are  normal.  No pericardial effusion is present.  IVC diameter <2.1 cm collapsing >50% with sniff suggests a normal RA pressure  of 3 mmHg.  No significant change from yesterday.  ______________________________________________________________________     Left Ventricle  Left ventricular size is normal. Global and regional left ventricular function  is hyperkinetic with an EF of 65-70%. Mild concentric wall thickening  consistent with left ventricular hypertrophy is present. Left ventricular  diastolic function is indeterminate.     Right Ventricle  Right ventricular function, chamber size, wall motion, and thickness are  normal.     Atria  The atria cannot be assessed.     Mitral Valve  Mild mitral annular calcification is present. Trace mitral insufficiency is  present.        Aortic Valve  Trace aortic insufficiency is present. The calculated aortic valve are is 1.5  cm^2. A bioprosthetic aortic valve is present. Doppler interrogation of the  aortic valve is normal. The mean gradient is 15 mmHg.     Tricuspid Valve  The tricuspid valve is normal.  The peak velocity of the tricuspid regurgitant  jet is not obtainable.     Pulmonic Valve  The pulmonic valve is normal.     Vessels  The thoracic aorta is normal. The aorta root is normal. IVC diameter <2.1 cm  collapsing >50% with sniff suggests a normal RA pressure of 3 mmHg.     Pericardium  No pericardial effusion is present.  Post-op EKG 9/19/2019u  Sinus rhythm       Pre-op EKG 9/18/2019    Brief HPI:  Gabino Jones is a 79 year old female with a past medical history significant for HTN, HLD, prior tobacco use, chronic anemia, non-obstructive CAD, and severe aortic stenosis who presents for TAVR.     Hospital Course by Diagnosis:  # Severe aortic stenosis, now s/p Transcatheter Aortic Valve Replacement with a 23mm ESIII.   Prior to procedure, pt noted to have a mean gradient 50 mmHG, PATRICIA 0.8 cm^2, and a PV of 4.6m/s- Sheath sites soft without hematoma - RFA closed with MANTA device. [place]io device used. LVEDP 13mmHg. Per report, had slightly wider QRS intraoperatively following valve placement however, rhythm stable overnight. Post-op EKG HOD 1 stable vs. Pre-op. Echocardiogram revealed a mean gradient of 15mmHg, which is partly due to a hyperkinetic LV.     - Plavix and ASA for anti-platelet therapy.   - Cardiac rehab  - Lifelong abx for any dental procedures  - Follow up with the valve clinic per protocol      # Hypertension  - Blood pressures well controlled while inpatient on hydrochlorothiazide 12.5mg. Initially held PTA lisinopril. On HOD 2, discontinued hydrochlorothiazide in favor of lisinopril at a decreased dose from PTA. Will have patient reassess hypertension in clinic in about one week with PCP.     #Non-obstructive CAD  - PTA atorvastatin 20mg      #Depressive disorder   - PTA effexor XR 75mg daily  - PTA Wellbutrin 150mg daily  - Resume PTA zyprexa at bedtime     #Chronic anemia - stable  Noted to have acute epistaxis with major bleed 1/2019. Has had subsequent chronic anemia suspected to be in  part secondary to iron deficiency. Recent admission to [facility] following syncopal episode, found to have low hgb and subsequently transfused 1U PRBC.  Hgb appears stable this admission ~9.     #Hyponatremia - resolved  Sodium 132 on admission. Historically has been low-normal (~133). LVEDP slightly above NML.    - BMP daily    Condition on discharge  Temp:  [97.2  F (36.2  C)-98.2  F (36.8  C)] 97.9  F (36.6  C)  Pulse:  [68-90] 79  Heart Rate:  [68-83] 83  Resp:  [8-23] 18  BP: ()/(45-99) 145/57  SpO2:  [95 %-100 %] 95 %  General: Alert, interactive, NAD  Eyes: sclera anicteric, EOMI  Cardiovascular: regular rate and rhythm, normal S1 and S2, no murmurs, gallops, or rubs  Resp: clear to auscultation bilaterally, no rales, wheezes, or rhonchi  GI: Soft, nontender, nondistended. +BS.  No HSM or masses, no rebound or guarding.  Extremities: no edema, no cyanosis or clubbing, dorsalis pedis and posterior tibialis pulses 2+ bilaterally  Skin: Warm and dry, no jaundice or rash  Neuro: CN 2-12 intact, moves all extremities equally  Psych: Alert & oriented x 3      Medication Changes:  Discontinue hydrochlorothiazide  Start clopidogrel 75mg daily    Discharge medications:   Current Discharge Medication List      START taking these medications    Details   clopidogrel (PLAVIX) 75 MG tablet Take 1 tablet (75 mg) by mouth daily  Qty: 30 tablet, Refills: 3    Associated Diagnoses: S/P TAVR (transcatheter aortic valve replacement)         CONTINUE these medications which have CHANGED    Details   lisinopril (PRINIVIL/ZESTRIL) 10 MG tablet Take 1 tablet (10 mg) by mouth daily  Qty: 30 tablet, Refills: 1    Associated Diagnoses: Benign essential hypertension         CONTINUE these medications which have NOT CHANGED    Details   acetaminophen (TYLENOL) 500 MG tablet Take 1 tablet (500 mg) by mouth every 6 hours as needed  Qty: 100 tablet, Refills: 3    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)       aspirin (ASA) 81 MG EC tablet Take 1 tablet (81 mg) by mouth daily  Qty: 90 tablet, Refills: 3    Associated Diagnoses: Coronary artery disease involving native coronary artery of native heart without angina pectoris      atorvastatin (LIPITOR) 20 MG tablet Take 1 tablet (20 mg) by mouth daily  Qty: 90 tablet, Refills: 3    Associated Diagnoses: Hypercholesteremia      benzonatate (TESSALON) 100 MG capsule TAKE 1 CAPSULE BY MOUTH THREE TIMES DAILY( EVERY EIGHT HOURS) AS NEEDED FOR COUGH  Qty: 60 capsule, Refills: 0    Associated Diagnoses: Cough      buPROPion (WELLBUTRIN XL) 150 MG 24 hr tablet TAKE 1 TABLET(150 MG) BY MOUTH EVERY MORNING  Qty: 90 tablet, Refills: 1    Associated Diagnoses: JITENDRA (generalized anxiety disorder)      melatonin 1 MG TABS tablet Take 1 mg by mouth At Bedtime       OLANZapine (ZYPREXA) 5 MG tablet Take 1 tablet (5 mg) by mouth At Bedtime  Qty: 30 tablet, Refills: 0    Associated Diagnoses: Agitation      pantoprazole (PROTONIX) 40 MG EC tablet Take 1 tablet (40 mg) by mouth daily Take 30-60 minutes before a meal.  Qty: 90 tablet, Refills: 1    Associated Diagnoses: Gastroesophageal reflux disease without esophagitis      ranitidine (ZANTAC) 150 MG tablet Take 1 tablet (150 mg) by mouth At Bedtime  Qty: 90 tablet, Refills: 1    Associated Diagnoses: Gastroesophageal reflux disease, esophagitis presence not specified      SENEXON-S 8.6-50 MG tablet TK 1 TO 2 TS PO BID  Qty: 100 tablet, Refills: 1    Associated Diagnoses: Constipation, unspecified constipation type      venlafaxine (EFFEXOR-XR) 75 MG 24 hr capsule TAKE 3 CAPSULES BY MOUTH DAILY  Qty: 270 capsule, Refills: 2    Associated Diagnoses: JITENDRA (generalized anxiety disorder)      carboxymethylcellulose PF (REFRESH PLUS) 0.5 % ophthalmic solution Place 2 drops into both eyes 2 times daily      Misc Natural Product Nasal (PONARIS) SOLN Spray 2 drops in nostril 2 times daily      sodium chloride (OCEAN) 0.65 % nasal spray Spray 1  spray into both nostrils daily as needed for congestion         STOP taking these medications       hydrochlorothiazide (HYDRODIURIL) 12.5 MG tablet Comments:   Reason for Stopping:         potassium chloride ER (K-DUR/KLOR-CON M) 10 MEQ CR tablet Comments:   Reason for Stopping:               Labs or imaging requiring follow-up after discharge:  Echocardiogram with valve clinic visit      Follow-up:  PCP within about 1 week  Valve clinic per protocol, usually in one week then again in one month    Code status:  FULL      Ashish Case PA-C  [facility] Cardiology Team      ATTENDING NOTE:  Patient has been seen and evaluated by me on 09/20/2019. I have reviewed the H&P.  Please refer to it for additional details.  I have reviewed today's vital signs, medications, labs, and imaging results.  I have reviewed and edited, as necessary, the history, review of systems, physical examination, and assessment and plan.  I have discussed my assessment and plan with the physician assistant.  I have seen and examined the patient today as part of a shared visit with Silverio Case PA-C.  I am participating in the discharge process.  Gabino Jones is a 79 year old female with risk factor profile (+) HTN, (-) DM, (+) hypercholesterolemia, severe aortic stenosis (mean gradient 50 mmHG, PATRICIA 0.8 cm^2, and a PV of 4.6m/s), mild CAD, underwent elective TAVR on 9/18/19 with 23 mm Loco 3.  No immediate complications.  Arteriotomy (RT) closed with Manta device without complications.  Patient examined on telemetry post procedure.  Unable to appreciate systolic or diastolic murmur.  RFA site healing well.  ECHO showed well seated valve, gradient 15 mm Hg, no paravalvular leak.  Patient having balance issues (not new issue) and is being assessed by PT.  Plan on discharge later today.       Blake oHbbs MD     Cardiovascular Division

## 2019-09-19 NOTE — PROGRESS NOTES
Social Work: Assessment with Discharge Plan    Patient Name:  Alex Anaya  :  1940  Age:  79 year old  MRN:  [id number]  Risk/Complexity Score:  Filed Complexity Screen Score: 13  Completed assessment with:  Patient, Pt's  (Henok), Chart Review    Presenting Information   Reason for Referral:  Discharge plan  Date of Intake:  2019  Referral Source:  Physician  Decision Maker:  Patient  Alternate Decision Maker:    Health Care Directive:  None on file  Living Situation:  Pt lives in a rambler style home with her  and dtr Kayce. They report all needs are met on one level.   Previous Functional Status:  Pt's  is home with her . He said that he often is with her during ambulation and transferring. Pt's dtr assists with bathing, laundry and housekeeping.  Patient and family understanding of hospitalization:  Appropriate  Cultural/Language/Spiritual Considerations:  None  Adjustment to Illness:  Appropriate    Physical Health  Reason for Admission:    1. Severe aortic stenosis      Services Needed/Recommended:  TCU    Mental Health/Chemical Dependency  Diagnosis:  Anxiety per medical record  Support/Services in Place:  Unknown  Services Needed/Recommended:  [place]    Support System  Significant relationship at present time:    Family of origin is available for support:  Yes - adult children  Other support available:  Yes  Gaps in support system:  No  Patient is caregiver to:  None     Provider Information   Primary Care Physician:  Rebekah Boogie   [phone]   Clinic:  [address] [place] [address] / [place] [address]      :  None identified    Financial   Income Source:  Did not discuss  Financial Concerns:  None identified at this time  Insurance:    Payor/Plan Subscriber Name Rel Member # Group #   MEDICARE - MEDICARE ALEX ANAYA  [id number]       ATTN CLAIMS, [address]       Discharge Plan   Patient and family discharge goal:  Pt and   are both leaning towards discharge to home w/HHC.     SW did discuss TCU and current recommendation. Pt/ to discuss discharge plan with dtnorma Devries and will make final decision if they want to plan for home or TCU.    GUILHERME did explain that Pt has not met the medicare requirement of 3 IP nights in order to have TCU coverage so this would be an out of pocket cost if Pt does not require 3 IP nights and does agree to TCU.   Provided education on discharge plan:  YES  Patient agreeable to discharge plan:  YES  A list of Medicare Certified Facilities was provided to the patient and/or family to encourage patient choice. Patient's choices for facility are:  List was left with family to review.   Will NH provide Skilled rehabilitation or complex medical:  YES  General information regarding anticipated insurance coverage and possible out of pocket cost was discussed. Patient and patient's family are aware patient may incur the cost of transportation to the facility, pending insurance payment: YES  Barriers to discharge:  Medical stability    Discharge Recommendations   Anticipated Disposition:  TBD - Home w/HHC vs. TCU  Transportation Needs:  Family:      DALE Lino, CHELSIE  [facility] Intermediate [facility] Unit   Phone: [phone]  Pager: [phone]

## 2019-09-19 NOTE — PLAN OF CARE
"/75 (BP Location: Left arm)   Pulse 84   Temp 98  F (36.7  C) (Oral)   Resp 18   Ht 1.702 m (5' 7.01\")   Wt 89.5 kg (197 lb 6.4 oz)   SpO2 96%   BMI 30.91 kg/m      Alert and oriented x4. Confused at times. Bed alarm on overnight. VSS. Sinus rhythm. Sats 90s on RA. Pt refused capno overnight. Denies pain. On cardiac diet. Fair appetite. No BM. Voiding adequate amounts. PIV x2 saline locked. Bilateral groin sites bruised. CMS WNL. Dressing on right radial site CDI. Skin tear on coccyx. Up with standby assist. Pt slept between cares. Plan for echo today. Will continue to monitor and notify MDs accordingly.  "

## 2019-09-19 NOTE — ANESTHESIA POSTPROCEDURE EVALUATION
Anesthesia POST Procedure Evaluation    Patient: Gabino Jones   MRN:     [id number] Gender:   female   Age:    79 year old :      1940        Preoperative Diagnosis: heart valve condition   Procedure(s):  Transcatheter (Harding size 23) Aortic Valve Replacement, Sentinal device placement, perfusion standby, cardiacc surgery standby, trans thorasic echocardiogram  Cardiopulmonary Bypass Standby   Postop Comments: No value filed.       Anesthesia Type:  Not documented  General    Reportable Event: NO     PAIN: Uncomplicated   Sign Out status: Comfortable, Well controlled pain     PONV: No PONV   Sign Out status:  No Nausea or Vomiting     Neuro/Psych: Uneventful perioperative course   Sign Out Status: Preoperative baseline; Age appropriate mentation     Airway/Resp.: Uneventful perioperative course   Sign Out Status: Non labored breathing, age appropriate RR; Resp. Status within EXPECTED Parameters     CV: Uneventful perioperative course   Sign Out status: Appropriate BP and perfusion indices; Appropriate HR/Rhythm     Disposition:   Sign Out in:  PACU  Disposition:  Floor  Recovery Course: Uneventful  Follow-Up: Not required           Last Anesthesia Record Vitals:  CRNA VITALS  2019 0926 - 2019 1026      2019             Resp Rate (set):  10          Last PACU Vitals:  Vitals Value Taken Time   /49 2019  3:48 PM   Temp 36.7  C (98  F) 2019  3:48 PM   Pulse 77 2019  3:48 PM   Resp 18 2019  3:48 PM   SpO2 99 % 2019  3:51 PM   Temp src     NIBP     Pulse     SpO2     Resp     Temp     Ht Rate     Temp 2     Vitals shown include unvalidated device data.      Electronically Signed By: Agapito Licona MD, 2019, 4:31 PM

## 2019-09-19 NOTE — PLAN OF CARE
Discharge Planner OT   Patient plan for discharge: Home w/ assist from  and daughter   Current status: Educated pt on OT role, POC, TAVR precautions and implications for ADL. Progressed activity tolerance for ADL I thorugh ambulation, pt ambulated 30 ft x2 with and without FWW, severe LOB noted, see flowchart for details. Educated on recommendation of TCU, pt declining at this time. Cognition appears somewhat impaired, see flowchart for details. Will follow up.   Barriers to return to prior living situation: Medical status, deconditioning, high falls risk, need for assist, cognitive status   Recommendations for discharge: TCU  Rationale for recommendations: Pt is below baseline and will benefit from continued therapy to progress ADL I. At this time, pt is at high risk for falls and presents with limited awareness of deficits. Pt does not have 24/7 assist at home.        Entered by: Linnea Cancino 09/19/2019 11:44 AM   OT/CR: 6C  Recommend use of GB, CGA and FWW at all times for mobility as pt is at high risk for falls.

## 2019-09-20 NOTE — DISCHARGE INSTRUCTIONS
Going home after Transcatheter Aortic Valve Replacement (TAVR)  Femoral Access  You may have small amounts of bruising or discomfort, this is expected. If you develop worse swelling, redness and warmth that does not go away, fever and chills, Increasing numbness in your legs, worsening pain at the site then you need to contact your nurse coordinator.    You may shower, but do not soak in a bath tub or pool or apply lotions, ointments, powder, etc for 3-5 days or until sites look healed.     Activity: No lifting, pushing, pulling more than 10 pounds (examples to avoid: groceries, vacuuming, gardening, golfing): For one week with a procedure through the groin.    After the initial healing process of the access site, we recommend cardiac rehabilitation for all TAVR patients. Cardiac rehabilitation will help you:  - Rebuild stamina, strength and balance.  - Learn how to participate in activities safely, as well as help you regain confidence to do so.  - Return to activities of daily living and leisure.  Please contact their central scheduling to arrange at [phone]    We prefer you to follow up with your primary care provider within 2 weeks. You will be arranged to see the TAVR clinic in month. At that time you will have a repeat ultrasound of the heart to look at the valve.     Should you have any questions or concerns please contact your assigned TAVR nurse coordinator:  Maria Alejandra [phone] (at the first prompt enter #1, second prompt enter #3, then ask the triage nurse if you can speak with Maria Alejandra).

## 2019-09-20 NOTE — PROGRESS NOTES
Care Coordinator - Discharge Planning    Admission Date/Time:  9/18/2019  Attending MD:  Drew Oates MD     Data  Chart reviewed, discussed with interdisciplinary team.   Patient was admitted for:   1. Severe aortic stenosis    S/P TAVR 9/18/2019    Assessment   Concerns with insurance coverage for discharge needs: None.  Current Living Situation: Patient lives with spouse and adult daughter, pt's spouse states he is able to provide 24/7 assistance.  Support System: Supportive and Involved  Services Involved: None currently  Transportation at Discharge: Family or friend will provide  Transportation to Medical Appointments: SpouseHenok    Coordination of Care and Referrals: Provided patient/family with options for home care. This writer met with pt and spouseHenok to introduce self and role of RNCC. Per discussion with PT pt is ok to go home with home therapies and they have the DME they need at home. Pt's  stated they have never used home care, but would like to stay in the [facility] system.    Intervention  Referral made to [facility] (Ph: [phone]) for RN evaluation post hospitalization. Assess vital signs, respiratory and cardiac status, activity tolerance, hydration, nutritional status, med setup and management. PT/OT eval and treat for deconditioning, strengthening and endurance.       Plan  Anticipated Discharge Date: 9/20/2019  Anticipated Discharge Plan: Discharge to home with home care  CC will continue to monitor patient's medical condition and progress towards discharge.  Kassandra Bailey RN BSN  6C Unit Care Coordinator  Phone number: [phone]  Pager: [phone]

## 2019-09-20 NOTE — PLAN OF CARE
OT/6C: Cancel. Patient and  preparing to discharge shortly after therapist's arrival. Endorse no concerns with returning home/ assisting with ADLs. Will plan to continue to work with home therapies. Occupational Therapy Discharge Summary    Reason for therapy discharge:    Discharged to home with home therapy.    Progress towards therapy goal(s). See goals on Care Plan in [facility] electronic health record for goal details.  Goals not met.  Barriers to achieving goals:   discharge from facility.    Therapy recommendation(s):    Continued therapy is recommended.  Rationale/Recommendations:  Patient would benefit from ongoing therapies to increased ADL independence and safety, and facilitate caregiver training with cognitive deficits.

## 2019-09-20 NOTE — PLAN OF CARE
S/p TAVR 9/18. Pt is stable. ALOx4 and forgetful at times. Neuro assessment WDL. Pain free. On RA. SR. Appetite is fair. No BM. Adequate UO. OOB and up in chair for dinner. Up with walker and SBA. Bed exit alarm on. Spouse was at bed side most of the evening. Bilateral grion site w/o hematoma. R radial access site WDL.  Possible discharge tomorrow to TCU vs home.

## 2019-09-20 NOTE — PLAN OF CARE
Pt admitted 9/18 s/p TAVR r/t severe aortic stenosis via b/l groin sites.  SR with 16 beats of SVT with frequent PAC's.  VS'S on RA and denied pain.  No events overnight.  Otherwise, pt slept well and up assist of one.  Bed alarm on r/t being forgetful and AOx4.  Continue to monitor and with POC.

## 2019-09-20 NOTE — PROGRESS NOTES
[facility]  Met with pt and spouse to discuss plans for HC.  Pt to be discharged home 9/20  and has agreed to have [facility] follow with services of SN, PT and OT. Patient care support center processing referral.  Pt and spouse verbalized understanding that initial visit is scheduled for 9/21 or 9/22.     Pt has 24 hour phone number for [facility] for any questions or concerns.    Thank you  Janet Crowe RN, BSN  [facility]  [facility] [place]  [phone]

## 2019-09-20 NOTE — PROGRESS NOTES
"   09/20/19 1256   Quick Adds   Type of Visit Initial PT Evaluation   Living Environment   Lives With spouse;child(prudencio), adult   Living Arrangements house   Home Accessibility stairs to enter home;stairs within home   Number of Stairs, Main Entrance 1  (through door frame)   Transportation Anticipated family or friend will provide   Living Environment Comment Pt lives in rambler style home. Has tub shower with grab bars inside and shower chair. Pt lives with  and adult daughter who assist her with ADL/IADL as needed, both work ( is a part time )   Self-Care   Usual Activity Tolerance moderate   Current Activity Tolerance fair   Regular Exercise Yes   Activity/Exercise Type biking;walking   Exercise Amount/Frequency 2 times/wk   Equipment Currently Used at Home shower chair;grab bar, tub/shower;wheelchair, manual;walker, rolling;cane, straight;crutches   Activity/Exercise/Self-Care Comment Pt reported decline in ADL I in last year 2/2 SOB and fatigue. Reported biking and walking occasionally, difficult to determine frequency, pt stated that she tries to exercise at least once a week but unsure on accuracy of this. Pt reported enjoying Take the Interview and spending time with family.   Functional Level Prior   Ambulation 2-->assistive person   Transferring 2-->assistive person   Toileting 2-->assistive person   Bathing 3-->assistive equipment and person   Fall history within last six months yes   Number of times patient has fallen within last six months 1   Which of the above functional risks had a recent onset or change? ambulation;transferring;toileting;bathing;dressing;fall history   Prior Functional Level Comment Pt reports \"sliding\" off toilet. Ambulates while holding onto  or daughter at home, occasionally uses w/c in community for longer distances. Has cane, walker, crutches that  has from previous injuries but does not use at baseline   General Information   Onset of Illness/Injury " or Date of Surgery - Date 09/19/19   Referring Physician Ashish Case PA-C   Patient/Family Goals Statement go home   Pertinent History of Current Problem (include personal factors and/or comorbidities that impact the POC) Pt is a 79 year old female with a past medical history significant for HTN, HLD, prior tobacco use, chronic anemia, non-obstructive CAD, and severe aortic stenosis who presents for TAVR.    Precautions/Limitations   (B groin puncture sites, no repetitive flrexion)   Heart Disease Risk Factors High blood pressure;Lack of physical activity;Dislipidemia;Age   General Observations Pt sitting up in chair upon entry, agreeable to PT session, RN ok'd session   General Info Comments Activity: Up in chair   Cognitive Status Examination   Orientation person   Level of Consciousness confused  ([place])   Range of Motion (ROM)   ROM Comment knee/hip ROM WFL B. Noted mild decrease in ankle df/heelcord tightness   Strength   Strength Comments >3/5 strength in BLEs   Bed Mobility   Bed Mobility Comments Supine>sitting with min A from , HOB flat   Transfer Skills   Transfer Comments sit<>stand with CGA-min A   Gait   Gait Comments Pt ambulated 10 ft with CGA-min A. Pt demonstrates decreased sarah, decreased step length, mild path deviations   Balance   Balance Comments standing balance good, dynamic balance fair.    Clinical Impression   Criteria for Skilled Therapeutic Intervention yes, treatment indicated   PT Diagnosis impaired functional mobility   Influenced by the following impairments impaired balance, decreased activity tolerance, decreased LE ROM, post surgical pain   Functional limitations due to impairments difficulty with bed mobility, transfers, ambulation, stairs   Clinical Presentation Stable/Uncomplicated   Clinical Presentation Rationale clinical judgement   Clinical Decision Making (Complexity) Low complexity   Therapy Frequency 5x/week   Predicted Duration of Therapy Intervention  "(days/wks) 1 day   Anticipated Discharge Disposition Home with Home Therapy;Home with Outpatient Therapy   Risk & Benefits of therapy have been explained Yes   Patient, Family & other staff in agreement with plan of care Yes   [facility] TM \"6 Clicks\"   2016, Trustees of [facility], under license to [employer].  All rights reserved.   6 Clicks Short Forms Basic Mobility Inpatient Short Form   [facility]  \"6 Clicks\" V.2 Basic Mobility Inpatient Short Form   1. Turning from your back to your side while in a flat bed without using bedrails? 3 - A Little   2. Moving from lying on your back to sitting on the side of a flat bed without using bedrails? 3 - A Little   3. Moving to and from a bed to a chair (including a wheelchair)? 3 - A Little   4. Standing up from a chair using your arms (e.g., wheelchair, or bedside chair)? 3 - A Little   5. To walk in hospital room? 3 - A Little   6. Climbing 3-5 steps with a railing? 3 - A Little   Basic Mobility Raw Score (Score out of 24.Lower scores equate to lower levels of function) 18   Total Evaluation Time   Total Evaluation Time (Minutes) 5     "

## 2019-09-20 NOTE — PLAN OF CARE
Discharge Planner PT   Patient plan for discharge: home with assist, home PT  Current status: PT evaluation completed. Pt transfers supine<>sitting and sit>stand with assist from . Pt ambulated 30 ft with GB + HHA. Pt has FWW at home but prefers assist from . Educated pt's  on proper assist/hand placement with gait belt for safety.   Barriers to return to prior living situation: weakness, impaired balance, decreased activity tolerance, post surgical precautions  Recommendations for discharge: TCU-however pt does not have insurance coverage. Therefore recommend home with 24/7 assist/supervision from pt's SO, home PT/OT prior to transition to OR phase II CR  Rationale for recommendations: improve strength and independence with mobility.        Entered by: Nanci Lees 09/20/2019 12:38 PM

## 2019-09-21 NOTE — PLAN OF CARE
Physical Therapy Discharge Summary    Reason for therapy discharge:    Discharged to home with home therapy.    Progress towards therapy goal(s). See goals on Care Plan in [facility] electronic health record for goal details.  Goals partially met.  Barriers to achieving goals:   discharge from facility.    Therapy recommendation(s):    Continued therapy is recommended.  Rationale/Recommendations:  continue with home PT to work on impaired functional mobility.

## 2019-09-22 NOTE — PROGRESS NOTES
Dear Rebekah Andrew   Medicare Home Health regulations requires [facility] to provide an initial assessment visit either within 48 hours of the patient's return home, or on the physician ordered Start of Care date.    There will be a delay in the Initial Assessment for Gabino Jones; MRN [id number]  We will notify you as soon as completed.       Sincerely [facility]  Mary Kay Gottlieb  [phone]

## 2019-09-23 NOTE — PROGRESS NOTES
Date: 9/27/2019 Status: [place]   Time: 9:00 AM Length: 60   Visit Type: UMP RETURN [[id number]] FLORENTIN: [id number]   Provider: Lexis Saab NP Department:  CARDIOVASCULAR CTR

## 2019-09-23 NOTE — PROGRESS NOTES
Dear Dr. Oates and Dr. Boogie,    S: We are notifying you that Gabino Jones; MRN [id number]  has declined home care assessment/services.    B:  [facility] received a referral for home care assessment after TAVR procedure on 9/18/19.    A: Writer called and spoke with , Henok who reported patient is doing well, eating and drinking without issues, ambulating safely, able to verbalize understanding and manage medications.  They respectfully declined Home Care assessment and plan to directly pursue outpatient cardiac rehab starting this week.    Writer reinforced to call PCP to schedule follow up appointment and or call MD if there are any further concerns they have or if they desire home care services in the future. Henok verbalized understanding with the plan moving forward.    R: Please follow up with patient as appropriate and if the patient's needs change, please submit a new home care referral order through [facility].  Thank you for the referral.  Sincerely [facility]  Jaimie Long RN  [phone]

## 2019-09-23 NOTE — PROGRESS NOTES
[facility] HEART [facility]  CARDIOVASCULAR DIVISION    VALVE CLINIC RETURN VISIT    PRIMARY CARDIOLOGIST: Dr. Hobbs      PERTINENT CLINICAL HISTORY & REASON FOR CONSULTATION:     Gabino Jones is a very pleasant 79 year old female with hypertension, hyperlipidemia, nonobstructive CAD, chronic anemia and severe aortic valvular stenosis that was treated with transfemoral transcatheter aortic valve replacement (TAVR) with a 23mm Harding Loco 3 valve on 9/18/2019 by Clarisse Oates, Tom, Vargas and Malik. The TAVR and post-procedural course were notable for no complications or development of conduction abnormalities. Her post-TAVR echo showed mean gradient of 15 mmHg and mild aortic insufficiency. She was discharged home on ASA 81 mg and Plavix 75 mg daily. She presents to clinic for 1 week TAVR follow-up.     The patient presents to clinic accompanied by her . She states she has been feeling well since her valve replacement and has noticed improvement in shortness of breath and exertional capacity. Her  states that prior to her TAVR she was short of breath with minimal exertion and couldn't walk more than 15 feet without having to stop and rest. He states that she can now walk about 100 feet without any symptoms. Her  also states that she is less fatigued and has better color. She denies any recent chest pain, shortness of breath, orthopnea, PND, leg swelling or weight gain.  She denies palpitations, lightheadedness or star syncope. She is compliant with her medications and denies significant bleeding issues. She is interested in attending cardiac rehab in [place].     PAST MEDICAL HISTORY:     Past Medical History:   Diagnosis Date     Anxiety      Aortic stenosis 4/25/2016     Arthritis      Blindness of left eye     after radiation     Cancer (H)      Chronic back pain      Chronic hyponatremia 7/10/2015     Chronic leg pain      COPD (chronic obstructive pulmonary disease) (H)  6/19/2019     Dementia      Depressive disorder 1/01/191992     Gastroesophageal reflux disease      Hearing loss      Heart murmur      History of blood transfusion      History of radiation therapy     Treatment for nasal cancer     Hoarseness      Hyperlipidemia      Hypertension      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     Reduced vision      Ringing in ears      Sensorineural hearing loss      Tinnitus         PAST SURGICAL HISTORY:     Past Surgical History:   Procedure Laterality Date     ANESTHESIA OUT OF OR MRI N/A 1/16/2019    Procedure: Anesthesia Coverage Brain MRI With Contrast @1330;  Surgeon: GENERIC ANESTHESIA PROVIDER;  Location: [facility] OR     C ANESTH,CERV SPINE, SITTING POSITION       COLONOSCOPY N/A 7/7/2017    Procedure: COMBINED COLONOSCOPY, SINGLE OR MULTIPLE BIOPSY/POLYPECTOMY BY BIOPSY;;  Surgeon: Sathya Norman MD;  Location: [facility] OR     COLONOSCOPY WITH CO2 INSUFFLATION N/A 7/7/2017    Procedure: COLONOSCOPY WITH CO2 INSUFFLATION;  Combined,  [place], Gastroesophageal reflux disease without esophagitis  Flatulence, eructation, and gas pain, BMI 29.35, [facility] [id number];  Surgeon: Sathya Norman MD;  Location: [facility] OR     COLONOSCOPY, SUBMUCOSA RESECTION N/A 10/4/2017    Procedure: COLONOSCOPY, SUBMUCOSA RESECTION;  Colonoscopy With Endoscopic Polypectomy with clips;  Surgeon: Milton Javier MD;  Location: [facility] OR     COMBINED ESOPHAGOSCOPY, GASTROSCOPY, DUODENOSCOPY (EGD) WITH CO2 INSUFFLATION N/A 7/7/2017    Procedure: COMBINED ESOPHAGOSCOPY, GASTROSCOPY, DUODENOSCOPY (EGD) WITH CO2 INSUFFLATION;  Combined,  [place], Gastroesophageal reflux disease without esophagitis  Flatulence, eructation, and gas pain, BMI 29.35, [facility] [id number];  Surgeon: Sathya Norman MD;  Location: [facility] OR     CV CORONARY ANGIOGRAM N/A 7/18/2019    Procedure: CV CORONARY ANGIOGRAM;  Surgeon: Blake Hobbs MD;  Location:  HEART CARDIAC CATH LAB     CV CORONARY  ANGIOGRAM N/A 7/18/2019    Procedure: Coronary Angiogram;  Surgeon: Blake Hobbs MD;  Location:  HEART CARDIAC CATH LAB     CV FRACTIONAL FLOW RESERVE Bilateral 7/18/2019    Procedure: Fractional Flow Reserve;  Surgeon: Blake Hobbs MD;  Location:  HEART CARDIAC CATH LAB     CV RIGHT HEART CATH N/A 7/18/2019    Procedure: CV RIGHT HEART CATH;  Surgeon: Blake Hobbs MD;  Location:  HEART CARDIAC CATH LAB     CV RIGHT HEART CATH N/A 7/18/2019    Procedure: Right Heart Cath;  Surgeon: Blake Hobbs MD;  Location:  HEART CARDIAC CATH LAB     CV TRANSCATHETER AORTIC VALVE REPLACEMENT N/A 9/18/2019    Procedure: Transcatheter (Harding size 23) Aortic Valve Replacement, Sentinal device placement, perfusion standby, cardiacc surgery standby, trans thorasic echocardiogram;  Surgeon: Drew Oates MD;  [facility] OR     ESOPHAGOSCOPY, GASTROSCOPY, DUODENOSCOPY (EGD), COMBINED N/A 7/7/2017    Procedure: COMBINED ESOPHAGOSCOPY, GASTROSCOPY, DUODENOSCOPY (EGD), BIOPSY SINGLE OR MULTIPLE;;  Surgeon: Sathya Norman MD;  Location:  OR     ESOPHAGOSCOPY, GASTROSCOPY, DUODENOSCOPY (EGD), COMBINED N/A 1/6/2019    Procedure: COMBINED ESOPHAGOSCOPY, GASTROSCOPY, DUODENOSCOPY (EGD);  Surgeon: Cailin Paulino MD;  Location:  GI     GALLBLADDER SURGERY       HEART CATH FEMORAL CANNULIZATION WITH OPEN STANDBY REPAIR AORTIC VALVE N/A 9/18/2019    Procedure: Cardiopulmonary Bypass Standby;  Surgeon: Jose Antonio Kaye MD;  Location: [facility] OR     NECK SURGERY       OPTICAL TRACKING SYSTEM CRANIOTOMY, EXCISE TUMOR, COMBINED Right 2/22/2017    Procedure: COMBINED OPTICAL TRACKING SYSTEM CRANIOTOMY, EXCISE TUMOR;  Surgeon: Lenora Pérez MD;  [facility] OR     OPTICAL TRACKING SYSTEM ENDOSCOPIC ENDONASAL SURGERY  6/23/2014    Procedure: OPTICAL TRACKING SYSTEM ENDOSCOPIC ENDONASAL SURGERY;  Surgeon: Yolanda Whitaker MD;  [facility]: [facility] OR     STOMACH  SURGERY       TONSILLECTOMY       TUBAL LIGATION      1975        CURRENT MEDICATIONS:     Current Outpatient Medications   Medication Sig Dispense Refill     acetaminophen (TYLENOL) 500 MG tablet Take 1 tablet (500 mg) by mouth every 6 hours as needed (Patient taking differently: Take 500-1,000 mg by mouth 2 times daily ) 100 tablet 3     aspirin (ASA) 81 MG EC tablet Take 1 tablet (81 mg) by mouth daily 90 tablet 3     atorvastatin (LIPITOR) 20 MG tablet Take 1 tablet (20 mg) by mouth daily (Patient taking differently: Take 20 mg by mouth At Bedtime ) 90 tablet 3     benzonatate (TESSALON) 100 MG capsule TAKE 1 CAPSULE BY MOUTH THREE TIMES DAILY( EVERY EIGHT HOURS) AS NEEDED FOR COUGH (Patient taking differently: Take 100 mg by mouth 2 times daily as needed (PT IS TAKING EVERY MORNING AND A NIGHT - PRN) TAKE 1 CAPSULE BY MOUTH THREE TIMES DAILY( EVERY EIGHT HOURS) AS NEEDED FOR COUGH) 60 capsule 0     buPROPion (WELLBUTRIN XL) 150 MG 24 hr tablet TAKE 1 TABLET(150 MG) BY MOUTH EVERY MORNING (Patient taking differently: Take 150 mg by mouth every morning TAKE 1 TABLET(150 MG) BY MOUTH EVERY MORNING) 90 tablet 1     carboxymethylcellulose PF (REFRESH PLUS) 0.5 % ophthalmic solution Place 2 drops into both eyes 2 times daily       clopidogrel (PLAVIX) 75 MG tablet Take 1 tablet (75 mg) by mouth daily 30 tablet 3     lisinopril (PRINIVIL/ZESTRIL) 10 MG tablet Take 1 tablet (10 mg) by mouth daily 30 tablet 1     melatonin 1 MG TABS tablet Take 1 mg by mouth At Bedtime        Misc Natural Product Nasal (PONARIS) SOLN Spray 2 drops in nostril 2 times daily       OLANZapine (ZYPREXA) 5 MG tablet Take 1 tablet (5 mg) by mouth At Bedtime 30 tablet 0     pantoprazole (PROTONIX) 40 MG EC tablet Take 1 tablet (40 mg) by mouth daily Take 30-60 minutes before a meal. (Patient taking differently: Take 40 mg by mouth At Bedtime Take 30-60 minutes before a meal.) 90 tablet 1     ranitidine (ZANTAC) 150 MG tablet Take 1 tablet (150  mg) by mouth At Bedtime (Patient taking differently: Take 150 mg by mouth every morning ) 90 tablet 1     SENEXON-S 8.6-50 MG tablet TK 1 TO 2 TS PO BID (Patient taking differently: Take 1 tablet by mouth every other day TK 1 TO 2 TS PO BID) 100 tablet 1     sodium chloride (OCEAN) 0.65 % nasal spray Spray 1 spray into both nostrils daily as needed for congestion       venlafaxine (EFFEXOR-XR) 75 MG 24 hr capsule TAKE 3 CAPSULES BY MOUTH DAILY (Patient taking differently: Take 150 mg by mouth At Bedtime TAKE 3 CAPSULES BY MOUTH DAILY) 270 capsule 2        ALLERGIES:     Allergies   Allergen Reactions     Novocain [Procaine] Unknown and Rash     Mirtazapine      Nefazodone Unknown and Rash        FAMILY HISTORY:     Family History   Problem Relation Age of Onset     Prostate Cancer Father      Cancer Sister         SCLC     Cancer Sister      Macular Degeneration Daughter      Glaucoma No family hx of         SOCIAL HISTORY:     Social History     Socioeconomic History     Marital status:      Spouse name: Not on file     Number of children: Not on file     Years of education: Not on file     Highest education level: Not on file   Occupational History     Not on file   Social Needs     Financial resource strain: Not on file     Food insecurity:     Worry: Not on file     Inability: Not on file     Transportation needs:     Medical: Not on file     Non-medical: Not on file   Tobacco Use     Smoking status: Former Smoker     Packs/day: 1.00     Years: 40.00     Pack years: 40.00     Types: Cigarettes     Last attempt to quit: 6/10/2004     Years since quitting: 15.2     Smokeless tobacco: Never Used   Substance and Sexual Activity     Alcohol use: No     Drug use: No     Sexual activity: Not Currently     Partners: Male     Birth control/protection: Post-menopausal, Female Surgical   Lifestyle     Physical activity:     Days per week: Not on file     Minutes per session: Not on file     Stress: Not on file  "  Relationships     Social connections:     Talks on phone: Not on file     Gets together: Not on file     Attends Muslim service: Not on file     Active member of club or organization: Not on file     Attends meetings of clubs or organizations: Not on file     Relationship status: Not on file     Intimate partner violence:     Fear of current or ex partner: Not on file     Emotionally abused: Not on file     Physically abused: Not on file     Forced sexual activity: Not on file   Other Topics Concern     Parent/sibling w/ CABG, MI or angioplasty before 65F 55M? Not Asked   Social History Narrative     Not on file        REVIEW OF SYSTEMS:     Constitutional: No fevers or chills  Skin: No new rash or itching  Eyes: No acute change in vision  Respiratory: No cough or hemoptysis  Cardiovascular: See HPI  Psychiatric: No hallucinations, excessive alcohol consumption or illegal drug usage  Hematologic/Lymphatic/Immunologic: No bleeding, chills, fever, night sweats or weight loss      PHYSICAL EXAMINATION:     /51 (BP Location: Right arm, Patient Position: Chair, Cuff Size: Adult Large)   Pulse 79   Ht 1.676 m (5' 6\")   Wt 90.1 kg (198 lb 11.2 oz)   LMP  (LMP Unknown)   SpO2 98%   BMI 32.07 kg/m      GENERAL: No acute distress.  HEENT: EOMI. Sclerae white, not injected. Nares clear. Pharynx without erythema or exudate.   Neck: No adenopathy. No thyromegaly. No jugular venous distension.   Heart: Regular rate and rhythm. 2/6 systolic murmur RUSB  Lungs: Clear to auscultation. No ronchi, wheezes, rales.   Abdomen: Soft, nontender, nondistended. Bowel sounds present.  Extremities: No clubbing, cyanosis, or edema.   Neurologic: Alert and oriented to person/place/time, normal speech and affect. No focal deficits.  Skin: No petechiae, purpura or rash.     LABORATORY DATA:     LIPID RESULTS:  Lab Results   Component Value Date    CHOL 118 04/23/2019    HDL 58 04/23/2019    LDL 43 04/23/2019    TRIG 87 04/23/2019 "    CHOLHDLRATIO 2.3 07/29/2016       LIVER ENZYME RESULTS:  Lab Results   Component Value Date    AST 19 09/18/2019    ALT 20 09/18/2019       CBC RESULTS:  Lab Results   Component Value Date    WBC 6.8 09/20/2019    RBC 3.30 (L) 09/20/2019    HGB 8.8 (L) 09/20/2019    HCT 28.7 (L) 09/20/2019    MCV 87 09/20/2019    MCH 26.7 09/20/2019    MCHC 30.7 (L) 09/20/2019    RDW Dimorphic population - unable to calculate 09/20/2019     09/20/2019       BMP RESULTS:  Lab Results   Component Value Date     09/20/2019    POTASSIUM 4.1 09/20/2019    CHLORIDE 102 09/20/2019    CO2 25 09/20/2019    ANIONGAP 7 09/20/2019     (H) 09/20/2019    BUN 13 09/20/2019    CR 0.66 09/20/2019    GFRESTIMATED 84 09/20/2019    GFRESTBLACK >90 09/20/2019    STEFFANIE 8.3 (L) 09/20/2019       PROCEDURES & FURTHER ASSESSMENTS:     ECG dated 9/20 post-TAVR: NSR with PACs, HR 70s, normal QRS 74 ms    Echocardiogram 9/19 post-TAVR:    Interpretation Summary  TAVR with 23 mm Harding Loco 3.The mean gradient is 15 mmHg.Trace aortic  insufficiency is present.  Global and regional left ventricular function is hyperkinetic with an EF of  65-70%. This likely contributes to the gradient across TAVR.  Right ventricular function, chamber size, wall motion, and thickness are  normal.  No pericardial effusion is present.  IVC diameter <2.1 cm collapsing >50% with sniff suggests a normal RA pressure  of 3 mmHg.  No significant change from yesterday.    NYHA Class: II - III     CLINICAL IMPRESSION:     Gabino Jones is a very pleasant 79 year old female with hypertension, hyperlipidemia, nonobstructive CAD, chronic anemia and severe aortic valvular stenosis that was treated with transfemoral transcatheter aortic valve replacement (TAVR) with a 23mm Hrading Loco 3 valve on 9/18/2019 by Clarisse Schwartz, Tom, Vargas and Malik. The TAVR and post-procedural course were notable for no complications or development of conduction abnormalities. Her  post-TAVR echo showed mean gradient of 15 mmHg and mild aortic insufficiency. She was discharged home on ASA 81 mg and Plavix 75 mg daily.     She states that she has noticed improvement in shortness of breath and exertional capacity since her valve replacement. Her  states she can now walk about 100 feet without difficultly, whereas before she would get short of breath just walking across the room. She is overall feeling well and denies any cardiac symptoms at this time. She is tolerating dual antiplatelet therapy and denies any significant bleeding issues. She is interested in attending cardiac rehab in [place].       Plan Summary:  1) Aspirin 81 mg daily lifelong  2) Continue Plavix 75 mg daily for 6 months through 3/18/2020  3) Lifelong antibiotic prophylaxis prior to all dental procedures  4) Will arrange cardiac rehab in [place]  5) Follow-up with Dr. Hobbs in 1-2 months with echo, ecg and labs prior    MELANIE Loomis, CNP  [facility] Cardiology Team      CC  Patient Care Team:  Rebekah Ramirez MD as PCP - General (Family Practice)  Garett Obregon MD as MD (Oncology)  Laron Archibald MD as MD (Ophthalmology)  Yolanda Whitaker MD as MD (Otolaryngology)  Matt Quiroga MD as MD (Ophthalmology)  Rebekah Ramirez MD as Assigned PCP  Marilin Sesay, RN as Nurse Coordinator (Oncology)  [facility], [facility] ([facility] ([facility] ([facility]))  Behl, Melissa K, RN as Clinic Care Coordinator (Primary Care - CC)  REBEKAH RAMIREZ

## 2019-09-23 NOTE — PROGRESS NOTES
"Clinic Care Coordination Contact    Clinic Care Coordination Contact  OUTREACH    Referral Information:  Referral Source: [facility]    Primary Diagnosis: Cardiovascular - other    Chief Complaint   Patient presents with     Clinic Care Coordination - Post Hospital     RN        Universal Utilization: Patient utilizes both [facility] [place] for primary care  Clinic Utilization  Difficulty keeping appointments:: No  Compliance Concerns: No  No-Show Concerns: No  No PCP office visit in Past Year: No  Utilization    Last refreshed: 9/23/2019  1:31 PM:  Hospital Admissions 2           Last refreshed: 9/23/2019  1:31 PM:  ED Visits 0           Last refreshed: 9/23/2019  1:31 PM:  No Show Count (past year) 1              Current as of: 9/23/2019  1:31 PM              Clinical Concerns:  Current Medical Concerns:  Patient was at [facility] 9/18/19-9/20/19 for severe aortic stenosis, s/p TAVR.  RN CC spoke with patient's spouse (consent to communicate on file).  Spouse states patient is doing well \"I've talked to many nurses today and home care, we're doing great.\"  Spouse declined care coordination and states patient will not have home care.  Spouse declined to have writer talk with patient stating she was taking a nap.    Current Behavioral Concerns: no concerns reported      Education Provided to patient: RN CC educated spouse on care coordination services, spouse declined for writer to review hospital discharge instructions.     Pain  Pain (GOAL):: No  Health Maintenance Reviewed: Due/Overdue   Health Maintenance Due   Topic Date Due     DEXA  1940     COPD ACTION PLAN  1940     PNEUMOCOCCAL IMMUNIZATION 65+ HIGH/HIGHEST RISK (2 of 2 - PPSV23) 09/21/2016     ZOSTER IMMUNIZATION (2 of 3) 09/29/2016     MEDICARE ANNUAL WELLNESS VISIT  01/12/2018     INFLUENZA VACCINE (1) 09/01/2019     PHQ-9  10/23/2019      Clinical Pathway: None    Medication Management:  Not addressed, " spouse declined care coordination     Functional Status:  Dependent ADLs:: Ambulation-walker, Bathing, Dressing  Dependent IADLs:: Medication Management, Money Management, Transportation, Meal Preparation, Shopping, Laundry, Cooking, Cleaning, Incontinence  Bed or wheelchair confined:: No  Mobility Status: Independent w/Device    Living Situation:  Current living arrangement:: I live in a private home with spouse  Type of residence:: Private home - stairs    Diet/Exercise/Sleep:  Diet:: Regular  Inadequate nutrition (GOAL):: No  Food Insecurity: No  Tube Feeding: No  Exercise:: Currently not exercising  Inadequate activity/exercise (GOAL):: No  Significant changes in sleep pattern (GOAL): No    Transportation:  Transportation concerns (GOAL):: No  Transportation means:: Family, Regular car     Psychosocial:  Jain or spiritual beliefs that impact treatment:: No  Mental health DX:: Yes  Mental health DX how managed:: Medication  Mental health management concern (GOAL):: No  Informal Support system:: Children, Spouse     Financial/Insurance:   Financial/Insurance concerns (GOAL):: No       Resources and Interventions:  Current Resources:    ;   Community Resources: None  Supplies used at home:: Incontinence Supplies  Equipment Currently Used at Home: shower chair, walker, rolling, wheelchair, manual, cane, straight, grab bar, tub/shower    Advance Care Plan/Directive  Advanced Care Plans/Directives on file:: No  Advanced Care Plan/Directive Status: Considering Options    Referrals Placed: None     Goals: n/a        Patient/Caregiver understanding: Spouse states all concerns were addressed and he declines needing care coordination.  Spouse states they will purse cardiac rehab in 1-2 weeks.       Future Appointments              In 4 days Lexis Saab NP [facility] Heart Care, [facility]    In 1 month [facility] Lab, [facility]    In 1 month [address] Cardiac Services, [facility]    In 1 month Blake Hobbs  MD JASON Ernst [facility] Heart Care, [facility]    In 2 months Yolanda Whitaker MD M [facility] Ear Nose and Throat, [facility]          Plan:   RN CC will perform no further outreaches per spouse's request.    Melissa Behl BSN, RN, PHN  Primary Care Clinical RN Care Coordinator  [facility]   [phone]

## 2019-09-24 NOTE — TELEPHONE ENCOUNTER
Pending Prescriptions:                       Disp   Refills    benzonatate (TESSALON) 100 MG capsule     60 cap*0            Sig: TAKE 1 CAPSULE BY MOUTH THREE TIMES DAILY( EVERY           EIGHT HOURS) AS NEEDED FOR COUGH        Last Written Prescription Date:  8/7/19  Last Fill Quantity: 60,   # refills: 0  Last Office Visit: 8/21/19  Future Office visit:       Routing refill request to provider for review/approval because:  Drug not on the [facility], P or [facility] refill protocol or controlled substance    Tenisha Rucekr, RN, BSN

## 2019-09-27 NOTE — NURSING NOTE
Vitals completed successfully and medication reconciled.     Charlotte Giles CMA  8:36 AM  Chief Complaint   Patient presents with     Follow Up     1 week follow up

## 2019-09-27 NOTE — LETTER
9/27/2019      RE: Gabino Jones  [address]  [facility] [address]       Dear Colleague,    Thank you for the opportunity to participate in the care of your patient, Gabino Jones, at the [facility]. Please see a copy of my visit note below.    [facility] HEART CARE  CARDIOVASCULAR DIVISION    VALVE CLINIC RETURN VISIT    PRIMARY CARDIOLOGIST: Dr. Hobbs      PERTINENT CLINICAL HISTORY & REASON FOR CONSULTATION:     Gabino Jones is a very pleasant 79 year old female with hypertension, hyperlipidemia, nonobstructive CAD, chronic anemia  and severe aortic valvular stenosis that was treated with transfemoral transcatheter aortic valve replacement (TAVR) with a 23mm Harding Loco 3 valve on 9/18/2019 by Clarisse Oates, Tom, Vargas and Malik. The TAVR and post-procedural course were notable for no complications or development of conduction abnormalities. Her post-TAVR echo showed mean gradient of 15 mmHg and mild aortic insufficiency. She was discharged home on ASA 81 mg and Plavix 75 mg daily. She presents to clinic for 1 week TAVR follow-up.     The patient presents to clinic accompanied by her . She states she has been feeling well since her valve replacement and has noticed improvement in shortness of breath and exertional capacity. Her  states that prior to her TAVR she was short of breath with minimal exertion and couldn't walk more than 15 feet without having to stop and rest. He states that she can now walk about 100 feet without any symptoms. Her  also states that she is less fatigued and has better color. She denies any recent chest pain, shortness of breath, orthopnea, PND, leg swelling or weight gain.  She denies palpitations, lightheadedness or star syncope. She is compliant with her medications and denies significant bleeding issues. She is interested in attending cardiac rehab in [place].     PAST  MEDICAL HISTORY:     Past Medical History:   Diagnosis Date     Anxiety      Aortic stenosis 4/25/2016     Arthritis      Blindness of left eye     after radiation     Cancer (H)      Chronic back pain      Chronic hyponatremia 7/10/2015     Chronic leg pain      COPD (chronic obstructive pulmonary disease) (H) 6/19/2019     Dementia      Depressive disorder 1/01/191992     Gastroesophageal reflux disease      Hearing loss      Heart murmur      History of blood transfusion      History of radiation therapy     Treatment for nasal cancer     Hoarseness      Hyperlipidemia      Hypertension      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     Reduced vision      Ringing in ears      Sensorineural hearing loss      Tinnitus         PAST SURGICAL HISTORY:     Past Surgical History:   Procedure Laterality Date     ANESTHESIA OUT OF OR MRI N/A 1/16/2019    Procedure: Anesthesia Coverage Brain MRI With Contrast @1330;  Surgeon: GENERIC ANESTHESIA PROVIDER;  Location: [facility] OR     C ANESTH,CERV SPINE, SITTING POSITION       COLONOSCOPY N/A 7/7/2017    Procedure: COMBINED COLONOSCOPY, SINGLE OR MULTIPLE BIOPSY/POLYPECTOMY BY BIOPSY;;  Surgeon: Sathya Norman MD;  Location: [facility] OR     COLONOSCOPY WITH CO2 INSUFFLATION N/A 7/7/2017    Procedure: COLONOSCOPY WITH CO2 INSUFFLATION;  Combined,  [place], Gastroesophageal reflux disease without esophagitis  Flatulence, eructation, and gas pain, BMI 29.35, [facility] [id number];  Surgeon: Sathya Norman MD;  Location: [facility] OR     COLONOSCOPY, SUBMUCOSA RESECTION N/A 10/4/2017    Procedure: COLONOSCOPY, SUBMUCOSA RESECTION;  Colonoscopy With Endoscopic Polypectomy with clips;  Surgeon: Milton Javier MD;  Location: [facility] OR     COMBINED ESOPHAGOSCOPY, GASTROSCOPY, DUODENOSCOPY (EGD) WITH CO2 INSUFFLATION N/A 7/7/2017    Procedure: COMBINED ESOPHAGOSCOPY, GASTROSCOPY, DUODENOSCOPY (EGD) WITH CO2 INSUFFLATION;  Combined,  [place], Gastroesophageal reflux disease  without esophagitis  Flatulence, eructation, and gas pain, BMI 29.35, [facility] [id number];  Surgeon: Sathya Norman MD;  Location: [facility] OR     CV CORONARY ANGIOGRAM N/A 7/18/2019    Procedure: CV CORONARY ANGIOGRAM;  Surgeon: Blake Hobbs MD;  Location:  HEART CARDIAC CATH LAB     CV CORONARY ANGIOGRAM N/A 7/18/2019    Procedure: Coronary Angiogram;  Surgeon: Blake Hobbs MD;  Location:  HEART CARDIAC CATH LAB     CV FRACTIONAL FLOW RESERVE Bilateral 7/18/2019    Procedure: Fractional Flow Reserve;  Surgeon: Blake Hobbs MD;  Location:  HEART CARDIAC CATH LAB     CV RIGHT HEART CATH N/A 7/18/2019    Procedure: CV RIGHT HEART CATH;  Surgeon: Blake Hobbs MD;  Location:  HEART CARDIAC CATH LAB     CV RIGHT HEART CATH N/A 7/18/2019    Procedure: Right Heart Cath;  Surgeon: Blake Hobbs MD;  Location:  HEART CARDIAC CATH LAB     CV TRANSCATHETER AORTIC VALVE REPLACEMENT N/A 9/18/2019    Procedure: Transcatheter (Harding size 23) Aortic Valve Replacement, Sentinal device placement, perfusion standby, cardiacc surgery standby, trans thorasic echocardiogram;  Surgeon: Drew Oates MD;  Location:  OR     ESOPHAGOSCOPY, GASTROSCOPY, DUODENOSCOPY (EGD), COMBINED N/A 7/7/2017    Procedure: COMBINED ESOPHAGOSCOPY, GASTROSCOPY, DUODENOSCOPY (EGD), BIOPSY SINGLE OR MULTIPLE;;  Surgeon: Sathya Norman MD;  Location:  OR     ESOPHAGOSCOPY, GASTROSCOPY, DUODENOSCOPY (EGD), COMBINED N/A 1/6/2019    Procedure: COMBINED ESOPHAGOSCOPY, GASTROSCOPY, DUODENOSCOPY (EGD);  Surgeon: Cailin Paulino MD;  Location:  GI     GALLBLADDER SURGERY       HEART CATH FEMORAL CANNULIZATION WITH OPEN STANDBY REPAIR AORTIC VALVE N/A 9/18/2019    Procedure: Cardiopulmonary Bypass Standby;  Surgeon: Jose Antonio Kaye MD;  Location:  OR     NECK SURGERY       OPTICAL TRACKING SYSTEM CRANIOTOMY, EXCISE TUMOR, COMBINED Right 2/22/2017    Procedure: COMBINED OPTICAL  TRACKING SYSTEM CRANIOTOMY, EXCISE TUMOR;  Surgeon: Lenora Pérez MD;  Location: [facility] OR     OPTICAL TRACKING SYSTEM ENDOSCOPIC ENDONASAL SURGERY  6/23/2014    Procedure: OPTICAL TRACKING SYSTEM ENDOSCOPIC ENDONASAL SURGERY;  Surgeon: Yolanda Whitaker MD;  Location: [facility] OR     STOMACH SURGERY       TONSILLECTOMY       TUBAL LIGATION      1975        CURRENT MEDICATIONS:     Current Outpatient Medications   Medication Sig Dispense Refill     acetaminophen (TYLENOL) 500 MG tablet Take 1 tablet (500 mg) by mouth every 6 hours as needed (Patient taking differently: Take 500-1,000 mg by mouth 2 times daily ) 100 tablet 3     aspirin (ASA) 81 MG EC tablet Take 1 tablet (81 mg) by mouth daily 90 tablet 3     atorvastatin (LIPITOR) 20 MG tablet Take 1 tablet (20 mg) by mouth daily (Patient taking differently: Take 20 mg by mouth At Bedtime ) 90 tablet 3     benzonatate (TESSALON) 100 MG capsule TAKE 1 CAPSULE BY MOUTH THREE TIMES DAILY( EVERY EIGHT HOURS) AS NEEDED FOR COUGH (Patient taking differently: Take 100 mg by mouth 2 times daily as needed (PT IS TAKING EVERY MORNING AND A NIGHT - PRN) TAKE 1 CAPSULE BY MOUTH THREE TIMES DAILY( EVERY EIGHT HOURS) AS NEEDED FOR COUGH) 60 capsule 0     buPROPion (WELLBUTRIN XL) 150 MG 24 hr tablet TAKE 1 TABLET(150 MG) BY MOUTH EVERY MORNING (Patient taking differently: Take 150 mg by mouth every morning TAKE 1 TABLET(150 MG) BY MOUTH EVERY MORNING) 90 tablet 1     carboxymethylcellulose PF (REFRESH PLUS) 0.5 % ophthalmic solution Place 2 drops into both eyes 2 times daily       clopidogrel (PLAVIX) 75 MG tablet Take 1 tablet (75 mg) by mouth daily 30 tablet 3     lisinopril (PRINIVIL/ZESTRIL) 10 MG tablet Take 1 tablet (10 mg) by mouth daily 30 tablet 1     melatonin 1 MG TABS tablet Take 1 mg by mouth At Bedtime        Misc Natural Product Nasal (PONARIS) SOLN Spray 2 drops in nostril 2 times daily       OLANZapine (ZYPREXA) 5 MG tablet Take 1 tablet (5 mg)  by mouth At Bedtime 30 tablet 0     pantoprazole (PROTONIX) 40 MG EC tablet Take 1 tablet (40 mg) by mouth daily Take 30-60 minutes before a meal. (Patient taking differently: Take 40 mg by mouth At Bedtime Take 30-60 minutes before a meal.) 90 tablet 1     ranitidine (ZANTAC) 150 MG tablet Take 1 tablet (150 mg) by mouth At Bedtime (Patient taking differently: Take 150 mg by mouth every morning ) 90 tablet 1     SENEXON-S 8.6-50 MG tablet TK 1 TO 2 TS PO BID (Patient taking differently: Take 1 tablet by mouth every other day TK 1 TO 2 TS PO BID) 100 tablet 1     sodium chloride (OCEAN) 0.65 % nasal spray Spray 1 spray into both nostrils daily as needed for congestion       venlafaxine (EFFEXOR-XR) 75 MG 24 hr capsule TAKE 3 CAPSULES BY MOUTH DAILY (Patient taking differently: Take 150 mg by mouth At Bedtime TAKE 3 CAPSULES BY MOUTH DAILY) 270 capsule 2        ALLERGIES:     Allergies   Allergen Reactions     Novocain [Procaine] Unknown and Rash     Mirtazapine      Nefazodone Unknown and Rash        FAMILY HISTORY:     Family History   Problem Relation Age of Onset     Prostate Cancer Father      Cancer Sister         SCLC     Cancer Sister      Macular Degeneration Daughter      Glaucoma No family hx of         SOCIAL HISTORY:     Social History     Socioeconomic History     Marital status:      Spouse name: Not on file     Number of children: Not on file     Years of education: Not on file     Highest education level: Not on file   Occupational History     Not on file   Social Needs     Financial resource strain: Not on file     Food insecurity:     Worry: Not on file     Inability: Not on file     Transportation needs:     Medical: Not on file     Non-medical: Not on file   Tobacco Use     Smoking status: Former Smoker     Packs/day: 1.00     Years: 40.00     Pack years: 40.00     Types: Cigarettes     Last attempt to quit: 6/10/2004     Years since quitting: 15.2     Smokeless tobacco: Never Used  "  Substance and Sexual Activity     Alcohol use: No     Drug use: No     Sexual activity: Not Currently     Partners: Male     Birth control/protection: Post-menopausal, Female Surgical   Lifestyle     Physical activity:     Days per week: Not on file     Minutes per session: Not on file     Stress: Not on file   Relationships     Social connections:     Talks on phone: Not on file     Gets together: Not on file     Attends Sabianist service: Not on file     Active member of club or organization: Not on file     Attends meetings of clubs or organizations: Not on file     Relationship status: Not on file     Intimate partner violence:     Fear of current or ex partner: Not on file     Emotionally abused: Not on file     Physically abused: Not on file     Forced sexual activity: Not on file   Other Topics Concern     Parent/sibling w/ CABG, MI or angioplasty before 65F 55M? Not Asked   Social History Narrative     Not on file        REVIEW OF SYSTEMS:     Constitutional: No fevers or chills  Skin: No new rash or itching  Eyes: No acute change in vision  Respiratory: No cough or hemoptysis  Cardiovascular: See HPI  Psychiatric: No hallucinations, excessive alcohol consumption or illegal drug usage  Hematologic/Lymphatic/Immunologic: No bleeding, chills, fever, night sweats or weight loss      PHYSICAL EXAMINATION:     /51 (BP Location: Right arm, Patient Position: Chair, Cuff Size: Adult Large)   Pulse 79   Ht 1.676 m (5' 6\")   Wt 90.1 kg (198 lb 11.2 oz)   LMP  (LMP Unknown)   SpO2 98%   BMI 32.07 kg/m       GENERAL: No acute distress.  HEENT: EOMI. Sclerae white, not injected. Nares clear. Pharynx without erythema or exudate.   Neck: No adenopathy. No thyromegaly. No jugular venous distension.   Heart: Regular rate and rhythm. 2/6 systolic murmur RUSB  Lungs: Clear to auscultation. No ronchi, wheezes, rales.   Abdomen: Soft, nontender, nondistended. Bowel sounds present.  Extremities: No clubbing, " cyanosis, or edema.   Neurologic: Alert and oriented to person/place/time, normal speech and affect. No focal deficits.  Skin: No petechiae, purpura or rash.     LABORATORY DATA:     LIPID RESULTS:  Lab Results   Component Value Date    CHOL 118 04/23/2019    HDL 58 04/23/2019    LDL 43 04/23/2019    TRIG 87 04/23/2019    CHOLHDLRATIO 2.3 07/29/2016       LIVER ENZYME RESULTS:  Lab Results   Component Value Date    AST 19 09/18/2019    ALT 20 09/18/2019       CBC RESULTS:  Lab Results   Component Value Date    WBC 6.8 09/20/2019    RBC 3.30 (L) 09/20/2019    HGB 8.8 (L) 09/20/2019    HCT 28.7 (L) 09/20/2019    MCV 87 09/20/2019    MCH 26.7 09/20/2019    MCHC 30.7 (L) 09/20/2019    RDW Dimorphic population - unable to calculate 09/20/2019     09/20/2019       BMP RESULTS:  Lab Results   Component Value Date     09/20/2019    POTASSIUM 4.1 09/20/2019    CHLORIDE 102 09/20/2019    CO2 25 09/20/2019    ANIONGAP 7 09/20/2019     (H) 09/20/2019    BUN 13 09/20/2019    CR 0.66 09/20/2019    GFRESTIMATED 84 09/20/2019    GFRESTBLACK >90 09/20/2019    STEFFANIE 8.3 (L) 09/20/2019       PROCEDURES & FURTHER ASSESSMENTS:     ECG dated 9/20 post-TAVR: NSR with PACs, HR 70s, normal QRS 74 ms    Echocardiogram 9/19 post-TAVR:    Interpretation Summary  TAVR with 23 mm Harding Loco 3.The mean gradient is 15 mmHg.Trace aortic  insufficiency is present.  Global and regional left ventricular function is hyperkinetic with an EF of  65-70%. This likely contributes to the gradient across TAVR.  Right ventricular function, chamber size, wall motion, and thickness are  normal.  No pericardial effusion is present.  IVC diameter <2.1 cm collapsing >50% with sniff suggests a normal RA pressure  of 3 mmHg.  No significant change from yesterday.    NYHA Class: II - III     CLINICAL IMPRESSION:     Gabino Jones is a very pleasant 79 year old female with hypertension, hyperlipidemia, nonobstructive CAD, chronic anemia  and severe  aortic valvular stenosis that was treated with transfemoral transcatheter aortic valve replacement (TAVR) with a 23mm Harding Loco 3 valve on 9/18/2019 by Tom Sanchez Knoper and Malik. The TAVR and post-procedural course were notable for no complications or development of conduction abnormalities. Her post-TAVR echo showed mean gradient of 15 mmHg and mild aortic insufficiency. She was discharged home on ASA 81 mg and Plavix 75 mg daily.     She states that she has noticed improvement in shortness of breath and exertional capacity since her valve replacement. Her  states she can now walk about 100 feet without difficultly, whereas before she would get short of breath just walking across the room. She is overall feeling well and denies any cardiac symptoms at this time. She is tolerating dual antiplatelet therapy and denies any significant bleeding issues. She is interested in attending cardiac rehab in [place].       Plan Summary:  1) Aspirin 81 mg daily lifelong  2) Continue Plavix 75 mg daily for 6 months through 3/18/2020  3) Lifelong antibiotic prophylaxis prior to all dental procedures  4) Will arrange cardiac rehab in [place]  5) Follow-up with Dr. Hobbs in 1-2 months with echo, ecg and labs prior    Lexis MELANIE Saab, CNP  [facility] Cardiology Team      CC  Patient Care Team:  Rebekah Ramirez MD as PCP - General (Family Practice)  Garett Obregon MD as MD (Oncology)  Laron Archibald MD as MD (Ophthalmology)  Yolanda Whitaker MD as MD (Otolaryngology)  Matt Quiroga MD as MD (Ophthalmology)  Rebekah Ramirez MD as Assigned PCP  Marilin Sesay, RN as Nurse Coordinator (Oncology)  Care, [facility] ([facility] ([facility] ([place]))  Behl, Melissa K, RN as Clinic Care Coordinator (Primary Care - CC)  REBEKAH RAMIREZ

## 2019-10-08 NOTE — TELEPHONE ENCOUNTER
Spoke with pt's  who wants to clarify if pt should be taking Plavix? Said she was previously on 75mg daily but he stopped it as of yesterday because he is worried she will get a nosebleed and not be able to stop the bleeding. Pt's  states due to a past cancer operation where they scraped down a lot of her inner nose & exposed her vasculature ,pt gets frequent nosebleeds. Said she was hospitalized for 1 month in January due to a nosebleed. Will clarify Plavix dosing with provider. Martha Rowland, RN

## 2019-10-09 NOTE — TELEPHONE ENCOUNTER
[facility] Call Center    Phone Message    May a detailed message be left on voicemail: yes    Reason for Call: Other: Gordon calling back. He states someone was going to call him but no one did. Please call him back as soon as possible to discuss.      Action Taken: Message routed to:  [facility] ([facility]): lindy cardio

## 2019-10-09 NOTE — TELEPHONE ENCOUNTER
Spoke with Henok at the time of the call he state that Gabino was currently on her way viz ambulance to [facility] for a nose bleed. He stated that her nose had been bleeding great than one hour when he call for the ambulance. He stated that he had held her Plavix for two days and that he should have held her ASA has well. I stress to him that before holding any of the antiplatelet medications that he must check in first due to the fact that a clot ca form on the valve but that we do understand bad nose bleeds are not good as well.    I let him know that I will follow up with him tomorrow in regards to Gabino since he was driving to the hospital.      He agreed with this.

## 2019-10-11 NOTE — TELEPHONE ENCOUNTER
"Requested Prescriptions   Pending Prescriptions Disp Refills     pantoprazole (PROTONIX) 40 MG EC tablet 90 tablet 1     Sig: Take 1 tablet (40 mg) by mouth daily Take 30-60 minutes before a meal.       PPI Protocol Failed - 10/11/2019  9:22 AM        Failed - Not on Clopidogrel (unless Pantoprazole ordered)        Passed - No diagnosis of osteoporosis on record        Passed - Recent (12 mo) or future (30 days) visit within the authorizing provider's specialty     Patient has had an office visit with the authorizing provider or a provider within the authorizing providers department within the previous 12 mos or has a future within next 30 days. See \"Patient Info\" tab in inbasket, or \"Choose Columns\" in Meds & Orders section of the refill encounter.              Passed - Medication is active on med list        Passed - Patient is age 18 or older        Passed - No active pregnacy on record        Passed - No positive pregnancy test in past 12 months        Last OV: 08/21/2019    Prescription approved per [facility] Refill Protocol.    Austin Arriola, RN, BSN    "

## 2019-10-18 NOTE — TELEPHONE ENCOUNTER
Pending Prescriptions:                       Disp   Refills    buPROPion (WELLBUTRIN XL) 150 MG 24 hr ta*90 tab*0            Sig: TAKE 1 TABLET(150 MG) BY MOUTH EVERY MORNING    Sent 8/21/2019 with 6 month supply. Refill not appropriate at this time.     Nhi Llanos, RN, BSN

## 2019-10-22 NOTE — TELEPHONE ENCOUNTER
Henok tapia and left  to call him back in reference to his plavix. He has been waiting for someone to call him. I called zeina and she was waiting for Dr. Oates [place] get [place] the [place]. I left  on Mr. Godinez that Zeina will speak with Dr. Oates and give him a call tomorrow. I apologized and told him zeina will definitely call him.

## 2019-10-22 NOTE — TELEPHONE ENCOUNTER
Pending Prescriptions:                       Disp   Refills    venlafaxine (EFFEXOR-XR) 75 MG 24 hr caps*270 ca*2            Sig: TAKE 3 CAPSULES BY MOUTH DAILY    Routing refill request to provider for review/approval because:  Verification of daily dose needed - pt reports taking only 2 tablets daily. Prescription is to take 3 daily        Tenisha Rucker, RN, BSN

## 2019-10-23 NOTE — TELEPHONE ENCOUNTER
Prescription has been for venlafaxine 75 mg 3 tablets daily - 225 mg total dose.     If she is doing well with taking venlafaxine 75 mg 2 tablets daily - 150 mg total dose - that is ok to continue with .     Please contact to verify how it is being taken and if patient is doing fine with that.     Recommend follow up office visit next month.

## 2019-10-23 NOTE — TELEPHONE ENCOUNTER
Patient in clinic for flu shot, spoke with her and she is taking 3 per day.  She needs refill for the 3 per day.  I scheduled patient for follow up for 12/3/19.  Thanks

## 2019-10-23 NOTE — TELEPHONE ENCOUNTER
Spoke with Henok and he had some questions about medication. zeina told him she should be on aspirin 81mg and off the plavix due to bleeding per Dr. Oates. He also wondered abou the potassium she used to take and other meds. Zeina told him to follow the discharge instructions and when they see Dr. Hobbs if he wants to change the medications given to her in the hospital  It should be Dr. Hobbs who decides. Patient acknowledged understanding.

## 2019-10-23 NOTE — PROGRESS NOTES
Date: 10/23/2019    Time of Call: 2:20 PM     Diagnosis:  S/p TAVR     [ TORB ] Ordering provider: Drew Medina MD  Order: Stop Plavix continue to take ASA 81 mg daily by mouth.     Order received by: Ml Neri Rn     Follow-up/additional notes: Spoke with Henok to give him direction all questions answered and he stated understanding.

## 2019-10-28 NOTE — TELEPHONE ENCOUNTER
Pending Prescriptions:                       Disp   Refills    OLANZapine (ZYPREXA) 5 MG tablet          30 tab*0            Sig: Take 1 tablet (5 mg) by mouth At Bedtime    Prescription approved per [facility] Refill Protocol.    Next 5 appointments (look out 90 days)    Dec 03, 2019  1:20 PM CST  Office Visit with Rebekah Boogie MD  [facility] [id number] [place] [address], [address]  [facility] [id number] [phone]          Tenisha Rucker, RN, BSN

## 2019-11-19 NOTE — NURSING NOTE
Chief Complaint   Patient presents with     Follow Up     30 day S/P TAVR Follow up with an echo and labs     Vitals were taken and medications were reconciled.     Layla Foy CMA    1:44 PM

## 2019-11-19 NOTE — LETTER
11/19/2019      RE: Gabino Jones  [address]  [facility] [id number]       Dear Colleague,    Thank you for the opportunity to participate in the care of your patient, Gabino Jones, at the [facility] at [facility]. Please see a copy of my visit note below.    CARDIOLOGY CLINIC FOLLOW UP    Referring Provider:  Yolanda Gil*  Primary Care Provider:   Susie Harrison    HPI: Gabino Jones is a 79 year old female  referred by Yolanda Gil, returns after recently undergoing TAVR. The patient's risk factor profile is: (+) HTN [Rx], (-) diabetes, (-) hyperlipidemia [Rx], (+) former  tobacco use, (-) family Hx CAD. The patient has a history of aortic stenosis with mean gradient of 22 mm Hg and PATRICIA 1.2 cm2 in 2014.  Her ECHO (Apr 2018) showed mild-moderate aortic insufficiency and moderate aortic stenosis (1.2 cm2).  Her ECHO (Apr 2019) showed normal LV function, possible bicuspid aortic valve, severe aortic stenosis (peak aortic velocity 4.6 m/sec, mean gradient gradient 50 mmHg, PATRICIA 0.8 cm^2 by the continuity equation), and moderate AI.    She has no other evidence of cardiovascular disease (CAD, CHF, arrhythmia).  She has no Hx of rheumatic fever.     The patient denies a history of chest discomfort.  She notes OSBORN with walking up a single flight of steps or walking 50 feet.  This is progression from her last visit. She denies PND, orthopnea, pedal edema, palpitations, and syncope.  She has had lightheadedness related to body position with associated imbalance in gait.  The lightheadedness is not related to activity.  The patient has melanoma of the sinus activities and is currently in remission.  She had surgery and XRT. She had a routine follow up appointment and as part of that visit, she was scheduled for a chest CT scan.  That study showed a questionable mass in the LV apex.  A follow up cardiac MRI showed no abnormality in the LV  [place].    She lives with her family.  She is independent.  She does not drive.  She leads a relatively sedentary lifestyle.    Home BPs in 120s-130s/70s.              She underwent TAVR with ES 23 mm valve on 9/18/19.   Post TAVR gradient 15 mm Hg, trace AI.    In October she presented to the ED with epistaxis. She was was advised by our office to stop plavix, continue ASA monotherapy.    Since her valve was replaced, Gabino states she has been feeling better. Dyspnea has improved, as has exercise capacity. She has no chest pain, LH/dizziness, palpitations. She has not been exercising much, as she is not sure how much she can do. Has not received a call for cardiac rehab. No other concerns about her CV health at this time    PAST MEDICAL HISTORY:  Past Medical History:   Diagnosis Date     Anxiety      Aortic stenosis 4/25/2016     Arthritis      Blindness of left eye     after radiation     Cancer (H)      Chronic back pain      Chronic hyponatremia 7/10/2015     Chronic leg pain      COPD (chronic obstructive pulmonary disease) (H) 6/19/2019     Dementia (H)      Depressive disorder 1/01/191992     Gastroesophageal reflux disease      Hearing loss      Heart murmur      History of blood transfusion      History of radiation therapy     Treatment for nasal cancer     Hoarseness      Hyperlipidemia      Hypertension      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     Reduced vision      Ringing in ears      Sensorineural hearing loss      Tinnitus        CURRENT MEDICATIONS:  Current Outpatient Medications   Medication Sig     acetaminophen (TYLENOL) 500 MG tablet Take 1 tablet (500 mg) by mouth every 6 hours as needed (Patient taking differently: Take 500-1,000 mg by mouth 2 times daily )     aspirin (ASA) 81 MG EC tablet Take 1 tablet (81 mg) by mouth daily     atorvastatin (LIPITOR) 20 MG tablet Take 1 tablet (20 mg) by mouth daily (Patient taking differently: Take 20 mg by mouth At Bedtime )     buPROPion (WELLBUTRIN  XL) 150 MG 24 hr tablet TAKE 1 TABLET(150 MG) BY MOUTH EVERY MORNING (Patient taking differently: Take 150 mg by mouth every morning TAKE 1 TABLET(150 MG) BY MOUTH EVERY MORNING)     carboxymethylcellulose PF (REFRESH PLUS) 0.5 % ophthalmic solution Place 2 drops into both eyes 2 times daily     lisinopril (PRINIVIL/ZESTRIL) 10 MG tablet Take 1 tablet (10 mg) by mouth daily     melatonin 1 MG TABS tablet Take 1 mg by mouth At Bedtime      Misc Natural Product Nasal (PONARIS) SOLN Spray 2 drops in nostril 2 times daily     OLANZapine (ZYPREXA) 5 MG tablet Take 1 tablet (5 mg) by mouth At Bedtime     omeprazole (PRILOSEC) 20 MG DR capsule Take 20 mg by mouth daily     pantoprazole (PROTONIX) 40 MG EC tablet Take 1 tablet (40 mg) by mouth At Bedtime Take 30-60 minutes before a meal.     ranitidine (ZANTAC) 150 MG tablet Take 1 tablet (150 mg) by mouth At Bedtime (Patient taking differently: Take 150 mg by mouth every morning )     sodium chloride (OCEAN) 0.65 % nasal spray Spray 1 spray into both nostrils daily as needed for congestion     venlafaxine (EFFEXOR-XR) 75 MG 24 hr capsule TAKE 3 CAPSULES BY MOUTH DAILY     benzonatate (TESSALON) 100 MG capsule TAKE 1 CAPSULE BY MOUTH THREE TIMES DAILY( EVERY EIGHT HOURS) AS NEEDED FOR COUGH (Patient not taking: Reported on 11/19/2019)     clopidogrel (PLAVIX) 75 MG tablet Take 1 tablet (75 mg) by mouth daily (Patient not taking: Reported on 11/19/2019)     SENEXON-S 8.6-50 MG tablet TK 1 TO 2 TS PO BID (Patient not taking: Reported on 9/27/2019)     Current Facility-Administered Medications   Medication     0.9% sodium chloride BOLUS         PAST SURGICAL HISTORY:  Past Surgical History:   Procedure Laterality Date     ANESTHESIA OUT OF OR MRI N/A 1/16/2019    Procedure: Anesthesia Coverage Brain MRI With Contrast @1330;  Surgeon: GENERIC ANESTHESIA PROVIDER;  Location: [facility] OR     C ANESTH,CERV SPINE, SITTING POSITION       COLONOSCOPY N/A 7/7/2017    Procedure: COMBINED  COLONOSCOPY, SINGLE OR MULTIPLE BIOPSY/POLYPECTOMY BY BIOPSY;;  Surgeon: Sathya Norman MD;  Location: [facility] OR     COLONOSCOPY WITH CO2 INSUFFLATION N/A 7/7/2017    Procedure: COLONOSCOPY WITH CO2 INSUFFLATION;  Combined,  [place], Gastroesophageal reflux disease without esophagitis  Flatulence, eructation, and gas pain, BMI 29.35, [facility] [phone];  Surgeon: Sathya Norman MD;  Location: [facility] OR     COLONOSCOPY, SUBMUCOSA RESECTION N/A 10/4/2017    Procedure: COLONOSCOPY, SUBMUCOSA RESECTION;  Colonoscopy With Endoscopic Polypectomy with clips;  Surgeon: Milton Javier MD;  Location: [facility] OR     COMBINED ESOPHAGOSCOPY, GASTROSCOPY, DUODENOSCOPY (EGD) WITH CO2 INSUFFLATION N/A 7/7/2017    Procedure: COMBINED ESOPHAGOSCOPY, GASTROSCOPY, DUODENOSCOPY (EGD) WITH CO2 INSUFFLATION;  Combined,  [place], Gastroesophageal reflux disease without esophagitis  Flatulence, eructation, and gas pain, BMI 29.35, [facility] [phone];  Surgeon: Sathya Norman MD;  Location: [facility] OR     CV CORONARY ANGIOGRAM N/A 7/18/2019    Procedure: CV CORONARY ANGIOGRAM;  Surgeon: Blake Hobbs MD;  Location: [facility] CARDIAC CATH LAB     CV CORONARY ANGIOGRAM N/A 7/18/2019    Procedure: Coronary Angiogram;  Surgeon: Blake Hobbs MD;  Location:  HEART CARDIAC CATH LAB     CV FRACTIONAL FLOW RESERVE Bilateral 7/18/2019    Procedure: Fractional Flow Reserve;  Surgeon: Blake Hobbs MD;  Location:  HEART CARDIAC CATH LAB     CV RIGHT HEART CATH N/A 7/18/2019    Procedure: CV RIGHT HEART CATH;  Surgeon: Blake Hobbs MD;  Location:  HEART CARDIAC CATH LAB     CV RIGHT HEART CATH N/A 7/18/2019    Procedure: Right Heart Cath;  Surgeon: Blake Hobbs MD;  Location: [facility] CARDIAC CATH LAB     CV TRANSCATHETER AORTIC VALVE REPLACEMENT N/A 9/18/2019    Procedure: Transcatheter (Harding size 23) Aortic Valve Replacement, Sentinal device placement, perfusion standby, cardiacc  surgery standby, trans thorasic echocardiogram;  Surgeon: Drew Oates MD;  Location: [facility] OR     ESOPHAGOSCOPY, GASTROSCOPY, DUODENOSCOPY (EGD), COMBINED N/A 7/7/2017    Procedure: COMBINED ESOPHAGOSCOPY, GASTROSCOPY, DUODENOSCOPY (EGD), BIOPSY SINGLE OR MULTIPLE;;  Surgeon: Sathya Norman MD;  Location:  OR     ESOPHAGOSCOPY, GASTROSCOPY, DUODENOSCOPY (EGD), COMBINED N/A 1/6/2019    Procedure: COMBINED ESOPHAGOSCOPY, GASTROSCOPY, DUODENOSCOPY (EGD);  Surgeon: Cailin Paulino MD;  [facility]     GALLBLADDER SURGERY       HEART CATH FEMORAL CANNULIZATION WITH OPEN STANDBY REPAIR AORTIC VALVE N/A 9/18/2019    Procedure: Cardiopulmonary Bypass Standby;  Surgeon: Jose Antonio Kaye MD;  Location:  OR     NECK SURGERY       OPTICAL TRACKING SYSTEM CRANIOTOMY, EXCISE TUMOR, COMBINED Right 2/22/2017    Procedure: COMBINED OPTICAL TRACKING SYSTEM CRANIOTOMY, EXCISE TUMOR;  Surgeon: Lenora Pérez MD;  Location:  OR     OPTICAL TRACKING SYSTEM ENDOSCOPIC ENDONASAL SURGERY  6/23/2014    Procedure: OPTICAL TRACKING SYSTEM ENDOSCOPIC ENDONASAL SURGERY;  Surgeon: Yolanda Whitaker MD;  Location:  OR     STOMACH SURGERY       TONSILLECTOMY       TUBAL LIGATION      1975       ALLERGIES  Novocain [procaine]; Mirtazapine; and Nefazodone    FAMILY HX:  Family History   Problem Relation Age of Onset     Prostate Cancer Father      Cancer Sister         SCLC     Cancer Sister      Macular Degeneration Daughter      Glaucoma No family hx of        SOCIAL HX:  History     Social History     Marital Status:      Spouse Name: N/A     Number of Children: N/A     Years of Education: N/A     Social History Main Topics     Smoking status: Former Smoker     Quit date: 06/10/2004     Smokeless tobacco: Never Used     Alcohol Use: No     Drug Use: No     Sexual Activity: Not on file     Other Topics Concern     None     Social History Narrative       ROS:  Constitutional:  "No fever, chills, or sweats. No weight gain/loss.   HEENT: No visual disturbance, ear ache, epistaxis, sore throat.   Allergies/Immunologic: Negative.   Respiratory: No cough, hemoptysis.   Cardiovascular: As per HPI.   GI: No nausea, vomiting, hematemesis, melena, or hematochezia.   : No urinary frequency, dysuria, or hematuria.   Integument: No rash.   Psychiatric: No anxiety / depression.   Neuro: No speech disturbance, focal sensory or motor deficit.   Endocrinology: No polyuria / polyphagia.   Musculoskeletal: No myalgia.    VITAL SIGNS:  /76 (BP Location: Left arm, Patient Position: Chair, Cuff Size: Adult Large)   Pulse 73   Ht 1.651 m (5' 5\")   Wt 92.1 kg (203 lb)   LMP  (LMP Unknown)   SpO2 100%   BMI 33.78 kg/m     Body mass index is 33.78 kg/m .  Wt Readings from Last 2 Encounters:   11/19/19 92.1 kg (203 lb)   09/27/19 90.1 kg (198 lb 11.2 oz)   Repeat Blood Pressure:  BP Pulse Site Cuff Size Time Date   122/76 73 Left arm Adult Large  1:35 PM 11/19/2019     Pain Information  Score Location Time Date   No Pain (0) ---  1:35 PM 11/19/2019   Comment: Per pt has no C/P but some SOB   No orthostatic vitals data filed.  No peak flow data filed.      PHYSICAL EXAM  Gabino Jones is a 79 year old female.in no acute distress.  HEENT: Eyes Nonicteric.  Neck: JVP normal.  Carotids +3/3 bilaterally without bruits.  Lungs: CTA.  Cor: RRR. Normal S1 and S2.  Grade 1 systolic murmur.  No rub, or gallop.  PMI in Lf 5th ICS.  Abd: Soft, nontender, nondistended.  NABS.  No pulsatile mass.  Extremities: No C/C/E.  Pulses +3/3 symmetric in upper and lower extremities.  Neuro: Grossly intact.  Psych: A&O x 3.  Skin: No rash.    LABS  Recent Labs   Lab Test 11/19/19  1158 09/20/19  0602   WBC 8.4 6.8   HGB 11.0* 8.8*   HCT 35.4 28.7*    198     Recent Labs   Lab Test 11/19/19  1158 09/20/19  0602   * 134   POTASSIUM 3.9 4.1   CHLORIDE 97 102   CO2 27 25   GLC 78 101*   BUN 10 13   CR 0.75 0.66   STEFFANIE " 8.7 8.3*     Recent Labs   Lab Test 19  1511 17  0914  16   CHOL 118 228*   < > 166.0   HDL 58 55   < > 71.0*   LDL 43 145*   < > 82.4   TRIG 87 139   < > 63.0   CHOLHDLRATIO  --   --   --  2.3   NHDL 60 173*   < >  --     < > = values in this interval not displayed.      Echo 19:    Interpretation Summary  Global and regional left ventricular function is normal with an EF of 60-65%.  Global right ventricular function is normal.  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 The peak velocity across  the aortic valve is 2.7 m/sec. The mean gradient is 14 mmHg.  Estimated pulmonary artery systolic pressure is 33 mmHg.  The inferior vena cava was normal in size with preserved respiratory  variability.  No pericardial effusion is present.    EK16  NSR with normal intervals and axes.  No ST shift, TWI, or Q waves.  No ectopy.  No LVH.    ECHO: 14  Global and regional left ventricular function is hyperkinetic with an EF of 65-70%. Global right ventricular function is normal. Moderate aortic stenosis. The peak aortic velocity is 3.2 m/sec. The mean gradient across the aortic valve is 22 mmHg. The aortic valve area is 1.2 cm2 by the continuity equation (with an LVOT of 23 mm). Moderate aortic insufficiency. Moderate tricuspid insufficiency is present. Moderate pulmonary hypertension. Right ventricular systolic pressure is 40 mmHg above the right atrial pressure. The inferior vena cava wi dilated at 2 cm without respiratory variability, consistent with increased right atrial pressure. No pericardial effusion is present.    ECHO (16):  Interpretation Summary  Global and regional left ventricular function is normal with an EF of 60-65%.  Right ventricular function, chamber size, wall motion, and thickness are normal.  Mild to moderate aortic insufficiency is present.  Mild aortic stenosis is  present.  Pulmonary artery systolic pressure is normal.  No pericardial effusion is present.    ECHO (4/23/18):  Mild to moderate aortic insufficiency is present.  Moderate aortic stenosis is present.  The aortic valve area is 1.2 cm^2, by the continuity equation.  Mild progression of aortic stenosis since last study in 5/2016.    ECHO:  (4/30/19)  Interpretation Summary:   Global and regional left ventricular function is hyperkinetic with an EF of 65-70%.  Severe aortic valve calcification is present.  Cannot exclude a bicuspid aortic valve.  Severe aortic stenosis is present.  The peak aortic velocity is 4.6 m/sec.  The mean gradient across the aortic valve is 50 mmHg.  The aortic valve area is 0.8 cm^2, by the continuity equation.  Moderate aortic insufficiency is present.  This study was compared with the study from 1/5/19 and aortic stenosis appears worse .    ECHO:  (9/18/19)  TAVR with 23 mm Harding Loco 3.The mean gradient is 15 mmHg.Trace aortic insufficiency is present.  Global and regional left ventricular function is hyperkinetic with an EF of 65-70%. This likely contributes to the gradient across TAVR.  Right ventricular function, chamber size, wall motion, and thickness are normal.  No pericardial effusion is present.  IVC diameter <2.1 cm collapsing >50% with sniff suggests a normal RA pressure of 3 mmHg.  No significant change from yesterday.    ECHO:  (9/18/2019)  Intraprocedural TTE for TAVR with 23 mm Harding Loco 3 prosthesis.  Preprocedural images show a severely calcified aortic valve with severely  restricted leaflet motion and moderate AI.  Postprocedural images show well-seated 23 mm Loco 3 TAVR bioprosthesis, with  resolution of aortic stenosis (peak velocity 2.1 m/s, mean gradient 9 mmHg.)  There is trace valvular and no paravalular AI.  There is no pericardial effusion on the pre- or postprocedural images.  This study was compared with the study from 4/30/19: There has been  interval  TAVR with resolution of aortic stenosis and only trace valvular aortic  insufficiency on the postprocedural images.    Cardiac MRI (5/6/16):  IMPRESSION:  1.  Normal left ventricular size and systolic function with a calculated ejection fraction of  69 %.  2.  Normal right ventricular size and systolic function with a calculated ejection fraction of 69%.   3.  There is decreased leaflet excursion with moderate sclerosis of the aortic valve with flow acceleration consistent with moderate aortic stenosis and mild aortic insufficiency.  4.  On delayed enhancement imaging, there is no abnormal hyperenhancement to suggest myocardial scar/inflammation/infiltration.  5.  There is no area of infarction or mass noted in the left ventricular apex.     STRESS TEST:  None    CARDIAC CATH: 7/18/2019    Mild elevated Pulmonary Hypertension.    Right sided filling pressures are mildly elevated.    Left sided filling pressures are moderately elevated.    Moderate stenosis (70%) distal RCA, physiologically insignificant (FFR 0.83)    Moderate ostial LCx (50%), physiologically insignificant (FFR 0.89)    Pressure wire/FFR was performed on the lesion. FFR: 0.89.    Pressure wire/FFR was performed on the lesion. FFR: 0.83.    Left Anterior Descending   Mid LAD lesion is 40% stenosed.   Left Circumflex   Ost Cx to Prox Cx lesion is 50% stenosed.   Prox Cx to Mid Cx lesion is 60% stenosed. Pressure wire/FFR was performed on the lesion. FFR: 0.89. The LMCA was engaged with a 6 Fr Ikari LF 3.5 GC and the FFR of the ostial and mid LCx were measured with the Opsens wire. The FFR at peak hyperemia was 0.89.   Right Coronary Artery   Prox RCA to Mid RCA lesion is 30% stenosed.   Dist RCA lesion is 70% stenosed. Pressure wire/FFR was performed on the lesion. FFR: 0.83. The RCA was engaged with a 6 Fr Ikari RT 1.0 GC and the FFR of the distal RCA was measured with the Opsens wire. The FFR at peak hyperemia was 0.83.       CT CHEST:   4/13/16  Findings: Coronary arterial calcifications. Heart is not enlarged. Focal hypodensity left ventricular apex. In the Main pulmonary and aorta are not enlarged. No mediastinal, hilar or axillary adenopathy. Calcified left paraesophageal node. Calcified granuloma in the in the  left lower lobe medially. Mild interlobular septal thickening.   6 mm nodule left lower lobe image 42. Unchanged  3 mm nodule right upper lobe image 17. Unchanged  4 mm subpleural nodule left lower lobe image 33. Unchanged  Other scattered nodules are also unchanged     Moderate centrilobular emphysematous changes in the lungs.     Evaluation of the upper abdomen is limited. Enlarged ita hepatis  lymph nodes are decreased. Cholecystectomy clips.       Bones: No suspicious bony lesions.     IMPRESSION:  1. Stable pulmonary nodules for nearly 2 years.  2. Focal hypodensity in the left ventricular apex could represent an infarct versus ischemia. Cardiac MRI could be helpful in further evaluation.  3. Subtle groundglass opacity within the lungs could represent infection or inflammatory possibly drug induced or chronic aspiration    CARDIAC MRI:  5/6/16  IMPRESSION:  1. Normal left ventricular size and systolic function with a calculated ejection fraction of 69 %.  2. Normal right ventricular size and systolic function with a calculated ejection fraction of 69%.   3. There is decreased leaflet excursion with moderate sclerosis of the aortic valve with flow acceleration consistent with moderate aortic stenosis and mild aortic insufficiency.  4. On delayed enhancement imaging, there is no abnormal hyperenhancement to suggest myocardial scar/inflammation/infiltration.  5. There is no area of infarction or mass noted in the left ventricular apex.     ASSESSMENT AND PLAN:   1. Aortic stenosis, s/p TAVR 9/18/19.  Class III Symptoms prior to TAVR.  Successful placement of 23mm Harding Loco 3 valve. Echo today shows valve is well seated with  gradient of 14 mmHg (normal gradient for TAVR). Since placement, she has been doing much better. Exercise capacity has improved, however she has limited herself as she is not sure what exercises she can do. It appears she was not initially referred for cardiac rehab, so we will do this today. I provided reassurance that exercise is safe and encouraged. Advised to avoid heavy lifting.    2. R/O Cardiac Mass.  The CT scan reported out a hypodensity in the LV apex but this could not be further defined.  Cardiac MRI showed no mass.    3. Lipids.  LDL increased from 80 to 145 mg/dl.  Patient on Pravastatin 40 mg qd.  Convert to Lipitor 20 mg qd.    4. HTN.  Well controlled.  Continue on Lisinopril 30 qd and HCTZ 12.5 qd.  The patient's lightheadedness does not appear to be orthostatic in nature.  Given the prior bleeding, check CBC today.    5. CAD: 2 vessel CAD (RCA, LCx), neither physiologically significant  - Continue ASA, statin, BP control    FOLLOW UP:  1 year      ATTENDING NOTE:  Patient has been seen and evaluated by me on 11/19/2019. I have reviewed the documentation above.  I have reviewed today's vital signs, medications, labs, and imaging results.  I have reviewed and edited, as necessary, the history, review of systems, physical examination, and assessment and plan.  I have discussed my assessment and plan with the cardiology fellow.  Gabino Jones is a 79 year old female with risk factor profile (+) HTN, (-) DM, (+) hypercholesterolemia, (-) tobacco use, (-) fam Hx premature CAD, Hx moderate CAD (540% mid LAD, 50-60% prox LCx with FFR 0.89, 70% distal RCA with FFR 0.83), severe aortic stenosis, s/p TAVR with 23 mm ES III (Sept 2019), residual gradient 15 mm Hg, normal LV function, returns for routine follow up.  Dyspnea has markedly improved.  Exam documented above.  Repeat ECHO today showed LVEF 60-65%,14 mm Hg gradient across Ao valve, trace valvular regurgitation, no paravalvular leak.  No further  intervention planned.  Continue ASA for underlying CAD.  Plavix had to be stopped due to recurrent epistaxis leading to multiple ER visits.    Blake Hobbs MD     Cardiovascular Division    CC  Patient Care Team:  Rebekah Boogie MD as PCP - General (Family Practice)  Garett Obregon MD as MD (Oncology)  Laron Archibald MD as MD (Ophthalmology)  Yolanda Whitaker MD as MD (Otolaryngology)  Matt Quiroga MD as MD (Ophthalmology)  Rebekah Boogie MD as Assigned PCP  Marilin Sesay RN as Nurse Coordinator (Oncology)  Care, [facility] ([facility] ([facility]), ([place]))  YOLANDA WHITAKER

## 2019-11-19 NOTE — PROGRESS NOTES
CARDIOLOGY CLINIC FOLLOW UP    Referring Provider:  Yolanda Gil*  Primary Care Provider:   Susie Harrison    HPI: Gabino Jones is a 79 year old female  referred by Yolanda Gil, returns after recently undergoing TAVR. The patient's risk factor profile is: (+) HTN [Rx], (-) diabetes, (-) hyperlipidemia [Rx], (+) former  tobacco use, (-) family Hx CAD. The patient has a history of aortic stenosis with mean gradient of 22 mm Hg and PATRICIA 1.2 cm2 in 2014.  Her ECHO (Apr 2018) showed mild-moderate aortic insufficiency and moderate aortic stenosis (1.2 cm2).  Her ECHO (Apr 2019) showed normal LV function, possible bicuspid aortic valve, severe aortic stenosis (peak aortic velocity 4.6 m/sec, mean gradient gradient 50 mmHg, PATRICIA 0.8 cm^2 by the continuity equation), and moderate AI.    She has no other evidence of cardiovascular disease (CAD, CHF, arrhythmia).  She has no Hx of rheumatic fever.     The patient denies a history of chest discomfort.  She notes OSBORN with walking up a single flight of steps or walking 50 feet.  This is progression from her last visit. She denies PND, orthopnea, pedal edema, palpitations, and syncope.  She has had lightheadedness related to body position with associated imbalance in gait.  The lightheadedness is not related to activity.  The patient has melanoma of the sinus activities and is currently in remission.  She had surgery and XRT. She had a routine follow up appointment and as part of that visit, she was scheduled for a chest CT scan.  That study showed a questionable mass in the LV apex.  A follow up cardiac MRI showed no abnormality in the LV apex.    She lives with her family.  She is independent.  She does not drive.  She leads a relatively sedentary lifestyle.    Home BPs in 120s-130s/70s.              She underwent TAVR with ES 23 mm valve on 9/18/19.   Post TAVR gradient 15 mm Hg, trace AI.    In October she presented to the ED with epistaxis. She was  was advised by our office to stop plavix, continue ASA monotherapy.    Since her valve was replaced, Gabino states she has been feeling better. Dyspnea has improved, as has exercise capacity. She has no chest pain, LH/dizziness, palpitations. She has not been exercising much, as she is not sure how much she can do. Has not received a call for cardiac rehab. No other concerns about her CV health at this time    PAST MEDICAL HISTORY:  Past Medical History:   Diagnosis Date     Anxiety      Aortic stenosis 4/25/2016     Arthritis      Blindness of left eye     after radiation     Cancer (H)      Chronic back pain      Chronic hyponatremia 7/10/2015     Chronic leg pain      COPD (chronic obstructive pulmonary disease) (H) 6/19/2019     Dementia (H)      Depressive disorder 1/01/191992     Gastroesophageal reflux disease      Hearing loss      Heart murmur      History of blood transfusion      History of radiation therapy     Treatment for nasal cancer     Hoarseness      Hyperlipidemia      Hypertension      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     Reduced vision      Ringing in ears      Sensorineural hearing loss      Tinnitus        CURRENT MEDICATIONS:  Current Outpatient Medications   Medication Sig     acetaminophen (TYLENOL) 500 MG tablet Take 1 tablet (500 mg) by mouth every 6 hours as needed (Patient taking differently: Take 500-1,000 mg by mouth 2 times daily )     aspirin (ASA) 81 MG EC tablet Take 1 tablet (81 mg) by mouth daily     atorvastatin (LIPITOR) 20 MG tablet Take 1 tablet (20 mg) by mouth daily (Patient taking differently: Take 20 mg by mouth At Bedtime )     buPROPion (WELLBUTRIN XL) 150 MG 24 hr tablet TAKE 1 TABLET(150 MG) BY MOUTH EVERY MORNING (Patient taking differently: Take 150 mg by mouth every morning TAKE 1 TABLET(150 MG) BY MOUTH EVERY MORNING)     carboxymethylcellulose PF (REFRESH PLUS) 0.5 % ophthalmic solution Place 2 drops into both eyes 2 times daily     lisinopril  (PRINIVIL/ZESTRIL) 10 MG tablet Take 1 tablet (10 mg) by mouth daily     melatonin 1 MG TABS tablet Take 1 mg by mouth At Bedtime      Misc Natural Product Nasal (PONARIS) SOLN Spray 2 drops in nostril 2 times daily     OLANZapine (ZYPREXA) 5 MG tablet Take 1 tablet (5 mg) by mouth At Bedtime     omeprazole (PRILOSEC) 20 MG DR capsule Take 20 mg by mouth daily     pantoprazole (PROTONIX) 40 MG EC tablet Take 1 tablet (40 mg) by mouth At Bedtime Take 30-60 minutes before a meal.     ranitidine (ZANTAC) 150 MG tablet Take 1 tablet (150 mg) by mouth At Bedtime (Patient taking differently: Take 150 mg by mouth every morning )     sodium chloride (OCEAN) 0.65 % nasal spray Spray 1 spray into both nostrils daily as needed for congestion     venlafaxine (EFFEXOR-XR) 75 MG 24 hr capsule TAKE 3 CAPSULES BY MOUTH DAILY     benzonatate (TESSALON) 100 MG capsule TAKE 1 CAPSULE BY MOUTH THREE TIMES DAILY( EVERY EIGHT HOURS) AS NEEDED FOR COUGH (Patient not taking: Reported on 11/19/2019)     clopidogrel (PLAVIX) 75 MG tablet Take 1 tablet (75 mg) by mouth daily (Patient not taking: Reported on 11/19/2019)     SENEXON-S 8.6-50 MG tablet TK 1 TO 2 TS PO BID (Patient not taking: Reported on 9/27/2019)     Current Facility-Administered Medications   Medication     0.9% sodium chloride BOLUS         PAST SURGICAL HISTORY:  Past Surgical History:   Procedure Laterality Date     ANESTHESIA OUT OF OR MRI N/A 1/16/2019    Procedure: Anesthesia Coverage Brain MRI With Contrast @1330;  Surgeon: GENERIC ANESTHESIA PROVIDER;  Location: [facility] OR     C ANESTH,CERV SPINE, SITTING POSITION       COLONOSCOPY N/A 7/7/2017    Procedure: COMBINED COLONOSCOPY, SINGLE OR MULTIPLE BIOPSY/POLYPECTOMY BY BIOPSY;;  Surgeon: Sathya Norman MD;  Location:  OR     COLONOSCOPY WITH CO2 INSUFFLATION N/A 7/7/2017    Procedure: COLONOSCOPY WITH CO2 INSUFFLATION;  Combined,  [place], Gastroesophageal reflux disease without esophagitis  Flatulence,  eructation, and gas pain, BMI 29.35, [facility] [phone];  Surgeon: Sathya Norman MD;  Location: [facility] OR     COLONOSCOPY, SUBMUCOSA RESECTION N/A 10/4/2017    Procedure: COLONOSCOPY, SUBMUCOSA RESECTION;  Colonoscopy With Endoscopic Polypectomy with clips;  Surgeon: Milton Javier MD;  Location: [facility] OR     COMBINED ESOPHAGOSCOPY, GASTROSCOPY, DUODENOSCOPY (EGD) WITH CO2 INSUFFLATION N/A 7/7/2017    Procedure: COMBINED ESOPHAGOSCOPY, GASTROSCOPY, DUODENOSCOPY (EGD) WITH CO2 INSUFFLATION;  Combined,  [place], Gastroesophageal reflux disease without esophagitis  Flatulence, eructation, and gas pain, BMI 29.35, [facility] [phone];  Surgeon: Sathya Norman MD;  Location: [facility] OR     CV CORONARY ANGIOGRAM N/A 7/18/2019    Procedure: CV CORONARY ANGIOGRAM;  Surgeon: Blake Hobbs MD;  Location:  HEART CARDIAC CATH LAB     CV CORONARY ANGIOGRAM N/A 7/18/2019    Procedure: Coronary Angiogram;  Surgeon: Blake Hobbs MD;  Location:  HEART CARDIAC CATH LAB     CV FRACTIONAL FLOW RESERVE Bilateral 7/18/2019    Procedure: Fractional Flow Reserve;  Surgeon: Blake Hobbs MD;  Location:  HEART CARDIAC CATH LAB     CV RIGHT HEART CATH N/A 7/18/2019    Procedure: CV RIGHT HEART CATH;  Surgeon: Blake Hobbs MD;  Location:  HEART CARDIAC CATH LAB     CV RIGHT HEART CATH N/A 7/18/2019    Procedure: Right Heart Cath;  Surgeon: Blake Hobbs MD;  Location:  HEART CARDIAC CATH LAB     CV TRANSCATHETER AORTIC VALVE REPLACEMENT N/A 9/18/2019    Procedure: Transcatheter (Harding size 23) Aortic Valve Replacement, Sentinal device placement, perfusion standby, cardiacc surgery standby, trans thorasic echocardiogram;  Surgeon: Drew Oates MD;  Location: [facility] OR     ESOPHAGOSCOPY, GASTROSCOPY, DUODENOSCOPY (EGD), COMBINED N/A 7/7/2017    Procedure: COMBINED ESOPHAGOSCOPY, GASTROSCOPY, DUODENOSCOPY (EGD), BIOPSY SINGLE OR MULTIPLE;;  Surgeon: Sathya Norman,  MD;  Location: [facility]     ESOPHAGOSCOPY, GASTROSCOPY, DUODENOSCOPY (EGD), COMBINED N/A 1/6/2019    Procedure: COMBINED ESOPHAGOSCOPY, GASTROSCOPY, DUODENOSCOPY (EGD);  Surgeon: Cailin Paulino MD;  [facility]     GALLBLADDER SURGERY       HEART CATH FEMORAL CANNULIZATION WITH OPEN STANDBY REPAIR AORTIC VALVE N/A 9/18/2019    Procedure: Cardiopulmonary Bypass Standby;  Surgeon: Jose Antonio Kaye MD;  Location:  OR     NECK SURGERY       OPTICAL TRACKING SYSTEM CRANIOTOMY, EXCISE TUMOR, COMBINED Right 2/22/2017    Procedure: COMBINED OPTICAL TRACKING SYSTEM CRANIOTOMY, EXCISE TUMOR;  Surgeon: Lenora Pérez MD;  Location:  OR     OPTICAL TRACKING SYSTEM ENDOSCOPIC ENDONASAL SURGERY  6/23/2014    Procedure: OPTICAL TRACKING SYSTEM ENDOSCOPIC ENDONASAL SURGERY;  Surgeon: Yolanda Whitaker MD;  Location:  OR     STOMACH SURGERY       TONSILLECTOMY       TUBAL LIGATION      1975       ALLERGIES  Novocain [procaine]; Mirtazapine; and Nefazodone    FAMILY HX:  Family History   Problem Relation Age of Onset     Prostate Cancer Father      Cancer Sister         SCLC     Cancer Sister      Macular Degeneration Daughter      Glaucoma No family hx of        SOCIAL HX:  History     Social History     Marital Status:      Spouse Name: N/A     Number of Children: N/A     Years of Education: N/A     Social History Main Topics     Smoking status: Former Smoker     Quit date: 06/10/2004     Smokeless tobacco: Never Used     Alcohol Use: No     Drug Use: No     Sexual Activity: Not on file     Other Topics Concern     None     Social History Narrative       ROS:  Constitutional: No fever, chills, or sweats. No weight gain/loss.   HEENT: No visual disturbance, ear ache, epistaxis, sore throat.   Allergies/Immunologic: Negative.   Respiratory: No cough, hemoptysis.   Cardiovascular: As per HPI.   GI: No nausea, vomiting, hematemesis, melena, or hematochezia.   : No urinary  "frequency, dysuria, or hematuria.   Integument: No rash.   Psychiatric: No anxiety / depression.   Neuro: No speech disturbance, focal sensory or motor deficit.   Endocrinology: No polyuria / polyphagia.   Musculoskeletal: No myalgia.    VITAL SIGNS:  /76 (BP Location: Left arm, Patient Position: Chair, Cuff Size: Adult Large)   Pulse 73   Ht 1.651 m (5' 5\")   Wt 92.1 kg (203 lb)   LMP  (LMP Unknown)   SpO2 100%   BMI 33.78 kg/m    Body mass index is 33.78 kg/m .  Wt Readings from Last 2 Encounters:   11/19/19 92.1 kg (203 lb)   09/27/19 90.1 kg (198 lb 11.2 oz)   Repeat Blood Pressure:  BP Pulse Site Cuff Size Time Date   122/76 73 Left arm Adult Large  1:35 PM 11/19/2019     Pain Information  Score Location Time Date   No Pain (0) ---  1:35 PM 11/19/2019   Comment: Per pt has no C/P but some SOB   No orthostatic vitals data filed.  No peak flow data filed.      PHYSICAL EXAM  Gabino Jones is a 79 year old female.in no acute distress.  HEENT: Eyes Nonicteric.  Neck: JVP normal.  Carotids +3/3 bilaterally without bruits.  Lungs: CTA.  Cor: RRR. Normal S1 and S2.  Grade 1 systolic murmur.  No rub, or gallop.  PMI in Lf 5th ICS.  Abd: Soft, nontender, nondistended.  NABS.  No pulsatile mass.  Extremities: No C/C/E.  Pulses +3/3 symmetric in upper and lower extremities.  Neuro: Grossly intact.  Psych: A&O x 3.  Skin: No rash.    LABS  Recent Labs   Lab Test 11/19/19  1158 09/20/19  0602   WBC 8.4 6.8   HGB 11.0* 8.8*   HCT 35.4 28.7*    198     Recent Labs   Lab Test 11/19/19  1158 09/20/19  0602   * 134   POTASSIUM 3.9 4.1   CHLORIDE 97 102   CO2 27 25   GLC 78 101*   BUN 10 13   CR 0.75 0.66   STEFFANIE 8.7 8.3*     Recent Labs   Lab Test 04/23/19  1511 08/01/17  0914  07/29/16   CHOL 118 228*   < > 166.0   HDL 58 55   < > 71.0*   LDL 43 145*   < > 82.4   TRIG 87 139   < > 63.0   CHOLHDLRATIO  --   --   --  2.3   NHDL 60 173*   < >  --     < > = values in this interval not displayed.      Echo " 19:    Interpretation Summary  Global and regional left ventricular function is normal with an EF of 60-65%.  Global right ventricular function is normal.  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 The peak velocity across  the aortic valve is 2.7 m/sec. The mean gradient is 14 mmHg.  Estimated pulmonary artery systolic pressure is 33 mmHg.  The inferior vena cava was normal in size with preserved respiratory  variability.  No pericardial effusion is present.    EK16  NSR with normal intervals and axes.  No ST shift, TWI, or Q waves.  No ectopy.  No LVH.    ECHO: 14  Global and regional left ventricular function is hyperkinetic with an EF of 65-70%. Global right ventricular function is normal. Moderate aortic stenosis. The peak aortic velocity is 3.2 m/sec. The mean gradient across the aortic valve is 22 mmHg. The aortic valve area is 1.2 cm2 by the continuity equation (with an LVOT of 23 mm). Moderate aortic insufficiency. Moderate tricuspid insufficiency is present. Moderate pulmonary hypertension. Right ventricular systolic pressure is 40 mmHg above the right atrial pressure. The inferior vena cava wi dilated at 2 cm without respiratory variability, consistent with increased right atrial pressure. No pericardial effusion is present.    ECHO (16):  Interpretation Summary  Global and regional left ventricular function is normal with an EF of 60-65%.  Right ventricular function, chamber size, wall motion, and thickness are normal.  Mild to moderate aortic insufficiency is present.  Mild aortic stenosis is present.  Pulmonary artery systolic pressure is normal.  No pericardial effusion is present.    ECHO (18):  Mild to moderate aortic insufficiency is present.  Moderate aortic stenosis is present.  The aortic valve area is 1.2 cm^2, by the continuity equation.  Mild progression of aortic stenosis  since last study in 5/2016.    ECHO:  (4/30/19)  Interpretation Summary:   Global and regional left ventricular function is hyperkinetic with an EF of 65-70%.  Severe aortic valve calcification is present.  Cannot exclude a bicuspid aortic valve.  Severe aortic stenosis is present.  The peak aortic velocity is 4.6 m/sec.  The mean gradient across the aortic valve is 50 mmHg.  The aortic valve area is 0.8 cm^2, by the continuity equation.  Moderate aortic insufficiency is present.  This study was compared with the study from 1/5/19 and aortic stenosis appears worse .    ECHO:  (9/18/19)  TAVR with 23 mm Harding Loco 3.The mean gradient is 15 mmHg.Trace aortic insufficiency is present.  Global and regional left ventricular function is hyperkinetic with an EF of 65-70%. This likely contributes to the gradient across TAVR.  Right ventricular function, chamber size, wall motion, and thickness are normal.  No pericardial effusion is present.  IVC diameter <2.1 cm collapsing >50% with sniff suggests a normal RA pressure of 3 mmHg.  No significant change from yesterday.    ECHO:  (9/18/2019)  Intraprocedural TTE for TAVR with 23 mm Harding Loco 3 prosthesis.  Preprocedural images show a severely calcified aortic valve with severely  restricted leaflet motion and moderate AI.  Postprocedural images show well-seated 23 mm Loco 3 TAVR bioprosthesis, with  resolution of aortic stenosis (peak velocity 2.1 m/s, mean gradient 9 mmHg.)  There is trace valvular and no paravalular AI.  There is no pericardial effusion on the pre- or postprocedural images.  This study was compared with the study from 4/30/19: There has been interval  TAVR with resolution of aortic stenosis and only trace valvular aortic  insufficiency on the postprocedural images.    Cardiac MRI (5/6/16):  IMPRESSION:  1.  Normal left ventricular size and systolic function with a calculated ejection fraction of  69 %.  2.  Normal right ventricular size and  systolic function with a calculated ejection fraction of 69%.   3.  There is decreased leaflet excursion with moderate sclerosis of the aortic valve with flow acceleration consistent with moderate aortic stenosis and mild aortic insufficiency.  4.  On delayed enhancement imaging, there is no abnormal hyperenhancement to suggest myocardial scar/inflammation/infiltration.  5.  There is no area of infarction or mass noted in the left ventricular apex.     STRESS TEST:  None    CARDIAC CATH: 7/18/2019    Mild elevated Pulmonary Hypertension.    Right sided filling pressures are mildly elevated.    Left sided filling pressures are moderately elevated.    Moderate stenosis (70%) distal RCA, physiologically insignificant (FFR 0.83)    Moderate ostial LCx (50%), physiologically insignificant (FFR 0.89)    Pressure wire/FFR was performed on the lesion. FFR: 0.89.    Pressure wire/FFR was performed on the lesion. FFR: 0.83.    Left Anterior Descending   Mid LAD lesion is 40% stenosed.   Left Circumflex   Ost Cx to Prox Cx lesion is 50% stenosed.   Prox Cx to Mid Cx lesion is 60% stenosed. Pressure wire/FFR was performed on the lesion. FFR: 0.89. The LMCA was engaged with a 6 Fr Ikari LF 3.5 GC and the FFR of the ostial and mid LCx were measured with the Opsens wire. The FFR at peak hyperemia was 0.89.   Right Coronary Artery   Prox RCA to Mid RCA lesion is 30% stenosed.   Dist RCA lesion is 70% stenosed. Pressure wire/FFR was performed on the lesion. FFR: 0.83. The RCA was engaged with a 6 Fr Ikari RT 1.0 GC and the FFR of the distal RCA was measured with the Opsens wire. The FFR at peak hyperemia was 0.83.       CT CHEST:  4/13/16  Findings: Coronary arterial calcifications. Heart is not enlarged. Focal hypodensity left ventricular apex. In the Main pulmonary and aorta are not enlarged. No mediastinal, hilar or axillary adenopathy. Calcified left paraesophageal node. Calcified granuloma in the in the  left lower lobe  medially. Mild interlobular septal thickening.   6 mm nodule left lower lobe image 42. Unchanged  3 mm nodule right upper lobe image 17. Unchanged  4 mm subpleural nodule left lower lobe image 33. Unchanged  Other scattered nodules are also unchanged     Moderate centrilobular emphysematous changes in the lungs.     Evaluation of the upper abdomen is limited. Enlarged ita hepatis  lymph nodes are decreased. Cholecystectomy clips.       Bones: No suspicious bony lesions.     IMPRESSION:  1. Stable pulmonary nodules for nearly 2 years.  2. Focal hypodensity in the left ventricular apex could represent an infarct versus ischemia. Cardiac MRI could be helpful in further evaluation.  3. Subtle groundglass opacity within the lungs could represent infection or inflammatory possibly drug induced or chronic aspiration    CARDIAC MRI:  5/6/16  IMPRESSION:  1. Normal left ventricular size and systolic function with a calculated ejection fraction of 69 %.  2. Normal right ventricular size and systolic function with a calculated ejection fraction of 69%.   3. There is decreased leaflet excursion with moderate sclerosis of the aortic valve with flow acceleration consistent with moderate aortic stenosis and mild aortic insufficiency.  4. On delayed enhancement imaging, there is no abnormal hyperenhancement to suggest myocardial scar/inflammation/infiltration.  5. There is no area of infarction or mass noted in the left ventricular apex.     ASSESSMENT AND PLAN:   1. Aortic stenosis, s/p TAVR 9/18/19.  Class III Symptoms prior to TAVR.  Successful placement of 23mm Harding Loco 3 valve. Echo today shows valve is well seated with gradient of 14 mmHg (normal gradient for TAVR). Since placement, she has been doing much better. Exercise capacity has improved, however she has limited herself as she is not sure what exercises she can do. It appears she was not initially referred for cardiac rehab, so we will do this today. I provided  reassurance that exercise is safe and encouraged. Advised to avoid heavy lifting.    2. R/O Cardiac Mass.  The CT scan reported out a hypodensity in the LV apex but this could not be further defined.  Cardiac MRI showed no mass.    3. Lipids.  LDL increased from 80 to 145 mg/dl.  Patient on Pravastatin 40 mg qd.  Convert to Lipitor 20 mg qd.    4. HTN.  Well controlled.  Continue on Lisinopril 30 qd and HCTZ 12.5 qd.  The patient's lightheadedness does not appear to be orthostatic in nature.  Given the prior bleeding, check CBC today.    5. CAD: 2 vessel CAD (RCA, LCx), neither physiologically significant  - Continue ASA, statin, BP control    FOLLOW UP:  1 year      ATTENDING NOTE:  Patient has been seen and evaluated by me on 11/19/2019. I have reviewed the documentation above.  I have reviewed today's vital signs, medications, labs, and imaging results.  I have reviewed and edited, as necessary, the history, review of systems, physical examination, and assessment and plan.  I have discussed my assessment and plan with the cardiology fellow.  Gabino Jones is a 79 year old female with risk factor profile (+) HTN, (-) DM, (+) hypercholesterolemia, (-) tobacco use, (-) fam Hx premature CAD, Hx moderate CAD (540% mid LAD, 50-60% prox LCx with FFR 0.89, 70% distal RCA with FFR 0.83), severe aortic stenosis, s/p TAVR with 23 mm ES III (Sept 2019), residual gradient 15 mm Hg, normal LV function, returns for routine follow up.  Dyspnea has markedly improved.  Exam documented above.  Repeat ECHO today showed LVEF 60-65%,14 mm Hg gradient across Ao valve, trace valvular regurgitation, no paravalvular leak.  No further intervention planned.  Continue ASA for underlying CAD.  Plavix had to be stopped due to recurrent epistaxis leading to multiple ER visits.    Blake Hobbs MD     Cardiovascular Division    CC  Patient Care Team:  Rebekah Boogie MD as PCP - General ([facility])  SabasGarett armenta,  MD as MD (Oncology)  Laron Archibald MD as MD (Ophthalmology)  Yolanda Whitaker MD as MD (Otolaryngology)  Matt Quiroga MD as MD (Ophthalmology)  Rebekah Boogie MD as Assigned PCP  Marilin Sesay RN as Nurse Coordinator (Oncology)  Care, [facility] ([facility] ([place]))  YOLANDA WHITAKER

## 2019-11-19 NOTE — NURSING NOTE
Labs: Patient was given results of the laboratory testing obtained today. Patient was instructed to return for the next laboratory testing in one week . Patient demonstrated understanding of this information and agreed to call with further questions or concerns.   Med Reconcile: Reviewed and verified all current medications with the patient. The updated medication list was printed and given to the patient.  Return Appointment: Patient given instructions regarding scheduling next clinic visit. Patient demonstrated understanding of this information and agreed to call with further questions or concerns.  Patient stated she understood all health information given and agreed to call with further questions or concerns.

## 2019-11-19 NOTE — TELEPHONE ENCOUNTER
lisinopril (PRINIVIL/ZESTRIL) 10 MG tablet     Last Written Prescription Date:  9/21/19  Last Fill Quantity: 30,   # refills: 1  Last Office Visit : 9/27/19  Future Office visit: 11/19/19      Routing refill request to provider for review/approval because :  last rf   #30   1 rf    rf qty?

## 2019-11-19 NOTE — PATIENT INSTRUCTIONS
It was a pleasure to see you in the cardiology clinic today.    If you have any questions, you can reach my nurse, Antonella Cortes, at [phone].  Press Option #1 for the [facility], and then press Option #f 4 or nursing.    Note the new medications: None    Stop the following medications: None    Tests ordered today: Fasting lipid panel at your convenience  Cardiac rehab referral - If you have not heard from the scheduling office within 2 business days, please call [phone]    The results from today include: ECHO    Procedure  Echocardiogram with two-dimensional, color and spectral Doppler performed.  ___________________________________     Interpretation Summary  Global and regional left ventricular function is normal with an EF of 60-65%.  Global right ventricular function is normal.  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 The peak velocity across  the aortic valve is 2.7 m/sec. The mean gradient is 14 mmHg.  Estimated pulmonary artery systolic pressure is 33 mmHg.  The inferior vena cava was normal in size with preserved respiratory  variability.  No pericardial effusion is present.  _____________________________________________________________________________  __        Left Ventricle  Left ventricular size is normal. Left ventricular wall thickness is normal.  Global and regional left ventricular function is normal with an EF of 60-65%.  Grade II or moderate diastolic dysfunction. No regional wall motion  abnormalities are seen.     Right Ventricle  The right ventricle is normal size. Global right ventricular function is  normal.     Atria  The right atria appears normal. Mild left atrial enlargement is present. LA  volume is underestimated, visually is enlarged. The atrial septum is intact as  assessed by color Doppler .     Mitral Valve  Mild mitral annular calcification is  present. Trace mitral insufficiency is  present. The mean mitral valve gradient is 4.0 mmHg. No mitral stenosis  present.        Aortic Valve  Trace aortic insufficiency is present. Post TAVR with 23 mm Harding Loco 3  which is well seated without paravalvular regurgitation. The peak velocity  across the aortic valve is 2.7 m/sec. The mean gradient is 14 mmHg.     Tricuspid Valve  The tricuspid valve is normal. Estimated pulmonary artery systolic pressure is  30 mmHg plus right atrial pressure.     Pulmonic Valve  The pulmonic valve is normal.     Vessels  The aorta root is normal. The inferior vena cava was normal in size with  preserved respiratory variability.     Pericardium  No pericardial effusion is present.        Compared to Previous Study  This study was compared with the study from 19 . There has been no change.  _____________________________________________________________________________  __  MMode/2D Measurements & Calculations     Ao root diam: 3.3 cm  asc Aorta Diam: 3.4 cm  LVOT diam: 2.0 cm  LVOT area: 3.3 cm2  LA Volume (BP): 51.5 ml  LA Volume Index (BP): 25.9 ml/m2        Doppler Measurements & Calculations  MV E max felix: 139.0 cm/sec  MV A max felix: 129.3 cm/sec  MV E/A: 1.1  MV max P.8 mmHg  MV mean P.0 mmHg  MV V2 VTI: 41.3 cm  MVA(VTI): 2.2 cm2  MV dec slope: 570.8 cm/sec2  MV dec time: 0.24 sec     Ao V2 max: 268.8 cm/sec  Ao max P.9 mmHg  Ao V2 mean: 171.3 cm/sec  Ao mean P.4 mmHg  Ao V2 VTI: 55.5 cm  PATRICIA(I,D): 1.7 cm2  PATRICIA(V,D): 1.8 cm2  LV V1 max P.5 mmHg  LV V1 max: 145.7 cm/sec  LV V1 VTI: 28.1 cm  SV(LVOT): 92.1 ml  SI(LVOT): 46.3 ml/m2  TR max felix: 273.9 cm/sec  TR max P.0 mmHg  AV Felix Ratio (DI): 0.54  PATRICIA Index (cm2/m2): 0.83  E/E' av.9  Lateral E/e': 21.3  Medial E/e': 24.6     I would like you to follow up with nurse practitioner in 6 months.    Sincerely,      Blake Hobbs MD     [facility]    Results for  ALEX ANAYA (MRN [id number]) as of 11/19/2019 15:33   Ref. Range 11/19/2019 11:58   Sodium Latest Ref Range: 133 - 144 mmol/L 130 (L)   Potassium Latest Ref Range: 3.4 - 5.3 mmol/L 3.9   Chloride Latest Ref Range: 94 - 109 mmol/L 97   Carbon Dioxide Latest Ref Range: 20 - 32 mmol/L 27   Urea Nitrogen Latest Ref Range: 7 - 30 mg/dL 10   Creatinine Latest Ref Range: 0.52 - 1.04 mg/dL 0.75   GFR Estimate Latest Ref Range: >60 mL/min/1.73_m2 75   GFR Estimate If Black Latest Ref Range: >60 mL/min/1.73_m2 87   Calcium Latest Ref Range: 8.5 - 10.1 mg/dL 8.7   Anion Gap Latest Ref Range: 3 - 14 mmol/L 5   Glucose Latest Ref Range: 70 - 99 mg/dL 78   WBC Latest Ref Range: 4.0 - 11.0 10e9/L 8.4   Hemoglobin Latest Ref Range: 11.7 - 15.7 g/dL 11.0 (L)   Hematocrit Latest Ref Range: 35.0 - 47.0 % 35.4   Platelet Count Latest Ref Range: 150 - 450 10e9/L 253   RBC Count Latest Ref Range: 3.8 - 5.2 10e12/L 3.77 (L)   MCV Latest Ref Range: 78 - 100 fl 94   MCH Latest Ref Range: 26.5 - 33.0 pg 29.2   MCHC Latest Ref Range: 31.5 - 36.5 g/dL 31.1 (L)   RDW Latest Ref Range: 10.0 - 15.0 % 14.9

## 2019-12-12 NOTE — NURSING NOTE
Chief Complaint   Patient presents with     RECHECK     6 month follow-up     Blood pressure (!) 186/81, pulse 80, not currently breastfeeding.    Charlotte Case EMT

## 2019-12-12 NOTE — LETTER
"2019        RE: Gabino Jones  [address]  [facility] [address]     Dear Colleague,    Thank you for referring your patient, Gabino Jones, to the [facility] EAR [facility]AT at [facility]. Please see a copy of my visit note below.      Otolaryngology Clinic      Name: Gabino Jones  MRN: [id number]  Age: 79 year old  : 1940  2019     Diagnosis:   Right-sided sinonasal mucosal malignant melanoma.     Treatment:  The patient underwent endoscopic resection on 2014. She completed postoperative radiation therapy on 2014.     History of Present Illness:   Gabino Jones is a 79 year old female with the above history who presents for follow up. The patient notes that she has had 4 episodes of epistaxis in the last week out of both nares and states that this is induced by her harsh cough which is apparently a more chronic problem for her. She actually went to the hospital for one of these nose bleeds a few months ago. She was given a nasal clamp at that time and the  reports that this has helped quell her bleeds. They also have been trying nasal oil drops 2-3 times a day and have a humidifier at home.   also notes that he believes the patient's hearing was decreased in the left side at one point until it \"popped\" and her hearing returned. Of note, the patient does have a hearing aid on that side. The patient states that the hearing on her right side is almost completely gone. The patient also notes that she is also experiencing deteriorating vision in her right eye while she remains to have no vision in her left. Her  states that the patient's vision is actually improving as of late.    Review of Systems:   Pertinent items are noted in HPI or as in patient entered ROS below, remainder of complete ROS is negative.      Physical Exam:   BP (!) 186/81 (BP Location: Left arm, Patient Position: Chair, Cuff Size: Adult Regular)   " Pulse 80   LMP  (LMP Unknown)      Constitutional: The patient was well-groomed and in no acute distress.    Skin: Warm and pink.    Neurologic: Alert and oriented x3. Cranial nerves III-XII within normal limits. Voice quality is normal.    Psychiatric: The patient's affect was calm, cooperative and appropriate.    Respiratory: Breathing comfortably without stridor or exertion of accessory muscles.    Eyes: Pupils were equal and reactive. Extraocular movements are intact.    Head: Normocephalic and atraumatic. No lesions or scars.    Ears: External auditory canals and tympanic membranes were clear.    Nose: Sinuses were nontender. Anterior rhinoscopy revealed midline septum and absence of purulence or polyps.    Oral cavity/Oropharynx: Normal tongue, floor of mouth, buccal mucosa and palate. No lesions or masses on inspection or palpation. No abnormal lymph tissue in the oropharynx.    Neck: The parotids are soft without masses. Supple with normal laryngeal and tracheal landmarks.    Lymphatic: There is no palpable lymphadenopathy or other masses in the neck or parotids.      Nasal endoscopy performed to examine the posterior nasal passages for nasal mucosal malignant melanoma.    Verbal consent obtained. Topical anesthetic/decongestant solution applied per patient request.   A rigid endoscope was passed into each nasal cavity separately.  Findings are as follows:  Removed abundant crusting from the right side. There are abundant mucosal varices all through the nasal mucosa. They actively bled . These were cauterized with silver nitrate. No evidence of masses or lesions on today's nasal endoscopy.   Left middle meatus:  Normal healthy meatus, no purulence or polyps.    Left posterior nasal cavity:  No masses, lesions or abnormal tissue.      Nasopharynx:  Clear, no lesions, crusting or inflammation     Imaging:  MR brain (09/16/2019):  Impression:  1. Stable planum sphenoidale meningioma.  2. Stable focus of  enhancement in the subependymal region anterior to  the anterior horn of the left lateral ventricle.  3. Stable postoperative changes in the right sinonasal region without  definite evidence for recurrence.  4. Stable post radiation changes in the bilateral frontal lobe white Matter.    Assessment and Plan:  79 year old female who underwent endoscopic resection and radiation therapy in 2014. Her most recent MRI this last September is unchanged from prior. The patient's nasal passages were very dry and she had as significant amount of crust formations removed on exam. They are trying to manage this with oil drops and humidifiers at home but the patient continued to have nose-bleeds. I cauterized some vessels here today and will prescribed her a prescription of Afrin. I advised them on how to properly use this. We will have her return in 3 months to monitor the status of this.      Scribe Disclosure:  I, James Turner, am serving as a scribe to document services personally performed by Yolanda Whitaker MD at this visit, based upon the provider's statements to me. All documentation has been reviewed by the aforementioned provider prior to being entered into the official medical record.    The documentation recorded by the scribe accurately reflects the services I personally performed and the decisions made by me.          Again, thank you for allowing me to participate in the care of your patient.      Sincerely,    Yolanda Whitaker MD

## 2019-12-12 NOTE — PROGRESS NOTES
"  Otolaryngology Clinic      Name: Gabino Jones  MRN: [id number]  Age: 79 year old  : 1940  2019     Diagnosis:   Right-sided sinonasal mucosal malignant melanoma.     Treatment:  The patient underwent endoscopic resection on 2014. She completed postoperative radiation therapy on 2014.     History of Present Illness:   Gabino Jones is a 79 year old female with the above history who presents for follow up. The patient notes that she has had 4 episodes of epistaxis in the last week out of both nares and states that this is induced by her harsh cough which is apparently a more chronic problem for her. She actually went to the hospital for one of these nose bleeds a few months ago. She was given a nasal clamp at that time and the  reports that this has helped quell her bleeds. They also have been trying nasal oil drops 2-3 times a day and have a humidifier at home.   also notes that he believes the patient's hearing was decreased in the left side at one point until it \"popped\" and her hearing returned. Of note, the patient does have a hearing aid on that side. The patient states that the hearing on her right side is almost completely gone. The patient also notes that she is also experiencing deteriorating vision in her right eye while she remains to have no vision in her left. Her  states that the patient's vision is actually improving as of late.    Review of Systems:   Pertinent items are noted in HPI or as in patient entered ROS below, remainder of complete ROS is negative.      Physical Exam:   BP (!) 186/81 (BP Location: Left arm, Patient Position: Chair, Cuff Size: Adult Regular)   Pulse 80   LMP  (LMP Unknown)      Constitutional: The patient was well-groomed and in no acute distress.    Skin: Warm and pink.    Neurologic: Alert and oriented x3. Cranial nerves III-XII within normal limits. Voice quality is normal.    Psychiatric: The patient's affect was calm, " cooperative and appropriate.    Respiratory: Breathing comfortably without stridor or exertion of accessory muscles.    Eyes: Pupils were equal and reactive. Extraocular movements are intact.    Head: Normocephalic and atraumatic. No lesions or scars.    Ears: External auditory canals and tympanic membranes were clear.    Nose: Sinuses were nontender. Anterior rhinoscopy revealed midline septum and absence of purulence or polyps.    Oral cavity/Oropharynx: Normal tongue, floor of mouth, buccal mucosa and palate. No lesions or masses on inspection or palpation. No abnormal lymph tissue in the oropharynx.    Neck: The parotids are soft without masses. Supple with normal laryngeal and tracheal landmarks.    Lymphatic: There is no palpable lymphadenopathy or other masses in the neck or parotids.      Nasal endoscopy performed to examine the posterior nasal passages for nasal mucosal malignant melanoma.    Verbal consent obtained. Topical anesthetic/decongestant solution applied per patient request.   A rigid endoscope was passed into each nasal cavity separately.  Findings are as follows:  Removed abundant crusting from the right side. There are abundant mucosal varices all through the nasal mucosa. They actively bled . These were cauterized with silver nitrate. No evidence of masses or lesions on today's nasal endoscopy.   Left middle meatus:  Normal healthy meatus, no purulence or polyps.    Left posterior nasal cavity:  No masses, lesions or abnormal tissue.      Nasopharynx:  Clear, no lesions, crusting or inflammation     Imaging:  MR brain (09/16/2019):  Impression:  1. Stable planum sphenoidale meningioma.  2. Stable focus of enhancement in the subependymal region anterior to  the anterior horn of the left lateral ventricle.  3. Stable postoperative changes in the right sinonasal region without  definite evidence for recurrence.  4. Stable post radiation changes in the bilateral frontal lobe white  Matter.    Assessment and Plan:  79 year old female who underwent endoscopic resection and radiation therapy in 2014. Her most recent MRI this last September is unchanged from prior. The patient's nasal passages were very dry and she had as significant amount of crust formations removed on exam. They are trying to manage this with oil drops and humidifiers at home but the patient continued to have nose-bleeds. I cauterized some vessels here today and will prescribed her a prescription of Afrin. I advised them on how to properly use this. We will have her return in 3 months to monitor the status of this.      Scribe Disclosure:  I, James Turner, am serving as a scribe to document services personally performed by Yolanda Whitaker MD at this visit, based upon the provider's statements to me. All documentation has been reviewed by the aforementioned provider prior to being entered into the official medical record.    The documentation recorded by the scribe accurately reflects the services I personally performed and the decisions made by me.

## 2019-12-12 NOTE — PATIENT INSTRUCTIONS
1. You were seen in the ENT Clinic today by Dr. Barboza.  If you have any questions or concerns after your appointment, please call   - Option 1: ENT Clinic: [phone]  - Option 2: Yordan (Dr. Barboza's Nurse): [phone]    2. Plan to return to clinic in 2 months; 2/13 at 2:20pm     3. Please obtain Afrin nasal spray (over the counter) and use if nose bleeds occur     Natice Schwab, RN  [facility] Otolaryngology  [phone]

## 2020-01-01 ENCOUNTER — OFFICE VISIT (OUTPATIENT)
Dept: FAMILY MEDICINE | Facility: CLINIC | Age: 80
End: 2020-01-01
Payer: MEDICARE

## 2020-01-01 ENCOUNTER — HEALTH MAINTENANCE LETTER (OUTPATIENT)
Age: 80
End: 2020-01-01

## 2020-01-01 VITALS
TEMPERATURE: 97.6 F | OXYGEN SATURATION: 97 % | HEART RATE: 76 BPM | HEIGHT: 65 IN | SYSTOLIC BLOOD PRESSURE: 128 MMHG | BODY MASS INDEX: 35.59 KG/M2 | DIASTOLIC BLOOD PRESSURE: 68 MMHG | WEIGHT: 213.6 LBS | RESPIRATION RATE: 20 BRPM

## 2020-01-01 DIAGNOSIS — F41.1 GAD (GENERALIZED ANXIETY DISORDER): ICD-10-CM

## 2020-01-01 DIAGNOSIS — E66.01 MORBID OBESITY (H): ICD-10-CM

## 2020-01-01 DIAGNOSIS — J06.9 URI WITH COUGH AND CONGESTION: ICD-10-CM

## 2020-01-01 DIAGNOSIS — Z12.31 ENCOUNTER FOR SCREENING MAMMOGRAM FOR BREAST CANCER: ICD-10-CM

## 2020-01-01 DIAGNOSIS — Z95.2 S/P AORTIC VALVE REPLACEMENT: ICD-10-CM

## 2020-01-01 DIAGNOSIS — F33.0 MILD RECURRENT MAJOR DEPRESSION (H): ICD-10-CM

## 2020-01-01 DIAGNOSIS — E28.39 ESTROGEN DEFICIENCY: ICD-10-CM

## 2020-01-01 DIAGNOSIS — C43.9 METASTATIC MALIGNANT MELANOMA (H): ICD-10-CM

## 2020-01-01 DIAGNOSIS — C30.0 MALIGNANT MELANOMA OF NASAL CAVITY (H): ICD-10-CM

## 2020-01-01 DIAGNOSIS — R45.1 AGITATION: ICD-10-CM

## 2020-01-01 DIAGNOSIS — H53.9 CHANGE IN VISION: ICD-10-CM

## 2020-01-01 DIAGNOSIS — I10 BENIGN ESSENTIAL HYPERTENSION: Primary | ICD-10-CM

## 2020-01-01 DIAGNOSIS — K21.9 GASTROESOPHAGEAL REFLUX DISEASE, ESOPHAGITIS PRESENCE NOT SPECIFIED: ICD-10-CM

## 2020-01-01 PROCEDURE — 99214 OFFICE O/P EST MOD 30 MIN: CPT | Performed by: FAMILY MEDICINE

## 2020-01-01 RX ORDER — LISINOPRIL 10 MG/1
10 TABLET ORAL DAILY
Qty: 90 TABLET | Refills: 1 | Status: SHIPPED | OUTPATIENT
Start: 2020-01-01

## 2020-01-01 RX ORDER — BUPROPION HYDROCHLORIDE 300 MG/1
300 TABLET ORAL EVERY MORNING
Qty: 90 TABLET | Refills: 1 | Status: SHIPPED | OUTPATIENT
Start: 2020-01-01

## 2020-01-01 RX ORDER — OLANZAPINE 5 MG/1
5 TABLET ORAL AT BEDTIME
Qty: 110 TABLET | Refills: 0 | Status: SHIPPED | OUTPATIENT
Start: 2020-01-01

## 2020-01-01 RX ORDER — BENZONATATE 100 MG/1
100 CAPSULE ORAL 2 TIMES DAILY PRN
Qty: 40 CAPSULE | Refills: 3 | Status: SHIPPED | OUTPATIENT
Start: 2020-01-01

## 2020-01-01 ASSESSMENT — PATIENT HEALTH QUESTIONNAIRE - PHQ9
5. POOR APPETITE OR OVEREATING: MORE THAN HALF THE DAYS
SUM OF ALL RESPONSES TO PHQ QUESTIONS 1-9: 6

## 2020-01-01 ASSESSMENT — ANXIETY QUESTIONNAIRES
3. WORRYING TOO MUCH ABOUT DIFFERENT THINGS: NOT AT ALL
2. NOT BEING ABLE TO STOP OR CONTROL WORRYING: MORE THAN HALF THE DAYS
6. BECOMING EASILY ANNOYED OR IRRITABLE: NEARLY EVERY DAY
5. BEING SO RESTLESS THAT IT IS HARD TO SIT STILL: NOT AT ALL
IF YOU CHECKED OFF ANY PROBLEMS ON THIS QUESTIONNAIRE, HOW DIFFICULT HAVE THESE PROBLEMS MADE IT FOR YOU TO DO YOUR WORK, TAKE CARE OF THINGS AT HOME, OR GET ALONG WITH OTHER PEOPLE: SOMEWHAT DIFFICULT
1. FEELING NERVOUS, ANXIOUS, OR ON EDGE: NEARLY EVERY DAY
7. FEELING AFRAID AS IF SOMETHING AWFUL MIGHT HAPPEN: NOT AT ALL
GAD7 TOTAL SCORE: 10
GAD7 TOTAL SCORE: 10

## 2020-01-01 ASSESSMENT — MIFFLIN-ST. JEOR: SCORE: 1444.76

## 2020-01-09 NOTE — PROGRESS NOTES
Subjective     Gabino Jones is a 79 year old female who presents to clinic today for the following health issues:    HPI   Hyperlipidemia Follow-Up      Are you regularly taking any medication or supplement to lower your cholesterol?   Yes- LIPITOR 20 MG    Are you having muscle aches or other side effects that you think could be caused by your cholesterol lowering medication?  No    Hypertension Follow-up      Do you check your blood pressure regularly outside of the clinic? No     Are you following a low salt diet?YES.     Are your blood pressures ever more than 140 on the top number (systolic) OR more   than 90 on the bottom number (diastolic), for example 140/90? Yes    Depression and Anxiety Follow-Up    How are you doing with your depression since your last visit? Worsened.    How are you doing with your anxiety since your last visit?  Worsened.    Are you having other symptoms that might be associated with depression or anxiety? Yes:  Irritable more at night.      Have you had a significant life event? No     Do you have any concerns with your use of alcohol or other drugs? No     S/P aortic valve replacement  The patient notes having a new heart valve placed 2 months ago. She notes that she is not to lift heavy weights. She notes having some shortness of breath if she moves too fast. But she is feeling much better overall.   She takes a baby aspirin daily.     Mood  The patient's Daughter sent a note with patient today. She notes that her Mother can be very anxious at times, especially towards the end of the day.   The patient notes that her depression is worsening towards the end of the day. They also recently lost their cat.   Daughter notes that patient has some times in the afternoon when she becomes agitated and confused.   They have Zyprexa that she takes in the evenings - 5 mg.   Would like to have additional on hand if things are tough in the afternoon some times.     Cough and cold  The patient's  " notes that the patient has difficulty with her right eye for past two weeks. \"The right eye looked infected the other day\". Patient notes having decreased vision in right eye. 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   Patient reports having a productive cough and sore throat. She also notes feeling feverish. No fevers though. Feeling better today.    has had a cold as well.     She has history of malignant melanoma of the nasal cavity.   She has an upcoming recheck.       Social History     Tobacco Use     Smoking status: Former Smoker     Packs/day: 1.00     Years: 40.00     Pack years: 40.00     Types: Cigarettes     Last attempt to quit: 6/10/2004     Years since quitting: 15.6     Smokeless tobacco: Never Used   Substance Use Topics     Alcohol use: No     Drug use: No     PHQ 2/25/2019 4/2/2019 4/23/2019   PHQ-9 Total Score 17 9 14   Q9: Thoughts of better off dead/self-harm past 2 weeks Not at all Not at all Not at all     JITENDRA-7 SCORE 2/25/2019 4/2/2019 4/23/2019   Total Score 14 (moderate anxiety) 9 (mild anxiety) -   Total Score 14 9 8     Suicide Assessment Five-step Evaluation and Treatment (SAFE-T)      Patient Active Problem List   Diagnosis     Malignant melanoma of nasal cavity (H)     Nasal pain     Metastatic malignant melanoma (H)     Proximal humerus fracture     Abnormal chest CT     Aortic stenosis     Hypertension goal BP (blood pressure) < 140/90     Skull lesion     JITENDRA (generalized anxiety disorder)     Mixed hyperlipidemia     Mild recurrent major depression (H)     Tubular adenoma of colon     Vitamin D deficiency     Senile dementia (H)     Lung nodule seen on imaging study     Insomnia     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)     Genetic testing     Gastroesophageal reflux disease with esophagitis     Fibromyalgia     Environmental and seasonal allergies     DDD (degenerative disc disease), lumbosacral     COPD (chronic obstructive " pulmonary disease) (H)     Chronic hyponatremia     Cancer-related pain     Acute renal insufficiency     Severe aortic stenosis     Other ill-defined heart diseases     Status post coronary angiogram     Aortic stenosis, severe     Past Surgical History:   Procedure Laterality Date     ANESTHESIA OUT OF OR MRI N/A 1/16/2019    Procedure: Anesthesia Coverage Brain MRI With Contrast @1330;  Surgeon: GENERIC ANESTHESIA PROVIDER;  Location: [facility] OR     C ANESTH,CERV SPINE, SITTING POSITION       COLONOSCOPY N/A 7/7/2017    Procedure: COMBINED COLONOSCOPY, SINGLE OR MULTIPLE BIOPSY/POLYPECTOMY BY BIOPSY;;  Surgeon: Sathya [facility] MD;  Location: [facility] OR     COLONOSCOPY WITH CO2 INSUFFLATION N/A 7/7/2017    Procedure: COLONOSCOPY WITH CO2 INSUFFLATION;  Combined,  [place], Gastroesophageal reflux disease without esophagitis  Flatulence, eructation, and gas pain, BMI 29.35, [facility] [id number];  Surgeon: Sathya [facility] MD;  Location: [facility] OR     COLONOSCOPY, SUBMUCOSA RESECTION N/A 10/4/2017    Procedure: COLONOSCOPY, SUBMUCOSA RESECTION;  Colonoscopy With Endoscopic Polypectomy with clips;  Surgeon: Milton Javier MD;  Location: [facility] OR     COMBINED ESOPHAGOSCOPY, GASTROSCOPY, DUODENOSCOPY (EGD) WITH CO2 INSUFFLATION N/A 7/7/2017    Procedure: COMBINED ESOPHAGOSCOPY, GASTROSCOPY, DUODENOSCOPY (EGD) WITH CO2 INSUFFLATION;  Combined,  [place], Gastroesophageal reflux disease without esophagitis  Flatulence, eructation, and gas pain, BMI 29.35, [facility] [id number];  Surgeon: Sathya [facility] MD;  Location: [facility] OR     CV CORONARY ANGIOGRAM N/A 7/18/2019    Procedure: CV CORONARY ANGIOGRAM;  Surgeon: Blake Hobbs MD;  Location:  HEART CARDIAC CATH LAB     CV CORONARY ANGIOGRAM N/A 7/18/2019    Procedure: Coronary Angiogram;  Surgeon: Blake Hobbs MD;  Location:  HEART CARDIAC CATH LAB     CV FRACTIONAL FLOW RESERVE Bilateral 7/18/2019    Procedure: Fractional Flow Reserve;   Surgeon: Blake Hobbs MD;  Location:  HEART CARDIAC CATH LAB     CV RIGHT HEART CATH N/A 7/18/2019    Procedure: CV RIGHT HEART CATH;  Surgeon: Blake Hobbs MD;  Location:  HEART CARDIAC CATH LAB     CV RIGHT HEART CATH N/A 7/18/2019    Procedure: Right Heart Cath;  Surgeon: Blake Hobbs MD;  Location:  HEART CARDIAC CATH LAB     CV TRANSCATHETER AORTIC VALVE REPLACEMENT N/A 9/18/2019    Procedure: Transcatheter (Harding size 23) Aortic Valve Replacement, Sentinal device placement, perfusion standby, cardiacc surgery standby, trans thorasic echocardiogram;  Surgeon: Drew Oates MD;  Location:  OR     ESOPHAGOSCOPY, GASTROSCOPY, DUODENOSCOPY (EGD), COMBINED N/A 7/7/2017    Procedure: COMBINED ESOPHAGOSCOPY, GASTROSCOPY, DUODENOSCOPY (EGD), BIOPSY SINGLE OR MULTIPLE;;  Surgeon: Sathya [facility] MD;  Location:  OR     ESOPHAGOSCOPY, GASTROSCOPY, DUODENOSCOPY (EGD), COMBINED N/A 1/6/2019    Procedure: COMBINED ESOPHAGOSCOPY, GASTROSCOPY, DUODENOSCOPY (EGD);  Surgeon: Cailin Paulino MD;  Location:  GI     GALLBLADDER SURGERY       HEART CATH FEMORAL CANNULIZATION WITH OPEN STANDBY REPAIR AORTIC VALVE N/A 9/18/2019    Procedure: Cardiopulmonary Bypass Standby;  Surgeon: Jose Antonio Kaye MD;  Location:  OR     NECK SURGERY       OPTICAL TRACKING SYSTEM CRANIOTOMY, EXCISE TUMOR, COMBINED Right 2/22/2017    Procedure: COMBINED OPTICAL TRACKING SYSTEM CRANIOTOMY, EXCISE TUMOR;  Surgeon: Lenora Pérez MD;  Location:  OR     OPTICAL TRACKING SYSTEM ENDOSCOPIC ENDONASAL SURGERY  6/23/2014    Procedure: OPTICAL TRACKING SYSTEM ENDOSCOPIC ENDONASAL SURGERY;  Surgeon: Yolanda Whitaker MD;  Location:  OR     STOMACH SURGERY       TONSILLECTOMY       TUBAL LIGATION      1975       Social History     Tobacco Use     Smoking status: Former Smoker     Packs/day: 1.00     Years: 40.00     Pack years: 40.00     Types: Cigarettes     Last  attempt to quit: 6/10/2004     Years since quitting: 15.6     Smokeless tobacco: Never Used   Substance Use Topics     Alcohol use: No     Family History   Problem Relation Age of Onset     Prostate Cancer Father      Cancer Sister         SCLC     Cancer Sister      Macular Degeneration Daughter      Glaucoma No family hx of          Current Outpatient Medications   Medication Sig Dispense Refill     acetaminophen (TYLENOL) 500 MG tablet Take 1 tablet (500 mg) by mouth every 6 hours as needed (Patient taking differently: Take 500-1,000 mg by mouth 2 times daily ) 100 tablet 3     aspirin (ASA) 81 MG EC tablet Take 1 tablet (81 mg) by mouth daily 90 tablet 3     atorvastatin (LIPITOR) 20 MG tablet Take 1 tablet (20 mg) by mouth daily (Patient taking differently: Take 20 mg by mouth At Bedtime ) 90 tablet 3     buPROPion (WELLBUTRIN XL) 150 MG 24 hr tablet TAKE 1 TABLET(150 MG) BY MOUTH EVERY MORNING 90 tablet 1     carboxymethylcellulose PF (REFRESH PLUS) 0.5 % ophthalmic solution Place 2 drops into both eyes 2 times daily       lisinopril (PRINIVIL/ZESTRIL) 10 MG tablet Take 1 tablet (10 mg) by mouth daily 30 tablet 1     melatonin 1 MG TABS tablet Take 1 mg by mouth At Bedtime        Misc Natural Product Nasal (PONARIS) SOLN Spray 2 drops in nostril 2 times daily       OLANZapine (ZYPREXA) 5 MG tablet Take 1 tablet (5 mg) by mouth At Bedtime 90 tablet 0     omeprazole (PRILOSEC) 20 MG DR capsule Take 20 mg by mouth daily       pantoprazole (PROTONIX) 40 MG EC tablet Take 1 tablet (40 mg) by mouth At Bedtime Take 30-60 minutes before a meal. 90 tablet 2     ranitidine (ZANTAC) 150 MG tablet Take 1 tablet (150 mg) by mouth At Bedtime (Patient taking differently: Take 150 mg by mouth every morning ) 90 tablet 1     sodium chloride (OCEAN) 0.65 % nasal spray Spray 1 spray into both nostrils daily as needed for congestion       venlafaxine (EFFEXOR-XR) 75 MG 24 hr capsule TAKE 3 CAPSULES BY MOUTH DAILY 270 capsule 2      Allergies   Allergen Reactions     Novocain [Procaine] Unknown and Rash     Mirtazapine      Nefazodone Unknown and Rash     Recent Labs   Lab Test 11/22/19  1139 11/19/19  1158 09/20/19  0602  09/18/19  1028 09/18/19  0655 08/21/19  1457  04/23/19  1511  12/12/18  1245  08/01/17  0914  02/15/17  0937   LDL 56  --   --   --   --   --   --   --  43  --   --   --  145*  --   --    HDL 57  --   --   --   --   --   --   --  58  --   --   --  55  --   --    TRIG 103  --   --   --   --   --   --   --  87  --   --   --  139  --   --    ALT  --   --   --   --  20 25 49   < > 19   < > 16   < > 20   < > 15   CR  --  0.75 0.66   < > 0.78 0.77 0.65   < > 0.86   < > 0.76   < > 0.78   < > 0.86   GFRESTIMATED  --  75 84   < > 72 73 84   < > 64   < > 73   < > 72   < > 64   GFRESTBLACK  --  87 >90   < > 83 85 >90   < > 75   < > 89   < > 87   < > 77   POTASSIUM  --  3.9 4.1   < > 4.0 4.2 4.1   < > 4.0   < > 4.5   < > 3.8   < > 3.9   TSH  --   --   --   --   --   --   --   --   --   --  1.72  --   --   --  2.27    < > = values in this interval not displayed.      BP Readings from Last 3 Encounters:   01/14/20 128/68   12/12/19 (!) 186/81   11/19/19 122/76    Wt Readings from Last 3 Encounters:   01/14/20 96.9 kg (213 lb 9.6 oz)   11/19/19 92.1 kg (203 lb)   09/27/19 90.1 kg (198 lb 11.2 oz)        Reviewed and updated as needed this visit by Provider         Review of Systems   ROS COMP: CONSTITUTIONAL: NEGATIVE for fever, chills, change in weight  EYES: POSITIVE for decreased vision in right eye and irritation   ENT/MOUTH: NEGATIVE for mouth problems; POSITIVE for sore throat and difficulty hearing out of right ear  RESP: POSITIVE for SOB and cough   CV: NEGATIVE for chest pain, palpitations or peripheral edema  PSYCHIATRIC: POSITIVE for depressed mood and agitation     This document serves as a record of the services and decisions personally performed and made by Rebekah Boogie MD. It was created on her behalf by Corina  "Tessa, a trained medical scribe. The creation of this document is based the provider's statements to the medical scribe.    Corina Tessa January 14, 2020 11:26 AM        Objective    /68   Pulse 76   Temp 97.6  F (36.4  C) (Oral)   Resp 20   Ht 1.651 m (5' 5\")   Wt 96.9 kg (213 lb 9.6 oz)   LMP  (LMP Unknown)   SpO2 97%   Breastfeeding No   BMI 35.54 kg/m    Body mass index is 35.54 kg/m .  Physical Exam   GENERAL: healthy, alert and no distress. Coughing occasionally.   EYES: right eye grossly normal to inspection, conjunctivae and sclerae normal  HENT: ear canals and TM's normal, mouth without ulcers or lesions  NECK: no adenopathy, no asymmetry, masses, or scars and thyroid normal to palpation  RESP: lungs clear to auscultation - no rales, rhonchi or wheezes  CV: regular rate and rhythm, normal S1 S2, no S3 or S4, no murmur, click or rub, no peripheral edema and peripheral pulses strong  PSYCH: mentation appears normal, affect normal/bright    Diagnostic Test Results:  No results found for this or any previous visit (from the past 24 hour(s)).        Assessment & Plan     1. Benign essential hypertension  Doing well. Well controlled. Tolerating medication.  No change in plan.   Refills have been ordered.   - lisinopril (PRINIVIL/ZESTRIL) 10 MG tablet; Take 1 tablet (10 mg) by mouth daily  Dispense: 90 tablet; Refill: 1    2. Agitation  Doing well overall.   She has been on the olanzapine 5mg at bedtime.   The family would like additional on hand in case there is agitation in the afternoon which occasionally occurs.   Additional olanzapine prescribed.    - OLANZapine (ZYPREXA) 5 MG tablet; Take 1 tablet (5 mg) by mouth At Bedtime May use 5 mg prn midday if needed.  Dispense: 110 tablet; Refill: 0    3. Morbid obesity (H)  Advised patient of health benefits weight loss can bring.   Encouraged regular exercise and healthy diet.   Will continue to monitor.     4. URI with cough and " "congestion  Patient's  reports \"right eye looked infected other day\".  Patient reports having a productive cough- had a slight fever and sore throat.   No concerns with exam at this time.   She has no fever, O2 sat is good.   Recommend tessalon perles as needed for cough. Fluids. Rest.   Discussed plan with patient.  Patient agreed.   Patient will continue to monitor symptoms- if worsening, will contact me. Consider antibiotics at that time.   Tessalon perles have been prescribed.   - benzonatate (TESSALON) 100 MG capsule; Take 1 capsule (100 mg) by mouth 2 times daily as needed for cough  Dispense: 40 capsule; Refill: 3    5. Malignant melanoma of nasal cavity (H)  Patient has history of.   She recently followed up with Dr. Whitaker on December 12th, 2019.   Patient has next appointment scheduled for February 13th, 2020.  Will continue as planned with ENT.    6. S/P aortic valve replacement  Patient reports having heart valve placed 2 months ago- cannot lift weights at this time. Has SOB if moving too fast.   She reports taking a baby aspirin daily.   Doing well. Well controlled. Tolerating medication.  No change in plan.   Will continue to monitor.  Follow up with Cardiology as planned.    7. JITENDRA (generalized anxiety disorder)  Patient's Daughter sent a note with parents today- Daughter notices patient can get anxiety towards end of day.   See #8.     8. Mild recurrent major depression (H)  Patient reports depression has worsened  She is interested in adjustment of medication.   Reviewed PHQ9 and GAD7  Discussed plan with patient.  Patient agreed.   Patient will start on higher 300 MG Wellbutrin daily.   Patient will come back in 1 month for recheck. Sooner if needed.   - buPROPion (WELLBUTRIN XL) 300 MG 24 hr tablet; Take 1 tablet (300 mg) by mouth every morning  Dispense: 90 tablet; Refill: 1    9. Estrogen deficiency  Patient is due for recheck.   DEXA order has been placed.  Patient will " "schedule appointment.   - DEXA HIP/PELVIS/SPINE - Future; Future    10. Encounter for screening mammogram for breast cancer  Patient is due.   Mammogram order has been placed.   Patient will schedule appointment.   - *MA Screening Digital Bilateral; Future    11. Change in vision  Patient requests new eye doctor referral for her routine care.   Referral placed.   - OPHTHALMOLOGY ADULT REFERRAL     BMI:   Estimated body mass index is 35.54 kg/m  as calculated from the following:    Height as of this encounter: 1.651 m (5' 5\").    Weight as of this encounter: 96.9 kg (213 lb 9.6 oz).   Weight management plan: Discussed healthy diet and exercise guidelines    Follow up- Come back in 1 month for depression recheck.     The information in this document, created by the medical scribe for me, accurately reflects the services I personally performed and the decisions made by me. I have reviewed and approved this document for accuracy prior to leaving the patient care area.    Rebekah Boogie MD  [facility]    "

## 2020-01-14 PROBLEM — Z95.2 S/P AORTIC VALVE REPLACEMENT: Status: ACTIVE | Noted: 2020-01-01

## 2020-01-14 PROBLEM — E66.01 MORBID OBESITY (H): Status: ACTIVE | Noted: 2020-01-01

## 2020-01-14 NOTE — PATIENT INSTRUCTIONS
Start on higher dose of Wellbutrin.    If cough worsens or persists, contact me.    I have placed a referral for an eye doctor. Schedule an appointment at your earliest convenience

## 2020-02-12 NOTE — TELEPHONE ENCOUNTER
Pending Prescriptions:                       Disp   Refills    ranitidine (ZANTAC) 150 MG tablet         90 tab*3            Sig: Take 1 tablet (150 mg) by mouth At Bedtime    Prescription approved per [facility] Refill Protocol.    Daily Nogueira RN BSN

## 2021-08-29 NOTE — PATIENT INSTRUCTIONS
TAVR post-procedure:    Plan Summary:  1) Aspirin 81 mg daily lifelong  2) Continue Plavix 75 mg daily for 6 months through 3/18/2020  3) Delay any nonurgent dental procedures for the first 3 month post TAVR  4) For all future dental cleanings and procedures you will need to take antibiotics prior - see instructions below.   5) Cardiac rehab in [place], we will arrange  6) Follow-up in 1 month with echo, ecg and labs prior    Prevention of Infective (Bacterial) Endocarditis:  You are at increased risk for developing adverse outcomes from infective endocarditis (IE), also known as bacterial endocarditis (BE) because of the new device in your heart. The guidelines for prevention of IE are to give patients antibiotics prior to any dental procedures that involve manipulation of gingival tissue or the periapical region of teeth, or perforation of the oral mucosa:      It is recommended to take Amoxicillin 2 gm by mouth as a single dose 30 to 60 minutes before procedure.     OR if allergic to Penicillin or Ampicillin:     Cephalexin 2 gm by mouth, or  Clindamycin 600 mg by mouth, or  Azithromycin or Clarithromycin 500 mg PO      No

## 2022-08-16 NOTE — PLAN OF CARE
Pt alert, only oriented to self, mentation waxes and wane. VSS on RA except for low grade temps, Tmax- 99.4 . Dyspnea, had increase work of breathing when put on commode, MD came and assessed pt, CXR done. Up w A1-A2 and walker to commode. K recheck 3.6. Mag 1.5, replaced, recheck- 2.7. Phosp 1.6, replaced, recheck at 0830. Denies pain. Mechanical/dental soft diet. R PIV-SL. Triggered sepsis, lactic 0.9. No BM. Voided x1 on commode, adequate output, needed repeated encouragement and multiple attempts. Will cont to monitor.    W Plasty Text: The lesion was extirpated to the level of the fat with a #15 scalpel blade.  Given the location of the defect, shape of the defect and the proximity to free margins a W-plasty was deemed most appropriate for repair.  Using a sterile surgical marker, the appropriate transposition arms of the W-plasty were drawn incorporating the defect and placing the expected incisions within the relaxed skin tension lines where possible.    The area thus outlined was incised deep to adipose tissue with a #15 scalpel blade.  The skin margins were undermined to an appropriate distance in all directions utilizing iris scissors.  The opposing transposition arms were then transposed into place in opposite direction and anchored with interrupted buried subcutaneous sutures.

## 2023-11-14 NOTE — PLAN OF CARE
Discharge Planner SLP   Patient plan for discharge: Unknown  Current status: Recommend dysphagia diet level 2 and thin liquids with 1:1 supervision.  Pt to be seated with upright posture and use small sips/bites during PO intake.  Pt not yet appropriate for diet advancement due to impulsivity and oral dysphagia. SLP to follow for diet advancement.  Barriers to return to prior living situation: Dysphagia, confusion, language  Recommendations for discharge: Defer to OT/PT  Rationale for recommendations: Swallowing function below baseline status       Entered by: Lyubov Carr 01/17/2019 1:53 PM        Plastic Surgeon (C): Dr Gallegos

## 2024-11-15 NOTE — PROGRESS NOTES
Check in to the hospital 3C at 0530.    Nothing to eat after midnight; water, apple juice or 7up/Sprite is OK up to two hours prior to your scheduled procedure.  You may take your medications in the morning with a sip of water.    Take a shower, with antibacterial soap, the night before the procedure, and the morning of the procedure.      Diagnosis: Severe aortic stenosis  Plan:  1. TAVR procedure scheduled for 9/4/2019.  The hospital will call patient to tell them time of surgery.  2. Medications changes:  -- Please take 325 mg Aspirin the day before and the morning of TAVR procedure.  -- All other medications at the discretion of the PAC clinic.     If any questions please contact:  Ml Neri RN  Structural Heart Care Coordinator  [facility]  Office: [phone]    
Continue with Current Diet; Defer texture/consistency to Speech Language Pathologist prn.

## 2024-12-03 NOTE — PLAN OF CARE
Dapsone Counseling: I discussed with the patient the risks of dapsone including but not limited to hemolytic anemia, agranulocytosis, rashes, methemoglobinemia, kidney failure, peripheral neuropathy, headaches, GI upset, and liver toxicity.  Patients who start dapsone require monitoring including baseline LFTs and weekly CBCs for the first month, then every month thereafter.  The patient verbalized understanding of the proper use and possible adverse effects of dapsone.  All of the patient's questions and concerns were addressed. PT 5A: Up with hand on gait belt + FWW for pivot.     Discharge Planner PT   Patient plan for discharge: Rehab  Current status: Evaluation complete and treatment indicated. Engaged pt in bed mobility at min-mod A, sit <> stand at CGA to brief min A with FWW, side stepping at CGA with FWW ~3ft. Pt disoriented and only oriented to self. Pt with symptomatic hypertension and not appropriate to assess gait today. Pt on RA with sats >92% and HR 100s with activity.   Barriers to return to prior living situation: medical status, 2 steps to enter home, tub shower, cognition  Recommendations for discharge: TCU  Rationale for recommendations: Pt is below baseline for mobility and would benefit from continued rehab to return to PLOF. Pt has had a steady decline in her activity and IND. Family is supportive, but only able to physically assist pt to an extent.        Entered by: Gypsy Gamboa 01/08/2019 3:26 PM        Isotretinoin Pregnancy And Lactation Text: This medication is Pregnancy Category X and is considered extremely dangerous during pregnancy. It is unknown if it is excreted in breast milk. Use Enhanced Medication Counseling?: No Azithromycin Pregnancy And Lactation Text: This medication is considered safe during pregnancy and is also secreted in breast milk. Topical Sulfur Applications Counseling: Topical Sulfur Counseling: Patient counseled that this medication may cause skin irritation or allergic reactions.  In the event of skin irritation, the patient was advised to reduce the amount of the drug applied or use it less frequently.   The patient verbalized understanding of the proper use and possible adverse effects of topical sulfur application.  All of the patient's questions and concerns were addressed. Doxycycline Counseling:  Patient counseled regarding possible photosensitivity and increased risk for sunburn.  Patient instructed to avoid sunlight, if possible.  When exposed to sunlight, patients should wear protective clothing, sunglasses, and sunscreen.  The patient was instructed to call the office immediately if the following severe adverse effects occur:  hearing changes, easy bruising/bleeding, severe headache, or vision changes.  The patient verbalized understanding of the proper use and possible adverse effects of doxycycline.  All of the patient's questions and concerns were addressed. High Dose Vitamin A Pregnancy And Lactation Text: High dose vitamin A therapy is contraindicated during pregnancy and breast feeding. Tetracycline Counseling: Patient counseled regarding possible photosensitivity and increased risk for sunburn.  Patient instructed to avoid sunlight, if possible.  When exposed to sunlight, patients should wear protective clothing, sunglasses, and sunscreen.  The patient was instructed to call the office immediately if the following severe adverse effects occur:  hearing changes, easy bruising/bleeding, severe headache, or vision changes.  The patient verbalized understanding of the proper use and possible adverse effects of tetracycline.  All of the patient's questions and concerns were addressed. Patient understands to avoid pregnancy while on therapy due to potential birth defects. Benzoyl Peroxide Pregnancy And Lactation Text: This medication is Pregnancy Category C. It is unknown if benzoyl peroxide is excreted in breast milk. Spironolactone Counseling: Patient advised regarding risks of diarrhea, abdominal pain, hyperkalemia, birth defects (for female patients), liver toxicity and renal toxicity. The patient may need blood work to monitor liver and kidney function and potassium levels while on therapy. The patient verbalized understanding of the proper use and possible adverse effects of spironolactone.  All of the patient's questions and concerns were addressed. Azelaic Acid Pregnancy And Lactation Text: This medication is considered safe during pregnancy and breast feeding. Topical Clindamycin Counseling: Patient counseled that this medication may cause skin irritation or allergic reactions.  In the event of skin irritation, the patient was advised to reduce the amount of the drug applied or use it less frequently.   The patient verbalized understanding of the proper use and possible adverse effects of clindamycin.  All of the patient's questions and concerns were addressed. Tetracycline Pregnancy And Lactation Text: This medication is Pregnancy Category D and not consider safe during pregnancy. It is also excreted in breast milk. Topical Retinoid counseling:  Patient advised to apply a pea-sized amount only at bedtime and wait 30 minutes after washing their face before applying.  If too drying, patient may add a non-comedogenic moisturizer. The patient verbalized understanding of the proper use and possible adverse effects of retinoids.  All of the patient's questions and concerns were addressed. Topical Sulfur Applications Pregnancy And Lactation Text: This medication is Pregnancy Category C and has an unknown safety profile during pregnancy. It is unknown if this topical medication is excreted in breast milk. Doxycycline Pregnancy And Lactation Text: This medication is Pregnancy Category D and not consider safe during pregnancy. It is also excreted in breast milk but is considered safe for shorter treatment courses. Bactrim Counseling:  I discussed with the patient the risks of sulfa antibiotics including but not limited to GI upset, allergic reaction, drug rash, diarrhea, dizziness, photosensitivity, and yeast infections.  Rarely, more serious reactions can occur including but not limited to aplastic anemia, agranulocytosis, methemoglobinemia, blood dyscrasias, liver or kidney failure, lung infiltrates or desquamative/blistering drug rashes. Minocycline Counseling: Patient advised regarding possible photosensitivity and discoloration of the teeth, skin, lips, tongue and gums.  Patient instructed to avoid sunlight, if possible.  When exposed to sunlight, patients should wear protective clothing, sunglasses, and sunscreen.  The patient was instructed to call the office immediately if the following severe adverse effects occur:  hearing changes, easy bruising/bleeding, severe headache, or vision changes.  The patient verbalized understanding of the proper use and possible adverse effects of minocycline.  All of the patient's questions and concerns were addressed. Winlevi Pregnancy And Lactation Text: This medication is considered safe during pregnancy and breastfeeding. Erythromycin Pregnancy And Lactation Text: This medication is Pregnancy Category B and is considered safe during pregnancy. It is also excreted in breast milk. Aklief Pregnancy And Lactation Text: It is unknown if this medication is safe to use during pregnancy.  It is unknown if this medication is excreted in breast milk.  Breastfeeding women should use the topical cream on the smallest area of the skin for the shortest time needed while breastfeeding.  Do not apply to nipple and areola. Tazorac Counseling:  Patient advised that medication is irritating and drying.  Patient may need to apply sparingly and wash off after an hour before eventually leaving it on overnight.  The patient verbalized understanding of the proper use and possible adverse effects of tazorac.  All of the patient's questions and concerns were addressed. Birth Control Pills Counseling: Birth Control Pill Counseling: I discussed with the patient the potential side effects of OCPs including but not limited to increased risk of stroke, heart attack, thrombophlebitis, deep venous thrombosis, hepatic adenomas, breast changes, GI upset, headaches, and depression.  The patient verbalized understanding of the proper use and possible adverse effects of OCPs. All of the patient's questions and concerns were addressed. Sarecycline Counseling: Patient advised regarding possible photosensitivity and discoloration of the teeth, skin, lips, tongue and gums.  Patient instructed to avoid sunlight, if possible.  When exposed to sunlight, patients should wear protective clothing, sunglasses, and sunscreen.  The patient was instructed to call the office immediately if the following severe adverse effects occur:  hearing changes, easy bruising/bleeding, severe headache, or vision changes.  The patient verbalized understanding of the proper use and possible adverse effects of sarecycline.  All of the patient's questions and concerns were addressed. Dapsone Pregnancy And Lactation Text: This medication is Pregnancy Category C and is not considered safe during pregnancy or breast feeding. High Dose Vitamin A Counseling: Side effects reviewed, pt to contact office should one occur. Azithromycin Counseling:  I discussed with the patient the risks of azithromycin including but not limited to GI upset, allergic reaction, drug rash, diarrhea, and yeast infections. Azelaic Acid Counseling: Patient counseled that medicine may cause skin irritation and to avoid applying near the eyes.  In the event of skin irritation, the patient was advised to reduce the amount of the drug applied or use it less frequently.   The patient verbalized understanding of the proper use and possible adverse effects of azelaic acid.  All of the patient's questions and concerns were addressed. Tazorac Pregnancy And Lactation Text: This medication is not safe during pregnancy. It is unknown if this medication is excreted in breast milk. Isotretinoin Counseling: Patient should get monthly blood tests, not donate blood, not drive at night if vision affected, not share medication, and not undergo elective surgery for 6 months after tx completed. Side effects reviewed, pt to contact office should one occur. Topical Clindamycin Pregnancy And Lactation Text: This medication is Pregnancy Category B and is considered safe during pregnancy. It is unknown if it is excreted in breast milk. Benzoyl Peroxide Counseling: Patient counseled that medicine may cause skin irritation and bleach clothing.  In the event of skin irritation, the patient was advised to reduce the amount of the drug applied or use it less frequently.   The patient verbalized understanding of the proper use and possible adverse effects of benzoyl peroxide.  All of the patient's questions and concerns were addressed. Spironolactone Pregnancy And Lactation Text: This medication can cause feminization of the male fetus and should be avoided during pregnancy. The active metabolite is also found in breast milk. Bactrim Pregnancy And Lactation Text: This medication is Pregnancy Category D and is known to cause fetal risk.  It is also excreted in breast milk. Birth Control Pills Pregnancy And Lactation Text: This medication should be avoided if pregnant and for the first 30 days post-partum. Detail Level: Detailed Erythromycin Counseling:  I discussed with the patient the risks of erythromycin including but not limited to GI upset, allergic reaction, drug rash, diarrhea, increase in liver enzymes, and yeast infections. Winlevi Counseling:  I discussed with the patient the risks of topical clascoterone including but not limited to erythema, scaling, itching, and stinging. Patient voiced their understanding. Aklief counseling:  Patient advised to apply a pea-sized amount only at bedtime and wait 30 minutes after washing their face before applying.  If too drying, patient may add a non-comedogenic moisturizer.  The most commonly reported side effects including irritation, redness, scaling, dryness, stinging, burning, itching, and increased risk of sunburn.  The patient verbalized understanding of the proper use and possible adverse effects of retinoids.  All of the patient's questions and concerns were addressed. Topical Retinoid Pregnancy And Lactation Text: This medication is Pregnancy Category C. It is unknown if this medication is excreted in breast milk.

## (undated) DEVICE — NDL ANGIOCATH 14GA 1.25" 4048

## (undated) DEVICE — WIRE GUIDE 0.035"X260CM SAFE-T-J EXCHANGE G00517

## (undated) DEVICE — KIT HAND CONTROL ACIST 014644 AR-P54

## (undated) DEVICE — KIT HAND CONTROL ANGIOTOUCH ACIST 65CM AT-P65

## (undated) DEVICE — BUR ROUTER 1.4X12.8MM ANSPACH S-1R

## (undated) DEVICE — INTRO SHEATH 4FRX25CM PINNACLE MARKER RSB403

## (undated) DEVICE — ESU GROUND PAD ADULT W/CORD E7507

## (undated) DEVICE — DEVICE CATH STATLOCK INTRA-AORTIC BALL PUMP 0684-00-0472

## (undated) DEVICE — VALVE HEMOSTASIS .115" DUOSTAT ADAPTER 23245

## (undated) DEVICE — CATH GUIDING IKARI 6FR IL3.5 LEFT HEARTRAIL 40-6370

## (undated) DEVICE — DRAPE SPECIAL PROCEDURE ANGIO-SHIELD D2224

## (undated) DEVICE — Device

## (undated) DEVICE — SET INTRODUCER SHEATH 14FR 914ES

## (undated) DEVICE — SPONGE COTTONOID 1X3" 20-10S

## (undated) DEVICE — SOL NACL 0.9% 10ML VIAL 0409-4888-02

## (undated) DEVICE — DRAPE POUCH INSTRUMENT 1018

## (undated) DEVICE — PACK HEART LEFT CUSTOM

## (undated) DEVICE — WIRE GUIDE 0.035"X150CM EMERALD STR 502542

## (undated) DEVICE — LIGHT HANDLE X1 31140133

## (undated) DEVICE — WIRE GUIDE 0.04"X300CM GRANDSLAM J TP AG141302J

## (undated) DEVICE — BASIN EMESIS STERILE  SSK9005A

## (undated) DEVICE — GW 175CM STR OPTOWIRE DEUX W/O GA FCTR CONN

## (undated) DEVICE — INTRO SHEATH 7FRX10CM PINNACLE RSS702

## (undated) DEVICE — SU VICRYL 3-0 SH 8X18" UND J864D

## (undated) DEVICE — INTRO SHEATH 6FRX10CM PINNACLE RSS602

## (undated) DEVICE — PACK GOWN 3/PK DISP XL SBA32GPFCB

## (undated) DEVICE — SUCTION MANIFOLD DORNOCH ULTRA CART UL-CL500

## (undated) DEVICE — BUR BALL 5MM ANSPACH S-5B-G1

## (undated) DEVICE — DEFIB PADPRO CONMED ADULT/CHILD 2001Z-C

## (undated) DEVICE — PREP POVIDONE IODINE SOLUTION 10% 120ML

## (undated) DEVICE — SLEEVE TR BAND RADIAL COMPRESSION DEVICE 24CM TRB24-REG

## (undated) DEVICE — BOWL 32OZ STERILE 01232

## (undated) DEVICE — TAPE CLOTH 3" CARDINAL 3TRCL03

## (undated) DEVICE — WIPE PREMOIST CLEANSING WASHCLOTHS 7988

## (undated) DEVICE — ENDO SNARE POLYPECTOMY SPIRAL 20MM LOOP SD-230U-20

## (undated) DEVICE — SPONGE COTTONOID 1/2X3" 20-07S

## (undated) DEVICE — SU PLEDGET SOFT TFE 3/8"X3/26"X1/16" PCP40

## (undated) DEVICE — INTRO SHEATH MICRO PLATINUM TIP 4FRX40CM 7274

## (undated) DEVICE — INTRO SHEATH 7FRX25CM PINNACLE RSS706

## (undated) DEVICE — MARKER SPHERES PASSIVE MEDT PACK 5 8801075

## (undated) DEVICE — RETR ELASTIC STAYS LONE STAR BLUNT DUAL LEAD 3550-1G

## (undated) DEVICE — NDL 27GA 1.25" 305136

## (undated) DEVICE — KIT ENDO FIRST STEP DISINFECTANT 200ML W/POUCH EP-4

## (undated) DEVICE — SU MONOCRYL 4-0 PS-2 27" UND Y426H

## (undated) DEVICE — SYR 30ML LL W/O NDL 302832

## (undated) DEVICE — SU VICRYL 2-0 CT-2 CR 8X18" J726D

## (undated) DEVICE — LINEN TOWEL PACK X30 5481

## (undated) DEVICE — DRAPE IOBAN INCISE 23X17" 6650EZ

## (undated) DEVICE — PREP CHLORAPREP 26ML TINTED ORANGE  260815

## (undated) DEVICE — PAD CHUX UNDERPAD 30X30"

## (undated) DEVICE — GUIDEWIRE VASC SAFARI2 0.035X275CM H74939406XS1

## (undated) DEVICE — TUBING PRESSURE 30"

## (undated) DEVICE — LINEN TOWEL PACK X6 WHITE 5487

## (undated) DEVICE — INFLATION DEVICE ATRION QL2530

## (undated) DEVICE — CATH BALLOON TRANSFEMORAL 20MM 9350BC20A

## (undated) DEVICE — SPONGE SURGIFOAM 01GM POWDER 1978

## (undated) DEVICE — DRAPE CRANIOTOMY W/POUCH 9450

## (undated) DEVICE — SU NUROLON 4-0 TF CR 8X18" C584D

## (undated) DEVICE — SOL WATER IRRIG 1000ML BOTTLE 07139-09

## (undated) DEVICE — CATH EP PACEL 5FRX110CM 1MM RIGHT HEART CURVE 401763

## (undated) DEVICE — INTRO SHEATH 6FRX25CM PINNACLE RSS606

## (undated) DEVICE — WIRE GUIDE 0.035"X260CM SAFE-T-J EXCHANGE TSCF-35-260-3-BH

## (undated) DEVICE — SYR VALVE LOW VISCOSITY 100ML ACIST A2000V

## (undated) DEVICE — PACK CRANIOTOMY

## (undated) DEVICE — SOL WATER IRRIG 1000ML BOTTLE 2F7114

## (undated) DEVICE — CONNECTOR STOPCOCK 3 WAY MALE LL HI-FLO MX9311L

## (undated) DEVICE — FASTENER CATH BALLOON CLAMPX2 STATLOCK 0684-00-493

## (undated) DEVICE — CATH SYSTEM SENTINEL CEREBRAL PROTECTION 6FR CMS15-10C-US

## (undated) DEVICE — ESU CORD BIPOLAR AND IRR TUBING AESCULAP US355

## (undated) DEVICE — RAD CLOSURE ANGIOSEAL 8FR  610131

## (undated) DEVICE — PREP CHLORHEXIDINE 4% 4OZ (HIBICLENS) 57504

## (undated) DEVICE — GUIDEWIRE ASAHI GRAND SLAM 300CM J-TIP AG141302J

## (undated) DEVICE — SHTH INTRO 0.021IN ID 6FR DIA

## (undated) DEVICE — RAD KNIFE HANDLE W/11 BLADE DISPOSABLE 371611

## (undated) DEVICE — ENDO SNARE POLYPECTOMY ACUSNARE 7FR 2.5CM G22649

## (undated) DEVICE — CRANIOTOME ADULT ANSPACH A-CRN

## (undated) DEVICE — SYR ANGIOGRAPHY MULTIUSE KIT ACIST 014612

## (undated) DEVICE — SYR 10ML LL W/O NDL 302995

## (undated) DEVICE — TAPE MEDIPORE 4"X2YD 2864

## (undated) DEVICE — ENDO CAP AND TUBING STERILE FOR ENDOGATOR  100130

## (undated) DEVICE — WIPES FOLEY CARE SURESTEP PROVON DFC100

## (undated) DEVICE — GLOVE PROTEXIS POWDER FREE 7.5 ORTHOPEDIC 2D73ET75

## (undated) DEVICE — SPONGE COTTONOID 1/2X1/2" 20-04S

## (undated) DEVICE — DRAPE MAYO STAND 23X54 8337

## (undated) DEVICE — DRSG PRIMAPORE 02X3" 7133

## (undated) DEVICE — COVER CAMERA VIDEO LASER 9X96" 04-CC227

## (undated) DEVICE — SPONGE COTTONOID 1/2X1 1/2" 20-06S

## (undated) DEVICE — SYR 50ML LL W/O NDL 309653

## (undated) DEVICE — MANIFOLD KIT ANGIO AUTOMATED 014613

## (undated) DEVICE — RX BACITRACIN OINTMENT 0.9G 1/32OZ CUR001109

## (undated) DEVICE — SPONGE RAY-TEC 4X8" 7318

## (undated) DEVICE — CONNECTOR STOPCOCK 3-WAY HIGH PRESSURE (NAMIC) H965700550091

## (undated) DEVICE — DRSG TEGADERM 4X4 3/4" 1626W

## (undated) DEVICE — WIRE GUIDE 0.035"X150CM EMERALD J TIP 502521

## (undated) DEVICE — PROTECTOR ARM ONE-STEP TRENDELENBURG 40418

## (undated) DEVICE — ESU GROUND PAD ADULT REM W/15' CORD E7507DB

## (undated) DEVICE — SPONGE SURGIFOAM 100 1974

## (undated) DEVICE — SPECIMEN CONTAINER 3OZ W/FORMALIN 59901

## (undated) DEVICE — DRAPE SHEET REV FOLD 3/4 9349

## (undated) DEVICE — PACK ENDOSCOPY GI CUSTOM UMMC

## (undated) DEVICE — DRSG KERLIX 4 1/2"X4YDS ROLL 6715

## (undated) DEVICE — DRAPE STERI FLUOROSCOPE 35X43"1012 LATEX FREE

## (undated) DEVICE — COVER ULTRASOUND PROBE W/GEL FLEXI-FEEL 6"X58" LF  25-FF658

## (undated) DEVICE — DRSG TEGADERM 8X12" 1629

## (undated) DEVICE — DRAPE CV SPLIT II 147X106" 9158

## (undated) DEVICE — ENDO TUBING CO2 SMARTCAP STERILE DISP 100145CO2EXT

## (undated) DEVICE — PREP SKIN SCRUB TRAY 4461A

## (undated) DEVICE — CATH ANGIO INFINITI PIGTAIL 145 6 SH 6FRX110CM  534-652S

## (undated) DEVICE — CATH GUIDING IKARI 6FR 100CM RIGHT HEARTRAIL 40-6380

## (undated) DEVICE — PREP CHLORAPREP CLEAR 3ML 260400

## (undated) DEVICE — PREP POVIDONE IODINE SCRUB 7.5% 120ML

## (undated) DEVICE — INTRO GLIDESHEATH SLENDER 6FR 10X45CM 60-1060

## (undated) DEVICE — LINEN TOWEL PACK X5 5464

## (undated) DEVICE — NDL SCLEROTHERAPY 25GA CARR-LOCK  00711811

## (undated) DEVICE — ENDO CONNECTOR ENDOGATOR AUX WATER JET FOR OLYMPUS SCOPE

## (undated) DEVICE — WIRE GUIDE 0.035"X145CM AMPLATZ XSTIFF J THSCF-35-145-3-AES

## (undated) DEVICE — TUBING SUCTION 10'X3/16" N510

## (undated) DEVICE — SPONGE COTTONOID 1/4X1/4" 20-01S

## (undated) DEVICE — WIRE GUIDE LUNDERQUIST 0.035"X260CM DBL CVD

## (undated) RX ORDER — FENTANYL CITRATE 50 UG/ML
INJECTION, SOLUTION INTRAMUSCULAR; INTRAVENOUS
Status: DISPENSED
Start: 2019-01-06

## (undated) RX ORDER — ADENOSINE 3 MG/ML
INJECTION, SOLUTION INTRAVENOUS
Status: DISPENSED
Start: 2019-01-01

## (undated) RX ORDER — GLYCOPYRROLATE 0.2 MG/ML
INJECTION, SOLUTION INTRAMUSCULAR; INTRAVENOUS
Status: DISPENSED
Start: 2019-01-01

## (undated) RX ORDER — HYDROMORPHONE HYDROCHLORIDE 1 MG/ML
INJECTION, SOLUTION INTRAMUSCULAR; INTRAVENOUS; SUBCUTANEOUS
Status: DISPENSED
Start: 2017-02-22

## (undated) RX ORDER — FENTANYL CITRATE 50 UG/ML
INJECTION, SOLUTION INTRAMUSCULAR; INTRAVENOUS
Status: DISPENSED
Start: 2019-01-01

## (undated) RX ORDER — BUPIVACAINE HYDROCHLORIDE AND EPINEPHRINE 2.5; 5 MG/ML; UG/ML
INJECTION, SOLUTION EPIDURAL; INFILTRATION; INTRACAUDAL; PERINEURAL
Status: DISPENSED
Start: 2017-02-22

## (undated) RX ORDER — SODIUM CHLORIDE 9 MG/ML
INJECTION, SOLUTION INTRAVENOUS
Status: DISPENSED
Start: 2019-01-01

## (undated) RX ORDER — ACETAMINOPHEN 325 MG/1
TABLET ORAL
Status: DISPENSED
Start: 2017-02-22

## (undated) RX ORDER — PROTAMINE SULFATE 10 MG/ML
INJECTION, SOLUTION INTRAVENOUS
Status: DISPENSED
Start: 2019-01-01

## (undated) RX ORDER — ACETAMINOPHEN 325 MG/1
TABLET ORAL
Status: DISPENSED
Start: 2019-01-01

## (undated) RX ORDER — LIDOCAINE 40 MG/G
CREAM TOPICAL
Status: DISPENSED
Start: 2019-01-01

## (undated) RX ORDER — BUPIVACAINE HYDROCHLORIDE 5 MG/ML
INJECTION, SOLUTION EPIDURAL; INTRACAUDAL
Status: DISPENSED
Start: 2019-01-01

## (undated) RX ORDER — ASPIRIN 81 MG/1
TABLET, CHEWABLE ORAL
Status: DISPENSED
Start: 2019-01-01

## (undated) RX ORDER — SODIUM CHLORIDE 9 MG/ML
INJECTION, SOLUTION INTRAVENOUS
Status: DISPENSED
Start: 2017-02-22

## (undated) RX ORDER — FENTANYL CITRATE 50 UG/ML
INJECTION, SOLUTION INTRAMUSCULAR; INTRAVENOUS
Status: DISPENSED
Start: 2019-01-16

## (undated) RX ORDER — PROPOFOL 10 MG/ML
INJECTION, EMULSION INTRAVENOUS
Status: DISPENSED
Start: 2019-01-01

## (undated) RX ORDER — BACITRACIN 50000 [IU]/1
INJECTION, POWDER, FOR SOLUTION INTRAMUSCULAR
Status: DISPENSED
Start: 2017-02-22

## (undated) RX ORDER — PROPOFOL 10 MG/ML
INJECTION, EMULSION INTRAVENOUS
Status: DISPENSED
Start: 2017-10-04

## (undated) RX ORDER — HEPARIN SODIUM 1000 [USP'U]/ML
INJECTION, SOLUTION INTRAVENOUS; SUBCUTANEOUS
Status: DISPENSED
Start: 2019-01-01

## (undated) RX ORDER — LIDOCAINE HYDROCHLORIDE 10 MG/ML
INJECTION, SOLUTION EPIDURAL; INFILTRATION; INTRACAUDAL; PERINEURAL
Status: DISPENSED
Start: 2019-01-01

## (undated) RX ORDER — LIDOCAINE HYDROCHLORIDE 20 MG/ML
INJECTION, SOLUTION EPIDURAL; INFILTRATION; INTRACAUDAL; PERINEURAL
Status: DISPENSED
Start: 2019-01-01

## (undated) RX ORDER — LIDOCAINE HYDROCHLORIDE 10 MG/ML
INJECTION, SOLUTION EPIDURAL; INFILTRATION; INTRACAUDAL; PERINEURAL
Status: DISPENSED
Start: 2019-01-25

## (undated) RX ORDER — FENTANYL CITRATE 50 UG/ML
INJECTION, SOLUTION INTRAMUSCULAR; INTRAVENOUS
Status: DISPENSED
Start: 2017-02-22

## (undated) RX ORDER — CEFAZOLIN SODIUM 2 G/100ML
INJECTION, SOLUTION INTRAVENOUS
Status: DISPENSED
Start: 2017-02-22

## (undated) RX ORDER — LIDOCAINE HYDROCHLORIDE 20 MG/ML
INJECTION, SOLUTION EPIDURAL; INFILTRATION; INTRACAUDAL; PERINEURAL
Status: DISPENSED
Start: 2017-10-04

## (undated) RX ORDER — BUPIVACAINE HYDROCHLORIDE 5 MG/ML
INJECTION, SOLUTION EPIDURAL; INTRACAUDAL
Status: DISPENSED
Start: 2017-02-22

## (undated) RX ORDER — CEFAZOLIN SODIUM 2 G/100ML
INJECTION, SOLUTION INTRAVENOUS
Status: DISPENSED
Start: 2019-01-01

## (undated) RX ORDER — FENTANYL CITRATE 50 UG/ML
INJECTION, SOLUTION INTRAMUSCULAR; INTRAVENOUS
Status: DISPENSED
Start: 2017-10-04

## (undated) RX ORDER — VERAPAMIL HYDROCHLORIDE 2.5 MG/ML
INJECTION, SOLUTION INTRAVENOUS
Status: DISPENSED
Start: 2019-01-01